# Patient Record
Sex: MALE | Race: BLACK OR AFRICAN AMERICAN | NOT HISPANIC OR LATINO | Employment: OTHER | URBAN - METROPOLITAN AREA
[De-identification: names, ages, dates, MRNs, and addresses within clinical notes are randomized per-mention and may not be internally consistent; named-entity substitution may affect disease eponyms.]

---

## 2018-07-02 LAB — FINAL CULTURE: NO GROWTH

## 2018-07-12 ENCOUNTER — HISTORICAL (OUTPATIENT)
Dept: ADMINISTRATIVE | Facility: HOSPITAL | Age: 43
End: 2018-07-12

## 2018-07-18 LAB
FINAL CULTURE: NORMAL
FINAL CULTURE: NORMAL

## 2018-11-26 ENCOUNTER — HISTORICAL (OUTPATIENT)
Dept: ADMINISTRATIVE | Facility: HOSPITAL | Age: 43
End: 2018-11-26

## 2018-12-03 ENCOUNTER — HISTORICAL (OUTPATIENT)
Dept: ADMINISTRATIVE | Facility: HOSPITAL | Age: 43
End: 2018-12-03

## 2019-01-03 ENCOUNTER — HISTORICAL (OUTPATIENT)
Dept: ADMINISTRATIVE | Facility: HOSPITAL | Age: 44
End: 2019-01-03

## 2019-01-04 ENCOUNTER — HISTORICAL (OUTPATIENT)
Dept: LAB | Facility: HOSPITAL | Age: 44
End: 2019-01-04

## 2019-01-08 ENCOUNTER — HISTORICAL (OUTPATIENT)
Dept: ADMINISTRATIVE | Facility: HOSPITAL | Age: 44
End: 2019-01-08

## 2019-01-08 ENCOUNTER — HISTORICAL (OUTPATIENT)
Dept: LAB | Facility: HOSPITAL | Age: 44
End: 2019-01-08

## 2019-01-14 ENCOUNTER — HISTORICAL (OUTPATIENT)
Dept: ADMINISTRATIVE | Facility: HOSPITAL | Age: 44
End: 2019-01-14

## 2019-01-14 ENCOUNTER — HISTORICAL (OUTPATIENT)
Dept: LAB | Facility: HOSPITAL | Age: 44
End: 2019-01-14

## 2019-01-21 ENCOUNTER — HISTORICAL (OUTPATIENT)
Dept: LAB | Facility: HOSPITAL | Age: 44
End: 2019-01-21

## 2019-01-21 ENCOUNTER — HISTORICAL (OUTPATIENT)
Dept: ADMINISTRATIVE | Facility: HOSPITAL | Age: 44
End: 2019-01-21

## 2019-01-21 LAB
ABS NEUT (OLG): 5.6 X10(3)/MCL (ref 2.1–9.2)
ALBUMIN SERPL-MCNC: 3 GM/DL (ref 3.4–5)
ALBUMIN/GLOB SERPL: 0.8 RATIO (ref 1.1–2)
ALP SERPL-CCNC: 102 UNIT/L (ref 50–136)
ALT SERPL-CCNC: 16 UNIT/L (ref 12–78)
AST SERPL-CCNC: 9 UNIT/L (ref 15–37)
BASOPHILS # BLD AUTO: 0 X10(3)/MCL (ref 0–0.2)
BASOPHILS NFR BLD AUTO: 1 %
BILIRUB SERPL-MCNC: 0.3 MG/DL (ref 0.2–1)
BILIRUBIN DIRECT+TOT PNL SERPL-MCNC: 0.1 MG/DL (ref 0–0.5)
BILIRUBIN DIRECT+TOT PNL SERPL-MCNC: 0.2 MG/DL (ref 0–0.8)
BUN SERPL-MCNC: 12 MG/DL (ref 7–18)
CALCIUM SERPL-MCNC: 10 MG/DL (ref 8.5–10.1)
CHLORIDE SERPL-SCNC: 104 MMOL/L (ref 98–107)
CO2 SERPL-SCNC: 28 MMOL/L (ref 21–32)
CREAT SERPL-MCNC: 0.41 MG/DL (ref 0.7–1.3)
CRP SERPL HS-MCNC: 66.7 MG/L (ref 0–3)
EOSINOPHIL # BLD AUTO: 0.3 X10(3)/MCL (ref 0–0.9)
EOSINOPHIL NFR BLD AUTO: 4 %
ERYTHROCYTE [DISTWIDTH] IN BLOOD BY AUTOMATED COUNT: 16.3 % (ref 11.5–17)
ERYTHROCYTE [SEDIMENTATION RATE] IN BLOOD: 82 MM/HR (ref 0–15)
GLOBULIN SER-MCNC: 4 GM/DL (ref 2.4–3.5)
GLUCOSE SERPL-MCNC: 67 MG/DL (ref 74–106)
HCT VFR BLD AUTO: 34 % (ref 42–52)
HGB BLD-MCNC: 10.1 GM/DL (ref 14–18)
LYMPHOCYTES # BLD AUTO: 1.9 X10(3)/MCL (ref 0.6–4.6)
LYMPHOCYTES NFR BLD AUTO: 22 %
MCH RBC QN AUTO: 26 PG (ref 27–31)
MCHC RBC AUTO-ENTMCNC: 29.7 GM/DL (ref 33–36)
MCV RBC AUTO: 87.4 FL (ref 80–94)
MONOCYTES # BLD AUTO: 0.6 X10(3)/MCL (ref 0.1–1.3)
MONOCYTES NFR BLD AUTO: 7 %
NEUTROPHILS # BLD AUTO: 5.6 X10(3)/MCL (ref 2.1–9.2)
NEUTROPHILS NFR BLD AUTO: 66 %
PLATELET # BLD AUTO: 424 X10(3)/MCL (ref 130–400)
PMV BLD AUTO: 10.2 FL (ref 9.4–12.4)
POTASSIUM SERPL-SCNC: 4 MMOL/L (ref 3.5–5.1)
PROT SERPL-MCNC: 7 GM/DL (ref 6.4–8.2)
RBC # BLD AUTO: 3.89 X10(6)/MCL (ref 4.7–6.1)
SODIUM SERPL-SCNC: 140 MMOL/L (ref 136–145)
VANCOMYCIN TROUGH SERPL-MCNC: 20.6 MCG/ML (ref 12–20)
WBC # SPEC AUTO: 8.5 X10(3)/MCL (ref 4.5–11.5)

## 2019-01-28 ENCOUNTER — HISTORICAL (OUTPATIENT)
Dept: ADMINISTRATIVE | Facility: HOSPITAL | Age: 44
End: 2019-01-28

## 2019-01-30 ENCOUNTER — HISTORICAL (OUTPATIENT)
Dept: LAB | Facility: HOSPITAL | Age: 44
End: 2019-01-30

## 2019-01-30 LAB
ABS NEUT (OLG): 7.06 X10(3)/MCL (ref 2.1–9.2)
ALBUMIN SERPL-MCNC: 2.6 GM/DL (ref 3.4–5)
ALBUMIN/GLOB SERPL: 0.7 {RATIO}
ALP SERPL-CCNC: 89 UNIT/L (ref 50–136)
ALT SERPL-CCNC: 9 UNIT/L (ref 12–78)
AST SERPL-CCNC: 8 UNIT/L (ref 15–37)
BASOPHILS # BLD AUTO: 0 X10(3)/MCL (ref 0–0.2)
BASOPHILS NFR BLD AUTO: 0 %
BILIRUB SERPL-MCNC: 0.3 MG/DL (ref 0.2–1)
BILIRUBIN DIRECT+TOT PNL SERPL-MCNC: 0.1 MG/DL (ref 0–0.2)
BILIRUBIN DIRECT+TOT PNL SERPL-MCNC: 0.2 MG/DL (ref 0–0.8)
BUN SERPL-MCNC: 7 MG/DL (ref 7–18)
CALCIUM SERPL-MCNC: 8.6 MG/DL (ref 8.5–10.1)
CHLORIDE SERPL-SCNC: 106 MMOL/L (ref 98–107)
CO2 SERPL-SCNC: 27 MMOL/L (ref 21–32)
CREAT SERPL-MCNC: 0.51 MG/DL (ref 0.7–1.3)
CRP SERPL HS-MCNC: 124 MG/L (ref 0–3)
EOSINOPHIL # BLD AUTO: 0.1 X10(3)/MCL (ref 0–0.9)
EOSINOPHIL NFR BLD AUTO: 2 %
ERYTHROCYTE [DISTWIDTH] IN BLOOD BY AUTOMATED COUNT: 16.2 % (ref 11.5–17)
ERYTHROCYTE [SEDIMENTATION RATE] IN BLOOD: 89 MM/HR (ref 0–15)
GLOBULIN SER-MCNC: 3.8 GM/DL (ref 2.4–3.5)
GLUCOSE SERPL-MCNC: 78 MG/DL (ref 74–106)
HCT VFR BLD AUTO: 30.2 % (ref 42–52)
HGB BLD-MCNC: 9 GM/DL (ref 14–18)
LYMPHOCYTES # BLD AUTO: 1.6 X10(3)/MCL (ref 0.6–4.6)
LYMPHOCYTES NFR BLD AUTO: 17 %
MCH RBC QN AUTO: 26 PG (ref 27–31)
MCHC RBC AUTO-ENTMCNC: 29.8 GM/DL (ref 33–36)
MCV RBC AUTO: 87.3 FL (ref 80–94)
MONOCYTES # BLD AUTO: 0.5 X10(3)/MCL (ref 0.1–1.3)
MONOCYTES NFR BLD AUTO: 6 %
NEUTROPHILS # BLD AUTO: 7.06 X10(3)/MCL (ref 2.1–9.2)
NEUTROPHILS NFR BLD AUTO: 75 %
PLATELET # BLD AUTO: 388 X10(3)/MCL (ref 130–400)
PMV BLD AUTO: 10.4 FL (ref 9.4–12.4)
POTASSIUM SERPL-SCNC: 3.8 MMOL/L (ref 3.5–5.1)
PROT SERPL-MCNC: 6.4 GM/DL (ref 6.4–8.2)
RBC # BLD AUTO: 3.46 X10(6)/MCL (ref 4.7–6.1)
SODIUM SERPL-SCNC: 142 MMOL/L (ref 136–145)
VANCOMYCIN TROUGH SERPL-MCNC: 19.6 MCG/ML (ref 12–20)
WBC # SPEC AUTO: 9.4 X10(3)/MCL (ref 4.5–11.5)

## 2019-02-04 ENCOUNTER — HISTORICAL (OUTPATIENT)
Dept: ADMINISTRATIVE | Facility: HOSPITAL | Age: 44
End: 2019-02-04

## 2019-03-07 ENCOUNTER — HISTORICAL (OUTPATIENT)
Dept: ADMINISTRATIVE | Facility: HOSPITAL | Age: 44
End: 2019-03-07

## 2019-03-14 ENCOUNTER — HOSPITAL ENCOUNTER (OUTPATIENT)
Dept: MEDSURG UNIT | Facility: HOSPITAL | Age: 44
End: 2019-04-04
Attending: INTERNAL MEDICINE | Admitting: INTERNAL MEDICINE

## 2019-03-14 LAB
ABS NEUT (OLG): 20.96 X10(3)/MCL (ref 2.1–9.2)
ALBUMIN SERPL-MCNC: 2.3 GM/DL (ref 3.4–5)
ALBUMIN/GLOB SERPL: 0.4 RATIO (ref 1.1–2)
ALP SERPL-CCNC: 136 UNIT/L (ref 50–136)
ALT SERPL-CCNC: 11 UNIT/L (ref 12–78)
AMPHET UR QL SCN: ABNORMAL
APPEARANCE, UA: ABNORMAL
AST SERPL-CCNC: 8 UNIT/L (ref 15–37)
BACTERIA SPEC CULT: ABNORMAL /HPF
BARBITURATE SCN PRESENT UR: ABNORMAL
BENZODIAZ UR QL SCN: ABNORMAL
BILIRUB SERPL-MCNC: 0.6 MG/DL (ref 0.2–1)
BILIRUB UR QL STRIP: ABNORMAL
BILIRUBIN DIRECT+TOT PNL SERPL-MCNC: 0.2 MG/DL (ref 0–0.2)
BILIRUBIN DIRECT+TOT PNL SERPL-MCNC: 0.4 MG/DL (ref 0–0.8)
BUN SERPL-MCNC: 17 MG/DL (ref 7–18)
CALCIUM SERPL-MCNC: 9 MG/DL (ref 8.5–10.1)
CANNABINOIDS UR QL SCN: POSITIVE
CHLORIDE SERPL-SCNC: 98 MMOL/L (ref 98–107)
CO2 SERPL-SCNC: 26 MMOL/L (ref 21–32)
COCAINE UR QL SCN: ABNORMAL
COLOR UR: ABNORMAL
CREAT SERPL-MCNC: 0.79 MG/DL (ref 0.7–1.3)
CRP SERPL HS-MCNC: >190 MG/L (ref 0–3)
ERYTHROCYTE [DISTWIDTH] IN BLOOD BY AUTOMATED COUNT: 15.7 % (ref 11.5–17)
ERYTHROCYTE [SEDIMENTATION RATE] IN BLOOD: 120 MM/HR (ref 0–15)
GLOBULIN SER-MCNC: 5.8 GM/DL (ref 2.4–3.5)
GLUCOSE (UA): NEGATIVE
GLUCOSE SERPL-MCNC: 86 MG/DL (ref 74–106)
HCT VFR BLD AUTO: 32.4 % (ref 42–52)
HGB BLD-MCNC: 9.5 GM/DL (ref 14–18)
HGB UR QL STRIP: ABNORMAL
HYPOCHROMIA BLD QL SMEAR: 1
KETONES UR QL STRIP: ABNORMAL
LACTATE SERPL-SCNC: 0.8 MMOL/L (ref 0.4–2)
LACTATE SERPL-SCNC: 2.4 MMOL/L (ref 0.4–2)
LACTATE SERPL-SCNC: 3.9 MMOL/L (ref 0.4–2)
LACTATE SERPL-SCNC: 5.1 MMOL/L (ref 0.4–2)
LEUKOCYTE ESTERASE UR QL STRIP: ABNORMAL
LYMPHOCYTES NFR BLD MANUAL: 5 % (ref 13–40)
MCH RBC QN AUTO: 26.4 PG (ref 27–31)
MCHC RBC AUTO-ENTMCNC: 29.3 GM/DL (ref 33–36)
MCV RBC AUTO: 90 FL (ref 80–94)
MONOCYTES NFR BLD MANUAL: 2 % (ref 2–11)
NEUTROPHILS NFR BLD MANUAL: 93 % (ref 47–80)
NITRITE UR QL STRIP: NEGATIVE
OPIATES UR QL SCN: POSITIVE
PCP UR QL: ABNORMAL
PH UR STRIP.AUTO: 5.5 [PH] (ref 5–7.5)
PH UR STRIP: 5.5 [PH] (ref 5–9)
PLATELET # BLD AUTO: 540 X10(3)/MCL (ref 130–400)
PLATELET # BLD EST: NORMAL 10*3/UL
PMV BLD AUTO: 9.7 FL (ref 7.4–10.4)
POTASSIUM SERPL-SCNC: 4.1 MMOL/L (ref 3.5–5.1)
PREALB SERPL-MCNC: 7.7 MG/DL (ref 18–38)
PROT SERPL-MCNC: 8.1 GM/DL (ref 6.4–8.2)
PROT UR QL STRIP: ABNORMAL
RBC # BLD AUTO: 3.6 X10(6)/MCL (ref 4.7–6.1)
RBC #/AREA URNS HPF: ABNORMAL /HPF
RBC MORPH BLD: NORMAL
SODIUM SERPL-SCNC: 134 MMOL/L (ref 136–145)
SP GR FLD REFRACTOMETRY: 1.02 (ref 1–1.03)
SP GR UR STRIP: 1.02 (ref 1–1.03)
SQUAMOUS EPITHELIAL, UA: ABNORMAL
UA WBC MAN: >200 /HPF
UROBILINOGEN UR STRIP-ACNC: 1
WBC # SPEC AUTO: 24.1 X10(3)/MCL (ref 4.5–11.5)
WBC #/AREA URNS HPF: ABNORMAL /HPF (ref 0–3)

## 2019-03-15 LAB
ABS NEUT (OLG): 12.13 X10(3)/MCL (ref 2.1–9.2)
ALBUMIN SERPL-MCNC: 2.1 GM/DL (ref 3.4–5)
ALBUMIN/GLOB SERPL: 0.5 RATIO (ref 1.1–2)
ALP SERPL-CCNC: 125 UNIT/L (ref 50–136)
ALT SERPL-CCNC: 13 UNIT/L (ref 12–78)
AST SERPL-CCNC: 11 UNIT/L (ref 15–37)
BASOPHILS # BLD AUTO: 0.1 X10(3)/MCL (ref 0–0.2)
BASOPHILS NFR BLD AUTO: 1 %
BILIRUB SERPL-MCNC: 0.2 MG/DL (ref 0.2–1)
BILIRUBIN DIRECT+TOT PNL SERPL-MCNC: 0.1 MG/DL (ref 0–0.5)
BILIRUBIN DIRECT+TOT PNL SERPL-MCNC: 0.1 MG/DL (ref 0–0.8)
BUN SERPL-MCNC: 10 MG/DL (ref 7–18)
CALCIUM SERPL-MCNC: 8.5 MG/DL (ref 8.5–10.1)
CHLORIDE SERPL-SCNC: 101 MMOL/L (ref 98–107)
CO2 SERPL-SCNC: 26 MMOL/L (ref 21–32)
CREAT SERPL-MCNC: 0.45 MG/DL (ref 0.7–1.3)
EOSINOPHIL # BLD AUTO: 0.8 X10(3)/MCL (ref 0–0.9)
EOSINOPHIL NFR BLD AUTO: 5 %
ERYTHROCYTE [DISTWIDTH] IN BLOOD BY AUTOMATED COUNT: 15.5 % (ref 11.5–17)
GLOBULIN SER-MCNC: 4.4 GM/DL (ref 2.4–3.5)
GLUCOSE SERPL-MCNC: 126 MG/DL (ref 74–106)
HCT VFR BLD AUTO: 26.2 % (ref 42–52)
HGB BLD-MCNC: 7.8 GM/DL (ref 14–18)
HIV 1+2 AB+HIV1 P24 AG SERPL QL IA: NEGATIVE
LYMPHOCYTES # BLD AUTO: 1.8 X10(3)/MCL (ref 0.6–4.6)
LYMPHOCYTES NFR BLD AUTO: 12 %
MCH RBC QN AUTO: 26.6 PG (ref 27–31)
MCHC RBC AUTO-ENTMCNC: 29.8 GM/DL (ref 33–36)
MCV RBC AUTO: 89.4 FL (ref 80–94)
MONOCYTES # BLD AUTO: 0.8 X10(3)/MCL (ref 0.1–1.3)
MONOCYTES NFR BLD AUTO: 5 %
NEUTROPHILS # BLD AUTO: 12.13 X10(3)/MCL (ref 2.1–9.2)
NEUTROPHILS NFR BLD AUTO: 77 %
PLATELET # BLD AUTO: 491 X10(3)/MCL (ref 130–400)
PMV BLD AUTO: 9.5 FL (ref 9.4–12.4)
POTASSIUM SERPL-SCNC: 4.1 MMOL/L (ref 3.5–5.1)
PROT SERPL-MCNC: 6.5 GM/DL (ref 6.4–8.2)
RBC # BLD AUTO: 2.93 X10(6)/MCL (ref 4.7–6.1)
SODIUM SERPL-SCNC: 135 MMOL/L (ref 136–145)
T PALLIDUM AB SER QL: NEGATIVE
WBC # SPEC AUTO: 15.8 X10(3)/MCL (ref 4.5–11.5)

## 2019-03-16 LAB
ABS NEUT (OLG): 8.22 X10(3)/MCL (ref 2.1–9.2)
BASOPHILS # BLD AUTO: 0.1 X10(3)/MCL (ref 0–0.2)
BASOPHILS NFR BLD AUTO: 1 %
BUN SERPL-MCNC: 6 MG/DL (ref 7–18)
CALCIUM SERPL-MCNC: 8.7 MG/DL (ref 8.5–10.1)
CHLORIDE SERPL-SCNC: 104 MMOL/L (ref 98–107)
CO2 SERPL-SCNC: 26 MMOL/L (ref 21–32)
CREAT SERPL-MCNC: 0.32 MG/DL (ref 0.7–1.3)
CREAT/UREA NIT SERPL: 18.8
EOSINOPHIL # BLD AUTO: 0.8 X10(3)/MCL (ref 0–0.9)
EOSINOPHIL NFR BLD AUTO: 6 %
ERYTHROCYTE [DISTWIDTH] IN BLOOD BY AUTOMATED COUNT: 15.6 % (ref 11.5–17)
GLUCOSE SERPL-MCNC: 82 MG/DL (ref 74–106)
HCT VFR BLD AUTO: 27.2 % (ref 42–52)
HGB BLD-MCNC: 7.9 GM/DL (ref 14–18)
LYMPHOCYTES # BLD AUTO: 2 X10(3)/MCL (ref 0.6–4.6)
LYMPHOCYTES NFR BLD AUTO: 17 %
MCH RBC QN AUTO: 26.2 PG (ref 27–31)
MCHC RBC AUTO-ENTMCNC: 29 GM/DL (ref 33–36)
MCV RBC AUTO: 90.4 FL (ref 80–94)
MONOCYTES # BLD AUTO: 0.8 X10(3)/MCL (ref 0.1–1.3)
MONOCYTES NFR BLD AUTO: 7 %
NEUTROPHILS # BLD AUTO: 8.22 X10(3)/MCL (ref 2.1–9.2)
NEUTROPHILS NFR BLD AUTO: 69 %
PLATELET # BLD AUTO: 452 X10(3)/MCL (ref 130–400)
PMV BLD AUTO: 9.5 FL (ref 9.4–12.4)
POTASSIUM SERPL-SCNC: 4.2 MMOL/L (ref 3.5–5.1)
RBC # BLD AUTO: 3.01 X10(6)/MCL (ref 4.7–6.1)
SODIUM SERPL-SCNC: 138 MMOL/L (ref 136–145)
WBC # SPEC AUTO: 11.9 X10(3)/MCL (ref 4.5–11.5)

## 2019-03-17 LAB
ABS NEUT (OLG): 6.97 X10(3)/MCL (ref 2.1–9.2)
BASOPHILS # BLD AUTO: 0.1 X10(3)/MCL (ref 0–0.2)
BASOPHILS NFR BLD AUTO: 1 %
EOSINOPHIL # BLD AUTO: 0.7 X10(3)/MCL (ref 0–0.9)
EOSINOPHIL NFR BLD AUTO: 7 %
ERYTHROCYTE [DISTWIDTH] IN BLOOD BY AUTOMATED COUNT: 15.3 % (ref 11.5–17)
HCT VFR BLD AUTO: 28.8 % (ref 42–52)
HGB BLD-MCNC: 8.2 GM/DL (ref 14–18)
LYMPHOCYTES # BLD AUTO: 2 X10(3)/MCL (ref 0.6–4.6)
LYMPHOCYTES NFR BLD AUTO: 19 %
MCH RBC QN AUTO: 26.1 PG (ref 27–31)
MCHC RBC AUTO-ENTMCNC: 28.5 GM/DL (ref 33–36)
MCV RBC AUTO: 91.7 FL (ref 80–94)
MONOCYTES # BLD AUTO: 0.8 X10(3)/MCL (ref 0.1–1.3)
MONOCYTES NFR BLD AUTO: 7 %
NEUTROPHILS # BLD AUTO: 6.97 X10(3)/MCL (ref 2.1–9.2)
NEUTROPHILS NFR BLD AUTO: 65 %
PLATELET # BLD AUTO: 547 X10(3)/MCL (ref 130–400)
PMV BLD AUTO: 9.3 FL (ref 9.4–12.4)
RBC # BLD AUTO: 3.14 X10(6)/MCL (ref 4.7–6.1)
WBC # SPEC AUTO: 10.8 X10(3)/MCL (ref 4.5–11.5)

## 2019-03-18 LAB
ABS NEUT (OLG): 7.27 X10(3)/MCL (ref 2.1–9.2)
BASOPHILS # BLD AUTO: 0.1 X10(3)/MCL (ref 0–0.2)
BASOPHILS NFR BLD AUTO: 1 %
EOSINOPHIL # BLD AUTO: 0.7 X10(3)/MCL (ref 0–0.9)
EOSINOPHIL NFR BLD AUTO: 6 %
ERYTHROCYTE [DISTWIDTH] IN BLOOD BY AUTOMATED COUNT: 15.5 % (ref 11.5–17)
HCT VFR BLD AUTO: 30 % (ref 42–52)
HGB BLD-MCNC: 8.5 GM/DL (ref 14–18)
LYMPHOCYTES # BLD AUTO: 2.8 X10(3)/MCL (ref 0.6–4.6)
LYMPHOCYTES NFR BLD AUTO: 24 %
MCH RBC QN AUTO: 25.9 PG (ref 27–31)
MCHC RBC AUTO-ENTMCNC: 28.3 GM/DL (ref 33–36)
MCV RBC AUTO: 91.5 FL (ref 80–94)
MONOCYTES # BLD AUTO: 0.9 X10(3)/MCL (ref 0.1–1.3)
MONOCYTES NFR BLD AUTO: 8 %
NEUTROPHILS # BLD AUTO: 7.27 X10(3)/MCL (ref 2.1–9.2)
NEUTROPHILS NFR BLD AUTO: 61 %
PLATELET # BLD AUTO: 578 X10(3)/MCL (ref 130–400)
PMV BLD AUTO: 9.5 FL (ref 9.4–12.4)
RBC # BLD AUTO: 3.28 X10(6)/MCL (ref 4.7–6.1)
WBC # SPEC AUTO: 11.9 X10(3)/MCL (ref 4.5–11.5)

## 2019-03-19 LAB — FINAL CULTURE: NORMAL

## 2019-03-20 LAB
ABS NEUT (OLG): 6.55 X10(3)/MCL (ref 2.1–9.2)
ALBUMIN SERPL-MCNC: 2.6 GM/DL (ref 3.4–5)
ALBUMIN/GLOB SERPL: 0.6 {RATIO}
ALP SERPL-CCNC: 100 UNIT/L (ref 50–136)
ALT SERPL-CCNC: 19 UNIT/L (ref 12–78)
AST SERPL-CCNC: 8 UNIT/L (ref 15–37)
BASOPHILS # BLD AUTO: 0.1 X10(3)/MCL (ref 0–0.2)
BASOPHILS NFR BLD AUTO: 1 %
BILIRUB SERPL-MCNC: 0.6 MG/DL (ref 0.2–1)
BILIRUBIN DIRECT+TOT PNL SERPL-MCNC: 0.1 MG/DL (ref 0–0.2)
BILIRUBIN DIRECT+TOT PNL SERPL-MCNC: 0.5 MG/DL (ref 0–0.8)
BUN SERPL-MCNC: 12 MG/DL (ref 7–18)
CALCIUM SERPL-MCNC: 9.4 MG/DL (ref 8.5–10.1)
CHLORIDE SERPL-SCNC: 98 MMOL/L (ref 98–107)
CO2 SERPL-SCNC: 31 MMOL/L (ref 21–32)
CREAT SERPL-MCNC: 0.37 MG/DL (ref 0.7–1.3)
EOSINOPHIL # BLD AUTO: 0.5 X10(3)/MCL (ref 0–0.9)
EOSINOPHIL NFR BLD AUTO: 5 %
ERYTHROCYTE [DISTWIDTH] IN BLOOD BY AUTOMATED COUNT: 15.9 % (ref 11.5–17)
GLOBULIN SER-MCNC: 4.6 GM/DL (ref 2.4–3.5)
GLUCOSE SERPL-MCNC: 95 MG/DL (ref 74–106)
HCT VFR BLD AUTO: 30.5 % (ref 42–52)
HGB BLD-MCNC: 9 GM/DL (ref 14–18)
LYMPHOCYTES # BLD AUTO: 2.6 X10(3)/MCL (ref 0.6–4.6)
LYMPHOCYTES NFR BLD AUTO: 24 %
MCH RBC QN AUTO: 25.7 PG (ref 27–31)
MCHC RBC AUTO-ENTMCNC: 29.5 GM/DL (ref 33–36)
MCV RBC AUTO: 87.1 FL (ref 80–94)
MONOCYTES # BLD AUTO: 0.9 X10(3)/MCL (ref 0.1–1.3)
MONOCYTES NFR BLD AUTO: 8 %
NEUTROPHILS # BLD AUTO: 6.55 X10(3)/MCL (ref 2.1–9.2)
NEUTROPHILS NFR BLD AUTO: 61 %
PLATELET # BLD AUTO: 667 X10(3)/MCL (ref 130–400)
PMV BLD AUTO: 9.1 FL (ref 9.4–12.4)
POTASSIUM SERPL-SCNC: 4.9 MMOL/L (ref 3.5–5.1)
PROT SERPL-MCNC: 7.2 GM/DL (ref 6.4–8.2)
RBC # BLD AUTO: 3.5 X10(6)/MCL (ref 4.7–6.1)
SODIUM SERPL-SCNC: 137 MMOL/L (ref 136–145)
WBC # SPEC AUTO: 10.8 X10(3)/MCL (ref 4.5–11.5)

## 2019-03-23 LAB
ABS NEUT (OLG): 4.9 X10(3)/MCL (ref 2.1–9.2)
ALBUMIN SERPL-MCNC: 2.6 GM/DL (ref 3.4–5)
ALBUMIN/GLOB SERPL: 0.6 RATIO (ref 1.1–2)
ALP SERPL-CCNC: 95 UNIT/L (ref 50–136)
ALT SERPL-CCNC: 16 UNIT/L (ref 12–78)
AST SERPL-CCNC: 9 UNIT/L (ref 15–37)
BASOPHILS # BLD AUTO: 0.1 X10(3)/MCL (ref 0–0.2)
BASOPHILS NFR BLD AUTO: 2 %
BILIRUB SERPL-MCNC: 0.1 MG/DL (ref 0.2–1)
BILIRUBIN DIRECT+TOT PNL SERPL-MCNC: 0 MG/DL (ref 0–0.8)
BILIRUBIN DIRECT+TOT PNL SERPL-MCNC: 0.1 MG/DL (ref 0–0.5)
BUN SERPL-MCNC: 14 MG/DL (ref 7–18)
CALCIUM SERPL-MCNC: 9.8 MG/DL (ref 8.5–10.1)
CHLORIDE SERPL-SCNC: 100 MMOL/L (ref 98–107)
CO2 SERPL-SCNC: 35 MMOL/L (ref 21–32)
CREAT SERPL-MCNC: 0.4 MG/DL (ref 0.7–1.3)
EOSINOPHIL # BLD AUTO: 0.5 X10(3)/MCL (ref 0–0.9)
EOSINOPHIL NFR BLD AUTO: 6 %
ERYTHROCYTE [DISTWIDTH] IN BLOOD BY AUTOMATED COUNT: 15.9 % (ref 11.5–17)
GLOBULIN SER-MCNC: 4.7 GM/DL (ref 2.4–3.5)
GLUCOSE SERPL-MCNC: 81 MG/DL (ref 74–106)
HCT VFR BLD AUTO: 31.1 % (ref 42–52)
HGB BLD-MCNC: 9.2 GM/DL (ref 14–18)
LYMPHOCYTES # BLD AUTO: 2.5 X10(3)/MCL (ref 0.6–4.6)
LYMPHOCYTES NFR BLD AUTO: 29 %
MAGNESIUM SERPL-MCNC: 2 MG/DL (ref 1.8–2.4)
MCH RBC QN AUTO: 26.6 PG (ref 27–31)
MCHC RBC AUTO-ENTMCNC: 29.6 GM/DL (ref 33–36)
MCV RBC AUTO: 89.9 FL (ref 80–94)
MONOCYTES # BLD AUTO: 0.5 X10(3)/MCL (ref 0.1–1.3)
MONOCYTES NFR BLD AUTO: 6 %
NEUTROPHILS # BLD AUTO: 4.9 X10(3)/MCL (ref 2.1–9.2)
NEUTROPHILS NFR BLD AUTO: 56 %
PLATELET # BLD AUTO: 707 X10(3)/MCL (ref 130–400)
PMV BLD AUTO: 8.9 FL (ref 9.4–12.4)
POTASSIUM SERPL-SCNC: 4.7 MMOL/L (ref 3.5–5.1)
PROT SERPL-MCNC: 7.3 GM/DL (ref 6.4–8.2)
RBC # BLD AUTO: 3.46 X10(6)/MCL (ref 4.7–6.1)
SODIUM SERPL-SCNC: 138 MMOL/L (ref 136–145)
WBC # SPEC AUTO: 8.7 X10(3)/MCL (ref 4.5–11.5)

## 2019-03-24 LAB
ABS NEUT (OLG): 8.54 X10(3)/MCL (ref 2.1–9.2)
ALBUMIN SERPL-MCNC: 2.7 GM/DL (ref 3.4–5)
ALBUMIN/GLOB SERPL: 0.6 RATIO (ref 1.1–2)
ALP SERPL-CCNC: 101 UNIT/L (ref 50–136)
ALT SERPL-CCNC: 15 UNIT/L (ref 12–78)
AST SERPL-CCNC: 8 UNIT/L (ref 15–37)
BASOPHILS # BLD AUTO: 0.1 X10(3)/MCL (ref 0–0.2)
BASOPHILS NFR BLD AUTO: 1 %
BILIRUB SERPL-MCNC: 0.2 MG/DL (ref 0.2–1)
BILIRUBIN DIRECT+TOT PNL SERPL-MCNC: 0.1 MG/DL (ref 0–0.5)
BILIRUBIN DIRECT+TOT PNL SERPL-MCNC: 0.1 MG/DL (ref 0–0.8)
BUN SERPL-MCNC: 18 MG/DL (ref 7–18)
CALCIUM SERPL-MCNC: 10.2 MG/DL (ref 8.5–10.1)
CHLORIDE SERPL-SCNC: 100 MMOL/L (ref 98–107)
CO2 SERPL-SCNC: 36 MMOL/L (ref 21–32)
CREAT SERPL-MCNC: 0.53 MG/DL (ref 0.7–1.3)
EOSINOPHIL # BLD AUTO: 0.5 X10(3)/MCL (ref 0–0.9)
EOSINOPHIL NFR BLD AUTO: 4 %
ERYTHROCYTE [DISTWIDTH] IN BLOOD BY AUTOMATED COUNT: 16.1 % (ref 11.5–17)
GLOBULIN SER-MCNC: 4.8 GM/DL (ref 2.4–3.5)
GLUCOSE SERPL-MCNC: 87 MG/DL (ref 74–106)
HCT VFR BLD AUTO: 32.2 % (ref 42–52)
HGB BLD-MCNC: 9.3 GM/DL (ref 14–18)
LYMPHOCYTES # BLD AUTO: 2.2 X10(3)/MCL (ref 0.6–4.6)
LYMPHOCYTES NFR BLD AUTO: 18 %
MAGNESIUM SERPL-MCNC: 2 MG/DL (ref 1.8–2.4)
MCH RBC QN AUTO: 25.8 PG (ref 27–31)
MCHC RBC AUTO-ENTMCNC: 28.9 GM/DL (ref 33–36)
MCV RBC AUTO: 89.4 FL (ref 80–94)
MONOCYTES # BLD AUTO: 0.7 X10(3)/MCL (ref 0.1–1.3)
MONOCYTES NFR BLD AUTO: 6 %
NEUTROPHILS # BLD AUTO: 8.54 X10(3)/MCL (ref 2.1–9.2)
NEUTROPHILS NFR BLD AUTO: 70 %
PLATELET # BLD AUTO: 657 X10(3)/MCL (ref 130–400)
PMV BLD AUTO: 8.7 FL (ref 9.4–12.4)
POTASSIUM SERPL-SCNC: 4.7 MMOL/L (ref 3.5–5.1)
PROT SERPL-MCNC: 7.5 GM/DL (ref 6.4–8.2)
RBC # BLD AUTO: 3.6 X10(6)/MCL (ref 4.7–6.1)
SODIUM SERPL-SCNC: 138 MMOL/L (ref 136–145)
WBC # SPEC AUTO: 12.2 X10(3)/MCL (ref 4.5–11.5)

## 2019-03-25 LAB
ABS NEUT (OLG): 7.95 X10(3)/MCL (ref 2.1–9.2)
ALBUMIN SERPL-MCNC: 2.7 GM/DL (ref 3.4–5)
ALBUMIN/GLOB SERPL: 0.6 RATIO (ref 1.1–2)
ALP SERPL-CCNC: 93 UNIT/L (ref 50–136)
ALT SERPL-CCNC: 12 UNIT/L (ref 12–78)
AST SERPL-CCNC: 10 UNIT/L (ref 15–37)
BASOPHILS # BLD AUTO: 0.1 X10(3)/MCL (ref 0–0.2)
BASOPHILS NFR BLD AUTO: 1 %
BILIRUB SERPL-MCNC: 0.2 MG/DL (ref 0.2–1)
BILIRUBIN DIRECT+TOT PNL SERPL-MCNC: 0.1 MG/DL (ref 0–0.5)
BILIRUBIN DIRECT+TOT PNL SERPL-MCNC: 0.1 MG/DL (ref 0–0.8)
BUN SERPL-MCNC: 16 MG/DL (ref 7–18)
CALCIUM SERPL-MCNC: 10 MG/DL (ref 8.5–10.1)
CHLORIDE SERPL-SCNC: 101 MMOL/L (ref 98–107)
CO2 SERPL-SCNC: 29 MMOL/L (ref 21–32)
CREAT SERPL-MCNC: 0.35 MG/DL (ref 0.7–1.3)
EOSINOPHIL # BLD AUTO: 0.5 X10(3)/MCL (ref 0–0.9)
EOSINOPHIL NFR BLD AUTO: 4 %
ERYTHROCYTE [DISTWIDTH] IN BLOOD BY AUTOMATED COUNT: 16 % (ref 11.5–17)
GLOBULIN SER-MCNC: 4.7 GM/DL (ref 2.4–3.5)
GLUCOSE SERPL-MCNC: 86 MG/DL (ref 74–106)
HCT VFR BLD AUTO: 31.4 % (ref 42–52)
HGB BLD-MCNC: 9.2 GM/DL (ref 14–18)
LYMPHOCYTES # BLD AUTO: 2.5 X10(3)/MCL (ref 0.6–4.6)
LYMPHOCYTES NFR BLD AUTO: 21 %
MAGNESIUM SERPL-MCNC: 2 MG/DL (ref 1.8–2.4)
MCH RBC QN AUTO: 26.1 PG (ref 27–31)
MCHC RBC AUTO-ENTMCNC: 29.3 GM/DL (ref 33–36)
MCV RBC AUTO: 89.2 FL (ref 80–94)
MONOCYTES # BLD AUTO: 0.7 X10(3)/MCL (ref 0.1–1.3)
MONOCYTES NFR BLD AUTO: 6 %
NEUTROPHILS # BLD AUTO: 7.95 X10(3)/MCL (ref 2.1–9.2)
NEUTROPHILS NFR BLD AUTO: 68 %
PLATELET # BLD AUTO: 651 X10(3)/MCL (ref 130–400)
PMV BLD AUTO: 8.6 FL (ref 9.4–12.4)
POTASSIUM SERPL-SCNC: 4.6 MMOL/L (ref 3.5–5.1)
PROT SERPL-MCNC: 7.4 GM/DL (ref 6.4–8.2)
RBC # BLD AUTO: 3.52 X10(6)/MCL (ref 4.7–6.1)
SODIUM SERPL-SCNC: 137 MMOL/L (ref 136–145)
WBC # SPEC AUTO: 11.8 X10(3)/MCL (ref 4.5–11.5)

## 2019-03-26 LAB
ABS NEUT (OLG): 6.86 X10(3)/MCL (ref 2.1–9.2)
ALBUMIN SERPL-MCNC: 2.8 GM/DL (ref 3.4–5)
ALBUMIN/GLOB SERPL: 0.6 RATIO (ref 1.1–2)
ALP SERPL-CCNC: 91 UNIT/L (ref 50–136)
ALT SERPL-CCNC: 16 UNIT/L (ref 12–78)
AST SERPL-CCNC: 13 UNIT/L (ref 15–37)
BASOPHILS # BLD AUTO: 0.1 X10(3)/MCL (ref 0–0.2)
BASOPHILS NFR BLD AUTO: 1 %
BILIRUB SERPL-MCNC: 0.2 MG/DL (ref 0.2–1)
BILIRUBIN DIRECT+TOT PNL SERPL-MCNC: 0.1 MG/DL (ref 0–0.5)
BILIRUBIN DIRECT+TOT PNL SERPL-MCNC: 0.1 MG/DL (ref 0–0.8)
BUN SERPL-MCNC: 19 MG/DL (ref 7–18)
CALCIUM SERPL-MCNC: 9.8 MG/DL (ref 8.5–10.1)
CHLORIDE SERPL-SCNC: 101 MMOL/L (ref 98–107)
CO2 SERPL-SCNC: 30 MMOL/L (ref 21–32)
CREAT SERPL-MCNC: 0.49 MG/DL (ref 0.7–1.3)
EOSINOPHIL # BLD AUTO: 0.6 X10(3)/MCL (ref 0–0.9)
EOSINOPHIL NFR BLD AUTO: 6 %
ERYTHROCYTE [DISTWIDTH] IN BLOOD BY AUTOMATED COUNT: 15.9 % (ref 11.5–17)
GLOBULIN SER-MCNC: 4.6 GM/DL (ref 2.4–3.5)
GLUCOSE SERPL-MCNC: 95 MG/DL (ref 74–106)
HCT VFR BLD AUTO: 30.6 % (ref 42–52)
HGB BLD-MCNC: 9 GM/DL (ref 14–18)
LYMPHOCYTES # BLD AUTO: 2.9 X10(3)/MCL (ref 0.6–4.6)
LYMPHOCYTES NFR BLD AUTO: 26 %
MAGNESIUM SERPL-MCNC: 2 MG/DL (ref 1.8–2.4)
MCH RBC QN AUTO: 26.1 PG (ref 27–31)
MCHC RBC AUTO-ENTMCNC: 29.4 GM/DL (ref 33–36)
MCV RBC AUTO: 88.7 FL (ref 80–94)
MONOCYTES # BLD AUTO: 0.7 X10(3)/MCL (ref 0.1–1.3)
MONOCYTES NFR BLD AUTO: 6 %
NEUTROPHILS # BLD AUTO: 6.86 X10(3)/MCL (ref 2.1–9.2)
NEUTROPHILS NFR BLD AUTO: 60 %
PLATELET # BLD AUTO: 598 X10(3)/MCL (ref 130–400)
PMV BLD AUTO: 8.8 FL (ref 9.4–12.4)
POTASSIUM SERPL-SCNC: 4.6 MMOL/L (ref 3.5–5.1)
PROT SERPL-MCNC: 7.4 GM/DL (ref 6.4–8.2)
RBC # BLD AUTO: 3.45 X10(6)/MCL (ref 4.7–6.1)
SODIUM SERPL-SCNC: 138 MMOL/L (ref 136–145)
WBC # SPEC AUTO: 11.4 X10(3)/MCL (ref 4.5–11.5)

## 2019-03-27 LAB
ABS NEUT (OLG): 7.13 X10(3)/MCL (ref 2.1–9.2)
ALBUMIN SERPL-MCNC: 2.7 GM/DL (ref 3.4–5)
ALBUMIN/GLOB SERPL: 0.6 {RATIO}
ALP SERPL-CCNC: 95 UNIT/L (ref 50–136)
ALT SERPL-CCNC: 12 UNIT/L (ref 12–78)
AST SERPL-CCNC: 7 UNIT/L (ref 15–37)
BASOPHILS # BLD AUTO: 0.2 X10(3)/MCL (ref 0–0.2)
BASOPHILS NFR BLD AUTO: 1 %
BILIRUB SERPL-MCNC: 0.2 MG/DL (ref 0.2–1)
BILIRUBIN DIRECT+TOT PNL SERPL-MCNC: 0.1 MG/DL (ref 0–0.2)
BILIRUBIN DIRECT+TOT PNL SERPL-MCNC: 0.1 MG/DL (ref 0–0.8)
BUN SERPL-MCNC: 17 MG/DL (ref 7–18)
CALCIUM SERPL-MCNC: 9.6 MG/DL (ref 8.5–10.1)
CHLORIDE SERPL-SCNC: 99 MMOL/L (ref 98–107)
CO2 SERPL-SCNC: 28 MMOL/L (ref 21–32)
CREAT SERPL-MCNC: 0.46 MG/DL (ref 0.7–1.3)
EOSINOPHIL # BLD AUTO: 0.6 X10(3)/MCL (ref 0–0.9)
EOSINOPHIL NFR BLD AUTO: 5 %
ERYTHROCYTE [DISTWIDTH] IN BLOOD BY AUTOMATED COUNT: 15.9 % (ref 11.5–17)
GLOBULIN SER-MCNC: 4.5 GM/DL (ref 2.4–3.5)
GLUCOSE SERPL-MCNC: 79 MG/DL (ref 74–106)
HCT VFR BLD AUTO: 29.6 % (ref 42–52)
HGB BLD-MCNC: 8.5 GM/DL (ref 14–18)
LYMPHOCYTES # BLD AUTO: 3.1 X10(3)/MCL (ref 0.6–4.6)
LYMPHOCYTES NFR BLD AUTO: 26 %
MAGNESIUM SERPL-MCNC: 1.9 MG/DL (ref 1.8–2.4)
MCH RBC QN AUTO: 26 PG (ref 27–31)
MCHC RBC AUTO-ENTMCNC: 28.7 GM/DL (ref 33–36)
MCV RBC AUTO: 90.5 FL (ref 80–94)
MONOCYTES # BLD AUTO: 0.7 X10(3)/MCL (ref 0.1–1.3)
MONOCYTES NFR BLD AUTO: 6 %
NEUTROPHILS # BLD AUTO: 7.13 X10(3)/MCL (ref 2.1–9.2)
NEUTROPHILS NFR BLD AUTO: 61 %
PLATELET # BLD AUTO: 598 X10(3)/MCL (ref 130–400)
PMV BLD AUTO: 8.9 FL (ref 9.4–12.4)
POTASSIUM SERPL-SCNC: 4.4 MMOL/L (ref 3.5–5.1)
PROT SERPL-MCNC: 7.2 GM/DL (ref 6.4–8.2)
RBC # BLD AUTO: 3.27 X10(6)/MCL (ref 4.7–6.1)
SODIUM SERPL-SCNC: 137 MMOL/L (ref 136–145)
WBC # SPEC AUTO: 11.8 X10(3)/MCL (ref 4.5–11.5)

## 2019-03-29 ENCOUNTER — HISTORICAL (OUTPATIENT)
Dept: LAB | Facility: HOSPITAL | Age: 44
End: 2019-03-29

## 2019-03-29 LAB
ABS NEUT (OLG): 9.56 X10(3)/MCL (ref 2.1–9.2)
BASOPHILS # BLD AUTO: 0.2 X10(3)/MCL (ref 0–0.2)
BASOPHILS NFR BLD AUTO: 1 %
EOSINOPHIL # BLD AUTO: 0.5 X10(3)/MCL (ref 0–0.9)
EOSINOPHIL NFR BLD AUTO: 3 %
ERYTHROCYTE [DISTWIDTH] IN BLOOD BY AUTOMATED COUNT: 15.9 % (ref 11.5–17)
HCT VFR BLD AUTO: 31.1 % (ref 42–52)
HGB BLD-MCNC: 9.1 GM/DL (ref 14–18)
LYMPHOCYTES # BLD AUTO: 2.4 X10(3)/MCL (ref 0.6–4.6)
LYMPHOCYTES NFR BLD AUTO: 18 %
MCH RBC QN AUTO: 25.8 PG (ref 27–31)
MCHC RBC AUTO-ENTMCNC: 29.3 GM/DL (ref 33–36)
MCV RBC AUTO: 88.1 FL (ref 80–94)
MONOCYTES # BLD AUTO: 0.9 X10(3)/MCL (ref 0.1–1.3)
MONOCYTES NFR BLD AUTO: 6 %
NEUTROPHILS # BLD AUTO: 9.56 X10(3)/MCL (ref 2.1–9.2)
NEUTROPHILS NFR BLD AUTO: 71 %
PLATELET # BLD AUTO: 584 X10(3)/MCL (ref 130–400)
PMV BLD AUTO: 9 FL (ref 9.4–12.4)
RBC # BLD AUTO: 3.53 X10(6)/MCL (ref 4.7–6.1)
WBC # SPEC AUTO: 13.5 X10(3)/MCL (ref 4.5–11.5)

## 2019-03-30 LAB
ABS NEUT (OLG): 5.47 X10(3)/MCL (ref 2.1–9.2)
BASOPHILS # BLD AUTO: 0.1 X10(3)/MCL (ref 0–0.2)
BASOPHILS NFR BLD AUTO: 1 %
EOSINOPHIL # BLD AUTO: 0.6 X10(3)/MCL (ref 0–0.9)
EOSINOPHIL NFR BLD AUTO: 6 %
ERYTHROCYTE [DISTWIDTH] IN BLOOD BY AUTOMATED COUNT: 15.8 % (ref 11.5–17)
HCT VFR BLD AUTO: 27.2 % (ref 42–52)
HGB BLD-MCNC: 8.1 GM/DL (ref 14–18)
LYMPHOCYTES # BLD AUTO: 2.4 X10(3)/MCL (ref 0.6–4.6)
LYMPHOCYTES NFR BLD AUTO: 26 %
MCH RBC QN AUTO: 26.4 PG (ref 27–31)
MCHC RBC AUTO-ENTMCNC: 29.8 GM/DL (ref 33–36)
MCV RBC AUTO: 88.6 FL (ref 80–94)
MONOCYTES # BLD AUTO: 0.7 X10(3)/MCL (ref 0.1–1.3)
MONOCYTES NFR BLD AUTO: 7 %
NEUTROPHILS # BLD AUTO: 5.47 X10(3)/MCL (ref 2.1–9.2)
NEUTROPHILS NFR BLD AUTO: 59 %
PLATELET # BLD AUTO: 474 X10(3)/MCL (ref 130–400)
PMV BLD AUTO: 9.2 FL (ref 9.4–12.4)
RBC # BLD AUTO: 3.07 X10(6)/MCL (ref 4.7–6.1)
WBC # SPEC AUTO: 9.2 X10(3)/MCL (ref 4.5–11.5)

## 2019-03-31 LAB
HCT VFR BLD AUTO: 28.3 % (ref 42–52)
HGB BLD-MCNC: 8.2 GM/DL (ref 14–18)

## 2019-04-01 LAB
ABS NEUT (OLG): 6.09 X10(3)/MCL (ref 2.1–9.2)
BASOPHILS # BLD AUTO: 0.1 X10(3)/MCL (ref 0–0.2)
BASOPHILS NFR BLD AUTO: 1 %
EOSINOPHIL # BLD AUTO: 0.5 X10(3)/MCL (ref 0–0.9)
EOSINOPHIL NFR BLD AUTO: 6 %
ERYTHROCYTE [DISTWIDTH] IN BLOOD BY AUTOMATED COUNT: 15.3 % (ref 11.5–17)
HCT VFR BLD AUTO: 29 % (ref 42–52)
HGB BLD-MCNC: 8.4 GM/DL (ref 14–18)
LYMPHOCYTES # BLD AUTO: 2 X10(3)/MCL (ref 0.6–4.6)
LYMPHOCYTES NFR BLD AUTO: 22 %
MCH RBC QN AUTO: 26 PG (ref 27–31)
MCHC RBC AUTO-ENTMCNC: 29 GM/DL (ref 33–36)
MCV RBC AUTO: 89.8 FL (ref 80–94)
MONOCYTES # BLD AUTO: 0.5 X10(3)/MCL (ref 0.1–1.3)
MONOCYTES NFR BLD AUTO: 6 %
NEUTROPHILS # BLD AUTO: 6.09 X10(3)/MCL (ref 2.1–9.2)
NEUTROPHILS NFR BLD AUTO: 65 %
PLATELET # BLD AUTO: 464 X10(3)/MCL (ref 130–400)
PMV BLD AUTO: 9.5 FL (ref 9.4–12.4)
RBC # BLD AUTO: 3.23 X10(6)/MCL (ref 4.7–6.1)
WBC # SPEC AUTO: 9.3 X10(3)/MCL (ref 4.5–11.5)

## 2019-04-03 LAB
ABS NEUT (OLG): 3.85 X10(3)/MCL (ref 2.1–9.2)
BASOPHILS # BLD AUTO: 0.1 X10(3)/MCL (ref 0–0.2)
BASOPHILS NFR BLD AUTO: 1 %
EOSINOPHIL # BLD AUTO: 0.4 X10(3)/MCL (ref 0–0.9)
EOSINOPHIL NFR BLD AUTO: 6 %
ERYTHROCYTE [DISTWIDTH] IN BLOOD BY AUTOMATED COUNT: 15.4 % (ref 11.5–17)
HCT VFR BLD AUTO: 31 % (ref 42–52)
HGB BLD-MCNC: 8.9 GM/DL (ref 14–18)
LYMPHOCYTES # BLD AUTO: 2.2 X10(3)/MCL (ref 0.6–4.6)
LYMPHOCYTES NFR BLD AUTO: 31 %
MCH RBC QN AUTO: 25.8 PG (ref 27–31)
MCHC RBC AUTO-ENTMCNC: 28.7 GM/DL (ref 33–36)
MCV RBC AUTO: 89.9 FL (ref 80–94)
MONOCYTES # BLD AUTO: 0.5 X10(3)/MCL (ref 0.1–1.3)
MONOCYTES NFR BLD AUTO: 7 %
NEUTROPHILS # BLD AUTO: 3.85 X10(3)/MCL (ref 2.1–9.2)
NEUTROPHILS NFR BLD AUTO: 54 %
PLATELET # BLD AUTO: 436 X10(3)/MCL (ref 130–400)
PMV BLD AUTO: 9.4 FL (ref 9.4–12.4)
RBC # BLD AUTO: 3.45 X10(6)/MCL (ref 4.7–6.1)
WBC # SPEC AUTO: 7.1 X10(3)/MCL (ref 4.5–11.5)

## 2019-05-13 ENCOUNTER — HISTORICAL (OUTPATIENT)
Dept: ADMINISTRATIVE | Facility: HOSPITAL | Age: 44
End: 2019-05-13

## 2019-05-20 ENCOUNTER — HISTORICAL (OUTPATIENT)
Dept: ADMINISTRATIVE | Facility: HOSPITAL | Age: 44
End: 2019-05-20

## 2020-04-03 ENCOUNTER — HOSPITAL ENCOUNTER (OUTPATIENT)
Dept: NUTRITION | Facility: HOSPITAL | Age: 45
End: 2020-04-04
Attending: INTERNAL MEDICINE | Admitting: INTERNAL MEDICINE

## 2020-11-28 ENCOUNTER — HOSPITAL ENCOUNTER (OUTPATIENT)
Dept: MEDSURG UNIT | Facility: HOSPITAL | Age: 45
End: 2020-11-30
Attending: INTERNAL MEDICINE | Admitting: INTERNAL MEDICINE

## 2021-05-03 ENCOUNTER — HISTORICAL (OUTPATIENT)
Dept: ADMINISTRATIVE | Facility: HOSPITAL | Age: 46
End: 2021-05-03

## 2021-05-10 LAB — FINAL CULTURE: NORMAL

## 2021-05-22 LAB — FINAL CULTURE: NO GROWTH

## 2022-01-14 ENCOUNTER — HOSPITAL ENCOUNTER (OUTPATIENT)
Dept: MEDSURG UNIT | Facility: HOSPITAL | Age: 47
End: 2022-01-20
Attending: INTERNAL MEDICINE | Admitting: INTERNAL MEDICINE

## 2022-04-09 ENCOUNTER — HISTORICAL (OUTPATIENT)
Dept: ADMINISTRATIVE | Facility: HOSPITAL | Age: 47
End: 2022-04-09
Payer: MEDICARE

## 2022-04-25 VITALS
HEIGHT: 72 IN | WEIGHT: 180.75 LBS | BODY MASS INDEX: 24.48 KG/M2 | SYSTOLIC BLOOD PRESSURE: 136 MMHG | DIASTOLIC BLOOD PRESSURE: 95 MMHG | OXYGEN SATURATION: 98 %
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
## 2022-04-30 NOTE — DISCHARGE SUMMARY
Patient:   Hemal Guerrero            MRN: [id number]            FIN: [id number]               Age:   46 years     Sex:  Male     :  1975   Associated Diagnoses:   None   Author:   Alli Kilpatrick MD      Discharge Information      Discharge Summary Information   Admit/Discharge Dates   Admit Date: 2022  Discharge Date: 2022     Physicians   Attending Physician - Ed ORELLANA, Fritz HARRIS  Admitting Physician - Fritz Ward MD  Consulting Physician - Mark ORELLANA, Jose HOOPER  Consulting Physician - Finesse ORELLANA, Alli  Primary Care Physician - No PCP, No  Primary Care Physician - Miguel Lamb MD  Primary Care Physician - No PCP, No     Discharge Diagnosis   Acute on chronic abdominal pain, ? bladder spasms  Chronic Cystitis  Lactic acidosis, resolved  Transaminitis, improving  Hyperkalemia, resolved  Sacral decubitus (POA)  Tobacco use  Constipation  h/o Paraplegia  Neurogenic bladder s/p SP catheter   h/o DVT on Eliquis   Procedures   No procedures recorded for this visit.   Immunizations   No immunizations recorded for this visit.     Discharge Medications   Prescribed  docusate (Colace 100 mg oral capsule) 100 mg, Oral, BID  hydrALAZINE (hydrALAZINE 10 mg oral tablet) 10 mg, Oral, TIDPC  methocarbamol (methocarbamol 500 mg oral tablet) 500 mg, Oral, QID  metoprolol (metoprolol succinate 25 mg oral tablet extended release) 25 mg, Oral, Daily  tamsulosin (Flomax 0.4 mg oral capsule) 0.4 mg, Oral, Daily  Continue  acetaminophen-HYDROcodone (Norco 10 mg-325 mg oral tablet) 1 tab(s), Oral, q6hr, PRN as needed for pain  acetaminophen-HYDROcodone (Norco 10 mg-325 mg oral tablet) 1 tab(s), Oral, q6hr, PRN as needed for pain  acetaminophen-HYDROcodone (Norco 10 mg-325 mg oral tablet) 1 tab(s), Oral, q6hr, PRN as needed for pain  acetaminophen-HYDROcodone (Norco 10 mg-325 mg oral tablet) 1 tab(s), Oral, q6hr, PRN as needed for pain  amLODIPine (amlodipine 10 mg oral tablet) 10 mg, Oral,  Daily  apixaban (Eliquis 5 mg oral tablet) 5 mg, Oral, BID  dicyclomine (dicyclomine 20 mg oral tablet) 20 mg, Oral, QID, PRN abdominal spasms  hyoscyamine (Levsin 0.125 mg oral tablet) 0.125 mg, Oral, QID, PRN for spasm  oxybutynin (oxybutynin 5 mg oral tablet) 10 mg, Oral, BID  Discontinue  losartan (losartan 50 mg oral tablet) 50 mg, Oral, BID        Education   Discharge - 01/19/22 8:50:00 CST, Post-Acute Services/Facilities, Give all scheduled vaccinations prior to discharge.   Discharge Activity - Activity as Tolerated   Discharge Diet - Regular   Discharge - 01/20/22 9:52:00 CST, Post-Acute Services/Facilities, Give all scheduled vaccinations prior to discharge.         Followup   Mulu Lui, on 03/07/2022   new PCP appt  [facility] will call you for date and time to resume [facility] [phone]        Hospital Course   Hospital Course   46-year-old paraplegic with neurogenic bladder, s/p suprapubic catheter, frequent hospitalizations for intractable abdominal pain, and questionable UTIs, has been admitted again with the same.  Patient seen and examined at bedside, and chart reviewed, including current medications.  He continues to complain of the same lower abd pain.  CT abd/pelvis was unremarkable.   Urine culture from admission is negative.  SP catheter has been changed. He was started on po prn pain meds and po robaxin. His pain was more in the back. Symptoms mostly consistent with musculoskeletal pain. His focus was more on pain control. Discussed about palliative care and he is ok with it.  will set up home health with PTx and palliative care.   Explained in detail to patient about the discharge plan, medications and F/U visits. He understands and agrees with the treatment plan.   Condition stable  Diet: Regular   Meds per dc med rec  Activities as tolerated   F/U with PCP in 1 to 2 weeks  For further questions contact hospitalist office  DC 31 mts   .        Physical Examination   General:   Alert and oriented, No acute distress.    Respiratory:  Lungs are clear to auscultation, Breath sounds are equal.    Cardiovascular:  Normal rate, Regular rhythm, No murmur.    Gastrointestinal:  Soft, Non-tender, Non-distended, Normal bowel sounds.       Discharge Plan   Education and Follow-up   Counseled: patient, regarding diagnosis, regarding treatment, regarding medications.
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
## 2022-04-30 NOTE — DISCHARGE SUMMARY
Patient:   Hemal Guerrero            MRN: [id number]            FIN: [id number]               Age:   45 years     Sex:  Male     :  1975   Associated Diagnoses:   Sacral osteomyelitis; Neurogenic bladder; Hypertension; History of DVT of lower extremity; Depression; Decubitus ulcer; Constipation; Chronic pain; Candida UTI   Author:   Chano Navarrete MD      Discharge Information      Discharge Summary Information   Admitted  2021   Discharged  2021   Admitting physician     Chano Navarrete MD.     Discharge diagnosis     Sacral osteomyelitis (IVM67-QQ M46.28).     Neurogenic bladder (RYN91-UZ N31.9).     Hypertension (WVV72-PJ I10).     History of DVT of lower extremity (XTY07-TI Z86.718).     Depression (VFP59-BA F32.9).     Decubitus ulcer (SDY55-TT L89.90).     Constipation (UAZ72-XF K59.00).     Chronic pain (ZIX74-JS G89.29).     Candida UTI (XBG25-UD B37.49).     Discharge medications     OTHER MEDICATIONS (Selected)   Prescriptions  Prescribed  Abilify 5 mg oral tablet: 5 mg = 1 tab(s), Oral, Daily, # 30 tab(s), 11 Refill(s), [id number], 183, cm, Height/Length Dosing, 21 18:20:00 CDT, 85.8, kg, Weight Dosing, 21 18:20:00 CDT  Cymbalta 30 mg oral delayed release capsule: 60 mg = 2 cap(s), Oral, Daily, # 60 cap(s), 11 Refill(s), Pharmacy: [facility]/pharmacy #[id number], 183, cm, Height/Length Dosing, 21 18:20:00 CDT, 85.8, kg, Weight Dosing, 21 18:20:00 CDT  Eliquis 5 mg oral tablet: 5 mg = 1 tab(s), Oral, BID, # 60 tab(s), 0 Refill(s), Pharmacy: [facility]/pharmacy #[id number], 183, cm, Height/Length Dosing, 20 9:15:00 CDT, 85, kg, Weight Dosing, 20 9:15:00 CDT  Percocet 10/325 oral tablet: 1 tab(s), Oral, q6hr, X 7 day(s), # 28 tab(s), 0 Refill(s), Pharmacy: [facility]/pharmacy #[id number], 183, cm, Height/Length Dosing, 21 18:20:00 CDT, 85.8, kg, Weight Dosing, 21 18:20:00 CDT  Documented Medications  Documented  amlodipine 5 mg oral tablet: 5 mg = 1 tab(s),  Oral, Daily, # 30 tab(s), 0 Refill(s).        Physical Examination      Vital Signs (last 24 hrs)_____  Last Charted___________  Temp Oral     36.6 DegC  (MAY 26 07:42)  Heart Rate Peripheral   90 bpm  (MAY 26 07:42)  Resp Rate         14 br/min  (MAY 26 03:00)  SBP      114 mmHg  (MAY 26 07:42)  DBP      71 mmHg  (MAY 26 07:42)  SpO2      97 %  (MAY 26 07:42)     Awake and alert.  Sacral and ischial wounds are healed.  Perisuprapubic cath site looks good.  Depression seems mostly resolved.      Hospital Course     Patient admitted for long-term IV antibiotics for sacral and coccygeal osteomyelitis.  He was treated with vancomycin and Zosyn empirically and has now completed a total of 6 weeks of treatment.  CRP is down to 2.  His stage IV sacral and ischial wounds that were in various stages of healing upon arrival from [facility] and [facility] now healed.  He had problems with severe depression when he came in along with chronic pain and was weaned off Effexor and started on Cymbalta and Abilify with good effect.  His mood is improved and he no longer seems depressed but he still complains of chronic pain that upon chart review has been going back for years and has been thoroughly evaluated multiple times without finding any cause be on chronic pain syndrome.  He has a neurogenic bladder and 20 [place] suprapubic catheter was changed on 5/20/2021.  He has been on Eliquis for history of DVT and it sounds as if it was a year ago or more so not sure why he is still on full anticoagulation but we continued it and will defer to his PCP whether its okay for him to stop.  Discharged home today in stable condition.      Discharge Plan   Discharge Summary Plan   Discharge Status: improved.     Discharge instructions given: to patient.     Discharge disposition: discharge to home with home health care.     Prescriptions: called to pharmacy.     Total discharge time greater than 30 minutes.
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
## 2022-08-06 ENCOUNTER — HOSPITAL ENCOUNTER (EMERGENCY)
Facility: HOSPITAL | Age: 47
Discharge: HOME | End: 2022-08-06
Attending: EMERGENCY MEDICINE
Payer: OTHER MISCELLANEOUS

## 2022-08-06 VITALS
DIASTOLIC BLOOD PRESSURE: 70 MMHG | WEIGHT: 130 LBS | OXYGEN SATURATION: 100 % | HEIGHT: 72 IN | HEART RATE: 60 BPM | BODY MASS INDEX: 17.61 KG/M2 | RESPIRATION RATE: 16 BRPM | SYSTOLIC BLOOD PRESSURE: 110 MMHG | TEMPERATURE: 98 F

## 2022-08-06 DIAGNOSIS — T78.40XD ALLERGIC REACTION, SUBSEQUENT ENCOUNTER: ICD-10-CM

## 2022-08-06 DIAGNOSIS — H00.011 HORDEOLUM EXTERNUM OF RIGHT UPPER EYELID: Primary | ICD-10-CM

## 2022-08-06 PROCEDURE — 99284 EMERGENCY DEPT VISIT MOD MDM: CPT | Mod: 25

## 2022-08-06 PROCEDURE — 63600175 PHARM REV CODE 636 W HCPCS: Performed by: PHYSICIAN ASSISTANT

## 2022-08-06 PROCEDURE — 96372 THER/PROPH/DIAG INJ SC/IM: CPT | Performed by: PHYSICIAN ASSISTANT

## 2022-08-06 RX ORDER — DIPHENHYDRAMINE HYDROCHLORIDE 50 MG/ML
12.5 INJECTION INTRAMUSCULAR; INTRAVENOUS
Status: COMPLETED | OUTPATIENT
Start: 2022-08-06 | End: 2022-08-06

## 2022-08-06 RX ADMIN — DIPHENHYDRAMINE HYDROCHLORIDE 12.5 MG: 50 INJECTION, SOLUTION INTRAMUSCULAR; INTRAVENOUS at 08:08

## 2022-08-07 NOTE — ED PROVIDER NOTES
Encounter Date: 8/6/2022       History     Chief Complaint   Patient presents with    Eye Problem     Eye infection few weeks ago. States he feels like something is now crawling around his R eye      This is a 46-year-old male.  Who comes in with complaint of right eyelid pain.  He also feels crawling around his right eye.  He did not have an injection into the eye.  Patient states that few days ago he felt a little bump over the right eyelid.  He has been trying to pop the eyelid bump.  Today he has allergic reaction around the eye, itching.  The bump it is not longer painful.  He feels something crawling around his eyes.  He denies fevers denies chest pain nausea vomiting.      Eye Pain   This is a new problem. The current episode started several days ago. The problem occurs occasionally. The problem has been waxing and waning. The right eye is affected. There was no injury mechanism. There is no history of trauma to the eye. There is no known exposure to pink eye. He does not wear contacts. Associated symptoms include foreign body sensation. Pertinent negatives include no numbness, no blurred vision, no discharge, no double vision, no photophobia, no eye redness, no nausea, no weakness and no itching. The treatment provided no relief.     Review of patient's allergies indicates:  No Known Allergies  No past medical history on file.  No past surgical history on file.  No family history on file.     Review of Systems   Constitutional: Negative for fever.   HENT: Negative for sore throat.    Eyes: Positive for pain. Negative for blurred vision, double vision, photophobia, discharge and redness.   Respiratory: Negative for shortness of breath.    Cardiovascular: Negative for chest pain.   Gastrointestinal: Negative for nausea.   Genitourinary: Negative for dysuria.   Musculoskeletal: Negative for back pain.   Skin: Negative for itching and rash.   Neurological: Negative for weakness and numbness.   Hematological:  Does not bruise/bleed easily.       Physical Exam     Initial Vitals [08/06/22 1948]   BP Pulse Resp Temp SpO2   (!) 132/90 96 16 97.7 °F (36.5 °C) 100 %      MAP       --         Physical Exam    Constitutional: He appears well-developed.   Eyes: Conjunctivae and EOM are normal. Pupils are equal, round, and reactive to light. Right eye exhibits hordeolum. Right eye exhibits no discharge and no exudate. No foreign body present in the right eye. Right conjunctiva is not injected. Right conjunctiva has no hemorrhage.       Cardiovascular: Normal rate.   Pulmonary/Chest: Breath sounds normal.     Skin: Skin is warm and dry. Rash noted.   Psychiatric: His behavior is normal. Judgment normal.         ED Course   Procedures  Labs Reviewed - No data to display       Imaging Results    None          Medications   diphenhydrAMINE injection 12.5 mg (12.5 mg Intramuscular Given 8/6/22 2028)     Medical Decision Making:   Initial Assessment:   Eye pain    Differential Diagnosis:   Hordeolum   anxiety    ED Management:  Patient understood instructions.  Apply warm compress to affected eye right.  Applied a thin layer of triple antibiotics.                      Clinical Impression:   Final diagnoses:  [H00.011] Hordeolum externum of right upper eyelid (Primary)  [T78.40XD] Allergic reaction, subsequent encounter          ED Disposition Condition    Discharge Stable        ED Prescriptions     None        Follow-up Information     Follow up With Specialties Details Why Contact Info    [facility] - Emergency Dept Emergency Medicine  If symptoms worsen [address] [facility] [address]  [phone]           ANDRAE Cook  08/06/22 2046

## 2022-08-08 ENCOUNTER — HOSPITAL ENCOUNTER (EMERGENCY)
Facility: HOSPITAL | Age: 47
Discharge: HOME | End: 2022-08-09
Attending: EMERGENCY MEDICINE
Payer: MEDICARE

## 2022-08-08 VITALS
OXYGEN SATURATION: 100 % | SYSTOLIC BLOOD PRESSURE: 123 MMHG | RESPIRATION RATE: 18 BRPM | TEMPERATURE: 98 F | HEART RATE: 70 BPM | DIASTOLIC BLOOD PRESSURE: 73 MMHG

## 2022-08-08 DIAGNOSIS — M25.551 BILATERAL HIP PAIN: ICD-10-CM

## 2022-08-08 DIAGNOSIS — M25.552 BILATERAL HIP PAIN: ICD-10-CM

## 2022-08-08 DIAGNOSIS — H02.9 LESION OF RIGHT UPPER EYELID: Primary | ICD-10-CM

## 2022-08-08 PROCEDURE — 99284 EMERGENCY DEPT VISIT MOD MDM: CPT | Mod: 25

## 2022-08-08 RX ORDER — ERYTHROMYCIN 5 MG/G
OINTMENT OPHTHALMIC
Qty: 3.5 G | Refills: 0 | Status: SHIPPED | OUTPATIENT
Start: 2022-08-08

## 2022-08-09 NOTE — DISCHARGE INSTRUCTIONS
Take your muscle relaxers and pain medication as needed as prescribed. Support your legs and hops with pillows and position changes. Apply thin layer of triple antibiotic ointment and erythromycin ointment to the area.

## 2022-08-09 NOTE — ED PROVIDER NOTES
Encounter Date: 8/8/2022       History     Chief Complaint   Patient presents with    Hip Pain     Pt to ER via [facility] for right hip pain.  Started this am.  Also back pain.  Pt is paraplegic/wheelchair bound.       45 y/o male who presents via EMS for c/o bilateral hip pain more so than usual. No trauma. He is paraplegic and bed bound. Indwelling suprapubic catheter noted. Also he continues to c/o of the right upper eyelid skin lesion for past several days. He was seen here on Saturday for the lesion on right upper eyelid and told to use triple antibiotic ointment.     The history is provided by the patient. No  was used.   Hip Pain  This is a recurrent problem. The current episode started 2 days ago. The problem occurs constantly. The problem has not changed since onset.Nothing relieves the symptoms.     Review of patient's allergies indicates:  No Known Allergies  No past medical history on file.  No past surgical history on file.  No family history on file.     Review of Systems   Eyes:        Right upper eyelid skin lesion   Musculoskeletal:        Bilateral hip pain    All other systems reviewed and are negative.      Physical Exam     Initial Vitals [08/08/22 1920]   BP Pulse Resp Temp SpO2   123/73 70 18 98.4 °F (36.9 °C) 100 %      MAP       --         Physical Exam    Nursing note and vitals reviewed.  Constitutional: He appears well-developed.   Eyes: Conjunctivae and EOM are normal.   Right upper eyelid has an area of approx 1cm by 1cm dry skin or some type of flat appearing lesion. Not red.does not appear to be an abscess or obvious stye. Is not in eyelashes.    Cardiovascular: Regular rhythm and intact distal pulses.   Pulmonary/Chest: No respiratory distress.   Musculoskeletal:      Right hip: Tenderness present.      Left hip: Tenderness present.      Comments: All other adjacent joints otherwise normal       Neurological: He is alert and oriented to person, place, and time.    Skin: Skin is warm and dry.   Psychiatric: His speech is normal.   Patient very agitated and hostile         ED Course   Procedures  Labs Reviewed - No data to display       Imaging Results          CT Pelvis Without Contrast (Final result)  Result time 08/08/22 22:32:40    Final result by Shaun Lopez MD (08/08/22 22:32:40)                 Impression:      An acute fracture is not seen.    Soft tissue calcification.    Prominent lateral margins of the acetabulum bilaterally cannot rule out impingement.      Electronically signed by: Shaun Lopez MD  Date:    08/08/2022  Time:    22:32             Narrative:    EXAMINATION:  CT PELVIS WITHOUT CONTRAST    CLINICAL HISTORY:  questionable pelvic xray; bilateral hip and pelvic pain;    TECHNIQUE:  Automatic exposure control (AEC) was utilized for dose reduction.    Dose: 273 mGycm    COMPARISON:  None    FINDINGS:  There is hardware in the spine there is extensive soft tissue calcification about both hips there are calcifications of the inferior pubic rami bilaterally and I cannot rule out old healed fractures.  Femoral heads are not dislocated.  There are prominent lateral osteophytes of the acetabulum bilaterally and I cannot rule out impingement.                               X-Ray Pelvis Routine AP (Final result)  Result time 08/09/22 11:03:57    Final result by Jerrell Vega MD (08/09/22 11:03:57)                 Impression:      No acute osseous abnormality.      Electronically signed by: Jerrell Vega  Date:    08/09/2022  Time:    11:03             Narrative:    EXAMINATION:  XR PELVIS ROUTINE AP    CLINICAL HISTORY:  Pain in right hip    COMPARISON:  None.    FINDINGS:  No acute displaced fractures or dislocations.    Mild degenerative changes of the hips bilaterally.    No blastic or lytic lesions.    Status post lumbosacral posterior fusion.    Suprapubic catheter in place.    Extensive dystrophic calcifications.    Otherwise soft tissues within  normal limits.    The sacrum is partially obscured by overlying stool and bowel gas.                                 Medications - No data to display  Medical Decision Making:   Clinical Tests:   Radiological Study: Ordered and Reviewed  ED Management:  No acute findings noted on CT pelvis, pulses intact. No mechanism of injury. Discussed f/u with ortho for further evaluation. He has oxycodone and muscle relaxers. I offered medications here and he states he has all that. Also discussed eye ointment as he is adamant he was supposed to get a sample before he left and he didn't get it and still having same issue. It appears per previous provider note that he was to use a thin layer of triple antiobiotic ointment. Will give erythromycin ointment to use and discussed triple antibiotic ointment.                         Clinical Impression:   Final diagnoses:  [M25.551, M25.552] Bilateral hip pain  [H02.9] Lesion of right upper eyelid (Primary)          ED Disposition Condition    Discharge Stable        ED Prescriptions     Medication Sig Dispense Start Date End Date Auth. Provider    erythromycin (ROMYCIN) ophthalmic ointment Place a 1/2 inch ribbon of ointment into the lower eyelid. 3.5 g 8/8/2022  JOSE ALFREDO Felix        Follow-up Information     Follow up With Specialties Details Why Contact Info    primary care provider  Call in 1 week As needed            JOSE ALFREDO Felix  08/09/22 9849

## 2022-09-12 ENCOUNTER — HOSPITAL ENCOUNTER (EMERGENCY)
Facility: HOSPITAL | Age: 47
Discharge: HOME | End: 2022-09-12
Attending: EMERGENCY MEDICINE
Payer: OTHER MISCELLANEOUS

## 2022-09-12 VITALS
RESPIRATION RATE: 14 BRPM | SYSTOLIC BLOOD PRESSURE: 135 MMHG | DIASTOLIC BLOOD PRESSURE: 94 MMHG | HEART RATE: 62 BPM | OXYGEN SATURATION: 96 %

## 2022-09-12 DIAGNOSIS — H57.89 EYE IRRITATION: Primary | ICD-10-CM

## 2022-09-12 PROCEDURE — 99283 EMERGENCY DEPT VISIT LOW MDM: CPT

## 2022-09-12 RX ORDER — ONDANSETRON 4 MG/1
4 TABLET, ORALLY DISINTEGRATING ORAL EVERY 8 HOURS PRN
Status: ON HOLD | COMMUNITY
Start: 2022-04-03 | End: 2023-02-03 | Stop reason: HOSPADM

## 2022-09-12 RX ORDER — GABAPENTIN 600 MG/1
600 TABLET ORAL 3 TIMES DAILY
Status: ON HOLD | COMMUNITY
Start: 2022-07-27 | End: 2023-02-03 | Stop reason: SDUPTHER

## 2022-09-12 RX ORDER — OXYCODONE HYDROCHLORIDE 10 MG/1
10 TABLET ORAL 4 TIMES DAILY PRN
Status: ON HOLD | COMMUNITY
Start: 2022-08-03 | End: 2023-02-03 | Stop reason: SDUPTHER

## 2022-09-12 RX ORDER — DOCUSATE SODIUM 100 MG/1
100 CAPSULE, LIQUID FILLED ORAL 2 TIMES DAILY
Status: ON HOLD | COMMUNITY
Start: 2022-06-27 | End: 2023-02-03 | Stop reason: SDUPTHER

## 2022-09-12 RX ORDER — OXYBUTYNIN CHLORIDE 5 MG/1
10 TABLET ORAL 2 TIMES DAILY
Status: ON HOLD | COMMUNITY
Start: 2022-05-23 | End: 2023-02-03 | Stop reason: HOSPADM

## 2022-09-12 RX ORDER — BACLOFEN 20 MG/1
20 TABLET ORAL 3 TIMES DAILY
Status: ON HOLD | COMMUNITY
Start: 2022-08-05 | End: 2023-02-03 | Stop reason: SDUPTHER

## 2022-09-12 RX ORDER — DULOXETIN HYDROCHLORIDE 60 MG/1
60 CAPSULE, DELAYED RELEASE ORAL DAILY
Status: ON HOLD | COMMUNITY
Start: 2022-07-28 | End: 2023-02-03 | Stop reason: SDUPTHER

## 2022-09-12 RX ORDER — DIAZEPAM 5 MG/1
5 TABLET ORAL 2 TIMES DAILY
Status: ON HOLD | COMMUNITY
Start: 2022-06-20 | End: 2022-12-09 | Stop reason: HOSPADM

## 2022-09-12 RX ORDER — METOPROLOL SUCCINATE 25 MG/1
25 TABLET, EXTENDED RELEASE ORAL DAILY
Status: ON HOLD | COMMUNITY
Start: 2022-06-20 | End: 2023-02-03 | Stop reason: SDUPTHER

## 2022-09-12 RX ORDER — AMLODIPINE BESYLATE 10 MG/1
10 TABLET ORAL DAILY
Status: ON HOLD | COMMUNITY
Start: 2021-09-28 | End: 2022-12-09 | Stop reason: HOSPADM

## 2022-09-12 NOTE — ED PROVIDER NOTES
Encounter Date: 9/12/2022       History     Chief Complaint   Patient presents with    medical problem     Pt reports drainage/possible bugs coming from eye. Quadriplegic. Told to come to ed by hospice nurse. Denies fever/pain. Denies any other complaints.      Patient is a 46-year-old male with a history of paraplegia presents with complain possible worm infestation.  Patient states he thinks he is having worms coming out of his nose on 1 occasion and saw on a couple of occasions worms around his eyes.  Patient states he has an appointment with his eye physician in approximately 10 days.  Patient does state over the last few months his vision seems to have gotten worse.  Patient denies eye pain.  Patient denies headache.  Patient denies any shortness of breath or cough.    Review of patient's allergies indicates:  No Known Allergies  No past medical history on file.  No past surgical history on file.  No family history on file.     Review of Systems   Constitutional: Negative.    HENT:  Negative for congestion, dental problem, ear discharge, ear pain, facial swelling, mouth sores, nosebleeds, postnasal drip, sinus pain, sneezing, sore throat and trouble swallowing.    Respiratory: Negative.     Cardiovascular: Negative.    Gastrointestinal: Negative.    Musculoskeletal: Negative.    Neurological:  Negative for headaches.   Psychiatric/Behavioral: Negative.       Physical Exam     Initial Vitals [09/12/22 1321]   BP Pulse Resp Temp SpO2   (!) 159/89 76 16 -- 99 %      MAP       --         Physical Exam    Nursing note and vitals reviewed.  Constitutional: He appears well-developed and well-nourished.   HENT:   Head: Normocephalic and atraumatic.   Eyes: Pupils are equal, round, and reactive to light.   Patient can easily count fingers at 10 ft using each eye individually.  Pupils are 3 mm bilaterally and reactive.  No conjunctival changes of the eyes bilaterally.  No foreign body seen within the eye or on the eyes.   Cornea is bilaterally.   Cardiovascular:  Normal rate, regular rhythm and normal heart sounds.           Pulmonary/Chest: Breath sounds normal.   Musculoskeletal:      Comments: Normal range of motion bilateral upper extremities.  Patient has paraplegia bilateral lower extremities.     Neurological: He is alert and oriented to person, place, and time.   Skin: Skin is warm and dry.   Psychiatric: He has a normal mood and affect. His behavior is normal. Thought content normal.       ED Course   Procedures  Labs Reviewed - No data to display       Imaging Results    None          Medications - No data to display                           Clinical Impression:   Final diagnoses:  [H57.89] Eye irritation (Primary)        ED Disposition Condition    Discharge Stable          ED Prescriptions       Medication Sig Dispense Start Date End Date Auth. Provider    pyrantel pamoate (HERMANN'S PINWORM MEDICINE) 50 mg/mL Susp Take 20 mLs by mouth once daily. for 3 days 60 mL 9/12/2022 9/15/2022 Tom Blanchard MD          Follow-up Information    None          Tom Blanchard MD  09/12/22 2720

## 2022-11-16 ENCOUNTER — HOSPITAL ENCOUNTER (INPATIENT)
Facility: HOSPITAL | Age: 47
LOS: 14 days | Discharge: NURSING, REHAB OR LONG-TERM CARE | DRG: 593 | End: 2022-12-09
Attending: EMERGENCY MEDICINE | Admitting: INTERNAL MEDICINE
Payer: MEDICARE

## 2022-11-16 DIAGNOSIS — M25.551 ACUTE HIP PAIN, RIGHT: Primary | ICD-10-CM

## 2022-11-16 DIAGNOSIS — L89.94: ICD-10-CM

## 2022-11-16 PROCEDURE — 25000003 PHARM REV CODE 250: Performed by: EMERGENCY MEDICINE

## 2022-11-16 PROCEDURE — 99285 EMERGENCY DEPT VISIT HI MDM: CPT | Mod: 25

## 2022-11-16 PROCEDURE — 11000001 HC ACUTE MED/SURG PRIVATE ROOM

## 2022-11-16 RX ORDER — OXYCODONE AND ACETAMINOPHEN 10; 325 MG/1; MG/1
1 TABLET ORAL
Status: COMPLETED | OUTPATIENT
Start: 2022-11-16 | End: 2022-11-16

## 2022-11-16 RX ORDER — SODIUM CHLORIDE 9 MG/ML
500 INJECTION, SOLUTION INTRAVENOUS
Status: COMPLETED | OUTPATIENT
Start: 2022-11-16 | End: 2022-11-17

## 2022-11-16 RX ORDER — FERROUS SULFATE 324(65)MG
324 TABLET, DELAYED RELEASE (ENTERIC COATED) ORAL EVERY MORNING
Status: ON HOLD | COMMUNITY
Start: 2022-10-20 | End: 2023-02-03 | Stop reason: SDUPTHER

## 2022-11-16 RX ORDER — CLONIDINE HYDROCHLORIDE 0.3 MG/1
0.3 TABLET ORAL DAILY
Status: ON HOLD | COMMUNITY
Start: 2022-10-31 | End: 2022-12-09 | Stop reason: HOSPADM

## 2022-11-16 RX ADMIN — OXYCODONE AND ACETAMINOPHEN 1 TABLET: 10; 325 TABLET ORAL at 11:11

## 2022-11-16 NOTE — LETTER
46-year-old  gentleman with paraplegia with chronic sacral decubitus ulcer who was admitted for placement on 11/16/2022.  There was also consideration for acute cystitis versus colonization.  Placed on anti-infective therapy, wound care.  Case management working on placement.  The patient was placed on an observation setting secondary to no acute entities.  However, upon reevaluation from infectious disease, there was a concern for exposed bone/clinical osteomyelitis.  Deep wound cultures were obtained.  The patient was started on vancomycin and cefepime.  A nuclear medicine bone scan demonstrated osteomyelitis.  In the setting of acute osteomyelitis, need for broad-spectrum anti-infective therapy, the patient is now appropriate for inpatient level of care.  I reached out to Dr. Bar on 11/30/2022 at 3 PM central time who agreed to inpatient level of care    MD JOHNNY  , Physician Advisor

## 2022-11-16 NOTE — LETTER
46-year-old  gentleman with paraplegia with chronic sacral decubitus ulcer who was admitted for placement on 11/16/2022.  There was also consideration for acute cystitis versus colonization.  Placed on anti-infective therapy, wound care.  Case management working on placement.  No evidence hemodynamic instability, fever, bacteremia, osteomyelitis, abscess, the need for surgical intervention.  Essentially, admitted for placement.  The patient was placed on an observation setting on 1/21/2022.  Since then, the patient remains hemodynamically stable, afebrile.  Nonhypoxic, nontachypneic.  Stable CBC, stable CMP cultures growing Providencia rettgeri and Pseudomonas.  Consideration of colonization.  Not on any anti-infective therapy.  Remains appropriate for observation setting as he awaits placement    MD JOHNNY  , Physician Advisor

## 2022-11-16 NOTE — Clinical Note
Diagnosis: Decubitus ulcer, stage 4 [513511]   Admitting Provider:: ESTUARDO SOFIA [[id number]]   Future Attending Provider: ESTUARDO SOFIA [[id number]]   Reason for IP Medical Treatment  (Clinical interventions that can only be accomplished in the IP setting? ) :: wound care   Reason for IP Medical Treatment  (Clinical interventions that can only be accomplished in the IP setting? ) :: NH placement   Estimated Length of Stay:: 3-4 midnights   I certify that Inpatient services for greater than or equal to 2 midnights are medically necessary:: Yes   Plans for Post-Acute care--if anticipated (pick the single best option):: I. Nursing Home (group home)

## 2022-11-17 LAB
ALBUMIN SERPL-MCNC: 2.6 GM/DL (ref 3.5–5)
ALBUMIN/GLOB SERPL: 0.6 RATIO (ref 1.1–2)
ALP SERPL-CCNC: 92 UNIT/L (ref 40–150)
ALT SERPL-CCNC: <5 UNIT/L (ref 0–55)
APPEARANCE UR: ABNORMAL
AST SERPL-CCNC: 7 UNIT/L (ref 5–34)
BACTERIA #/AREA URNS AUTO: ABNORMAL /HPF
BASOPHILS # BLD AUTO: 0.11 X10(3)/MCL (ref 0–0.2)
BASOPHILS NFR BLD AUTO: 1.2 %
BILIRUB UR QL STRIP.AUTO: ABNORMAL MG/DL
BILIRUBIN DIRECT+TOT PNL SERPL-MCNC: 0.3 MG/DL
BUN SERPL-MCNC: 10.2 MG/DL (ref 8.9–20.6)
CALCIUM SERPL-MCNC: 8.6 MG/DL (ref 8.4–10.2)
CHLORIDE SERPL-SCNC: 104 MMOL/L (ref 98–107)
CO2 SERPL-SCNC: 31 MMOL/L (ref 22–29)
COLOR UR AUTO: ABNORMAL
CREAT SERPL-MCNC: 0.75 MG/DL (ref 0.73–1.18)
EOSINOPHIL # BLD AUTO: 0.2 X10(3)/MCL (ref 0–0.9)
EOSINOPHIL NFR BLD AUTO: 2.1 %
ERYTHROCYTE [DISTWIDTH] IN BLOOD BY AUTOMATED COUNT: 13.8 % (ref 11.5–17)
GFR SERPLBLD CREATININE-BSD FMLA CKD-EPI: >60 MLS/MIN/1.73/M2
GLOBULIN SER-MCNC: 4.4 GM/DL (ref 2.4–3.5)
GLUCOSE SERPL-MCNC: 127 MG/DL (ref 74–100)
GLUCOSE UR QL STRIP.AUTO: NEGATIVE MG/DL
HCT VFR BLD AUTO: 36.4 % (ref 42–52)
HGB BLD-MCNC: 11 GM/DL (ref 14–18)
IMM GRANULOCYTES # BLD AUTO: 0.03 X10(3)/MCL (ref 0–0.04)
IMM GRANULOCYTES NFR BLD AUTO: 0.3 %
KETONES UR QL STRIP.AUTO: ABNORMAL MG/DL
LEUKOCYTE ESTERASE UR QL STRIP.AUTO: ABNORMAL UNIT/L
LYMPHOCYTES # BLD AUTO: 2.52 X10(3)/MCL (ref 0.6–4.6)
LYMPHOCYTES NFR BLD AUTO: 27 %
MCH RBC QN AUTO: 25.3 PG (ref 27–31)
MCHC RBC AUTO-ENTMCNC: 30.2 MG/DL (ref 33–36)
MCV RBC AUTO: 83.9 FL (ref 80–94)
MONOCYTES # BLD AUTO: 0.68 X10(3)/MCL (ref 0.1–1.3)
MONOCYTES NFR BLD AUTO: 7.3 %
NEUTROPHILS # BLD AUTO: 5.8 X10(3)/MCL (ref 2.1–9.2)
NEUTROPHILS NFR BLD AUTO: 62.1 %
NITRITE UR QL STRIP.AUTO: POSITIVE
NRBC BLD AUTO-RTO: 0 %
PH UR STRIP.AUTO: 6 [PH]
PLATELET # BLD AUTO: 376 X10(3)/MCL (ref 130–400)
PMV BLD AUTO: 10 FL (ref 7.4–10.4)
POTASSIUM SERPL-SCNC: 3 MMOL/L (ref 3.5–5.1)
PROT SERPL-MCNC: 7 GM/DL (ref 6.4–8.3)
PROT UR QL STRIP.AUTO: ABNORMAL MG/DL
RBC # BLD AUTO: 4.34 X10(6)/MCL (ref 4.7–6.1)
RBC #/AREA URNS AUTO: <5 /HPF
RBC UR QL AUTO: ABNORMAL UNIT/L
SODIUM SERPL-SCNC: 144 MMOL/L (ref 136–145)
SP GR UR STRIP.AUTO: 1.03 (ref 1–1.03)
SQUAMOUS #/AREA URNS AUTO: <5 /HPF
UROBILINOGEN UR STRIP-ACNC: 2 MG/DL
WBC # SPEC AUTO: 9.3 X10(3)/MCL (ref 4.5–11.5)
WBC #/AREA URNS AUTO: >200 /HPF

## 2022-11-17 PROCEDURE — 63600175 PHARM REV CODE 636 W HCPCS: Performed by: EMERGENCY MEDICINE

## 2022-11-17 PROCEDURE — 25000003 PHARM REV CODE 250: Performed by: INTERNAL MEDICINE

## 2022-11-17 PROCEDURE — 87186 SC STD MICRODIL/AGAR DIL: CPT | Performed by: EMERGENCY MEDICINE

## 2022-11-17 PROCEDURE — 85025 COMPLETE CBC W/AUTO DIFF WBC: CPT | Performed by: EMERGENCY MEDICINE

## 2022-11-17 PROCEDURE — 11000001 HC ACUTE MED/SURG PRIVATE ROOM

## 2022-11-17 PROCEDURE — 81003 URINALYSIS AUTO W/O SCOPE: CPT | Performed by: EMERGENCY MEDICINE

## 2022-11-17 PROCEDURE — 80053 COMPREHEN METABOLIC PANEL: CPT | Performed by: EMERGENCY MEDICINE

## 2022-11-17 PROCEDURE — 81001 URINALYSIS AUTO W/SCOPE: CPT | Performed by: EMERGENCY MEDICINE

## 2022-11-17 PROCEDURE — 25000003 PHARM REV CODE 250: Performed by: EMERGENCY MEDICINE

## 2022-11-17 RX ORDER — OXYBUTYNIN CHLORIDE 5 MG/1
10 TABLET ORAL 2 TIMES DAILY
Status: DISCONTINUED | OUTPATIENT
Start: 2022-11-17 | End: 2022-12-09 | Stop reason: HOSPADM

## 2022-11-17 RX ORDER — GABAPENTIN 300 MG/1
600 CAPSULE ORAL 3 TIMES DAILY
Status: DISCONTINUED | OUTPATIENT
Start: 2022-11-17 | End: 2022-11-18

## 2022-11-17 RX ORDER — BACLOFEN 10 MG/1
20 TABLET ORAL 3 TIMES DAILY
Status: DISCONTINUED | OUTPATIENT
Start: 2022-11-17 | End: 2022-12-09 | Stop reason: HOSPADM

## 2022-11-17 RX ORDER — ONDANSETRON 4 MG/1
4 TABLET, ORALLY DISINTEGRATING ORAL EVERY 8 HOURS PRN
Status: DISCONTINUED | OUTPATIENT
Start: 2022-11-17 | End: 2022-12-09 | Stop reason: HOSPADM

## 2022-11-17 RX ORDER — DULOXETIN HYDROCHLORIDE 30 MG/1
60 CAPSULE, DELAYED RELEASE ORAL DAILY
Status: DISCONTINUED | OUTPATIENT
Start: 2022-11-17 | End: 2022-12-09 | Stop reason: HOSPADM

## 2022-11-17 RX ORDER — TRAMADOL HYDROCHLORIDE 50 MG/1
50 TABLET ORAL EVERY 6 HOURS PRN
Status: DISCONTINUED | OUTPATIENT
Start: 2022-11-17 | End: 2022-12-01

## 2022-11-17 RX ORDER — DOCUSATE SODIUM 100 MG/1
100 CAPSULE, LIQUID FILLED ORAL 2 TIMES DAILY
Status: DISCONTINUED | OUTPATIENT
Start: 2022-11-17 | End: 2022-12-09 | Stop reason: HOSPADM

## 2022-11-17 RX ORDER — METOPROLOL SUCCINATE 25 MG/1
25 TABLET, EXTENDED RELEASE ORAL DAILY
Status: DISCONTINUED | OUTPATIENT
Start: 2022-11-17 | End: 2022-12-09 | Stop reason: HOSPADM

## 2022-11-17 RX ORDER — POTASSIUM CHLORIDE 20 MEQ/1
40 TABLET, EXTENDED RELEASE ORAL 3 TIMES DAILY
Status: COMPLETED | OUTPATIENT
Start: 2022-11-17 | End: 2022-11-18

## 2022-11-17 RX ORDER — AMLODIPINE BESYLATE 5 MG/1
10 TABLET ORAL DAILY
Status: DISCONTINUED | OUTPATIENT
Start: 2022-11-17 | End: 2022-11-22

## 2022-11-17 RX ADMIN — APIXABAN 5 MG: 5 TABLET, FILM COATED ORAL at 08:11

## 2022-11-17 RX ADMIN — POTASSIUM CHLORIDE 40 MEQ: 1500 TABLET, EXTENDED RELEASE ORAL at 08:11

## 2022-11-17 RX ADMIN — OXYBUTYNIN CHLORIDE 10 MG: 5 TABLET ORAL at 08:11

## 2022-11-17 RX ADMIN — METOPROLOL SUCCINATE 25 MG: 25 TABLET, FILM COATED, EXTENDED RELEASE ORAL at 08:11

## 2022-11-17 RX ADMIN — GABAPENTIN 600 MG: 300 CAPSULE ORAL at 08:11

## 2022-11-17 RX ADMIN — BACLOFEN 20 MG: 10 TABLET ORAL at 08:11

## 2022-11-17 RX ADMIN — TRAMADOL HYDROCHLORIDE 50 MG: 50 TABLET, COATED ORAL at 06:11

## 2022-11-17 RX ADMIN — DULOXETINE 60 MG: 30 CAPSULE, DELAYED RELEASE ORAL at 08:11

## 2022-11-17 RX ADMIN — AMLODIPINE BESYLATE 10 MG: 5 TABLET ORAL at 08:11

## 2022-11-17 RX ADMIN — SODIUM CHLORIDE 500 ML: 9 INJECTION, SOLUTION INTRAVENOUS at 01:11

## 2022-11-17 RX ADMIN — DOCUSATE SODIUM 100 MG: 100 CAPSULE, LIQUID FILLED ORAL at 08:11

## 2022-11-17 RX ADMIN — BACLOFEN 20 MG: 10 TABLET ORAL at 04:11

## 2022-11-17 RX ADMIN — CEFTRIAXONE SODIUM 1 G: 1 INJECTION, POWDER, FOR SOLUTION INTRAMUSCULAR; INTRAVENOUS at 03:11

## 2022-11-17 RX ADMIN — GABAPENTIN 600 MG: 300 CAPSULE ORAL at 04:11

## 2022-11-17 RX ADMIN — TRAMADOL HYDROCHLORIDE 50 MG: 50 TABLET, COATED ORAL at 05:11
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
## 2022-11-17 NOTE — PROGRESS NOTES
Initial visit , noting patient at rest with pressure injury prevention measures in place,  left ischial full thickness wound noted , cleansed assessed and redressed with Dakins moist gauze in wound bed under dry gauze and secured with cloth tape, hygeine measures provided small BM noted. Requested low air loss mattress , reported same to assigned nurse.    11/17/22 1100        Altered Skin Integrity 11/16/22 Left Buttocks #1 Ulceration   Date First Assessed: 11/16/22   Altered Skin Integrity Present on Admission: yes  Side: Left  Location: Buttocks  Wound Number: #1  Primary Wound Type: Ulceration   Wound Image    Description of Altered Skin Integrity Full thickness tissue loss with exposed bone, tendon, or muscle. Often includes undermining and tunneling. May extend into muscle and/or supporting structures.   Dressing Appearance Moist drainage   Drainage Amount Moderate   Drainage Characteristics/Odor Serous   Appearance Red   Tissue loss description Full thickness   Red (%), Wound Tissue Color 80 %   Periwound Area Intact   Wound Edges Defined   Wound Length (cm) 4 cm   Wound Width (cm) 5 cm   Wound Depth (cm) 2 cm   Wound Volume (cm^3) 40 cm^3   Wound Surface Area (cm^2) 20 cm^2   Care Antimicrobial agent   Dressing Changed;Gauze, wet to dry  (with Dakins moist gauze.)   Dressing Change Due 11/17/22   Skin Interventions   Pressure Reduction Devices positioning supports utilized;heel offloading device utilized   Pressure Reduction Techniques pressure points protected  (sacral preventive foam dressing.)   Safety Management   Patient Rounds bed in low position;ID band on;bed wheels locked;placement of personal items at bedside;toileting offered;call light in patient/parent reach;clutter free environment maintained;visualized patient
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
## 2022-11-18 PROCEDURE — 63600175 PHARM REV CODE 636 W HCPCS: Performed by: INTERNAL MEDICINE

## 2022-11-18 PROCEDURE — 11000001 HC ACUTE MED/SURG PRIVATE ROOM

## 2022-11-18 PROCEDURE — 25000003 PHARM REV CODE 250: Performed by: INTERNAL MEDICINE

## 2022-11-18 RX ORDER — KETOROLAC TROMETHAMINE 30 MG/ML
30 INJECTION, SOLUTION INTRAMUSCULAR; INTRAVENOUS EVERY 6 HOURS PRN
Status: DISPENSED | OUTPATIENT
Start: 2022-11-18 | End: 2022-11-21

## 2022-11-18 RX ORDER — DIAZEPAM 5 MG/1
5 TABLET ORAL EVERY 8 HOURS
Status: DISCONTINUED | OUTPATIENT
Start: 2022-11-19 | End: 2022-11-21

## 2022-11-18 RX ADMIN — TRAMADOL HYDROCHLORIDE 50 MG: 50 TABLET, COATED ORAL at 03:11

## 2022-11-18 RX ADMIN — GABAPENTIN 600 MG: 300 CAPSULE ORAL at 09:11

## 2022-11-18 RX ADMIN — BACLOFEN 20 MG: 10 TABLET ORAL at 03:11

## 2022-11-18 RX ADMIN — DULOXETINE 60 MG: 30 CAPSULE, DELAYED RELEASE ORAL at 09:11

## 2022-11-18 RX ADMIN — TRAMADOL HYDROCHLORIDE 50 MG: 50 TABLET, COATED ORAL at 07:11

## 2022-11-18 RX ADMIN — TRAMADOL HYDROCHLORIDE 50 MG: 50 TABLET, COATED ORAL at 11:11

## 2022-11-18 RX ADMIN — APIXABAN 5 MG: 5 TABLET, FILM COATED ORAL at 09:11

## 2022-11-18 RX ADMIN — KETOROLAC TROMETHAMINE 30 MG: 30 INJECTION, SOLUTION INTRAMUSCULAR at 06:11

## 2022-11-18 RX ADMIN — BACLOFEN 20 MG: 10 TABLET ORAL at 09:11

## 2022-11-18 RX ADMIN — POTASSIUM CHLORIDE 40 MEQ: 1500 TABLET, EXTENDED RELEASE ORAL at 09:11

## 2022-11-18 RX ADMIN — OXYBUTYNIN CHLORIDE 10 MG: 5 TABLET ORAL at 09:11

## 2022-11-18 RX ADMIN — GABAPENTIN 600 MG: 300 CAPSULE ORAL at 03:11

## 2022-11-18 RX ADMIN — GABAPENTIN 800 MG: 300 CAPSULE ORAL at 09:11

## 2022-11-18 RX ADMIN — POTASSIUM CHLORIDE 40 MEQ: 1500 TABLET, EXTENDED RELEASE ORAL at 03:11

## 2022-11-18 RX ADMIN — DOCUSATE SODIUM 100 MG: 100 CAPSULE, LIQUID FILLED ORAL at 09:11

## 2022-11-18 NOTE — PROGRESS NOTES
Follow up , noting patient resting on a pressure redistribution mattress, low air loss mattress has been requested , reported same to assigned nurse Sabina MOORE and communicated same to bed rep Norton .pressure injury prevention measures in place.    11/18/22 0900   Skin   Specialty Bed/Overlay   (Pressure redistribution mattress)   Safety   Safety Precautions emergency equipment at bedside   Safety Management   Safety Promotion/Fall Prevention assistive device/personal item within reach;side rails raised x 2;family to remain at bedside   Patient Rounds bed in low position;bed wheels locked;call light in patient/parent reach;clutter free environment maintained;ID band on;placement of personal items at bedside;toileting offered   Daily Care   Activity Management Rolling - L1   Positioning   Body Position 30 degrees;semi-prone   Head of Bed (HOB) Positioning HOB at 30-45 degrees   Positioning/Transfer Devices pillows;wedge

## 2022-11-18 NOTE — PROGRESS NOTES
Inpatient Nutrition Evaluation    Admit Date: 11/16/2022   Total duration of encounter: 2 days    Nutrition Recommendation/Prescription     Continue regular diet as tolerated.     Boost Max BID. 160 kcals and 30 g protein per serving.    Boost Plus once daily. 360 kcals and 14 g protein per serving.     Add vitamin C, MVI, and 2 weeks of zinc to aid in wound healing.     Nutrition Assessment     Chart Review    Reason Seen: continuous nutrition monitoring full thickness tissue loss w/ exposed muscle or bone to left buttocks    Diagnosis:  1. Stage IV decubitus ulcer.  It looks like he probably as acute cystitis versus   colonization.  2. Mild chronic anemia.  3. Hypertension.  4. Tobacco abuse.  5. Paraplegia.  6. Neurogenic bladder and bowel.  7. History of recurrent deep venous thrombosis.   8. History of significant deconditioning.  9. Protein-calorie malnutrition.    10. He has some nausea and vomiting.  11. Slight dehydration.    Relevant Medical History:   paraplegia, tobacco abuse, neurogenic   bowel and bladder, stage IV decubitus in his sacrum and area on the buttock,   hypertension, history of DVT, history depression, anxiety, history of C diff in   the past, history of depression    Nutrition-Related Medications: Baclofen, Cl, Metoprolol     Nutrition-Related Labs:  11/17: RBC 4.34, K 3.0, Glu 127, Alb 2.6     Diet Order: Diet Adult Regular  Oral Supplement Order: none  Appetite/Oral Intake: fair/50-75% of meals  Factors Affecting Nutritional Intake: none identified  Food/Church/Cultural Preferences: none reported  Food Allergies: none reported    Skin Integrity: (P) wound  Wound(s):      Altered Skin Integrity 11/16/22 Left Buttocks #1 Ulceration-Tissue loss description: Full thickness     Comments    11/18/22: Pt reports fair appetite today, tolerating diet, appetite has been good lately, no recent unintentional weight loss, no GI complaints, normal bowel activity. Sounds like there is a problem with  "his care @ home and that case management is looking into placement in a care facility, it does sound like he has at least had food available to him. Does not like Brandon, agrees to Boost Max. Likes all flavors of Boost.     Anthropometrics    Height: 5' 10" (177.8 cm) Height Method: Stated  Last Weight: 78.9 kg (173 lb 14.4 oz) (11/17/22 1405) Weight Method: Bed Scale  BMI (Calculated): 25  BMI Classification: overweight (BMI 25-29.9)        Ideal Body Weight (IBW), Male: 166 lb     % Ideal Body Weight, Male (lb): 104.76 %                          Usual Weight Provided By: patient    Wt Readings from Last 5 Encounters:   11/17/22 78.9 kg (173 lb 14.4 oz)   08/06/22 59 kg (130 lb)   01/02/20 82 kg (180 lb 12.4 oz)     Weight Change(s) Since Admission:  Admit Weight: 78.9 kg (173 lb 14.4 oz) (11/17/22 1405)      Patient Education    Not applicable.    Monitoring & Evaluation     Dietitian will monitor food and beverage intake, weight, and electrolyte/renal panel.  Nutrition Risk/Follow-Up: low (follow-up in 5-7 days)  Patients assigned 'low nutrition risk' status do not qualify for a full nutritional assessment but will be monitored and re-evaluated in a 5-7 day time period. Please consult if re-evaluation needed sooner.   "

## 2022-11-19 LAB
ALBUMIN SERPL-MCNC: 2.4 GM/DL (ref 3.5–5)
ALBUMIN/GLOB SERPL: 0.6 RATIO (ref 1.1–2)
ALP SERPL-CCNC: 83 UNIT/L (ref 40–150)
ALT SERPL-CCNC: 5 UNIT/L (ref 0–55)
AST SERPL-CCNC: 6 UNIT/L (ref 5–34)
BASOPHILS # BLD AUTO: 0.09 X10(3)/MCL (ref 0–0.2)
BASOPHILS NFR BLD AUTO: 1.1 %
BILIRUBIN DIRECT+TOT PNL SERPL-MCNC: 0.3 MG/DL
BUN SERPL-MCNC: 8.8 MG/DL (ref 8.9–20.6)
CALCIUM SERPL-MCNC: 8.4 MG/DL (ref 8.4–10.2)
CHLORIDE SERPL-SCNC: 108 MMOL/L (ref 98–107)
CO2 SERPL-SCNC: 24 MMOL/L (ref 22–29)
CREAT SERPL-MCNC: 0.67 MG/DL (ref 0.73–1.18)
EOSINOPHIL # BLD AUTO: 0.17 X10(3)/MCL (ref 0–0.9)
EOSINOPHIL NFR BLD AUTO: 2.1 %
ERYTHROCYTE [DISTWIDTH] IN BLOOD BY AUTOMATED COUNT: 14.5 % (ref 11.5–17)
GFR SERPLBLD CREATININE-BSD FMLA CKD-EPI: >60 MLS/MIN/1.73/M2
GLOBULIN SER-MCNC: 3.9 GM/DL (ref 2.4–3.5)
GLUCOSE SERPL-MCNC: 98 MG/DL (ref 74–100)
HCT VFR BLD AUTO: 32.9 % (ref 42–52)
HGB BLD-MCNC: 9.7 GM/DL (ref 14–18)
IMM GRANULOCYTES # BLD AUTO: 0.04 X10(3)/MCL (ref 0–0.04)
IMM GRANULOCYTES NFR BLD AUTO: 0.5 %
LYMPHOCYTES # BLD AUTO: 2.76 X10(3)/MCL (ref 0.6–4.6)
LYMPHOCYTES NFR BLD AUTO: 34.8 %
MAGNESIUM SERPL-MCNC: 2.1 MG/DL (ref 1.6–2.6)
MCH RBC QN AUTO: 25.1 PG (ref 27–31)
MCHC RBC AUTO-ENTMCNC: 29.5 MG/DL (ref 33–36)
MCV RBC AUTO: 85 FL (ref 80–94)
MONOCYTES # BLD AUTO: 0.7 X10(3)/MCL (ref 0.1–1.3)
MONOCYTES NFR BLD AUTO: 8.8 %
NEUTROPHILS # BLD AUTO: 4.2 X10(3)/MCL (ref 2.1–9.2)
NEUTROPHILS NFR BLD AUTO: 52.7 %
NRBC BLD AUTO-RTO: 0 %
PLATELET # BLD AUTO: 322 X10(3)/MCL (ref 130–400)
PMV BLD AUTO: 10.4 FL (ref 7.4–10.4)
POTASSIUM SERPL-SCNC: 4.4 MMOL/L (ref 3.5–5.1)
PROT SERPL-MCNC: 6.3 GM/DL (ref 6.4–8.3)
RBC # BLD AUTO: 3.87 X10(6)/MCL (ref 4.7–6.1)
SODIUM SERPL-SCNC: 141 MMOL/L (ref 136–145)
WBC # SPEC AUTO: 7.9 X10(3)/MCL (ref 4.5–11.5)

## 2022-11-19 PROCEDURE — 25000003 PHARM REV CODE 250: Performed by: INTERNAL MEDICINE

## 2022-11-19 PROCEDURE — 80053 COMPREHEN METABOLIC PANEL: CPT | Performed by: INTERNAL MEDICINE

## 2022-11-19 PROCEDURE — 83735 ASSAY OF MAGNESIUM: CPT | Performed by: INTERNAL MEDICINE

## 2022-11-19 PROCEDURE — 36415 COLL VENOUS BLD VENIPUNCTURE: CPT | Performed by: INTERNAL MEDICINE

## 2022-11-19 PROCEDURE — 85025 COMPLETE CBC W/AUTO DIFF WBC: CPT | Performed by: INTERNAL MEDICINE

## 2022-11-19 PROCEDURE — 11000001 HC ACUTE MED/SURG PRIVATE ROOM

## 2022-11-19 RX ORDER — OXYCODONE AND ACETAMINOPHEN 10; 325 MG/1; MG/1
1 TABLET ORAL EVERY 4 HOURS PRN
Status: DISCONTINUED | OUTPATIENT
Start: 2022-11-19 | End: 2022-11-22

## 2022-11-19 RX ADMIN — GABAPENTIN 800 MG: 300 CAPSULE ORAL at 09:11

## 2022-11-19 RX ADMIN — BACLOFEN 20 MG: 10 TABLET ORAL at 09:11

## 2022-11-19 RX ADMIN — OXYCODONE AND ACETAMINOPHEN 1 TABLET: 10; 325 TABLET ORAL at 09:11

## 2022-11-19 RX ADMIN — GABAPENTIN 800 MG: 300 CAPSULE ORAL at 10:11

## 2022-11-19 RX ADMIN — OXYBUTYNIN CHLORIDE 10 MG: 5 TABLET ORAL at 09:11

## 2022-11-19 RX ADMIN — APIXABAN 5 MG: 5 TABLET, FILM COATED ORAL at 09:11

## 2022-11-19 RX ADMIN — OXYCODONE AND ACETAMINOPHEN 1 TABLET: 10; 325 TABLET ORAL at 03:11

## 2022-11-19 RX ADMIN — TRAMADOL HYDROCHLORIDE 50 MG: 50 TABLET, COATED ORAL at 05:11

## 2022-11-19 RX ADMIN — DOCUSATE SODIUM 100 MG: 100 CAPSULE, LIQUID FILLED ORAL at 09:11

## 2022-11-19 RX ADMIN — DULOXETINE 60 MG: 30 CAPSULE, DELAYED RELEASE ORAL at 09:11

## 2022-11-19 RX ADMIN — DIAZEPAM 5 MG: 5 TABLET ORAL at 09:11

## 2022-11-19 RX ADMIN — GABAPENTIN 800 MG: 300 CAPSULE ORAL at 02:11

## 2022-11-19 RX ADMIN — DIAZEPAM 5 MG: 5 TABLET ORAL at 02:11

## 2022-11-19 RX ADMIN — BACLOFEN 20 MG: 10 TABLET ORAL at 02:11

## 2022-11-19 RX ADMIN — DIAZEPAM 5 MG: 5 TABLET ORAL at 05:11

## 2022-11-19 RX ADMIN — BACLOFEN 20 MG: 10 TABLET ORAL at 10:11

## 2022-11-19 NOTE — PROGRESS NOTES
[facility]                       [address] [address]    PATIENT NAME:       OWEN PIRES  YOB: 1975  CSN:                [id number]   MRN:                [id number]  ADMIT DATE:         11/16/2022 22:08:00  PHYSICIAN:          Miguel Lamb MD                            PROGRESS NOTE    DATE:      A 46-year-old  male.  He was admitted for a stage IV decubitus   and acute cystitis, also for placement.  He is doing fairly good at the present   time.  He denies any shortness of breath.  No chest pain or palpitations.  No   headaches or any other problems.  He has been afebrile.  Vital signs have been   stable.  No other significant problems.    REVIEW OF SYSTEMS:  X12 as above.    PHYSICAL EXAMINATION:  VITAL SIGNS:  Blood pressure is 129/69, pulse 59, and temp 97.7.   GENERAL APPEARANCE:  He is alert, in no acute distress.    HEART:  Regular rhythm and rate.  LUNGS:  Clear.    ABDOMEN:  Soft, nontender.    EXTREMITIES:  No clubbing, cyanosis, or edema.  NEUROLOGIC:  Unchanged, paraplegic.  He does have a stage IV sacral decubitus.    LABS:  There are no new labs.  Urine culture shows more than 100,000 colonies of   gram-negative rods.    IMPRESSION:    1. Acute cystitis.    2. He has hypokalemia.    3. Stage IV decubitus.  4. History of hypertension.   5. Clinical malnutrition.   6. Paraplegia.   7. Neurogenic bladder and bowel.   8. He has some nausea, vomiting.   9. History of recurrent DVTs.    PLAN:    1.  The patient will continue with Rocephin until cultures and sensitivities are   back.    2. We will continue with DVT prophylaxis.    3. The patient had the potassium replaced.        ______________________________  Miguel Lamb MD    JOHN/S  DD:  11/18/2022  Time:  05:47PM  DT:  11/18/2022  Time:  06:16PM  Job #:  [id number]      PROGRESS NOTE

## 2022-11-19 NOTE — PROGRESS NOTES
A 46-year-old  male.  He was admitted for a stage IV decubitus   and acute cystitis, also for placement.  He is doing fairly good at the present   time.  He denies any shortness of breath.  No chest pain or palpitations.  No   headaches or any other problems.  He has been afebrile.  Vital signs have been   stable.  No other significant problems.    Today's info : seen and examined, no acute events overnight. Continues to improve   Afebrile  Carbepenem resist pseudomonas in urine    REVIEW OF SYSTEMS:  X12 as above.    PHYSICAL EXAMINATION:  Vitals:    11/19/22 0001 11/19/22 0551 11/19/22 0801 11/19/22 1142   BP: 123/81 118/75 120/79 124/80   Pulse: 78 81 76 63   Resp: 18 18 20 20   Temp: 98.3 °F (36.8 °C) 98 °F (36.7 °C) 98.2 °F (36.8 °C) 98.2 °F (36.8 °C)   TempSrc: Oral  Oral Oral   SpO2: 98% 99% 100% 99%   Weight:       Height:         .   GENERAL APPEARANCE:  He is alert, in no acute distress.    HEART:  Regular rhythm and rate.  LUNGS:  Clear.    ABDOMEN:  Soft, nontender.    EXTREMITIES:  No clubbing, cyanosis, or edema.  NEUROLOGIC:  Unchanged, paraplegic.  He does have a stage IV sacral decubitus.    LABS:  There are no new labs.  Urine culture shows more than 100,000 colonies of   gram-negative rods.  Lab Results   Component Value Date    WBC 7.9 11/19/2022    HGB 9.7 (L) 11/19/2022    HCT 32.9 (L) 11/19/2022    MCV 85.0 11/19/2022     11/19/2022         Recent Labs   Lab 11/19/22  0516      K 4.4   CO2 24   BUN 8.8*   CREATININE 0.67*   GLUCOSE 98   CALCIUM 8.4         IMPRESSION:    1. Acute cystitis.    2. He has hypokalemia.    3. Stage IV decubitus.  4. History of hypertension.   5. Clinical malnutrition.   6. Paraplegia.   7. Neurogenic bladder and bowel.   8. He has some nausea, vomiting.   9. History of recurrent DVTs.    PLAN:    Ivf  Monitor off of Iv abx  Follow cx  Labs in am  Gi and dvt ppx

## 2022-11-19 NOTE — PROGRESS NOTES
PATIENT NAME:       OWEN PIRES  YOB: 1975  CSN:                [id number]   MRN:                [id number]  ADMIT DATE:         11/16/2022 22:08:00  PHYSICIAN:          Miguel Lamb MD                            PROGRESS NOTE    DATE:  11/17/2022 00:00:00    A 46-year-old  male with acute cystitis, sensitivities are not   back yet.  No fever or chills.  No shortness of breath.  No chest pain or   palpitations or other problems.  He has been afebrile.  Vital signs have been   stable.    REVIEW OF SYSTEMS:  X12 as above.    PHYSICAL EXAMINATION:  VITAL SIGNS:  Blood pressure is 114/76, pulse is 74, temp 98.2.   GENERAL APPEARANCE:  Alert, in no acute distress.  HEART:  Regular rhythm and rate.  LUNGS:  Clear.  ABDOMEN:  Soft, nontender.  EXTREMITIES:  No clubbing, cyanosis, or edema.    NEUROLOGIC:  Unchanged.  Paraplegic.    SKIN:  He has a sacral decubitus stage IV.    IMPRESSION:    1. Acute cystitis.  2. Hypokalemia, status post replacement of the potassium.    3. He has mild chronic anemia.  4. Hypertension.   5. Paraplegia.   6. History of drug abuse.   7. Neurogenic bladder and bowel.   8. Recurrent DVTs.   9. Deconditioning.   10. Malnutrition.    11. Nausea vomiting with slight dehydration.    PLAN:    1. Continue Rocephin until the sensitivities are back.   2. We will continue with wound care.  3. We will continue to look for placement.   is on the case.        ______________________________  Miguel Lamb MD    JOHN/AQS  DD:  11/18/2022  Time:  05:47PM  DT:  11/18/2022  Time:  06:20PM  Job #:  [id number]      PROGRESS NOTE

## 2022-11-20 LAB
ALBUMIN SERPL-MCNC: 2.7 GM/DL (ref 3.5–5)
ALBUMIN/GLOB SERPL: 0.7 RATIO (ref 1.1–2)
ALP SERPL-CCNC: 91 UNIT/L (ref 40–150)
ALT SERPL-CCNC: 5 UNIT/L (ref 0–55)
AST SERPL-CCNC: 15 UNIT/L (ref 5–34)
BASOPHILS # BLD AUTO: 0.1 X10(3)/MCL (ref 0–0.2)
BASOPHILS NFR BLD AUTO: 1 %
BILIRUBIN DIRECT+TOT PNL SERPL-MCNC: 0.2 MG/DL
BUN SERPL-MCNC: 11.7 MG/DL (ref 8.9–20.6)
CALCIUM SERPL-MCNC: 8.6 MG/DL (ref 8.4–10.2)
CHLORIDE SERPL-SCNC: 102 MMOL/L (ref 98–107)
CO2 SERPL-SCNC: 25 MMOL/L (ref 22–29)
CREAT SERPL-MCNC: 0.57 MG/DL (ref 0.73–1.18)
EOSINOPHIL # BLD AUTO: 0.23 X10(3)/MCL (ref 0–0.9)
EOSINOPHIL NFR BLD AUTO: 2.3 %
ERYTHROCYTE [DISTWIDTH] IN BLOOD BY AUTOMATED COUNT: 14.4 % (ref 11.5–17)
GFR SERPLBLD CREATININE-BSD FMLA CKD-EPI: >60 MLS/MIN/1.73/M2
GLOBULIN SER-MCNC: 4 GM/DL (ref 2.4–3.5)
GLUCOSE SERPL-MCNC: 81 MG/DL (ref 74–100)
HCT VFR BLD AUTO: 34.3 % (ref 42–52)
HGB BLD-MCNC: 10.6 GM/DL (ref 14–18)
IMM GRANULOCYTES # BLD AUTO: 0.04 X10(3)/MCL (ref 0–0.04)
IMM GRANULOCYTES NFR BLD AUTO: 0.4 %
LYMPHOCYTES # BLD AUTO: 4.07 X10(3)/MCL (ref 0.6–4.6)
LYMPHOCYTES NFR BLD AUTO: 41.5 %
MCH RBC QN AUTO: 26 PG (ref 27–31)
MCHC RBC AUTO-ENTMCNC: 30.9 MG/DL (ref 33–36)
MCV RBC AUTO: 84.1 FL (ref 80–94)
MONOCYTES # BLD AUTO: 0.69 X10(3)/MCL (ref 0.1–1.3)
MONOCYTES NFR BLD AUTO: 7 %
NEUTROPHILS # BLD AUTO: 4.7 X10(3)/MCL (ref 2.1–9.2)
NEUTROPHILS NFR BLD AUTO: 47.8 %
NRBC BLD AUTO-RTO: 0 %
PLATELET # BLD AUTO: 243 X10(3)/MCL (ref 130–400)
PMV BLD AUTO: 12 FL (ref 7.4–10.4)
POTASSIUM SERPL-SCNC: 5.6 MMOL/L (ref 3.5–5.1)
PROT SERPL-MCNC: 6.7 GM/DL (ref 6.4–8.3)
RBC # BLD AUTO: 4.08 X10(6)/MCL (ref 4.7–6.1)
SODIUM SERPL-SCNC: 138 MMOL/L (ref 136–145)
WBC # SPEC AUTO: 9.8 X10(3)/MCL (ref 4.5–11.5)

## 2022-11-20 PROCEDURE — 25000003 PHARM REV CODE 250: Performed by: INTERNAL MEDICINE

## 2022-11-20 PROCEDURE — 80053 COMPREHEN METABOLIC PANEL: CPT | Performed by: INTERNAL MEDICINE

## 2022-11-20 PROCEDURE — 11000001 HC ACUTE MED/SURG PRIVATE ROOM

## 2022-11-20 PROCEDURE — 85025 COMPLETE CBC W/AUTO DIFF WBC: CPT | Performed by: INTERNAL MEDICINE

## 2022-11-20 PROCEDURE — 36415 COLL VENOUS BLD VENIPUNCTURE: CPT | Performed by: INTERNAL MEDICINE

## 2022-11-20 RX ADMIN — OXYCODONE AND ACETAMINOPHEN 1 TABLET: 10; 325 TABLET ORAL at 05:11

## 2022-11-20 RX ADMIN — OXYCODONE AND ACETAMINOPHEN 1 TABLET: 10; 325 TABLET ORAL at 01:11

## 2022-11-20 RX ADMIN — OXYCODONE AND ACETAMINOPHEN 1 TABLET: 10; 325 TABLET ORAL at 07:11

## 2022-11-20 RX ADMIN — APIXABAN 5 MG: 5 TABLET, FILM COATED ORAL at 09:11

## 2022-11-20 RX ADMIN — BACLOFEN 20 MG: 10 TABLET ORAL at 09:11

## 2022-11-20 RX ADMIN — BACLOFEN 20 MG: 10 TABLET ORAL at 04:11

## 2022-11-20 RX ADMIN — DIAZEPAM 5 MG: 5 TABLET ORAL at 05:11

## 2022-11-20 RX ADMIN — OXYCODONE AND ACETAMINOPHEN 1 TABLET: 10; 325 TABLET ORAL at 11:11

## 2022-11-20 RX ADMIN — DOCUSATE SODIUM 100 MG: 100 CAPSULE, LIQUID FILLED ORAL at 09:11

## 2022-11-20 RX ADMIN — OXYCODONE AND ACETAMINOPHEN 1 TABLET: 10; 325 TABLET ORAL at 02:11

## 2022-11-20 RX ADMIN — DIAZEPAM 5 MG: 5 TABLET ORAL at 09:11

## 2022-11-20 RX ADMIN — OXYBUTYNIN CHLORIDE 10 MG: 5 TABLET ORAL at 09:11

## 2022-11-20 RX ADMIN — GABAPENTIN 800 MG: 300 CAPSULE ORAL at 09:11

## 2022-11-20 RX ADMIN — DIAZEPAM 5 MG: 5 TABLET ORAL at 03:11

## 2022-11-20 RX ADMIN — OXYCODONE AND ACETAMINOPHEN 1 TABLET: 10; 325 TABLET ORAL at 09:11

## 2022-11-20 RX ADMIN — DULOXETINE 60 MG: 30 CAPSULE, DELAYED RELEASE ORAL at 09:11

## 2022-11-20 RX ADMIN — GABAPENTIN 800 MG: 300 CAPSULE ORAL at 04:11

## 2022-11-20 NOTE — PROGRESS NOTES
A 46-year-old  male.  He was admitted for a stage IV decubitus   and acute cystitis, also for placement.  He is doing fairly good at the present   time.  He denies any shortness of breath.  No chest pain or palpitations.  No   headaches or any other problems.  He has been afebrile.  Vital signs have been   stable.  No other significant problems.    Today's info : seen and examined, no acute events overnight. Continues to improve   Afebrile  Carbepenem resist pseudomonas in urine    REVIEW OF SYSTEMS:  X12 as above.    PHYSICAL EXAMINATION:  Vitals:    11/20/22 0531 11/20/22 0908 11/20/22 0910 11/20/22 0930   BP:  134/87 137/87    Pulse:  63     Resp: 15 18  20   Temp:  97.6 °F (36.4 °C)     TempSrc:  Oral     SpO2:  99%     Weight:       Height:         .   GENERAL APPEARANCE:  He is alert, in no acute distress.    HEART:  Regular rhythm and rate.  LUNGS:  Clear.    ABDOMEN:  Soft, nontender.    EXTREMITIES:  No clubbing, cyanosis, or edema.  NEUROLOGIC:  Unchanged, paraplegic.  He does have a stage IV sacral decubitus.    LABS:  There are no new labs.  Urine culture shows more than 100,000 colonies of   gram-negative rods.  Lab Results   Component Value Date    WBC 9.8 11/20/2022    HGB 10.6 (L) 11/20/2022    HCT 34.3 (L) 11/20/2022    MCV 84.1 11/20/2022     11/20/2022         Recent Labs   Lab 11/20/22  0504      K 5.6*   CO2 25   BUN 11.7   CREATININE 0.57*   GLUCOSE 81   CALCIUM 8.6           IMPRESSION:    1. Acute cystitis.    2. He has hypokalemia.    3. Stage IV decubitus.  4. History of hypertension.   5. Clinical malnutrition.   6. Paraplegia.   7. Neurogenic bladder and bowel.   8. He has some nausea, vomiting.   9. History of recurrent DVTs.    PLAN:    Ivf  Monitor off of Iv abx  Follow cx  Labs in am  Gi and dvt ppx  Id consult

## 2022-11-21 LAB
BASOPHILS # BLD AUTO: 0.09 X10(3)/MCL (ref 0–0.2)
BASOPHILS NFR BLD AUTO: 1 %
CRP SERPL-MCNC: 12.4 MG/L
EOSINOPHIL # BLD AUTO: 0.26 X10(3)/MCL (ref 0–0.9)
EOSINOPHIL NFR BLD AUTO: 2.8 %
ERYTHROCYTE [DISTWIDTH] IN BLOOD BY AUTOMATED COUNT: 14.4 % (ref 11.5–17)
ERYTHROCYTE [SEDIMENTATION RATE] IN BLOOD: 115 MM/HR (ref 0–15)
HCT VFR BLD AUTO: 37.7 % (ref 42–52)
HGB BLD-MCNC: 11.3 GM/DL (ref 14–18)
IMM GRANULOCYTES # BLD AUTO: 0.03 X10(3)/MCL (ref 0–0.04)
IMM GRANULOCYTES NFR BLD AUTO: 0.3 %
LYMPHOCYTES # BLD AUTO: 2.75 X10(3)/MCL (ref 0.6–4.6)
LYMPHOCYTES NFR BLD AUTO: 29.8 %
MCH RBC QN AUTO: 25.1 PG (ref 27–31)
MCHC RBC AUTO-ENTMCNC: 30 MG/DL (ref 33–36)
MCV RBC AUTO: 83.6 FL (ref 80–94)
MONOCYTES # BLD AUTO: 0.46 X10(3)/MCL (ref 0.1–1.3)
MONOCYTES NFR BLD AUTO: 5 %
NEUTROPHILS # BLD AUTO: 5.6 X10(3)/MCL (ref 2.1–9.2)
NEUTROPHILS NFR BLD AUTO: 61.1 %
NRBC BLD AUTO-RTO: 0 %
PLATELET # BLD AUTO: 333 X10(3)/MCL (ref 130–400)
PMV BLD AUTO: 10.1 FL (ref 7.4–10.4)
RBC # BLD AUTO: 4.51 X10(6)/MCL (ref 4.7–6.1)
WBC # SPEC AUTO: 9.2 X10(3)/MCL (ref 4.5–11.5)

## 2022-11-21 PROCEDURE — 25000003 PHARM REV CODE 250: Performed by: INTERNAL MEDICINE

## 2022-11-21 PROCEDURE — 86140 C-REACTIVE PROTEIN: CPT | Performed by: INTERNAL MEDICINE

## 2022-11-21 PROCEDURE — G0378 HOSPITAL OBSERVATION PER HR: HCPCS

## 2022-11-21 PROCEDURE — 36415 COLL VENOUS BLD VENIPUNCTURE: CPT | Performed by: INTERNAL MEDICINE

## 2022-11-21 PROCEDURE — 85025 COMPLETE CBC W/AUTO DIFF WBC: CPT | Performed by: INTERNAL MEDICINE

## 2022-11-21 PROCEDURE — 85651 RBC SED RATE NONAUTOMATED: CPT | Performed by: INTERNAL MEDICINE

## 2022-11-21 RX ORDER — TIZANIDINE 4 MG/1
4 TABLET ORAL EVERY 8 HOURS
Status: DISCONTINUED | OUTPATIENT
Start: 2022-11-21 | End: 2022-11-22

## 2022-11-21 RX ADMIN — AMLODIPINE BESYLATE 10 MG: 5 TABLET ORAL at 08:11

## 2022-11-21 RX ADMIN — BACLOFEN 20 MG: 10 TABLET ORAL at 03:11

## 2022-11-21 RX ADMIN — OXYBUTYNIN CHLORIDE 10 MG: 5 TABLET ORAL at 10:11

## 2022-11-21 RX ADMIN — OXYCODONE AND ACETAMINOPHEN 1 TABLET: 10; 325 TABLET ORAL at 03:11

## 2022-11-21 RX ADMIN — GABAPENTIN 800 MG: 300 CAPSULE ORAL at 03:11

## 2022-11-21 RX ADMIN — OXYCODONE AND ACETAMINOPHEN 1 TABLET: 10; 325 TABLET ORAL at 10:11

## 2022-11-21 RX ADMIN — GABAPENTIN 800 MG: 300 CAPSULE ORAL at 10:11

## 2022-11-21 RX ADMIN — APIXABAN 5 MG: 5 TABLET, FILM COATED ORAL at 10:11

## 2022-11-21 RX ADMIN — TIZANIDINE 4 MG: 4 TABLET ORAL at 03:11

## 2022-11-21 RX ADMIN — GABAPENTIN 800 MG: 300 CAPSULE ORAL at 08:11

## 2022-11-21 RX ADMIN — OXYCODONE AND ACETAMINOPHEN 1 TABLET: 10; 325 TABLET ORAL at 12:11

## 2022-11-21 RX ADMIN — DIAZEPAM 5 MG: 5 TABLET ORAL at 06:11

## 2022-11-21 RX ADMIN — OXYCODONE AND ACETAMINOPHEN 1 TABLET: 10; 325 TABLET ORAL at 06:11

## 2022-11-21 RX ADMIN — BACLOFEN 20 MG: 10 TABLET ORAL at 10:11

## 2022-11-21 RX ADMIN — TIZANIDINE 4 MG: 4 TABLET ORAL at 10:11

## 2022-11-21 RX ADMIN — METOPROLOL SUCCINATE 25 MG: 25 TABLET, FILM COATED, EXTENDED RELEASE ORAL at 08:11

## 2022-11-21 RX ADMIN — APIXABAN 5 MG: 5 TABLET, FILM COATED ORAL at 08:11

## 2022-11-21 RX ADMIN — DULOXETINE 60 MG: 30 CAPSULE, DELAYED RELEASE ORAL at 08:11

## 2022-11-21 RX ADMIN — OXYCODONE AND ACETAMINOPHEN 1 TABLET: 10; 325 TABLET ORAL at 07:11

## 2022-11-21 RX ADMIN — BACLOFEN 20 MG: 10 TABLET ORAL at 08:11

## 2022-11-21 RX ADMIN — DOCUSATE SODIUM 100 MG: 100 CAPSULE, LIQUID FILLED ORAL at 08:11

## 2022-11-21 RX ADMIN — OXYBUTYNIN CHLORIDE 10 MG: 5 TABLET ORAL at 08:11

## 2022-11-21 NOTE — NURSING
Nurses Note -- 4 Eyes      11/21/2022   12:09 PM      Skin assessed during: Admit      [] No Pressure Injuries Present    []Prevention Measures Documented      [x] Yes- Altered Skin Integrity Present or Discovered   [] LDA Added if Not in Epic (Describe Wound)   [x] New Altered Skin Integrity was Present on Admit and Documented in LDA   [] Wound Image Taken    Wound Care Consulted? Yes    Attending Nurse:  Ivanna Bennett RN     Second RN/Staff Member:  Antionette Shabazz RN

## 2022-11-21 NOTE — PLAN OF CARE
Problem: Adult Inpatient Plan of Care  Goal: Plan of Care Review  Outcome: Ongoing, Progressing  Goal: Patient-Specific Goal (Individualized)  Outcome: Ongoing, Progressing  Goal: Absence of Hospital-Acquired Illness or Injury  Outcome: Ongoing, Progressing  Goal: Optimal Comfort and Wellbeing  Outcome: Ongoing, Progressing  Goal: Readiness for Transition of Care  Outcome: Ongoing, Progressing     Problem: Skin Injury Risk Increased  Goal: Skin Health and Integrity  Outcome: Ongoing, Progressing     Problem: Impaired Wound Healing  Goal: Optimal Wound Healing  Outcome: Ongoing, Progressing     Problem: Fall Injury Risk  Goal: Absence of Fall and Fall-Related Injury  Outcome: Ongoing, Progressing

## 2022-11-21 NOTE — PLAN OF CARE
Problem: Adult Inpatient Plan of Care  Goal: Plan of Care Review  Outcome: Ongoing, Progressing  Flowsheets (Taken 11/21/2022 1611)  Plan of Care Reviewed With: patient  Goal: Absence of Hospital-Acquired Illness or Injury  Outcome: Ongoing, Progressing  Goal: Optimal Comfort and Wellbeing  Outcome: Ongoing, Progressing  Goal: Readiness for Transition of Care  Outcome: Ongoing, Progressing     Problem: Skin Injury Risk Increased  Goal: Skin Health and Integrity  Outcome: Ongoing, Progressing     Problem: Impaired Wound Healing  Goal: Optimal Wound Healing  Outcome: Ongoing, Progressing  Intervention: Promote Wound Healing  Flowsheets (Taken 11/21/2022 1611)  Oral Nutrition Promotion:   rest periods promoted   medicated   safe use of adaptive equipment encouraged  Sleep/Rest Enhancement: awakenings minimized     Problem: Fall Injury Risk  Goal: Absence of Fall and Fall-Related Injury  Outcome: Ongoing, Progressing  Intervention: Identify and Manage Contributors  Flowsheets (Taken 11/21/2022 1611)  Self-Care Promotion:   independence encouraged   safe use of adaptive equipment encouraged   BADL personal objects within reach  Medication Review/Management: medications reviewed

## 2022-11-21 NOTE — PLAN OF CARE
CC44. RN Explained CYR to patient 11/21 @0910AM. Patient verbalized understanding. Delivery of CYR letter per JAISON Rodriguez.

## 2022-11-21 NOTE — PROGRESS NOTES
[facility]                       [address] [address]    PATIENT NAME:       OWEN PIRES  YOB: 1975  CSN:                [id number]   MRN:                [id number]  ADMIT DATE:         11/16/2022 22:08:00  PHYSICIAN:          Miguel Lamb MD                            PROGRESS NOTE    DATE:      A 46-year-old  male.  He is waiting for placement.  He is able   to make all decisions for himself.  He is oriented x3 with no significant   cognitive issues other than some spasms when he moves around.  He is not having   any other significant problems.  No fever or chills.  No shortness of breath.    No chest pain or palpitations or any other problems.    REVIEW OF SYSTEMS:  Times 12 as above.    PHYSICAL EXAMINATION:  VITAL SIGNS:  Blood pressure 112/74, pulse 69, temp 97.2.  GENERAL APPEARANCE:  Alert, not in acute distress.  Oriented x3.    HEART:  Regular rate and rhythm.   LUNGS:  Clear.    ABDOMEN:  Soft, nontender.  EXTREMITIES:  Trace edema.  No clubbing or cyanosis.   NEUROLOGIC:  Unchanged, paraplegic.    LABS:  Yesterday, white cell count 9.8, hemoglobin and hematocrit 10.6 and 34.3,   platelet count 243. Potassium 5.6, most likely secondary to subtle   hyperkalemia.  BUN 11.7 and creatinine 0.57, albumin 2.7.  Urine showed   multi-resistance Pseudomonas colonization.    IMPRESSION:  Acute cystitis versus colonisation, hyperkalemia, stage IV decubitus,   hypertension, clinical malnutrition and deconditioning, paraplegia, neurogenic   bladder and bowel, DVT's which are recurrence.  Drug seeking behavior  PLAN:  Patient off antibiotics to see if he has any symptoms to treat his   Pseudomonas.  We are waiting for placement in a nursing home.  Continue with DVT   prophylaxis.  We will add some Zanaflex 4 mg t.i.d. for spasms.        ______________________________  Miguel Lamb  MD LOPEZ/JESENIA  DD:  11/21/2022  Time:  07:54AM  DT:  11/21/2022  Time:  08:35AM  Job #:  [id number]      PROGRESS NOTE

## 2022-11-21 NOTE — PLAN OF CARE
[facility] provided client with MOON form to sign and provided the pt with copy and inserted the original in chart

## 2022-11-21 NOTE — PLAN OF CARE
46-year-old  gentleman with paraplegia with chronic sacral decubitus ulcer who was admitted for placement on 11/16/2022.  There was also consideration for acute cystitis versus colonization.  Placed on anti-infective therapy, wound care.  Case management working on placement.  No evidence hemodynamic instability, fever, bacteremia, osteomyelitis, abscess, the need for surgical intervention.  Essentially, admitted for placement.  I reached out to Dr. Yosvany ALANIS on 11/21/2022 at 8:58 a.m. central time who is okay with an outpatient with observation level of care services    Angelo Wiggins MD  Utilization Management  Physician Advisor

## 2022-11-22 LAB
BACTERIA UR CULT: ABNORMAL
BACTERIA UR CULT: ABNORMAL
POCT GLUCOSE: 168 MG/DL (ref 70–110)

## 2022-11-22 PROCEDURE — 25000003 PHARM REV CODE 250: Performed by: INTERNAL MEDICINE

## 2022-11-22 PROCEDURE — G0378 HOSPITAL OBSERVATION PER HR: HCPCS

## 2022-11-22 PROCEDURE — 76937 US GUIDE VASCULAR ACCESS: CPT

## 2022-11-22 PROCEDURE — A4216 STERILE WATER/SALINE, 10 ML: HCPCS | Performed by: INTERNAL MEDICINE

## 2022-11-22 PROCEDURE — C1751 CATH, INF, PER/CENT/MIDLINE: HCPCS

## 2022-11-22 PROCEDURE — 36410 VNPNXR 3YR/> PHY/QHP DX/THER: CPT

## 2022-11-22 RX ORDER — SODIUM CHLORIDE 9 MG/ML
INJECTION, SOLUTION INTRAVENOUS ONCE
Status: COMPLETED | OUTPATIENT
Start: 2022-11-22 | End: 2022-11-22

## 2022-11-22 RX ORDER — SODIUM CHLORIDE 0.9 % (FLUSH) 0.9 %
10 SYRINGE (ML) INJECTION EVERY 6 HOURS
Status: DISCONTINUED | OUTPATIENT
Start: 2022-11-22 | End: 2022-12-09 | Stop reason: HOSPADM

## 2022-11-22 RX ORDER — SODIUM CHLORIDE 900 MG/100ML
1000 INJECTION INTRAVENOUS ONCE
Status: COMPLETED | OUTPATIENT
Start: 2022-11-22 | End: 2022-11-22

## 2022-11-22 RX ORDER — AMLODIPINE BESYLATE 5 MG/1
5 TABLET ORAL DAILY
Status: DISCONTINUED | OUTPATIENT
Start: 2022-11-23 | End: 2022-12-09 | Stop reason: HOSPADM

## 2022-11-22 RX ORDER — SODIUM CHLORIDE 9 MG/ML
INJECTION, SOLUTION INTRAVENOUS CONTINUOUS
Status: DISCONTINUED | OUTPATIENT
Start: 2022-11-22 | End: 2022-11-22

## 2022-11-22 RX ORDER — SODIUM CHLORIDE 9 MG/ML
INJECTION, SOLUTION INTRAVENOUS CONTINUOUS
Status: DISCONTINUED | OUTPATIENT
Start: 2022-11-22 | End: 2022-12-07

## 2022-11-22 RX ORDER — SODIUM CHLORIDE 900 MG/100ML
1000 INJECTION INTRAVENOUS ONCE
Status: DISCONTINUED | OUTPATIENT
Start: 2022-11-22 | End: 2022-11-22

## 2022-11-22 RX ORDER — SODIUM CHLORIDE 0.9 % (FLUSH) 0.9 %
10 SYRINGE (ML) INJECTION
Status: DISCONTINUED | OUTPATIENT
Start: 2022-11-22 | End: 2022-12-09 | Stop reason: HOSPADM

## 2022-11-22 RX ADMIN — SODIUM CHLORIDE, PRESERVATIVE FREE 10 ML: 5 INJECTION INTRAVENOUS at 11:11

## 2022-11-22 RX ADMIN — SODIUM CHLORIDE: 9 INJECTION, SOLUTION INTRAVENOUS at 02:11

## 2022-11-22 RX ADMIN — SODIUM CHLORIDE: 9 INJECTION, SOLUTION INTRAVENOUS at 09:11

## 2022-11-22 RX ADMIN — APIXABAN 5 MG: 5 TABLET, FILM COATED ORAL at 10:11

## 2022-11-22 RX ADMIN — DULOXETINE 60 MG: 30 CAPSULE, DELAYED RELEASE ORAL at 10:11

## 2022-11-22 RX ADMIN — SODIUM CHLORIDE, PRESERVATIVE FREE 10 ML: 5 INJECTION INTRAVENOUS at 06:11

## 2022-11-22 RX ADMIN — OXYCODONE AND ACETAMINOPHEN 1 TABLET: 10; 325 TABLET ORAL at 03:11

## 2022-11-22 RX ADMIN — TIZANIDINE 4 MG: 4 TABLET ORAL at 05:11

## 2022-11-22 RX ADMIN — BACLOFEN 20 MG: 10 TABLET ORAL at 08:11

## 2022-11-22 RX ADMIN — SODIUM CHLORIDE 1000 ML: 9 INJECTION, SOLUTION INTRAVENOUS at 08:11

## 2022-11-22 RX ADMIN — DOCUSATE SODIUM 100 MG: 100 CAPSULE, LIQUID FILLED ORAL at 08:11

## 2022-11-22 RX ADMIN — APIXABAN 5 MG: 5 TABLET, FILM COATED ORAL at 08:11

## 2022-11-22 RX ADMIN — BACLOFEN 20 MG: 10 TABLET ORAL at 12:11

## 2022-11-22 RX ADMIN — OXYBUTYNIN CHLORIDE 10 MG: 5 TABLET ORAL at 08:11

## 2022-11-22 RX ADMIN — GABAPENTIN 800 MG: 300 CAPSULE ORAL at 08:11

## 2022-11-22 RX ADMIN — GABAPENTIN 800 MG: 300 CAPSULE ORAL at 12:11

## 2022-11-22 NOTE — PROCEDURES
"Hemal Guerrero is a 46 y.o. male patient.    Temp: 97.4 °F (36.3 °C) (11/22/22 0014)  Pulse: 64 (11/22/22 0145)  Resp: 20 (11/21/22 2212)  BP: (!) 89/56 (11/22/22 0145)  SpO2: 100 % (11/22/22 0014)  Weight: 78.9 kg (173 lb 14.4 oz) (11/17/22 1405)  Height: 5' 10" (177.8 cm) (11/17/22 1405)    PICC  Date/Time: 11/22/2022 2:10 AM  Performed by: Fabien Beard RN  Consent Done: Yes  Time out: Immediately prior to procedure a time out was called to verify the correct patient, procedure, equipment, support staff and site/side marked as required  Indications: med administration and vascular access  Anesthesia: local infiltration  Local anesthetic: lidocaine 1% without epinephrine  Anesthetic Total (mL): 5  Preparation: skin prepped with ChloraPrep  Skin prep agent dried: skin prep agent completely dried prior to procedure  Sterile barriers: all five maximum sterile barriers used - cap, mask, sterile gown, sterile gloves, and large sterile sheet  Hand hygiene: hand hygiene performed prior to central venous catheter insertion  Location details: right brachial  Catheter type: single lumen  Catheter size: 4 Fr  Catheter Length: 17cm    Ultrasound guidance: yes  Vessel Caliber: large and patentVascular Doppler: not done  Needle advanced into vessel with real time Ultrasound guidance.  Guidewire confirmed in vessel.  Sterile sheath used.  no esophageal manometryNumber of attempts: 1  Post-procedure: blood return through all ports, chlorhexidine patch and sterile dressing applied    Assessment: successful placement  Complications: none        Fabien Beard RN  11/22/2022    "

## 2022-11-22 NOTE — PROGRESS NOTES
[facility] - 8th Floor Med Surg  Wound Care    Patient Name:  Hemal Guerrero   MRN:  [id number]  Date: 11/22/2022  Diagnosis: <principal problem not specified>    History:     History reviewed. No pertinent past medical history.    Social History     Socioeconomic History    Marital status:        Precautions:     Allergies as of 11/16/2022 - Reviewed 11/16/2022   Allergen Reaction Noted    Amitriptyline  11/16/2022       WOC Assessment Details/Treatment   Patient with present on admission stage 4 pressure injury to left buttocks. Patient without complaints, denies any pain today.       11/22/22 1035   WOCN Assessment   WOCN Total Time (mins) 30   Visit Date 11/22/22   Visit Time 1035   Consult Type Follow Up   WOCN Speciality Wound   Wound pressure   Number of Wounds 1   Intervention assessed;changed;applied   Teaching on-going   Skin Interventions   Device Skin Pressure Protection pressure points protected   Pressure Reduction Devices pressure-redistributing mattress utilized  (Low air loss mattress)   Pressure Reduction Techniques weight shift assistance provided;heels elevated off bed   Positioning   Body Position 30 degrees   Head of Bed (HOB) Positioning HOB at 20-30 degrees   Pressure Injury Prevention    Check Moisture Management Pad Done   Sacral Foam Dressing Replace   Heel protection technique Pillow   Check Medical Devices Not Applicable        Altered Skin Integrity 11/16/22 Left Buttocks #1 Ulceration   Date First Assessed: 11/16/22   Altered Skin Integrity Present on Admission: yes  Side: Left  Location: Buttocks  Wound Number: #1  Primary Wound Type: Ulceration   Wound Image    Description of Altered Skin Integrity Full thickness tissue loss with exposed bone, tendon, or muscle. Often includes undermining and tunneling. May extend into muscle and/or supporting structures.   Dressing Appearance Intact   Drainage Amount Moderate   Drainage Characteristics/Odor Clear;Serous    Appearance Granulating;Slough   Tissue loss description Full thickness   Red (%), Wound Tissue Color 75 %   Yellow (%), Wound Tissue Color 25 %   Periwound Area Intact;Scar tissue   Wound Edges Defined   Wound Length (cm) 4.2 cm   Wound Width (cm) 4 cm   Wound Depth (cm) 1.6 cm   Wound Volume (cm^3) 26.88 cm^3   Wound Surface Area (cm^2) 16.8 cm^2   Undermining (depth (cm)/location) 2.8cm @ 11-2 o'clock position 2.8cm @ 1 o'clock position   Care Sterile normal saline   Dressing Applied  (1/4 strength dakins moistened gauze, abd pad, cloth tape.)    (Insert flowsheet data here)    Recommendations made to primary team for continued present wound care plan . Continue present dressing changes.     11/22/2022

## 2022-11-22 NOTE — PT/OT/SLP PROGRESS
Attempted PT eval 3x. This AM, pt was too hypotensive. Attempted this afternoon and pt was completing a medicaid application. Returned again, and transportation was taking pt to xray. Will follow up as able.

## 2022-11-22 NOTE — PLAN OF CARE
[facility].     [facility] notified Deneen Varela,  that pt is requesting to apply for medicaid.
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
## 2022-11-22 NOTE — PROGRESS NOTES
[facility]                       [address] [id number]    PATIENT NAME:       OWEN PIRES  YOB: 1975  CSN:                [id number]   MRN:                [id number]  ADMIT DATE:         11/16/2022 22:08:00  PHYSICIAN:          Miguel Lamb MD                            PROGRESS NOTE    DATE:      HISTORY:  A 46-year-old  male.  He had an episode of hypotension   yesterday, most likely secondary to Zanaflex.  It was resolved with 1 L of   normal saline.  Other than that, he has been doing good.  He denies any   shortness of breath, chest pain, palpitations, or any other problems.  He was   asymptomatic throughout the episode.  He is still waiting for placement at the   present time.  There was an infectious disease consult placed and he has not   been seen by Infectious Disease.  He continues to have wound care due to a stage   IV decubitus.  He has been afebrile.  No other new issues.    REVIEW OF SYSTEMS:  Systems times 12 as above.    PHYSICAL EXAMINATION:  VITAL SIGNS:  Blood pressure this morning was 99/71, pulse 62, and temp 97.    GENERAL APPEARANCE:  He is alert in no acute distress.    HEART:  Regular rhythm and rate.  LUNGS:  Clear.    ABDOMEN:  Soft, nontender.  Bowel sounds are present.    EXTREMITIES:  No clubbing, cyanosis, or edema.  NEUROLOGIC:  Unchanged.  He is paraplegic.    LABS:  His sed rate is 115, CRP 12.4.    IMPRESSION:    1. Acute cystitis versus colonization.  2. Stage IV decubitus, cannot rule out osteomyelitis.   3. Hypertension.  He had an episode of hypotension with Zanaflex.  4. Deconditioned.  5. Clinical malnutrition.  6. Elevated sed rate, most likely secondary to osteo.  7. Neurogenic bladder and bowel.    8. History of deep venous thrombosis with recurrence.  9 drug seeking behavior  PLAN:   As stated above, we will await for the infectious  disease consult for   possible osteomyelitis.  We will continue with the wound care.  We will continue   with placement.        ______________________________  MD JOHN Aquino/JESENIA  DD:  11/22/2022  Time:  07:21AM  DT:  11/22/2022  Time:  07:37AM  Job #:  [id number]      PROGRESS NOTE

## 2022-11-23 LAB
ALBUMIN SERPL-MCNC: 2.5 GM/DL (ref 3.5–5)
ALBUMIN/GLOB SERPL: 0.7 RATIO (ref 1.1–2)
ALP SERPL-CCNC: 84 UNIT/L (ref 40–150)
ALT SERPL-CCNC: 9 UNIT/L (ref 0–55)
AST SERPL-CCNC: 11 UNIT/L (ref 5–34)
BASOPHILS # BLD AUTO: 0.12 X10(3)/MCL (ref 0–0.2)
BASOPHILS NFR BLD AUTO: 1.3 %
BILIRUBIN DIRECT+TOT PNL SERPL-MCNC: 0.5 MG/DL
BUN SERPL-MCNC: 9.5 MG/DL (ref 8.9–20.6)
CALCIUM SERPL-MCNC: 8.3 MG/DL (ref 8.4–10.2)
CHLORIDE SERPL-SCNC: 111 MMOL/L (ref 98–107)
CO2 SERPL-SCNC: 24 MMOL/L (ref 22–29)
CREAT SERPL-MCNC: 0.58 MG/DL (ref 0.73–1.18)
EOSINOPHIL # BLD AUTO: 0.25 X10(3)/MCL (ref 0–0.9)
EOSINOPHIL NFR BLD AUTO: 2.7 %
ERYTHROCYTE [DISTWIDTH] IN BLOOD BY AUTOMATED COUNT: 14.9 % (ref 11.5–17)
GFR SERPLBLD CREATININE-BSD FMLA CKD-EPI: >60 MLS/MIN/1.73/M2
GLOBULIN SER-MCNC: 3.7 GM/DL (ref 2.4–3.5)
GLUCOSE SERPL-MCNC: 85 MG/DL (ref 74–100)
HCT VFR BLD AUTO: 34.3 % (ref 42–52)
HGB BLD-MCNC: 10.5 GM/DL (ref 14–18)
IMM GRANULOCYTES # BLD AUTO: 0.03 X10(3)/MCL (ref 0–0.04)
IMM GRANULOCYTES NFR BLD AUTO: 0.3 %
LYMPHOCYTES # BLD AUTO: 2.38 X10(3)/MCL (ref 0.6–4.6)
LYMPHOCYTES NFR BLD AUTO: 25.9 %
MCH RBC QN AUTO: 25.5 PG (ref 27–31)
MCHC RBC AUTO-ENTMCNC: 30.6 MG/DL (ref 33–36)
MCV RBC AUTO: 83.5 FL (ref 80–94)
MONOCYTES # BLD AUTO: 0.47 X10(3)/MCL (ref 0.1–1.3)
MONOCYTES NFR BLD AUTO: 5.1 %
NEUTROPHILS # BLD AUTO: 6 X10(3)/MCL (ref 2.1–9.2)
NEUTROPHILS NFR BLD AUTO: 64.7 %
NRBC BLD AUTO-RTO: 0 %
PLATELET # BLD AUTO: 280 X10(3)/MCL (ref 130–400)
PMV BLD AUTO: 10.3 FL (ref 7.4–10.4)
POTASSIUM SERPL-SCNC: 4 MMOL/L (ref 3.5–5.1)
PROT SERPL-MCNC: 6.2 GM/DL (ref 6.4–8.3)
RBC # BLD AUTO: 4.11 X10(6)/MCL (ref 4.7–6.1)
SODIUM SERPL-SCNC: 141 MMOL/L (ref 136–145)
WBC # SPEC AUTO: 9.2 X10(3)/MCL (ref 4.5–11.5)

## 2022-11-23 PROCEDURE — 36415 COLL VENOUS BLD VENIPUNCTURE: CPT | Performed by: INTERNAL MEDICINE

## 2022-11-23 PROCEDURE — 97162 PT EVAL MOD COMPLEX 30 MIN: CPT

## 2022-11-23 PROCEDURE — A4216 STERILE WATER/SALINE, 10 ML: HCPCS | Performed by: INTERNAL MEDICINE

## 2022-11-23 PROCEDURE — G0378 HOSPITAL OBSERVATION PER HR: HCPCS

## 2022-11-23 PROCEDURE — 85025 COMPLETE CBC W/AUTO DIFF WBC: CPT | Performed by: INTERNAL MEDICINE

## 2022-11-23 PROCEDURE — 80053 COMPREHEN METABOLIC PANEL: CPT | Performed by: INTERNAL MEDICINE

## 2022-11-23 PROCEDURE — 97166 OT EVAL MOD COMPLEX 45 MIN: CPT

## 2022-11-23 PROCEDURE — 25000003 PHARM REV CODE 250: Performed by: INTERNAL MEDICINE

## 2022-11-23 RX ADMIN — APIXABAN 5 MG: 5 TABLET, FILM COATED ORAL at 09:11

## 2022-11-23 RX ADMIN — GABAPENTIN 800 MG: 300 CAPSULE ORAL at 09:11

## 2022-11-23 RX ADMIN — BACLOFEN 20 MG: 10 TABLET ORAL at 09:11

## 2022-11-23 RX ADMIN — OXYBUTYNIN CHLORIDE 10 MG: 5 TABLET ORAL at 09:11

## 2022-11-23 RX ADMIN — SODIUM CHLORIDE, PRESERVATIVE FREE 10 ML: 5 INJECTION INTRAVENOUS at 06:11

## 2022-11-23 RX ADMIN — DULOXETINE 60 MG: 30 CAPSULE, DELAYED RELEASE ORAL at 09:11

## 2022-11-23 RX ADMIN — TRAMADOL HYDROCHLORIDE 50 MG: 50 TABLET, COATED ORAL at 03:11

## 2022-11-23 RX ADMIN — DOCUSATE SODIUM 100 MG: 100 CAPSULE, LIQUID FILLED ORAL at 09:11

## 2022-11-23 RX ADMIN — GABAPENTIN 800 MG: 300 CAPSULE ORAL at 03:11

## 2022-11-23 RX ADMIN — SODIUM CHLORIDE, PRESERVATIVE FREE 10 ML: 5 INJECTION INTRAVENOUS at 12:11

## 2022-11-23 RX ADMIN — TRAMADOL HYDROCHLORIDE 50 MG: 50 TABLET, COATED ORAL at 07:11

## 2022-11-23 RX ADMIN — BACLOFEN 20 MG: 10 TABLET ORAL at 03:11

## 2022-11-23 RX ADMIN — TRAMADOL HYDROCHLORIDE 50 MG: 50 TABLET, COATED ORAL at 09:11

## 2022-11-23 RX ADMIN — SODIUM CHLORIDE, PRESERVATIVE FREE 10 ML: 5 INJECTION INTRAVENOUS at 05:11

## 2022-11-23 NOTE — PLAN OF CARE
Problem: Occupational Therapy  Goal: Occupational Therapy Goal  Description: Goals to be met by: 12/21/22     Patient will increase functional independence with ADLs by performing:    LE Dressing with Modified [place].  Grooming while seated with Modified [place].  Pt will participate in progressive resistive UE exercises to increase and maximize UE strength required for tf and functional independence with ADLs.     Outcome: Ongoing, Progressing
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
## 2022-11-23 NOTE — PLAN OF CARE
JAISON left a vm and sent a text to  [facility] also left a message for [facility], awaiting a call back  JAISON sent updated clinicals via [facility]
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
## 2022-11-23 NOTE — PLAN OF CARE
Problem: Occupational Therapy  Goal: Occupational Therapy Goal  Description: Goals to be met by: 12/21/22     Patient will increase functional independence with ADLs by performing:    LE Dressing with Modified [place].  Grooming while seated with Modified [place].    Pt will participate in progressive resistive UE exercises to increase UE strength required for tf and functional independence with ADLs.     Outcome: Ongoing, Progressing

## 2022-11-23 NOTE — PT/OT/SLP EVAL
Physical Therapy Evaluation    Patient Name:  Hemal Guerrero   MRN:  [id number]    Recommendations:     Discharge Recommendations:  nursing facility, skilled   Discharge Equipment Recommendations:     Barriers to discharge: Inaccessible home and Decreased caregiver support    Assessment:     Hemal Guerrero is a 46 y.o. male admitted with a medical diagnosis of decubitus ulcer, stage IV, paraplegia, neurogenic bladder.  He presents with the following impairments/functional limitations:  weakness, impaired self care skills, impaired functional mobility, decreased lower extremity function, pain, decreased ROM, edema Pt is in low spirits but demonstrated independence with pulling up to long sitting, and sitting on the EOB. He noted he has more feeling in his lower abdomen and demonstrated good trunk support. BLE ROM is limited into flexion and hip internal and external rotation. PT stated that he used a slide board about a month ago to transfer to his WC but transferring and sitting up in the chair was painful. PT demonstrates good upper body strength and required Sam to transfer from sit to supine. Pt's goal is to become as independent as possible. Pt will benefit from placement at a skilled nursing facility to address the above impairments.     Rehab Prognosis: Good; patient would benefit from acute skilled PT services to address these deficits and reach maximum level of function.    Recent Surgery: * No surgery found *      Plan:     During this hospitalization, patient to be seen 6 x/week to address the identified rehab impairments via therapeutic activities, therapeutic exercises, wheelchair management/training and progress toward the following goals:    Plan of Care Expires:       Subjective     Chief Complaint: not stated  Patient/Family Comments/goals: wants to become as indpendent as possible  Pain/Comfort:       Patients cultural, spiritual, [employer] conflicts given the current situation: no    Living  Environment:  Previously living with children and ex wife. PT was receiving hospice but was discharged recently. Pt stated he has no place to go and is seeking placement so he can recover while he figures out his living situation.   Prior to admission, patients level of function was modA with dressing and bathing. Pt said hospice was helping him with this. Pt reported independence with slide board transfers until about a month ago when he started remaining in bed.  Equipment used at home: wheelchair.  DME owned (not currently used): none.  Upon discharge, patient will have assistance from not determined. Pt has decreased caregiver support.    Objective:     Communicated with nursing prior to session.  Patient found HOB elevated with vogt catheter  upon PT entry to room.    General Precautions: Standard, sit time <1 hr, no slide board transfers, use geomat when sitting up in chair    Orthopedic Precautions:    Braces:    Respiratory Status: Room air    Exams:  Sensation:    -       Impaired  light/touch impaired sensation starting around L1 dermatome - paraplegia- Pt stated he recently just started feeling more in his trunk and abdomen  RLE ROM: WFL except pt has decreased knee and hip ROM - increasing range will benefit him achieve his independence with ADLs  RLE Strength: 0/5  LLE ROM: WFL except pt has decreased knee and hip ROM - increasing range will benefit him achieve his independence with ADLs  LLE Strength: 0/5    Functional Mobility:  Bed Mobility:     Scooting: independence  Supine to Sit: independence  Sit to Supine: minimum assistance  Balance: Pt demonstrates good seated balance at EOB for several min      AM-PAC 6 CLICK MOBILITY  Total Score:11       Treatment & Education:  Bed mobility  ROM  Discussed goals and how we will help him achieve them    Patient left HOB elevated with all lines intact and call button in reach.    GOALS:   Multidisciplinary Problems       Physical Therapy Goals           Problem: Physical Therapy    Goal Priority Disciplines Outcome Goal Variances Interventions   Physical Therapy Goal     PT, PT/OT Ongoing, Progressing     Description: Goals to be met by: 2022     Patient will increase functional independence with mobility by performin. Sit to supine with [place]  2. Rolling to Left and Right with [place].  3. Wheelchair propulsion x400 feet with [place] using bilateral uppper extremities  4. Sitting at edge of bed x10 minutes with [place]  5. Improve BLE ROM knee and hip flexion as well as hip internal and external rotation.                         History:     History reviewed. No pertinent past medical history.    History reviewed. No pertinent surgical history.    Time Tracking:     PT Received On: 22  PT Start Time: 1018     PT Stop Time: 1042  PT Total Time (min): 24 min     Billable Minutes: Evaluation 24      2022

## 2022-11-23 NOTE — PLAN OF CARE
Problem: Physical Therapy  Goal: Physical Therapy Goal  Description: Goals to be met by: 2022     Patient will increase functional independence with mobility by performin. Sit to supine with [place]  2. Rolling to Left and Right with [place].  3. Wheelchair propulsion x400 feet with [place] using bilateral uppper extremities  4. Sitting at edge of bed x10 minutes with [place]  5. Improve BLE ROM knee and hip flexion as well as hip internal and external rotation.    Outcome: Ongoing, Progressing

## 2022-11-24 PROCEDURE — 63600175 PHARM REV CODE 636 W HCPCS: Performed by: INTERNAL MEDICINE

## 2022-11-24 PROCEDURE — 25000003 PHARM REV CODE 250: Performed by: INTERNAL MEDICINE

## 2022-11-24 PROCEDURE — G0378 HOSPITAL OBSERVATION PER HR: HCPCS

## 2022-11-24 PROCEDURE — A4216 STERILE WATER/SALINE, 10 ML: HCPCS | Performed by: INTERNAL MEDICINE

## 2022-11-24 RX ORDER — HYDROMORPHONE HYDROCHLORIDE 2 MG/ML
1 INJECTION, SOLUTION INTRAMUSCULAR; INTRAVENOUS; SUBCUTANEOUS EVERY 8 HOURS PRN
Status: DISCONTINUED | OUTPATIENT
Start: 2022-11-24 | End: 2022-11-29

## 2022-11-24 RX ADMIN — TRAMADOL HYDROCHLORIDE 50 MG: 50 TABLET, COATED ORAL at 05:11

## 2022-11-24 RX ADMIN — BACLOFEN 20 MG: 10 TABLET ORAL at 09:11

## 2022-11-24 RX ADMIN — DULOXETINE 60 MG: 30 CAPSULE, DELAYED RELEASE ORAL at 09:11

## 2022-11-24 RX ADMIN — SODIUM CHLORIDE: 9 INJECTION, SOLUTION INTRAVENOUS at 01:11

## 2022-11-24 RX ADMIN — SODIUM CHLORIDE, PRESERVATIVE FREE 10 ML: 5 INJECTION INTRAVENOUS at 05:11

## 2022-11-24 RX ADMIN — METOPROLOL SUCCINATE 25 MG: 25 TABLET, FILM COATED, EXTENDED RELEASE ORAL at 09:11

## 2022-11-24 RX ADMIN — APIXABAN 5 MG: 5 TABLET, FILM COATED ORAL at 08:11

## 2022-11-24 RX ADMIN — OXYBUTYNIN CHLORIDE 10 MG: 5 TABLET ORAL at 08:11

## 2022-11-24 RX ADMIN — SODIUM CHLORIDE, PRESERVATIVE FREE 10 ML: 5 INJECTION INTRAVENOUS at 11:11

## 2022-11-24 RX ADMIN — BACLOFEN 20 MG: 10 TABLET ORAL at 08:11

## 2022-11-24 RX ADMIN — OXYBUTYNIN CHLORIDE 10 MG: 5 TABLET ORAL at 09:11

## 2022-11-24 RX ADMIN — HYDROMORPHONE HYDROCHLORIDE 1 MG: 2 INJECTION INTRAMUSCULAR; INTRAVENOUS; SUBCUTANEOUS at 02:11

## 2022-11-24 RX ADMIN — GABAPENTIN 800 MG: 300 CAPSULE ORAL at 08:11

## 2022-11-24 RX ADMIN — BACLOFEN 20 MG: 10 TABLET ORAL at 02:11

## 2022-11-24 RX ADMIN — GABAPENTIN 800 MG: 300 CAPSULE ORAL at 01:11

## 2022-11-24 RX ADMIN — GABAPENTIN 800 MG: 300 CAPSULE ORAL at 04:11

## 2022-11-24 RX ADMIN — SODIUM CHLORIDE, PRESERVATIVE FREE 10 ML: 5 INJECTION INTRAVENOUS at 06:11

## 2022-11-24 RX ADMIN — SODIUM CHLORIDE, PRESERVATIVE FREE 10 ML: 5 INJECTION INTRAVENOUS at 12:11

## 2022-11-24 RX ADMIN — HYDROMORPHONE HYDROCHLORIDE 1 MG: 2 INJECTION INTRAMUSCULAR; INTRAVENOUS; SUBCUTANEOUS at 11:11

## 2022-11-24 RX ADMIN — SODIUM CHLORIDE, PRESERVATIVE FREE 10 ML: 5 INJECTION INTRAVENOUS at 01:11

## 2022-11-24 RX ADMIN — GABAPENTIN 800 MG: 300 CAPSULE ORAL at 09:11

## 2022-11-24 RX ADMIN — APIXABAN 5 MG: 5 TABLET, FILM COATED ORAL at 09:11

## 2022-11-24 RX ADMIN — TRAMADOL HYDROCHLORIDE 50 MG: 50 TABLET, COATED ORAL at 01:11

## 2022-11-24 RX ADMIN — TRAMADOL HYDROCHLORIDE 50 MG: 50 TABLET, COATED ORAL at 09:11

## 2022-11-24 NOTE — PROGRESS NOTES
SUBJECTIVE:  46-year-old  male.  He said that he is hurting in   his sides and his butt, which I find it almost impossible due to his paraplegia.    He denies any other new problems.  He does not have any signs of being in pain   like moaning, like anything else that tells that he is in pain, and he says it   comes and goes, which is even more unlikely.  He has not had any other problems.    Today's info : seen and examined, no acute events overnight. Continues to improve   afebrile    REVIEW OF SYSTEMS:  Times 12 as above.    PHYSICAL EXAMINATION:  VITAL SIGNS:   Vitals:    11/24/22 0423 11/24/22 0849 11/24/22 0900 11/24/22 1152   BP: 113/72 118/66  127/84   Pulse: 88 71  68   Resp: 18 18 18    Temp: 98.2 °F (36.8 °C) 98.4 °F (36.9 °C)  97.5 °F (36.4 °C)   TempSrc: Oral Oral  Oral   SpO2: 99% 97%  98%   Weight:       Height:           GENERAL APPEARANCE:  Alert in no acute distress.    HEART:  Regular rhythm and rate.  LUNGS:  Clear.    ABDOMEN:  Soft, nontender.  Suprapubic cath in the right lower quadrant.    EXTREMITIES:  No clubbing, cyanosis, or edema.    NEUROLOGIC:  Unchanged.  Paraplegic.    LABS:  Remarkable for a white cell count of 9.2, H and H 10.5 and 34.3,   platelets 280, BUN 9.5, creatinine 0.58, potassium 4.  Lab Results   Component Value Date    WBC 9.2 11/23/2022    HGB 10.5 (L) 11/23/2022    HCT 34.3 (L) 11/23/2022    MCV 83.5 11/23/2022     11/23/2022         Recent Labs   Lab 11/23/22  0648      K 4.0   CO2 24   BUN 9.5   CREATININE 0.58*   GLUCOSE 85   CALCIUM 8.3*         IMPRESSION:    1. Most likely colonizations with pseudomonas and gram-negative bacteria.   2. Neurogenic bladder and bowel with a suprapubic catheter.  3. Stage IV sacral decubitus.  4. Anemia.  5. Hypertension.  6. Chronic malnutrition and deconditioning.  7. Paraplegia.    PLAN:    1. No other new issues.   2. Will increase his Neurontin to 800 mg q.i.d.    3. Will continue with other  current medications.    4. Will continue with wound care as well.    Pending placement

## 2022-11-24 NOTE — PLAN OF CARE
Problem: Adult Inpatient Plan of Care  Goal: Plan of Care Review  Outcome: Ongoing, Progressing  Flowsheets (Taken 11/24/2022 0750)  Plan of Care Reviewed With: patient  Goal: Patient-Specific Goal (Individualized)  Outcome: Ongoing, Progressing  Goal: Absence of Hospital-Acquired Illness or Injury  Outcome: Ongoing, Progressing  Goal: Optimal Comfort and Wellbeing  Outcome: Ongoing, Progressing  Goal: Readiness for Transition of Care  Outcome: Ongoing, Progressing     Problem: Skin Injury Risk Increased  Goal: Skin Health and Integrity  Outcome: Ongoing, Progressing     Problem: Impaired Wound Healing  Goal: Optimal Wound Healing  Outcome: Ongoing, Progressing     Problem: Fall Injury Risk  Goal: Absence of Fall and Fall-Related Injury  Outcome: Ongoing, Progressing     Problem: Infection  Goal: Absence of Infection Signs and Symptoms  Outcome: Ongoing, Progressing

## 2022-11-24 NOTE — PROGRESS NOTES
[facility]                       [address] [id number]    PATIENT NAME:       OWEN PIRES  YOB: 1975  CSN:                [id number]   MRN:                [id number]  ADMIT DATE:         11/16/2022 22:08:00  PHYSICIAN:          Miguel Lamb MD                            PROGRESS NOTE    DATE:      SUBJECTIVE:  46-year-old  male.  He said that he is hurting in   his sides and his butt, which I find it almost impossible due to his paraplegia.    He denies any other new problems.  He does not have any signs of being in pain   like moaning, like anything else that tells that he is in pain, and he says it   comes and goes, which is even more unlikely.  He has not had any other problems.    REVIEW OF SYSTEMS:  Times 12 as above.    PHYSICAL EXAMINATION:  VITAL SIGNS:  Blood pressure is 118/79, pulse 79, and temp 98.5.  GENERAL APPEARANCE:  Alert in no acute distress.    HEART:  Regular rhythm and rate.  LUNGS:  Clear.    ABDOMEN:  Soft, nontender.  Suprapubic cath in the right lower quadrant.    EXTREMITIES:  No clubbing, cyanosis, or edema.    NEUROLOGIC:  Unchanged.  Paraplegic.    LABS:  Remarkable for a white cell count of 9.2, H and H 10.5 and 34.3,   platelets 280, BUN 9.5, creatinine 0.58, potassium 4.    IMPRESSION:    1. Most likely colonizations with pseudomonas and gram-negative bacteria.   2. Neurogenic bladder and bowel with a suprapubic  catheter.  3. Stage IV sacral decubitus.  4. Anemia.  5. Hypertension.  6. Chronic malnutrition and deconditioning.  7. Paraplegia.  8 drug seeking behavior  PLAN:    1. No other new issues.   2. Will increase his Neurontin to 800 mg q.i.d.    3. Will continue with other current medications.    4. Will continue with wound care as well.        ______________________________  MD JOHN Aquino/AQS  DD:  11/23/2022  Time:  05:48PM  DT:   11/23/2022  Time:  08:04PM  Job #:  [id number]      PROGRESS NOTE

## 2022-11-25 PROCEDURE — 63600175 PHARM REV CODE 636 W HCPCS: Performed by: INTERNAL MEDICINE

## 2022-11-25 PROCEDURE — G0378 HOSPITAL OBSERVATION PER HR: HCPCS

## 2022-11-25 PROCEDURE — A4216 STERILE WATER/SALINE, 10 ML: HCPCS | Performed by: INTERNAL MEDICINE

## 2022-11-25 PROCEDURE — 25000003 PHARM REV CODE 250: Performed by: INTERNAL MEDICINE

## 2022-11-25 RX ADMIN — TRAMADOL HYDROCHLORIDE 50 MG: 50 TABLET, COATED ORAL at 01:11

## 2022-11-25 RX ADMIN — OXYBUTYNIN CHLORIDE 10 MG: 5 TABLET ORAL at 08:11

## 2022-11-25 RX ADMIN — SODIUM CHLORIDE, PRESERVATIVE FREE 10 ML: 5 INJECTION INTRAVENOUS at 12:11

## 2022-11-25 RX ADMIN — BACLOFEN 20 MG: 10 TABLET ORAL at 08:11

## 2022-11-25 RX ADMIN — SODIUM CHLORIDE, PRESERVATIVE FREE 10 ML: 5 INJECTION INTRAVENOUS at 06:11

## 2022-11-25 RX ADMIN — HYDROMORPHONE HYDROCHLORIDE 1 MG: 2 INJECTION INTRAMUSCULAR; INTRAVENOUS; SUBCUTANEOUS at 06:11

## 2022-11-25 RX ADMIN — APIXABAN 5 MG: 5 TABLET, FILM COATED ORAL at 08:11

## 2022-11-25 RX ADMIN — HYDROMORPHONE HYDROCHLORIDE 1 MG: 2 INJECTION INTRAMUSCULAR; INTRAVENOUS; SUBCUTANEOUS at 08:11

## 2022-11-25 RX ADMIN — SODIUM CHLORIDE: 9 INJECTION, SOLUTION INTRAVENOUS at 06:11

## 2022-11-25 RX ADMIN — DOCUSATE SODIUM 100 MG: 100 CAPSULE, LIQUID FILLED ORAL at 08:11

## 2022-11-25 RX ADMIN — TRAMADOL HYDROCHLORIDE 50 MG: 50 TABLET, COATED ORAL at 08:11

## 2022-11-25 RX ADMIN — AMLODIPINE BESYLATE 5 MG: 5 TABLET ORAL at 08:11

## 2022-11-25 RX ADMIN — GABAPENTIN 800 MG: 300 CAPSULE ORAL at 08:11

## 2022-11-25 RX ADMIN — BACLOFEN 20 MG: 10 TABLET ORAL at 02:11

## 2022-11-25 RX ADMIN — TRAMADOL HYDROCHLORIDE 50 MG: 50 TABLET, COATED ORAL at 02:11

## 2022-11-25 RX ADMIN — GABAPENTIN 800 MG: 300 CAPSULE ORAL at 06:11

## 2022-11-25 RX ADMIN — DULOXETINE 60 MG: 30 CAPSULE, DELAYED RELEASE ORAL at 08:11

## 2022-11-25 RX ADMIN — GABAPENTIN 800 MG: 300 CAPSULE ORAL at 12:11

## 2022-11-25 RX ADMIN — METOPROLOL SUCCINATE 25 MG: 25 TABLET, FILM COATED, EXTENDED RELEASE ORAL at 08:11

## 2022-11-25 NOTE — PLAN OF CARE
David denied referral alone with all the others that has been sent. Will send placement referrals [place] wide

## 2022-11-25 NOTE — PROGRESS NOTES
SUBJECTIVE:  46-year-old  male.  He said that he is hurting in   his sides and his butt, which I find it almost impossible due to his paraplegia.    He denies any other new problems.  He does not have any signs of being in pain   like moaning, like anything else that tells that he is in pain, and he says it   comes and goes, which is even more unlikely.  He has not had any other problems.    Today's info : seen and examined, no acute events overnight. Continues to improve   afebrile    REVIEW OF SYSTEMS:  Times 12 as above.    PHYSICAL EXAMINATION:  VITAL SIGNS:   Vitals:    11/25/22 0434 11/25/22 0829 11/25/22 0846 11/25/22 1131   BP: 138/79 138/79 (!) 142/90 (!) 162/99   Pulse: 64  69 66   Resp: 18 18 18 18   Temp: 97.4 °F (36.3 °C)  98.1 °F (36.7 °C) 97.8 °F (36.6 °C)   TempSrc: Oral  Oral Oral   SpO2: 99%  99% 100%   Weight:       Height:           GENERAL APPEARANCE:  Alert in no acute distress.    HEART:  Regular rhythm and rate.  LUNGS:  Clear.    ABDOMEN:  Soft, nontender.  Suprapubic cath in the right lower quadrant.    EXTREMITIES:  No clubbing, cyanosis, or edema.    NEUROLOGIC:  Unchanged.  Paraplegic.    LABS:  Remarkable for a white cell count of 9.2, H and H 10.5 and 34.3,   platelets 280, BUN 9.5, creatinine 0.58, potassium 4.  Lab Results   Component Value Date    WBC 9.2 11/23/2022    HGB 10.5 (L) 11/23/2022    HCT 34.3 (L) 11/23/2022    MCV 83.5 11/23/2022     11/23/2022         Recent Labs   Lab 11/23/22  0648      K 4.0   CO2 24   BUN 9.5   CREATININE 0.58*   GLUCOSE 85   CALCIUM 8.3*           IMPRESSION:    1. Most likely colonizations with pseudomonas and gram-negative bacteria.   2. Neurogenic bladder and bowel with a suprapubic catheter.  3. Stage IV sacral decubitus.  4. Anemia.  5. Hypertension.  6. Chronic malnutrition and deconditioning.  7. Paraplegia.    PLAN:    1. No other new issues.   2. Will increase his Neurontin to 800 mg q.i.d.    3. Will  continue with other current medications.    4. Will continue with wound care as well.    Pending placement

## 2022-11-25 NOTE — PROGRESS NOTES
Inpatient Nutrition Evaluation    Admit Date: 11/16/2022   Total duration of encounter: 9 days    Nutrition Recommendation/Prescription     Continue regular diet as tolerated.     Boost Max BID. 160 kcals and 30 g protein per serving.    Boost Plus once daily. 360 kcals and 14 g protein per serving.     Add vitamin C, MVI, and 2 weeks of zinc to aid in wound healing.     Nutrition Assessment     Chart Review    Reason Seen: continuous nutrition monitoring full thickness tissue loss w/ exposed muscle or bone to left buttocks    Diagnosis:  1. Stage IV decubitus ulcer.  It looks like he probably as acute cystitis versus   colonization.  2. Mild chronic anemia.  3. Hypertension.  4. Tobacco abuse.  5. Paraplegia.  6. Neurogenic bladder and bowel.  7. History of recurrent deep venous thrombosis.   8. History of significant deconditioning.  9. Protein-calorie malnutrition.    10. He has some nausea and vomiting.  11. Slight dehydration.    Relevant Medical History:   paraplegia, tobacco abuse, neurogenic   bowel and bladder, stage IV decubitus in his sacrum and area on the buttock,   hypertension, history of DVT, history depression, anxiety, history of C diff in   the past, history of depression    Nutrition-Related Medications: Baclofen, Cl, Metoprolol     Nutrition-Related Labs:  11/17: RBC 4.34, K 3.0, Glu 127, Alb 2.6     Diet Order: Diet Adult Regular  Oral Supplement Order: none  Appetite/Oral Intake: fair/% of meals  Factors Affecting Nutritional Intake: none identified  Food/Congregational/Cultural Preferences: none reported  Food Allergies: none reported    Skin Integrity: wound  Wound(s):      Altered Skin Integrity 11/16/22 Left Buttocks #1 Ulceration-Tissue loss description: Full thickness     Comments    11/18/22: Pt reports fair appetite today, tolerating diet, appetite has been good lately, no recent unintentional weight loss, no GI complaints, normal bowel activity. Sounds like there is a problem with  "his care @ home and that case management is looking into placement in a care facility, it does sound like he has at least had food available to him. Does not like Brandon, agrees to Boost Max. Likes all flavors of Boost.     11/25/22: Pt reports fair po intake, tolerating diet, drinking supplements, voiced complaint of back spasms that he says that affect his abdomen in some way and when this happens he does not feel like eating, this causes him to skip an odd meal here are there but overall is eating well, mentions back spasms multiple times during our visit.      Anthropometrics    Height: 5' 10" (177.8 cm) Height Method: Stated  Last Weight: 78.9 kg (173 lb 14.4 oz) (11/17/22 1405) Weight Method: Bed Scale  BMI (Calculated): 25  BMI Classification: overweight (BMI 25-29.9)        Ideal Body Weight (IBW), Male: 166 lb     % Ideal Body Weight, Male (lb): 104.76 %                          Usual Weight Provided By: patient    Wt Readings from Last 5 Encounters:   11/17/22 78.9 kg (173 lb 14.4 oz)   08/06/22 59 kg (130 lb)   01/02/20 82 kg (180 lb 12.4 oz)     Weight Change(s) Since Admission:  Admit Weight: 78.9 kg (173 lb 14.4 oz) (11/17/22 1405)      Patient Education    Not applicable.    Monitoring & Evaluation     Dietitian will monitor food and beverage intake, weight, and electrolyte/renal panel.  Nutrition Risk/Follow-Up: low (follow-up in 5-7 days)  Patients assigned 'low nutrition risk' status do not qualify for a full nutritional assessment but will be monitored and re-evaluated in a 5-7 day time period. Please consult if re-evaluation needed sooner.   "

## 2022-11-25 NOTE — NURSING
Nurse informed at shift change that patient's home pain management was not reordered and the attending physician did not wish to restart it. Pt aware of such but still c/o pain, ultram ordered and being given. Dr Bar on call. On rounds nurse spoke with Dr Bar who reiterated the attending had left instructions not to reorder pain meds. Pt continues to c/o pain and wants a new physician. Discussed concern with Charge nurse, Lucia who recommended discussing with SurpervisorGina. Explained issue to Gina who said to discuss further with Dr Bar about changing attending physician. Dr Bar said no changing physician at this point but did give an order for Dilaudid 1mg q8 prn pain. Med given to Patient and explained the process that was taken. Pt stating dilaudid did help some and not insisting to change attending physician .

## 2022-11-26 PROCEDURE — 63600175 PHARM REV CODE 636 W HCPCS: Performed by: INTERNAL MEDICINE

## 2022-11-26 PROCEDURE — G0378 HOSPITAL OBSERVATION PER HR: HCPCS

## 2022-11-26 PROCEDURE — A4216 STERILE WATER/SALINE, 10 ML: HCPCS | Performed by: INTERNAL MEDICINE

## 2022-11-26 PROCEDURE — 25000003 PHARM REV CODE 250: Performed by: INTERNAL MEDICINE

## 2022-11-26 RX ADMIN — APIXABAN 5 MG: 5 TABLET, FILM COATED ORAL at 09:11

## 2022-11-26 RX ADMIN — OXYBUTYNIN CHLORIDE 10 MG: 5 TABLET ORAL at 09:11

## 2022-11-26 RX ADMIN — GABAPENTIN 800 MG: 300 CAPSULE ORAL at 01:11

## 2022-11-26 RX ADMIN — DULOXETINE 60 MG: 30 CAPSULE, DELAYED RELEASE ORAL at 09:11

## 2022-11-26 RX ADMIN — SODIUM CHLORIDE, PRESERVATIVE FREE 10 ML: 5 INJECTION INTRAVENOUS at 06:11

## 2022-11-26 RX ADMIN — OXYBUTYNIN CHLORIDE 10 MG: 5 TABLET ORAL at 08:11

## 2022-11-26 RX ADMIN — GABAPENTIN 800 MG: 300 CAPSULE ORAL at 09:11

## 2022-11-26 RX ADMIN — TRAMADOL HYDROCHLORIDE 50 MG: 50 TABLET, COATED ORAL at 08:11

## 2022-11-26 RX ADMIN — DOCUSATE SODIUM 100 MG: 100 CAPSULE, LIQUID FILLED ORAL at 08:11

## 2022-11-26 RX ADMIN — SODIUM CHLORIDE, PRESERVATIVE FREE 10 ML: 5 INJECTION INTRAVENOUS at 12:11

## 2022-11-26 RX ADMIN — BACLOFEN 20 MG: 10 TABLET ORAL at 09:11

## 2022-11-26 RX ADMIN — GABAPENTIN 800 MG: 300 CAPSULE ORAL at 08:11

## 2022-11-26 RX ADMIN — HYDROMORPHONE HYDROCHLORIDE 1 MG: 2 INJECTION INTRAMUSCULAR; INTRAVENOUS; SUBCUTANEOUS at 07:11

## 2022-11-26 RX ADMIN — BACLOFEN 20 MG: 10 TABLET ORAL at 08:11

## 2022-11-26 RX ADMIN — HYDROMORPHONE HYDROCHLORIDE 1 MG: 2 INJECTION INTRAMUSCULAR; INTRAVENOUS; SUBCUTANEOUS at 02:11

## 2022-11-26 RX ADMIN — APIXABAN 5 MG: 5 TABLET, FILM COATED ORAL at 08:11

## 2022-11-26 RX ADMIN — SODIUM CHLORIDE: 9 INJECTION, SOLUTION INTRAVENOUS at 03:11

## 2022-11-26 RX ADMIN — GABAPENTIN 800 MG: 300 CAPSULE ORAL at 04:11

## 2022-11-26 RX ADMIN — HYDROMORPHONE HYDROCHLORIDE 1 MG: 2 INJECTION INTRAMUSCULAR; INTRAVENOUS; SUBCUTANEOUS at 10:11

## 2022-11-26 RX ADMIN — BACLOFEN 20 MG: 10 TABLET ORAL at 03:11

## 2022-11-26 NOTE — PLAN OF CARE
Problem: Adult Inpatient Plan of Care  Goal: Plan of Care Review  Outcome: Ongoing, Progressing  Goal: Patient-Specific Goal (Individualized)  Outcome: Ongoing, Progressing  Goal: Absence of Hospital-Acquired Illness or Injury  Outcome: Ongoing, Progressing  Goal: Optimal Comfort and Wellbeing  Outcome: Ongoing, Progressing  Goal: Readiness for Transition of Care  Outcome: Ongoing, Progressing     Problem: Skin Injury Risk Increased  Goal: Skin Health and Integrity  Outcome: Ongoing, Progressing     Problem: Impaired Wound Healing  Goal: Optimal Wound Healing  Outcome: Ongoing, Progressing     Problem: Fall Injury Risk  Goal: Absence of Fall and Fall-Related Injury  Outcome: Ongoing, Progressing     Problem: Infection  Goal: Absence of Infection Signs and Symptoms  Outcome: Ongoing, Progressing

## 2022-11-26 NOTE — PROGRESS NOTES
SUBJECTIVE:  46-year-old  male.  He said that he is hurting in   his sides and his butt, which I find it almost impossible due to his paraplegia.    He denies any other new problems.  He does not have any signs of being in pain   like moaning, like anything else that tells that he is in pain, and he says it   comes and goes, which is even more unlikely.  He has not had any other problems.    Today's info : seen and examined, no acute events overnight. Continues to improve   Afebrile  Pain controlled    REVIEW OF SYSTEMS:  Times 12 as above.    PHYSICAL EXAMINATION:  VITAL SIGNS:   Vitals:    11/26/22 0438 11/26/22 0802 11/26/22 1042 11/26/22 1151   BP: (!) 122/51 130/76  (!) 144/89   Pulse: 66 61  (!) 54   Resp: 18 16 18 18   Temp: 97.6 °F (36.4 °C) 97.5 °F (36.4 °C)  97.4 °F (36.3 °C)   TempSrc: Oral Oral  Oral   SpO2: 100% 100%  100%   Weight:       Height:           GENERAL APPEARANCE:  Alert in no acute distress.    HEART:  Regular rhythm and rate.  LUNGS:  Clear.    ABDOMEN:  Soft, nontender.  Suprapubic cath in the right lower quadrant.    EXTREMITIES:  No clubbing, cyanosis, or edema.    NEUROLOGIC:  Unchanged.  Paraplegic.    LABS:  Remarkable for a white cell count of 9.2, H and H 10.5 and 34.3,   platelets 280, BUN 9.5, creatinine 0.58, potassium 4.  Lab Results   Component Value Date    WBC 9.2 11/23/2022    HGB 10.5 (L) 11/23/2022    HCT 34.3 (L) 11/23/2022    MCV 83.5 11/23/2022     11/23/2022         Recent Labs   Lab 11/23/22  0648      K 4.0   CO2 24   BUN 9.5   CREATININE 0.58*   GLUCOSE 85   CALCIUM 8.3*           IMPRESSION:    1. Most likely colonizations with pseudomonas and gram-negative bacteria.   2. Neurogenic bladder and bowel with a suprapubic catheter.  3. Stage IV sacral decubitus.  4. Anemia.  5. Hypertension.  6. Chronic malnutrition and deconditioning.  7. Paraplegia.    PLAN:    1. No other new issues.   2. Will increase his Neurontin to 800 mg q.i.d.     3. Will continue with other current medications.    4. Will continue with wound care as well.    Pending placement

## 2022-11-27 PROCEDURE — 25000003 PHARM REV CODE 250: Performed by: INTERNAL MEDICINE

## 2022-11-27 PROCEDURE — A4216 STERILE WATER/SALINE, 10 ML: HCPCS | Performed by: INTERNAL MEDICINE

## 2022-11-27 PROCEDURE — 63600175 PHARM REV CODE 636 W HCPCS: Performed by: INTERNAL MEDICINE

## 2022-11-27 PROCEDURE — G0378 HOSPITAL OBSERVATION PER HR: HCPCS

## 2022-11-27 RX ADMIN — BACLOFEN 20 MG: 10 TABLET ORAL at 09:11

## 2022-11-27 RX ADMIN — METOPROLOL SUCCINATE 25 MG: 25 TABLET, FILM COATED, EXTENDED RELEASE ORAL at 09:11

## 2022-11-27 RX ADMIN — SODIUM CHLORIDE, PRESERVATIVE FREE 10 ML: 5 INJECTION INTRAVENOUS at 06:11

## 2022-11-27 RX ADMIN — HYDROMORPHONE HYDROCHLORIDE 1 MG: 2 INJECTION INTRAMUSCULAR; INTRAVENOUS; SUBCUTANEOUS at 07:11

## 2022-11-27 RX ADMIN — HYDROMORPHONE HYDROCHLORIDE 1 MG: 2 INJECTION INTRAMUSCULAR; INTRAVENOUS; SUBCUTANEOUS at 11:11

## 2022-11-27 RX ADMIN — SODIUM CHLORIDE, PRESERVATIVE FREE 10 ML: 5 INJECTION INTRAVENOUS at 05:11

## 2022-11-27 RX ADMIN — DOCUSATE SODIUM 100 MG: 100 CAPSULE, LIQUID FILLED ORAL at 09:11

## 2022-11-27 RX ADMIN — DULOXETINE 60 MG: 30 CAPSULE, DELAYED RELEASE ORAL at 09:11

## 2022-11-27 RX ADMIN — APIXABAN 5 MG: 5 TABLET, FILM COATED ORAL at 09:11

## 2022-11-27 RX ADMIN — BACLOFEN 20 MG: 10 TABLET ORAL at 03:11

## 2022-11-27 RX ADMIN — SODIUM CHLORIDE: 9 INJECTION, SOLUTION INTRAVENOUS at 05:11

## 2022-11-27 RX ADMIN — OXYBUTYNIN CHLORIDE 10 MG: 5 TABLET ORAL at 09:11

## 2022-11-27 RX ADMIN — SODIUM CHLORIDE, PRESERVATIVE FREE 10 ML: 5 INJECTION INTRAVENOUS at 12:11

## 2022-11-27 RX ADMIN — AMLODIPINE BESYLATE 5 MG: 5 TABLET ORAL at 09:11

## 2022-11-27 RX ADMIN — GABAPENTIN 800 MG: 300 CAPSULE ORAL at 05:11

## 2022-11-27 RX ADMIN — TRAMADOL HYDROCHLORIDE 50 MG: 50 TABLET, COATED ORAL at 06:11

## 2022-11-27 RX ADMIN — SODIUM CHLORIDE, PRESERVATIVE FREE 10 ML: 5 INJECTION INTRAVENOUS at 11:11

## 2022-11-27 RX ADMIN — SODIUM CHLORIDE: 9 INJECTION, SOLUTION INTRAVENOUS at 06:11

## 2022-11-27 RX ADMIN — GABAPENTIN 800 MG: 300 CAPSULE ORAL at 12:11

## 2022-11-27 RX ADMIN — GABAPENTIN 800 MG: 300 CAPSULE ORAL at 09:11

## 2022-11-27 RX ADMIN — HYDROMORPHONE HYDROCHLORIDE 1 MG: 2 INJECTION INTRAMUSCULAR; INTRAVENOUS; SUBCUTANEOUS at 02:11
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
## 2022-11-27 NOTE — PROGRESS NOTES
SUBJECTIVE:  46-year-old  male.  He said that he is hurting in   his sides and his butt, which I find it almost impossible due to his paraplegia.    He denies any other new problems.  He does not have any signs of being in pain   like moaning, like anything else that tells that he is in pain, and he says it   comes and goes, which is even more unlikely.  He has not had any other problems.    Today's info : seen and examined, no acute events overnight. Continues to improve   Afebrile  Pain controlled    REVIEW OF SYSTEMS:  Times 12 as above.    PHYSICAL EXAMINATION:  VITAL SIGNS:   Vitals:    11/27/22 0752 11/27/22 0916 11/27/22 1111 11/27/22 1129   BP: 127/79 127/79  130/85   Pulse: 76   81   Resp: 16  18 18   Temp: 98.2 °F (36.8 °C)   98.5 °F (36.9 °C)   TempSrc: Oral   Oral   SpO2: 98%   98%   Weight:       Height:           GENERAL APPEARANCE:  Alert in no acute distress.    HEART:  Regular rhythm and rate.  LUNGS:  Clear.    ABDOMEN:  Soft, nontender.  Suprapubic cath in the right lower quadrant.    EXTREMITIES:  No clubbing, cyanosis, or edema.    NEUROLOGIC:  Unchanged.  Paraplegic.    LABS:  Remarkable for a white cell count of 9.2, H and H 10.5 and 34.3,   platelets 280, BUN 9.5, creatinine 0.58, potassium 4.  Lab Results   Component Value Date    WBC 9.2 11/23/2022    HGB 10.5 (L) 11/23/2022    HCT 34.3 (L) 11/23/2022    MCV 83.5 11/23/2022     11/23/2022         Recent Labs   Lab 11/23/22  0648      K 4.0   CO2 24   BUN 9.5   CREATININE 0.58*   GLUCOSE 85   CALCIUM 8.3*           IMPRESSION:    1. Most likely colonizations with pseudomonas and gram-negative bacteria.   2. Neurogenic bladder and bowel with a suprapubic catheter.  3. Stage IV sacral decubitus.  4. Anemia.  5. Hypertension.  6. Chronic malnutrition and deconditioning.  7. Paraplegia.    PLAN:    1. No other new issues.   2. Will increase his Neurontin to 800 mg q.i.d.    3. Will continue with other current  medications.    4. Will continue with wound care as well.    Pending placement

## 2022-11-28 PROCEDURE — 25000003 PHARM REV CODE 250: Performed by: INTERNAL MEDICINE

## 2022-11-28 PROCEDURE — A4216 STERILE WATER/SALINE, 10 ML: HCPCS | Performed by: INTERNAL MEDICINE

## 2022-11-28 PROCEDURE — G0378 HOSPITAL OBSERVATION PER HR: HCPCS

## 2022-11-28 PROCEDURE — 63600175 PHARM REV CODE 636 W HCPCS: Performed by: INTERNAL MEDICINE

## 2022-11-28 RX ADMIN — GABAPENTIN 800 MG: 300 CAPSULE ORAL at 04:11

## 2022-11-28 RX ADMIN — SODIUM CHLORIDE, PRESERVATIVE FREE 10 ML: 5 INJECTION INTRAVENOUS at 01:11

## 2022-11-28 RX ADMIN — OXYBUTYNIN CHLORIDE 10 MG: 5 TABLET ORAL at 09:11

## 2022-11-28 RX ADMIN — HYDROMORPHONE HYDROCHLORIDE 1 MG: 2 INJECTION INTRAMUSCULAR; INTRAVENOUS; SUBCUTANEOUS at 01:11

## 2022-11-28 RX ADMIN — TRAMADOL HYDROCHLORIDE 50 MG: 50 TABLET, COATED ORAL at 09:11

## 2022-11-28 RX ADMIN — BACLOFEN 20 MG: 10 TABLET ORAL at 04:11

## 2022-11-28 RX ADMIN — DULOXETINE 60 MG: 30 CAPSULE, DELAYED RELEASE ORAL at 09:11

## 2022-11-28 RX ADMIN — GABAPENTIN 800 MG: 300 CAPSULE ORAL at 09:11

## 2022-11-28 RX ADMIN — APIXABAN 5 MG: 5 TABLET, FILM COATED ORAL at 09:11

## 2022-11-28 RX ADMIN — HYDROMORPHONE HYDROCHLORIDE 1 MG: 2 INJECTION INTRAMUSCULAR; INTRAVENOUS; SUBCUTANEOUS at 05:11

## 2022-11-28 RX ADMIN — SODIUM CHLORIDE, PRESERVATIVE FREE 10 ML: 5 INJECTION INTRAVENOUS at 06:11

## 2022-11-28 RX ADMIN — HYDROMORPHONE HYDROCHLORIDE 1 MG: 2 INJECTION INTRAMUSCULAR; INTRAVENOUS; SUBCUTANEOUS at 11:11

## 2022-11-28 RX ADMIN — BACLOFEN 20 MG: 10 TABLET ORAL at 09:11

## 2022-11-28 RX ADMIN — SODIUM CHLORIDE: 9 INJECTION, SOLUTION INTRAVENOUS at 04:11

## 2022-11-28 RX ADMIN — GABAPENTIN 800 MG: 300 CAPSULE ORAL at 01:11

## 2022-11-28 NOTE — PLAN OF CARE
Problem: Skin Injury Risk Increased  Goal: Skin Health and Integrity  Outcome: Ongoing, Progressing  Intervention: Optimize Skin Protection  Flowsheets (Taken 11/27/2022 2022)  Pressure Reduction Devices:   positioning supports utilized   pressure-redistributing mattress utilized  Head of Bed (HOB) Positioning: HOB at 30-45 degrees     Problem: Fall Injury Risk  Goal: Absence of Fall and Fall-Related Injury  Outcome: Ongoing, Progressing  Intervention: Promote Injury-Free Environment  Flowsheets (Taken 11/27/2022 2022)  Safety Promotion/Fall Prevention:   assistive device/personal item within reach   medications reviewed   nonskid shoes/socks when out of bed   instructed to call staff for mobility

## 2022-11-29 LAB
ANION GAP SERPL CALC-SCNC: 6 MEQ/L
BUN SERPL-MCNC: 6.6 MG/DL (ref 8.9–20.6)
CALCIUM SERPL-MCNC: 8.4 MG/DL (ref 8.4–10.2)
CHLORIDE SERPL-SCNC: 108 MMOL/L (ref 98–107)
CO2 SERPL-SCNC: 29 MMOL/L (ref 22–29)
CREAT SERPL-MCNC: 0.58 MG/DL (ref 0.73–1.18)
CREAT/UREA NIT SERPL: 11
GFR SERPLBLD CREATININE-BSD FMLA CKD-EPI: >60 MLS/MIN/1.73/M2
GLUCOSE SERPL-MCNC: 109 MG/DL (ref 74–100)
POTASSIUM SERPL-SCNC: 3.9 MMOL/L (ref 3.5–5.1)
SODIUM SERPL-SCNC: 143 MMOL/L (ref 136–145)

## 2022-11-29 PROCEDURE — 80048 BASIC METABOLIC PNL TOTAL CA: CPT | Performed by: INTERNAL MEDICINE

## 2022-11-29 PROCEDURE — 36415 COLL VENOUS BLD VENIPUNCTURE: CPT | Performed by: INTERNAL MEDICINE

## 2022-11-29 PROCEDURE — G0378 HOSPITAL OBSERVATION PER HR: HCPCS

## 2022-11-29 PROCEDURE — 63600175 PHARM REV CODE 636 W HCPCS: Performed by: INTERNAL MEDICINE

## 2022-11-29 PROCEDURE — 25000003 PHARM REV CODE 250: Performed by: INTERNAL MEDICINE

## 2022-11-29 PROCEDURE — 87070 CULTURE OTHR SPECIMN AEROBIC: CPT | Performed by: INTERNAL MEDICINE

## 2022-11-29 PROCEDURE — A4216 STERILE WATER/SALINE, 10 ML: HCPCS | Performed by: INTERNAL MEDICINE

## 2022-11-29 RX ORDER — KETOROLAC TROMETHAMINE 30 MG/ML
15 INJECTION, SOLUTION INTRAMUSCULAR; INTRAVENOUS EVERY 6 HOURS PRN
Status: DISPENSED | OUTPATIENT
Start: 2022-11-29 | End: 2022-12-01

## 2022-11-29 RX ADMIN — OXYBUTYNIN CHLORIDE 10 MG: 5 TABLET ORAL at 09:11

## 2022-11-29 RX ADMIN — METOPROLOL SUCCINATE 25 MG: 25 TABLET, FILM COATED, EXTENDED RELEASE ORAL at 09:11

## 2022-11-29 RX ADMIN — HYDROMORPHONE HYDROCHLORIDE 1 MG: 2 INJECTION INTRAMUSCULAR; INTRAVENOUS; SUBCUTANEOUS at 02:11

## 2022-11-29 RX ADMIN — VANCOMYCIN HYDROCHLORIDE 1250 MG: 1.25 INJECTION, POWDER, LYOPHILIZED, FOR SOLUTION INTRAVENOUS at 09:11

## 2022-11-29 RX ADMIN — DOCUSATE SODIUM 100 MG: 100 CAPSULE, LIQUID FILLED ORAL at 09:11

## 2022-11-29 RX ADMIN — CEFEPIME 2 G: 2 INJECTION, POWDER, FOR SOLUTION INTRAVENOUS at 02:11

## 2022-11-29 RX ADMIN — SODIUM CHLORIDE, PRESERVATIVE FREE 10 ML: 5 INJECTION INTRAVENOUS at 12:11

## 2022-11-29 RX ADMIN — VANCOMYCIN HYDROCHLORIDE 1500 MG: 1.5 INJECTION, POWDER, LYOPHILIZED, FOR SOLUTION INTRAVENOUS at 03:11

## 2022-11-29 RX ADMIN — TRAMADOL HYDROCHLORIDE 50 MG: 50 TABLET, COATED ORAL at 05:11

## 2022-11-29 RX ADMIN — KETOROLAC TROMETHAMINE 15 MG: 30 INJECTION, SOLUTION INTRAMUSCULAR at 09:11

## 2022-11-29 RX ADMIN — GABAPENTIN 800 MG: 300 CAPSULE ORAL at 09:11

## 2022-11-29 RX ADMIN — GABAPENTIN 800 MG: 300 CAPSULE ORAL at 05:11

## 2022-11-29 RX ADMIN — SODIUM CHLORIDE, PRESERVATIVE FREE 10 ML: 5 INJECTION INTRAVENOUS at 05:11

## 2022-11-29 RX ADMIN — SODIUM CHLORIDE: 9 INJECTION, SOLUTION INTRAVENOUS at 02:11

## 2022-11-29 RX ADMIN — AMLODIPINE BESYLATE 5 MG: 5 TABLET ORAL at 09:11

## 2022-11-29 RX ADMIN — HYDROMORPHONE HYDROCHLORIDE 1 MG: 2 INJECTION INTRAMUSCULAR; INTRAVENOUS; SUBCUTANEOUS at 09:11

## 2022-11-29 RX ADMIN — DULOXETINE 60 MG: 30 CAPSULE, DELAYED RELEASE ORAL at 09:11

## 2022-11-29 RX ADMIN — CEFEPIME 2 G: 2 INJECTION, POWDER, FOR SOLUTION INTRAVENOUS at 09:11

## 2022-11-29 RX ADMIN — TRAMADOL HYDROCHLORIDE 50 MG: 50 TABLET, COATED ORAL at 10:11

## 2022-11-29 RX ADMIN — SODIUM CHLORIDE: 9 INJECTION, SOLUTION INTRAVENOUS at 12:11

## 2022-11-29 RX ADMIN — BACLOFEN 20 MG: 10 TABLET ORAL at 05:11

## 2022-11-29 RX ADMIN — APIXABAN 5 MG: 5 TABLET, FILM COATED ORAL at 09:11

## 2022-11-29 RX ADMIN — BACLOFEN 20 MG: 10 TABLET ORAL at 09:11

## 2022-11-29 NOTE — PROGRESS NOTES
Upon attempt at tx this AM, pt having BM, declined cleaning and further activity at this time, educated on POC, role/goals of therapy. Attempted again PM and pt at nuclear med. Will con't to attempt as appropriate.

## 2022-11-29 NOTE — PLAN OF CARE
[facility]  sent LTAC referral via [facility]  [facility] spoke to pt about d/c planning, will contact his sister Marivel about assisting with d/c planning     Pt is requesting to change his ER contact to his sister    Pt is also requesting to remove his spouse has ER contact & legal Representative for his financial monthly check. He is requesting that is takes control of his own finances so that he can provide stability safe housing upon discharge as well
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
## 2022-11-29 NOTE — PROGRESS NOTES
Pharmacokinetic Initial Assessment: IV Vancomycin    Assessment/Plan:  Initiate intravenous vancomycin with loading dose of 1500 mg once followed by a maintenance dose of vancomycin 1250 mg IV every 8 hours  Desired empiric serum trough concentration is 15 to 20 mcg/mL  Draw vancomycin trough level 60 min prior to fourth dose on 11/30 at approximately 1200  Pharmacy will continue to follow and monitor vancomycin.      Please contact pharmacy at extension [phone] with any questions regarding this assessment.     Thank you for the consult,   Porsha Lea, MariaelenaD       Patient brief summary:  Hemal Guerrero is a 47 y.o. male initiated on antimicrobial therapy with IV Vancomycin for treatment of suspected bone/joint infection    Drug Allergies:   Review of patient's allergies indicates:   Allergen Reactions    Amitriptyline        Actual Body Weight:   78.9 kg    Renal Function:   Estimated Creatinine Clearance: 160.8 mL/min (A) (based on SCr of 0.58 mg/dL (L)).,     Dialysis Method (if applicable):  N/A    CBC (last 72 hours):  No results for input(s): WHITE BLOOD CELL COUNT, HEMOGLOBIN, HEMATOCRIT, PLATELETS, GRAN%, LYMPH%, MONO%, EOSINOPHIL%, BASOPHIL%, DIFFERENTIAL METHOD in the last 72 hours.    Metabolic Panel (last 72 hours):  No results for input(s): SODIUM, POTASSIUM, CHLORIDE, CO2, GLUCOSE, BUN BLD, CREATININE, ALBUMIN, BILIRUBIN TOTAL, ALK PHOS, AST, ALT, MAGNESIUM, PHOSPHORUS in the last 72 hours.    Drug levels (last 3 results):  No results for input(s): VANCOMYCINRA, VANCORANDOM, VANCOMYCINPE, VANCOPEAK, VANCOMYCINTR, VANCOTROUGH in the last 72 hours.    Microbiologic Results:  Microbiology Results (last 7 days)       Procedure Component Value Units Date/Time    Urine culture [[id number]]  (Abnormal)  (Susceptibility) Collected: 11/17/22 0103    Order Status: Completed Specimen: Urine Updated: 11/22/22 1236     Urine Culture >/= 100,000 colonies/ml Providencia stuartii      >/= 100,000 colonies/ml Pseudomonas  aeruginosa
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
## 2022-11-29 NOTE — PT/OT/SLP PROGRESS
Physical Therapy      Patient Name:  Hemal Guerrero   MRN:  [id number]    Pt off floor for bone scan, will f/u when schedule permits.

## 2022-11-30 LAB
IRON SATN MFR SERPL: 11 % (ref 20–50)
IRON SERPL-MCNC: 24 UG/DL (ref 65–175)
TIBC SERPL-MCNC: 196 UG/DL (ref 69–240)
TIBC SERPL-MCNC: 220 UG/DL (ref 250–450)
TRANSFERRIN SERPL-MCNC: 190 MG/DL (ref 174–364)
VANCOMYCIN TROUGH SERPL-MCNC: 30.2 UG/ML (ref 15–20)

## 2022-11-30 PROCEDURE — 82746 ASSAY OF FOLIC ACID SERUM: CPT | Performed by: NURSE PRACTITIONER

## 2022-11-30 PROCEDURE — 97110 THERAPEUTIC EXERCISES: CPT | Mod: CO

## 2022-11-30 PROCEDURE — 36415 COLL VENOUS BLD VENIPUNCTURE: CPT | Performed by: NURSE PRACTITIONER

## 2022-11-30 PROCEDURE — A4216 STERILE WATER/SALINE, 10 ML: HCPCS | Performed by: INTERNAL MEDICINE

## 2022-11-30 PROCEDURE — 97530 THERAPEUTIC ACTIVITIES: CPT | Mod: CQ

## 2022-11-30 PROCEDURE — 63600175 PHARM REV CODE 636 W HCPCS: Performed by: INTERNAL MEDICINE

## 2022-11-30 PROCEDURE — 36415 COLL VENOUS BLD VENIPUNCTURE: CPT | Performed by: INTERNAL MEDICINE

## 2022-11-30 PROCEDURE — 25000003 PHARM REV CODE 250: Performed by: NURSE PRACTITIONER

## 2022-11-30 PROCEDURE — 82728 ASSAY OF FERRITIN: CPT | Performed by: NURSE PRACTITIONER

## 2022-11-30 PROCEDURE — 82607 VITAMIN B-12: CPT | Performed by: NURSE PRACTITIONER

## 2022-11-30 PROCEDURE — 25000003 PHARM REV CODE 250: Performed by: INTERNAL MEDICINE

## 2022-11-30 PROCEDURE — 87040 BLOOD CULTURE FOR BACTERIA: CPT | Performed by: NURSE PRACTITIONER

## 2022-11-30 PROCEDURE — 11000001 HC ACUTE MED/SURG PRIVATE ROOM

## 2022-11-30 PROCEDURE — 80202 ASSAY OF VANCOMYCIN: CPT | Performed by: INTERNAL MEDICINE

## 2022-11-30 PROCEDURE — 97535 SELF CARE MNGMENT TRAINING: CPT | Mod: CO

## 2022-11-30 PROCEDURE — 83540 ASSAY OF IRON: CPT | Performed by: NURSE PRACTITIONER

## 2022-11-30 RX ORDER — TRAMADOL HYDROCHLORIDE 50 MG/1
100 TABLET ORAL ONCE
Status: COMPLETED | OUTPATIENT
Start: 2022-11-30 | End: 2022-11-30

## 2022-11-30 RX ADMIN — SODIUM CHLORIDE, PRESERVATIVE FREE 10 ML: 5 INJECTION INTRAVENOUS at 12:11

## 2022-11-30 RX ADMIN — TRAMADOL HYDROCHLORIDE 50 MG: 50 TABLET, COATED ORAL at 06:11

## 2022-11-30 RX ADMIN — SODIUM CHLORIDE: 9 INJECTION, SOLUTION INTRAVENOUS at 03:11

## 2022-11-30 RX ADMIN — GABAPENTIN 800 MG: 300 CAPSULE ORAL at 04:11

## 2022-11-30 RX ADMIN — VANCOMYCIN HYDROCHLORIDE 1250 MG: 1.25 INJECTION, POWDER, LYOPHILIZED, FOR SOLUTION INTRAVENOUS at 06:11

## 2022-11-30 RX ADMIN — OXYBUTYNIN CHLORIDE 10 MG: 5 TABLET ORAL at 09:11

## 2022-11-30 RX ADMIN — CEFEPIME 2 G: 2 INJECTION, POWDER, FOR SOLUTION INTRAVENOUS at 03:11

## 2022-11-30 RX ADMIN — CEFEPIME 2 G: 2 INJECTION, POWDER, FOR SOLUTION INTRAVENOUS at 12:11

## 2022-11-30 RX ADMIN — SODIUM CHLORIDE, PRESERVATIVE FREE 10 ML: 5 INJECTION INTRAVENOUS at 06:11

## 2022-11-30 RX ADMIN — TRAMADOL HYDROCHLORIDE 100 MG: 50 TABLET, COATED ORAL at 11:11

## 2022-11-30 RX ADMIN — APIXABAN 5 MG: 5 TABLET, FILM COATED ORAL at 09:11

## 2022-11-30 RX ADMIN — DULOXETINE 60 MG: 30 CAPSULE, DELAYED RELEASE ORAL at 09:11

## 2022-11-30 RX ADMIN — GABAPENTIN 800 MG: 300 CAPSULE ORAL at 09:11

## 2022-11-30 RX ADMIN — BACLOFEN 20 MG: 10 TABLET ORAL at 09:11

## 2022-11-30 RX ADMIN — DOCUSATE SODIUM 100 MG: 100 CAPSULE, LIQUID FILLED ORAL at 09:11

## 2022-11-30 RX ADMIN — CEFEPIME 2 G: 2 INJECTION, POWDER, FOR SOLUTION INTRAVENOUS at 09:11

## 2022-11-30 RX ADMIN — KETOROLAC TROMETHAMINE 15 MG: 30 INJECTION, SOLUTION INTRAMUSCULAR at 07:11

## 2022-11-30 RX ADMIN — BACLOFEN 20 MG: 10 TABLET ORAL at 04:11

## 2022-11-30 RX ADMIN — KETOROLAC TROMETHAMINE 15 MG: 30 INJECTION, SOLUTION INTRAMUSCULAR at 01:11

## 2022-11-30 RX ADMIN — SODIUM CHLORIDE: 9 INJECTION, SOLUTION INTRAVENOUS at 04:11

## 2022-11-30 RX ADMIN — TRAMADOL HYDROCHLORIDE 50 MG: 50 TABLET, COATED ORAL at 03:11

## 2022-11-30 RX ADMIN — KETOROLAC TROMETHAMINE 15 MG: 30 INJECTION, SOLUTION INTRAMUSCULAR at 03:11

## 2022-11-30 NOTE — PROGRESS NOTES
Pharmacokinetic Assessment Follow Up: IV Vancomycin    Vancomycin serum concentration assessment(s):    The trough level was drawn correctly and can be used to guide therapy at this time. The measurement is above the desired definitive target range of 15 to 20 mcg/mL.    Vancomycin Regimen Plan:    Discontinue the scheduled vancomycin regimen and re-dose when the random level is less than 20 mcg/mL, next level to be drawn at 0430 on 12/1/22..    Drug levels (last 3 results):  Recent Labs   Lab Result Units 11/30/22  1347   Vancomycin Trough ug/ml 30.2*       Pharmacy will continue to follow and monitor vancomycin.    Please contact pharmacy at extension [phone] for questions regarding this assessment.    Thank you for the consult,   Pilar Cowan       Patient brief summary:  Hemal Guerrero is a 47 y.o. male initiated on antimicrobial therapy with IV Vancomycin for treatment of bone/joint infection    Drug Allergies:   Review of patient's allergies indicates:   Allergen Reactions    Amitriptyline        Actual Body Weight:   78.9 kg    Renal Function:   Estimated Creatinine Clearance: 160.8 mL/min (A) (based on SCr of 0.58 mg/dL (L)).,     Dialysis Method (if applicable):  N/A    CBC (last 72 hours):  No results for input(s): WHITE BLOOD CELL COUNT, HEMOGLOBIN, HEMATOCRIT, PLATELETS, GRAN%, LYMPH%, MONO%, EOSINOPHIL%, BASOPHIL%, DIFFERENTIAL METHOD in the last 72 hours.    Metabolic Panel (last 72 hours):  Recent Labs   Lab Result Units 11/29/22  1148   Sodium Level mmol/L 143   Potassium Level mmol/L 3.9   Chloride mmol/L 108*   Carbon Dioxide mmol/L 29   Glucose Level mg/dL 109*   Blood Urea Nitrogen mg/dL 6.6*   Creatinine mg/dL 0.58*       Vancomycin Administrations:  vancomycin given in the last 96 hours                     vancomycin 1.25 g in dextrose 5% 250 mL IVPB (ready to mix) (mg) 1,250 mg New Bag 11/30/22 0632     1,250 mg New Bag 11/29/22 2100    vancomycin 1.5 g in dextrose 5 % 250 mL IVPB (ready to mix)  (mg) 1,500 mg New Bag 11/29/22 1521                    Microbiologic Results:  Microbiology Results (last 7 days)       Procedure Component Value Units Date/Time    Wound Culture [[id number]]  (Abnormal) Collected: 11/29/22 1229    Order Status: Completed Specimen: Decubitus from Leg, Right Updated: 11/30/22 0996     Wound Culture Moderate Gram-negative Rods

## 2022-11-30 NOTE — PT/OT/SLP PROGRESS
Occupational Therapy  Treatment    Hemal Guerrero   MRN: [id number]   Admitting Diagnosis: <principal problem not specified>    OT Date of Treatment: 11/30/22   OT Start Time: 1510  OT Stop Time: 1539  OT Total Time (min): 29 min      OT/ALEXANDER: ALEXANDER MUNOZ Visit Number: 1    General Precautions: Standard, fall  Orthopedic Precautions:    Braces:      Spiritual, Cultural Beliefs, Scientologist Practices, Values that Affect Care: no    Subjective:  Communicated with RN prior to session.         Objective:       Functional Mobility:  Bed Mobility:   Supine to sit: Moderate Assistance   Sit to supine: Moderate Assistance   Rolling: Independent    Toilet Training:  Pt performed toileting with Total Assistance with therapist assist at Bed level.    Additional Treatment:  Therapband exercises using green band in all planes for 10 reps.    Patient left HOB elevated with all lines intact and call button in reach    ASSESSMENT:  Hemal Guerrero is a 47 y.o. male with a medical diagnosis of <principal problem not specified>.    Rehab potential is excellent    Activity tolerance: Excellent    Discharge recommendations: [facility] (long-term acute care [facility] in [place])     Equipment recommendations:       GOALS:   Multidisciplinary Problems       Occupational Therapy Goals          Problem: Occupational Therapy    Goal Priority Disciplines Outcome Interventions   Occupational Therapy Goal     OT, PT/OT Ongoing, Progressing    Description: Goals to be met by: 12/21/22     Patient will increase functional independence with ADLs by performing:    LE Dressing with Modified [place].  Grooming while seated with Modified [place].  Toileting from bedside commode with Modified [place] for hygiene and clothing management. Progress to toilet as appropriate.   Toilet transfer to bedside commode with Modified [place]. Progress to toilet as appropriate.   Pt will participate in progressive resistive UE exercises to increase UE strength  required for tf and functional independence with ADLs.                          Plan:  Patient to be seen 2 x/week, 3 x/week to address the above listed problems via self-care/home management, therapeutic activities, therapeutic exercises  Plan of Care expires: 12/21/22  Plan of Care reviewed with: patient         11/30/2022
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
## 2022-11-30 NOTE — PROGRESS NOTES
[facility]                       [address] [id number]    PATIENT NAME:       OWEN PIRES  YOB: 1975  CSN:                [id number]   MRN:                [id number]  ADMIT DATE:         11/16/2022 22:08:00  PHYSICIAN:          Miguel Lamb MD                            PROGRESS NOTE    DATE:      SUBJECTIVE:  47-year-old  male.  He has been taken off narcotics   and again he was placed on IV Dilaudid over the weekend.  He is paraplegic and   he states that he hurts in his pelvis, which does not make any sense.    In any case, he was seen by Infectious Disease due to an elevated sed rate and   other symptoms suggestive of osteomyelitis.  He was placed on vancomycin as well   as cefepime.  He had some deep wound cultures as per Infectious Disease, and   x-rays of the sacrum.  Triple phase bone scan was order.  No other significant   issues.    REVIEW OF SYSTEMS:  Times 12 as above.    PHYSICAL EXAMINATION:  VITAL SIGNS:  Blood pressure 138/88, pulse is 64, temp 98.  GENERAL APPEARANCE:  Alert in no acute distress.   HEART:  Regular rhythm and rate.  LUNGS:  Clear.    ABDOMEN:  Soft, nontender.  Suprapubic catheter in the right lower quadrant.    EXTREMITIES:  No clubbing, cyanosis, or edema.    NEUROLOGIC:  Unchanged, paraplegic.    LABS:  There is a Chem-7 from today with a BUN 6.6, creatinine 0.58.  Bone scan   pending.    IMPRESSION:    1. Elevated inflammatory markers suggestive of osteomyelitis.    2. History of paraplegia.  3. Stage IV ulcers to the sacral area.  4. Anemia.  5. Hypertension.  6. Tobacco abuse.  7. History of recurrent deep vein thromboses.  8. Clinical malnutrition and deconditioning.    PLAN:    1. Will continue with the current medications.   2. Will continue to wait for the reports of the bone scan.        ______________________________  Miguel Lamb  MD LOPEZ/JESENIA  DD:  11/29/2022  Time:  06:16PM  DT:  11/29/2022  Time:  06:53PM  Job #:  [id number]      PROGRESS NOTE
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
## 2022-11-30 NOTE — PLAN OF CARE
SSC notified pt sister( Marivel) that Ami from [facility] was going to contact her about placement    SSC sent wound care notes, Bone scan results to [facility] via [facility] Envelope as requested

## 2022-11-30 NOTE — PROGRESS NOTES
[facility]                       [address] [id number]    PATIENT NAME:       OWEN PIRES  YOB: 1975  CSN:                [id number]   MRN:                [id number]  ADMIT DATE:         11/16/2022 22:08:00  PHYSICIAN:          Miguel Lamb MD                            PROGRESS NOTE    DATE:      SUBJECTIVE:  47-year-old  male.  He is doing fair at the present   time.  Complains of pain.  It seems that they wanted to get him is [facility].    He was evaluated by Infectious Disease and he was placed on vancomycin and   Rocephin.  He did grow out in the urine some pseudomonas and procidentia, which   most likely is most likely colonization.  He did start the vancomycin and   cefepime.  Triple phase scan shows a light in the right hip around the right   hip.  It may represent septic arthritis or osteo.  He is not having any fever or   chills.  No shortness of breath, chest pain, palpitations, or other problems.    REVIEW OF SYSTEMS:  Time 12 as above.    PHYSICAL EXAMINATION:  VITAL SIGNS:  Blood pressure was 131/86, pulse 64, temp 97.5.  GENERAL APPEARANCE:  Alert, in no acute distress.  HEART:  Regular rhythm and rate.  LUNGS:  Clear.    ABDOMEN:  Soft.  Suprapubic looks good.    EXTREMITIES:  No clubbing, cyanosis, or edema.   GLUTEAL AREA:  See wound care notes.    NEUROLOGIC:  Unchanged, paraplegic.  Imp:poss osteo,htn paraplegia,drug seeking behavior,anemia,[id number] sacral decubitus  PLAN:    1. Agree with LTAC placement.    2. He had a chemistry on the 29th.  Will do some labs in a couple of days,   including sed rate and CRP.    3. Will continue with the current medications.    4. We are waiting for placement.        ______________________________  Miguel Lamb MD    JHON/AQS  DD:  11/30/2022  Time:  03:12PM  DT:  11/30/2022  Time:  04:36PM  Job #:   [id number]      PROGRESS NOTE

## 2022-11-30 NOTE — PLAN OF CARE
Rosalba with [facility] called about patient. There are no beds available at this time but she will continue to follow patient and possibly re eval next week.

## 2022-11-30 NOTE — PROGRESS NOTES
[facility]                       [address] [id number]    PATIENT NAME:       OWEN PIRES  YOB: 1975  CSN:                [id number]   MRN:                [id number]  ADMIT DATE:         11/16/2022 22:08:00  PHYSICIAN:          Mgiuel Lamb MD                            PROGRESS NOTE    DATE:      SUBJECTIVE:  This is a 47-year-old  male. Again, he has   drug-seeking behavior.  No fever or chills.  No shortness of breath.  No chest   pain or palpitations or any other problems at the present time.  Blood pressure   has been stable.    REVIEW OF SYSTEMS:  Times 12 as above.    PHYSICAL EXAMINATION:  VITAL SIGNS:  Blood pressure is 147/94, pulse 64, temp 98.1.  GENERAL APPEARANCE: Alert, in no acute distress.  HEART:  Regular rhythm and rate.  LUNGS: Clear.  ABDOMEN:  Soft, , nontender.  Bowel sounds present.  Right lower quadrant   suprapubic catheter in place.  EXTREMITIES:  No clubbing, cyanosis, or edema.  NEUROLOGIC: Nonfocal in the upper extremities.  He is paraplegic.    IMPRESSION:    1. Possible osteomyelitis.  2. Hypertension.  3. Chronic anemia.  4. History of recurrent deep vein thromboses.   5. Paraplegia.  6. Neurogenic bladder and bowel.  7. Deconditioning.  8. Protein-calorie malnutrition.  9 drug seeking behavior  PLAN:    1. Will continue with DVT prophylaxis.    2. The patient was started on vancomycin and Maxipime.   3. Bone scan is pending.        ______________________________  MD JOHN Aquino/AQS  DD:  11/29/2022  Time:  06:16PM  DT:  11/29/2022  Time:  06:54PM  Job #:  [id number]      PROGRESS NOTE

## 2022-11-30 NOTE — NURSING
Assisted assigned nurse Sridhar VALDIVIA to , cleanse assess and obtain wound culture from Left ischial PU POOA and redress wound,recommend to continue current wound care orders. Patient remains resting on a low air loss mattress with pressure injury prevention measures in place.

## 2022-12-01 LAB
ALBUMIN SERPL-MCNC: 2.6 GM/DL (ref 3.5–5)
ALBUMIN/GLOB SERPL: 0.8 RATIO (ref 1.1–2)
ALP SERPL-CCNC: 86 UNIT/L (ref 40–150)
ALT SERPL-CCNC: 7 UNIT/L (ref 0–55)
AST SERPL-CCNC: 7 UNIT/L (ref 5–34)
BASOPHILS # BLD AUTO: 0.11 X10(3)/MCL (ref 0–0.2)
BASOPHILS NFR BLD AUTO: 1 %
BILIRUBIN DIRECT+TOT PNL SERPL-MCNC: 0.2 MG/DL
BUN SERPL-MCNC: 10 MG/DL (ref 8.9–20.6)
CALCIUM SERPL-MCNC: 8.4 MG/DL (ref 8.4–10.2)
CHLORIDE SERPL-SCNC: 105 MMOL/L (ref 98–107)
CO2 SERPL-SCNC: 27 MMOL/L (ref 22–29)
CREAT SERPL-MCNC: 0.61 MG/DL (ref 0.73–1.18)
EOSINOPHIL # BLD AUTO: 0.51 X10(3)/MCL (ref 0–0.9)
EOSINOPHIL NFR BLD AUTO: 4.7 %
ERYTHROCYTE [DISTWIDTH] IN BLOOD BY AUTOMATED COUNT: 15.7 % (ref 11.5–17)
FERRITIN SERPL-MCNC: 60.67 NG/ML (ref 21.81–274.66)
FOLATE SERPL-MCNC: 7.2 NG/ML (ref 7–31.4)
GFR SERPLBLD CREATININE-BSD FMLA CKD-EPI: >60 MLS/MIN/1.73/M2
GLOBULIN SER-MCNC: 3.4 GM/DL (ref 2.4–3.5)
GLUCOSE SERPL-MCNC: 91 MG/DL (ref 74–100)
HCT VFR BLD AUTO: 31.7 % (ref 42–52)
HGB BLD-MCNC: 9.5 GM/DL (ref 14–18)
IMM GRANULOCYTES # BLD AUTO: 0.05 X10(3)/MCL (ref 0–0.04)
IMM GRANULOCYTES NFR BLD AUTO: 0.5 %
LYMPHOCYTES # BLD AUTO: 2.03 X10(3)/MCL (ref 0.6–4.6)
LYMPHOCYTES NFR BLD AUTO: 18.6 %
MAGNESIUM SERPL-MCNC: 1.9 MG/DL (ref 1.6–2.6)
MCH RBC QN AUTO: 25.5 PG (ref 27–31)
MCHC RBC AUTO-ENTMCNC: 30 MG/DL (ref 33–36)
MCV RBC AUTO: 85.2 FL (ref 80–94)
MONOCYTES # BLD AUTO: 0.72 X10(3)/MCL (ref 0.1–1.3)
MONOCYTES NFR BLD AUTO: 6.6 %
NEUTROPHILS # BLD AUTO: 7.5 X10(3)/MCL (ref 2.1–9.2)
NEUTROPHILS NFR BLD AUTO: 68.6 %
NRBC BLD AUTO-RTO: 0 %
PHOSPHATE SERPL-MCNC: 3.6 MG/DL (ref 2.3–4.7)
PLATELET # BLD AUTO: 267 X10(3)/MCL (ref 130–400)
PMV BLD AUTO: 11.5 FL (ref 7.4–10.4)
POTASSIUM SERPL-SCNC: 4.1 MMOL/L (ref 3.5–5.1)
PROT SERPL-MCNC: 6 GM/DL (ref 6.4–8.3)
RBC # BLD AUTO: 3.72 X10(6)/MCL (ref 4.7–6.1)
SODIUM SERPL-SCNC: 139 MMOL/L (ref 136–145)
VANCOMYCIN SERPL-MCNC: 10.4 UG/ML (ref 15–20)
VIT B12 SERPL-MCNC: 348 PG/ML (ref 213–816)
WBC # SPEC AUTO: 10.9 X10(3)/MCL (ref 4.5–11.5)

## 2022-12-01 PROCEDURE — 84100 ASSAY OF PHOSPHORUS: CPT | Performed by: NURSE PRACTITIONER

## 2022-12-01 PROCEDURE — 83735 ASSAY OF MAGNESIUM: CPT | Performed by: NURSE PRACTITIONER

## 2022-12-01 PROCEDURE — 99223 PR INITIAL HOSPITAL CARE,LEVL III: ICD-10-PCS | Mod: ,,, | Performed by: ORTHOPAEDIC SURGERY

## 2022-12-01 PROCEDURE — 25000003 PHARM REV CODE 250: Performed by: NURSE PRACTITIONER

## 2022-12-01 PROCEDURE — 25000003 PHARM REV CODE 250: Performed by: INTERNAL MEDICINE

## 2022-12-01 PROCEDURE — 11000001 HC ACUTE MED/SURG PRIVATE ROOM

## 2022-12-01 PROCEDURE — 85025 COMPLETE CBC W/AUTO DIFF WBC: CPT | Performed by: NURSE PRACTITIONER

## 2022-12-01 PROCEDURE — 63600175 PHARM REV CODE 636 W HCPCS: Performed by: INTERNAL MEDICINE

## 2022-12-01 PROCEDURE — 36415 COLL VENOUS BLD VENIPUNCTURE: CPT | Performed by: NURSE PRACTITIONER

## 2022-12-01 PROCEDURE — A4216 STERILE WATER/SALINE, 10 ML: HCPCS | Performed by: INTERNAL MEDICINE

## 2022-12-01 PROCEDURE — 99223 1ST HOSP IP/OBS HIGH 75: CPT | Mod: ,,, | Performed by: ORTHOPAEDIC SURGERY

## 2022-12-01 PROCEDURE — 80202 ASSAY OF VANCOMYCIN: CPT | Performed by: INTERNAL MEDICINE

## 2022-12-01 PROCEDURE — 80053 COMPREHEN METABOLIC PANEL: CPT | Performed by: NURSE PRACTITIONER

## 2022-12-01 RX ORDER — HYDROCODONE BITARTRATE AND ACETAMINOPHEN 10; 325 MG/1; MG/1
1 TABLET ORAL ONCE
Status: COMPLETED | OUTPATIENT
Start: 2022-12-01 | End: 2022-12-01

## 2022-12-01 RX ORDER — TRAMADOL HYDROCHLORIDE 50 MG/1
100 TABLET ORAL EVERY 8 HOURS PRN
Status: DISCONTINUED | OUTPATIENT
Start: 2022-12-01 | End: 2022-12-09 | Stop reason: HOSPADM

## 2022-12-01 RX ADMIN — TRAMADOL HYDROCHLORIDE 100 MG: 50 TABLET, COATED ORAL at 03:12

## 2022-12-01 RX ADMIN — SODIUM CHLORIDE, PRESERVATIVE FREE 10 ML: 5 INJECTION INTRAVENOUS at 12:12

## 2022-12-01 RX ADMIN — VANCOMYCIN HYDROCHLORIDE 1250 MG: 1.25 INJECTION, POWDER, LYOPHILIZED, FOR SOLUTION INTRAVENOUS at 09:12

## 2022-12-01 RX ADMIN — KETOROLAC TROMETHAMINE 15 MG: 30 INJECTION, SOLUTION INTRAMUSCULAR at 03:12

## 2022-12-01 RX ADMIN — METOPROLOL SUCCINATE 25 MG: 25 TABLET, FILM COATED, EXTENDED RELEASE ORAL at 09:12

## 2022-12-01 RX ADMIN — VANCOMYCIN HYDROCHLORIDE 1250 MG: 1.25 INJECTION, POWDER, LYOPHILIZED, FOR SOLUTION INTRAVENOUS at 11:12

## 2022-12-01 RX ADMIN — SODIUM CHLORIDE: 9 INJECTION, SOLUTION INTRAVENOUS at 03:12

## 2022-12-01 RX ADMIN — GABAPENTIN 800 MG: 300 CAPSULE ORAL at 02:12

## 2022-12-01 RX ADMIN — KETOROLAC TROMETHAMINE 15 MG: 30 INJECTION, SOLUTION INTRAMUSCULAR at 05:12

## 2022-12-01 RX ADMIN — TRAMADOL HYDROCHLORIDE 100 MG: 50 TABLET, COATED ORAL at 11:12

## 2022-12-01 RX ADMIN — BACLOFEN 20 MG: 10 TABLET ORAL at 02:12

## 2022-12-01 RX ADMIN — HYDROCODONE BITARTRATE AND ACETAMINOPHEN 1 TABLET: 10; 325 TABLET ORAL at 09:12

## 2022-12-01 RX ADMIN — BACLOFEN 20 MG: 10 TABLET ORAL at 08:12

## 2022-12-01 RX ADMIN — CEFEPIME 2 G: 2 INJECTION, POWDER, FOR SOLUTION INTRAVENOUS at 08:12

## 2022-12-01 RX ADMIN — OXYBUTYNIN CHLORIDE 10 MG: 5 TABLET ORAL at 08:12

## 2022-12-01 RX ADMIN — SODIUM CHLORIDE, PRESERVATIVE FREE 10 ML: 5 INJECTION INTRAVENOUS at 06:12

## 2022-12-01 RX ADMIN — CEFEPIME 2 G: 2 INJECTION, POWDER, FOR SOLUTION INTRAVENOUS at 03:12

## 2022-12-01 RX ADMIN — CEFEPIME 2 G: 2 INJECTION, POWDER, FOR SOLUTION INTRAVENOUS at 02:12

## 2022-12-01 RX ADMIN — TRAMADOL HYDROCHLORIDE 100 MG: 50 TABLET, COATED ORAL at 05:12

## 2022-12-01 RX ADMIN — BACLOFEN 20 MG: 10 TABLET ORAL at 09:12

## 2022-12-01 RX ADMIN — OXYBUTYNIN CHLORIDE 10 MG: 5 TABLET ORAL at 09:12

## 2022-12-01 RX ADMIN — DULOXETINE 60 MG: 30 CAPSULE, DELAYED RELEASE ORAL at 09:12

## 2022-12-01 RX ADMIN — AMLODIPINE BESYLATE 5 MG: 5 TABLET ORAL at 09:12

## 2022-12-01 RX ADMIN — APIXABAN 5 MG: 5 TABLET, FILM COATED ORAL at 08:12

## 2022-12-01 RX ADMIN — GABAPENTIN 800 MG: 300 CAPSULE ORAL at 09:12

## 2022-12-01 RX ADMIN — GABAPENTIN 800 MG: 300 CAPSULE ORAL at 08:12

## 2022-12-01 RX ADMIN — KETOROLAC TROMETHAMINE 15 MG: 30 INJECTION, SOLUTION INTRAMUSCULAR at 11:12

## 2022-12-01 RX ADMIN — SODIUM CHLORIDE, PRESERVATIVE FREE 10 ML: 5 INJECTION INTRAVENOUS at 05:12

## 2022-12-01 RX ADMIN — DOCUSATE SODIUM 100 MG: 100 CAPSULE, LIQUID FILLED ORAL at 09:12

## 2022-12-01 RX ADMIN — DOCUSATE SODIUM 100 MG: 100 CAPSULE, LIQUID FILLED ORAL at 08:12

## 2022-12-01 RX ADMIN — GABAPENTIN 800 MG: 300 CAPSULE ORAL at 05:12

## 2022-12-01 NOTE — PROGRESS NOTES
Inpatient Nutrition Evaluation    Admit Date: 11/16/2022   Total duration of encounter: 15 days    Nutrition Recommendation/Prescription     -Resume Regular Diet once medically feasible.    - Resume Boost Max BID. 160 kcals and 30 g protein per serving.  - Resume Boost Plus once daily. 360 kcals and 14 g protein per serving.   - Add vitamin C, MVI, and 2 weeks of zinc to aid in wound healing.     Nutrition Assessment     Chart Review    Reason Seen: continuous nutrition monitoring full thickness tissue loss w/ exposed muscle or bone to left buttocks    Diagnosis:  1. Stage IV decubitus ulcer.  It looks like he probably as acute cystitis versus   colonization.  2. Mild chronic anemia.  3. Hypertension.  4. Tobacco abuse.  5. Paraplegia.  6. Neurogenic bladder and bowel.  7. History of recurrent deep venous thrombosis.   8. History of significant deconditioning.  9. Protein-calorie malnutrition.    10. He has some nausea and vomiting.  11. Slight dehydration.    Relevant Medical History:   paraplegia, tobacco abuse, neurogenic   bowel and bladder, stage IV decubitus in his sacrum and area on the buttock,   hypertension, history of DVT, history depression, anxiety, history of C diff in   the past, history of depression    Nutrition-Related Medications: NS, docusate sodium     Nutrition-Related Labs:  11/17: RBC 4.34, K 3.0, Glu 127, Alb 2.6   12/1: Glu 91, GFR>60    Diet Order: Diet NPO Except for: Medication  Oral Supplement Order: Boost Max and Boost Plus  Appetite/Oral Intake: NPO/NPO  Factors Affecting Nutritional Intake: NPO  Food/Pent[employer]al/Cultural Preferences: none reported  Food Allergies: none reported    Skin Integrity: wound  Wound(s):      Altered Skin Integrity 11/16/22 Left Buttocks #1 Ulceration-Tissue loss description: Full thickness     Comments    11/18/22: Pt reports fair appetite today, tolerating diet, appetite has been good lately, no recent unintentional weight loss, no GI complaints, normal  "bowel activity. Sounds like there is a problem with his care @ home and that case management is looking into placement in a care facility, it does sound like he has at least had food available to him. Does not like Brandon, agrees to Boost Max. Likes all flavors of Boost.     11/25/22: Pt reports fair po intake, tolerating diet, drinking supplements, voiced complaint of back spasms that he says that affect his abdomen in some way and when this happens he does not feel like eating, this causes him to skip an odd meal here are there but overall is eating well, mentions back spasms multiple times during our visit.      12/1/22: Pt is NPO for testing.     Anthropometrics    Height: 5' 10" (177.8 cm) Height Method: Stated  Last Weight: 78.9 kg (173 lb 14.4 oz) (11/17/22 1405) Weight Method: Bed Scale  BMI (Calculated): 25  BMI Classification: overweight (BMI 25-29.9)        Ideal Body Weight (IBW), Male: 166 lb     % Ideal Body Weight, Male (lb): 104.76 %                          Usual Weight Provided By: patient    Wt Readings from Last 5 Encounters:   11/17/22 78.9 kg (173 lb 14.4 oz)   08/06/22 59 kg (130 lb)   01/02/20 82 kg (180 lb 12.4 oz)     Weight Change(s) Since Admission:  Admit Weight: 78.9 kg (173 lb 14.4 oz) (11/17/22 1405)  12/1/22: no new wt noted     Patient Education    Not applicable.    Monitoring & Evaluation     Dietitian will monitor food and beverage intake, weight, and electrolyte/renal panel.  Nutrition Risk/Follow-Up: low (follow-up in 5-7 days)  Patients assigned 'low nutrition risk' status do not qualify for a full nutritional assessment but will be monitored and re-evaluated in a 5-7 day time period. Please consult if re-evaluation needed sooner.   "

## 2022-12-01 NOTE — PROGRESS NOTES
Brief ortho Consult -     Full consult/patient exam to follow    Briefly - patient is 48 yo M, paraplegic with chronic R hip pain.  Admitted with decubitus/wound.  Bone scan with concern for septic arthritis of his hip.      Plan to order x-rays of his hip - likely to require aspiration to evaluate for possible infectious etiology.

## 2022-12-01 NOTE — PROGRESS NOTES
Pharmacokinetic Assessment Follow Up: IV Vancomycin    Vancomycin serum concentration assessment(s):  The random level was drawn correctly and can be used to guide therapy at this time.    Vancomycin Regimen Plan:  Change regimen to Vancomycin 1250 mg IV every 12 hours with next serum trough concentration measured at 2000 prior to fourth dose on 12/02    Scheduled Administration Times  0900  2100      Drug levels (last 3 results):  Recent Labs   Lab Result Units 11/30/22  1347 12/01/22  0430   Vanc Lvl Random ug/ml  --  10.4*   Vancomycin Trough ug/ml 30.2*  --        Vancomycin Administrations:  vancomycin given in the last 96 hours                     vancomycin 1.25 g in dextrose 5% 250 mL IVPB (ready to mix) (mg) 1,250 mg New Bag 11/30/22 0632     1,250 mg New Bag 11/29/22 2100    vancomycin 1.5 g in dextrose 5 % 250 mL IVPB (ready to mix) (mg) 1,500 mg New Bag 11/29/22 1521                    Pharmacy will continue to follow and monitor vancomycin.    Please contact pharmacy at extension [phone] for questions regarding this assessment.    Thank you for the consult,   Porsha Lea, MariaelenaD       Patient brief summary:  Hemal Guerrero is a 47 y.o. male initiated on antimicrobial therapy with IV Vancomycin for treatment of bone/joint infection    The patient's current regimen is 1250 mg IV Q12H    Drug Allergies:   Review of patient's allergies indicates:   Allergen Reactions    Amitriptyline        Actual Body Weight:   78.9 kg    Renal Function:   Estimated Creatinine Clearance: 152.9 mL/min (A) (based on SCr of 0.61 mg/dL (L)).,     Dialysis Method (if applicable):  N/A    CBC (last 72 hours):  Recent Labs   Lab Result Units 12/01/22  0430   WBC x10(3)/mcL 10.9   Hgb gm/dL 9.5*   Hct % 31.7*   Platelet x10(3)/mcL 267   Mono % % 6.6   Eos % % 4.7   Basophil % % 1.0       Metabolic Panel (last 72 hours):  Recent Labs   Lab Result Units 11/29/22  1148 12/01/22  0430   Sodium Level mmol/L 143 139   Potassium Level  mmol/L 3.9 4.1   Chloride mmol/L 108* 105   Carbon Dioxide mmol/L 29 27   Glucose Level mg/dL 109* 91   Blood Urea Nitrogen mg/dL 6.6* 10.0   Creatinine mg/dL 0.58* 0.61*   Albumin Level gm/dL  --  2.6*   Bilirubin Total mg/dL  --  0.2   Alkaline Phosphatase unit/L  --  86   Aspartate Aminotransferase unit/L  --  7   Alanine Aminotransferase unit/L  --  7   Magnesium Level mg/dL  --  1.90   Phosphorus Level mg/dL  --  3.6       Microbiologic Results:  Microbiology Results (last 7 days)       Procedure Component Value Units Date/Time    Blood Culture [[id number]] Collected: 11/30/22 2233    Order Status: Resulted Specimen: Blood from Hand, Right Updated: 11/30/22 2312    Blood Culture [[id number]] Collected: 11/30/22 2224    Order Status: Resulted Specimen: Blood from Hand, Left Updated: 11/30/22 2312    Wound Culture [[id number]]  (Abnormal) Collected: 11/29/22 1229    Order Status: Completed Specimen: Decubitus from Leg, Right Updated: 11/30/22 0926     Wound Culture Moderate Gram-negative Rods

## 2022-12-01 NOTE — PLAN OF CARE
JAISON sent clinicals update to [facility] via [employer]. Spoke to Ami at [facility] she  already spoke to pt family (Marivel )and now Ami will review updates to decide on admission

## 2022-12-01 NOTE — PLAN OF CARE
Problem: Adult Inpatient Plan of Care  Goal: Plan of Care Review  Outcome: Ongoing, Progressing     Problem: Skin Injury Risk Increased  Goal: Skin Health and Integrity  Outcome: Ongoing, Progressing     Problem: Impaired Wound Healing  Goal: Optimal Wound Healing  Outcome: Ongoing, Progressing     Problem: Fall Injury Risk  Goal: Absence of Fall and Fall-Related Injury  Outcome: Ongoing, Progressing     Problem: Infection  Goal: Absence of Infection Signs and Symptoms  Outcome: Ongoing, Progressing
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
## 2022-12-02 LAB
ALBUMIN SERPL-MCNC: 2.7 GM/DL (ref 3.5–5)
ALBUMIN/GLOB SERPL: 0.7 RATIO (ref 1.1–2)
ALP SERPL-CCNC: 87 UNIT/L (ref 40–150)
ALT SERPL-CCNC: 7 UNIT/L (ref 0–55)
AST SERPL-CCNC: 7 UNIT/L (ref 5–34)
BASOPHILS # BLD AUTO: 0.09 X10(3)/MCL (ref 0–0.2)
BASOPHILS NFR BLD AUTO: 1.1 %
BILIRUBIN DIRECT+TOT PNL SERPL-MCNC: 0.2 MG/DL
BUN SERPL-MCNC: 13 MG/DL (ref 8.9–20.6)
CALCIUM SERPL-MCNC: 8.8 MG/DL (ref 8.4–10.2)
CHLORIDE SERPL-SCNC: 104 MMOL/L (ref 98–107)
CO2 SERPL-SCNC: 27 MMOL/L (ref 22–29)
CREAT SERPL-MCNC: 0.57 MG/DL (ref 0.73–1.18)
EOSINOPHIL # BLD AUTO: 0.6 X10(3)/MCL (ref 0–0.9)
EOSINOPHIL NFR BLD AUTO: 7.1 %
ERYTHROCYTE [DISTWIDTH] IN BLOOD BY AUTOMATED COUNT: 15.6 % (ref 11.5–17)
GFR SERPLBLD CREATININE-BSD FMLA CKD-EPI: >60 MLS/MIN/1.73/M2
GLOBULIN SER-MCNC: 3.8 GM/DL (ref 2.4–3.5)
GLUCOSE SERPL-MCNC: 92 MG/DL (ref 74–100)
HCT VFR BLD AUTO: 30.9 % (ref 42–52)
HGB BLD-MCNC: 9.4 GM/DL (ref 14–18)
IMM GRANULOCYTES # BLD AUTO: 0.03 X10(3)/MCL (ref 0–0.04)
IMM GRANULOCYTES NFR BLD AUTO: 0.4 %
LYMPHOCYTES # BLD AUTO: 2.3 X10(3)/MCL (ref 0.6–4.6)
LYMPHOCYTES NFR BLD AUTO: 27.1 %
MAGNESIUM SERPL-MCNC: 2 MG/DL (ref 1.6–2.6)
MCH RBC QN AUTO: 25.3 PG (ref 27–31)
MCHC RBC AUTO-ENTMCNC: 30.4 MG/DL (ref 33–36)
MCV RBC AUTO: 83.1 FL (ref 80–94)
MONOCYTES # BLD AUTO: 0.66 X10(3)/MCL (ref 0.1–1.3)
MONOCYTES NFR BLD AUTO: 7.8 %
NEUTROPHILS # BLD AUTO: 4.8 X10(3)/MCL (ref 2.1–9.2)
NEUTROPHILS NFR BLD AUTO: 56.5 %
NRBC BLD AUTO-RTO: 0 %
PLATELET # BLD AUTO: 271 X10(3)/MCL (ref 130–400)
PMV BLD AUTO: 10.7 FL (ref 7.4–10.4)
POTASSIUM SERPL-SCNC: 4.1 MMOL/L (ref 3.5–5.1)
PROT SERPL-MCNC: 6.5 GM/DL (ref 6.4–8.3)
RBC # BLD AUTO: 3.72 X10(6)/MCL (ref 4.7–6.1)
SODIUM SERPL-SCNC: 138 MMOL/L (ref 136–145)
WBC # SPEC AUTO: 8.5 X10(3)/MCL (ref 4.5–11.5)

## 2022-12-02 PROCEDURE — 25000003 PHARM REV CODE 250: Performed by: INTERNAL MEDICINE

## 2022-12-02 PROCEDURE — 25000003 PHARM REV CODE 250: Performed by: NURSE PRACTITIONER

## 2022-12-02 PROCEDURE — 87070 CULTURE OTHR SPECIMN AEROBIC: CPT | Performed by: ORTHOPAEDIC SURGERY

## 2022-12-02 PROCEDURE — 11000001 HC ACUTE MED/SURG PRIVATE ROOM

## 2022-12-02 PROCEDURE — A4216 STERILE WATER/SALINE, 10 ML: HCPCS | Performed by: INTERNAL MEDICINE

## 2022-12-02 PROCEDURE — 87075 CULTR BACTERIA EXCEPT BLOOD: CPT | Performed by: ORTHOPAEDIC SURGERY

## 2022-12-02 PROCEDURE — 83735 ASSAY OF MAGNESIUM: CPT | Performed by: INTERNAL MEDICINE

## 2022-12-02 PROCEDURE — 36415 COLL VENOUS BLD VENIPUNCTURE: CPT | Performed by: INTERNAL MEDICINE

## 2022-12-02 PROCEDURE — 63600175 PHARM REV CODE 636 W HCPCS: Mod: JG | Performed by: INTERNAL MEDICINE

## 2022-12-02 PROCEDURE — 87205 SMEAR GRAM STAIN: CPT | Performed by: ORTHOPAEDIC SURGERY

## 2022-12-02 PROCEDURE — 80053 COMPREHEN METABOLIC PANEL: CPT | Performed by: INTERNAL MEDICINE

## 2022-12-02 PROCEDURE — 85025 COMPLETE CBC W/AUTO DIFF WBC: CPT | Performed by: INTERNAL MEDICINE

## 2022-12-02 PROCEDURE — 63600175 PHARM REV CODE 636 W HCPCS: Performed by: INTERNAL MEDICINE

## 2022-12-02 RX ORDER — HYDROCODONE BITARTRATE AND ACETAMINOPHEN 10; 325 MG/1; MG/1
1 TABLET ORAL ONCE
Status: COMPLETED | OUTPATIENT
Start: 2022-12-02 | End: 2022-12-02

## 2022-12-02 RX ORDER — MORPHINE SULFATE 4 MG/ML
2 INJECTION, SOLUTION INTRAMUSCULAR; INTRAVENOUS EVERY 6 HOURS PRN
Status: DISPENSED | OUTPATIENT
Start: 2022-12-02 | End: 2022-12-03

## 2022-12-02 RX ADMIN — METOPROLOL SUCCINATE 25 MG: 25 TABLET, FILM COATED, EXTENDED RELEASE ORAL at 09:12

## 2022-12-02 RX ADMIN — GABAPENTIN 800 MG: 300 CAPSULE ORAL at 08:12

## 2022-12-02 RX ADMIN — GABAPENTIN 800 MG: 300 CAPSULE ORAL at 01:12

## 2022-12-02 RX ADMIN — AMLODIPINE BESYLATE 5 MG: 5 TABLET ORAL at 09:12

## 2022-12-02 RX ADMIN — TRAMADOL HYDROCHLORIDE 100 MG: 50 TABLET, COATED ORAL at 08:12

## 2022-12-02 RX ADMIN — OXYBUTYNIN CHLORIDE 10 MG: 5 TABLET ORAL at 08:12

## 2022-12-02 RX ADMIN — CEFEPIME 2 G: 2 INJECTION, POWDER, FOR SOLUTION INTRAVENOUS at 01:12

## 2022-12-02 RX ADMIN — BACLOFEN 20 MG: 10 TABLET ORAL at 09:12

## 2022-12-02 RX ADMIN — SODIUM CHLORIDE, PRESERVATIVE FREE 10 ML: 5 INJECTION INTRAVENOUS at 06:12

## 2022-12-02 RX ADMIN — DULOXETINE 60 MG: 30 CAPSULE, DELAYED RELEASE ORAL at 09:12

## 2022-12-02 RX ADMIN — MORPHINE SULFATE 2 MG: 4 INJECTION INTRAVENOUS at 09:12

## 2022-12-02 RX ADMIN — DOCUSATE SODIUM 100 MG: 100 CAPSULE, LIQUID FILLED ORAL at 09:12

## 2022-12-02 RX ADMIN — VANCOMYCIN HYDROCHLORIDE 1250 MG: 1.25 INJECTION, POWDER, LYOPHILIZED, FOR SOLUTION INTRAVENOUS at 09:12

## 2022-12-02 RX ADMIN — GABAPENTIN 800 MG: 300 CAPSULE ORAL at 05:12

## 2022-12-02 RX ADMIN — BACLOFEN 20 MG: 10 TABLET ORAL at 03:12

## 2022-12-02 RX ADMIN — GABAPENTIN 800 MG: 300 CAPSULE ORAL at 09:12

## 2022-12-02 RX ADMIN — BACLOFEN 20 MG: 10 TABLET ORAL at 08:12

## 2022-12-02 RX ADMIN — CEFEPIME 2 G: 2 INJECTION, POWDER, FOR SOLUTION INTRAVENOUS at 03:12

## 2022-12-02 RX ADMIN — MORPHINE SULFATE 2 MG: 4 INJECTION INTRAVENOUS at 03:12

## 2022-12-02 RX ADMIN — CEFTAZIDIME, AVIBACTAM 2.5 G: 2; .5 POWDER, FOR SOLUTION INTRAVENOUS at 08:12

## 2022-12-02 RX ADMIN — SODIUM CHLORIDE, PRESERVATIVE FREE 10 ML: 5 INJECTION INTRAVENOUS at 03:12

## 2022-12-02 RX ADMIN — HYDROCODONE BITARTRATE AND ACETAMINOPHEN 1 TABLET: 10; 325 TABLET ORAL at 03:12

## 2022-12-02 RX ADMIN — APIXABAN 5 MG: 5 TABLET, FILM COATED ORAL at 08:12

## 2022-12-02 RX ADMIN — DOCUSATE SODIUM 100 MG: 100 CAPSULE, LIQUID FILLED ORAL at 08:12

## 2022-12-02 RX ADMIN — SODIUM CHLORIDE, PRESERVATIVE FREE 10 ML: 5 INJECTION INTRAVENOUS at 12:12

## 2022-12-02 RX ADMIN — OXYBUTYNIN CHLORIDE 10 MG: 5 TABLET ORAL at 09:12

## 2022-12-02 RX ADMIN — SODIUM CHLORIDE: 9 INJECTION, SOLUTION INTRAVENOUS at 03:12
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
## 2022-12-02 NOTE — PLAN OF CARE
Problem: Adult Inpatient Plan of Care  Goal: Plan of Care Review  Outcome: Ongoing, Progressing  Goal: Patient-Specific Goal (Individualized)  Outcome: Ongoing, Progressing  Goal: Absence of Hospital-Acquired Illness or Injury  Outcome: Ongoing, Progressing  Goal: Optimal Comfort and Wellbeing  Outcome: Ongoing, Progressing  Goal: Readiness for Transition of Care  Outcome: Ongoing, Progressing     Problem: Skin Injury Risk Increased  Goal: Skin Health and Integrity  Outcome: Ongoing, Progressing     Problem: Impaired Wound Healing  Goal: Optimal Wound Healing  Outcome: Ongoing, Progressing     Problem: Fall Injury Risk  Goal: Absence of Fall and Fall-Related Injury  Outcome: Ongoing, Progressing     Problem: Infection  Goal: Absence of Infection Signs and Symptoms  Outcome: Ongoing, Progressing     Problem: Pain Acute  Goal: Acceptable Pain Control and Functional Ability  Outcome: Ongoing, Progressing
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
## 2022-12-02 NOTE — PT/OT/SLP PROGRESS
Physical Therapy      Patient Name:  Hemal Guerrero   MRN:  [id number]    Patient not seen today secondary to aspiration of hip.
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
## 2022-12-02 NOTE — PLAN OF CARE
Spoke to patient and sister Marivel [phone]. They agreed for [facility] placement. Notified Rosalba with [facility] and Ami with [facility].

## 2022-12-03 LAB — GRAM STN SPEC: NORMAL

## 2022-12-03 PROCEDURE — 25000003 PHARM REV CODE 250: Performed by: INTERNAL MEDICINE

## 2022-12-03 PROCEDURE — 63600175 PHARM REV CODE 636 W HCPCS: Mod: JG | Performed by: INTERNAL MEDICINE

## 2022-12-03 PROCEDURE — 11000001 HC ACUTE MED/SURG PRIVATE ROOM

## 2022-12-03 PROCEDURE — 25000003 PHARM REV CODE 250: Performed by: NURSE PRACTITIONER

## 2022-12-03 PROCEDURE — A4216 STERILE WATER/SALINE, 10 ML: HCPCS | Performed by: INTERNAL MEDICINE

## 2022-12-03 PROCEDURE — 63600175 PHARM REV CODE 636 W HCPCS: Performed by: INTERNAL MEDICINE

## 2022-12-03 RX ORDER — HYDROCODONE BITARTRATE AND ACETAMINOPHEN 7.5; 325 MG/1; MG/1
1 TABLET ORAL EVERY 6 HOURS PRN
Status: DISCONTINUED | OUTPATIENT
Start: 2022-12-03 | End: 2022-12-06

## 2022-12-03 RX ADMIN — OXYBUTYNIN CHLORIDE 10 MG: 5 TABLET ORAL at 08:12

## 2022-12-03 RX ADMIN — GABAPENTIN 800 MG: 300 CAPSULE ORAL at 12:12

## 2022-12-03 RX ADMIN — MORPHINE SULFATE 2 MG: 4 INJECTION INTRAVENOUS at 04:12

## 2022-12-03 RX ADMIN — APIXABAN 5 MG: 5 TABLET, FILM COATED ORAL at 08:12

## 2022-12-03 RX ADMIN — MORPHINE SULFATE 2 MG: 4 INJECTION INTRAVENOUS at 10:12

## 2022-12-03 RX ADMIN — BACLOFEN 20 MG: 10 TABLET ORAL at 04:12

## 2022-12-03 RX ADMIN — CEFTAZIDIME, AVIBACTAM 2.5 G: 2; .5 POWDER, FOR SOLUTION INTRAVENOUS at 04:12

## 2022-12-03 RX ADMIN — CEFTAZIDIME, AVIBACTAM 2.5 G: 2; .5 POWDER, FOR SOLUTION INTRAVENOUS at 12:12

## 2022-12-03 RX ADMIN — BACLOFEN 20 MG: 10 TABLET ORAL at 08:12

## 2022-12-03 RX ADMIN — GABAPENTIN 800 MG: 300 CAPSULE ORAL at 08:12

## 2022-12-03 RX ADMIN — GABAPENTIN 800 MG: 300 CAPSULE ORAL at 04:12

## 2022-12-03 RX ADMIN — HYDROCODONE BITARTRATE AND ACETAMINOPHEN 1 TABLET: 7.5; 325 TABLET ORAL at 11:12

## 2022-12-03 RX ADMIN — SODIUM CHLORIDE: 9 INJECTION, SOLUTION INTRAVENOUS at 04:12

## 2022-12-03 RX ADMIN — DULOXETINE 60 MG: 30 CAPSULE, DELAYED RELEASE ORAL at 08:12

## 2022-12-03 RX ADMIN — CEFTAZIDIME, AVIBACTAM 2.5 G: 2; .5 POWDER, FOR SOLUTION INTRAVENOUS at 08:12

## 2022-12-03 RX ADMIN — HYDROCODONE BITARTRATE AND ACETAMINOPHEN 1 TABLET: 7.5; 325 TABLET ORAL at 05:12

## 2022-12-03 RX ADMIN — DOCUSATE SODIUM 100 MG: 100 CAPSULE, LIQUID FILLED ORAL at 08:12

## 2022-12-03 RX ADMIN — SODIUM CHLORIDE, PRESERVATIVE FREE 10 ML: 5 INJECTION INTRAVENOUS at 12:12

## 2022-12-03 RX ADMIN — SODIUM CHLORIDE, PRESERVATIVE FREE 10 ML: 5 INJECTION INTRAVENOUS at 06:12

## 2022-12-03 RX ADMIN — TRAMADOL HYDROCHLORIDE 100 MG: 50 TABLET, COATED ORAL at 06:12

## 2022-12-03 RX ADMIN — SODIUM CHLORIDE, PRESERVATIVE FREE 10 ML: 5 INJECTION INTRAVENOUS at 11:12

## 2022-12-03 RX ADMIN — TRAMADOL HYDROCHLORIDE 100 MG: 50 TABLET, COATED ORAL at 02:12

## 2022-12-03 NOTE — PROGRESS NOTES
[facility]  Orthopedics  Progress Note    Patient Name: Hemal Guerrero  MRN: [id number]  Admission Date: 11/16/2022  Hospital Length of Stay: 8 days  Attending Provider: Harpal Rios MD  Primary Care Provider: Miguel Lamb MD    Subjective:     Principal Problem:<principal problem not specified>    Principal Orthopedic Problem: * No surgery found *   Patient POD 1 right hip aspiration with IR.     Interval History: Cultures with no growth to date at this time. Patient was not able to work with therapy yesterday due to aspiration. Dr. Moreno is following for infectious disease. Overall states still some pain in the hip as prior, no acute complaints. Denies fevers or chills. No acute distress    Review of patient's allergies indicates:   Allergen Reactions    Amitriptyline        Current Facility-Administered Medications   Medication    0.9%  NaCl infusion    amLODIPine tablet 5 mg    apixaban tablet 5 mg    baclofen tablet 20 mg    cefTAZidime-avibactam (AVYCAZ) 2.5 g in dextrose 5 % 100 mL    docusate sodium capsule 100 mg    DULoxetine DR capsule 60 mg    gabapentin capsule 800 mg    metoprolol succinate (TOPROL-XL) 24 hr tablet 25 mg    morphine injection 2 mg    ondansetron disintegrating tablet 4 mg    oxybutynin tablet 10 mg    sodium chloride 0.9% flush 10 mL    And    sodium chloride 0.9% flush 10 mL    traMADoL tablet 100 mg     Objective:     Vital Signs (Most Recent):  Temp: 98 °F (36.7 °C) (12/03/22 0721)  Pulse: 73 (12/03/22 0721)  Resp: 18 (12/03/22 0721)  BP: (!) 143/88 (12/03/22 0721)  SpO2: 99 % (12/03/22 0721)   Vital Signs (24h Range):  Temp:  [97.7 °F (36.5 °C)-98.2 °F (36.8 °C)] 98 °F (36.7 °C)  Pulse:  [69-77] 73  Resp:  [16-18] 18  SpO2:  [99 %-100 %] 99 %  BP: (121-143)/(78-90) 143/88     Weight: 78.9 kg (173 lb 14.4 oz)  Height: 5' 10" (177.8 cm)  Body mass index is 24.95 kg/m².      Intake/Output Summary (Last 24 hours) at 12/3/2022 0931  Last " data filed at 12/3/2022 0634  Gross per 24 hour   Intake 2330 ml   Output 4900 ml   Net -2570 ml       Physical Exam:   Musculoskeletal:   Right lower extremity: no swelling; no deformity; no open wounds; compartments are soft and compressible; minimal pain to the lateral hip with range of motion, no groin pain, no pain with ROM at the knee or ankle; appropriate tenderness to palpation; dorsi/plantar flexes the foot; SILT distally; BCR distally; DP pulse palpable      Diagnostic Findings:   Significant Labs:   Recent Lab Results  (Last 5 results in the past 72 hours)        12/02/22  1444   12/02/22  0434   12/01/22  0430   11/30/22  2233   11/30/22  2224        Albumin/Globulin Ratio   0.7   0.8           Aerobic Culture - Tissue No Growth At 24 Hours  [P]               Albumin   2.7   2.6           Alkaline Phosphatase   87   86           ALT   7   7           AST   7   7           Baso #   0.09   0.11           Basophil %   1.1   1.0           BILIRUBIN TOTAL   0.2   0.2           BLOOD CULTURE       No Growth At 48 Hours  [P]   No Growth At 48 Hours  [P]       BUN   13.0   10.0           Calcium   8.8   8.4           Chloride   104   105           CO2   27   27           Creatinine   0.57   0.61           eGFR   >60   >60           Eos #   0.60   0.51           Eosinophil %   7.1   4.7           Ferritin         60.67       Folate         7.2       Globulin, Total   3.8   3.4           Glucose   92   91           Gram Stain Result No WBCs, No bacteria seen               Hematocrit   30.9   31.7           Hemoglobin   9.4   9.5           Immature Grans (Abs)   0.03   0.05           Immature Granulocytes   0.4   0.5           Iron         24       Iron Binding Capacity Unsaturated         196       Iron Saturation         11       Lymph #   2.30   2.03           LYMPH %   27.1   18.6           Magnesium   2.00   1.90           MCH   25.3   25.5           MCHC   30.4   30.0           MCV   83.1   85.2            Mono #   0.66   0.72           Mono %   7.8   6.6           MPV   10.7   11.5           Neut #   4.8   7.5           Neut %   56.5   68.6           nRBC   0.0   0.0           Phosphorus     3.6           Platelets   271   267           Potassium   4.1   4.1           PROTEIN TOTAL   6.5   6.0           RBC   3.72   3.72           RDW   15.6   15.7           Sodium   138   139           TIBC         220       Transferrin         190       Vancomycin, Random     10.4           Vitamin B-12         348       WBC   8.5   10.9                                   [P] - Preliminary Result                Significant Imaging: I have reviewed and interpreted all pertinent imaging results/findings.     Assessment/Plan:     There are no hospital problems to display for this patient.  No leukocytosis. No WBC or bacteria on gram stain. Cultures with no growth.   Low suspicion for septic hip at this time.  We will continue to monitor labs for evidence of infection.   No surgical intervention planned at this time.   No activity restrictions.     The above findings, diagnostics, and treatment plan were discussed with Dr. Jasso who is in agreement with the plan of care except as stated in additional documentation.      Cassandra Pan PA-C  Orthopedic Trauma Surgery  [facility]
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
## 2022-12-04 PROCEDURE — 25000003 PHARM REV CODE 250: Performed by: INTERNAL MEDICINE

## 2022-12-04 PROCEDURE — 25000003 PHARM REV CODE 250: Performed by: NURSE PRACTITIONER

## 2022-12-04 PROCEDURE — 63600175 PHARM REV CODE 636 W HCPCS: Mod: JG | Performed by: INTERNAL MEDICINE

## 2022-12-04 PROCEDURE — 11000001 HC ACUTE MED/SURG PRIVATE ROOM

## 2022-12-04 PROCEDURE — A4216 STERILE WATER/SALINE, 10 ML: HCPCS | Performed by: INTERNAL MEDICINE

## 2022-12-04 RX ORDER — HYDROXYZINE HYDROCHLORIDE 25 MG/1
25 TABLET, FILM COATED ORAL 3 TIMES DAILY
Status: DISCONTINUED | OUTPATIENT
Start: 2022-12-04 | End: 2022-12-09 | Stop reason: HOSPADM

## 2022-12-04 RX ADMIN — TRAMADOL HYDROCHLORIDE 100 MG: 50 TABLET, COATED ORAL at 09:12

## 2022-12-04 RX ADMIN — BACLOFEN 20 MG: 10 TABLET ORAL at 03:12

## 2022-12-04 RX ADMIN — TRAMADOL HYDROCHLORIDE 100 MG: 50 TABLET, COATED ORAL at 12:12

## 2022-12-04 RX ADMIN — TRAMADOL HYDROCHLORIDE 100 MG: 50 TABLET, COATED ORAL at 05:12

## 2022-12-04 RX ADMIN — HYDROCODONE BITARTRATE AND ACETAMINOPHEN 1 TABLET: 7.5; 325 TABLET ORAL at 07:12

## 2022-12-04 RX ADMIN — HYDROCODONE BITARTRATE AND ACETAMINOPHEN 1 TABLET: 7.5; 325 TABLET ORAL at 06:12

## 2022-12-04 RX ADMIN — BACLOFEN 20 MG: 10 TABLET ORAL at 08:12

## 2022-12-04 RX ADMIN — BACLOFEN 20 MG: 10 TABLET ORAL at 09:12

## 2022-12-04 RX ADMIN — SODIUM CHLORIDE, PRESERVATIVE FREE 10 ML: 5 INJECTION INTRAVENOUS at 05:12

## 2022-12-04 RX ADMIN — HYDROXYZINE HYDROCHLORIDE 25 MG: 25 TABLET, FILM COATED ORAL at 08:12

## 2022-12-04 RX ADMIN — OXYBUTYNIN CHLORIDE 10 MG: 5 TABLET ORAL at 09:12

## 2022-12-04 RX ADMIN — GABAPENTIN 800 MG: 300 CAPSULE ORAL at 12:12

## 2022-12-04 RX ADMIN — OXYBUTYNIN CHLORIDE 10 MG: 5 TABLET ORAL at 08:12

## 2022-12-04 RX ADMIN — SODIUM CHLORIDE, PRESERVATIVE FREE 10 ML: 5 INJECTION INTRAVENOUS at 10:12

## 2022-12-04 RX ADMIN — APIXABAN 5 MG: 5 TABLET, FILM COATED ORAL at 08:12

## 2022-12-04 RX ADMIN — HYDROCODONE BITARTRATE AND ACETAMINOPHEN 1 TABLET: 7.5; 325 TABLET ORAL at 12:12

## 2022-12-04 RX ADMIN — GABAPENTIN 800 MG: 300 CAPSULE ORAL at 09:12

## 2022-12-04 RX ADMIN — SODIUM CHLORIDE: 9 INJECTION, SOLUTION INTRAVENOUS at 04:12

## 2022-12-04 RX ADMIN — SODIUM CHLORIDE, PRESERVATIVE FREE 10 ML: 5 INJECTION INTRAVENOUS at 06:12

## 2022-12-04 RX ADMIN — HYDROXYZINE HYDROCHLORIDE 25 MG: 25 TABLET, FILM COATED ORAL at 10:12

## 2022-12-04 RX ADMIN — DOCUSATE SODIUM 100 MG: 100 CAPSULE, LIQUID FILLED ORAL at 09:12

## 2022-12-04 RX ADMIN — CEFTAZIDIME, AVIBACTAM 2.5 G: 2; .5 POWDER, FOR SOLUTION INTRAVENOUS at 10:12

## 2022-12-04 RX ADMIN — APIXABAN 5 MG: 5 TABLET, FILM COATED ORAL at 09:12

## 2022-12-04 RX ADMIN — CEFTAZIDIME, AVIBACTAM 2.5 G: 2; .5 POWDER, FOR SOLUTION INTRAVENOUS at 04:12

## 2022-12-04 RX ADMIN — CEFTAZIDIME, AVIBACTAM 2.5 G: 2; .5 POWDER, FOR SOLUTION INTRAVENOUS at 08:12

## 2022-12-04 RX ADMIN — DULOXETINE 60 MG: 30 CAPSULE, DELAYED RELEASE ORAL at 09:12

## 2022-12-04 RX ADMIN — HYDROXYZINE HYDROCHLORIDE 25 MG: 25 TABLET, FILM COATED ORAL at 03:12

## 2022-12-04 RX ADMIN — GABAPENTIN 800 MG: 300 CAPSULE ORAL at 08:12

## 2022-12-04 RX ADMIN — GABAPENTIN 800 MG: 300 CAPSULE ORAL at 05:12
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
## 2022-12-05 LAB — BACTERIA SPEC ANAEROBE CULT: NORMAL

## 2022-12-05 PROCEDURE — 25000003 PHARM REV CODE 250: Performed by: INTERNAL MEDICINE

## 2022-12-05 PROCEDURE — 25500020 PHARM REV CODE 255: Performed by: INTERNAL MEDICINE

## 2022-12-05 PROCEDURE — A4216 STERILE WATER/SALINE, 10 ML: HCPCS | Performed by: INTERNAL MEDICINE

## 2022-12-05 PROCEDURE — 63600175 PHARM REV CODE 636 W HCPCS: Mod: JG | Performed by: INTERNAL MEDICINE

## 2022-12-05 PROCEDURE — 11000001 HC ACUTE MED/SURG PRIVATE ROOM

## 2022-12-05 PROCEDURE — 25000003 PHARM REV CODE 250: Performed by: NURSE PRACTITIONER

## 2022-12-05 RX ORDER — HYDROCODONE BITARTRATE AND ACETAMINOPHEN 5; 325 MG/1; MG/1
1 TABLET ORAL ONCE
Status: COMPLETED | OUTPATIENT
Start: 2022-12-05 | End: 2022-12-05

## 2022-12-05 RX ADMIN — HYDROXYZINE HYDROCHLORIDE 25 MG: 25 TABLET, FILM COATED ORAL at 10:12

## 2022-12-05 RX ADMIN — TRAMADOL HYDROCHLORIDE 100 MG: 50 TABLET, COATED ORAL at 12:12

## 2022-12-05 RX ADMIN — OXYBUTYNIN CHLORIDE 10 MG: 5 TABLET ORAL at 08:12

## 2022-12-05 RX ADMIN — HYDROCODONE BITARTRATE AND ACETAMINOPHEN 1 TABLET: 7.5; 325 TABLET ORAL at 02:12

## 2022-12-05 RX ADMIN — APIXABAN 5 MG: 5 TABLET, FILM COATED ORAL at 08:12

## 2022-12-05 RX ADMIN — GABAPENTIN 800 MG: 300 CAPSULE ORAL at 12:12

## 2022-12-05 RX ADMIN — BACLOFEN 20 MG: 10 TABLET ORAL at 08:12

## 2022-12-05 RX ADMIN — GABAPENTIN 800 MG: 300 CAPSULE ORAL at 04:12

## 2022-12-05 RX ADMIN — BACLOFEN 20 MG: 10 TABLET ORAL at 02:12

## 2022-12-05 RX ADMIN — APIXABAN 5 MG: 5 TABLET, FILM COATED ORAL at 10:12

## 2022-12-05 RX ADMIN — HYDROXYZINE HYDROCHLORIDE 25 MG: 25 TABLET, FILM COATED ORAL at 02:12

## 2022-12-05 RX ADMIN — CEFTAZIDIME, AVIBACTAM 2.5 G: 2; .5 POWDER, FOR SOLUTION INTRAVENOUS at 05:12

## 2022-12-05 RX ADMIN — HYDROCODONE BITARTRATE AND ACETAMINOPHEN 1 TABLET: 7.5; 325 TABLET ORAL at 04:12

## 2022-12-05 RX ADMIN — GABAPENTIN 800 MG: 300 CAPSULE ORAL at 08:12

## 2022-12-05 RX ADMIN — GABAPENTIN 800 MG: 300 CAPSULE ORAL at 10:12

## 2022-12-05 RX ADMIN — OXYBUTYNIN CHLORIDE 10 MG: 5 TABLET ORAL at 10:12

## 2022-12-05 RX ADMIN — SODIUM CHLORIDE, PRESERVATIVE FREE 10 ML: 5 INJECTION INTRAVENOUS at 04:12

## 2022-12-05 RX ADMIN — TRAMADOL HYDROCHLORIDE 100 MG: 50 TABLET, COATED ORAL at 08:12

## 2022-12-05 RX ADMIN — HYDROCODONE BITARTRATE AND ACETAMINOPHEN 1 TABLET: 7.5; 325 TABLET ORAL at 10:12

## 2022-12-05 RX ADMIN — CEFTAZIDIME, AVIBACTAM 2.5 G: 2; .5 POWDER, FOR SOLUTION INTRAVENOUS at 08:12

## 2022-12-05 RX ADMIN — HYDROCODONE BITARTRATE AND ACETAMINOPHEN 1 TABLET: 5; 325 TABLET ORAL at 05:12

## 2022-12-05 RX ADMIN — BACLOFEN 20 MG: 10 TABLET ORAL at 10:12

## 2022-12-05 RX ADMIN — IOPAMIDOL 100 ML: 755 INJECTION, SOLUTION INTRAVENOUS at 10:12

## 2022-12-05 RX ADMIN — CEFTAZIDIME, AVIBACTAM 2.5 G: 2; .5 POWDER, FOR SOLUTION INTRAVENOUS at 12:12

## 2022-12-05 RX ADMIN — DULOXETINE 60 MG: 30 CAPSULE, DELAYED RELEASE ORAL at 10:12

## 2022-12-05 NOTE — PLAN OF CARE
Spoke to Melania with [facility] [phone]. Sent copy of responses from SNF referrals to [email or web address].
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
## 2022-12-05 NOTE — PLAN OF CARE
Problem: Adult Inpatient Plan of Care  Goal: Patient-Specific Goal (Individualized)  Outcome: Ongoing, Progressing  Goal: Absence of Hospital-Acquired Illness or Injury  Outcome: Ongoing, Progressing  Goal: Optimal Comfort and Wellbeing  Outcome: Ongoing, Progressing  Goal: Readiness for Transition of Care  Outcome: Ongoing, Progressing     Problem: Skin Injury Risk Increased  Goal: Skin Health and Integrity  Outcome: Ongoing, Progressing     Problem: Impaired Wound Healing  Goal: Optimal Wound Healing  Outcome: Ongoing, Progressing     Problem: Fall Injury Risk  Goal: Absence of Fall and Fall-Related Injury  Outcome: Ongoing, Progressing     Problem: Infection  Goal: Absence of Infection Signs and Symptoms  Outcome: Ongoing, Progressing     Problem: Pain Acute  Goal: Acceptable Pain Control and Functional Ability  Outcome: Ongoing, Progressing

## 2022-12-05 NOTE — PT/OT/SLP PROGRESS
"Physical Therapy      Patient Name:  Hemal Guererro   MRN:  [id number]    Patient not seen today secondary to pt having BM. Therapist offered to assist with pericare. Pt stated "Didn't you hear me I'm having a BM. It takes awhile." PT to return as schedule permits.    "

## 2022-12-06 LAB
BACTERIA BLD CULT: NORMAL
BACTERIA BLD CULT: NORMAL
BACTERIA SPEC CULT: ABNORMAL
BACTERIA SPEC CULT: ABNORMAL

## 2022-12-06 PROCEDURE — 97530 THERAPEUTIC ACTIVITIES: CPT

## 2022-12-06 PROCEDURE — 25000003 PHARM REV CODE 250: Performed by: INTERNAL MEDICINE

## 2022-12-06 PROCEDURE — 97110 THERAPEUTIC EXERCISES: CPT

## 2022-12-06 PROCEDURE — 25000003 PHARM REV CODE 250: Performed by: NURSE PRACTITIONER

## 2022-12-06 PROCEDURE — A4216 STERILE WATER/SALINE, 10 ML: HCPCS | Performed by: INTERNAL MEDICINE

## 2022-12-06 PROCEDURE — 11000001 HC ACUTE MED/SURG PRIVATE ROOM

## 2022-12-06 PROCEDURE — 63600175 PHARM REV CODE 636 W HCPCS: Mod: JG | Performed by: INTERNAL MEDICINE

## 2022-12-06 PROCEDURE — 97112 NEUROMUSCULAR REEDUCATION: CPT

## 2022-12-06 RX ORDER — OXYCODONE AND ACETAMINOPHEN 5; 325 MG/1; MG/1
1 TABLET ORAL EVERY 6 HOURS PRN
Status: DISCONTINUED | OUTPATIENT
Start: 2022-12-06 | End: 2022-12-09 | Stop reason: HOSPADM

## 2022-12-06 RX ADMIN — APIXABAN 5 MG: 5 TABLET, FILM COATED ORAL at 08:12

## 2022-12-06 RX ADMIN — GABAPENTIN 800 MG: 300 CAPSULE ORAL at 08:12

## 2022-12-06 RX ADMIN — SODIUM CHLORIDE, PRESERVATIVE FREE 10 ML: 5 INJECTION INTRAVENOUS at 05:12

## 2022-12-06 RX ADMIN — DULOXETINE 60 MG: 30 CAPSULE, DELAYED RELEASE ORAL at 08:12

## 2022-12-06 RX ADMIN — CEFTAZIDIME, AVIBACTAM 2.5 G: 2; .5 POWDER, FOR SOLUTION INTRAVENOUS at 04:12

## 2022-12-06 RX ADMIN — SODIUM CHLORIDE, PRESERVATIVE FREE 10 ML: 5 INJECTION INTRAVENOUS at 01:12

## 2022-12-06 RX ADMIN — OXYCODONE HYDROCHLORIDE AND ACETAMINOPHEN 1 TABLET: 5; 325 TABLET ORAL at 01:12

## 2022-12-06 RX ADMIN — METOPROLOL SUCCINATE 25 MG: 25 TABLET, FILM COATED, EXTENDED RELEASE ORAL at 08:12

## 2022-12-06 RX ADMIN — BACLOFEN 20 MG: 10 TABLET ORAL at 08:12

## 2022-12-06 RX ADMIN — BACLOFEN 20 MG: 10 TABLET ORAL at 03:12

## 2022-12-06 RX ADMIN — OXYCODONE HYDROCHLORIDE AND ACETAMINOPHEN 1 TABLET: 5; 325 TABLET ORAL at 06:12

## 2022-12-06 RX ADMIN — HYDROXYZINE HYDROCHLORIDE 25 MG: 25 TABLET, FILM COATED ORAL at 08:12

## 2022-12-06 RX ADMIN — DOCUSATE SODIUM 100 MG: 100 CAPSULE, LIQUID FILLED ORAL at 08:12

## 2022-12-06 RX ADMIN — TRAMADOL HYDROCHLORIDE 100 MG: 50 TABLET, COATED ORAL at 11:12

## 2022-12-06 RX ADMIN — CEFTAZIDIME, AVIBACTAM 2.5 G: 2; .5 POWDER, FOR SOLUTION INTRAVENOUS at 08:12

## 2022-12-06 RX ADMIN — CEFTAZIDIME, AVIBACTAM 2.5 G: 2; .5 POWDER, FOR SOLUTION INTRAVENOUS at 01:12

## 2022-12-06 RX ADMIN — OXYBUTYNIN CHLORIDE 10 MG: 5 TABLET ORAL at 08:12

## 2022-12-06 RX ADMIN — AMLODIPINE BESYLATE 5 MG: 5 TABLET ORAL at 08:12

## 2022-12-06 RX ADMIN — GABAPENTIN 800 MG: 300 CAPSULE ORAL at 05:12

## 2022-12-06 RX ADMIN — GABAPENTIN 800 MG: 300 CAPSULE ORAL at 01:12

## 2022-12-06 RX ADMIN — TRAMADOL HYDROCHLORIDE 100 MG: 50 TABLET, COATED ORAL at 06:12

## 2022-12-06 RX ADMIN — Medication 10 ML: at 01:12

## 2022-12-06 RX ADMIN — HYDROCODONE BITARTRATE AND ACETAMINOPHEN 1 TABLET: 7.5; 325 TABLET ORAL at 03:12

## 2022-12-06 RX ADMIN — HYDROCODONE BITARTRATE AND ACETAMINOPHEN 1 TABLET: 7.5; 325 TABLET ORAL at 10:12

## 2022-12-06 RX ADMIN — TRAMADOL HYDROCHLORIDE 100 MG: 50 TABLET, COATED ORAL at 03:12

## 2022-12-06 NOTE — PLAN OF CARE
Copy of all of the SNF denials sent to [email or web address]. LVM for Melania with [facility] [phone].

## 2022-12-06 NOTE — PT/OT/SLP PROGRESS
Occupational Therapy   Treatment    Name: Hemal Guerrero  MRN: [id number]  Admitting Diagnosis:  <principal problem not specified>       Recommendations:     Discharge Recommendations: LTACH (long-term acute care hospital)  Discharge Equipment Recommendations:  bedside commode  Barriers to discharge:       Assessment:     Hemal Guerrero is a 47 y.o. male with a medical diagnosis of stage IV sacral wound  He presents with good effort with activity, good static sitting balance and progressing with dynamic balance. Performance deficits affecting function are weakness, impaired endurance, impaired self care skills, impaired functional mobility, impaired balance, decreased lower extremity function, pain, impaired sensation, decreased ROM, impaired skin, impaired muscle length, impaired joint extensibility.     Rehab Prognosis:  Fair; patient would benefit from acute skilled OT services to address these deficits and reach maximum level of function.       Plan:     Patient to be seen 2 x/week, 3 x/week to address the above listed problems via self-care/home management, therapeutic activities, therapeutic exercises, neuromuscular re-education  Plan of Care Expires: 12/21/22  Plan of Care Reviewed with: patient    Subjective     Pain/Comfort:  Pain Rating 1: 8/10  Location 1: sacral spine  Pain Addressed 1: Pre-medicate for activity    Objective:     Communicated with: nsg and PT prior to session.  Patient found left sidelying with pressure relief boots upon OT entry to room.    General Precautions: Standard, fall    Orthopedic Precautions:N/A  Braces: N/A  Respiratory Status: Room air     Occupational Performance:     Bed Mobility:    Patient completed Supine to Sit with stand by assistance     Functional Mobility/Transfers:    Functional Mobility: able to scoot forward seated EOB with Ue's and lifting bottom from bed    Activities of Daily Living:        [facility] 6 Click ADL:      Treatment & Education:  Participated in static  and dynamic balance activities EOB, performed BUE dynamic reaching and crossbody reaches and punches, practiced forward leans and self-righting, performed BUE green theraband exercises x 4 sets x 10-15 reps, performed figure 4 stretches, educated on long sitting and core strengthening exercises seated EOB.     Patient left left sidelying with all lines intact and call button in reach    GOALS:   Multidisciplinary Problems       Occupational Therapy Goals          Problem: Occupational Therapy    Goal Priority Disciplines Outcome Interventions   Occupational Therapy Goal     OT, PT/OT Ongoing, Progressing    Description: Goals to be met by: 12/21/22     Patient will increase functional independence with ADLs by performing:    LE Dressing with Modified [place].  Grooming while seated with Modified [place].  Toileting from bedside commode with Modified [place] for hygiene and clothing management. Progress to toilet as appropriate.   Toilet transfer to bedside commode with Modified [place]. Progress to toilet as appropriate.   Pt will participate in progressive resistive UE exercises to increase UE strength required for tf and functional independence with ADLs.                          Time Tracking:     OT Date of Treatment: 12/06/22  OT Start Time: 1002  OT Stop Time: 1025  OT Total Time (min): 23 min    Billable Minutes:Therapeutic Exercise 10 min  Neuromuscular Re-education 13 min    OT/ALEXANDER: OT     ALEXANDER Visit Number: 2    12/6/2022

## 2022-12-06 NOTE — PLAN OF CARE
[facility] contacted [facility] and the [place] on the status of their acceptance of the pt and they both denied the pt.     [facility] requested that they noted their response in [facility], email or fax    [facility] also made both aware that this is urgent and this is causing some delay with placement per [employer] request as well.    [facility] updated their status in [facility] still awaiting on the [place].

## 2022-12-06 NOTE — PROGRESS NOTES
[facility] - 8th Floor Med Surg  Wound Care    Patient Name:  Hemal Guerrero   MRN:  [id number]  Date: 12/6/2022  Diagnosis: <principal problem not specified>    History:     History reviewed. No pertinent past medical history.    Social History     Socioeconomic History    Marital status:        Precautions:     Allergies as of 11/16/2022 - Reviewed 11/16/2022   Allergen Reaction Noted    Amitriptyline  11/16/2022       WOC Assessment Details/Treatment     Follow up visit, cleansed , assessed and redressed left ischial wound, noting moist red granulating wound bed, patient remains resting on a low air loss mattress with pressure injury  prevention measures in place, discussed same with assigned nurse Vidal CASON.       12/06/22 0930        Altered Skin Integrity 11/16/22 Left Buttocks #1 Ulceration   Date First Assessed: 11/16/22   Altered Skin Integrity Present on Admission: yes  Side: Left  Location: Buttocks  Wound Number: #1  Primary Wound Type: Ulceration   Wound Image    Description of Altered Skin Integrity Full thickness tissue loss with exposed bone, tendon, or muscle. Often includes undermining and tunneling. May extend into muscle and/or supporting structures.   Dressing Appearance Intact   Drainage Amount Scant   Drainage Characteristics/Odor Serous   Appearance Red;Granulating   Tissue loss description Full thickness   Red (%), Wound Tissue Color 100 %   Periwound Area Intact;Moist  (moist wound edges)   Wound Length (cm) 3.5 cm   Wound Width (cm) 5 cm   Wound Depth (cm) 1.5 cm   Wound Volume (cm^3) 26.25 cm^3   Wound Surface Area (cm^2) 17.5 cm^2   Care Cleansed with:;Antimicrobial agent   Dressing Gauze  (Dakins moist gauze under dry gauze secured with cloth tape.)   Dressing Change Due 12/06/22   (Insert flowsheet data here)    Recommendations made to primary team for continue with current wound care and pressure injury prevention measures  .      12/06/2022

## 2022-12-06 NOTE — PT/OT/SLP PROGRESS
Physical Therapy         Treatment        Hemal Guerrero   MRN: [id number]     PT Received On: 12/06/22  PT Start Time: 1000     PT Stop Time: 1025    PT Total Time (min): 25 min       Billable Minutes:  Therapeutic Activity 25  Total Minutes: 25    Treatment Type: Treatment  PT/PTA: PT     PTA Visit Number: 2       General Precautions: Standard, fall  Orthopedic Precautions: Orthopedic Precautions : N/A   Braces: Braces: N/A    Spiritual, Cultural Beliefs, Anabaptism Practices, Values that Affect Care: no    Subjective:  Communicated with NSG prior to session.         Objective:  Patient found supine, with Patient found with: pressure relief boots    Functional Mobility:  Bed Mobility:   Supine to sit: Standby Assistance   Sit to supine: Standby Assistance    Balance:   Static Sit: FAIR+: Able to take MINIMAL challenges from all directions  Dynamic Sit:  FAIR+: Maintains balance through MINIMAL excursions of active trunk motion  Pt able to perform reaching activities while seated EOB, as well as leaning forward and extending trunk back to upright posture, with min A.    Activity Tolerance:  Patient tolerated treatment well    Patient left supine with all lines intact and call button in reach.    Assessment:  Hemal Guerrero is a 47 y.o. male with a medical diagnosis of stage IV decubitus ulcer, hx paraplegia. He presents with impaired mobility.    Rehab potential is good.    Activity tolerance: Good    Discharge recommendations: Discharge Facility/Level of Care Needs: LTACH (long-term acute care hospital)     Equipment recommendations: Equipment Needed After Discharge: bedside commode     GOALS:   Multidisciplinary Problems       Physical Therapy Goals          Problem: Physical Therapy    Goal Priority Disciplines Outcome Goal Variances Interventions   Physical Therapy Goal     PT, PT/OT Ongoing, Progressing     Description: Goals to be met by: 12/23/2022     Patient will increase functional independence with mobility  by performin. Sit to supine with [place]  2. Rolling to Left and Right with [place].  3. Wheelchair propulsion x400 feet with [place] using bilateral uppper extremities  4. Sitting at edge of bed x10 minutes with [place]  5. Improve BLE ROM knee and hip flexion as well as hip internal and external rotation.                         PLAN:    Patient to be seen 6 x/week  to address the above listed problems via therapeutic activities, therapeutic exercises, gait training  Plan of Care expires: 23  Plan of Care reviewed with: patient         2022
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
## 2022-12-07 LAB — BACTERIA SPEC CULT: NORMAL

## 2022-12-07 PROCEDURE — 11000001 HC ACUTE MED/SURG PRIVATE ROOM

## 2022-12-07 PROCEDURE — 25000003 PHARM REV CODE 250: Performed by: NURSE PRACTITIONER

## 2022-12-07 PROCEDURE — 25000003 PHARM REV CODE 250: Performed by: INTERNAL MEDICINE

## 2022-12-07 PROCEDURE — 63600175 PHARM REV CODE 636 W HCPCS: Mod: JG | Performed by: INTERNAL MEDICINE

## 2022-12-07 PROCEDURE — A4216 STERILE WATER/SALINE, 10 ML: HCPCS | Performed by: INTERNAL MEDICINE

## 2022-12-07 RX ADMIN — GABAPENTIN 800 MG: 300 CAPSULE ORAL at 04:12

## 2022-12-07 RX ADMIN — DOCUSATE SODIUM 100 MG: 100 CAPSULE, LIQUID FILLED ORAL at 09:12

## 2022-12-07 RX ADMIN — CEFTAZIDIME, AVIBACTAM 2.5 G: 2; .5 POWDER, FOR SOLUTION INTRAVENOUS at 08:12

## 2022-12-07 RX ADMIN — OXYCODONE HYDROCHLORIDE AND ACETAMINOPHEN 1 TABLET: 5; 325 TABLET ORAL at 10:12

## 2022-12-07 RX ADMIN — CEFTAZIDIME, AVIBACTAM 2.5 G: 2; .5 POWDER, FOR SOLUTION INTRAVENOUS at 12:12

## 2022-12-07 RX ADMIN — OXYCODONE HYDROCHLORIDE AND ACETAMINOPHEN 1 TABLET: 5; 325 TABLET ORAL at 09:12

## 2022-12-07 RX ADMIN — GABAPENTIN 800 MG: 300 CAPSULE ORAL at 09:12

## 2022-12-07 RX ADMIN — BACLOFEN 20 MG: 10 TABLET ORAL at 04:12

## 2022-12-07 RX ADMIN — GABAPENTIN 800 MG: 300 CAPSULE ORAL at 08:12

## 2022-12-07 RX ADMIN — OXYCODONE HYDROCHLORIDE AND ACETAMINOPHEN 1 TABLET: 5; 325 TABLET ORAL at 04:12

## 2022-12-07 RX ADMIN — BACLOFEN 20 MG: 10 TABLET ORAL at 08:12

## 2022-12-07 RX ADMIN — GABAPENTIN 800 MG: 300 CAPSULE ORAL at 12:12

## 2022-12-07 RX ADMIN — SODIUM CHLORIDE, PRESERVATIVE FREE 10 ML: 5 INJECTION INTRAVENOUS at 12:12

## 2022-12-07 RX ADMIN — OXYBUTYNIN CHLORIDE 10 MG: 5 TABLET ORAL at 09:12

## 2022-12-07 RX ADMIN — SODIUM CHLORIDE, PRESERVATIVE FREE 10 ML: 5 INJECTION INTRAVENOUS at 11:12

## 2022-12-07 RX ADMIN — TRAMADOL HYDROCHLORIDE 100 MG: 50 TABLET, COATED ORAL at 12:12

## 2022-12-07 RX ADMIN — OXYBUTYNIN CHLORIDE 10 MG: 5 TABLET ORAL at 08:12

## 2022-12-07 RX ADMIN — HYDROXYZINE HYDROCHLORIDE 25 MG: 25 TABLET, FILM COATED ORAL at 08:12

## 2022-12-07 RX ADMIN — SODIUM CHLORIDE, PRESERVATIVE FREE 10 ML: 5 INJECTION INTRAVENOUS at 06:12

## 2022-12-07 RX ADMIN — CEFTAZIDIME, AVIBACTAM 2.5 G: 2; .5 POWDER, FOR SOLUTION INTRAVENOUS at 04:12

## 2022-12-07 RX ADMIN — SODIUM CHLORIDE, PRESERVATIVE FREE 10 ML: 5 INJECTION INTRAVENOUS at 05:12

## 2022-12-07 RX ADMIN — TRAMADOL HYDROCHLORIDE 100 MG: 50 TABLET, COATED ORAL at 08:12

## 2022-12-07 RX ADMIN — DULOXETINE 60 MG: 30 CAPSULE, DELAYED RELEASE ORAL at 09:12

## 2022-12-07 RX ADMIN — BACLOFEN 20 MG: 10 TABLET ORAL at 09:12

## 2022-12-07 RX ADMIN — APIXABAN 5 MG: 5 TABLET, FILM COATED ORAL at 09:12

## 2022-12-07 RX ADMIN — DOCUSATE SODIUM 100 MG: 100 CAPSULE, LIQUID FILLED ORAL at 08:12

## 2022-12-07 RX ADMIN — APIXABAN 5 MG: 5 TABLET, FILM COATED ORAL at 08:12
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
## 2022-12-07 NOTE — PT/OT/SLP PROGRESS
Physical Therapy      Patient Name:  Hemal Guerrero   MRN:  [id number]    Pt declined PT session this AM, POC updated to 3x per week.
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
## 2022-12-08 PROCEDURE — 25000003 PHARM REV CODE 250: Performed by: INTERNAL MEDICINE

## 2022-12-08 PROCEDURE — 63600175 PHARM REV CODE 636 W HCPCS: Mod: JG | Performed by: INTERNAL MEDICINE

## 2022-12-08 PROCEDURE — 25000003 PHARM REV CODE 250: Performed by: NURSE PRACTITIONER

## 2022-12-08 PROCEDURE — 11000001 HC ACUTE MED/SURG PRIVATE ROOM

## 2022-12-08 PROCEDURE — A4216 STERILE WATER/SALINE, 10 ML: HCPCS | Performed by: INTERNAL MEDICINE

## 2022-12-08 PROCEDURE — 97110 THERAPEUTIC EXERCISES: CPT | Mod: CQ

## 2022-12-08 RX ADMIN — HYDROXYZINE HYDROCHLORIDE 25 MG: 25 TABLET, FILM COATED ORAL at 04:12

## 2022-12-08 RX ADMIN — SODIUM CHLORIDE, PRESERVATIVE FREE 10 ML: 5 INJECTION INTRAVENOUS at 11:12

## 2022-12-08 RX ADMIN — BACLOFEN 20 MG: 10 TABLET ORAL at 08:12

## 2022-12-08 RX ADMIN — APIXABAN 5 MG: 5 TABLET, FILM COATED ORAL at 08:12

## 2022-12-08 RX ADMIN — SODIUM CHLORIDE, PRESERVATIVE FREE 10 ML: 5 INJECTION INTRAVENOUS at 04:12

## 2022-12-08 RX ADMIN — DOCUSATE SODIUM 100 MG: 100 CAPSULE, LIQUID FILLED ORAL at 08:12

## 2022-12-08 RX ADMIN — GABAPENTIN 800 MG: 300 CAPSULE ORAL at 08:12

## 2022-12-08 RX ADMIN — OXYCODONE HYDROCHLORIDE AND ACETAMINOPHEN 1 TABLET: 5; 325 TABLET ORAL at 04:12

## 2022-12-08 RX ADMIN — APIXABAN 5 MG: 5 TABLET, FILM COATED ORAL at 10:12

## 2022-12-08 RX ADMIN — DULOXETINE 60 MG: 30 CAPSULE, DELAYED RELEASE ORAL at 08:12

## 2022-12-08 RX ADMIN — OXYCODONE HYDROCHLORIDE AND ACETAMINOPHEN 1 TABLET: 5; 325 TABLET ORAL at 05:12

## 2022-12-08 RX ADMIN — CEFTAZIDIME, AVIBACTAM 2.5 G: 2; .5 POWDER, FOR SOLUTION INTRAVENOUS at 04:12

## 2022-12-08 RX ADMIN — TRAMADOL HYDROCHLORIDE 100 MG: 50 TABLET, COATED ORAL at 10:12

## 2022-12-08 RX ADMIN — DOCUSATE SODIUM 100 MG: 100 CAPSULE, LIQUID FILLED ORAL at 10:12

## 2022-12-08 RX ADMIN — TRAMADOL HYDROCHLORIDE 100 MG: 50 TABLET, COATED ORAL at 01:12

## 2022-12-08 RX ADMIN — METOPROLOL SUCCINATE 25 MG: 25 TABLET, FILM COATED, EXTENDED RELEASE ORAL at 08:12

## 2022-12-08 RX ADMIN — OXYCODONE HYDROCHLORIDE AND ACETAMINOPHEN 1 TABLET: 5; 325 TABLET ORAL at 11:12

## 2022-12-08 RX ADMIN — GABAPENTIN 800 MG: 300 CAPSULE ORAL at 04:12

## 2022-12-08 RX ADMIN — OXYBUTYNIN CHLORIDE 10 MG: 5 TABLET ORAL at 10:12

## 2022-12-08 RX ADMIN — GABAPENTIN 800 MG: 300 CAPSULE ORAL at 01:12

## 2022-12-08 RX ADMIN — TRAMADOL HYDROCHLORIDE 100 MG: 50 TABLET, COATED ORAL at 03:12

## 2022-12-08 RX ADMIN — OXYBUTYNIN CHLORIDE 10 MG: 5 TABLET ORAL at 08:12

## 2022-12-08 RX ADMIN — HYDROXYZINE HYDROCHLORIDE 25 MG: 25 TABLET, FILM COATED ORAL at 08:12

## 2022-12-08 RX ADMIN — BACLOFEN 20 MG: 10 TABLET ORAL at 10:12

## 2022-12-08 RX ADMIN — BACLOFEN 20 MG: 10 TABLET ORAL at 04:12

## 2022-12-08 RX ADMIN — CEFTAZIDIME, AVIBACTAM 2.5 G: 2; .5 POWDER, FOR SOLUTION INTRAVENOUS at 11:12

## 2022-12-08 RX ADMIN — HYDROXYZINE HYDROCHLORIDE 25 MG: 25 TABLET, FILM COATED ORAL at 10:12

## 2022-12-08 RX ADMIN — GABAPENTIN 800 MG: 300 CAPSULE ORAL at 10:12
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
## 2022-12-08 NOTE — PLAN OF CARE
Spoke to Melania with [employer] . She said to re submit to [facility] for authorization. Rosalba with [facility] notified.
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
## 2022-12-08 NOTE — PROGRESS NOTES
Inpatient Nutrition Evaluation    Admit Date: 11/16/2022   Total duration of encounter: 22 days    Nutrition Recommendation/Prescription     - Continue Regular Diet as tolerated.    - Cancel Boosts 2/2 pt not drinking and stockpile at bedside.   - Brandon BID for wound healing.   - Add vitamin C, MVI, and 2 weeks of zinc to aid in wound healing.   - Obtain and document more recent measured weight.     Nutrition Assessment     Chart Review    Reason Seen: continuous nutrition monitoring full thickness tissue loss w/ exposed muscle or bone to left buttocks    Diagnosis:  1. Stage IV decubitus ulcer.  It looks like he probably as acute cystitis versus   colonization.  2. Mild chronic anemia.  3. Hypertension.  4. Tobacco abuse.  5. Paraplegia.  6. Neurogenic bladder and bowel.  7. History of recurrent deep venous thrombosis.   8. History of significant deconditioning.  9. Protein-calorie malnutrition.    10. He has some nausea and vomiting.  11. Slight dehydration.    Relevant Medical History:   paraplegia, tobacco abuse, neurogenic   bowel and bladder, stage IV decubitus in his sacrum and area on the buttock,   hypertension, history of DVT, history depression, anxiety, history of C diff in   the past, history of depression    Nutrition-Related Medications: docusate sodium, zofran PRN    Nutrition-Related Labs:  11/17: RBC 4.34, K 3.0, Glu 127, Alb 2.6   12/1: Glu 91, GFR>60  12/8: no recent pertinent labs noted    Diet Order: Diet Adult Regular  Oral Supplement Order: Boost Max and Boost Plus  Appetite/Oral Intake: good/% of meals  Factors Affecting Nutritional Intake: none identified  Food/Adventist/Cultural Preferences: none reported  Food Allergies: none reported    Skin Integrity: wound  Wound(s):      Altered Skin Integrity 11/16/22 Left Buttocks #1 Ulceration-Tissue loss description: Full thickness     Comments    11/18/22: Pt reports fair appetite today, tolerating diet, appetite has been good lately, no  "recent unintentional weight loss, no GI complaints, normal bowel activity. Sounds like there is a problem with his care @ home and that case management is looking into placement in a care facility, it does sound like he has at least had food available to him. Does not like Brandon, agrees to Boost Max. Likes all flavors of Boost.     11/25/22: Pt reports fair po intake, tolerating diet, drinking supplements, voiced complaint of back spasms that he says that affect his abdomen in some way and when this happens he does not feel like eating, this causes him to skip an odd meal here are there but overall is eating well, mentions back spasms multiple times during our visit.      12/1/22: Pt is NPO for testing.     12/8/22: Pt reports good po intake of meals; denies n/v; states that the Boost products are giving him gas and he would like to cancel order; noted stockpile at bedside.     Anthropometrics    Height: 5' 10" (177.8 cm) Height Method: Stated  Last Weight: 78.9 kg (173 lb 14.4 oz) (11/17/22 1405) Weight Method: Bed Scale  BMI (Calculated): 25  BMI Classification: overweight (BMI 25-29.9)        Ideal Body Weight (IBW), Male: 166 lb     % Ideal Body Weight, Male (lb): 104.76 %                          Usual Weight Provided By: patient    Wt Readings from Last 5 Encounters:   11/17/22 78.9 kg (173 lb 14.4 oz)   08/06/22 59 kg (130 lb)   01/02/20 82 kg (180 lb 12.4 oz)     Weight Change(s) Since Admission:  Admit Weight: 78.9 kg (173 lb 14.4 oz) (11/17/22 1405)  12/1/22-12/8/22: no new wt noted     Patient Education    Not applicable.    Monitoring & Evaluation     Dietitian will monitor food and beverage intake, weight, and electrolyte/renal panel.  Nutrition Risk/Follow-Up: low (follow-up in 5-7 days)  Patients assigned 'low nutrition risk' status do not qualify for a full nutritional assessment but will be monitored and re-evaluated in a 5-7 day time period. Please consult if re-evaluation needed sooner.   "

## 2022-12-08 NOTE — PT/OT/SLP PROGRESS
Physical Therapy         Treatment        Hemal Guerrero   MRN: [id number]     PT Received On: 12/08/22  PT Start Time: 0912     PT Stop Time: 0930    PT Total Time (min): 18 min       Billable Minutes:  Therapeutic Exercise 10 TherAct 8  Total Minutes: 18    Treatment Type: Treatment  PT/PTA: PTA     PTA Visit Number: 3       General Precautions: Standard, fall  Orthopedic Precautions: Orthopedic Precautions : N/A   Braces:      Spiritual, Cultural Beliefs, Anabaptist Practices, Values that Affect Care: no    Subjective:  Communicated with NSG prior to session.    Pain/Comfort  Location - Orientation 1: generalized  Pain Addressed 1: Reposition, Distraction    Objective:  Patient found in bed with HOB elevated, with Patient found with: PRAFO. Wedge under pt R side.    Functional Mobility:  Bed Mobility:   Supine to sit: Moderate Assistance   Sit to supine: Moderate Assistance   Rolling: Standby Assistance   Scooting: Contact Guard Assistance    Balance:   Static Sit: GOOD: Takes MODERATE challenges from all directions  Dynamic Sit:  GOOD: Maintains balance through MODERATE excursions of active trunk movement. Pt sat EOB while reaching outside REINALDO and across midline to touch therapist hand in front. 15 reps BUE. Pt unsteady but had no major LOB.    Additional Treatment:  BLE PROM: ankle DF/PF, knee flex/ext, hip flex/add/abd. 12 reps BLE    Activity Tolerance:  Patient limited by fatigue    Patient left HOB elevated with call button in reach. Wedge under pt R side.    Assessment:  Hemal Guerrero is a 47 y.o. male with a medical diagnosis of stage IV decubitus ulcer, hx paraplegia. He presents with impaired functional mobility. Pt mentioned he wanted to practice T/Fing to w/c. Plan to practice this in future if appropriate.        Rehab potential is good.    Activity tolerance: Good    Discharge recommendations: Discharge Facility/Level of Care Needs: LTACH (long-term acute care hospital)     Equipment recommendations:        GOALS:   Multidisciplinary Problems       Physical Therapy Goals          Problem: Physical Therapy    Goal Priority Disciplines Outcome Goal Variances Interventions   Physical Therapy Goal     PT, PT/OT Ongoing, Progressing     Description: Goals to be met by: 2022     Patient will increase functional independence with mobility by performin. Sit to supine with [place]  2. Rolling to Left and Right with [place].  3. Wheelchair propulsion x400 feet with [place] using bilateral uppper extremities  4. Sitting at edge of bed x10 minutes with [place]  5. Improve BLE ROM knee and hip flexion as well as hip internal and external rotation.                         PLAN:    Patient to be seen 3 x/week  to address the above listed problems via therapeutic activities, therapeutic exercises, gait training  Plan of Care expires: 23  Plan of Care reviewed with: patient         2022
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
## 2022-12-09 VITALS
SYSTOLIC BLOOD PRESSURE: 135 MMHG | BODY MASS INDEX: 24.89 KG/M2 | DIASTOLIC BLOOD PRESSURE: 89 MMHG | HEIGHT: 70 IN | TEMPERATURE: 98 F | RESPIRATION RATE: 18 BRPM | OXYGEN SATURATION: 99 % | HEART RATE: 67 BPM | WEIGHT: 173.88 LBS

## 2022-12-09 PROBLEM — L03.90 CELLULITIS: Status: ACTIVE | Noted: 2022-12-09

## 2022-12-09 LAB — SARS-COV-2 RDRP RESP QL NAA+PROBE: NEGATIVE

## 2022-12-09 PROCEDURE — 25000003 PHARM REV CODE 250: Performed by: NURSE PRACTITIONER

## 2022-12-09 PROCEDURE — 25000003 PHARM REV CODE 250: Performed by: INTERNAL MEDICINE

## 2022-12-09 PROCEDURE — 63600175 PHARM REV CODE 636 W HCPCS: Mod: JG | Performed by: INTERNAL MEDICINE

## 2022-12-09 PROCEDURE — A4216 STERILE WATER/SALINE, 10 ML: HCPCS | Performed by: INTERNAL MEDICINE

## 2022-12-09 PROCEDURE — 87635 SARS-COV-2 COVID-19 AMP PRB: CPT | Performed by: INTERNAL MEDICINE

## 2022-12-09 RX ORDER — AMLODIPINE BESYLATE 5 MG/1
5 TABLET ORAL DAILY
Qty: 30 TABLET | Refills: 11 | Status: ON HOLD
Start: 2022-12-10 | End: 2023-02-03 | Stop reason: SDUPTHER

## 2022-12-09 RX ADMIN — SODIUM CHLORIDE, PRESERVATIVE FREE 10 ML: 5 INJECTION INTRAVENOUS at 07:12

## 2022-12-09 RX ADMIN — BACLOFEN 20 MG: 10 TABLET ORAL at 08:12

## 2022-12-09 RX ADMIN — GABAPENTIN 800 MG: 300 CAPSULE ORAL at 01:12

## 2022-12-09 RX ADMIN — CEFTAZIDIME, AVIBACTAM 2.5 G: 2; .5 POWDER, FOR SOLUTION INTRAVENOUS at 08:12

## 2022-12-09 RX ADMIN — HYDROXYZINE HYDROCHLORIDE 25 MG: 25 TABLET, FILM COATED ORAL at 08:12

## 2022-12-09 RX ADMIN — OXYCODONE HYDROCHLORIDE AND ACETAMINOPHEN 1 TABLET: 5; 325 TABLET ORAL at 01:12

## 2022-12-09 RX ADMIN — GABAPENTIN 800 MG: 300 CAPSULE ORAL at 08:12

## 2022-12-09 RX ADMIN — DULOXETINE 60 MG: 30 CAPSULE, DELAYED RELEASE ORAL at 08:12

## 2022-12-09 RX ADMIN — DOCUSATE SODIUM 100 MG: 100 CAPSULE, LIQUID FILLED ORAL at 08:12

## 2022-12-09 RX ADMIN — OXYCODONE HYDROCHLORIDE AND ACETAMINOPHEN 1 TABLET: 5; 325 TABLET ORAL at 08:12

## 2022-12-09 RX ADMIN — TRAMADOL HYDROCHLORIDE 100 MG: 50 TABLET, COATED ORAL at 01:12

## 2022-12-09 RX ADMIN — APIXABAN 5 MG: 5 TABLET, FILM COATED ORAL at 08:12

## 2022-12-09 RX ADMIN — TRAMADOL HYDROCHLORIDE 100 MG: 50 TABLET, COATED ORAL at 05:12

## 2022-12-09 RX ADMIN — OXYBUTYNIN CHLORIDE 10 MG: 5 TABLET ORAL at 08:12

## 2022-12-09 NOTE — NURSING
DC note: Patient transferring [place] [facility] [place] [facility] Report called to Leonides CASON. All questions answered. NAD noted.
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
## 2022-12-09 NOTE — PLAN OF CARE
Pt has been accepted at [facility]. Accepting MD is Dr Flaherty. Nurse will call report to [place] at [phone]. Patient set up in will call with [facility].
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
## 2023-01-14 ENCOUNTER — HOSPITAL ENCOUNTER (INPATIENT)
Facility: HOSPITAL | Age: 48
LOS: 19 days | Discharge: HOME HEALTH | DRG: 673 | End: 2023-02-03
Attending: EMERGENCY MEDICINE | Admitting: INTERNAL MEDICINE
Payer: MEDICARE

## 2023-01-14 DIAGNOSIS — L03.90 CELLULITIS, UNSPECIFIED CELLULITIS SITE: ICD-10-CM

## 2023-01-14 DIAGNOSIS — L89.324 PRESSURE INJURY OF LEFT BUTTOCK, STAGE 4: ICD-10-CM

## 2023-01-14 DIAGNOSIS — N17.9 AKI (ACUTE KIDNEY INJURY): Primary | ICD-10-CM

## 2023-01-14 LAB
ALBUMIN SERPL-MCNC: 4.4 G/DL (ref 3.5–5)
ALBUMIN/GLOB SERPL: 0.8 RATIO (ref 1.1–2)
ALP SERPL-CCNC: 123 UNIT/L (ref 40–150)
ALT SERPL-CCNC: 46 UNIT/L (ref 0–55)
AST SERPL-CCNC: 21 UNIT/L (ref 5–34)
BASOPHILS # BLD AUTO: 0.1 X10(3)/MCL (ref 0–0.2)
BASOPHILS NFR BLD AUTO: 0.8 %
BILIRUBIN DIRECT+TOT PNL SERPL-MCNC: 0.7 MG/DL
BUN SERPL-MCNC: 33.6 MG/DL (ref 8.9–20.6)
CALCIUM SERPL-MCNC: 10.8 MG/DL (ref 8.4–10.2)
CHLORIDE SERPL-SCNC: 103 MMOL/L (ref 98–107)
CO2 SERPL-SCNC: 20 MMOL/L (ref 22–29)
CREAT SERPL-MCNC: 1.5 MG/DL (ref 0.73–1.18)
EOSINOPHIL # BLD AUTO: 0.18 X10(3)/MCL (ref 0–0.9)
EOSINOPHIL NFR BLD AUTO: 1.5 %
ERYTHROCYTE [DISTWIDTH] IN BLOOD BY AUTOMATED COUNT: 14.6 % (ref 11.5–17)
GFR SERPLBLD CREATININE-BSD FMLA CKD-EPI: 57 MLS/MIN/1.73/M2
GLOBULIN SER-MCNC: 5.3 GM/DL (ref 2.4–3.5)
GLUCOSE SERPL-MCNC: 75 MG/DL (ref 74–100)
HCT VFR BLD AUTO: 48.3 % (ref 42–52)
HGB BLD-MCNC: 14.8 GM/DL (ref 14–18)
IMM GRANULOCYTES # BLD AUTO: 0.06 X10(3)/MCL (ref 0–0.04)
IMM GRANULOCYTES NFR BLD AUTO: 0.5 %
LYMPHOCYTES # BLD AUTO: 2.61 X10(3)/MCL (ref 0.6–4.6)
LYMPHOCYTES NFR BLD AUTO: 21.4 %
MCH RBC QN AUTO: 24.8 PG
MCHC RBC AUTO-ENTMCNC: 30.6 MG/DL (ref 33–36)
MCV RBC AUTO: 81 FL (ref 80–94)
MONOCYTES # BLD AUTO: 0.89 X10(3)/MCL (ref 0.1–1.3)
MONOCYTES NFR BLD AUTO: 7.3 %
NEUTROPHILS # BLD AUTO: 8.38 X10(3)/MCL (ref 2.1–9.2)
NEUTROPHILS NFR BLD AUTO: 68.5 %
NRBC BLD AUTO-RTO: 0 %
PLATELET # BLD AUTO: 311 X10(3)/MCL (ref 130–400)
PMV BLD AUTO: 10.7 FL (ref 7.4–10.4)
POTASSIUM SERPL-SCNC: 4.6 MMOL/L (ref 3.5–5.1)
PROT SERPL-MCNC: 9.7 GM/DL (ref 6.4–8.3)
RBC # BLD AUTO: 5.96 X10(6)/MCL (ref 4.7–6.1)
SODIUM SERPL-SCNC: 138 MMOL/L (ref 136–145)
WBC # SPEC AUTO: 12.2 X10(3)/MCL (ref 4.5–11.5)

## 2023-01-14 PROCEDURE — 81001 URINALYSIS AUTO W/SCOPE: CPT | Performed by: STUDENT IN AN ORGANIZED HEALTH CARE EDUCATION/TRAINING PROGRAM

## 2023-01-14 PROCEDURE — 96376 TX/PRO/DX INJ SAME DRUG ADON: CPT

## 2023-01-14 PROCEDURE — 96375 TX/PRO/DX INJ NEW DRUG ADDON: CPT

## 2023-01-14 PROCEDURE — 80053 COMPREHEN METABOLIC PANEL: CPT | Performed by: STUDENT IN AN ORGANIZED HEALTH CARE EDUCATION/TRAINING PROGRAM

## 2023-01-14 PROCEDURE — 85025 COMPLETE CBC W/AUTO DIFF WBC: CPT | Performed by: STUDENT IN AN ORGANIZED HEALTH CARE EDUCATION/TRAINING PROGRAM

## 2023-01-14 PROCEDURE — 96374 THER/PROPH/DIAG INJ IV PUSH: CPT

## 2023-01-14 PROCEDURE — 96361 HYDRATE IV INFUSION ADD-ON: CPT

## 2023-01-14 PROCEDURE — 99285 EMERGENCY DEPT VISIT HI MDM: CPT | Mod: 25

## 2023-01-14 PROCEDURE — 87077 CULTURE AEROBIC IDENTIFY: CPT | Performed by: STUDENT IN AN ORGANIZED HEALTH CARE EDUCATION/TRAINING PROGRAM

## 2023-01-14 RX ORDER — OXYCODONE AND ACETAMINOPHEN 10; 325 MG/1; MG/1
1 TABLET ORAL EVERY 4 HOURS PRN
Status: DISCONTINUED | OUTPATIENT
Start: 2023-01-15 | End: 2023-01-15

## 2023-01-14 RX ADMIN — OXYCODONE AND ACETAMINOPHEN 1 TABLET: 10; 325 TABLET ORAL at 11:01

## 2023-01-15 LAB
APPEARANCE UR: ABNORMAL
BACTERIA #/AREA URNS AUTO: ABNORMAL /HPF
BILIRUB UR QL STRIP.AUTO: NEGATIVE MG/DL
COLOR UR AUTO: ABNORMAL
CRP SERPL HS-MCNC: 34.68 MG/L
ERYTHROCYTE [SEDIMENTATION RATE] IN BLOOD: 86 MM/HR (ref 0–15)
GLUCOSE UR QL STRIP.AUTO: NEGATIVE MG/DL
GRAN CASTS URNS QL MICRO: ABNORMAL /LPF
KETONES UR QL STRIP.AUTO: NEGATIVE MG/DL
LEUKOCYTE ESTERASE UR QL STRIP.AUTO: ABNORMAL UNIT/L
NITRITE UR QL STRIP.AUTO: NEGATIVE
PH UR STRIP.AUTO: 5.5 [PH]
PROT UR QL STRIP.AUTO: NEGATIVE MG/DL
RBC #/AREA URNS AUTO: ABNORMAL /HPF
RBC UR QL AUTO: ABNORMAL UNIT/L
SP GR UR STRIP.AUTO: 1.02 (ref 1–1.03)
SQUAMOUS #/AREA URNS AUTO: ABNORMAL /HPF
UROBILINOGEN UR STRIP-ACNC: 0.2 MG/DL
WBC #/AREA URNS AUTO: ABNORMAL /HPF
YEAST URNS QL MICRO: ABNORMAL /HPF

## 2023-01-15 PROCEDURE — 25000003 PHARM REV CODE 250: Performed by: NURSE PRACTITIONER

## 2023-01-15 PROCEDURE — 86141 C-REACTIVE PROTEIN HS: CPT | Performed by: NURSE PRACTITIONER

## 2023-01-15 PROCEDURE — 63600175 PHARM REV CODE 636 W HCPCS: Performed by: EMERGENCY MEDICINE

## 2023-01-15 PROCEDURE — 25000003 PHARM REV CODE 250: Performed by: STUDENT IN AN ORGANIZED HEALTH CARE EDUCATION/TRAINING PROGRAM

## 2023-01-15 PROCEDURE — 85651 RBC SED RATE NONAUTOMATED: CPT | Performed by: NURSE PRACTITIONER

## 2023-01-15 PROCEDURE — 25500020 PHARM REV CODE 255: Performed by: EMERGENCY MEDICINE

## 2023-01-15 PROCEDURE — 63600175 PHARM REV CODE 636 W HCPCS: Performed by: NURSE PRACTITIONER

## 2023-01-15 PROCEDURE — 21400001 HC TELEMETRY ROOM

## 2023-01-15 PROCEDURE — 11000001 HC ACUTE MED/SURG PRIVATE ROOM

## 2023-01-15 RX ORDER — DOCUSATE SODIUM 100 MG/1
100 CAPSULE, LIQUID FILLED ORAL 2 TIMES DAILY
Status: DISCONTINUED | OUTPATIENT
Start: 2023-01-15 | End: 2023-01-19

## 2023-01-15 RX ORDER — ONDANSETRON 2 MG/ML
4 INJECTION INTRAMUSCULAR; INTRAVENOUS EVERY 8 HOURS PRN
Status: DISCONTINUED | OUTPATIENT
Start: 2023-01-15 | End: 2023-01-15

## 2023-01-15 RX ORDER — DOXYCYCLINE HYCLATE 100 MG
100 TABLET ORAL EVERY 12 HOURS
Status: DISCONTINUED | OUTPATIENT
Start: 2023-01-15 | End: 2023-02-03 | Stop reason: HOSPADM

## 2023-01-15 RX ORDER — DULOXETIN HYDROCHLORIDE 30 MG/1
60 CAPSULE, DELAYED RELEASE ORAL DAILY
Status: DISCONTINUED | OUTPATIENT
Start: 2023-01-15 | End: 2023-01-18

## 2023-01-15 RX ORDER — ONDANSETRON 4 MG/1
4 TABLET, ORALLY DISINTEGRATING ORAL EVERY 8 HOURS PRN
Status: DISCONTINUED | OUTPATIENT
Start: 2023-01-15 | End: 2023-01-23

## 2023-01-15 RX ORDER — OXYBUTYNIN CHLORIDE 5 MG/1
10 TABLET ORAL 2 TIMES DAILY
Status: DISCONTINUED | OUTPATIENT
Start: 2023-01-15 | End: 2023-02-03 | Stop reason: HOSPADM

## 2023-01-15 RX ORDER — LANOLIN ALCOHOL/MO/W.PET/CERES
1 CREAM (GRAM) TOPICAL
Status: DISCONTINUED | OUTPATIENT
Start: 2023-01-15 | End: 2023-02-03 | Stop reason: HOSPADM

## 2023-01-15 RX ORDER — DOXYCYCLINE 100 MG/1
100 CAPSULE ORAL 2 TIMES DAILY
Status: ON HOLD | COMMUNITY
End: 2023-02-03 | Stop reason: SDUPTHER

## 2023-01-15 RX ORDER — HYDROXYZINE HYDROCHLORIDE 10 MG/1
25 TABLET, FILM COATED ORAL 3 TIMES DAILY
Status: ON HOLD | COMMUNITY
End: 2023-02-03 | Stop reason: SDUPTHER

## 2023-01-15 RX ORDER — GABAPENTIN 300 MG/1
600 CAPSULE ORAL 3 TIMES DAILY
Status: DISCONTINUED | OUTPATIENT
Start: 2023-01-15 | End: 2023-02-03 | Stop reason: HOSPADM

## 2023-01-15 RX ORDER — METOPROLOL SUCCINATE 25 MG/1
25 TABLET, EXTENDED RELEASE ORAL DAILY
Status: DISCONTINUED | OUTPATIENT
Start: 2023-01-15 | End: 2023-02-03 | Stop reason: HOSPADM

## 2023-01-15 RX ORDER — ACETAMINOPHEN 325 MG/1
650 TABLET ORAL EVERY 4 HOURS PRN
Status: DISCONTINUED | OUTPATIENT
Start: 2023-01-15 | End: 2023-02-02

## 2023-01-15 RX ORDER — BACLOFEN 10 MG/1
20 TABLET ORAL 3 TIMES DAILY
Status: DISCONTINUED | OUTPATIENT
Start: 2023-01-15 | End: 2023-02-03 | Stop reason: HOSPADM

## 2023-01-15 RX ORDER — MORPHINE SULFATE 4 MG/ML
4 INJECTION, SOLUTION INTRAMUSCULAR; INTRAVENOUS
Status: COMPLETED | OUTPATIENT
Start: 2023-01-15 | End: 2023-01-15

## 2023-01-15 RX ORDER — METHOCARBAMOL 500 MG/1
500 TABLET, FILM COATED ORAL 3 TIMES DAILY
Status: DISCONTINUED | OUTPATIENT
Start: 2023-01-15 | End: 2023-01-23

## 2023-01-15 RX ORDER — OXYCODONE HYDROCHLORIDE 5 MG/1
10 TABLET ORAL 4 TIMES DAILY PRN
Status: DISCONTINUED | OUTPATIENT
Start: 2023-01-15 | End: 2023-01-18

## 2023-01-15 RX ORDER — AMLODIPINE BESYLATE 5 MG/1
5 TABLET ORAL DAILY
Status: DISCONTINUED | OUTPATIENT
Start: 2023-01-15 | End: 2023-02-03 | Stop reason: HOSPADM

## 2023-01-15 RX ORDER — ONDANSETRON 2 MG/ML
4 INJECTION INTRAMUSCULAR; INTRAVENOUS
Status: COMPLETED | OUTPATIENT
Start: 2023-01-15 | End: 2023-01-15

## 2023-01-15 RX ORDER — ACETAMINOPHEN 325 MG/1
650 TABLET ORAL EVERY 8 HOURS PRN
Status: DISCONTINUED | OUTPATIENT
Start: 2023-01-15 | End: 2023-01-16

## 2023-01-15 RX ADMIN — ONDANSETRON 4 MG: 2 INJECTION INTRAMUSCULAR; INTRAVENOUS at 02:01

## 2023-01-15 RX ADMIN — OXYCODONE AND ACETAMINOPHEN 1 TABLET: 10; 325 TABLET ORAL at 10:01

## 2023-01-15 RX ADMIN — SODIUM CHLORIDE 1000 ML: 9 INJECTION, SOLUTION INTRAVENOUS at 10:01

## 2023-01-15 RX ADMIN — DOCUSATE SODIUM 100 MG: 100 CAPSULE, LIQUID FILLED ORAL at 08:01

## 2023-01-15 RX ADMIN — GABAPENTIN 600 MG: 300 CAPSULE ORAL at 03:01

## 2023-01-15 RX ADMIN — OXYBUTYNIN CHLORIDE 10 MG: 5 TABLET ORAL at 08:01

## 2023-01-15 RX ADMIN — METOPROLOL SUCCINATE 25 MG: 25 TABLET, EXTENDED RELEASE ORAL at 02:01

## 2023-01-15 RX ADMIN — DULOXETINE 60 MG: 30 CAPSULE, DELAYED RELEASE ORAL at 02:01

## 2023-01-15 RX ADMIN — OXYCODONE 10 MG: 5 TABLET ORAL at 02:01

## 2023-01-15 RX ADMIN — DOXYCYCLINE HYCLATE 100 MG: 100 TABLET, COATED ORAL at 08:01

## 2023-01-15 RX ADMIN — BACLOFEN 20 MG: 10 TABLET ORAL at 08:01

## 2023-01-15 RX ADMIN — ONDANSETRON 4 MG: 2 INJECTION INTRAMUSCULAR; INTRAVENOUS at 10:01

## 2023-01-15 RX ADMIN — APIXABAN 5 MG: 5 TABLET, FILM COATED ORAL at 08:01

## 2023-01-15 RX ADMIN — METHOCARBAMOL 500 MG: 500 TABLET ORAL at 03:01

## 2023-01-15 RX ADMIN — BACLOFEN 20 MG: 10 TABLET ORAL at 03:01

## 2023-01-15 RX ADMIN — METHOCARBAMOL 500 MG: 500 TABLET ORAL at 08:01

## 2023-01-15 RX ADMIN — OXYCODONE 10 MG: 5 TABLET ORAL at 08:01

## 2023-01-15 RX ADMIN — MORPHINE SULFATE 4 MG: 4 INJECTION INTRAVENOUS at 02:01

## 2023-01-15 RX ADMIN — AMLODIPINE BESYLATE 5 MG: 5 TABLET ORAL at 02:01

## 2023-01-15 RX ADMIN — FERROUS SULFATE TAB 325 MG (65 MG ELEMENTAL FE) 1 EACH: 325 (65 FE) TAB at 02:01

## 2023-01-15 RX ADMIN — GABAPENTIN 600 MG: 300 CAPSULE ORAL at 08:01

## 2023-01-15 RX ADMIN — SODIUM CHLORIDE 1000 ML: 9 INJECTION, SOLUTION INTRAVENOUS at 01:01

## 2023-01-15 RX ADMIN — IOPAMIDOL 100 ML: 755 INJECTION, SOLUTION INTRAVENOUS at 01:01
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
## 2023-01-15 NOTE — NURSING
Nurses Note -- 4 Eyes      1/15/2023   5:42 PM      Skin assessed during: Admit      [] No Pressure Injuries Present    [x]Prevention Measures Documented      [x] Yes- Altered Skin Integrity Present or Discovered   [] LDA Added if Not in Epic (Describe Wound)   [x] New Altered Skin Integrity was Present on Admit and Documented in LDA   [x] Wound Image Taken    Wound Care Consulted? Yes    Attending Nurse:  Deloris Ariza LPN     Second RN/Staff Member:  Yohan Castellanos CNA
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
## 2023-01-16 LAB
ALBUMIN SERPL-MCNC: 3.1 G/DL (ref 3.5–5)
ALBUMIN/GLOB SERPL: 0.8 RATIO (ref 1.1–2)
ALP SERPL-CCNC: 92 UNIT/L (ref 40–150)
ALT SERPL-CCNC: 25 UNIT/L (ref 0–55)
AST SERPL-CCNC: 10 UNIT/L (ref 5–34)
BASOPHILS # BLD AUTO: 0.12 X10(3)/MCL (ref 0–0.2)
BASOPHILS NFR BLD AUTO: 1.2 %
BILIRUBIN DIRECT+TOT PNL SERPL-MCNC: 0.3 MG/DL
BUN SERPL-MCNC: 40.2 MG/DL (ref 8.9–20.6)
CALCIUM SERPL-MCNC: 8.9 MG/DL (ref 8.4–10.2)
CHLORIDE SERPL-SCNC: 108 MMOL/L (ref 98–107)
CO2 SERPL-SCNC: 21 MMOL/L (ref 22–29)
CREAT SERPL-MCNC: 1.6 MG/DL (ref 0.73–1.18)
EOSINOPHIL # BLD AUTO: 0.25 X10(3)/MCL (ref 0–0.9)
EOSINOPHIL NFR BLD AUTO: 2.6 %
ERYTHROCYTE [DISTWIDTH] IN BLOOD BY AUTOMATED COUNT: 14.9 % (ref 11.5–17)
GFR SERPLBLD CREATININE-BSD FMLA CKD-EPI: 53 MLS/MIN/1.73/M2
GLOBULIN SER-MCNC: 3.7 GM/DL (ref 2.4–3.5)
GLUCOSE SERPL-MCNC: 83 MG/DL (ref 74–100)
HCT VFR BLD AUTO: 36.8 % (ref 42–52)
HGB BLD-MCNC: 11.1 GM/DL (ref 14–18)
IMM GRANULOCYTES # BLD AUTO: 0.04 X10(3)/MCL (ref 0–0.04)
IMM GRANULOCYTES NFR BLD AUTO: 0.4 %
LYMPHOCYTES # BLD AUTO: 3.53 X10(3)/MCL (ref 0.6–4.6)
LYMPHOCYTES NFR BLD AUTO: 36.1 %
MCH RBC QN AUTO: 24.8 PG
MCHC RBC AUTO-ENTMCNC: 30.2 MG/DL (ref 33–36)
MCV RBC AUTO: 82.1 FL (ref 80–94)
MONOCYTES # BLD AUTO: 0.9 X10(3)/MCL (ref 0.1–1.3)
MONOCYTES NFR BLD AUTO: 9.2 %
NEUTROPHILS # BLD AUTO: 4.94 X10(3)/MCL (ref 2.1–9.2)
NEUTROPHILS NFR BLD AUTO: 50.5 %
NRBC BLD AUTO-RTO: 0 %
PLATELET # BLD AUTO: 264 X10(3)/MCL (ref 130–400)
PMV BLD AUTO: 11.1 FL (ref 7.4–10.4)
POTASSIUM SERPL-SCNC: 5 MMOL/L (ref 3.5–5.1)
PROT SERPL-MCNC: 6.8 GM/DL (ref 6.4–8.3)
RBC # BLD AUTO: 4.48 X10(6)/MCL (ref 4.7–6.1)
SODIUM SERPL-SCNC: 139 MMOL/L (ref 136–145)
WBC # SPEC AUTO: 9.8 X10(3)/MCL (ref 4.5–11.5)

## 2023-01-16 PROCEDURE — 85025 COMPLETE CBC W/AUTO DIFF WBC: CPT | Performed by: NURSE PRACTITIONER

## 2023-01-16 PROCEDURE — 36415 COLL VENOUS BLD VENIPUNCTURE: CPT | Performed by: NURSE PRACTITIONER

## 2023-01-16 PROCEDURE — 80053 COMPREHEN METABOLIC PANEL: CPT | Performed by: NURSE PRACTITIONER

## 2023-01-16 PROCEDURE — 97162 PT EVAL MOD COMPLEX 30 MIN: CPT

## 2023-01-16 PROCEDURE — 25000003 PHARM REV CODE 250: Performed by: NURSE PRACTITIONER

## 2023-01-16 PROCEDURE — 11000001 HC ACUTE MED/SURG PRIVATE ROOM

## 2023-01-16 PROCEDURE — 25000003 PHARM REV CODE 250: Performed by: STUDENT IN AN ORGANIZED HEALTH CARE EDUCATION/TRAINING PROGRAM

## 2023-01-16 PROCEDURE — 21400001 HC TELEMETRY ROOM

## 2023-01-16 RX ADMIN — METHOCARBAMOL 500 MG: 500 TABLET ORAL at 02:01

## 2023-01-16 RX ADMIN — METHOCARBAMOL 500 MG: 500 TABLET ORAL at 09:01

## 2023-01-16 RX ADMIN — DULOXETINE 60 MG: 30 CAPSULE, DELAYED RELEASE ORAL at 09:01

## 2023-01-16 RX ADMIN — BACLOFEN 20 MG: 10 TABLET ORAL at 09:01

## 2023-01-16 RX ADMIN — DOXYCYCLINE HYCLATE 100 MG: 100 TABLET, COATED ORAL at 09:01

## 2023-01-16 RX ADMIN — APIXABAN 5 MG: 5 TABLET, FILM COATED ORAL at 09:01

## 2023-01-16 RX ADMIN — GABAPENTIN 600 MG: 300 CAPSULE ORAL at 09:01

## 2023-01-16 RX ADMIN — OXYCODONE 10 MG: 5 TABLET ORAL at 04:01

## 2023-01-16 RX ADMIN — BACLOFEN 20 MG: 10 TABLET ORAL at 02:01

## 2023-01-16 RX ADMIN — OXYCODONE 10 MG: 5 TABLET ORAL at 02:01

## 2023-01-16 RX ADMIN — DOCUSATE SODIUM 100 MG: 100 CAPSULE, LIQUID FILLED ORAL at 09:01

## 2023-01-16 RX ADMIN — OXYBUTYNIN CHLORIDE 10 MG: 5 TABLET ORAL at 09:01

## 2023-01-16 RX ADMIN — OXYCODONE 10 MG: 5 TABLET ORAL at 09:01

## 2023-01-16 RX ADMIN — ONDANSETRON 4 MG: 4 TABLET, ORALLY DISINTEGRATING ORAL at 04:01

## 2023-01-16 RX ADMIN — OXYCODONE 10 MG: 5 TABLET ORAL at 10:01

## 2023-01-16 RX ADMIN — GABAPENTIN 600 MG: 300 CAPSULE ORAL at 02:01

## 2023-01-16 NOTE — PLAN OF CARE
Problem: Infection  Goal: Absence of Infection Signs and Symptoms  1/16/2023 0542 by Ashley Burk RN  Outcome: Ongoing, Progressing  1/16/2023 0529 by Ashley Burk RN  Outcome: Ongoing, Progressing     Problem: Adult Inpatient Plan of Care  Goal: Plan of Care Review  1/16/2023 0542 by Ashley Burk RN  Outcome: Ongoing, Progressing  1/16/2023 0529 by Ashley Burk RN  Outcome: Ongoing, Progressing  Goal: Patient-Specific Goal (Individualized)  1/16/2023 0542 by Ashley Burk RN  Outcome: Ongoing, Progressing  1/16/2023 0529 by Ashley Burk RN  Outcome: Ongoing, Progressing  Goal: Absence of Hospital-Acquired Illness or Injury  1/16/2023 0542 by Ashley Burk RN  Outcome: Ongoing, Progressing  1/16/2023 0529 by Ashley Burk RN  Outcome: Ongoing, Progressing  Goal: Optimal Comfort and Wellbeing  1/16/2023 0542 by Ashley Burk RN  Outcome: Ongoing, Progressing  1/16/2023 0529 by Ashley Burk RN  Outcome: Ongoing, Progressing

## 2023-01-16 NOTE — PLAN OF CARE
Problem: Physical Therapy  Goal: Physical Therapy Goal  Description: Goals to be met by: 2023     Patient will increase functional independence with mobility by performin. Supine to sit with Stand-by Assistance  2. Sit to supine with Stand-by Assistance  3. Rolling to Left and Right with Stand-by Assistance.  4. Sit to stand transfer with Maximum Assistance  5. Sitting at edge of bed x10 minutes with [place]    Outcome: Ongoing, Progressing

## 2023-01-17 LAB
ALBUMIN SERPL-MCNC: 3.2 G/DL (ref 3.5–5)
ALBUMIN/GLOB SERPL: 1 RATIO (ref 1.1–2)
ALP SERPL-CCNC: 92 UNIT/L (ref 40–150)
ALT SERPL-CCNC: 17 UNIT/L (ref 0–55)
AST SERPL-CCNC: 10 UNIT/L (ref 5–34)
BASOPHILS # BLD AUTO: 0.12 X10(3)/MCL (ref 0–0.2)
BASOPHILS NFR BLD AUTO: 1.1 %
BILIRUBIN DIRECT+TOT PNL SERPL-MCNC: 0.5 MG/DL
BUN SERPL-MCNC: 48.8 MG/DL (ref 8.9–20.6)
CALCIUM SERPL-MCNC: 9.1 MG/DL (ref 8.4–10.2)
CHLORIDE SERPL-SCNC: 104 MMOL/L (ref 98–107)
CO2 SERPL-SCNC: 23 MMOL/L (ref 22–29)
CREAT SERPL-MCNC: 1.74 MG/DL (ref 0.73–1.18)
EOSINOPHIL # BLD AUTO: 0.28 X10(3)/MCL (ref 0–0.9)
EOSINOPHIL NFR BLD AUTO: 2.5 %
ERYTHROCYTE [DISTWIDTH] IN BLOOD BY AUTOMATED COUNT: 14.9 % (ref 11.5–17)
GFR SERPLBLD CREATININE-BSD FMLA CKD-EPI: 48 MLS/MIN/1.73/M2
GLOBULIN SER-MCNC: 3.3 GM/DL (ref 2.4–3.5)
GLUCOSE SERPL-MCNC: 88 MG/DL (ref 74–100)
HCT VFR BLD AUTO: 36.5 % (ref 42–52)
HGB BLD-MCNC: 11.2 GM/DL (ref 14–18)
IMM GRANULOCYTES # BLD AUTO: 0.05 X10(3)/MCL (ref 0–0.04)
IMM GRANULOCYTES NFR BLD AUTO: 0.5 %
LYMPHOCYTES # BLD AUTO: 3.94 X10(3)/MCL (ref 0.6–4.6)
LYMPHOCYTES NFR BLD AUTO: 35.9 %
MCH RBC QN AUTO: 24.7 PG
MCHC RBC AUTO-ENTMCNC: 30.7 MG/DL (ref 33–36)
MCV RBC AUTO: 80.6 FL (ref 80–94)
MONOCYTES # BLD AUTO: 0.99 X10(3)/MCL (ref 0.1–1.3)
MONOCYTES NFR BLD AUTO: 9 %
NEUTROPHILS # BLD AUTO: 5.61 X10(3)/MCL (ref 2.1–9.2)
NEUTROPHILS NFR BLD AUTO: 51 %
NRBC BLD AUTO-RTO: 0 %
PLATELET # BLD AUTO: 243 X10(3)/MCL (ref 130–400)
PMV BLD AUTO: 9.9 FL (ref 7.4–10.4)
POTASSIUM SERPL-SCNC: 4.8 MMOL/L (ref 3.5–5.1)
PROT SERPL-MCNC: 6.5 GM/DL (ref 6.4–8.3)
RBC # BLD AUTO: 4.53 X10(6)/MCL (ref 4.7–6.1)
SODIUM SERPL-SCNC: 137 MMOL/L (ref 136–145)
WBC # SPEC AUTO: 11 X10(3)/MCL (ref 4.5–11.5)

## 2023-01-17 PROCEDURE — 85025 COMPLETE CBC W/AUTO DIFF WBC: CPT | Performed by: STUDENT IN AN ORGANIZED HEALTH CARE EDUCATION/TRAINING PROGRAM

## 2023-01-17 PROCEDURE — 99223 1ST HOSP IP/OBS HIGH 75: CPT | Mod: NSCH,,, | Performed by: EMERGENCY MEDICINE

## 2023-01-17 PROCEDURE — 25000003 PHARM REV CODE 250: Performed by: NURSE PRACTITIONER

## 2023-01-17 PROCEDURE — 21400001 HC TELEMETRY ROOM

## 2023-01-17 PROCEDURE — 99223 PR INITIAL HOSPITAL CARE,LEVL III: ICD-10-PCS | Mod: NSCH,,, | Performed by: EMERGENCY MEDICINE

## 2023-01-17 PROCEDURE — 25000003 PHARM REV CODE 250: Performed by: STUDENT IN AN ORGANIZED HEALTH CARE EDUCATION/TRAINING PROGRAM

## 2023-01-17 PROCEDURE — 97165 OT EVAL LOW COMPLEX 30 MIN: CPT

## 2023-01-17 PROCEDURE — 36415 COLL VENOUS BLD VENIPUNCTURE: CPT | Performed by: STUDENT IN AN ORGANIZED HEALTH CARE EDUCATION/TRAINING PROGRAM

## 2023-01-17 PROCEDURE — 80053 COMPREHEN METABOLIC PANEL: CPT | Performed by: STUDENT IN AN ORGANIZED HEALTH CARE EDUCATION/TRAINING PROGRAM

## 2023-01-17 RX ORDER — DOXEPIN HYDROCHLORIDE 10 MG/1
10 CAPSULE ORAL NIGHTLY
Status: DISCONTINUED | OUTPATIENT
Start: 2023-01-17 | End: 2023-01-20

## 2023-01-17 RX ADMIN — DOCUSATE SODIUM 100 MG: 100 CAPSULE, LIQUID FILLED ORAL at 10:01

## 2023-01-17 RX ADMIN — GABAPENTIN 600 MG: 300 CAPSULE ORAL at 02:01

## 2023-01-17 RX ADMIN — DOXYCYCLINE HYCLATE 100 MG: 100 TABLET, COATED ORAL at 10:01

## 2023-01-17 RX ADMIN — METHOCARBAMOL 500 MG: 500 TABLET ORAL at 10:01

## 2023-01-17 RX ADMIN — BACLOFEN 20 MG: 10 TABLET ORAL at 02:01

## 2023-01-17 RX ADMIN — BACLOFEN 20 MG: 10 TABLET ORAL at 09:01

## 2023-01-17 RX ADMIN — OXYCODONE 10 MG: 5 TABLET ORAL at 09:01

## 2023-01-17 RX ADMIN — BACLOFEN 20 MG: 10 TABLET ORAL at 10:01

## 2023-01-17 RX ADMIN — OXYCODONE 10 MG: 5 TABLET ORAL at 07:01

## 2023-01-17 RX ADMIN — APIXABAN 5 MG: 5 TABLET, FILM COATED ORAL at 10:01

## 2023-01-17 RX ADMIN — DOCUSATE SODIUM 100 MG: 100 CAPSULE, LIQUID FILLED ORAL at 09:01

## 2023-01-17 RX ADMIN — DOXYCYCLINE HYCLATE 100 MG: 100 TABLET, COATED ORAL at 09:01

## 2023-01-17 RX ADMIN — DULOXETINE 60 MG: 30 CAPSULE, DELAYED RELEASE ORAL at 09:01

## 2023-01-17 RX ADMIN — GABAPENTIN 600 MG: 300 CAPSULE ORAL at 09:01

## 2023-01-17 RX ADMIN — ONDANSETRON 4 MG: 4 TABLET, ORALLY DISINTEGRATING ORAL at 09:01

## 2023-01-17 RX ADMIN — METHOCARBAMOL 500 MG: 500 TABLET ORAL at 02:01

## 2023-01-17 RX ADMIN — FERROUS SULFATE TAB 325 MG (65 MG ELEMENTAL FE) 1 EACH: 325 (65 FE) TAB at 01:01

## 2023-01-17 RX ADMIN — OXYBUTYNIN CHLORIDE 10 MG: 5 TABLET ORAL at 11:01

## 2023-01-17 RX ADMIN — OXYBUTYNIN CHLORIDE 10 MG: 5 TABLET ORAL at 09:01

## 2023-01-17 RX ADMIN — DOXEPIN HYDROCHLORIDE 10 MG: 10 CAPSULE ORAL at 10:01

## 2023-01-17 RX ADMIN — APIXABAN 5 MG: 5 TABLET, FILM COATED ORAL at 09:01

## 2023-01-17 RX ADMIN — OXYCODONE 10 MG: 5 TABLET ORAL at 01:01

## 2023-01-17 RX ADMIN — GABAPENTIN 600 MG: 300 CAPSULE ORAL at 10:01

## 2023-01-17 RX ADMIN — OXYCODONE 10 MG: 5 TABLET ORAL at 11:01

## 2023-01-17 RX ADMIN — METHOCARBAMOL 500 MG: 500 TABLET ORAL at 09:01
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
## 2023-01-17 NOTE — PROGRESS NOTES
[facility]  Wound Care    Patient Name:  Hemal Guerrero   MRN:  [id number]  Date: 1/17/2023  Diagnosis: <principal problem not specified>    History:     Past Medical History:   Diagnosis Date    Paraplegia        Social History     Socioeconomic History    Marital status:    Tobacco Use    Smoking status: Never    Smokeless tobacco: Never   Substance and Sexual Activity    Alcohol use: Not Currently    Drug use: Never       Precautions:     Allergies as of 01/14/2023 - Reviewed 01/14/2023   Allergen Reaction Noted    Amitriptyline  11/16/2022       [facility] Assessment Details/Treatment        01/17/23 1249        Altered Skin Integrity 01/14/23 2301  medial Coccyx #2 Full thickness tissue loss. Subcutaneous fat may be visible but bone, tendon or muscle are not exposed   Date First Assessed/Time First Assessed: 01/14/23 2301   Altered Skin Integrity Present on Admission: yes  Side: (c)   Orientation: medial  Location: Coccyx  Wound Number: #2  Is this injury device related?: No  Description of Altered Skin Integrity: ...   Wound Image    Dressing Appearance Dry;Intact;Clean   Drainage Amount None   Appearance Intact;Dry;Closed/resurfaced   Periwound Area Scar tissue;Dry   Care Cleansed with:;Sterile normal saline   Dressing Applied;Foam        Altered Skin Integrity 11/16/22 Left Buttocks #1 Ulceration   Date First Assessed: 11/16/22   Altered Skin Integrity Present on Admission: yes  Side: Left  Location: Buttocks  Wound Number: #1  Primary Wound Type: Ulceration   Wound Image    Description of Altered Skin Integrity Full thickness tissue loss with exposed bone, tendon, or muscle. Often includes undermining and tunneling. May extend into muscle and/or supporting structures.   Dressing Appearance Dry;Intact;Clean   Drainage Amount Moderate   Drainage Characteristics/Odor Serosanguineous;Green   Appearance Red;Yellow   Tissue loss description Full thickness   Red (%), Wound Tissue  Color 50 %   Yellow (%), Wound Tissue Color 50 %   Periwound Area Scar tissue   Wound Edges Defined   Wound Length (cm) 3.5 cm   Wound Width (cm) 3 cm   Wound Depth (cm) 1 cm   Wound Volume (cm^3) 10.5 cm^3   Wound Surface Area (cm^2) 10.5 cm^2   Undermining (depth (cm)/location) 2cm from 11:00 to 2:00   Care Cleansed with:;Wound cleanser   Dressing Applied;Gauze, wet to dry;Absorptive Pad     WOCN consulted for sacrum. No family at bedside. Educated patient on the importance of turning every 2 hours. He voiced understanding. Treatment recommendations put into place. Left Buttocks: Cleanse with Dakins. Pack with Dakins moistened 4 x 4, cover with abd pad, secure with medipore tape. Change BID and PRN, Coccyx: Cover with abd pad and secure with medipore tape for protection. Keep areas clean and dry, no adult briefs while in bed. Nursing to continue with turning every two hours, wedge and floating heels.  Head of bed elevated, bed in lowest position, and call bell within reach. MONTY mattress ordered with [employer] rep. Will follow up.    01/17/2023
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
## 2023-01-17 NOTE — PT/OT/SLP PROGRESS
Attempted PT tx, however pt is unwilling to mobilize this AM. Assisted pt with emailing his national seating rep to inquire about his new w/c. Changing PT freq to 3x/week due to pt's wound and inability to perform slide board transfer on wound.
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
## 2023-01-17 NOTE — SUBJECTIVE & OBJECTIVE
Scheduled Meds:   amLODIPine  5 mg Oral Daily    apixaban  5 mg Oral BID    baclofen  20 mg Oral TID    docusate sodium  100 mg Oral BID    doxycycline  100 mg Oral Q12H    DULoxetine  60 mg Oral Daily    ferrous sulfate  1 tablet Oral Q48H    gabapentin  600 mg Oral TID    methocarbamoL  500 mg Oral TID    metoprolol succinate  25 mg Oral Daily    oxybutynin  10 mg Oral BID     Continuous Infusions:  PRN Meds:acetaminophen, ondansetron, oxyCODONE    Review of patient's allergies indicates:   Allergen Reactions    Amitriptyline         Past Medical History:   Diagnosis Date    Paraplegia      Past Surgical History:   Procedure Laterality Date    INSERTION OF SUPRAPUBIC CATHETER         Family History    None       Tobacco Use    Smoking status: Never    Smokeless tobacco: Never   Substance and Sexual Activity    Alcohol use: Not Currently    Drug use: Never    Sexual activity: Not on file     Review of Systems   Constitutional:  Negative for chills and fatigue.   Respiratory:  Negative for chest tightness and shortness of breath.    Cardiovascular:  Negative for chest pain, palpitations and leg swelling.   Gastrointestinal:  Positive for abdominal pain.   Genitourinary:         Suprapubic catheter   Musculoskeletal:  Positive for back pain.   Skin:  Wound: see hpi.   Neurological:  Weakness: paraplegic.     Objective:     Vital Signs (Most Recent):  Temp: 98.4 °F (36.9 °C) (01/17/23 1539)  Pulse: (!) 114 (01/17/23 1544)  Resp: 20 (01/17/23 1539)  BP: 110/74 (01/17/23 1544)  SpO2: 99 % (01/17/23 1539)   Vital Signs (24h Range):  Temp:  [97.5 °F (36.4 °C)-98.4 °F (36.9 °C)] 98.4 °F (36.9 °C)  Pulse:  [] 114  Resp:  [18-20] 20  SpO2:  [95 %-99 %] 99 %  BP: ()/(56-85) 110/74     Weight: 72.9 kg (160 lb 11.5 oz)  Body mass index is 21.8 kg/m².  Physical Exam  Vitals reviewed.   HENT:      Head: Normocephalic and atraumatic.      Mouth/Throat:      Pharynx: Oropharynx is clear.   Eyes:      Conjunctiva/sclera:  Conjunctivae normal.   Cardiovascular:      Rate and Rhythm: Tachycardia present.   Pulmonary:      Effort: Pulmonary effort is normal.   Abdominal:      General: Abdomen is flat.   Genitourinary:     Comments: Suprapubic catheter noted ; he is picking around the entrance of the tube making sure he does not see anything; I assured him no wound noted In that area  Musculoskeletal:        Legs:    Feet:      Comments: Bilateral lower extremity Podus boots in place  Neurological:      Mental Status: He is alert.      Sensory: Sensory deficit: paraplegia.     Left lower buttock      SACRUM      Laboratory:  CBC:   Recent Labs   Lab 01/17/23  0405   WBC 11.0   RBC 4.53*   HGB 11.2*   HCT 36.5*      MCV 80.6   MCH 24.7   MCHC 30.7*     CMP:   Recent Labs   Lab 01/17/23  0405   CALCIUM 9.1   ALBUMIN 3.2*      K 4.8   CO2 23   BUN 48.8*   CREATININE 1.74*   ALKPHOS 92   ALT 17   AST 10   BILITOT 0.5     Prealbumin: No results for input(s): PREALBUMIN in the last 48 hours.  Wound Cultures: No results for input(s): LABAERO in the last 4320 hours.  Microbiology Results (last 7 days)       Procedure Component Value Units Date/Time    Urine culture [[id number]]  (Abnormal) Collected: 01/14/23 2333    Order Status: Completed Specimen: Urine Updated: 01/17/23 0959     Urine Culture >/= 100,000 colonies/ml Candida tropicalis      >/= 100,000 colonies/ml GAMMA STREPTOCOCCUS          Specimen (24h ago, onward)      None          Recent Labs   Lab 01/14/23  2333   PROTEINUA Negative   BACTERIA None Seen   LEUKOCYTESUR 2+*   UROBILINOGEN 0.2       Results for orders placed or performed during the hospital encounter of 01/14/23 (from the past 2160 hour(s))   CT Abdomen Pelvis With Contrast    Impression    Impression:    1. No acute intraabdominal or pelvic solid organ or bowel pathology identified. Details and other findings as discussed above.    There is general concurrence with the preliminary report.  Of note is a  nonobstructing punctate medullary calculus in the lower pole of the left kidney which was not described in the preliminary report.      Electronically signed by: Romy Mcdonald  Date:    01/15/2023  Time:    08:10   CT Head Without Contrast    Impression    No acute intracranial findings.      Electronically signed by: Man Naylor  Date:    01/16/2023  Time:    08:39   Results for orders placed or performed during the hospital encounter of 11/16/22 (from the past 2160 hour(s))   CT Maxillofacial With Contrast    Impression    No abnormality seen of CT scan of the maxillofacial region with contrast      Electronically signed by: Romy Mcdonald  Date:    12/05/2022  Time:    18:18

## 2023-01-17 NOTE — PLAN OF CARE
Problem: Infection  Goal: Absence of Infection Signs and Symptoms  Outcome: Ongoing, Progressing     Problem: Adult Inpatient Plan of Care  Goal: Plan of Care Review  Outcome: Ongoing, Progressing  Goal: Patient-Specific Goal (Individualized)  Outcome: Ongoing, Progressing  Goal: Absence of Hospital-Acquired Illness or Injury  Outcome: Ongoing, Progressing  Goal: Optimal Comfort and Wellbeing  Outcome: Ongoing, Progressing  Goal: Readiness for Transition of Care  Outcome: Ongoing, Progressing     Problem: Impaired Wound Healing  Goal: Optimal Wound Healing  Outcome: Ongoing, Progressing     Problem: Skin Injury Risk Increased  Goal: Skin Health and Integrity  Outcome: Ongoing, Progressing

## 2023-01-17 NOTE — PLAN OF CARE
Problem: Occupational Therapy  Goal: Occupational Therapy Goal  Description: Goals to be met by: 2/17/23     Patient will increase functional independence with ADLs by performing:    LE Dressing with Minimal Assistance.  Grooming while seated at sink with Minimal Assistance.    Outcome: Ongoing, Progressing

## 2023-01-18 LAB
BACTERIA UR CULT: ABNORMAL
BACTERIA UR CULT: ABNORMAL

## 2023-01-18 PROCEDURE — 25000003 PHARM REV CODE 250: Performed by: NURSE PRACTITIONER

## 2023-01-18 PROCEDURE — 21400001 HC TELEMETRY ROOM

## 2023-01-18 PROCEDURE — 25000003 PHARM REV CODE 250: Performed by: STUDENT IN AN ORGANIZED HEALTH CARE EDUCATION/TRAINING PROGRAM

## 2023-01-18 PROCEDURE — G0378 HOSPITAL OBSERVATION PER HR: HCPCS

## 2023-01-18 RX ORDER — SERTRALINE HYDROCHLORIDE 50 MG/1
50 TABLET, FILM COATED ORAL DAILY
Status: DISCONTINUED | OUTPATIENT
Start: 2023-01-25 | End: 2023-02-03 | Stop reason: HOSPADM

## 2023-01-18 RX ORDER — OXYCODONE HYDROCHLORIDE 5 MG/1
10 TABLET ORAL EVERY 4 HOURS PRN
Status: DISCONTINUED | OUTPATIENT
Start: 2023-01-18 | End: 2023-01-29

## 2023-01-18 RX ORDER — DULOXETIN HYDROCHLORIDE 30 MG/1
30 CAPSULE, DELAYED RELEASE ORAL DAILY
Status: COMPLETED | OUTPATIENT
Start: 2023-01-18 | End: 2023-01-31

## 2023-01-18 RX ORDER — SERTRALINE HYDROCHLORIDE 25 MG/1
25 TABLET, FILM COATED ORAL DAILY
Status: DISCONTINUED | OUTPATIENT
Start: 2023-01-18 | End: 2023-01-20

## 2023-01-18 RX ORDER — DULOXETIN HYDROCHLORIDE 30 MG/1
30 CAPSULE, DELAYED RELEASE ORAL EVERY OTHER DAY
Status: DISCONTINUED | OUTPATIENT
Start: 2023-02-01 | End: 2023-02-03 | Stop reason: HOSPADM

## 2023-01-18 RX ADMIN — GABAPENTIN 600 MG: 300 CAPSULE ORAL at 10:01

## 2023-01-18 RX ADMIN — BACLOFEN 20 MG: 10 TABLET ORAL at 10:01

## 2023-01-18 RX ADMIN — DOCUSATE SODIUM 100 MG: 100 CAPSULE, LIQUID FILLED ORAL at 10:01

## 2023-01-18 RX ADMIN — OXYBUTYNIN CHLORIDE 10 MG: 5 TABLET ORAL at 10:01

## 2023-01-18 RX ADMIN — APIXABAN 5 MG: 5 TABLET, FILM COATED ORAL at 10:01

## 2023-01-18 RX ADMIN — OXYCODONE 10 MG: 5 TABLET ORAL at 05:01

## 2023-01-18 RX ADMIN — DOXYCYCLINE HYCLATE 100 MG: 100 TABLET, COATED ORAL at 10:01

## 2023-01-18 RX ADMIN — GABAPENTIN 600 MG: 300 CAPSULE ORAL at 03:01

## 2023-01-18 RX ADMIN — METHOCARBAMOL 500 MG: 500 TABLET ORAL at 10:01

## 2023-01-18 RX ADMIN — OXYCODONE HYDROCHLORIDE 10 MG: 5 TABLET ORAL at 07:01

## 2023-01-18 RX ADMIN — OXYCODONE 10 MG: 5 TABLET ORAL at 11:01

## 2023-01-18 RX ADMIN — DULOXETINE 30 MG: 30 CAPSULE, DELAYED RELEASE ORAL at 10:01

## 2023-01-18 RX ADMIN — METHOCARBAMOL 500 MG: 500 TABLET ORAL at 03:01
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
## 2023-01-18 NOTE — PLAN OF CARE
Condition Code 44 Complete  Secondary review determination agrees with obs; Team physican agrees; Patient changed to OBS.
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
## 2023-01-18 NOTE — PLAN OF CARE
Reviewed MOON with patient over the phone in room 550. Emailed a copy of CYR to ROSALVA Mujica to deliver to patient.
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
## 2023-01-19 LAB
ALBUMIN SERPL-MCNC: 3.2 G/DL (ref 3.5–5)
ALBUMIN/GLOB SERPL: 1 RATIO (ref 1.1–2)
ALP SERPL-CCNC: 95 UNIT/L (ref 40–150)
ALT SERPL-CCNC: 14 UNIT/L (ref 0–55)
AST SERPL-CCNC: 10 UNIT/L (ref 5–34)
BASOPHILS # BLD AUTO: 0.12 X10(3)/MCL (ref 0–0.2)
BASOPHILS NFR BLD AUTO: 1 %
BILIRUBIN DIRECT+TOT PNL SERPL-MCNC: 0.5 MG/DL
BUN SERPL-MCNC: 41.2 MG/DL (ref 8.9–20.6)
CALCIUM SERPL-MCNC: 8.9 MG/DL (ref 8.4–10.2)
CHLORIDE SERPL-SCNC: 101 MMOL/L (ref 98–107)
CO2 SERPL-SCNC: 28 MMOL/L (ref 22–29)
CREAT SERPL-MCNC: 1.9 MG/DL (ref 0.73–1.18)
EOSINOPHIL # BLD AUTO: 0.28 X10(3)/MCL (ref 0–0.9)
EOSINOPHIL NFR BLD AUTO: 2.4 %
ERYTHROCYTE [DISTWIDTH] IN BLOOD BY AUTOMATED COUNT: 15 % (ref 11.5–17)
GFR SERPLBLD CREATININE-BSD FMLA CKD-EPI: 43 MLS/MIN/1.73/M2
GLOBULIN SER-MCNC: 3.1 GM/DL (ref 2.4–3.5)
GLUCOSE SERPL-MCNC: 73 MG/DL (ref 74–100)
HCT VFR BLD AUTO: 35.5 % (ref 42–52)
HGB BLD-MCNC: 10.8 GM/DL (ref 14–18)
IMM GRANULOCYTES # BLD AUTO: 0.03 X10(3)/MCL (ref 0–0.04)
IMM GRANULOCYTES NFR BLD AUTO: 0.3 %
LYMPHOCYTES # BLD AUTO: 3.95 X10(3)/MCL (ref 0.6–4.6)
LYMPHOCYTES NFR BLD AUTO: 34.5 %
MCH RBC QN AUTO: 24.8 PG
MCHC RBC AUTO-ENTMCNC: 30.4 MG/DL (ref 33–36)
MCV RBC AUTO: 81.6 FL (ref 80–94)
MONOCYTES # BLD AUTO: 1.05 X10(3)/MCL (ref 0.1–1.3)
MONOCYTES NFR BLD AUTO: 9.2 %
NEUTROPHILS # BLD AUTO: 6.02 X10(3)/MCL (ref 2.1–9.2)
NEUTROPHILS NFR BLD AUTO: 52.6 %
NRBC BLD AUTO-RTO: 0 %
PLATELET # BLD AUTO: 261 X10(3)/MCL (ref 130–400)
PMV BLD AUTO: 10.7 FL (ref 7.4–10.4)
POTASSIUM SERPL-SCNC: 4.5 MMOL/L (ref 3.5–5.1)
PROT SERPL-MCNC: 6.3 GM/DL (ref 6.4–8.3)
RBC # BLD AUTO: 4.35 X10(6)/MCL (ref 4.7–6.1)
SODIUM SERPL-SCNC: 138 MMOL/L (ref 136–145)
WBC # SPEC AUTO: 11.5 X10(3)/MCL (ref 4.5–11.5)

## 2023-01-19 PROCEDURE — 25000003 PHARM REV CODE 250: Performed by: NURSE PRACTITIONER

## 2023-01-19 PROCEDURE — 96376 TX/PRO/DX INJ SAME DRUG ADON: CPT

## 2023-01-19 PROCEDURE — 25000003 PHARM REV CODE 250: Performed by: STUDENT IN AN ORGANIZED HEALTH CARE EDUCATION/TRAINING PROGRAM

## 2023-01-19 PROCEDURE — 97535 SELF CARE MNGMENT TRAINING: CPT

## 2023-01-19 PROCEDURE — 25000003 PHARM REV CODE 250: Performed by: INTERNAL MEDICINE

## 2023-01-19 PROCEDURE — 63600175 PHARM REV CODE 636 W HCPCS: Performed by: INTERNAL MEDICINE

## 2023-01-19 PROCEDURE — 80053 COMPREHEN METABOLIC PANEL: CPT | Performed by: STUDENT IN AN ORGANIZED HEALTH CARE EDUCATION/TRAINING PROGRAM

## 2023-01-19 PROCEDURE — 21400001 HC TELEMETRY ROOM

## 2023-01-19 PROCEDURE — 85025 COMPLETE CBC W/AUTO DIFF WBC: CPT | Performed by: STUDENT IN AN ORGANIZED HEALTH CARE EDUCATION/TRAINING PROGRAM

## 2023-01-19 PROCEDURE — 97530 THERAPEUTIC ACTIVITIES: CPT

## 2023-01-19 PROCEDURE — 96361 HYDRATE IV INFUSION ADD-ON: CPT

## 2023-01-19 PROCEDURE — G0378 HOSPITAL OBSERVATION PER HR: HCPCS

## 2023-01-19 PROCEDURE — 36415 COLL VENOUS BLD VENIPUNCTURE: CPT | Performed by: STUDENT IN AN ORGANIZED HEALTH CARE EDUCATION/TRAINING PROGRAM

## 2023-01-19 RX ORDER — MORPHINE SULFATE 4 MG/ML
2 INJECTION, SOLUTION INTRAMUSCULAR; INTRAVENOUS ONCE
Status: COMPLETED | OUTPATIENT
Start: 2023-01-19 | End: 2023-01-19

## 2023-01-19 RX ORDER — ONDANSETRON 4 MG/1
4 TABLET, ORALLY DISINTEGRATING ORAL
Status: DISCONTINUED | OUTPATIENT
Start: 2023-01-19 | End: 2023-01-23

## 2023-01-19 RX ORDER — ACETAMINOPHEN 500 MG
1000 TABLET ORAL 3 TIMES DAILY
Status: DISCONTINUED | OUTPATIENT
Start: 2023-01-19 | End: 2023-02-02

## 2023-01-19 RX ORDER — AMOXICILLIN 250 MG
2 CAPSULE ORAL NIGHTLY
Status: DISCONTINUED | OUTPATIENT
Start: 2023-01-19 | End: 2023-02-03 | Stop reason: HOSPADM

## 2023-01-19 RX ADMIN — METHOCARBAMOL 500 MG: 500 TABLET ORAL at 03:01

## 2023-01-19 RX ADMIN — FERROUS SULFATE TAB 325 MG (65 MG ELEMENTAL FE) 1 EACH: 325 (65 FE) TAB at 12:01

## 2023-01-19 RX ADMIN — DOCUSATE SODIUM 100 MG: 100 CAPSULE, LIQUID FILLED ORAL at 08:01

## 2023-01-19 RX ADMIN — OXYCODONE HYDROCHLORIDE 10 MG: 5 TABLET ORAL at 08:01

## 2023-01-19 RX ADMIN — APIXABAN 5 MG: 5 TABLET, FILM COATED ORAL at 08:01

## 2023-01-19 RX ADMIN — GABAPENTIN 600 MG: 300 CAPSULE ORAL at 08:01

## 2023-01-19 RX ADMIN — OXYCODONE HYDROCHLORIDE 10 MG: 5 TABLET ORAL at 12:01

## 2023-01-19 RX ADMIN — DOXEPIN HYDROCHLORIDE 10 MG: 10 CAPSULE ORAL at 09:01

## 2023-01-19 RX ADMIN — ACETAMINOPHEN 1000 MG: 500 TABLET, FILM COATED ORAL at 03:01

## 2023-01-19 RX ADMIN — AMLODIPINE BESYLATE 5 MG: 5 TABLET ORAL at 08:01

## 2023-01-19 RX ADMIN — SODIUM CHLORIDE, POTASSIUM CHLORIDE, SODIUM LACTATE AND CALCIUM CHLORIDE 1000 ML: 600; 310; 30; 20 INJECTION, SOLUTION INTRAVENOUS at 12:01

## 2023-01-19 RX ADMIN — OXYBUTYNIN CHLORIDE 10 MG: 5 TABLET ORAL at 08:01

## 2023-01-19 RX ADMIN — METHOCARBAMOL 500 MG: 500 TABLET ORAL at 08:01

## 2023-01-19 RX ADMIN — ONDANSETRON 4 MG: 4 TABLET, ORALLY DISINTEGRATING ORAL at 03:01

## 2023-01-19 RX ADMIN — ACETAMINOPHEN 1000 MG: 500 TABLET, FILM COATED ORAL at 08:01

## 2023-01-19 RX ADMIN — METOPROLOL SUCCINATE 25 MG: 25 TABLET, EXTENDED RELEASE ORAL at 08:01

## 2023-01-19 RX ADMIN — DOXYCYCLINE HYCLATE 100 MG: 100 TABLET, COATED ORAL at 08:01

## 2023-01-19 RX ADMIN — OXYCODONE HYDROCHLORIDE 10 MG: 5 TABLET ORAL at 09:01

## 2023-01-19 RX ADMIN — DULOXETINE 30 MG: 30 CAPSULE, DELAYED RELEASE ORAL at 08:01

## 2023-01-19 RX ADMIN — SERTRALINE HYDROCHLORIDE 25 MG: 25 TABLET ORAL at 08:01

## 2023-01-19 RX ADMIN — GABAPENTIN 600 MG: 300 CAPSULE ORAL at 03:01

## 2023-01-19 RX ADMIN — SENNOSIDES AND DOCUSATE SODIUM 2 TABLET: 50; 8.6 TABLET ORAL at 09:01

## 2023-01-19 RX ADMIN — OXYCODONE HYDROCHLORIDE 10 MG: 5 TABLET ORAL at 04:01

## 2023-01-19 RX ADMIN — MORPHINE SULFATE 2 MG: 4 INJECTION INTRAVENOUS at 02:01
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
## 2023-01-19 NOTE — PT/OT/SLP PROGRESS
Physical Therapy Treatment    Patient Name:  Hemal Guerrero   MRN:  [id number]    Recommendations:     Discharge Recommendations: nursing facility, skilled  Discharge Equipment Recommendations: hospital bed, lift device  Barriers to discharge: Decreased caregiver support    Assessment:     Discussed pt's custom chair with national seating rep, and they stated they were requesting pt's home address so if the w/c becomes approved they can deliver it. They stated they cannot deliver the w/c to patient while the patient is in in SNF or NH. Relayed this message to the CM. Attempted to mobilize pt this afternoon, however pt found soiled in BM. Assisted pt in clean up and notified RN to place new dressings on wounds as they were soiled and had to be removed. Will also order wedge for room for better offloading of sore.     Rehab Prognosis: Fair; patient would benefit from acute skilled PT services to address these deficits and reach maximum level of function.    Recent Surgery: * No surgery found *      Plan:     During this hospitalization, patient to be seen 3 x/week to address the identified rehab impairments via therapeutic activities, therapeutic exercises, wheelchair management/training and progress toward the following goals:    Plan of Care Expires:  02/16/23    Subjective     Chief Complaint: none  Patient/Family Comments/goals: to get better  Pain/Comfort:  Pain Rating 1: 0/10      Objective:     Communicated with nurse prior to session.  Patient found supine with vogt catheter upon PT entry to room.     General Precautions: Standard, other (see comments) (geomat/ROHO on wheelchair seat and small pillow to lower back as well as ongoing heel offloading devices in place. christie lift only)  Orthopedic Precautions:    Braces:    Respiratory Status: Room air     Functional Mobility:  Bed Mobility:     Rolling Left:  minimum assistance  Rolling Right: minimum assistance      AM-PAC 6 CLICK MOBILITY  Turning over in bed  (including adjusting bedclothes, sheets and blankets)?: 3  Sitting down on and standing up from a chair with arms (e.g., wheelchair, bedside commode, etc.): 1  Moving from lying on back to sitting on the side of the bed?: 3  Moving to and from a bed to a chair (including a wheelchair)?: 1  Need to walk in hospital room?: 1  Climbing 3-5 steps with a railing?: 1  Basic Mobility Total Score: 10           Patient left supine with all lines intact, call button in reach, and nurse notified..    GOALS:   Multidisciplinary Problems       Physical Therapy Goals          Problem: Physical Therapy    Goal Priority Disciplines Outcome Goal Variances Interventions   Physical Therapy Goal     PT, PT/OT Ongoing, Progressing     Description: Goals to be met by: 2023     Patient will increase functional independence with mobility by performin. Supine to sit with Stand-by Assistance  2. Sit to supine with Stand-by Assistance  3. Rolling to Left and Right with Stand-by Assistance.  4. Sit to stand transfer with Maximum Assistance  5. Sitting at edge of bed x10 minutes with [place]                         Time Tracking:     PT Received On: 23  PT Start Time: 1456     PT Stop Time: 1509  PT Total Time (min): 13 min     Billable Minutes: Therapeutic Activity 13    Treatment Type: Evaluation  PT/PTA: PT     PTA Visit Number: 1     2023
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
## 2023-01-19 NOTE — PSYCH
1/19/2023 1:09 AM   Hemal Guerrero   1975   [id number]       Psychiatry Consult Progress Note     SUBJECTIVE:   Hemal Guerrero is a 47 y.o. male seen for follow-up for questionable hallucination.    Mr. Guerrero is lying in bed sound asleep.  I attempted to wake him several times but he just slept through my attempts.  He had his home meds in a clear bag sitting on his bed.  I informed nurse Jesus of this and advised her that they need to remove his home medications from his reach so that we can be certain that he is not taking any extra medications from home.  Jesus reports that she was advised in report that Mr. Guerrero had a restless night.  She reports that he did not have any aggressive behaviors or agitation.       Current Medications:   Scheduled Meds:    amLODIPine  5 mg Oral Daily    apixaban  5 mg Oral BID    baclofen  20 mg Oral TID    docusate sodium  100 mg Oral BID    doxepin  10 mg Oral QHS    doxycycline  100 mg Oral Q12H    DULoxetine  30 mg Oral Daily    [START ON 2/1/2023] DULoxetine  30 mg Oral Every other day    ferrous sulfate  1 tablet Oral Q48H    gabapentin  600 mg Oral TID    methocarbamoL  500 mg Oral TID    metoprolol succinate  25 mg Oral Daily    oxybutynin  10 mg Oral BID    sertraline  25 mg Oral Daily    [START ON 1/25/2023] sertraline  50 mg Oral Daily      PRN Meds: acetaminophen, ondansetron, oxyCODONE   Psychotherapeutics (From admission, onward)      Start     Stop Route Frequency Ordered    02/01/23 0900  DULoxetine DR capsule 30 mg         02/09 0859 Oral Every other day 01/18/23 0348    01/25/23 0900  sertraline tablet 50 mg         -- Oral Daily 01/18/23 0348    01/18/23 0900  sertraline tablet 25 mg         01/25 0859 Oral Daily 01/18/23 0348    01/18/23 0900  DULoxetine DR capsule 30 mg         02/01 0859 Oral Daily 01/18/23 0348    01/17/23 2100  doxepin capsule 10 mg         -- Oral Nightly 01/17/23 1846            Allergies:   Review of patient's allergies  indicates:   Allergen Reactions    Amitriptyline         OBJECTIVE:   Vitals   Vitals:    01/19/23 0015   BP:    Pulse:    Resp: 16   Temp:         Labs/Imaging/Studies:   No results found for this or any previous visit (from the past 36 hour(s)).         Psychiatric Mental Status Exam:  Unable to complete assessment due to patient not wanting to wake up.      ASSESSMENT/PLAN:   Diagnosis:  Major depressive disorder, recurrent with psychosis  Anxiety       Past Medical History:   Diagnosis Date    Paraplegia         Recommendations:  Continue current psych meds.  Psych to continue following.          Cierra Guerrero
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
## 2023-01-20 LAB
ANION GAP SERPL CALC-SCNC: 10 MEQ/L
BUN SERPL-MCNC: 38 MG/DL (ref 8.9–20.6)
CALCIUM SERPL-MCNC: 9.1 MG/DL (ref 8.4–10.2)
CHLORIDE SERPL-SCNC: 103 MMOL/L (ref 98–107)
CO2 SERPL-SCNC: 22 MMOL/L (ref 22–29)
CREAT SERPL-MCNC: 1.79 MG/DL (ref 0.73–1.18)
CREAT/UREA NIT SERPL: 21
GFR SERPLBLD CREATININE-BSD FMLA CKD-EPI: 46 MLS/MIN/1.73/M2
GLUCOSE SERPL-MCNC: 105 MG/DL (ref 74–100)
POTASSIUM SERPL-SCNC: 5 MMOL/L (ref 3.5–5.1)
SODIUM SERPL-SCNC: 135 MMOL/L (ref 136–145)

## 2023-01-20 PROCEDURE — 25000003 PHARM REV CODE 250: Performed by: INTERNAL MEDICINE

## 2023-01-20 PROCEDURE — 25000003 PHARM REV CODE 250: Performed by: NURSE PRACTITIONER

## 2023-01-20 PROCEDURE — 25000003 PHARM REV CODE 250: Performed by: STUDENT IN AN ORGANIZED HEALTH CARE EDUCATION/TRAINING PROGRAM

## 2023-01-20 PROCEDURE — 97530 THERAPEUTIC ACTIVITIES: CPT

## 2023-01-20 PROCEDURE — 11000001 HC ACUTE MED/SURG PRIVATE ROOM

## 2023-01-20 PROCEDURE — G0378 HOSPITAL OBSERVATION PER HR: HCPCS

## 2023-01-20 PROCEDURE — 36415 COLL VENOUS BLD VENIPUNCTURE: CPT | Performed by: INTERNAL MEDICINE

## 2023-01-20 PROCEDURE — 80048 BASIC METABOLIC PNL TOTAL CA: CPT | Performed by: INTERNAL MEDICINE

## 2023-01-20 PROCEDURE — 97530 THERAPEUTIC ACTIVITIES: CPT | Mod: CO

## 2023-01-20 RX ORDER — MUPIROCIN 20 MG/G
OINTMENT TOPICAL 2 TIMES DAILY
Status: DISCONTINUED | OUTPATIENT
Start: 2023-01-20 | End: 2023-02-03 | Stop reason: HOSPADM

## 2023-01-20 RX ORDER — SERTRALINE HYDROCHLORIDE 50 MG/1
50 TABLET, FILM COATED ORAL DAILY
Status: COMPLETED | OUTPATIENT
Start: 2023-01-21 | End: 2023-01-23

## 2023-01-20 RX ORDER — MIRTAZAPINE 15 MG/1
15 TABLET, FILM COATED ORAL NIGHTLY
Status: DISCONTINUED | OUTPATIENT
Start: 2023-01-20 | End: 2023-02-03 | Stop reason: HOSPADM

## 2023-01-20 RX ADMIN — SERTRALINE HYDROCHLORIDE 25 MG: 25 TABLET ORAL at 08:01

## 2023-01-20 RX ADMIN — SENNOSIDES AND DOCUSATE SODIUM 2 TABLET: 50; 8.6 TABLET ORAL at 08:01

## 2023-01-20 RX ADMIN — OXYCODONE HYDROCHLORIDE 10 MG: 5 TABLET ORAL at 08:01

## 2023-01-20 RX ADMIN — OXYCODONE HYDROCHLORIDE 10 MG: 5 TABLET ORAL at 12:01

## 2023-01-20 RX ADMIN — OXYCODONE HYDROCHLORIDE 10 MG: 5 TABLET ORAL at 04:01

## 2023-01-20 RX ADMIN — BACLOFEN 20 MG: 10 TABLET ORAL at 08:01

## 2023-01-20 RX ADMIN — METHOCARBAMOL 500 MG: 500 TABLET ORAL at 08:01

## 2023-01-20 RX ADMIN — ACETAMINOPHEN 1000 MG: 500 TABLET, FILM COATED ORAL at 08:01

## 2023-01-20 RX ADMIN — BACLOFEN 20 MG: 10 TABLET ORAL at 02:01

## 2023-01-20 RX ADMIN — DOXYCYCLINE HYCLATE 100 MG: 100 TABLET, COATED ORAL at 08:01

## 2023-01-20 RX ADMIN — GABAPENTIN 600 MG: 300 CAPSULE ORAL at 02:01

## 2023-01-20 RX ADMIN — ACETAMINOPHEN 650 MG: 325 TABLET ORAL at 08:01

## 2023-01-20 RX ADMIN — AMLODIPINE BESYLATE 5 MG: 5 TABLET ORAL at 08:01

## 2023-01-20 RX ADMIN — OXYBUTYNIN CHLORIDE 10 MG: 5 TABLET ORAL at 08:01

## 2023-01-20 RX ADMIN — OXYCODONE HYDROCHLORIDE 10 MG: 5 TABLET ORAL at 07:01

## 2023-01-20 RX ADMIN — ONDANSETRON 4 MG: 4 TABLET, ORALLY DISINTEGRATING ORAL at 10:01

## 2023-01-20 RX ADMIN — OXYCODONE HYDROCHLORIDE 10 MG: 5 TABLET ORAL at 02:01

## 2023-01-20 RX ADMIN — GABAPENTIN 600 MG: 300 CAPSULE ORAL at 08:01

## 2023-01-20 RX ADMIN — METHOCARBAMOL 500 MG: 500 TABLET ORAL at 02:01

## 2023-01-20 RX ADMIN — MIRTAZAPINE 15 MG: 15 TABLET, FILM COATED ORAL at 10:01

## 2023-01-20 RX ADMIN — MUPIROCIN: 20 OINTMENT TOPICAL at 08:01

## 2023-01-20 RX ADMIN — METOPROLOL SUCCINATE 25 MG: 25 TABLET, EXTENDED RELEASE ORAL at 08:01

## 2023-01-20 RX ADMIN — ONDANSETRON 4 MG: 4 TABLET, ORALLY DISINTEGRATING ORAL at 03:01

## 2023-01-20 RX ADMIN — DULOXETINE 30 MG: 30 CAPSULE, DELAYED RELEASE ORAL at 08:01

## 2023-01-20 RX ADMIN — ONDANSETRON 4 MG: 4 TABLET, ORALLY DISINTEGRATING ORAL at 07:01

## 2023-01-20 RX ADMIN — APIXABAN 5 MG: 5 TABLET, FILM COATED ORAL at 08:01

## 2023-01-20 NOTE — PT/OT/SLP PROGRESS
Occupational Therapy  Treatment    Hemal Guerrero   MRN: [id number]   Admitting Diagnosis: <principal problem not specified>    OT Date of Treatment: 01/20/23   OT Start Time: 1233  OT Stop Time: 1256  OT Total Time (min): 23 min     Billable Minutes:  Therapeutic Activity 2  Total Minutes: 23     OT/ALEXANDER: ALEXANDER     ALEXANDER Visit Number: 2    General Precautions: Standard, fall  Orthopedic Precautions:    Braces:      Spiritual, Cultural Beliefs, Catholic Practices, Values that Affect Care: no    Subjective:  Communicated with RN prior to session.  Flat    Objective:  Pt. UIC upon entry, pt. Repositioned in w/c appropriately. Pt. Hoyered BTB with adherence given to all pxns.   Pt. Requiring Max A for rolling during toileting activity. Pt. Left repositioned in bed appropriately.        Patient left HOB elevated with all lines intact and call button in reach    ASSESSMENT:  Hemal Guerrero is a 47 y.o. male with a medical diagnosis of <principal problem not specified> Pt. Tolerated t/f well, continue POC    Activity tolerance: Fair    Discharge recommendations: nursing facility, skilled     Equipment recommendations:       GOALS:   Multidisciplinary Problems       Occupational Therapy Goals          Problem: Occupational Therapy    Goal Priority Disciplines Outcome Interventions   Occupational Therapy Goal     OT, PT/OT Ongoing, Progressing    Description: Goals to be met by: 2/17/23     Patient will increase functional independence with ADLs by performing:    LE Dressing with Minimal Assistance.  Grooming while seated at sink with Minimal Assistance.                         Plan:  Patient to be seen 2 x/week, 3 x/week to address the above listed problems via self-care/home management, therapeutic activities, therapeutic exercises  Plan of Care expires: 02/17/23  Plan of Care reviewed with: patient         01/20/2023

## 2023-01-20 NOTE — PT/OT/SLP PROGRESS
Physical Therapy Treatment    Patient Name:  Hemal Guerrero   MRN:  [id number]    Recommendations:     Discharge Recommendations: nursing facility, skilled  Discharge Equipment Recommendations: hospital bed, lift device  Barriers to discharge: Decreased caregiver support    Assessment:     Pt tolerated bed mobility, christie transfer to w/c, and w/c mobility. Pt sat in w/c for 1 hr with geomat cushion and small pillow to lower back.     Rehab Prognosis: Fair; patient would benefit from acute skilled PT services to address these deficits and reach maximum level of function.    Recent Surgery: * No surgery found *      Plan:     During this hospitalization, patient to be seen 3 x/week to address the identified rehab impairments via therapeutic activities, therapeutic exercises, wheelchair management/training and progress toward the following goals:    Plan of Care Expires:  02/16/23    Subjective     Chief Complaint: none  Patient/Family Comments/goals: to get better  Pain/Comfort:  Pain Rating 1: 0/10      Objective:     Communicated with nurse prior to session.  Patient found right sidelying with vogt catheter upon PT entry to room.     General Precautions: Standard, other (see comments) (geomat/ROHO on wheelchair seat and small pillow to lower back as well as ongoing heel offloading devices in place. christie lift only) limit sitting time to 1hr.   Orthopedic Precautions:    Braces:    Respiratory Status: Room air     Functional Mobility:  Bed Mobility:     Rolling Left:  minimum assistance  Rolling Right: minimum assistance  Wheelchair Propulsion:  Pt propelled Standard wheelchair x 100 feet on Level tile with  Bilateral upper extremity with Stand-by Assistance.       AM-PAC 6 CLICK MOBILITY  Turning over in bed (including adjusting bedclothes, sheets and blankets)?: 3  Sitting down on and standing up from a chair with arms (e.g., wheelchair, bedside commode, etc.): 1  Moving from lying on back to sitting on the side of  the bed?: 3  Moving to and from a bed to a chair (including a wheelchair)?: 1  Need to walk in hospital room?: 1  Climbing 3-5 steps with a railing?: 1  Basic Mobility Total Score: 10           Patient left up in chair with all lines intact and call button in reach..      Returned from [id number] to [facility] pt back to bed and position for optimum sacral offloading.       GOALS:   Multidisciplinary Problems       Physical Therapy Goals          Problem: Physical Therapy    Goal Priority Disciplines Outcome Goal Variances Interventions   Physical Therapy Goal     PT, PT/OT Ongoing, Progressing     Description: Goals to be met by: 2023     Patient will increase functional independence with mobility by performin. Supine to sit with Stand-by Assistance  2. Sit to supine with Stand-by Assistance  3. Rolling to Left and Right with Stand-by Assistance.  4. Sit to stand transfer with Maximum Assistance  5. Sitting at edge of bed x10 minutes with Ona                         Time Tracking:     PT Received On: 23  PT Start Time: 1120     PT Stop Time: 1140  PT Total Time (min): 20 min     Start time tx 2: 1236  Stop time tx 2: 1253  Total time tx 2: 17 min    Total mins: 37min    Billable Minutes: Therapeutic Activity 37    Treatment Type: Treatment  PT/PTA: PT     PTA Visit Number: 2     2023

## 2023-01-20 NOTE — NURSING
Patient requesting brief. Educated as to why he should not have a brief in bed with current wounds. He still insist he wants one for tonight so he can rest without getting wet.

## 2023-01-20 NOTE — PLAN OF CARE
Denied by [place] and [facility]. Met with patient for additional choices, he states he has no preference and would just like to be placed around the [place] area.
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
## 2023-01-21 PROCEDURE — G0378 HOSPITAL OBSERVATION PER HR: HCPCS

## 2023-01-21 PROCEDURE — 25000003 PHARM REV CODE 250: Performed by: NURSE PRACTITIONER

## 2023-01-21 PROCEDURE — 11000001 HC ACUTE MED/SURG PRIVATE ROOM

## 2023-01-21 PROCEDURE — 25000003 PHARM REV CODE 250: Performed by: STUDENT IN AN ORGANIZED HEALTH CARE EDUCATION/TRAINING PROGRAM

## 2023-01-21 PROCEDURE — 25000003 PHARM REV CODE 250: Performed by: INTERNAL MEDICINE

## 2023-01-21 RX ADMIN — OXYCODONE HYDROCHLORIDE 10 MG: 5 TABLET ORAL at 01:01

## 2023-01-21 RX ADMIN — METOPROLOL SUCCINATE 25 MG: 25 TABLET, EXTENDED RELEASE ORAL at 08:01

## 2023-01-21 RX ADMIN — SERTRALINE 50 MG: 50 TABLET, FILM COATED ORAL at 08:01

## 2023-01-21 RX ADMIN — OXYCODONE HYDROCHLORIDE 10 MG: 5 TABLET ORAL at 06:01

## 2023-01-21 RX ADMIN — OXYCODONE HYDROCHLORIDE 10 MG: 5 TABLET ORAL at 08:01

## 2023-01-21 RX ADMIN — OXYCODONE HYDROCHLORIDE 10 MG: 5 TABLET ORAL at 10:01

## 2023-01-21 RX ADMIN — SERTRALINE 50 MG: 50 TABLET, FILM COATED ORAL at 09:01

## 2023-01-21 RX ADMIN — AMLODIPINE BESYLATE 5 MG: 5 TABLET ORAL at 08:01

## 2023-01-21 RX ADMIN — ONDANSETRON 4 MG: 4 TABLET, ORALLY DISINTEGRATING ORAL at 04:01

## 2023-01-21 RX ADMIN — OXYCODONE HYDROCHLORIDE 10 MG: 5 TABLET ORAL at 05:01

## 2023-01-21 RX ADMIN — MUPIROCIN: 20 OINTMENT TOPICAL at 09:01

## 2023-01-21 RX ADMIN — GABAPENTIN 600 MG: 300 CAPSULE ORAL at 08:01

## 2023-01-21 RX ADMIN — DOXYCYCLINE HYCLATE 100 MG: 100 TABLET, COATED ORAL at 08:01

## 2023-01-21 RX ADMIN — MIRTAZAPINE 15 MG: 15 TABLET, FILM COATED ORAL at 09:01

## 2023-01-21 RX ADMIN — APIXABAN 5 MG: 5 TABLET, FILM COATED ORAL at 08:01

## 2023-01-21 RX ADMIN — DULOXETINE 30 MG: 30 CAPSULE, DELAYED RELEASE ORAL at 08:01

## 2023-01-21 RX ADMIN — ACETAMINOPHEN 1000 MG: 500 TABLET, FILM COATED ORAL at 09:01

## 2023-01-21 RX ADMIN — OXYBUTYNIN CHLORIDE 10 MG: 5 TABLET ORAL at 08:01

## 2023-01-21 RX ADMIN — APIXABAN 5 MG: 5 TABLET, FILM COATED ORAL at 09:01

## 2023-01-21 RX ADMIN — MUPIROCIN: 20 OINTMENT TOPICAL at 08:01

## 2023-01-21 RX ADMIN — ONDANSETRON 4 MG: 4 TABLET, ORALLY DISINTEGRATING ORAL at 10:01

## 2023-01-21 RX ADMIN — OXYBUTYNIN CHLORIDE 10 MG: 5 TABLET ORAL at 09:01

## 2023-01-21 RX ADMIN — GABAPENTIN 600 MG: 300 CAPSULE ORAL at 09:01

## 2023-01-21 RX ADMIN — SENNOSIDES AND DOCUSATE SODIUM 2 TABLET: 50; 8.6 TABLET ORAL at 09:01

## 2023-01-21 RX ADMIN — ACETAMINOPHEN 1000 MG: 500 TABLET, FILM COATED ORAL at 03:01

## 2023-01-21 RX ADMIN — METHOCARBAMOL 500 MG: 500 TABLET ORAL at 08:01

## 2023-01-21 RX ADMIN — GABAPENTIN 600 MG: 300 CAPSULE ORAL at 03:01

## 2023-01-21 RX ADMIN — ONDANSETRON 4 MG: 4 TABLET, ORALLY DISINTEGRATING ORAL at 06:01

## 2023-01-21 RX ADMIN — METHOCARBAMOL 500 MG: 500 TABLET ORAL at 03:01

## 2023-01-21 RX ADMIN — METHOCARBAMOL 500 MG: 500 TABLET ORAL at 09:01

## 2023-01-21 RX ADMIN — FERROUS SULFATE TAB 325 MG (65 MG ELEMENTAL FE) 1 EACH: 325 (65 FE) TAB at 01:01
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
## 2023-01-21 NOTE — CONSULTS
Contacted to consult on MR Guerrero regarding the possibility of a parasite in his nose.  His history is well documented in the medical records.  He reports a sensation of some thick nasal discharge with intermittent movements.  He shows me some blurry pictures of his nostrils and one of his left eye, both of which are non-revealing to me.  On exam he has some dried regions of his anterior nasal septum, but no other concerning findings.  He has 2 CT scans from the past month that are clear, with the most recent being from 5 days ago.    I have no concern for parasites in the patient's nose.  We discussed the more likely scenario of nasal dryness, intermittent rhinitis.  He can trial saline sprays bid, nasal steroid sprays bid, and for the dryness can use mupirocin bid x 1 week.  Other considerations could be given to formications, morgellons, or malingering.

## 2023-01-21 NOTE — PLAN OF CARE
Problem: Infection  Goal: Absence of Infection Signs and Symptoms  Outcome: Ongoing, Progressing     Problem: Adult Inpatient Plan of Care  Goal: Plan of Care Review  Outcome: Ongoing, Progressing  Goal: Optimal Comfort and Wellbeing  Outcome: Ongoing, Progressing     Problem: Impaired Wound Healing  Goal: Optimal Wound Healing  Outcome: Ongoing, Progressing     Problem: Skin Injury Risk Increased  Goal: Skin Health and Integrity  Outcome: Ongoing, Progressing
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
## 2023-01-22 PROCEDURE — 25000003 PHARM REV CODE 250: Performed by: NURSE PRACTITIONER

## 2023-01-22 PROCEDURE — G0378 HOSPITAL OBSERVATION PER HR: HCPCS

## 2023-01-22 PROCEDURE — 11000001 HC ACUTE MED/SURG PRIVATE ROOM

## 2023-01-22 PROCEDURE — 25000003 PHARM REV CODE 250: Performed by: INTERNAL MEDICINE

## 2023-01-22 PROCEDURE — 25000003 PHARM REV CODE 250: Performed by: STUDENT IN AN ORGANIZED HEALTH CARE EDUCATION/TRAINING PROGRAM

## 2023-01-22 RX ADMIN — OXYCODONE HYDROCHLORIDE 10 MG: 5 TABLET ORAL at 09:01

## 2023-01-22 RX ADMIN — ACETAMINOPHEN 1000 MG: 500 TABLET, FILM COATED ORAL at 03:01

## 2023-01-22 RX ADMIN — APIXABAN 5 MG: 5 TABLET, FILM COATED ORAL at 08:01

## 2023-01-22 RX ADMIN — OXYBUTYNIN CHLORIDE 10 MG: 5 TABLET ORAL at 08:01

## 2023-01-22 RX ADMIN — METOPROLOL SUCCINATE 25 MG: 25 TABLET, EXTENDED RELEASE ORAL at 09:01

## 2023-01-22 RX ADMIN — OXYCODONE HYDROCHLORIDE 10 MG: 5 TABLET ORAL at 01:01

## 2023-01-22 RX ADMIN — DOXYCYCLINE HYCLATE 100 MG: 100 TABLET, COATED ORAL at 08:01

## 2023-01-22 RX ADMIN — ACETAMINOPHEN 1000 MG: 500 TABLET, FILM COATED ORAL at 08:01

## 2023-01-22 RX ADMIN — METHOCARBAMOL 500 MG: 500 TABLET ORAL at 03:01

## 2023-01-22 RX ADMIN — OXYCODONE HYDROCHLORIDE 10 MG: 5 TABLET ORAL at 08:01

## 2023-01-22 RX ADMIN — GABAPENTIN 600 MG: 300 CAPSULE ORAL at 03:01

## 2023-01-22 RX ADMIN — GABAPENTIN 600 MG: 300 CAPSULE ORAL at 08:01

## 2023-01-22 RX ADMIN — DOXYCYCLINE HYCLATE 100 MG: 100 TABLET, COATED ORAL at 09:01

## 2023-01-22 RX ADMIN — SERTRALINE 50 MG: 50 TABLET, FILM COATED ORAL at 09:01

## 2023-01-22 RX ADMIN — MUPIROCIN: 20 OINTMENT TOPICAL at 09:01

## 2023-01-22 RX ADMIN — METHOCARBAMOL 500 MG: 500 TABLET ORAL at 08:01

## 2023-01-22 RX ADMIN — MIRTAZAPINE 15 MG: 15 TABLET, FILM COATED ORAL at 08:01

## 2023-01-22 RX ADMIN — ACETAMINOPHEN 1000 MG: 500 TABLET, FILM COATED ORAL at 09:01

## 2023-01-22 RX ADMIN — DULOXETINE 30 MG: 30 CAPSULE, DELAYED RELEASE ORAL at 09:01

## 2023-01-22 RX ADMIN — GABAPENTIN 600 MG: 300 CAPSULE ORAL at 09:01

## 2023-01-22 RX ADMIN — AMLODIPINE BESYLATE 5 MG: 5 TABLET ORAL at 09:01

## 2023-01-22 RX ADMIN — OXYCODONE HYDROCHLORIDE 10 MG: 5 TABLET ORAL at 03:01

## 2023-01-22 RX ADMIN — MUPIROCIN: 20 OINTMENT TOPICAL at 08:01

## 2023-01-22 RX ADMIN — OXYCODONE HYDROCHLORIDE 10 MG: 5 TABLET ORAL at 04:01

## 2023-01-22 RX ADMIN — ONDANSETRON 4 MG: 4 TABLET, ORALLY DISINTEGRATING ORAL at 09:01

## 2023-01-22 NOTE — PLAN OF CARE
Problem: Infection  Goal: Absence of Infection Signs and Symptoms  Outcome: Ongoing, Progressing     Problem: Adult Inpatient Plan of Care  Goal: Plan of Care Review  Outcome: Ongoing, Progressing  Goal: Absence of Hospital-Acquired Illness or Injury  Outcome: Ongoing, Progressing  Goal: Optimal Comfort and Wellbeing  Outcome: Ongoing, Progressing     Problem: Impaired Wound Healing  Goal: Optimal Wound Healing  Outcome: Ongoing, Progressing     Problem: Skin Injury Risk Increased  Goal: Skin Health and Integrity  Outcome: Ongoing, Progressing
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
## 2023-01-23 PROBLEM — L89.324 PRESSURE INJURY OF LEFT BUTTOCK, STAGE 4: Status: ACTIVE | Noted: 2023-01-23

## 2023-01-23 LAB
ANION GAP SERPL CALC-SCNC: 10 MEQ/L
BASOPHILS # BLD AUTO: 0.1 X10(3)/MCL (ref 0–0.2)
BASOPHILS NFR BLD AUTO: 0.8 %
BUN SERPL-MCNC: 32.4 MG/DL (ref 8.9–20.6)
CALCIUM SERPL-MCNC: 9.4 MG/DL (ref 8.4–10.2)
CHLORIDE SERPL-SCNC: 106 MMOL/L (ref 98–107)
CO2 SERPL-SCNC: 24 MMOL/L (ref 22–29)
CREAT SERPL-MCNC: 1.5 MG/DL (ref 0.73–1.18)
CREAT/UREA NIT SERPL: 22
CRP SERPL-MCNC: 46 MG/L
EOSINOPHIL # BLD AUTO: 0.34 X10(3)/MCL (ref 0–0.9)
EOSINOPHIL NFR BLD AUTO: 2.7 %
ERYTHROCYTE [DISTWIDTH] IN BLOOD BY AUTOMATED COUNT: 15.6 % (ref 11.5–17)
ERYTHROCYTE [SEDIMENTATION RATE] IN BLOOD: 88 MM/HR (ref 0–15)
GFR SERPLBLD CREATININE-BSD FMLA CKD-EPI: 57 MLS/MIN/1.73/M2
GLUCOSE SERPL-MCNC: 112 MG/DL (ref 74–100)
HCT VFR BLD AUTO: 32.8 % (ref 42–52)
HGB BLD-MCNC: 10 GM/DL (ref 14–18)
IMM GRANULOCYTES # BLD AUTO: 0.07 X10(3)/MCL (ref 0–0.04)
IMM GRANULOCYTES NFR BLD AUTO: 0.6 %
LYMPHOCYTES # BLD AUTO: 3.02 X10(3)/MCL (ref 0.6–4.6)
LYMPHOCYTES NFR BLD AUTO: 24 %
MCH RBC QN AUTO: 25.4 PG
MCHC RBC AUTO-ENTMCNC: 30.5 MG/DL (ref 33–36)
MCV RBC AUTO: 83.2 FL (ref 80–94)
MONOCYTES # BLD AUTO: 1.39 X10(3)/MCL (ref 0.1–1.3)
MONOCYTES NFR BLD AUTO: 11 %
NEUTROPHILS # BLD AUTO: 7.66 X10(3)/MCL (ref 2.1–9.2)
NEUTROPHILS NFR BLD AUTO: 60.9 %
NRBC BLD AUTO-RTO: 0 %
PLATELET # BLD AUTO: 167 X10(3)/MCL (ref 130–400)
PMV BLD AUTO: 11.6 FL (ref 7.4–10.4)
POTASSIUM SERPL-SCNC: 4.6 MMOL/L (ref 3.5–5.1)
RBC # BLD AUTO: 3.94 X10(6)/MCL (ref 4.7–6.1)
SODIUM SERPL-SCNC: 140 MMOL/L (ref 136–145)
WBC # SPEC AUTO: 12.6 X10(3)/MCL (ref 4.5–11.5)

## 2023-01-23 PROCEDURE — 99233 PR SUBSEQUENT HOSPITAL CARE,LEVL III: ICD-10-PCS | Mod: 25,NSCH,, | Performed by: EMERGENCY MEDICINE

## 2023-01-23 PROCEDURE — 11042 DEBRIDEMENT: ICD-10-PCS | Mod: NSCH,,, | Performed by: EMERGENCY MEDICINE

## 2023-01-23 PROCEDURE — 11000001 HC ACUTE MED/SURG PRIVATE ROOM

## 2023-01-23 PROCEDURE — 85025 COMPLETE CBC W/AUTO DIFF WBC: CPT | Performed by: INTERNAL MEDICINE

## 2023-01-23 PROCEDURE — 99233 SBSQ HOSP IP/OBS HIGH 50: CPT | Mod: 25,NSCH,, | Performed by: EMERGENCY MEDICINE

## 2023-01-23 PROCEDURE — 36415 COLL VENOUS BLD VENIPUNCTURE: CPT | Performed by: INTERNAL MEDICINE

## 2023-01-23 PROCEDURE — 86140 C-REACTIVE PROTEIN: CPT | Performed by: INTERNAL MEDICINE

## 2023-01-23 PROCEDURE — 80048 BASIC METABOLIC PNL TOTAL CA: CPT | Performed by: INTERNAL MEDICINE

## 2023-01-23 PROCEDURE — 25000003 PHARM REV CODE 250: Performed by: STUDENT IN AN ORGANIZED HEALTH CARE EDUCATION/TRAINING PROGRAM

## 2023-01-23 PROCEDURE — 97530 THERAPEUTIC ACTIVITIES: CPT

## 2023-01-23 PROCEDURE — 11042 DBRDMT SUBQ TIS 1ST 20SQCM/<: CPT | Mod: NSCH,,, | Performed by: EMERGENCY MEDICINE

## 2023-01-23 PROCEDURE — 25000003 PHARM REV CODE 250: Performed by: NURSE PRACTITIONER

## 2023-01-23 PROCEDURE — 25000003 PHARM REV CODE 250: Performed by: INTERNAL MEDICINE

## 2023-01-23 PROCEDURE — 11042 DBRDMT SUBQ TIS 1ST 20SQCM/<: CPT

## 2023-01-23 PROCEDURE — 85651 RBC SED RATE NONAUTOMATED: CPT | Performed by: INTERNAL MEDICINE

## 2023-01-23 PROCEDURE — G0378 HOSPITAL OBSERVATION PER HR: HCPCS

## 2023-01-23 RX ORDER — ONDANSETRON 4 MG/1
4 TABLET, ORALLY DISINTEGRATING ORAL EVERY 6 HOURS PRN
Status: DISCONTINUED | OUTPATIENT
Start: 2023-01-23 | End: 2023-02-03 | Stop reason: HOSPADM

## 2023-01-23 RX ADMIN — OXYCODONE HYDROCHLORIDE 10 MG: 5 TABLET ORAL at 05:01

## 2023-01-23 RX ADMIN — OXYCODONE HYDROCHLORIDE 10 MG: 5 TABLET ORAL at 06:01

## 2023-01-23 RX ADMIN — ONDANSETRON 4 MG: 4 TABLET, ORALLY DISINTEGRATING ORAL at 09:01

## 2023-01-23 RX ADMIN — MUPIROCIN: 20 OINTMENT TOPICAL at 09:01

## 2023-01-23 RX ADMIN — GABAPENTIN 600 MG: 300 CAPSULE ORAL at 08:01

## 2023-01-23 RX ADMIN — OXYCODONE HYDROCHLORIDE 10 MG: 5 TABLET ORAL at 01:01

## 2023-01-23 RX ADMIN — OXYBUTYNIN CHLORIDE 10 MG: 5 TABLET ORAL at 09:01

## 2023-01-23 RX ADMIN — SERTRALINE 50 MG: 50 TABLET, FILM COATED ORAL at 09:01

## 2023-01-23 RX ADMIN — BACLOFEN 20 MG: 10 TABLET ORAL at 08:01

## 2023-01-23 RX ADMIN — APIXABAN 5 MG: 5 TABLET, FILM COATED ORAL at 08:01

## 2023-01-23 RX ADMIN — APIXABAN 5 MG: 5 TABLET, FILM COATED ORAL at 09:01

## 2023-01-23 RX ADMIN — DOXYCYCLINE HYCLATE 100 MG: 100 TABLET, COATED ORAL at 09:01

## 2023-01-23 RX ADMIN — DULOXETINE 30 MG: 30 CAPSULE, DELAYED RELEASE ORAL at 09:01

## 2023-01-23 RX ADMIN — ACETAMINOPHEN 1000 MG: 500 TABLET, FILM COATED ORAL at 09:01

## 2023-01-23 RX ADMIN — OXYBUTYNIN CHLORIDE 10 MG: 5 TABLET ORAL at 08:01

## 2023-01-23 RX ADMIN — SENNOSIDES AND DOCUSATE SODIUM 2 TABLET: 50; 8.6 TABLET ORAL at 08:01

## 2023-01-23 RX ADMIN — OXYCODONE HYDROCHLORIDE 10 MG: 5 TABLET ORAL at 10:01

## 2023-01-23 RX ADMIN — MIRTAZAPINE 15 MG: 15 TABLET, FILM COATED ORAL at 08:01

## 2023-01-23 RX ADMIN — BACLOFEN 20 MG: 10 TABLET ORAL at 04:01

## 2023-01-23 RX ADMIN — MUPIROCIN: 20 OINTMENT TOPICAL at 08:01

## 2023-01-23 RX ADMIN — GABAPENTIN 600 MG: 300 CAPSULE ORAL at 04:01

## 2023-01-23 RX ADMIN — ACETAMINOPHEN 1000 MG: 500 TABLET, FILM COATED ORAL at 04:01

## 2023-01-23 RX ADMIN — DOXYCYCLINE HYCLATE 100 MG: 100 TABLET, COATED ORAL at 08:01

## 2023-01-23 RX ADMIN — FERROUS SULFATE TAB 325 MG (65 MG ELEMENTAL FE) 1 EACH: 325 (65 FE) TAB at 02:01

## 2023-01-23 RX ADMIN — GABAPENTIN 600 MG: 300 CAPSULE ORAL at 09:01

## 2023-01-23 RX ADMIN — OXYCODONE HYDROCHLORIDE 10 MG: 5 TABLET ORAL at 02:01

## 2023-01-23 RX ADMIN — OXYCODONE HYDROCHLORIDE 10 MG: 5 TABLET ORAL at 09:01

## 2023-01-23 NOTE — PT/OT/SLP PROGRESS
Physical Therapy Treatment    Patient Name:  Hemal Guerrero   MRN:  [id number]    Recommendations:     Discharge Recommendations: nursing facility, skilled  Discharge Equipment Recommendations: hospital bed, lift device  Barriers to discharge: Decreased caregiver support, placement    Assessment:     Pt agreed to EOB mobility. Easily fatigues with dynamic balance challenges, requiring frequent rest breaks. Increased difficulty reaching with L UE to target vs R UE. Verbal cues for slow controlled movements throughout sessions.    Rehab Prognosis: Fair; patient would benefit from acute skilled PT services to address these deficits and reach maximum level of function.    Recent Surgery: * No surgery found *      Plan:     During this hospitalization, patient to be seen 3 x/week to address the identified rehab impairments via therapeutic activities, therapeutic exercises, wheelchair management/training and progress toward the following goals:    Plan of Care Expires:  02/16/23    Subjective     Chief Complaint: none  Patient/Family Comments/goals: to get better  Pain/Comfort:  Pain Rating 1: 5/10  Location 1: abdomen      Objective:     Communicated with nurse prior to session.  Patient found right sidelying with PRAFO, peripheral IV, vogt catheter upon PT entry to room.     General Precautions: Standard, other (see comments) (geomat/ROHO on wheelchair seat and small pillow to lower back as well as ongoing heel offloading devices in place. christie lift only) limit sitting time to 1hr.   Orthopedic Precautions:    Braces: N/A  Respiratory Status: Room air     Functional Mobility:  Bed Mobility:     Rolling Left:  minimum assistance  Rolling Right: minimum assistance  Supine<>Sit: Moderate assistance  Balance  Static- with STEW UE support pt maintained x 5 min SBA   Dynamic- single UE reaches to target with mod A for trunk support, STEW UE reaches with max trunk support    AM-PAC 6 CLICK MOBILITY  Turning over in bed  (including adjusting bedclothes, sheets and blankets)?: 2  Sitting down on and standing up from a chair with arms (e.g., wheelchair, bedside commode, etc.): 1  Moving from lying on back to sitting on the side of the bed?: 2  Moving to and from a bed to a chair (including a wheelchair)?: 2  Need to walk in hospital room?: 1  Climbing 3-5 steps with a railing?: 1  Basic Mobility Total Score: 9     Treatment and Education:  Pt sat EOB approx 15 min performing UE reaching and balance activities.           Patient left HOB elevated with wedge on L and STEW heels offloaded with PRAFO boots with all lines intact and call button in reach..      Returned from [id number] to [facility] pt back to bed and position for optimum sacral offloading.       GOALS:   Multidisciplinary Problems       Physical Therapy Goals          Problem: Physical Therapy    Goal Priority Disciplines Outcome Goal Variances Interventions   Physical Therapy Goal     PT, PT/OT Ongoing, Progressing     Description: Goals to be met by: 2023     Patient will increase functional independence with mobility by performin. Supine to sit with Stand-by Assistance  2. Sit to supine with Stand-by Assistance  3. Rolling to Left and Right with Stand-by Assistance.  4. Sit to stand transfer with Maximum Assistance  5. Sitting at edge of bed x10 minutes with [place]                         Time Tracking:     PT Received On:    PT Start Time: 1106     PT Stop Time: 1130  PT Total Time (min): 24 min       Billable Minutes: Therapeutic Activity 24    Treatment Type: Treatment  PT/PTA: PT     PTA Visit Number: 3     2023

## 2023-01-23 NOTE — PLAN OF CARE
Spoke to patient about discharge plan. Has been denied by a few SNF discussed that we will send outside of [place] due to history of not being able to placed and very limited if any options inside of [place]. Notified [facility] to extend search.
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
## 2023-01-23 NOTE — PLAN OF CARE
01/23/23 0938   Discharge Reassessment   Assessment Type Discharge Planning Reassessment   Did the patient's condition or plan change since previous assessment? No   Discharge Plan discussed with: Patient   Communicated IVORY with patient/caregiver Date not available/Unable to determine   Discharge Plan A Skilled Nursing Facility   Discharge Plan B Home Health   DME Needed Upon Discharge  none   Discharge Barriers Identified Does not adhere to care plan;Homeless;Nursing Home rejection;Social   Why the patient remains in the hospital Social issues;Placement issues   Post-Acute Status   Discharge Delays None known at this time

## 2023-01-23 NOTE — PROCEDURES
"Hemal Guerrero is a 47 y.o. male patient.    Temp: 98.5 °F (36.9 °C) (01/23/23 1609)  Pulse: (!) 120 (01/23/23 1609)  Resp: 18 (01/23/23 1609)  BP: 123/76 (01/23/23 1609)  SpO2: 97 % (01/23/23 1609)  Weight: 72.9 kg (160 lb 11.5 oz) (01/15/23 1628)  Height: 6' (182.9 cm) (01/15/23 1628)       Debridement    Date/Time: 1/23/2023 5:03 PM  Performed by: Cathi Bravo MD  Authorized by: Cathi Bravo MD   Associated wounds:        Altered Skin Integrity 11/16/22 Left Buttocks #1 Ulceration  Time out: Immediately prior to procedure a "time out" was called to verify the correct patient, procedure, equipment, support staff and site/side marked as required.    Consent Done?:  Yes (Verbal)    Preparation: Patient was prepped and draped in usual sterile fashion    Local anesthesia used?: Yes      Wound Details:    Location:  Left buttock    Type of Debridement:  Excisional       Length (cm):  2.5       Area (sq cm):  6.75       Width (cm):  2.7       Percent Debrided (%):  100       Depth (cm):  1       Total Area Debrided (sq cm):  6.75    Depth of debridement:  Subcutaneous tissue    Tissue debrided:  Dermis, Epidermis, Subcutaneous and Hypergranulation    Devitalized tissue debrided:  Biofilm and Slough    Instruments:  Curette    Bleeding:  Minimal  Hemostasis Achieved: Yes    Method Used:  Silver Nitrate and Pressure  Patient tolerance:  Patient tolerated the procedure well with no immediate complications     No bleeding when dressed wound    1/23/2023    "

## 2023-01-24 LAB
ALBUMIN SERPL-MCNC: 3.3 G/DL (ref 3.5–5)
ALBUMIN/GLOB SERPL: 0.7 RATIO (ref 1.1–2)
ALP SERPL-CCNC: 95 UNIT/L (ref 40–150)
ALT SERPL-CCNC: 13 UNIT/L (ref 0–55)
APPEARANCE UR: ABNORMAL
AST SERPL-CCNC: 9 UNIT/L (ref 5–34)
BACTERIA #/AREA URNS AUTO: ABNORMAL /HPF
BASOPHILS # BLD AUTO: 0.1 X10(3)/MCL (ref 0–0.2)
BASOPHILS NFR BLD AUTO: 0.9 %
BILIRUB UR QL STRIP.AUTO: NEGATIVE MG/DL
BILIRUBIN DIRECT+TOT PNL SERPL-MCNC: 0.5 MG/DL
BUN SERPL-MCNC: 30 MG/DL (ref 8.9–20.6)
CALCIUM SERPL-MCNC: 9.7 MG/DL (ref 8.4–10.2)
CHLORIDE SERPL-SCNC: 105 MMOL/L (ref 98–107)
CO2 SERPL-SCNC: 26 MMOL/L (ref 22–29)
COLOR UR AUTO: ABNORMAL
CREAT SERPL-MCNC: 1.54 MG/DL (ref 0.73–1.18)
EOSINOPHIL # BLD AUTO: 0.29 X10(3)/MCL (ref 0–0.9)
EOSINOPHIL NFR BLD AUTO: 2.7 %
ERYTHROCYTE [DISTWIDTH] IN BLOOD BY AUTOMATED COUNT: 15.8 % (ref 11.5–17)
GFR SERPLBLD CREATININE-BSD FMLA CKD-EPI: 55 MLS/MIN/1.73/M2
GLOBULIN SER-MCNC: 4.5 GM/DL (ref 2.4–3.5)
GLUCOSE SERPL-MCNC: 108 MG/DL (ref 74–100)
GLUCOSE UR QL STRIP.AUTO: NEGATIVE MG/DL
HCT VFR BLD AUTO: 34.2 % (ref 42–52)
HGB BLD-MCNC: 10.4 GM/DL (ref 14–18)
IMM GRANULOCYTES # BLD AUTO: 0.12 X10(3)/MCL (ref 0–0.04)
IMM GRANULOCYTES NFR BLD AUTO: 1.1 %
KETONES UR QL STRIP.AUTO: ABNORMAL MG/DL
LEUKOCYTE ESTERASE UR QL STRIP.AUTO: ABNORMAL UNIT/L
LYMPHOCYTES # BLD AUTO: 2.45 X10(3)/MCL (ref 0.6–4.6)
LYMPHOCYTES NFR BLD AUTO: 23 %
MCH RBC QN AUTO: 25.2 PG
MCHC RBC AUTO-ENTMCNC: 30.4 MG/DL (ref 33–36)
MCV RBC AUTO: 83 FL (ref 80–94)
MONOCYTES # BLD AUTO: 1.1 X10(3)/MCL (ref 0.1–1.3)
MONOCYTES NFR BLD AUTO: 10.3 %
NEUTROPHILS # BLD AUTO: 6.59 X10(3)/MCL (ref 2.1–9.2)
NEUTROPHILS NFR BLD AUTO: 62 %
NITRITE UR QL STRIP.AUTO: NEGATIVE
NRBC BLD AUTO-RTO: 0 %
PH UR STRIP.AUTO: 7 [PH]
PLATELET # BLD AUTO: 244 X10(3)/MCL (ref 130–400)
PMV BLD AUTO: 10.9 FL (ref 7.4–10.4)
POTASSIUM SERPL-SCNC: 4.6 MMOL/L (ref 3.5–5.1)
PREALB SERPL-MCNC: 20.3 MG/DL (ref 18–45)
PROT SERPL-MCNC: 7.8 GM/DL (ref 6.4–8.3)
PROT UR QL STRIP.AUTO: NEGATIVE MG/DL
RBC # BLD AUTO: 4.12 X10(6)/MCL (ref 4.7–6.1)
RBC #/AREA URNS AUTO: ABNORMAL /HPF
RBC UR QL AUTO: ABNORMAL UNIT/L
SODIUM SERPL-SCNC: 142 MMOL/L (ref 136–145)
SP GR UR STRIP.AUTO: 1.01 (ref 1–1.03)
SQUAMOUS #/AREA URNS AUTO: ABNORMAL /HPF
UROBILINOGEN UR STRIP-ACNC: 0.2 MG/DL
WBC # SPEC AUTO: 10.7 X10(3)/MCL (ref 4.5–11.5)
WBC #/AREA URNS AUTO: ABNORMAL /HPF
YEAST URNS QL MICRO: ABNORMAL /HPF

## 2023-01-24 PROCEDURE — 25000003 PHARM REV CODE 250: Performed by: INTERNAL MEDICINE

## 2023-01-24 PROCEDURE — 87077 CULTURE AEROBIC IDENTIFY: CPT | Performed by: INTERNAL MEDICINE

## 2023-01-24 PROCEDURE — 11000001 HC ACUTE MED/SURG PRIVATE ROOM

## 2023-01-24 PROCEDURE — 81003 URINALYSIS AUTO W/O SCOPE: CPT | Performed by: INTERNAL MEDICINE

## 2023-01-24 PROCEDURE — 97530 THERAPEUTIC ACTIVITIES: CPT

## 2023-01-24 PROCEDURE — 97530 THERAPEUTIC ACTIVITIES: CPT | Mod: CQ

## 2023-01-24 PROCEDURE — 36415 COLL VENOUS BLD VENIPUNCTURE: CPT | Performed by: EMERGENCY MEDICINE

## 2023-01-24 PROCEDURE — 36415 COLL VENOUS BLD VENIPUNCTURE: CPT | Performed by: INTERNAL MEDICINE

## 2023-01-24 PROCEDURE — 85025 COMPLETE CBC W/AUTO DIFF WBC: CPT | Performed by: INTERNAL MEDICINE

## 2023-01-24 PROCEDURE — 25000003 PHARM REV CODE 250: Performed by: NURSE PRACTITIONER

## 2023-01-24 PROCEDURE — 87040 BLOOD CULTURE FOR BACTERIA: CPT | Performed by: INTERNAL MEDICINE

## 2023-01-24 PROCEDURE — 97535 SELF CARE MNGMENT TRAINING: CPT

## 2023-01-24 PROCEDURE — 97110 THERAPEUTIC EXERCISES: CPT | Mod: CQ

## 2023-01-24 PROCEDURE — 84134 ASSAY OF PREALBUMIN: CPT | Performed by: EMERGENCY MEDICINE

## 2023-01-24 PROCEDURE — G0378 HOSPITAL OBSERVATION PER HR: HCPCS

## 2023-01-24 PROCEDURE — 80053 COMPREHEN METABOLIC PANEL: CPT | Performed by: INTERNAL MEDICINE

## 2023-01-24 PROCEDURE — 25000003 PHARM REV CODE 250: Performed by: STUDENT IN AN ORGANIZED HEALTH CARE EDUCATION/TRAINING PROGRAM

## 2023-01-24 RX ADMIN — OXYCODONE HYDROCHLORIDE 10 MG: 5 TABLET ORAL at 01:01

## 2023-01-24 RX ADMIN — GABAPENTIN 600 MG: 300 CAPSULE ORAL at 08:01

## 2023-01-24 RX ADMIN — APIXABAN 5 MG: 5 TABLET, FILM COATED ORAL at 09:01

## 2023-01-24 RX ADMIN — ACETAMINOPHEN 1000 MG: 500 TABLET, FILM COATED ORAL at 09:01

## 2023-01-24 RX ADMIN — MUPIROCIN: 20 OINTMENT TOPICAL at 09:01

## 2023-01-24 RX ADMIN — GABAPENTIN 600 MG: 300 CAPSULE ORAL at 05:01

## 2023-01-24 RX ADMIN — SENNOSIDES AND DOCUSATE SODIUM 2 TABLET: 50; 8.6 TABLET ORAL at 09:01

## 2023-01-24 RX ADMIN — MIRTAZAPINE 15 MG: 15 TABLET, FILM COATED ORAL at 09:01

## 2023-01-24 RX ADMIN — BACLOFEN 20 MG: 10 TABLET ORAL at 09:01

## 2023-01-24 RX ADMIN — GABAPENTIN 600 MG: 300 CAPSULE ORAL at 09:01

## 2023-01-24 RX ADMIN — OXYCODONE HYDROCHLORIDE 10 MG: 5 TABLET ORAL at 09:01

## 2023-01-24 RX ADMIN — OXYBUTYNIN CHLORIDE 10 MG: 5 TABLET ORAL at 09:01

## 2023-01-24 RX ADMIN — APIXABAN 5 MG: 5 TABLET, FILM COATED ORAL at 08:01

## 2023-01-24 RX ADMIN — BACLOFEN 20 MG: 10 TABLET ORAL at 05:01

## 2023-01-24 RX ADMIN — OXYCODONE HYDROCHLORIDE 10 MG: 5 TABLET ORAL at 08:01

## 2023-01-24 RX ADMIN — OXYBUTYNIN CHLORIDE 10 MG: 5 TABLET ORAL at 08:01

## 2023-01-24 RX ADMIN — DOXYCYCLINE HYCLATE 100 MG: 100 TABLET, COATED ORAL at 08:01

## 2023-01-24 RX ADMIN — OXYCODONE HYDROCHLORIDE 10 MG: 5 TABLET ORAL at 05:01

## 2023-01-24 RX ADMIN — DOXYCYCLINE HYCLATE 100 MG: 100 TABLET, COATED ORAL at 09:01

## 2023-01-24 RX ADMIN — ACETAMINOPHEN 1000 MG: 500 TABLET, FILM COATED ORAL at 05:01

## 2023-01-24 RX ADMIN — DULOXETINE 30 MG: 30 CAPSULE, DELAYED RELEASE ORAL at 08:01
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
## 2023-01-24 NOTE — PLAN OF CARE
Expanded referrals to all facilities in patient's insurance network in a 50 mile radius at [facility]'s request. Awaiting response at this time.

## 2023-01-25 PROCEDURE — 97110 THERAPEUTIC EXERCISES: CPT

## 2023-01-25 PROCEDURE — 97530 THERAPEUTIC ACTIVITIES: CPT | Mod: CQ

## 2023-01-25 PROCEDURE — 97535 SELF CARE MNGMENT TRAINING: CPT

## 2023-01-25 PROCEDURE — 25000003 PHARM REV CODE 250: Performed by: NURSE PRACTITIONER

## 2023-01-25 PROCEDURE — G0378 HOSPITAL OBSERVATION PER HR: HCPCS

## 2023-01-25 PROCEDURE — 11000001 HC ACUTE MED/SURG PRIVATE ROOM

## 2023-01-25 PROCEDURE — 25000003 PHARM REV CODE 250: Performed by: STUDENT IN AN ORGANIZED HEALTH CARE EDUCATION/TRAINING PROGRAM

## 2023-01-25 PROCEDURE — 25000003 PHARM REV CODE 250: Performed by: INTERNAL MEDICINE

## 2023-01-25 RX ORDER — FLUTICASONE PROPIONATE 50 MCG
1 SPRAY, SUSPENSION (ML) NASAL 2 TIMES DAILY
Status: DISCONTINUED | OUTPATIENT
Start: 2023-01-25 | End: 2023-02-03 | Stop reason: HOSPADM

## 2023-01-25 RX ADMIN — OXYCODONE HYDROCHLORIDE 10 MG: 5 TABLET ORAL at 09:01

## 2023-01-25 RX ADMIN — MUPIROCIN: 20 OINTMENT TOPICAL at 08:01

## 2023-01-25 RX ADMIN — DULOXETINE 30 MG: 30 CAPSULE, DELAYED RELEASE ORAL at 08:01

## 2023-01-25 RX ADMIN — MIRTAZAPINE 15 MG: 15 TABLET, FILM COATED ORAL at 09:01

## 2023-01-25 RX ADMIN — GABAPENTIN 600 MG: 300 CAPSULE ORAL at 09:01

## 2023-01-25 RX ADMIN — APIXABAN 5 MG: 5 TABLET, FILM COATED ORAL at 08:01

## 2023-01-25 RX ADMIN — OXYCODONE HYDROCHLORIDE 10 MG: 5 TABLET ORAL at 01:01

## 2023-01-25 RX ADMIN — OXYBUTYNIN CHLORIDE 10 MG: 5 TABLET ORAL at 09:01

## 2023-01-25 RX ADMIN — DOXYCYCLINE HYCLATE 100 MG: 100 TABLET, COATED ORAL at 09:01

## 2023-01-25 RX ADMIN — APIXABAN 5 MG: 5 TABLET, FILM COATED ORAL at 09:01

## 2023-01-25 RX ADMIN — OXYCODONE HYDROCHLORIDE 10 MG: 5 TABLET ORAL at 10:01

## 2023-01-25 RX ADMIN — AMLODIPINE BESYLATE 5 MG: 5 TABLET ORAL at 08:01

## 2023-01-25 RX ADMIN — METOPROLOL SUCCINATE 25 MG: 25 TABLET, EXTENDED RELEASE ORAL at 08:01

## 2023-01-25 RX ADMIN — DOXYCYCLINE HYCLATE 100 MG: 100 TABLET, COATED ORAL at 08:01

## 2023-01-25 RX ADMIN — SERTRALINE HYDROCHLORIDE 50 MG: 50 TABLET ORAL at 08:01

## 2023-01-25 RX ADMIN — OXYCODONE HYDROCHLORIDE 10 MG: 5 TABLET ORAL at 05:01

## 2023-01-25 RX ADMIN — SENNOSIDES AND DOCUSATE SODIUM 2 TABLET: 50; 8.6 TABLET ORAL at 09:01

## 2023-01-25 RX ADMIN — GABAPENTIN 600 MG: 300 CAPSULE ORAL at 03:01

## 2023-01-25 RX ADMIN — ACETAMINOPHEN 1000 MG: 500 TABLET, FILM COATED ORAL at 03:01

## 2023-01-25 RX ADMIN — OXYCODONE HYDROCHLORIDE 10 MG: 5 TABLET ORAL at 06:01

## 2023-01-25 RX ADMIN — OXYBUTYNIN CHLORIDE 10 MG: 5 TABLET ORAL at 08:01

## 2023-01-25 RX ADMIN — ACETAMINOPHEN 1000 MG: 500 TABLET, FILM COATED ORAL at 09:01

## 2023-01-25 RX ADMIN — MUPIROCIN: 20 OINTMENT TOPICAL at 09:01

## 2023-01-25 RX ADMIN — FERROUS SULFATE TAB 325 MG (65 MG ELEMENTAL FE) 1 EACH: 325 (65 FE) TAB at 01:01

## 2023-01-25 RX ADMIN — ONDANSETRON 4 MG: 4 TABLET, ORALLY DISINTEGRATING ORAL at 01:01

## 2023-01-25 RX ADMIN — GABAPENTIN 600 MG: 300 CAPSULE ORAL at 08:01

## 2023-01-25 RX ADMIN — BACLOFEN 20 MG: 10 TABLET ORAL at 09:01

## 2023-01-25 RX ADMIN — ACETAMINOPHEN 1000 MG: 500 TABLET, FILM COATED ORAL at 08:01

## 2023-01-25 RX ADMIN — BACLOFEN 20 MG: 10 TABLET ORAL at 03:01
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
## 2023-01-25 NOTE — PT/OT/SLP PROGRESS
Physical Therapy Treatment    Patient Name:  Hemal Guerrero   MRN:  [id number]    Recommendations:     Discharge Recommendations:  nursing facility, skilled   Discharge Equipment Recommendations: hospital bed, lift device     Subjective     Patient awake and alert.     Objective:     General Precautions: Standard, other (see comments) (geomat/ROHO on wheelchair seat and small pillow to lower back as well as ongoing heel offloading devices in place. christie lift only)   Orthopedic Precautions:    Braces:    Respiratory Status: Room air     Communicated with nurse prior to treatment.     Functional Mobility:    Rolling:Minimal Assistance  Supine to sit:Activity did not occur  Sit to stand transfer: Activity did not occur  Bed to chair transfer:Total Assistance  Pt sat for one hour on geomat and assisted BTB via christie lift due to wound. Pt was nauseated therefore no PRE's performed. /65 and xray of right were negative.       AM-PAC 6 CLICK MOBILITY        Patient left left sidelying with call button in reach.    GOALS:   Multidisciplinary Problems       Physical Therapy Goals          Problem: Physical Therapy    Goal Priority Disciplines Outcome Goal Variances Interventions   Physical Therapy Goal     PT, PT/OT Ongoing, Progressing     Description: Goals to be met by: 2023     Patient will increase functional independence with mobility by performin. Supine to sit with Stand-by Assistance  2. Sit to supine with Stand-by Assistance  3. Rolling to Left and Right with Stand-by Assistance.  4. Sit to stand transfer with Maximum Assistance  5. Sitting at edge of bed x10 minutes with [place]                         Assessment:     Hemal Guerrero is a 47 y.o. male admitted with a medical diagnosis of <principal problem not specified>.     Patient Active Problem List   Diagnosis    Cellulitis    Pressure injury of left buttock, stage 4        Rehab Prognosis: Good; patient would benefit from acute skilled PT  services to address these deficits and reach maximum level of function.    Recent Surgery: * No surgery found *      Plan:     During this hospitalization, patient to be seen 3 x/week to address the identified rehab impairments via therapeutic activities, therapeutic exercises, wheelchair management/training and progress toward the following goals:    Plan of Care Expires:  02/16/23    Billable Minutes     Billable Minutes: Therapeutic Activity 15    Treatment Type: Treatment  PT/PTA: PTA     PTA Visit Number: 5     01/25/2023

## 2023-01-26 PROCEDURE — 25000242 PHARM REV CODE 250 ALT 637 W/ HCPCS: Performed by: NURSE PRACTITIONER

## 2023-01-26 PROCEDURE — 25000003 PHARM REV CODE 250: Performed by: STUDENT IN AN ORGANIZED HEALTH CARE EDUCATION/TRAINING PROGRAM

## 2023-01-26 PROCEDURE — 11000001 HC ACUTE MED/SURG PRIVATE ROOM

## 2023-01-26 PROCEDURE — 25000003 PHARM REV CODE 250: Performed by: NURSE PRACTITIONER

## 2023-01-26 PROCEDURE — G0378 HOSPITAL OBSERVATION PER HR: HCPCS

## 2023-01-26 PROCEDURE — 97530 THERAPEUTIC ACTIVITIES: CPT | Mod: CO

## 2023-01-26 PROCEDURE — 51702 INSERT TEMP BLADDER CATH: CPT

## 2023-01-26 PROCEDURE — 25000003 PHARM REV CODE 250: Performed by: INTERNAL MEDICINE

## 2023-01-26 RX ADMIN — ONDANSETRON 4 MG: 4 TABLET, ORALLY DISINTEGRATING ORAL at 10:01

## 2023-01-26 RX ADMIN — APIXABAN 5 MG: 5 TABLET, FILM COATED ORAL at 08:01

## 2023-01-26 RX ADMIN — MUPIROCIN: 20 OINTMENT TOPICAL at 08:01

## 2023-01-26 RX ADMIN — GABAPENTIN 600 MG: 300 CAPSULE ORAL at 09:01

## 2023-01-26 RX ADMIN — DOXYCYCLINE HYCLATE 100 MG: 100 TABLET, COATED ORAL at 08:01

## 2023-01-26 RX ADMIN — FLUTICASONE PROPIONATE 50 MCG: 50 SPRAY, METERED NASAL at 12:01

## 2023-01-26 RX ADMIN — DULOXETINE 30 MG: 30 CAPSULE, DELAYED RELEASE ORAL at 09:01

## 2023-01-26 RX ADMIN — SERTRALINE HYDROCHLORIDE 50 MG: 50 TABLET ORAL at 09:01

## 2023-01-26 RX ADMIN — DOXYCYCLINE HYCLATE 100 MG: 100 TABLET, COATED ORAL at 09:01

## 2023-01-26 RX ADMIN — MUPIROCIN: 20 OINTMENT TOPICAL at 09:01

## 2023-01-26 RX ADMIN — OXYCODONE HYDROCHLORIDE 10 MG: 5 TABLET ORAL at 03:01

## 2023-01-26 RX ADMIN — OXYBUTYNIN CHLORIDE 10 MG: 5 TABLET ORAL at 08:01

## 2023-01-26 RX ADMIN — APIXABAN 5 MG: 5 TABLET, FILM COATED ORAL at 09:01

## 2023-01-26 RX ADMIN — FLUTICASONE PROPIONATE 50 MCG: 50 SPRAY, METERED NASAL at 09:01

## 2023-01-26 RX ADMIN — BACLOFEN 20 MG: 10 TABLET ORAL at 09:01

## 2023-01-26 RX ADMIN — BACLOFEN 20 MG: 10 TABLET ORAL at 02:01

## 2023-01-26 RX ADMIN — OXYCODONE HYDROCHLORIDE 10 MG: 5 TABLET ORAL at 08:01

## 2023-01-26 RX ADMIN — ACETAMINOPHEN 1000 MG: 500 TABLET, FILM COATED ORAL at 08:01

## 2023-01-26 RX ADMIN — GABAPENTIN 600 MG: 300 CAPSULE ORAL at 02:01

## 2023-01-26 RX ADMIN — GABAPENTIN 600 MG: 300 CAPSULE ORAL at 08:01

## 2023-01-26 RX ADMIN — OXYCODONE HYDROCHLORIDE 10 MG: 5 TABLET ORAL at 07:01

## 2023-01-26 RX ADMIN — OXYBUTYNIN CHLORIDE 10 MG: 5 TABLET ORAL at 09:01

## 2023-01-26 RX ADMIN — MIRTAZAPINE 15 MG: 15 TABLET, FILM COATED ORAL at 08:01

## 2023-01-26 RX ADMIN — OXYCODONE HYDROCHLORIDE 10 MG: 5 TABLET ORAL at 11:01

## 2023-01-26 RX ADMIN — OXYCODONE HYDROCHLORIDE 10 MG: 5 TABLET ORAL at 04:01

## 2023-01-26 NOTE — PROCEDURES
Hemal Guerrero is a 47 y.o. male patient.    Temp: 98.4 °F (36.9 °C) (01/26/23 1128)  Pulse: 93 (01/26/23 1128)  Resp: 18 (01/26/23 1156)  BP: 128/84 (01/26/23 1128)  SpO2: 97 % (01/26/23 1128)  Weight: 72.9 kg (160 lb 11.5 oz) (01/15/23 1628)  Height: 6' (182.9 cm) (01/15/23 1628)       Bladder Cath    Date/Time: 1/26/2023 3:12 PM  Location procedure was performed: [facility]  Performed by: JOSE ALFREDO Kay  Authorized by: JOSE ALFREDO Kay   Indications: neurogenic bladder  Local anesthesia used: no    Anesthesia:  Local anesthesia used: no    Patient sedated: no  Preparation: Patient was prepped and draped in the usual sterile fashion.  Catheter insertion: indwelling  Catheter size: 18 Fr  Complicated insertion: no  Altered anatomy: no  Urine characteristics: yellow  Complications: No  Specimens: No  Implants: No  Patient tolerance: Patient tolerated the procedure well with no immediate complications  Comments: There was minimal brown urine in the  bag prior to removal of existing Bhatti, once I removed it, urine was shooting out from the SP site. New 18 Malay Bhatti replaced quickly qith return of 800 mL's of urine.         1/26/2023

## 2023-01-26 NOTE — PLAN OF CARE
Problem: Infection  Goal: Absence of Infection Signs and Symptoms  Outcome: Ongoing, Progressing     Problem: Adult Inpatient Plan of Care  Goal: Plan of Care Review  Outcome: Ongoing, Progressing  Goal: Readiness for Transition of Care  Outcome: Ongoing, Progressing     Problem: Impaired Wound Healing  Goal: Optimal Wound Healing  Outcome: Ongoing, Progressing     Problem: Skin Injury Risk Increased  Goal: Skin Health and Integrity  Outcome: Ongoing, Progressing
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
## 2023-01-26 NOTE — PT/OT/SLP PROGRESS
Occupational Therapy  Treatment    Hemal Guerrero   MRN: [id number]   Admitting Diagnosis: <principal problem not specified>    OT Date of Treatment: 01/26/23   OT Start Time: 1520  OT Stop Time: 1539  OT Total Time (min): 19 min     OT/ALEXANDER: ALEXANDER     ALEXANDER Visit Number: 5    General Precautions: Standard, fall  Orthopedic Precautions:    Braces:      Spiritual, Cultural Beliefs, Advent Practices, Values that Affect Care: no    Subjective:  Communicated with RN prior to session.         Objective:       Functional Mobility:  Bed Mobility:   Supine to sit: Total Assistance   Sit to supine: Total Assistance    Patient performing theraband activity supported long sit in bed in all planes using green theraband for 10 reps  Patient sitting to EOB performing dynamic sitting activity to reach in different planes for 5 reps x5, patient requiring Mod A for sitting balance with tremors noted throughout body.    Patient left HOB elevated with all lines intact, call button in reach, and wedge under R shoulder    ASSESSMENT:  Hemal Guerrero is a 47 y.o. male with a medical diagnosis of <principal problem not specified>.    Rehab potential is poor    Activity tolerance: Poor    Discharge recommendations: nursing facility, basic     Equipment recommendations:       GOALS:   Multidisciplinary Problems       Occupational Therapy Goals          Problem: Occupational Therapy    Goal Priority Disciplines Outcome Interventions   Occupational Therapy Goal     OT, PT/OT Ongoing, Progressing    Description: Goals to be met by: 2/17/23     Patient will increase functional independence with ADLs by performing:    LE Dressing with Minimal Assistance.  Grooming while seated at sink with Minimal Assistance.                         Plan:  Patient to be seen 2 x/week, 3 x/week to address the above listed problems via self-care/home management, therapeutic activities, therapeutic exercises  Plan of Care expires: 02/17/23  Plan of Care reviewed with:  patient         01/26/2023
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
## 2023-01-27 PROCEDURE — 25000003 PHARM REV CODE 250: Performed by: INTERNAL MEDICINE

## 2023-01-27 PROCEDURE — 25000003 PHARM REV CODE 250: Performed by: NURSE PRACTITIONER

## 2023-01-27 PROCEDURE — 25000003 PHARM REV CODE 250: Performed by: STUDENT IN AN ORGANIZED HEALTH CARE EDUCATION/TRAINING PROGRAM

## 2023-01-27 PROCEDURE — 11000001 HC ACUTE MED/SURG PRIVATE ROOM

## 2023-01-27 PROCEDURE — G0378 HOSPITAL OBSERVATION PER HR: HCPCS

## 2023-01-27 RX ADMIN — BACLOFEN 20 MG: 10 TABLET ORAL at 07:01

## 2023-01-27 RX ADMIN — BACLOFEN 20 MG: 10 TABLET ORAL at 09:01

## 2023-01-27 RX ADMIN — DOXYCYCLINE HYCLATE 100 MG: 100 TABLET, COATED ORAL at 09:01

## 2023-01-27 RX ADMIN — OXYBUTYNIN CHLORIDE 10 MG: 5 TABLET ORAL at 07:01

## 2023-01-27 RX ADMIN — AMLODIPINE BESYLATE 5 MG: 5 TABLET ORAL at 09:01

## 2023-01-27 RX ADMIN — METOPROLOL SUCCINATE 25 MG: 25 TABLET, EXTENDED RELEASE ORAL at 09:01

## 2023-01-27 RX ADMIN — OXYCODONE HYDROCHLORIDE 10 MG: 5 TABLET ORAL at 01:01

## 2023-01-27 RX ADMIN — MIRTAZAPINE 15 MG: 15 TABLET, FILM COATED ORAL at 07:01

## 2023-01-27 RX ADMIN — DULOXETINE 30 MG: 30 CAPSULE, DELAYED RELEASE ORAL at 09:01

## 2023-01-27 RX ADMIN — MUPIROCIN: 20 OINTMENT TOPICAL at 08:01

## 2023-01-27 RX ADMIN — BACLOFEN 20 MG: 10 TABLET ORAL at 02:01

## 2023-01-27 RX ADMIN — ACETAMINOPHEN 1000 MG: 500 TABLET, FILM COATED ORAL at 09:01

## 2023-01-27 RX ADMIN — OXYCODONE HYDROCHLORIDE 10 MG: 5 TABLET ORAL at 10:01

## 2023-01-27 RX ADMIN — FLUTICASONE PROPIONATE 50 MCG: 50 SPRAY, METERED NASAL at 09:01

## 2023-01-27 RX ADMIN — APIXABAN 5 MG: 5 TABLET, FILM COATED ORAL at 07:01

## 2023-01-27 RX ADMIN — OXYBUTYNIN CHLORIDE 10 MG: 5 TABLET ORAL at 09:01

## 2023-01-27 RX ADMIN — APIXABAN 5 MG: 5 TABLET, FILM COATED ORAL at 09:01

## 2023-01-27 RX ADMIN — ACETAMINOPHEN 1000 MG: 500 TABLET, FILM COATED ORAL at 07:01

## 2023-01-27 RX ADMIN — SERTRALINE HYDROCHLORIDE 50 MG: 50 TABLET ORAL at 09:01

## 2023-01-27 RX ADMIN — OXYCODONE HYDROCHLORIDE 10 MG: 5 TABLET ORAL at 03:01

## 2023-01-27 RX ADMIN — GABAPENTIN 600 MG: 300 CAPSULE ORAL at 07:01

## 2023-01-27 RX ADMIN — DOXYCYCLINE HYCLATE 100 MG: 100 TABLET, COATED ORAL at 07:01

## 2023-01-27 RX ADMIN — GABAPENTIN 600 MG: 300 CAPSULE ORAL at 09:01

## 2023-01-27 RX ADMIN — OXYCODONE HYDROCHLORIDE 10 MG: 5 TABLET ORAL at 05:01

## 2023-01-27 RX ADMIN — GABAPENTIN 600 MG: 300 CAPSULE ORAL at 02:01

## 2023-01-27 RX ADMIN — ACETAMINOPHEN 1000 MG: 500 TABLET, FILM COATED ORAL at 02:01

## 2023-01-27 RX ADMIN — OXYCODONE HYDROCHLORIDE 10 MG: 5 TABLET ORAL at 07:01

## 2023-01-27 RX ADMIN — MUPIROCIN: 20 OINTMENT TOPICAL at 09:01

## 2023-01-27 RX ADMIN — FERROUS SULFATE TAB 325 MG (65 MG ELEMENTAL FE) 1 EACH: 325 (65 FE) TAB at 02:01
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
## 2023-01-28 LAB
BACTERIA UR CULT: ABNORMAL
BACTERIA UR CULT: ABNORMAL

## 2023-01-28 PROCEDURE — 25000003 PHARM REV CODE 250: Performed by: NURSE PRACTITIONER

## 2023-01-28 PROCEDURE — 11000001 HC ACUTE MED/SURG PRIVATE ROOM

## 2023-01-28 PROCEDURE — 25000003 PHARM REV CODE 250: Performed by: INTERNAL MEDICINE

## 2023-01-28 PROCEDURE — 25000003 PHARM REV CODE 250: Performed by: STUDENT IN AN ORGANIZED HEALTH CARE EDUCATION/TRAINING PROGRAM

## 2023-01-28 PROCEDURE — G0378 HOSPITAL OBSERVATION PER HR: HCPCS

## 2023-01-28 RX ADMIN — APIXABAN 5 MG: 5 TABLET, FILM COATED ORAL at 08:01

## 2023-01-28 RX ADMIN — DOXYCYCLINE HYCLATE 100 MG: 100 TABLET, COATED ORAL at 08:01

## 2023-01-28 RX ADMIN — OXYCODONE HYDROCHLORIDE 10 MG: 5 TABLET ORAL at 02:01

## 2023-01-28 RX ADMIN — SERTRALINE HYDROCHLORIDE 50 MG: 50 TABLET ORAL at 08:01

## 2023-01-28 RX ADMIN — BACLOFEN 20 MG: 10 TABLET ORAL at 08:01

## 2023-01-28 RX ADMIN — ACETAMINOPHEN 1000 MG: 500 TABLET, FILM COATED ORAL at 02:01

## 2023-01-28 RX ADMIN — MUPIROCIN: 20 OINTMENT TOPICAL at 08:01

## 2023-01-28 RX ADMIN — OXYCODONE HYDROCHLORIDE 10 MG: 5 TABLET ORAL at 07:01

## 2023-01-28 RX ADMIN — FLUTICASONE PROPIONATE 50 MCG: 50 SPRAY, METERED NASAL at 09:01

## 2023-01-28 RX ADMIN — GABAPENTIN 600 MG: 300 CAPSULE ORAL at 08:01

## 2023-01-28 RX ADMIN — OXYCODONE HYDROCHLORIDE 10 MG: 5 TABLET ORAL at 06:01

## 2023-01-28 RX ADMIN — BACLOFEN 20 MG: 10 TABLET ORAL at 02:01

## 2023-01-28 RX ADMIN — AMLODIPINE BESYLATE 5 MG: 5 TABLET ORAL at 08:01

## 2023-01-28 RX ADMIN — OXYBUTYNIN CHLORIDE 10 MG: 5 TABLET ORAL at 08:01

## 2023-01-28 RX ADMIN — SENNOSIDES AND DOCUSATE SODIUM 2 TABLET: 50; 8.6 TABLET ORAL at 08:01

## 2023-01-28 RX ADMIN — MIRTAZAPINE 15 MG: 15 TABLET, FILM COATED ORAL at 08:01

## 2023-01-28 RX ADMIN — DULOXETINE 30 MG: 30 CAPSULE, DELAYED RELEASE ORAL at 08:01

## 2023-01-28 RX ADMIN — FLUTICASONE PROPIONATE 50 MCG: 50 SPRAY, METERED NASAL at 08:01

## 2023-01-28 RX ADMIN — ACETAMINOPHEN 1000 MG: 500 TABLET, FILM COATED ORAL at 08:01

## 2023-01-28 RX ADMIN — OXYCODONE HYDROCHLORIDE 10 MG: 5 TABLET ORAL at 10:01

## 2023-01-28 RX ADMIN — OXYCODONE HYDROCHLORIDE 10 MG: 5 TABLET ORAL at 03:01

## 2023-01-28 RX ADMIN — GABAPENTIN 600 MG: 300 CAPSULE ORAL at 02:01
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
## 2023-01-29 LAB
BACTERIA BLD CULT: NORMAL
BACTERIA BLD CULT: NORMAL

## 2023-01-29 PROCEDURE — 25000003 PHARM REV CODE 250: Performed by: INTERNAL MEDICINE

## 2023-01-29 PROCEDURE — 25000003 PHARM REV CODE 250: Performed by: STUDENT IN AN ORGANIZED HEALTH CARE EDUCATION/TRAINING PROGRAM

## 2023-01-29 PROCEDURE — 11000001 HC ACUTE MED/SURG PRIVATE ROOM

## 2023-01-29 PROCEDURE — 25000003 PHARM REV CODE 250: Performed by: NURSE PRACTITIONER

## 2023-01-29 PROCEDURE — G0378 HOSPITAL OBSERVATION PER HR: HCPCS

## 2023-01-29 RX ORDER — OXYCODONE HYDROCHLORIDE 5 MG/1
10 TABLET ORAL EVERY 6 HOURS PRN
Status: DISCONTINUED | OUTPATIENT
Start: 2023-01-29 | End: 2023-02-03 | Stop reason: HOSPADM

## 2023-01-29 RX ADMIN — OXYCODONE HYDROCHLORIDE 10 MG: 5 TABLET ORAL at 05:01

## 2023-01-29 RX ADMIN — METOPROLOL SUCCINATE 25 MG: 25 TABLET, EXTENDED RELEASE ORAL at 08:01

## 2023-01-29 RX ADMIN — APIXABAN 5 MG: 5 TABLET, FILM COATED ORAL at 08:01

## 2023-01-29 RX ADMIN — DOXYCYCLINE HYCLATE 100 MG: 100 TABLET, COATED ORAL at 09:01

## 2023-01-29 RX ADMIN — OXYCODONE 10 MG: 5 TABLET ORAL at 10:01

## 2023-01-29 RX ADMIN — ACETAMINOPHEN 1000 MG: 500 TABLET, FILM COATED ORAL at 08:01

## 2023-01-29 RX ADMIN — GABAPENTIN 600 MG: 300 CAPSULE ORAL at 03:01

## 2023-01-29 RX ADMIN — ACETAMINOPHEN 1000 MG: 500 TABLET, FILM COATED ORAL at 09:01

## 2023-01-29 RX ADMIN — FLUTICASONE PROPIONATE 50 MCG: 50 SPRAY, METERED NASAL at 08:01

## 2023-01-29 RX ADMIN — SENNOSIDES AND DOCUSATE SODIUM 2 TABLET: 50; 8.6 TABLET ORAL at 09:01

## 2023-01-29 RX ADMIN — OXYBUTYNIN CHLORIDE 10 MG: 5 TABLET ORAL at 08:01

## 2023-01-29 RX ADMIN — APIXABAN 5 MG: 5 TABLET, FILM COATED ORAL at 09:01

## 2023-01-29 RX ADMIN — GABAPENTIN 600 MG: 300 CAPSULE ORAL at 09:01

## 2023-01-29 RX ADMIN — OXYBUTYNIN CHLORIDE 10 MG: 5 TABLET ORAL at 09:01

## 2023-01-29 RX ADMIN — BACLOFEN 20 MG: 10 TABLET ORAL at 08:01

## 2023-01-29 RX ADMIN — GABAPENTIN 600 MG: 300 CAPSULE ORAL at 08:01

## 2023-01-29 RX ADMIN — OXYCODONE 10 MG: 5 TABLET ORAL at 04:01

## 2023-01-29 RX ADMIN — FERROUS SULFATE TAB 325 MG (65 MG ELEMENTAL FE) 1 EACH: 325 (65 FE) TAB at 01:01

## 2023-01-29 RX ADMIN — ACETAMINOPHEN 1000 MG: 500 TABLET, FILM COATED ORAL at 03:01

## 2023-01-29 RX ADMIN — SERTRALINE HYDROCHLORIDE 50 MG: 50 TABLET ORAL at 08:01

## 2023-01-29 RX ADMIN — DOXYCYCLINE HYCLATE 100 MG: 100 TABLET, COATED ORAL at 08:01

## 2023-01-29 RX ADMIN — DULOXETINE 30 MG: 30 CAPSULE, DELAYED RELEASE ORAL at 08:01

## 2023-01-29 RX ADMIN — FLUTICASONE PROPIONATE 50 MCG: 50 SPRAY, METERED NASAL at 09:01

## 2023-01-29 RX ADMIN — OXYCODONE HYDROCHLORIDE 10 MG: 5 TABLET ORAL at 12:01

## 2023-01-29 RX ADMIN — MUPIROCIN: 20 OINTMENT TOPICAL at 08:01

## 2023-01-29 RX ADMIN — MIRTAZAPINE 15 MG: 15 TABLET, FILM COATED ORAL at 09:01

## 2023-01-29 RX ADMIN — BACLOFEN 20 MG: 10 TABLET ORAL at 09:01

## 2023-01-29 RX ADMIN — OXYCODONE HYDROCHLORIDE 10 MG: 5 TABLET ORAL at 09:01

## 2023-01-29 RX ADMIN — MUPIROCIN: 20 OINTMENT TOPICAL at 09:01

## 2023-01-29 RX ADMIN — BACLOFEN 20 MG: 10 TABLET ORAL at 03:01
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
## 2023-01-30 PROCEDURE — 25000003 PHARM REV CODE 250: Performed by: STUDENT IN AN ORGANIZED HEALTH CARE EDUCATION/TRAINING PROGRAM

## 2023-01-30 PROCEDURE — 25000003 PHARM REV CODE 250: Performed by: INTERNAL MEDICINE

## 2023-01-30 PROCEDURE — 99233 SBSQ HOSP IP/OBS HIGH 50: CPT | Mod: NSCH,,, | Performed by: EMERGENCY MEDICINE

## 2023-01-30 PROCEDURE — G0378 HOSPITAL OBSERVATION PER HR: HCPCS

## 2023-01-30 PROCEDURE — 25000003 PHARM REV CODE 250: Performed by: NURSE PRACTITIONER

## 2023-01-30 PROCEDURE — 97168 OT RE-EVAL EST PLAN CARE: CPT

## 2023-01-30 PROCEDURE — 99233 PR SUBSEQUENT HOSPITAL CARE,LEVL III: ICD-10-PCS | Mod: NSCH,,, | Performed by: EMERGENCY MEDICINE

## 2023-01-30 PROCEDURE — 11000001 HC ACUTE MED/SURG PRIVATE ROOM

## 2023-01-30 PROCEDURE — 97535 SELF CARE MNGMENT TRAINING: CPT

## 2023-01-30 RX ADMIN — DULOXETINE 30 MG: 30 CAPSULE, DELAYED RELEASE ORAL at 08:01

## 2023-01-30 RX ADMIN — GABAPENTIN 600 MG: 300 CAPSULE ORAL at 10:01

## 2023-01-30 RX ADMIN — BACLOFEN 20 MG: 10 TABLET ORAL at 10:01

## 2023-01-30 RX ADMIN — ACETAMINOPHEN 1000 MG: 500 TABLET, FILM COATED ORAL at 08:01

## 2023-01-30 RX ADMIN — APIXABAN 5 MG: 5 TABLET, FILM COATED ORAL at 08:01

## 2023-01-30 RX ADMIN — APIXABAN 5 MG: 5 TABLET, FILM COATED ORAL at 10:01

## 2023-01-30 RX ADMIN — GABAPENTIN 600 MG: 300 CAPSULE ORAL at 08:01

## 2023-01-30 RX ADMIN — OXYCODONE 10 MG: 5 TABLET ORAL at 04:01

## 2023-01-30 RX ADMIN — MIRTAZAPINE 15 MG: 15 TABLET, FILM COATED ORAL at 10:01

## 2023-01-30 RX ADMIN — MUPIROCIN: 20 OINTMENT TOPICAL at 10:01

## 2023-01-30 RX ADMIN — DOXYCYCLINE HYCLATE 100 MG: 100 TABLET, COATED ORAL at 08:01

## 2023-01-30 RX ADMIN — SERTRALINE HYDROCHLORIDE 50 MG: 50 TABLET ORAL at 08:01

## 2023-01-30 RX ADMIN — OXYCODONE 10 MG: 5 TABLET ORAL at 10:01

## 2023-01-30 RX ADMIN — GABAPENTIN 600 MG: 300 CAPSULE ORAL at 04:01

## 2023-01-30 RX ADMIN — METOPROLOL SUCCINATE 25 MG: 25 TABLET, EXTENDED RELEASE ORAL at 08:01

## 2023-01-30 RX ADMIN — OXYBUTYNIN CHLORIDE 10 MG: 5 TABLET ORAL at 08:01

## 2023-01-30 RX ADMIN — DOXYCYCLINE HYCLATE 100 MG: 100 TABLET, COATED ORAL at 10:01

## 2023-01-30 RX ADMIN — OXYBUTYNIN CHLORIDE 10 MG: 5 TABLET ORAL at 10:01

## 2023-01-30 RX ADMIN — AMLODIPINE BESYLATE 5 MG: 5 TABLET ORAL at 08:01

## 2023-01-30 RX ADMIN — SENNOSIDES AND DOCUSATE SODIUM 2 TABLET: 50; 8.6 TABLET ORAL at 10:01

## 2023-01-30 RX ADMIN — BACLOFEN 20 MG: 10 TABLET ORAL at 08:01

## 2023-01-30 RX ADMIN — BACLOFEN 20 MG: 10 TABLET ORAL at 04:01

## 2023-01-30 RX ADMIN — FLUTICASONE PROPIONATE 50 MCG: 50 SPRAY, METERED NASAL at 10:01

## 2023-01-30 NOTE — PLAN OF CARE
Discussed with patient that he was denied SNF by [employer] he will have to go long-term of private pay to the nursing home. States that ain't going to happen .He will speak to his sister about a discharge plan.
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
## 2023-01-30 NOTE — PLAN OF CARE
Problem: Occupational Therapy  Goal: Occupational Therapy Goal  Description: Goals to be met by: 2/13/23     Patient will increase functional independence with ADLs by performing:    LE Dressing with Minimal Assistance, utilizing dressing AEDs.   Grooming while seated at sink with Minimal Assistance. - MET.  UB Dressing at w/c level with Mod I. - New goal.  Pt will maintain dynamic sitting balance while EOB with one UE support and SBA provided in order to increase pt's safety and independence with ADLs. - New goal.    Outcome: Ongoing, Progressing

## 2023-01-31 PROCEDURE — 25000003 PHARM REV CODE 250: Performed by: NURSE PRACTITIONER

## 2023-01-31 PROCEDURE — 25000003 PHARM REV CODE 250: Performed by: STUDENT IN AN ORGANIZED HEALTH CARE EDUCATION/TRAINING PROGRAM

## 2023-01-31 PROCEDURE — 97530 THERAPEUTIC ACTIVITIES: CPT

## 2023-01-31 PROCEDURE — 25000003 PHARM REV CODE 250: Performed by: INTERNAL MEDICINE

## 2023-01-31 PROCEDURE — 11000001 HC ACUTE MED/SURG PRIVATE ROOM

## 2023-01-31 PROCEDURE — G0378 HOSPITAL OBSERVATION PER HR: HCPCS

## 2023-01-31 PROCEDURE — 97164 PT RE-EVAL EST PLAN CARE: CPT

## 2023-01-31 RX ADMIN — MIRTAZAPINE 15 MG: 15 TABLET, FILM COATED ORAL at 09:01

## 2023-01-31 RX ADMIN — DOXYCYCLINE HYCLATE 100 MG: 100 TABLET, COATED ORAL at 09:01

## 2023-01-31 RX ADMIN — BACLOFEN 20 MG: 10 TABLET ORAL at 09:01

## 2023-01-31 RX ADMIN — FERROUS SULFATE TAB 325 MG (65 MG ELEMENTAL FE) 1 EACH: 325 (65 FE) TAB at 03:01

## 2023-01-31 RX ADMIN — DULOXETINE 30 MG: 30 CAPSULE, DELAYED RELEASE ORAL at 09:01

## 2023-01-31 RX ADMIN — FLUTICASONE PROPIONATE 50 MCG: 50 SPRAY, METERED NASAL at 09:01

## 2023-01-31 RX ADMIN — APIXABAN 5 MG: 5 TABLET, FILM COATED ORAL at 09:01

## 2023-01-31 RX ADMIN — OXYCODONE 10 MG: 5 TABLET ORAL at 05:01

## 2023-01-31 RX ADMIN — BACLOFEN 20 MG: 10 TABLET ORAL at 03:01

## 2023-01-31 RX ADMIN — METOPROLOL SUCCINATE 25 MG: 25 TABLET, EXTENDED RELEASE ORAL at 09:01

## 2023-01-31 RX ADMIN — OXYCODONE 10 MG: 5 TABLET ORAL at 11:01

## 2023-01-31 RX ADMIN — GABAPENTIN 600 MG: 300 CAPSULE ORAL at 03:01

## 2023-01-31 RX ADMIN — SENNOSIDES AND DOCUSATE SODIUM 2 TABLET: 50; 8.6 TABLET ORAL at 09:01

## 2023-01-31 RX ADMIN — GABAPENTIN 600 MG: 300 CAPSULE ORAL at 09:01

## 2023-01-31 RX ADMIN — AMLODIPINE BESYLATE 5 MG: 5 TABLET ORAL at 09:01

## 2023-01-31 RX ADMIN — OXYBUTYNIN CHLORIDE 10 MG: 5 TABLET ORAL at 09:01

## 2023-01-31 RX ADMIN — ACETAMINOPHEN 1000 MG: 500 TABLET, FILM COATED ORAL at 09:01

## 2023-01-31 RX ADMIN — ACETAMINOPHEN 1000 MG: 500 TABLET, FILM COATED ORAL at 03:01

## 2023-01-31 RX ADMIN — SERTRALINE HYDROCHLORIDE 50 MG: 50 TABLET ORAL at 09:01

## 2023-01-31 RX ADMIN — MUPIROCIN: 20 OINTMENT TOPICAL at 09:01

## 2023-01-31 NOTE — PLAN OF CARE
Spoke to patient's sister Marivel she states that he was told by previous casemanager to get a copy of his medical records specifically MD notes from November stay to prove his competency.

## 2023-01-31 NOTE — PLAN OF CARE
Problem: Physical Therapy  Goal: Physical Therapy Goal  Description: Goals to be met by: 2023     Patient will increase functional independence with mobility by performin. Supine to sit with Stand-by Assistance  2. Sit to supine with Stand-by Assistance  3. Rolling to Left and Right with Stand-by Assistance.  4. Sitting at edge of bed x10 minutes with [place]  5. Wheelchair mobility x250 ft with independence    Outcome: Ongoing, Progressing
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
## 2023-01-31 NOTE — PT/OT/SLP RE-EVAL
Physical Therapy Re-evaluation    Patient Name:  Hemal Guerrero   MRN:  [id number]    Recommendations:     Discharge Recommendations: nursing facility, basic, nursing facility, skilled  Discharge Equipment Recommendations: hospital bed, lift device   Barriers to discharge:  medical dx; decreased functional independence; burden of care    Assessment:     Hemal Guerrero is a 47 y.o. 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  He presents with the following impairments/functional limitations: weakness, impaired balance, decreased lower extremity function, decreased upper extremity function, impaired self care skills, impaired functional mobility.    PT to continue services in order to improve trunk control and tolerance to an upright position in order to improve overall sitting balance independence.Will not work on transfers at this time 2/2 pt only able to laterally scoot/use slideboard (this is still not appropriate for him to do at this time 2/2 buttock wound; do not want to shear that region).     Rehab Prognosis:  fair; patient would benefit from acute skilled PT services to address these deficits and reach maximum level of function.      Recent Surgery: * No surgery found *      Plan:     During this hospitalization, patient to be seen 3 x/week to address the above listed problems via therapeutic activities, therapeutic exercises, wheelchair management/training, neuromuscular re-education  Plan of Care Expires:  02/16/23  Plan of Care Reviewed with: patient    Subjective     Communicated with NSG prior to session.  Patient found HOB elevated with PRAZOILA, vogt catheter, wedge on R side upon PT entry to room, agreeable to evaluation.      Chief Complaint: n/a  Patient comments/goals: to get better  Pain/Comfort:  Pain Rating 1: 0/10    Patients cultural, spiritual, Advent conflicts given the current situation: no      Objective:     Patient found with: VICK  vogt catheter (wedge on R side)     General Precautions: Standard,  (1 hour sitting limit)  Orthopedic Precautions: N/A  Braces: N/A  Respiratory Status: Room air    Exams:  Cognitive Exam:  Patient is oriented to Person, Place, and Time  RLE and LLE Strength: equivalent to that on initial evaluation    Functional Mobility:  Bed Mobility:     Rolling Left:  minimum assistance  Rolling Right: minimum assistance  Wheelchair chair mobility: pt propelled w/c x150 feet independently with use of B UE  Transfers:     Bed to Chair: dependence with  christie lift    Pt sat in wheelchair for about 45-50 minutes; PT returned to transfer pt B2B  In the w/c pt was seated on Geomat + had a small pillow behind his back    Patient left HOB elevated with all lines intact, call button in reach, and wedge on L side ; B PRAFO donned    GOALS:   Multidisciplinary Problems       Physical Therapy Goals          Problem: Physical Therapy    Goal Priority Disciplines Outcome Goal Variances Interventions   Physical Therapy Goal     PT, PT/OT Ongoing, Progressing     Description: Goals to be met by: 2023     Patient will increase functional independence with mobility by performin. Supine to sit with Stand-by Assistance  2. Sit to supine with Stand-by Assistance  3. Rolling to Left and Right with Stand-by Assistance.  4. Sitting at edge of bed x10 minutes with [place]  5. Wheelchair mobility x250 ft with independence                         History:     Past Medical History:   Diagnosis Date    Paraplegia        Past Surgical History:   Procedure Laterality Date    INSERTION OF SUPRAPUBIC CATHETER         Time Tracking:     PT Received On: 23  PT Start Time: 1049     PT Stop Time: 1112  PT Total Time (min): 23 min   PT Start Time: 1145  PT Stop Time: 1200  PT Total Time (min): 15 min  PT Overall Time (min): 38 min    Billable Minutes: Re-eval 1 and Therapeutic Activity 1      2023

## 2023-01-31 NOTE — PLAN OF CARE
Problem: Infection  Goal: Absence of Infection Signs and Symptoms  Outcome: Ongoing, Progressing     Problem: Adult Inpatient Plan of Care  Goal: Plan of Care Review  Outcome: Ongoing, Progressing  Goal: Patient-Specific Goal (Individualized)  Outcome: Ongoing, Progressing  Goal: Absence of Hospital-Acquired Illness or Injury  Outcome: Ongoing, Progressing  Goal: Optimal Comfort and Wellbeing  Outcome: Ongoing, Progressing  Goal: Readiness for Transition of Care  Outcome: Ongoing, Progressing     Problem: Impaired Wound Healing  Goal: Optimal Wound Healing  Outcome: Ongoing, Progressing     Problem: Skin Injury Risk Increased  Goal: Skin Health and Integrity  Outcome: Ongoing, Progressing     Problem: Fall Injury Risk  Goal: Absence of Fall and Fall-Related Injury  Outcome: Ongoing, Progressing
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
## 2023-02-01 PROCEDURE — 25000003 PHARM REV CODE 250: Performed by: NURSE PRACTITIONER

## 2023-02-01 PROCEDURE — 97535 SELF CARE MNGMENT TRAINING: CPT

## 2023-02-01 PROCEDURE — G0378 HOSPITAL OBSERVATION PER HR: HCPCS

## 2023-02-01 PROCEDURE — 25000003 PHARM REV CODE 250: Performed by: INTERNAL MEDICINE

## 2023-02-01 PROCEDURE — 25000003 PHARM REV CODE 250: Performed by: STUDENT IN AN ORGANIZED HEALTH CARE EDUCATION/TRAINING PROGRAM

## 2023-02-01 PROCEDURE — 11000001 HC ACUTE MED/SURG PRIVATE ROOM

## 2023-02-01 RX ADMIN — OXYCODONE 10 MG: 5 TABLET ORAL at 06:02

## 2023-02-01 RX ADMIN — APIXABAN 5 MG: 5 TABLET, FILM COATED ORAL at 07:02

## 2023-02-01 RX ADMIN — FLUTICASONE PROPIONATE 50 MCG: 50 SPRAY, METERED NASAL at 09:02

## 2023-02-01 RX ADMIN — DOXYCYCLINE HYCLATE 100 MG: 100 TABLET, COATED ORAL at 07:02

## 2023-02-01 RX ADMIN — ACETAMINOPHEN 1000 MG: 500 TABLET, FILM COATED ORAL at 07:02

## 2023-02-01 RX ADMIN — METOPROLOL SUCCINATE 25 MG: 25 TABLET, EXTENDED RELEASE ORAL at 09:02

## 2023-02-01 RX ADMIN — OXYCODONE 10 MG: 5 TABLET ORAL at 12:02

## 2023-02-01 RX ADMIN — BACLOFEN 20 MG: 10 TABLET ORAL at 04:02

## 2023-02-01 RX ADMIN — DULOXETINE 30 MG: 30 CAPSULE, DELAYED RELEASE ORAL at 09:02

## 2023-02-01 RX ADMIN — SENNOSIDES AND DOCUSATE SODIUM 2 TABLET: 50; 8.6 TABLET ORAL at 07:02

## 2023-02-01 RX ADMIN — BACLOFEN 20 MG: 10 TABLET ORAL at 09:02

## 2023-02-01 RX ADMIN — OXYCODONE 10 MG: 5 TABLET ORAL at 07:02

## 2023-02-01 RX ADMIN — SERTRALINE HYDROCHLORIDE 50 MG: 50 TABLET ORAL at 09:02

## 2023-02-01 RX ADMIN — MIRTAZAPINE 15 MG: 15 TABLET, FILM COATED ORAL at 07:02

## 2023-02-01 RX ADMIN — GABAPENTIN 600 MG: 300 CAPSULE ORAL at 09:02

## 2023-02-01 RX ADMIN — GABAPENTIN 600 MG: 300 CAPSULE ORAL at 07:02

## 2023-02-01 RX ADMIN — ACETAMINOPHEN 1000 MG: 500 TABLET, FILM COATED ORAL at 04:02

## 2023-02-01 RX ADMIN — OXYBUTYNIN CHLORIDE 10 MG: 5 TABLET ORAL at 09:02

## 2023-02-01 RX ADMIN — APIXABAN 5 MG: 5 TABLET, FILM COATED ORAL at 09:02

## 2023-02-01 RX ADMIN — BACLOFEN 20 MG: 10 TABLET ORAL at 07:02

## 2023-02-01 RX ADMIN — OXYBUTYNIN CHLORIDE 10 MG: 5 TABLET ORAL at 07:02

## 2023-02-01 RX ADMIN — ACETAMINOPHEN 1000 MG: 500 TABLET, FILM COATED ORAL at 09:02

## 2023-02-01 RX ADMIN — GABAPENTIN 600 MG: 300 CAPSULE ORAL at 04:02

## 2023-02-01 RX ADMIN — DOXYCYCLINE HYCLATE 100 MG: 100 TABLET, COATED ORAL at 09:02

## 2023-02-01 NOTE — PT/OT/SLP PROGRESS
Attempted PT 2x this AM. First attempt pt needed to be cleaned from BM. Second attempt, pt stated R side of face felt weak. Smile is symmetrical. Pt then started talking about parasites in sinuses again. Pt insisted on talking to nurse, nurse notified.    Of note, pt's custom w/c is in the approval process with [employer]. Rep is Yaniv Bunch, email is [email or web address]. He stated that when it is approved, the w/c has to be delivered to a private residence. They will not deliver to pt while he is in a NH or rehab. Please email Yaniv if/when we know where pt is d/c to.
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
## 2023-02-02 LAB
ANION GAP SERPL CALC-SCNC: 12 MEQ/L
BASOPHILS # BLD AUTO: 0.09 X10(3)/MCL (ref 0–0.2)
BASOPHILS NFR BLD AUTO: 0.8 %
BUN SERPL-MCNC: 25.5 MG/DL (ref 8.9–20.6)
CALCIUM SERPL-MCNC: 9.8 MG/DL (ref 8.4–10.2)
CHLORIDE SERPL-SCNC: 109 MMOL/L (ref 98–107)
CO2 SERPL-SCNC: 23 MMOL/L (ref 22–29)
CREAT SERPL-MCNC: 0.9 MG/DL (ref 0.73–1.18)
CREAT/UREA NIT SERPL: 28
EOSINOPHIL # BLD AUTO: 0.31 X10(3)/MCL (ref 0–0.9)
EOSINOPHIL NFR BLD AUTO: 2.7 %
ERYTHROCYTE [DISTWIDTH] IN BLOOD BY AUTOMATED COUNT: 16.5 % (ref 11.5–17)
GFR SERPLBLD CREATININE-BSD FMLA CKD-EPI: >60 MLS/MIN/1.73/M2
GLUCOSE SERPL-MCNC: 98 MG/DL (ref 74–100)
HCT VFR BLD AUTO: 31.1 % (ref 42–52)
HGB BLD-MCNC: 9.6 GM/DL (ref 14–18)
IMM GRANULOCYTES # BLD AUTO: 0.12 X10(3)/MCL (ref 0–0.04)
IMM GRANULOCYTES NFR BLD AUTO: 1 %
LYMPHOCYTES # BLD AUTO: 2.75 X10(3)/MCL (ref 0.6–4.6)
LYMPHOCYTES NFR BLD AUTO: 23.9 %
MCH RBC QN AUTO: 25.3 PG
MCHC RBC AUTO-ENTMCNC: 30.9 MG/DL (ref 33–36)
MCV RBC AUTO: 82.1 FL (ref 80–94)
MONOCYTES # BLD AUTO: 0.73 X10(3)/MCL (ref 0.1–1.3)
MONOCYTES NFR BLD AUTO: 6.4 %
NEUTROPHILS # BLD AUTO: 7.49 X10(3)/MCL (ref 2.1–9.2)
NEUTROPHILS NFR BLD AUTO: 65.2 %
NRBC BLD AUTO-RTO: 0 %
PLATELET # BLD AUTO: 320 X10(3)/MCL (ref 130–400)
PMV BLD AUTO: 10.1 FL (ref 7.4–10.4)
POTASSIUM SERPL-SCNC: 5 MMOL/L (ref 3.5–5.1)
RBC # BLD AUTO: 3.79 X10(6)/MCL (ref 4.7–6.1)
SODIUM SERPL-SCNC: 144 MMOL/L (ref 136–145)
WBC # SPEC AUTO: 11.5 X10(3)/MCL (ref 4.5–11.5)

## 2023-02-02 PROCEDURE — G0378 HOSPITAL OBSERVATION PER HR: HCPCS

## 2023-02-02 PROCEDURE — 80048 BASIC METABOLIC PNL TOTAL CA: CPT | Performed by: INTERNAL MEDICINE

## 2023-02-02 PROCEDURE — 25000003 PHARM REV CODE 250: Performed by: INTERNAL MEDICINE

## 2023-02-02 PROCEDURE — 85025 COMPLETE CBC W/AUTO DIFF WBC: CPT | Performed by: INTERNAL MEDICINE

## 2023-02-02 PROCEDURE — 25000003 PHARM REV CODE 250: Performed by: NURSE PRACTITIONER

## 2023-02-02 PROCEDURE — 11000001 HC ACUTE MED/SURG PRIVATE ROOM

## 2023-02-02 PROCEDURE — 25000003 PHARM REV CODE 250: Performed by: STUDENT IN AN ORGANIZED HEALTH CARE EDUCATION/TRAINING PROGRAM

## 2023-02-02 RX ORDER — ACETAMINOPHEN 500 MG
1000 TABLET ORAL EVERY 8 HOURS PRN
Status: DISCONTINUED | OUTPATIENT
Start: 2023-02-02 | End: 2023-02-03 | Stop reason: HOSPADM

## 2023-02-02 RX ADMIN — DOXYCYCLINE HYCLATE 100 MG: 100 TABLET, COATED ORAL at 08:02

## 2023-02-02 RX ADMIN — GABAPENTIN 600 MG: 300 CAPSULE ORAL at 03:02

## 2023-02-02 RX ADMIN — FERROUS SULFATE TAB 325 MG (65 MG ELEMENTAL FE) 1 EACH: 325 (65 FE) TAB at 08:02

## 2023-02-02 RX ADMIN — OXYCODONE 10 MG: 5 TABLET ORAL at 08:02

## 2023-02-02 RX ADMIN — APIXABAN 5 MG: 5 TABLET, FILM COATED ORAL at 08:02

## 2023-02-02 RX ADMIN — SERTRALINE HYDROCHLORIDE 50 MG: 50 TABLET ORAL at 08:02

## 2023-02-02 RX ADMIN — MUPIROCIN: 20 OINTMENT TOPICAL at 08:02

## 2023-02-02 RX ADMIN — SENNOSIDES AND DOCUSATE SODIUM 2 TABLET: 50; 8.6 TABLET ORAL at 08:02

## 2023-02-02 RX ADMIN — METOPROLOL SUCCINATE 25 MG: 25 TABLET, EXTENDED RELEASE ORAL at 08:02

## 2023-02-02 RX ADMIN — GABAPENTIN 600 MG: 300 CAPSULE ORAL at 08:02

## 2023-02-02 RX ADMIN — BACLOFEN 20 MG: 10 TABLET ORAL at 08:02

## 2023-02-02 RX ADMIN — MIRTAZAPINE 15 MG: 15 TABLET, FILM COATED ORAL at 08:02

## 2023-02-02 RX ADMIN — OXYCODONE 10 MG: 5 TABLET ORAL at 01:02

## 2023-02-02 RX ADMIN — FLUTICASONE PROPIONATE 50 MCG: 50 SPRAY, METERED NASAL at 09:02

## 2023-02-02 RX ADMIN — OXYBUTYNIN CHLORIDE 10 MG: 5 TABLET ORAL at 08:02

## 2023-02-02 RX ADMIN — OXYCODONE 10 MG: 5 TABLET ORAL at 03:02

## 2023-02-02 RX ADMIN — BACLOFEN 20 MG: 10 TABLET ORAL at 03:02

## 2023-02-02 RX ADMIN — FLUTICASONE PROPIONATE 50 MCG: 50 SPRAY, METERED NASAL at 08:02
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
## 2023-02-02 NOTE — PLAN OF CARE
Problem: Infection  Goal: Absence of Infection Signs and Symptoms  2/2/2023 1240 by Aracelis Harrington RN  Outcome: Ongoing, Progressing  2/2/2023 1239 by Aracelis Harrington RN  Outcome: Ongoing, Progressing     Problem: Adult Inpatient Plan of Care  Goal: Plan of Care Review  2/2/2023 1240 by Aracelis Harrington RN  Outcome: Ongoing, Progressing  2/2/2023 1239 by Aracelis Harrington RN  Outcome: Ongoing, Progressing  Goal: Patient-Specific Goal (Individualized)  2/2/2023 1240 by Aracelis Harrington RN  Outcome: Ongoing, Progressing  2/2/2023 1239 by Aracelis Harrington RN  Outcome: Ongoing, Progressing  Goal: Absence of Hospital-Acquired Illness or Injury  2/2/2023 1240 by Aracelis Harrington RN  Outcome: Ongoing, Progressing  2/2/2023 1239 by Aracelis Harrington RN  Outcome: Ongoing, Progressing  Goal: Optimal Comfort and Wellbeing  2/2/2023 1240 by Aracelis Harrington RN  Outcome: Ongoing, Progressing  2/2/2023 1239 by Aracelis Harrington RN  Outcome: Ongoing, Progressing  Goal: Readiness for Transition of Care  2/2/2023 1240 by Aracelis Harrington RN  Outcome: Ongoing, Progressing  2/2/2023 1239 by Aracelis Harrington RN  Outcome: Ongoing, Progressing     Problem: Impaired Wound Healing  Goal: Optimal Wound Healing  2/2/2023 1240 by Aracelis Harrington RN  Outcome: Ongoing, Progressing  2/2/2023 1239 by Aracelis Harrington RN  Outcome: Ongoing, Progressing     Problem: Skin Injury Risk Increased  Goal: Skin Health and Integrity  2/2/2023 1240 by Aracelis Harrington RN  Outcome: Ongoing, Progressing  2/2/2023 1239 by Aracelis Harrington RN  Outcome: Ongoing, Progressing     Problem: Fall Injury Risk  Goal: Absence of Fall and Fall-Related Injury  2/2/2023 1240 by Aracelis Harrington RN  Outcome: Ongoing, Progressing  2/2/2023 1239 by Aracelis Harrington RN  Outcome: Ongoing, Progressing

## 2023-02-02 NOTE — PLAN OF CARE
Present for Conversation with patient that he is medically clear for placement and will be discharged tomorrow. Patient unhappy with conversation states that he will appeal. Notified patient that he does not have appeal rights as he is in observation status. Notified by Tiesha Miguel that patient is trying to appeal his discharge. I informed her that he does not have an order yet and is in observation status

## 2023-02-03 VITALS
TEMPERATURE: 99 F | OXYGEN SATURATION: 100 % | WEIGHT: 160.69 LBS | HEART RATE: 99 BPM | HEIGHT: 72 IN | DIASTOLIC BLOOD PRESSURE: 72 MMHG | RESPIRATION RATE: 18 BRPM | SYSTOLIC BLOOD PRESSURE: 106 MMHG | BODY MASS INDEX: 21.77 KG/M2

## 2023-02-03 PROCEDURE — 25000003 PHARM REV CODE 250: Performed by: NURSE PRACTITIONER

## 2023-02-03 PROCEDURE — 25000003 PHARM REV CODE 250: Performed by: INTERNAL MEDICINE

## 2023-02-03 PROCEDURE — G0378 HOSPITAL OBSERVATION PER HR: HCPCS

## 2023-02-03 PROCEDURE — 25000003 PHARM REV CODE 250: Performed by: STUDENT IN AN ORGANIZED HEALTH CARE EDUCATION/TRAINING PROGRAM

## 2023-02-03 RX ORDER — HYDROXYZINE HYDROCHLORIDE 25 MG/1
25 TABLET, FILM COATED ORAL 3 TIMES DAILY PRN
Qty: 30 TABLET | Refills: 0 | Status: ON HOLD | OUTPATIENT
Start: 2023-02-03 | End: 2023-06-23

## 2023-02-03 RX ORDER — SERTRALINE HYDROCHLORIDE 50 MG/1
50 TABLET, FILM COATED ORAL DAILY
Qty: 30 TABLET | Refills: 0 | Status: SHIPPED | OUTPATIENT
Start: 2023-02-03 | End: 2023-03-05

## 2023-02-03 RX ORDER — OXYCODONE HYDROCHLORIDE 10 MG/1
10 TABLET ORAL EVERY 6 HOURS PRN
Qty: 20 TABLET | Refills: 0 | Status: SHIPPED | OUTPATIENT
Start: 2023-02-03 | End: 2023-02-08

## 2023-02-03 RX ORDER — FERROUS SULFATE 324(65)MG
324 TABLET, DELAYED RELEASE (ENTERIC COATED) ORAL EVERY MORNING
Qty: 30 TABLET | Refills: 0 | Status: SHIPPED | OUTPATIENT
Start: 2023-02-03 | End: 2023-03-05

## 2023-02-03 RX ORDER — DOCUSATE SODIUM 100 MG/1
100 CAPSULE, LIQUID FILLED ORAL 2 TIMES DAILY
Qty: 60 CAPSULE | Refills: 0 | Status: SHIPPED | OUTPATIENT
Start: 2023-02-03 | End: 2023-03-05

## 2023-02-03 RX ORDER — BACLOFEN 10 MG/1
10 TABLET ORAL 2 TIMES DAILY PRN
Qty: 30 TABLET | Refills: 0 | Status: ON HOLD | OUTPATIENT
Start: 2023-02-03 | End: 2023-06-23 | Stop reason: CLARIF

## 2023-02-03 RX ORDER — MIRTAZAPINE 15 MG/1
15 TABLET, FILM COATED ORAL NIGHTLY
Qty: 30 TABLET | Refills: 0 | Status: SHIPPED | OUTPATIENT
Start: 2023-02-03 | End: 2023-03-05

## 2023-02-03 RX ORDER — METOPROLOL SUCCINATE 25 MG/1
25 TABLET, EXTENDED RELEASE ORAL DAILY
Qty: 30 TABLET | Refills: 0 | Status: ON HOLD | OUTPATIENT
Start: 2023-02-03 | End: 2023-06-23 | Stop reason: CLARIF

## 2023-02-03 RX ORDER — DOXYCYCLINE 100 MG/1
100 CAPSULE ORAL 2 TIMES DAILY
Qty: 60 CAPSULE | Refills: 0 | Status: SHIPPED | OUTPATIENT
Start: 2023-02-03 | End: 2023-03-05

## 2023-02-03 RX ORDER — FLUTICASONE PROPIONATE 50 MCG
1 SPRAY, SUSPENSION (ML) NASAL 2 TIMES DAILY
Qty: 5 G | Refills: 0 | Status: SHIPPED | OUTPATIENT
Start: 2023-02-03 | End: 2023-02-17

## 2023-02-03 RX ORDER — AMLODIPINE BESYLATE 5 MG/1
5 TABLET ORAL DAILY
Qty: 30 TABLET | Refills: 0 | Status: SHIPPED | OUTPATIENT
Start: 2023-02-03 | End: 2023-03-05

## 2023-02-03 RX ORDER — GABAPENTIN 600 MG/1
600 TABLET ORAL 3 TIMES DAILY
Qty: 90 TABLET | Refills: 0 | Status: SHIPPED | OUTPATIENT
Start: 2023-02-03 | End: 2023-03-05

## 2023-02-03 RX ORDER — DULOXETIN HYDROCHLORIDE 30 MG/1
30 CAPSULE, DELAYED RELEASE ORAL DAILY
Qty: 30 CAPSULE | Refills: 0 | Status: ON HOLD | OUTPATIENT
Start: 2023-02-03 | End: 2023-06-23 | Stop reason: CLARIF

## 2023-02-03 RX ADMIN — MUPIROCIN: 20 OINTMENT TOPICAL at 08:02

## 2023-02-03 RX ADMIN — BACLOFEN 20 MG: 10 TABLET ORAL at 08:02

## 2023-02-03 RX ADMIN — SERTRALINE HYDROCHLORIDE 50 MG: 50 TABLET ORAL at 08:02

## 2023-02-03 RX ADMIN — APIXABAN 5 MG: 5 TABLET, FILM COATED ORAL at 08:02

## 2023-02-03 RX ADMIN — DULOXETINE 30 MG: 30 CAPSULE, DELAYED RELEASE ORAL at 08:02

## 2023-02-03 RX ADMIN — OXYCODONE 10 MG: 5 TABLET ORAL at 03:02

## 2023-02-03 RX ADMIN — GABAPENTIN 600 MG: 300 CAPSULE ORAL at 08:02

## 2023-02-03 RX ADMIN — METOPROLOL SUCCINATE 25 MG: 25 TABLET, EXTENDED RELEASE ORAL at 08:02

## 2023-02-03 RX ADMIN — BACLOFEN 20 MG: 10 TABLET ORAL at 03:02

## 2023-02-03 RX ADMIN — DOXYCYCLINE HYCLATE 100 MG: 100 TABLET, COATED ORAL at 08:02

## 2023-02-03 RX ADMIN — FLUTICASONE PROPIONATE 50 MCG: 50 SPRAY, METERED NASAL at 08:02

## 2023-02-03 RX ADMIN — GABAPENTIN 600 MG: 300 CAPSULE ORAL at 03:02

## 2023-02-03 RX ADMIN — OXYCODONE 10 MG: 5 TABLET ORAL at 09:02

## 2023-02-03 RX ADMIN — OXYBUTYNIN CHLORIDE 10 MG: 5 TABLET ORAL at 08:02

## 2023-02-03 NOTE — PLAN OF CARE
"Spoke to patient about discharge he states that he is trying to find a place to discharge and his plan was to go to SNF to have time to get control of his check back and find an apartment. Discussed that this is not the appropriate use of SNF and that he does not meet criteria. Patient yelling at this nurse that "we need to get our shit together and fix this mess" Spoke to patient about assisting his finances. Discussed  that the hospital is for acutely ill people and we unfortunately do not have housing and the reality is that there is a homeless population. He cannot go to nursing home [facility] because he does not have access to his finances. Requesting to talk to [facility]   "
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
## 2023-02-03 NOTE — PLAN OF CARE
[facility]. Notified MD that patient has negative history with home health that I have tried she is ok with outpatient wound care if no one will accept him

## 2023-02-03 NOTE — PLAN OF CARE
02/03/23 1222   Final Note   Assessment Type Final Discharge Note   Anticipated Discharge Disposition Home-Health  (new 1st option)   Post-Acute Status   Post-Acute Authorization Home Health   Discharge Delays None known at this time
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
## 2023-04-10 ENCOUNTER — HOSPITAL ENCOUNTER (INPATIENT)
Facility: HOSPITAL | Age: 48
LOS: 1 days | Discharge: NURSING, REHAB OR LONG-TERM CARE | DRG: 602 | End: 2023-04-12
Attending: STUDENT IN AN ORGANIZED HEALTH CARE EDUCATION/TRAINING PROGRAM | Admitting: INTERNAL MEDICINE
Payer: MEDICARE

## 2023-04-10 DIAGNOSIS — R10.84 GENERALIZED ABDOMINAL PAIN: ICD-10-CM

## 2023-04-10 DIAGNOSIS — S31.829A BUTTOCK WOUND, LEFT, INITIAL ENCOUNTER: Primary | ICD-10-CM

## 2023-04-10 DIAGNOSIS — K59.00 CONSTIPATION, UNSPECIFIED CONSTIPATION TYPE: ICD-10-CM

## 2023-04-10 DIAGNOSIS — Z93.59 SUPRAPUBIC CATHETER: ICD-10-CM

## 2023-04-10 LAB
ALBUMIN SERPL-MCNC: 3.4 G/DL (ref 3.5–5)
ALBUMIN/GLOB SERPL: 0.7 RATIO (ref 1.1–2)
ALP SERPL-CCNC: 88 UNIT/L (ref 40–150)
ALT SERPL-CCNC: 5 UNIT/L (ref 0–55)
APPEARANCE UR: CLEAR
AST SERPL-CCNC: 10 UNIT/L (ref 5–34)
BACTERIA #/AREA URNS AUTO: ABNORMAL /HPF
BASOPHILS # BLD AUTO: 0.08 X10(3)/MCL (ref 0–0.2)
BASOPHILS NFR BLD AUTO: 0.9 %
BILIRUB UR QL STRIP.AUTO: NEGATIVE MG/DL
BILIRUBIN DIRECT+TOT PNL SERPL-MCNC: 0.5 MG/DL
BUN SERPL-MCNC: 4.6 MG/DL (ref 8.9–20.6)
CALCIUM SERPL-MCNC: 10.1 MG/DL (ref 8.4–10.2)
CHLORIDE SERPL-SCNC: 103 MMOL/L (ref 98–107)
CO2 SERPL-SCNC: 24 MMOL/L (ref 22–29)
COLOR UR AUTO: YELLOW
CREAT SERPL-MCNC: 0.76 MG/DL (ref 0.73–1.18)
EOSINOPHIL # BLD AUTO: 0.06 X10(3)/MCL (ref 0–0.9)
EOSINOPHIL NFR BLD AUTO: 0.7 %
ERYTHROCYTE [DISTWIDTH] IN BLOOD BY AUTOMATED COUNT: 13.5 % (ref 11.5–17)
ERYTHROCYTE [SEDIMENTATION RATE] IN BLOOD: >130 MM/HR (ref 0–15)
GFR SERPLBLD CREATININE-BSD FMLA CKD-EPI: >60 MLS/MIN/1.73/M2
GLOBULIN SER-MCNC: 4.9 GM/DL (ref 2.4–3.5)
GLUCOSE SERPL-MCNC: 140 MG/DL (ref 74–100)
GLUCOSE UR QL STRIP.AUTO: NEGATIVE MG/DL
HCT VFR BLD AUTO: 39.5 % (ref 42–52)
HGB BLD-MCNC: 12.5 G/DL (ref 14–18)
IMM GRANULOCYTES # BLD AUTO: 0.03 X10(3)/MCL (ref 0–0.04)
IMM GRANULOCYTES NFR BLD AUTO: 0.3 %
KETONES UR QL STRIP.AUTO: NEGATIVE MG/DL
LEUKOCYTE ESTERASE UR QL STRIP.AUTO: ABNORMAL UNIT/L
LIPASE SERPL-CCNC: 6 U/L
LYMPHOCYTES # BLD AUTO: 2.1 X10(3)/MCL (ref 0.6–4.6)
LYMPHOCYTES NFR BLD AUTO: 23.8 %
MCH RBC QN AUTO: 27.1 PG (ref 27–31)
MCHC RBC AUTO-ENTMCNC: 31.6 G/DL (ref 33–36)
MCV RBC AUTO: 85.5 FL (ref 80–94)
MONOCYTES # BLD AUTO: 0.44 X10(3)/MCL (ref 0.1–1.3)
MONOCYTES NFR BLD AUTO: 5 %
NEUTROPHILS # BLD AUTO: 6.1 X10(3)/MCL (ref 2.1–9.2)
NEUTROPHILS NFR BLD AUTO: 69.3 %
NITRITE UR QL STRIP.AUTO: NEGATIVE
NRBC BLD AUTO-RTO: 0 %
PH UR STRIP.AUTO: 7 [PH]
PLATELET # BLD AUTO: 353 X10(3)/MCL (ref 130–400)
PMV BLD AUTO: 10.2 FL (ref 7.4–10.4)
POTASSIUM SERPL-SCNC: 3.3 MMOL/L (ref 3.5–5.1)
PROT SERPL-MCNC: 8.3 GM/DL (ref 6.4–8.3)
PROT UR QL STRIP.AUTO: NEGATIVE MG/DL
RBC # BLD AUTO: 4.62 X10(6)/MCL (ref 4.7–6.1)
RBC #/AREA URNS AUTO: ABNORMAL /HPF
RBC UR QL AUTO: NEGATIVE UNIT/L
SODIUM SERPL-SCNC: 141 MMOL/L (ref 136–145)
SP GR UR STRIP.AUTO: 1 (ref 1–1.03)
SQUAMOUS #/AREA URNS AUTO: ABNORMAL /HPF
UROBILINOGEN UR STRIP-ACNC: 1 MG/DL
WBC # SPEC AUTO: 8.8 X10(3)/MCL (ref 4.5–11.5)
WBC #/AREA URNS AUTO: ABNORMAL /HPF
YEAST URNS QL MICRO: ABNORMAL /HPF

## 2023-04-10 PROCEDURE — 80053 COMPREHEN METABOLIC PANEL: CPT | Performed by: PHYSICIAN ASSISTANT

## 2023-04-10 PROCEDURE — G0378 HOSPITAL OBSERVATION PER HR: HCPCS

## 2023-04-10 PROCEDURE — 96365 THER/PROPH/DIAG IV INF INIT: CPT

## 2023-04-10 PROCEDURE — 63600175 PHARM REV CODE 636 W HCPCS: Performed by: STUDENT IN AN ORGANIZED HEALTH CARE EDUCATION/TRAINING PROGRAM

## 2023-04-10 PROCEDURE — 99285 EMERGENCY DEPT VISIT HI MDM: CPT | Mod: 25

## 2023-04-10 PROCEDURE — 96376 TX/PRO/DX INJ SAME DRUG ADON: CPT

## 2023-04-10 PROCEDURE — 96375 TX/PRO/DX INJ NEW DRUG ADDON: CPT

## 2023-04-10 PROCEDURE — 25000003 PHARM REV CODE 250: Performed by: STUDENT IN AN ORGANIZED HEALTH CARE EDUCATION/TRAINING PROGRAM

## 2023-04-10 PROCEDURE — 25500020 PHARM REV CODE 255: Performed by: STUDENT IN AN ORGANIZED HEALTH CARE EDUCATION/TRAINING PROGRAM

## 2023-04-10 PROCEDURE — 96366 THER/PROPH/DIAG IV INF ADDON: CPT

## 2023-04-10 PROCEDURE — 96361 HYDRATE IV INFUSION ADD-ON: CPT

## 2023-04-10 PROCEDURE — 85025 COMPLETE CBC W/AUTO DIFF WBC: CPT | Performed by: PHYSICIAN ASSISTANT

## 2023-04-10 PROCEDURE — 85651 RBC SED RATE NONAUTOMATED: CPT | Performed by: INTERNAL MEDICINE

## 2023-04-10 PROCEDURE — 81001 URINALYSIS AUTO W/SCOPE: CPT | Performed by: PHYSICIAN ASSISTANT

## 2023-04-10 PROCEDURE — 83690 ASSAY OF LIPASE: CPT | Performed by: PHYSICIAN ASSISTANT

## 2023-04-10 RX ORDER — ONDANSETRON 2 MG/ML
4 INJECTION INTRAMUSCULAR; INTRAVENOUS EVERY 8 HOURS PRN
Status: DISCONTINUED | OUTPATIENT
Start: 2023-04-10 | End: 2023-04-12 | Stop reason: HOSPADM

## 2023-04-10 RX ORDER — MAG HYDROX/ALUMINUM HYD/SIMETH 200-200-20
5 SUSPENSION, ORAL (FINAL DOSE FORM) ORAL
Status: COMPLETED | OUTPATIENT
Start: 2023-04-10 | End: 2023-04-10

## 2023-04-10 RX ORDER — HYDROMORPHONE HYDROCHLORIDE 2 MG/ML
1 INJECTION, SOLUTION INTRAMUSCULAR; INTRAVENOUS; SUBCUTANEOUS
Status: COMPLETED | OUTPATIENT
Start: 2023-04-10 | End: 2023-04-10

## 2023-04-10 RX ORDER — HYDROMORPHONE HYDROCHLORIDE 2 MG/ML
1 INJECTION, SOLUTION INTRAMUSCULAR; INTRAVENOUS; SUBCUTANEOUS EVERY 4 HOURS PRN
Status: DISCONTINUED | OUTPATIENT
Start: 2023-04-10 | End: 2023-04-12 | Stop reason: HOSPADM

## 2023-04-10 RX ORDER — PROMETHAZINE HYDROCHLORIDE 25 MG/1
25 TABLET ORAL EVERY 6 HOURS PRN
Status: DISCONTINUED | OUTPATIENT
Start: 2023-04-10 | End: 2023-04-12 | Stop reason: HOSPADM

## 2023-04-10 RX ORDER — ADHESIVE BANDAGE
10 BANDAGE TOPICAL
Status: ACTIVE | OUTPATIENT
Start: 2023-04-10 | End: 2023-04-11

## 2023-04-10 RX ORDER — ALUMINUM HYDROXIDE, MAGNESIUM HYDROXIDE, AND SIMETHICONE 2400; 240; 2400 MG/30ML; MG/30ML; MG/30ML
5 SUSPENSION ORAL
Status: DISCONTINUED | OUTPATIENT
Start: 2023-04-10 | End: 2023-04-10

## 2023-04-10 RX ORDER — DOCUSATE SODIUM 250 MG
250 CAPSULE ORAL DAILY PRN
COMMUNITY

## 2023-04-10 RX ORDER — OXYBUTYNIN CHLORIDE 10 MG/1
1 TABLET, EXTENDED RELEASE ORAL EVERY MORNING
COMMUNITY

## 2023-04-10 RX ORDER — GABAPENTIN 300 MG/1
600 CAPSULE ORAL 3 TIMES DAILY
Status: ON HOLD | COMMUNITY
Start: 2022-10-27 | End: 2023-06-23 | Stop reason: CLARIF

## 2023-04-10 RX ORDER — SODIUM CHLORIDE 0.9 % (FLUSH) 0.9 %
10 SYRINGE (ML) INJECTION
Status: DISCONTINUED | OUTPATIENT
Start: 2023-04-10 | End: 2023-04-12 | Stop reason: HOSPADM

## 2023-04-10 RX ORDER — APIXABAN 5 MG/1
5 TABLET, FILM COATED ORAL 2 TIMES DAILY
COMMUNITY
Start: 2023-03-23

## 2023-04-10 RX ORDER — HYDROMORPHONE HYDROCHLORIDE 2 MG/ML
0.5 INJECTION, SOLUTION INTRAMUSCULAR; INTRAVENOUS; SUBCUTANEOUS
Status: COMPLETED | OUTPATIENT
Start: 2023-04-10 | End: 2023-04-10

## 2023-04-10 RX ORDER — HYDROMORPHONE HYDROCHLORIDE 2 MG/ML
0.5 INJECTION, SOLUTION INTRAMUSCULAR; INTRAVENOUS; SUBCUTANEOUS EVERY 4 HOURS PRN
Status: DISCONTINUED | OUTPATIENT
Start: 2023-04-10 | End: 2023-04-11

## 2023-04-10 RX ORDER — POLYETHYLENE GLYCOL 3350 17 G/17G
17 POWDER, FOR SOLUTION ORAL DAILY
Status: DISCONTINUED | OUTPATIENT
Start: 2023-04-10 | End: 2023-04-12 | Stop reason: HOSPADM

## 2023-04-10 RX ORDER — TALC
6 POWDER (GRAM) TOPICAL NIGHTLY PRN
Status: DISCONTINUED | OUTPATIENT
Start: 2023-04-10 | End: 2023-04-12 | Stop reason: HOSPADM

## 2023-04-10 RX ORDER — ACETAMINOPHEN 325 MG/1
650 TABLET ORAL EVERY 8 HOURS PRN
Status: DISCONTINUED | OUTPATIENT
Start: 2023-04-10 | End: 2023-04-12 | Stop reason: HOSPADM

## 2023-04-10 RX ORDER — FERROUS SULFATE TAB 325 MG (65 MG ELEMENTAL FE) 325 (65 FE) MG
1 TAB ORAL EVERY MORNING
COMMUNITY
Start: 2023-02-03

## 2023-04-10 RX ORDER — KETOROLAC TROMETHAMINE 30 MG/ML
15 INJECTION, SOLUTION INTRAMUSCULAR; INTRAVENOUS
Status: COMPLETED | OUTPATIENT
Start: 2023-04-10 | End: 2023-04-10

## 2023-04-10 RX ADMIN — SODIUM CHLORIDE, POTASSIUM CHLORIDE, SODIUM LACTATE AND CALCIUM CHLORIDE 1000 ML: 600; 310; 30; 20 INJECTION, SOLUTION INTRAVENOUS at 10:04

## 2023-04-10 RX ADMIN — HYDROMORPHONE HYDROCHLORIDE 1 MG: 2 INJECTION, SOLUTION INTRAMUSCULAR; INTRAVENOUS; SUBCUTANEOUS at 08:04

## 2023-04-10 RX ADMIN — IOPAMIDOL 100 ML: 755 INJECTION, SOLUTION INTRAVENOUS at 11:04

## 2023-04-10 RX ADMIN — HYDROMORPHONE HYDROCHLORIDE 1 MG: 2 INJECTION, SOLUTION INTRAMUSCULAR; INTRAVENOUS; SUBCUTANEOUS at 04:04

## 2023-04-10 RX ADMIN — VANCOMYCIN HYDROCHLORIDE 1750 MG: 500 INJECTION, POWDER, LYOPHILIZED, FOR SOLUTION INTRAVENOUS at 01:04

## 2023-04-10 RX ADMIN — KETOROLAC TROMETHAMINE 15 MG: 30 INJECTION, SOLUTION INTRAMUSCULAR; INTRAVENOUS at 12:04

## 2023-04-10 RX ADMIN — ALUMINUM HYDROXIDE, MAGNESIUM HYDROXIDE, AND SIMETHICONE 5 ML: 200; 200; 20 SUSPENSION ORAL at 12:04

## 2023-04-10 RX ADMIN — POLYETHYLENE GLYCOL 3350 17 G: 17 POWDER, FOR SOLUTION ORAL at 12:04

## 2023-04-10 RX ADMIN — HYDROMORPHONE HYDROCHLORIDE 1 MG: 2 INJECTION, SOLUTION INTRAMUSCULAR; INTRAVENOUS; SUBCUTANEOUS at 12:04

## 2023-04-10 RX ADMIN — HYDROMORPHONE HYDROCHLORIDE 0.5 MG: 2 INJECTION INTRAMUSCULAR; INTRAVENOUS; SUBCUTANEOUS at 10:04

## 2023-04-10 NOTE — PROGRESS NOTES
Pharmacokinetic Initial Assessment: IV Vancomycin    Assessment/Plan:    Initiate intravenous vancomycin with loading dose of 1750 mg once followed by a maintenance dose of vancomycin 1250 mg IV every 12 hours  Desired empiric serum trough concentration is 15 to 20 mcg/mL  Draw vancomycin trough level 60 min prior to fourth dose on 04/12 at approximately 1300  Pharmacy will continue to follow and monitor vancomycin.      Please contact pharmacy at extension [phone] with any questions regarding this assessment.     Thank you for the consult,   Ashlyn Harman       Patient brief summary:  Hemal Guerrero is a 47 y.o. male initiated on antimicrobial therapy with IV Vancomycin for treatment of suspected skin & soft tissue infection    Drug Allergies:   Review of patient's allergies indicates:   Allergen Reactions    Amitriptyline        Actual Body Weight:   70.3kg    Renal Function:   Estimated Creatinine Clearance: 118.2 mL/min (based on SCr of 0.76 mg/dL).,     Dialysis Method (if applicable):  N/A    CBC (last 72 hours):  Recent Labs   Lab Result Units 04/10/23  1035   WBC x10(3)/mcL 8.8   Hgb g/dL 12.5*   Hct % 39.5*   Platelet x10(3)/mcL 353   Mono % % 5.0   Eos % % 0.7   Basophil % % 0.9       Metabolic Panel (last 72 hours):  Recent Labs   Lab Result Units 04/10/23  1035 04/10/23  1043   Sodium Level mmol/L 141  --    Potassium Level mmol/L 3.3*  --    Chloride mmol/L 103  --    Carbon Dioxide mmol/L 24  --    Glucose Level mg/dL 140*  --    Glucose, UA mg/dL  --  Negative   Blood Urea Nitrogen mg/dL 4.6*  --    Creatinine mg/dL 0.76  --    Albumin Level g/dL 3.4*  --    Bilirubin Total mg/dL 0.5  --    Alkaline Phosphatase unit/L 88  --    Aspartate Aminotransferase unit/L 10  --    Alanine Aminotransferase unit/L 5  --        Drug levels (last 3 results):  No results for input(s): VANCOMYCINRA, VANCORANDOM, VANCOMYCINPE, VANCOPEAK, VANCOMYCINTR, VANCOTROUGH in the last 72 hours.    Microbiologic  Results:  Microbiology Results (last 7 days)       ** No results found for the last 168 hours. **
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
## 2023-04-10 NOTE — Clinical Note
Diagnosis: Buttock wound, left, initial encounter [[id number]]   Future Attending Provider: JOSE MENDOZA [[id number]]   Admitting Provider:: JOSE MENDOZA [[id number]]
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
## 2023-04-11 PROCEDURE — 25000003 PHARM REV CODE 250: Performed by: INTERNAL MEDICINE

## 2023-04-11 PROCEDURE — C1751 CATH, INF, PER/CENT/MIDLINE: HCPCS

## 2023-04-11 PROCEDURE — 11000001 HC ACUTE MED/SURG PRIVATE ROOM

## 2023-04-11 PROCEDURE — 25000003 PHARM REV CODE 250: Performed by: STUDENT IN AN ORGANIZED HEALTH CARE EDUCATION/TRAINING PROGRAM

## 2023-04-11 PROCEDURE — 36569 INSJ PICC 5 YR+ W/O IMAGING: CPT

## 2023-04-11 PROCEDURE — 63600175 PHARM REV CODE 636 W HCPCS: Performed by: STUDENT IN AN ORGANIZED HEALTH CARE EDUCATION/TRAINING PROGRAM

## 2023-04-11 RX ORDER — MUPIROCIN 20 MG/G
OINTMENT TOPICAL 2 TIMES DAILY
Status: DISCONTINUED | OUTPATIENT
Start: 2023-04-12 | End: 2023-04-12 | Stop reason: HOSPADM

## 2023-04-11 RX ORDER — SODIUM CHLORIDE 0.9 % (FLUSH) 0.9 %
10 SYRINGE (ML) INJECTION EVERY 6 HOURS
Status: DISCONTINUED | OUTPATIENT
Start: 2023-04-12 | End: 2023-04-12 | Stop reason: HOSPADM

## 2023-04-11 RX ORDER — SODIUM CHLORIDE 0.9 % (FLUSH) 0.9 %
10 SYRINGE (ML) INJECTION
Status: DISCONTINUED | OUTPATIENT
Start: 2023-04-12 | End: 2023-04-12 | Stop reason: HOSPADM

## 2023-04-11 RX ORDER — OXYCODONE HYDROCHLORIDE 5 MG/1
10 TABLET ORAL EVERY 4 HOURS PRN
Status: DISCONTINUED | OUTPATIENT
Start: 2023-04-11 | End: 2023-04-12 | Stop reason: HOSPADM

## 2023-04-11 RX ADMIN — POLYETHYLENE GLYCOL 3350 17 G: 17 POWDER, FOR SOLUTION ORAL at 08:04

## 2023-04-11 RX ADMIN — OXYCODONE 10 MG: 5 TABLET ORAL at 02:04

## 2023-04-11 RX ADMIN — OXYCODONE 10 MG: 5 TABLET ORAL at 10:04

## 2023-04-11 RX ADMIN — HYDROMORPHONE HYDROCHLORIDE 1 MG: 2 INJECTION, SOLUTION INTRAMUSCULAR; INTRAVENOUS; SUBCUTANEOUS at 04:04

## 2023-04-11 RX ADMIN — HYDROMORPHONE HYDROCHLORIDE 1 MG: 2 INJECTION, SOLUTION INTRAMUSCULAR; INTRAVENOUS; SUBCUTANEOUS at 08:04

## 2023-04-11 RX ADMIN — OXYCODONE 10 MG: 5 TABLET ORAL at 06:04

## 2023-04-11 RX ADMIN — HYDROMORPHONE HYDROCHLORIDE 1 MG: 2 INJECTION, SOLUTION INTRAMUSCULAR; INTRAVENOUS; SUBCUTANEOUS at 12:04

## 2023-04-11 RX ADMIN — VANCOMYCIN HYDROCHLORIDE 1250 MG: 1.25 INJECTION, POWDER, LYOPHILIZED, FOR SOLUTION INTRAVENOUS at 01:04

## 2023-04-11 RX ADMIN — VANCOMYCIN HYDROCHLORIDE 1250 MG: 1.25 INJECTION, POWDER, LYOPHILIZED, FOR SOLUTION INTRAVENOUS at 02:04
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
## 2023-04-11 NOTE — PLAN OF CARE
Patient states that he does not have a preference for a new home health company he will go with whoever will accept him. Historically have had issues setting up home health due to limited companies in network with [employer] plan and negative history with several companies. May have more options with current medicare plan. Will send referrals. Patient states that he did not receive the wheelchair that was ordered for him he is unsure why and thinks that it has something to do with insurance.Called [employer] left VM message waiting for return call

## 2023-04-11 NOTE — NURSING
Nurses Note -- 4 Eyes      4/10/2023   11:48 PM      Skin assessed during: Admit      [] No Pressure Injuries Present    []Prevention Measures Documented      [x] Yes- Altered Skin Integrity Present or Discovered   [x] LDA Added if Not in Epic (Describe Wound)   [x] New Altered Skin Integrity was Present on Admit and Documented in LDA   [] Wound Image Taken    Wound Care Consulted? Yes    Attending Nurse:  Taylor Hernandez RN     Second RN/Staff Member:  Kiki Odonnell RN

## 2023-04-11 NOTE — PLAN OF CARE
04/11/23 1140   Discharge Assessment   Assessment Type Discharge Planning Assessment   Confirmed/corrected address, phone number and insurance Yes   Confirmed Demographics Correct on Facesheet   Source of Information patient   Communicated IVORY with patient/caregiver Date not available/Unable to determine   Reason For Admission Woundcare   People in Home child(arnol), adult   Do you expect to return to your current living situation? Yes   Prior to hospitilization cognitive status: Alert/Oriented   Current cognitive status: Alert/Oriented   Walking or Climbing Stairs ambulation difficulty, dependent;stair climbing difficulty, dependent;transferring difficulty, dependent   Mobility Management wheelchair   Home Accessibility wheelchair accessible   Home Layout Able to live on 1st floor   Equipment Currently Used at Home wheelchair   Readmission within 30 days? No   Patient currently being followed by outpatient case management? No   Do you currently have service(s) that help you manage your care at home? No   Do you take prescription medications? Yes   Do you have prescription coverage? Yes   Do you have any problems affording any of your prescribed medications? No   Is the patient taking medications as prescribed? yes   Who is going to help you get home at discharge? ambulance   How do you get to doctors appointments? health plan transportation   Are you on dialysis? No   Do you take coumadin? No   Discharge Plan A Home Health   Discharge Plan B Home   DME Needed Upon Discharge  none   Discharge Plan discussed with: Patient   Discharge Barriers Identified None

## 2023-04-11 NOTE — PROGRESS NOTES
[facility] - 5th Floor Med Surg  Wound Care    Patient Name:  Hemal Guerrero   MRN:  [id number]  Date: 4/11/2023  Diagnosis: <principal problem not specified>    History:     Past Medical History:   Diagnosis Date    Paraplegia        Social History     Socioeconomic History    Marital status:    Tobacco Use    Smoking status: Never    Smokeless tobacco: Never   Substance and Sexual Activity    Alcohol use: Not Currently    Drug use: Never       Precautions:     Allergies as of 04/10/2023 - Reviewed 04/10/2023   Allergen Reaction Noted    Amitriptyline  11/16/2022       WOC Assessment Details/Treatment        04/11/23 1119        Altered Skin Integrity 11/16/22 Left Buttocks #1 Ulceration   Date First Assessed: 11/16/22   Altered Skin Integrity Present on Admission: yes  Side: Left  Location: Buttocks  Wound Number: #1  Primary Wound Type: Ulceration   Wound Image    Description of Altered Skin Integrity Full thickness tissue loss with exposed bone, tendon, or muscle. Often includes undermining and tunneling. May extend into muscle and/or supporting structures.   Dressing Appearance Dry;Intact   Drainage Amount Moderate   Drainage Characteristics/Odor Serosanguineous   Appearance Pink;Yellow;Fibrin   Tissue loss description Full thickness   Red (%), Wound Tissue Color 50 %   Yellow (%), Wound Tissue Color 50 %   Periwound Area Scar tissue;Moist   Wound Edges Defined   Wound Length (cm) 3.5 cm   Wound Width (cm) 2.5 cm   Wound Depth (cm) 2 cm   Wound Volume (cm^3) 17.5 cm^3   Wound Surface Area (cm^2) 8.75 cm^2   Undermining (depth (cm)/location) 10-1 3.5cm   Care Cleansed with:;Sterile normal saline   Dressing Applied;Silver   Skin Interventions   Pressure Reduction Devices   (LALordered)   Pressure Reduction Techniques heels elevated off bed;positioned off wounds   Skin Protection incontinence pads utilized     WOCN consulted for Buttocks. Initial evaluation performed no family at bedside.  Treatment recommendations put into place. Cleanse with NS. Pack with Calcium alginate with AG, cover with ABD pad, secure with medipore tape. Change Daily.Nursing to continue with turning every two hours, wedge and floating heels. Repositioned on left side with wedge, heel lift boots on, head of bed elevated, bed in lowest position, call bell within reach. MONTY mattress ordered. Will follow up    04/11/2023

## 2023-04-12 VITALS
TEMPERATURE: 99 F | OXYGEN SATURATION: 99 % | WEIGHT: 155 LBS | BODY MASS INDEX: 20.99 KG/M2 | HEIGHT: 72 IN | SYSTOLIC BLOOD PRESSURE: 147 MMHG | HEART RATE: 66 BPM | RESPIRATION RATE: 18 BRPM | DIASTOLIC BLOOD PRESSURE: 83 MMHG

## 2023-04-12 PROBLEM — S31.829A BUTTOCK WOUND, LEFT, INITIAL ENCOUNTER: Status: RESOLVED | Noted: 2023-04-12 | Resolved: 2023-04-12

## 2023-04-12 PROBLEM — S31.829A BUTTOCK WOUND, LEFT, INITIAL ENCOUNTER: Status: ACTIVE | Noted: 2023-04-12

## 2023-04-12 LAB
ALBUMIN SERPL-MCNC: 3.3 G/DL (ref 3.5–5)
ALBUMIN/GLOB SERPL: 0.8 RATIO (ref 1.1–2)
ALP SERPL-CCNC: 84 UNIT/L (ref 40–150)
ALT SERPL-CCNC: 5 UNIT/L (ref 0–55)
AST SERPL-CCNC: 11 UNIT/L (ref 5–34)
BASOPHILS # BLD AUTO: 0.1 X10(3)/MCL (ref 0–0.2)
BASOPHILS NFR BLD AUTO: 1.2 %
BILIRUBIN DIRECT+TOT PNL SERPL-MCNC: 0.3 MG/DL
BUN SERPL-MCNC: 4.8 MG/DL (ref 8.9–20.6)
CALCIUM SERPL-MCNC: 9.3 MG/DL (ref 8.4–10.2)
CHLORIDE SERPL-SCNC: 103 MMOL/L (ref 98–107)
CO2 SERPL-SCNC: 27 MMOL/L (ref 22–29)
CREAT SERPL-MCNC: 0.69 MG/DL (ref 0.73–1.18)
EOSINOPHIL # BLD AUTO: 0.23 X10(3)/MCL (ref 0–0.9)
EOSINOPHIL NFR BLD AUTO: 2.8 %
ERYTHROCYTE [DISTWIDTH] IN BLOOD BY AUTOMATED COUNT: 13.3 % (ref 11.5–17)
GFR SERPLBLD CREATININE-BSD FMLA CKD-EPI: >60 MLS/MIN/1.73/M2
GLOBULIN SER-MCNC: 4.2 GM/DL (ref 2.4–3.5)
GLUCOSE SERPL-MCNC: 83 MG/DL (ref 74–100)
HCT VFR BLD AUTO: 39 % (ref 42–52)
HGB BLD-MCNC: 12.1 G/DL (ref 14–18)
IMM GRANULOCYTES # BLD AUTO: 0.01 X10(3)/MCL (ref 0–0.04)
IMM GRANULOCYTES NFR BLD AUTO: 0.1 %
LYMPHOCYTES # BLD AUTO: 2.45 X10(3)/MCL (ref 0.6–4.6)
LYMPHOCYTES NFR BLD AUTO: 29.3 %
MCH RBC QN AUTO: 26.8 PG (ref 27–31)
MCHC RBC AUTO-ENTMCNC: 31 G/DL (ref 33–36)
MCV RBC AUTO: 86.5 FL (ref 80–94)
MONOCYTES # BLD AUTO: 0.55 X10(3)/MCL (ref 0.1–1.3)
MONOCYTES NFR BLD AUTO: 6.6 %
NEUTROPHILS # BLD AUTO: 5.01 X10(3)/MCL (ref 2.1–9.2)
NEUTROPHILS NFR BLD AUTO: 60 %
NRBC BLD AUTO-RTO: 0 %
PLATELET # BLD AUTO: 322 X10(3)/MCL (ref 130–400)
PMV BLD AUTO: 10.1 FL (ref 7.4–10.4)
POTASSIUM SERPL-SCNC: 3.4 MMOL/L (ref 3.5–5.1)
PROT SERPL-MCNC: 7.5 GM/DL (ref 6.4–8.3)
RBC # BLD AUTO: 4.51 X10(6)/MCL (ref 4.7–6.1)
SODIUM SERPL-SCNC: 139 MMOL/L (ref 136–145)
VANCOMYCIN TROUGH SERPL-MCNC: 19.3 UG/ML (ref 15–20)
WBC # SPEC AUTO: 8.4 X10(3)/MCL (ref 4.5–11.5)

## 2023-04-12 PROCEDURE — A4216 STERILE WATER/SALINE, 10 ML: HCPCS | Performed by: INTERNAL MEDICINE

## 2023-04-12 PROCEDURE — 85025 COMPLETE CBC W/AUTO DIFF WBC: CPT | Performed by: INTERNAL MEDICINE

## 2023-04-12 PROCEDURE — 63600175 PHARM REV CODE 636 W HCPCS: Performed by: STUDENT IN AN ORGANIZED HEALTH CARE EDUCATION/TRAINING PROGRAM

## 2023-04-12 PROCEDURE — 25000003 PHARM REV CODE 250: Performed by: STUDENT IN AN ORGANIZED HEALTH CARE EDUCATION/TRAINING PROGRAM

## 2023-04-12 PROCEDURE — 80202 ASSAY OF VANCOMYCIN: CPT | Performed by: INTERNAL MEDICINE

## 2023-04-12 PROCEDURE — 80053 COMPREHEN METABOLIC PANEL: CPT | Performed by: INTERNAL MEDICINE

## 2023-04-12 PROCEDURE — 25000003 PHARM REV CODE 250: Performed by: INTERNAL MEDICINE

## 2023-04-12 RX ORDER — HYDROXYZINE HYDROCHLORIDE 25 MG/1
25 TABLET, FILM COATED ORAL ONCE
Status: COMPLETED | OUTPATIENT
Start: 2023-04-12 | End: 2023-04-12

## 2023-04-12 RX ADMIN — VANCOMYCIN HYDROCHLORIDE 1250 MG: 1.25 INJECTION, POWDER, LYOPHILIZED, FOR SOLUTION INTRAVENOUS at 02:04

## 2023-04-12 RX ADMIN — POLYETHYLENE GLYCOL 3350 17 G: 17 POWDER, FOR SOLUTION ORAL at 09:04

## 2023-04-12 RX ADMIN — HYDROXYZINE HYDROCHLORIDE 25 MG: 25 TABLET, FILM COATED ORAL at 03:04

## 2023-04-12 RX ADMIN — SODIUM CHLORIDE, PRESERVATIVE FREE 10 ML: 5 INJECTION INTRAVENOUS at 06:04

## 2023-04-12 RX ADMIN — HYDROMORPHONE HYDROCHLORIDE 1 MG: 2 INJECTION, SOLUTION INTRAMUSCULAR; INTRAVENOUS; SUBCUTANEOUS at 06:04

## 2023-04-12 RX ADMIN — OXYCODONE 10 MG: 5 TABLET ORAL at 04:04

## 2023-04-12 RX ADMIN — HYDROMORPHONE HYDROCHLORIDE 1 MG: 2 INJECTION, SOLUTION INTRAMUSCULAR; INTRAVENOUS; SUBCUTANEOUS at 11:04

## 2023-04-12 RX ADMIN — HYDROMORPHONE HYDROCHLORIDE 1 MG: 2 INJECTION, SOLUTION INTRAMUSCULAR; INTRAVENOUS; SUBCUTANEOUS at 12:04

## 2023-04-12 RX ADMIN — HYDROMORPHONE HYDROCHLORIDE 1 MG: 2 INJECTION, SOLUTION INTRAMUSCULAR; INTRAVENOUS; SUBCUTANEOUS at 04:04

## 2023-04-12 RX ADMIN — OXYCODONE 10 MG: 5 TABLET ORAL at 09:04

## 2023-04-12 RX ADMIN — MUPIROCIN: 20 OINTMENT TOPICAL at 09:04

## 2023-04-12 RX ADMIN — OXYCODONE 10 MG: 5 TABLET ORAL at 02:04

## 2023-04-12 RX ADMIN — SODIUM CHLORIDE, PRESERVATIVE FREE 10 ML: 5 INJECTION INTRAVENOUS at 12:04

## 2023-04-12 NOTE — NURSING
Attempted to call report to [facility]. Name and number left. Nurse to return call to writer for report.

## 2023-04-12 NOTE — PLAN OF CARE
04/12/23 1436   Final Note   Assessment Type Final Discharge Note   Anticipated Discharge Disposition LTAC   Post-Acute Status   Post-Acute Authorization Placement   Post-Acute Placement Status Set-up Complete/Auth obtained   Discharge Delays (!) Ambulance Transport/Facility Transport
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
## 2023-04-12 NOTE — PLAN OF CARE
Spoke to [facility] she stated she spoke to the pt. And pt agrees. She stated she'll call MD Bar to see when pt can DC

## 2023-04-12 NOTE — NURSING
Recd call back from Concepcion CASON at [facility]. Report given. Brinda here for transport to [facility]. Belongings gathered. Patient left via stretcher with IRENE PICC and suprapubic catheter in place.

## 2023-04-12 NOTE — PROCEDURES
Hemal Guerrero is a 47 y.o. male patient.    Temp: 98.5 °F (36.9 °C) (04/11/23 2221)  Pulse: 70 (04/11/23 2221)  Resp: 18 (04/11/23 2221)  BP: 130/86 (04/11/23 2221)  SpO2: 100 % (04/11/23 2221)  Weight: 70.3 kg (155 lb) (04/10/23 2221)  Height: 6' (182.9 cm) (04/10/23 2221)    PICC  Date/Time: 4/11/2023 11:16 PM  Performed by: Fabien Beard RN  Consent Done: Yes  Time out: Immediately prior to procedure a time out was called to verify the correct patient, procedure, equipment, support staff and site/side marked as required  Indications: med administration and vascular access  Anesthesia: local infiltration  Local anesthetic: lidocaine 1% without epinephrine  Anesthetic Total (mL): 5  Preparation: skin prepped with ChloraPrep  Skin prep agent dried: skin prep agent completely dried prior to procedure  Sterile barriers: all five maximum sterile barriers used - cap, mask, sterile gown, sterile gloves, and large sterile sheet  Hand hygiene: hand hygiene performed prior to central venous catheter insertion  Location details: right basilic  Catheter Length: 38cm    Ultrasound guidance: yes  Vessel Caliber: large and patent, compressibility normal  Vascular Doppler: not done  Needle advanced into vessel with real time Ultrasound guidance.  Guidewire confirmed in vessel.  Sterile sheath used.  no esophageal manometryNumber of attempts: 1  Post-procedure: blood return through all ports, sterile dressing applied and chlorhexidine patch    Assessment: placement verified by x-ray  Complications: none      Fabien Beard RN  4/11/2023

## 2023-04-12 NOTE — PLAN OF CARE
04/12/23 1448   Medicare Message   Important Message from Medicare regarding Discharge Appeal Rights Explained to patient/caregiver

## 2023-04-13 NOTE — PHYSICIAN QUERY
PT Name: Hemal Guerrero  MR #: [id number]     DOCUMENTATION CLARIFICATION     CDS/: Mary Ellen Pires RN          Contact Information: [email or web address]    This form is a permanent document in the medical record.     Query Date: April 13, 2023    By submitting this query, we are merely seeking further clarification of documentation.  Please utilize your independent clinical judgment when addressing the question(s) below.  The Medical Record contains the following:  Indicators Supporting Clinical Findings Location in Medical Record   x HR         RR          BP        Temp T 99, HR 82-88, RR 17-18, /89  T 97.8-98.1, HR 70-88, RR 16-20, /75 4/10/2023 Vitals  4/11/2023 Vitals    Lactic Acid          Procalcitonin     x WBC           Bands          CRP     04/10/23 10:35 04/12/23 05:19   WBC 8.8 8.4    Lab results     Culture(s)      AMS, Confusion, LOC, etc.      Organ Dysfunction/Failure     x Bacteremia or Sepsis / Septic Potential sepsis 4/10/2023 H&P, 4/11/2023 HM PN   x Known or Suspected Source of Infection documented Stage IV left gluteal/ischial pressure wound with associated recent osteomyelitis-completed IV antibiotics 4/10/2023 H&P    (Failed) Outpatient Treatment     x Medication vancomycin (VANCOCIN) 1,750 mg IV x1    vancomycin 1.25 g IV q12 hrs  4/10/2023 medications  4/11/2023-4/12/2023 medications   x Treatment lactated ringers bolus 1,000 mL IV x1  4/10/2023 medications    x Other Finished round of abx for UTI yesterday  Neurogenic bladder status post SP cath placement 4/10/2023 H&P        Provider, please clarify the Sepsis diagnosis or diagnoses associated with above clinical findings.    [ x  ] Sepsis Ruled Out   [   ] Sepsis Ruled In.       Please specify etiology and supported indicators:___________________________________   [   ] Other Infectious Disease (please specify): __________   [  ] Clinically Undetermined       Please document in your progress notes daily for the  duration of treatment until resolved and include in your discharge summary.

## 2023-05-15 ENCOUNTER — HOSPITAL ENCOUNTER (EMERGENCY)
Facility: HOSPITAL | Age: 48
Discharge: HOME | End: 2023-05-15
Attending: EMERGENCY MEDICINE
Payer: MEDICARE

## 2023-05-15 VITALS
OXYGEN SATURATION: 99 % | WEIGHT: 165 LBS | BODY MASS INDEX: 22.38 KG/M2 | RESPIRATION RATE: 18 BRPM | SYSTOLIC BLOOD PRESSURE: 123 MMHG | HEART RATE: 73 BPM | TEMPERATURE: 98 F | DIASTOLIC BLOOD PRESSURE: 74 MMHG

## 2023-05-15 DIAGNOSIS — G62.9 PERIPHERAL POLYNEUROPATHY: Primary | ICD-10-CM

## 2023-05-15 DIAGNOSIS — G83.9 PARALYSIS: ICD-10-CM

## 2023-05-15 DIAGNOSIS — L89.90 PRESSURE INJURY OF SKIN, UNSPECIFIED INJURY STAGE, UNSPECIFIED LOCATION: ICD-10-CM

## 2023-05-15 DIAGNOSIS — G62.9 NEUROPATHY: ICD-10-CM

## 2023-05-15 DIAGNOSIS — L84 PRE-ULCERATIVE CORN OR CALLOUS: ICD-10-CM

## 2023-05-15 PROCEDURE — 25000003 PHARM REV CODE 250: Performed by: EMERGENCY MEDICINE

## 2023-05-15 PROCEDURE — 99284 EMERGENCY DEPT VISIT MOD MDM: CPT | Mod: 25

## 2023-05-15 PROCEDURE — 63600175 PHARM REV CODE 636 W HCPCS: Performed by: EMERGENCY MEDICINE

## 2023-05-15 PROCEDURE — 96374 THER/PROPH/DIAG INJ IV PUSH: CPT

## 2023-05-15 RX ORDER — MELOXICAM 7.5 MG/1
7.5 TABLET ORAL DAILY
Qty: 7 TABLET | Refills: 0 | Status: SHIPPED | OUTPATIENT
Start: 2023-05-15 | End: 2023-05-22

## 2023-05-15 RX ORDER — HYDROMORPHONE HYDROCHLORIDE 2 MG/ML
1 INJECTION, SOLUTION INTRAMUSCULAR; INTRAVENOUS; SUBCUTANEOUS
Status: COMPLETED | OUTPATIENT
Start: 2023-05-15 | End: 2023-05-15

## 2023-05-15 RX ORDER — ONDANSETRON 4 MG/1
4 TABLET, ORALLY DISINTEGRATING ORAL
Status: COMPLETED | OUTPATIENT
Start: 2023-05-15 | End: 2023-05-15

## 2023-05-15 RX ORDER — OXYCODONE HYDROCHLORIDE 10 MG/1
10 TABLET ORAL
Status: COMPLETED | OUTPATIENT
Start: 2023-05-15 | End: 2023-05-15

## 2023-05-15 RX ADMIN — OXYCODONE HYDROCHLORIDE 10 MG: 10 TABLET ORAL at 09:05

## 2023-05-15 RX ADMIN — HYDROMORPHONE HYDROCHLORIDE 1 MG: 2 INJECTION, SOLUTION INTRAMUSCULAR; INTRAVENOUS; SUBCUTANEOUS at 09:05

## 2023-05-15 RX ADMIN — ONDANSETRON 4 MG: 4 TABLET, ORALLY DISINTEGRATING ORAL at 09:05

## 2023-05-16 NOTE — DISCHARGE INSTRUCTIONS
Follow-up with a primary care provider.  Continue to follow up with wound care.  I can not write you any additional opiate pain medications because you had a prescription filled 3 days ago that was prescribed for 7 days.  Talk to your primary doctor if you need additional medication

## 2023-05-16 NOTE — ED PROVIDER NOTES
Encounter Date: 5/15/2023    SCRIBE #1 NOTE: I, Kunal Fletcher, am scribing for, and in the presence of,  Jackson Juarez MD. I have scribed the entire note.     History     Chief Complaint   Patient presents with    Hip Pain     Hx of paraplegia. C/o l hip pain for awhile. Stage 4 wound to l hip. Seen by wound care on Saturday. Also reports regaining feeling in bilat LE     47 year old male with a hx of paraplegia presents to the ED via EMS for LE pain. Pt states he has a wound to his left hip. Pt was last seen by wound care this past Saturday. Pt notes he has experienced increased sensation to his bilateral LE for 2 months now. Pt has been taking 10 mg Percocet for pain, but recently ran out of it. Pt is also taking 600 mg Gabapentin 3x per day and Baclofen 3x per day. Pt is still experiencing back spasms. Pt lastly notes that he has a new wound to his right foot that he wanted to get evaluated before it further worsened. Pt recently had podous boots placed.  Right foot wound is not new it has been present for a while but he states he is only had a nurse with wound care look at it thus far so requested I look at it.        The history is provided by the patient. No  was used.   Hip Pain  This is a recurrent problem. The current episode started more than 1 week ago. The problem occurs daily. The problem has been gradually improving. Pertinent negatives include no chest pain, no abdominal pain and no shortness of breath. He has tried nothing for the symptoms. The treatment provided no relief.   Review of patient's allergies indicates:   Allergen Reactions    Amitriptyline      Past Medical History:   Diagnosis Date    Paraplegia      Past Surgical History:   Procedure Laterality Date    INSERTION OF SUPRAPUBIC CATHETER       No family history on file.  Social History     Tobacco Use    Smoking status: Never    Smokeless tobacco: Never   Substance Use Topics    Alcohol use: Not Currently    Drug use:  Never     Review of Systems   Constitutional:  Negative for chills and fever.   Respiratory:  Negative for cough and shortness of breath.    Cardiovascular:  Negative for chest pain.   Gastrointestinal:  Negative for abdominal pain, nausea and vomiting.   Musculoskeletal:  Negative for myalgias.   Skin:  Positive for wound (left hip).   Neurological:  Negative for syncope.        Increased sensation/pain bilateral LE   All other systems reviewed and are negative.    Physical Exam     Initial Vitals [05/15/23 1822]   BP Pulse Resp Temp SpO2   (!) 108/59 74 16 97.7 °F (36.5 °C) 100 %      MAP       --         Physical Exam    Constitutional: He appears well-developed and well-nourished. No distress.   HENT:   Head: Normocephalic and atraumatic.   Cardiovascular:  Normal rate.           Pulmonary/Chest: No respiratory distress. He has no wheezes. He has no rhonchi. He exhibits no tenderness.   Abdominal: Abdomen is soft. He exhibits no distension. There is no abdominal tenderness. There is no rebound and no guarding.   Musculoskeletal:         General: Normal range of motion.     Neurological: He is alert and oriented to person, place, and time.   Paralyzed bilateral LE   Skin: Skin is warm and dry. Rash is not vesicular.   Well healing sacral wound with no surrounding erythema, induration, or purulence.  No subcutaneous fluid collections    Callused skin to right lateral foot near 5th small toes. No erythema or fluctuance.  No Subcutaneous fluid collections   Psychiatric: He has a normal mood and affect.       ED Course   Procedures  Labs Reviewed - No data to display       Imaging Results    None          Medications   oxyCODONE immediate release tablet Tab 10 mg (10 mg Oral Given 5/15/23 2115)   ondansetron disintegrating tablet 4 mg (4 mg Oral Given 5/15/23 2115)   HYDROmorphone (PF) injection 1 mg (1 mg Intravenous Given 5/15/23 2132)     Medical Decision Making  The differential diagnosis includes, but is not  limited to, wound infection, neuropathic pain, or callus.   Chronic sacral wound appears to be healing well.  The lesion on the right lateral foot appears to be callused skin some slight darkening no heat no redness no purulence no fluid collection.  The wound care nurse has evaluated this and has placed a padded dressing.  Boots are well fitted and do not apply pressure to this region.  The sacral wound is clean healed base.  It is also being followed by wound care.  His only complaint today is that he ran out of pain medication and now is having return of his chronic pain.  Will refill it for a few days and have him follow up with his PCP    Problems Addressed:  Peripheral polyneuropathy: chronic illness or injury with exacerbation, progression, or side effects of treatment  Pressure injury of skin, unspecified injury stage, unspecified location: chronic illness or injury  Pre-ulcerative corn or callous: chronic illness or injury                 Scribe Attestation:   Scribe #1: I performed the above scribed service and the documentation accurately describes the services I performed. I attest to the accuracy of the note.    Attending Attestation:           Physician Attestation for Scribe:  Physician Attestation Statement for Scribe #1: I, Jackson Juarez MD, reviewed documentation, as scribed by Kunal Fletcher in my presence, and it is both accurate and complete.           ED Course as of 05/16/23 0430   Mon May 15, 2023   2120 Reviewing LA  patient was given a 7 day supply of Percocet 10 mg 3 days ago.  It was 14 tablets but listed to last for 7 days.  Unfortunately this is only day 3 as it was filled on the 12th so does not appropriate for me to write him a prescription of narcotics at this time.  I have discussed this with the patient.  I have given him a dose of pain medication here [LF]   2121 Continue gabapentin and baclofen.  Creatinine is within normal limits will write short supply of NSAIDs as well.   Follow up with PCP for additional medication [LF]      ED Course User Index  [LF] Jackson Juarez MD                 Clinical Impression:   Final diagnoses:  [G62.9] Peripheral polyneuropathy (Primary)  [G83.9] Paralysis  [L89.90] Pressure injury of skin, unspecified injury stage, unspecified location  [L84] Pre-ulcerative corn or callous  [G62.9] Neuropathy        ED Disposition Condition    Discharge Stable          ED Prescriptions       Medication Sig Dispense Start Date End Date Auth. Provider    meloxicam (MOBIC) 7.5 MG tablet Take 1 tablet (7.5 mg total) by mouth once daily. for 7 days 7 tablet 5/15/2023 5/22/2023 Jackson Juarez MD          Follow-up Information       Follow up With Specialties Details Why Contact Info    Yosvany Simms MD Internal Medicine Schedule an appointment as soon as possible for a visit  Keep your appointment on Saturday.  Call to see if he can get a sooner appointment.  Also call the physician who wrote you your prescription 3 days ago and see if they can write you any additional medication. [address] [facility] [address]  [address]  [facility] [id number]  [phone]               Jackson Juarez MD  05/16/23 5421

## 2023-06-13 ENCOUNTER — HOSPITAL ENCOUNTER (EMERGENCY)
Facility: HOSPITAL | Age: 48
Discharge: HOME | End: 2023-06-14
Attending: EMERGENCY MEDICINE
Payer: MEDICARE

## 2023-06-13 DIAGNOSIS — N39.0 CHRONIC UTI: ICD-10-CM

## 2023-06-13 DIAGNOSIS — K62.89 PROCTITIS: ICD-10-CM

## 2023-06-13 DIAGNOSIS — K59.03 CONSTIPATION DUE TO PAIN MEDICATION: Primary | ICD-10-CM

## 2023-06-13 PROCEDURE — 85025 COMPLETE CBC W/AUTO DIFF WBC: CPT | Performed by: EMERGENCY MEDICINE

## 2023-06-13 PROCEDURE — 99284 EMERGENCY DEPT VISIT MOD MDM: CPT

## 2023-06-13 PROCEDURE — 80053 COMPREHEN METABOLIC PANEL: CPT | Performed by: EMERGENCY MEDICINE

## 2023-06-13 PROCEDURE — P9612 CATHETERIZE FOR URINE SPEC: HCPCS

## 2023-06-14 VITALS
TEMPERATURE: 98 F | OXYGEN SATURATION: 98 % | HEART RATE: 71 BPM | SYSTOLIC BLOOD PRESSURE: 132 MMHG | WEIGHT: 165 LBS | HEIGHT: 72 IN | RESPIRATION RATE: 20 BRPM | DIASTOLIC BLOOD PRESSURE: 91 MMHG | BODY MASS INDEX: 22.35 KG/M2

## 2023-06-14 LAB
ALBUMIN SERPL-MCNC: 3.7 G/DL (ref 3.5–5)
ALBUMIN/GLOB SERPL: 0.9 RATIO (ref 1.1–2)
ALP SERPL-CCNC: 99 UNIT/L (ref 40–150)
ALT SERPL-CCNC: 9 UNIT/L (ref 0–55)
APPEARANCE UR: ABNORMAL
AST SERPL-CCNC: 9 UNIT/L (ref 5–34)
BACTERIA #/AREA URNS AUTO: ABNORMAL /HPF
BASOPHILS # BLD AUTO: 0.16 X10(3)/MCL
BASOPHILS NFR BLD AUTO: 1.4 %
BILIRUB UR QL STRIP.AUTO: NEGATIVE MG/DL
BILIRUBIN DIRECT+TOT PNL SERPL-MCNC: 0.4 MG/DL
BUN SERPL-MCNC: 12.7 MG/DL (ref 8.9–20.6)
CALCIUM SERPL-MCNC: 9.4 MG/DL (ref 8.4–10.2)
CHLORIDE SERPL-SCNC: 109 MMOL/L (ref 98–107)
CO2 SERPL-SCNC: 24 MMOL/L (ref 22–29)
COLOR UR: YELLOW
CREAT SERPL-MCNC: 0.88 MG/DL (ref 0.73–1.18)
EOSINOPHIL # BLD AUTO: 0.43 X10(3)/MCL (ref 0–0.9)
EOSINOPHIL NFR BLD AUTO: 3.6 %
ERYTHROCYTE [DISTWIDTH] IN BLOOD BY AUTOMATED COUNT: 14.5 % (ref 11.5–17)
GFR SERPLBLD CREATININE-BSD FMLA CKD-EPI: >60 MLS/MIN/1.73/M2
GLOBULIN SER-MCNC: 4 GM/DL (ref 2.4–3.5)
GLUCOSE SERPL-MCNC: 117 MG/DL (ref 74–100)
GLUCOSE UR QL STRIP.AUTO: NEGATIVE MG/DL
HCT VFR BLD AUTO: 40.5 % (ref 42–52)
HGB BLD-MCNC: 12.8 G/DL (ref 14–18)
IMM GRANULOCYTES # BLD AUTO: 0.03 X10(3)/MCL (ref 0–0.04)
IMM GRANULOCYTES NFR BLD AUTO: 0.3 %
KETONES UR QL STRIP.AUTO: NEGATIVE MG/DL
LEUKOCYTE ESTERASE UR QL STRIP.AUTO: ABNORMAL UNIT/L
LYMPHOCYTES # BLD AUTO: 2.95 X10(3)/MCL (ref 0.6–4.6)
LYMPHOCYTES NFR BLD AUTO: 25 %
MCH RBC QN AUTO: 26.7 PG (ref 27–31)
MCHC RBC AUTO-ENTMCNC: 31.6 G/DL (ref 33–36)
MCV RBC AUTO: 84.4 FL (ref 80–94)
MONOCYTES # BLD AUTO: 0.71 X10(3)/MCL (ref 0.1–1.3)
MONOCYTES NFR BLD AUTO: 6 %
NEUTROPHILS # BLD AUTO: 7.52 X10(3)/MCL (ref 2.1–9.2)
NEUTROPHILS NFR BLD AUTO: 63.7 %
NITRITE UR QL STRIP.AUTO: NEGATIVE
NRBC BLD AUTO-RTO: 0 %
PH UR STRIP.AUTO: 6.5 [PH]
PLATELET # BLD AUTO: 270 X10(3)/MCL (ref 130–400)
PMV BLD AUTO: 10.7 FL (ref 7.4–10.4)
POTASSIUM SERPL-SCNC: 3.7 MMOL/L (ref 3.5–5.1)
PROT SERPL-MCNC: 7.7 GM/DL (ref 6.4–8.3)
PROT UR QL STRIP.AUTO: NEGATIVE MG/DL
RBC # BLD AUTO: 4.8 X10(6)/MCL (ref 4.7–6.1)
RBC #/AREA URNS AUTO: 35 /HPF
RBC UR QL AUTO: ABNORMAL UNIT/L
SODIUM SERPL-SCNC: 143 MMOL/L (ref 136–145)
SP GR UR STRIP.AUTO: 1.02 (ref 1–1.03)
SQUAMOUS #/AREA URNS AUTO: <5 /HPF
UROBILINOGEN UR STRIP-ACNC: 1 MG/DL
WBC # SPEC AUTO: 11.8 X10(3)/MCL (ref 4.5–11.5)
WBC #/AREA URNS AUTO: 112 /HPF

## 2023-06-14 PROCEDURE — 87088 URINE BACTERIA CULTURE: CPT | Performed by: STUDENT IN AN ORGANIZED HEALTH CARE EDUCATION/TRAINING PROGRAM

## 2023-06-14 PROCEDURE — 25000003 PHARM REV CODE 250: Performed by: EMERGENCY MEDICINE

## 2023-06-14 PROCEDURE — 63700000 PHARM REV CODE 250 ALT 637 W/O HCPCS: Performed by: EMERGENCY MEDICINE

## 2023-06-14 PROCEDURE — 81001 URINALYSIS AUTO W/SCOPE: CPT | Performed by: STUDENT IN AN ORGANIZED HEALTH CARE EDUCATION/TRAINING PROGRAM

## 2023-06-14 PROCEDURE — 87186 SC STD MICRODIL/AGAR DIL: CPT | Performed by: STUDENT IN AN ORGANIZED HEALTH CARE EDUCATION/TRAINING PROGRAM

## 2023-06-14 RX ORDER — NITROFURANTOIN 25; 75 MG/1; MG/1
100 CAPSULE ORAL
Status: COMPLETED | OUTPATIENT
Start: 2023-06-14 | End: 2023-06-14

## 2023-06-14 RX ORDER — OXYCODONE AND ACETAMINOPHEN 10; 325 MG/1; MG/1
1 TABLET ORAL EVERY 4 HOURS PRN
Status: DISCONTINUED | OUTPATIENT
Start: 2023-06-14 | End: 2023-06-14 | Stop reason: HOSPADM

## 2023-06-14 RX ORDER — FLUCONAZOLE 100 MG/1
200 TABLET ORAL
Status: COMPLETED | OUTPATIENT
Start: 2023-06-14 | End: 2023-06-14

## 2023-06-14 RX ORDER — CIPROFLOXACIN 500 MG/1
500 TABLET ORAL 2 TIMES DAILY
Qty: 20 TABLET | Refills: 0 | Status: ON HOLD | OUTPATIENT
Start: 2023-06-14 | End: 2023-06-23 | Stop reason: CLARIF

## 2023-06-14 RX ORDER — METRONIDAZOLE 500 MG/1
500 TABLET ORAL 3 TIMES DAILY
Qty: 21 TABLET | Refills: 0 | Status: ON HOLD | OUTPATIENT
Start: 2023-06-14 | End: 2023-06-23 | Stop reason: CLARIF

## 2023-06-14 RX ORDER — ONDANSETRON 4 MG/1
4 TABLET, ORALLY DISINTEGRATING ORAL
Status: COMPLETED | OUTPATIENT
Start: 2023-06-14 | End: 2023-06-14

## 2023-06-14 RX ADMIN — OXYCODONE HYDROCHLORIDE AND ACETAMINOPHEN 1 TABLET: 10; 325 TABLET ORAL at 04:06

## 2023-06-14 RX ADMIN — OXYCODONE HYDROCHLORIDE AND ACETAMINOPHEN 1 TABLET: 10; 325 TABLET ORAL at 01:06

## 2023-06-14 RX ADMIN — NITROFURANTOIN MONOHYDRATE/MACROCRYSTALS 100 MG: 25; 75 CAPSULE ORAL at 01:06

## 2023-06-14 RX ADMIN — ONDANSETRON 4 MG: 4 TABLET, ORALLY DISINTEGRATING ORAL at 01:06

## 2023-06-14 RX ADMIN — FLUCONAZOLE 200 MG: 100 TABLET ORAL at 01:06
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
## 2023-06-14 NOTE — FIRST PROVIDER EVALUATION
Medical screening examination initiated.  I have conducted a focused provider triage encounter, findings are as follows:    Brief history of present illness:  Patient with a history of suprapubic catheter and with a pelvic skin breakdown who states that he was having difficulty urinating pain on urination and that he hurts down there.  No fevers no chills no change in urine patient states he is run out of his pain medication    Vitals:    06/13/23 2123   BP: (!) 149/90   Pulse: 94   Resp: 16   Temp: 98.4 °F (36.9 °C)   TempSrc: Oral   SpO2: 100%   Weight: 74.8 kg (165 lb)   Height: 6' (1.829 m)       Pertinent physical exam:  Suprapubic abdominal tenderness without rebound or guarding chest clear lower extremities without any significant movement  Brief workup plan:  CBC chemistry urinalysis    Preliminary workup initiated; this workup will be continued and followed by the physician or advanced practice provider that is assigned to the patient when roomed.

## 2023-06-16 LAB — BACTERIA UR CULT: ABNORMAL

## 2023-06-20 ENCOUNTER — HOSPITAL ENCOUNTER (INPATIENT)
Facility: HOSPITAL | Age: 48
LOS: 5 days | Discharge: HOME HEALTH | DRG: 698 | End: 2023-06-25
Attending: EMERGENCY MEDICINE | Admitting: INTERNAL MEDICINE
Payer: MEDICARE

## 2023-06-20 DIAGNOSIS — T83.511A URINARY TRACT INFECTION ASSOCIATED WITH CATHETERIZATION OF URINARY TRACT, UNSPECIFIED INDWELLING URINARY CATHETER TYPE, INITIAL ENCOUNTER: Primary | ICD-10-CM

## 2023-06-20 DIAGNOSIS — L03.90 CELLULITIS, UNSPECIFIED CELLULITIS SITE: ICD-10-CM

## 2023-06-20 DIAGNOSIS — N39.0 URINARY TRACT INFECTION ASSOCIATED WITH CATHETERIZATION OF URINARY TRACT, UNSPECIFIED INDWELLING URINARY CATHETER TYPE, INITIAL ENCOUNTER: Primary | ICD-10-CM

## 2023-06-20 DIAGNOSIS — L89.324 PRESSURE INJURY OF LEFT BUTTOCK, STAGE 4: ICD-10-CM

## 2023-06-20 LAB
ALBUMIN SERPL-MCNC: 4.5 G/DL (ref 3.5–5)
ALBUMIN/GLOB SERPL: 1.2 RATIO (ref 1.1–2)
ALP SERPL-CCNC: 106 UNIT/L (ref 40–150)
ALT SERPL-CCNC: 9 UNIT/L (ref 0–55)
AST SERPL-CCNC: 17 UNIT/L (ref 5–34)
BASOPHILS # BLD AUTO: 0.09 X10(3)/MCL
BASOPHILS NFR BLD AUTO: 1.1 %
BILIRUBIN DIRECT+TOT PNL SERPL-MCNC: 0.5 MG/DL
BUN SERPL-MCNC: 10.8 MG/DL (ref 8.9–20.6)
CALCIUM SERPL-MCNC: 9.8 MG/DL (ref 8.4–10.2)
CHLORIDE SERPL-SCNC: 107 MMOL/L (ref 98–107)
CO2 SERPL-SCNC: 20 MMOL/L (ref 22–29)
CREAT SERPL-MCNC: 0.76 MG/DL (ref 0.73–1.18)
EOSINOPHIL # BLD AUTO: 0.23 X10(3)/MCL (ref 0–0.9)
EOSINOPHIL NFR BLD AUTO: 2.9 %
ERYTHROCYTE [DISTWIDTH] IN BLOOD BY AUTOMATED COUNT: 14.2 % (ref 11.5–17)
GFR SERPLBLD CREATININE-BSD FMLA CKD-EPI: >60 MLS/MIN/1.73/M2
GLOBULIN SER-MCNC: 3.9 GM/DL (ref 2.4–3.5)
GLUCOSE SERPL-MCNC: 64 MG/DL (ref 74–100)
HCT VFR BLD AUTO: 45.5 % (ref 42–52)
HGB BLD-MCNC: 14.2 G/DL (ref 14–18)
IMM GRANULOCYTES # BLD AUTO: 0.03 X10(3)/MCL (ref 0–0.04)
IMM GRANULOCYTES NFR BLD AUTO: 0.4 %
LYMPHOCYTES # BLD AUTO: 1.99 X10(3)/MCL (ref 0.6–4.6)
LYMPHOCYTES NFR BLD AUTO: 24.8 %
MCH RBC QN AUTO: 26.2 PG (ref 27–31)
MCHC RBC AUTO-ENTMCNC: 31.2 G/DL (ref 33–36)
MCV RBC AUTO: 83.9 FL (ref 80–94)
MONOCYTES # BLD AUTO: 0.36 X10(3)/MCL (ref 0.1–1.3)
MONOCYTES NFR BLD AUTO: 4.5 %
NEUTROPHILS # BLD AUTO: 5.32 X10(3)/MCL (ref 2.1–9.2)
NEUTROPHILS NFR BLD AUTO: 66.3 %
NRBC BLD AUTO-RTO: 0 %
PLATELET # BLD AUTO: 289 X10(3)/MCL (ref 130–400)
PMV BLD AUTO: 10.6 FL (ref 7.4–10.4)
POTASSIUM SERPL-SCNC: 4.6 MMOL/L (ref 3.5–5.1)
PROT SERPL-MCNC: 8.4 GM/DL (ref 6.4–8.3)
RBC # BLD AUTO: 5.42 X10(6)/MCL (ref 4.7–6.1)
SODIUM SERPL-SCNC: 139 MMOL/L (ref 136–145)
WBC # SPEC AUTO: 8.02 X10(3)/MCL (ref 4.5–11.5)

## 2023-06-20 PROCEDURE — 80053 COMPREHEN METABOLIC PANEL: CPT | Performed by: NURSE PRACTITIONER

## 2023-06-20 PROCEDURE — 25000003 PHARM REV CODE 250: Performed by: NURSE PRACTITIONER

## 2023-06-20 PROCEDURE — 85025 COMPLETE CBC W/AUTO DIFF WBC: CPT | Performed by: NURSE PRACTITIONER

## 2023-06-20 PROCEDURE — 11000001 HC ACUTE MED/SURG PRIVATE ROOM

## 2023-06-20 PROCEDURE — 99285 EMERGENCY DEPT VISIT HI MDM: CPT

## 2023-06-20 PROCEDURE — 87040 BLOOD CULTURE FOR BACTERIA: CPT | Performed by: NURSE PRACTITIONER

## 2023-06-20 PROCEDURE — 63600175 PHARM REV CODE 636 W HCPCS: Performed by: NURSE PRACTITIONER

## 2023-06-20 PROCEDURE — 96375 TX/PRO/DX INJ NEW DRUG ADDON: CPT

## 2023-06-20 RX ORDER — HYDROMORPHONE HYDROCHLORIDE 2 MG/ML
1 INJECTION, SOLUTION INTRAMUSCULAR; INTRAVENOUS; SUBCUTANEOUS
Status: COMPLETED | OUTPATIENT
Start: 2023-06-20 | End: 2023-06-20

## 2023-06-20 RX ORDER — ONDANSETRON 2 MG/ML
4 INJECTION INTRAMUSCULAR; INTRAVENOUS
Status: COMPLETED | OUTPATIENT
Start: 2023-06-20 | End: 2023-06-20

## 2023-06-20 RX ORDER — SODIUM CHLORIDE 0.9 % (FLUSH) 0.9 %
10 SYRINGE (ML) INJECTION
Status: DISCONTINUED | OUTPATIENT
Start: 2023-06-20 | End: 2023-06-26 | Stop reason: HOSPADM

## 2023-06-20 RX ORDER — HYDROMORPHONE HYDROCHLORIDE 2 MG/ML
0.5 INJECTION, SOLUTION INTRAMUSCULAR; INTRAVENOUS; SUBCUTANEOUS EVERY 6 HOURS PRN
Status: DISCONTINUED | OUTPATIENT
Start: 2023-06-20 | End: 2023-06-26 | Stop reason: HOSPADM

## 2023-06-20 RX ORDER — TALC
6 POWDER (GRAM) TOPICAL NIGHTLY PRN
Status: DISCONTINUED | OUTPATIENT
Start: 2023-06-20 | End: 2023-06-26 | Stop reason: HOSPADM

## 2023-06-20 RX ORDER — HYDROMORPHONE HYDROCHLORIDE 2 MG/ML
1 INJECTION, SOLUTION INTRAMUSCULAR; INTRAVENOUS; SUBCUTANEOUS EVERY 6 HOURS PRN
Status: DISCONTINUED | OUTPATIENT
Start: 2023-06-20 | End: 2023-06-26 | Stop reason: HOSPADM

## 2023-06-20 RX ADMIN — ONDANSETRON 4 MG: 2 INJECTION INTRAMUSCULAR; INTRAVENOUS at 04:06

## 2023-06-20 RX ADMIN — MEROPENEM 500 MG: 500 INJECTION, POWDER, FOR SOLUTION INTRAVENOUS at 06:06

## 2023-06-20 RX ADMIN — HYDROMORPHONE HYDROCHLORIDE 1 MG: 2 INJECTION INTRAMUSCULAR; INTRAVENOUS; SUBCUTANEOUS at 04:06

## 2023-06-20 RX ADMIN — HYDROMORPHONE HYDROCHLORIDE 1 MG: 2 INJECTION INTRAMUSCULAR; INTRAVENOUS; SUBCUTANEOUS at 08:06

## 2023-06-20 NOTE — ED PROVIDER NOTES
Encounter Date: 6/20/2023       History     Chief Complaint   Patient presents with    General Illness     AASI from home where pt reports his PCP wants him here for IV ABX due to UTI. PMH paraplegic with indwelling urinary catheter, present PTA. Pt reports lower abdominal pain and a sore on his upper legs that is bothering him. GCS 15.      See MDM    The history is provided by the patient. No  was used.   Review of patient's allergies indicates:   Allergen Reactions    Amitriptyline      Past Medical History:   Diagnosis Date    Paraplegia      Past Surgical History:   Procedure Laterality Date    INSERTION OF SUPRAPUBIC CATHETER       History reviewed. No pertinent family history.  Social History     Tobacco Use    Smoking status: Never    Smokeless tobacco: Never   Substance Use Topics    Alcohol use: Not Currently    Drug use: Never     Review of Systems   Constitutional:  Negative for fever.   Respiratory:  Negative for cough and shortness of breath.    Cardiovascular:  Negative for chest pain.   Gastrointestinal:  Positive for abdominal pain.   Genitourinary:  Negative for difficulty urinating and dysuria.   Musculoskeletal:  Negative for gait problem.   Skin:  Negative for color change.   Neurological:  Negative for dizziness, speech difficulty and headaches.   Psychiatric/Behavioral:  Negative for hallucinations and suicidal ideas.    All other systems reviewed and are negative.    Physical Exam     Initial Vitals [06/20/23 1411]   BP Pulse Resp Temp SpO2   116/78 80 16 97.5 °F (36.4 °C) 96 %      MAP       --         Physical Exam    Nursing note and vitals reviewed.  Constitutional: He appears well-developed and well-nourished.   HENT:   Head: Normocephalic.   Eyes: EOM are normal.   Neck: Neck supple.   Normal range of motion.  Cardiovascular:  Normal rate, regular rhythm, normal heart sounds and intact distal pulses.           Pulmonary/Chest: Breath sounds normal.   Abdominal:  Abdomen is soft. Bowel sounds are normal.   Musculoskeletal:         General: Normal range of motion.      Cervical back: Normal range of motion and neck supple.     Neurological: He is alert and oriented to person, place, and time. He has normal strength.   Skin: Skin is warm and dry. Capillary refill takes less than 2 seconds.   Wound to left gluteal fold   Psychiatric: He has a normal mood and affect. His behavior is normal. Judgment and thought content normal.       ED Course   Procedures  Labs Reviewed   CBC WITH DIFFERENTIAL - Abnormal; Notable for the following components:       Result Value    MCH 26.2 (*)     MCHC 31.2 (*)     MPV 10.6 (*)     All other components within normal limits   BLOOD CULTURE [facility]   BLOOD CULTURE [facility]   COMPREHENSIVE METABOLIC PANEL          Imaging Results    None          Medications   meropenem (MERREM) 500 mg in sodium chloride 0.9 % 100 mL IVPB (MB+) (has no administration in time range)   HYDROmorphone (PF) injection 1 mg (1 mg Intravenous Given 6/20/23 1616)   ondansetron injection 4 mg (4 mg Intravenous Given 6/20/23 1617)     Medical Decision Making:   Initial Assessment:   Historian:  patient.  Patient is a 47-year-old male  that presents with sent by PCP for UTI and IV antibiotics that has been present today. Associated symptoms lower abdominal pain. Surrounding information is patient was seen by Dr. Leblanc today.  Urine culture from 06/14/2023  was reviewed at visit.  This showed Klebsiella pneumoniae only susceptible to Merrem.  He was sent to the ER for admission. Exacerbated by nothing. Relieved by nothing. Patient treatment prior to arrival  Cipro and Flagyl. Risk factors include paraplegic with a indwelling suprapubic catheter. Other history pertaining to this complaint nothing.   Assessment:  See physical exam.    Differential Diagnosis:    UTI, pyelonephritis  ED Management:   History was obtained.  Physical was performed.  Reviewed patient's recent urine culture  showing Klebsiella.  Only susceptible to Merrem.  I did speak to Dr. Bar [facility].  He would like patient admitted.  He would also like infectious disease consult.  I did speak to Dr. BELLA Blanchard, emergency room physician, and he agrees with admission and will perform a face-to-face interaction with patient.  Social determinants that affect healthcare or paraplegia.           ED Course as of 06/20/23 1707 Tue Jun 20, 2023   0099 Paged Dr Bar [CL]      ED Course User Index  [CL] JOSE ALFREDO Rodríguez                 Clinical Impression:   Final diagnoses:  [T83.511A, N39.0] Urinary tract infection associated with catheterization of urinary tract, unspecified indwelling urinary catheter type, initial encounter (Primary)        ED Disposition Condition    Admit Stable                JOSE ALFREDO Rodríguez  06/20/23 1707

## 2023-06-21 PROBLEM — N39.0 CATHETER-ASSOCIATED URINARY TRACT INFECTION: Status: ACTIVE | Noted: 2023-06-21

## 2023-06-21 PROBLEM — T83.511A CATHETER-ASSOCIATED URINARY TRACT INFECTION: Status: ACTIVE | Noted: 2023-06-21

## 2023-06-21 LAB
ANION GAP SERPL CALC-SCNC: 9 MEQ/L
BASOPHILS # BLD AUTO: 0.12 X10(3)/MCL
BASOPHILS NFR BLD AUTO: 1.5 %
BUN SERPL-MCNC: 16.7 MG/DL (ref 8.9–20.6)
CALCIUM SERPL-MCNC: 9.2 MG/DL (ref 8.4–10.2)
CHLORIDE SERPL-SCNC: 107 MMOL/L (ref 98–107)
CO2 SERPL-SCNC: 24 MMOL/L (ref 22–29)
CREAT SERPL-MCNC: 0.78 MG/DL (ref 0.73–1.18)
CREAT/UREA NIT SERPL: 21
EOSINOPHIL # BLD AUTO: 0.51 X10(3)/MCL (ref 0–0.9)
EOSINOPHIL NFR BLD AUTO: 6.5 %
ERYTHROCYTE [DISTWIDTH] IN BLOOD BY AUTOMATED COUNT: 14.4 % (ref 11.5–17)
GFR SERPLBLD CREATININE-BSD FMLA CKD-EPI: >60 MLS/MIN/1.73/M2
GLUCOSE SERPL-MCNC: 86 MG/DL (ref 74–100)
HCT VFR BLD AUTO: 42.3 % (ref 42–52)
HGB BLD-MCNC: 13.2 G/DL (ref 14–18)
IMM GRANULOCYTES # BLD AUTO: 0.02 X10(3)/MCL (ref 0–0.04)
IMM GRANULOCYTES NFR BLD AUTO: 0.3 %
LYMPHOCYTES # BLD AUTO: 2.65 X10(3)/MCL (ref 0.6–4.6)
LYMPHOCYTES NFR BLD AUTO: 33.6 %
MCH RBC QN AUTO: 26.3 PG (ref 27–31)
MCHC RBC AUTO-ENTMCNC: 31.2 G/DL (ref 33–36)
MCV RBC AUTO: 84.4 FL (ref 80–94)
MONOCYTES # BLD AUTO: 0.5 X10(3)/MCL (ref 0.1–1.3)
MONOCYTES NFR BLD AUTO: 6.3 %
NEUTROPHILS # BLD AUTO: 4.09 X10(3)/MCL (ref 2.1–9.2)
NEUTROPHILS NFR BLD AUTO: 51.8 %
NRBC BLD AUTO-RTO: 0 %
PLATELET # BLD AUTO: 245 X10(3)/MCL (ref 130–400)
PMV BLD AUTO: 10.2 FL (ref 7.4–10.4)
POTASSIUM SERPL-SCNC: 4 MMOL/L (ref 3.5–5.1)
RBC # BLD AUTO: 5.01 X10(6)/MCL (ref 4.7–6.1)
SODIUM SERPL-SCNC: 140 MMOL/L (ref 136–145)
WBC # SPEC AUTO: 7.89 X10(3)/MCL (ref 4.5–11.5)

## 2023-06-21 PROCEDURE — 27000207 HC ISOLATION

## 2023-06-21 PROCEDURE — 25000003 PHARM REV CODE 250: Performed by: INTERNAL MEDICINE

## 2023-06-21 PROCEDURE — 63600175 PHARM REV CODE 636 W HCPCS: Performed by: INTERNAL MEDICINE

## 2023-06-21 PROCEDURE — 63600175 PHARM REV CODE 636 W HCPCS: Performed by: NURSE PRACTITIONER

## 2023-06-21 PROCEDURE — 80048 BASIC METABOLIC PNL TOTAL CA: CPT | Performed by: NURSE PRACTITIONER

## 2023-06-21 PROCEDURE — 11000001 HC ACUTE MED/SURG PRIVATE ROOM

## 2023-06-21 PROCEDURE — 85025 COMPLETE CBC W/AUTO DIFF WBC: CPT | Performed by: NURSE PRACTITIONER

## 2023-06-21 PROCEDURE — 25000003 PHARM REV CODE 250: Performed by: NURSE PRACTITIONER

## 2023-06-21 RX ORDER — DULOXETIN HYDROCHLORIDE 30 MG/1
30 CAPSULE, DELAYED RELEASE ORAL DAILY
Status: DISCONTINUED | OUTPATIENT
Start: 2023-06-21 | End: 2023-06-26 | Stop reason: HOSPADM

## 2023-06-21 RX ORDER — OXYBUTYNIN CHLORIDE 5 MG/1
5 TABLET ORAL 2 TIMES DAILY
Status: DISCONTINUED | OUTPATIENT
Start: 2023-06-21 | End: 2023-06-26 | Stop reason: HOSPADM

## 2023-06-21 RX ORDER — BACLOFEN 10 MG/1
10 TABLET ORAL 2 TIMES DAILY PRN
Status: DISCONTINUED | OUTPATIENT
Start: 2023-06-21 | End: 2023-06-26 | Stop reason: HOSPADM

## 2023-06-21 RX ORDER — METOPROLOL SUCCINATE 25 MG/1
25 TABLET, EXTENDED RELEASE ORAL DAILY
Status: DISCONTINUED | OUTPATIENT
Start: 2023-06-21 | End: 2023-06-26 | Stop reason: HOSPADM

## 2023-06-21 RX ORDER — MIRTAZAPINE 15 MG/1
15 TABLET, FILM COATED ORAL NIGHTLY
Status: DISCONTINUED | OUTPATIENT
Start: 2023-06-21 | End: 2023-06-26 | Stop reason: HOSPADM

## 2023-06-21 RX ORDER — SODIUM CHLORIDE 9 MG/ML
INJECTION, SOLUTION INTRAVENOUS CONTINUOUS
Status: DISCONTINUED | OUTPATIENT
Start: 2023-06-21 | End: 2023-06-26 | Stop reason: HOSPADM

## 2023-06-21 RX ORDER — AMLODIPINE BESYLATE 5 MG/1
5 TABLET ORAL DAILY
Status: DISCONTINUED | OUTPATIENT
Start: 2023-06-21 | End: 2023-06-26 | Stop reason: HOSPADM

## 2023-06-21 RX ORDER — OXYCODONE AND ACETAMINOPHEN 10; 325 MG/1; MG/1
1 TABLET ORAL EVERY 4 HOURS PRN
Status: DISCONTINUED | OUTPATIENT
Start: 2023-06-21 | End: 2023-06-26 | Stop reason: HOSPADM

## 2023-06-21 RX ADMIN — MEROPENEM 500 MG: 500 INJECTION, POWDER, FOR SOLUTION INTRAVENOUS at 08:06

## 2023-06-21 RX ADMIN — OXYCODONE AND ACETAMINOPHEN 1 TABLET: 10; 325 TABLET ORAL at 08:06

## 2023-06-21 RX ADMIN — Medication 6 MG: at 08:06

## 2023-06-21 RX ADMIN — APIXABAN 5 MG: 5 TABLET, FILM COATED ORAL at 08:06

## 2023-06-21 RX ADMIN — MIRTAZAPINE 15 MG: 15 TABLET, FILM COATED ORAL at 08:06

## 2023-06-21 RX ADMIN — BACLOFEN 10 MG: 10 TABLET ORAL at 09:06

## 2023-06-21 RX ADMIN — DOCUSATE SODIUM 250 MG: 50 CAPSULE, LIQUID FILLED ORAL at 09:06

## 2023-06-21 RX ADMIN — HYDROMORPHONE HYDROCHLORIDE 1 MG: 2 INJECTION INTRAMUSCULAR; INTRAVENOUS; SUBCUTANEOUS at 12:06

## 2023-06-21 RX ADMIN — SODIUM CHLORIDE: 9 INJECTION, SOLUTION INTRAVENOUS at 05:06

## 2023-06-21 RX ADMIN — MEROPENEM 500 MG: 500 INJECTION, POWDER, FOR SOLUTION INTRAVENOUS at 02:06

## 2023-06-21 RX ADMIN — HYDROMORPHONE HYDROCHLORIDE 1 MG: 2 INJECTION INTRAMUSCULAR; INTRAVENOUS; SUBCUTANEOUS at 05:06

## 2023-06-21 RX ADMIN — OXYBUTYNIN CHLORIDE 5 MG: 5 TABLET ORAL at 08:06

## 2023-06-21 RX ADMIN — MEROPENEM 500 MG: 500 INJECTION, POWDER, FOR SOLUTION INTRAVENOUS at 11:06

## 2023-06-21 RX ADMIN — HYDROMORPHONE HYDROCHLORIDE 1 MG: 2 INJECTION INTRAMUSCULAR; INTRAVENOUS; SUBCUTANEOUS at 06:06
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
## 2023-06-21 NOTE — PLAN OF CARE
Pt lives at [address] [place] [address] with his son, Ahmet [phone]. No steps/ stairs. Pt is  with 3 children. PCP Dr. Yosvany Araujo. [facility]. Has Wheelchair, shower chair, bedside commode, BP machine. Son assists with ADLs. Pt fills with [facility] on [address]. Plans on [facility] for transport home.    06/21/23 5686   Discharge Assessment   Assessment Type Discharge Planning Assessment   Confirmed/corrected address, phone number and insurance Yes   Confirmed Demographics Correct on Facesheet   Source of Information patient   When was your last doctors appointment?   (PCP Dr. Yosvany Ibarra)   Communicated IVORY with patient/caregiver Date not available/Unable to determine   People in Home child(arnol), adult   Do you expect to return to your current living situation? Yes   Do you have help at home or someone to help you manage your care at home? Yes  (lives with son)   Who are your caregiver(s) and their phone number(s)? alla Leo [phone]   Prior to hospitilization cognitive status: Unable to Assess   Current cognitive status: Alert/Oriented   Walking or Climbing Stairs ambulation difficulty, requires equipment   Mobility Management wheelchair   Dressing/Bathing bathing difficulty, requires equipment;bathing difficulty, assistance 1 person   Dressing/Bathing Management shower chair, family assists   Home Accessibility wheelchair accessible   Home Layout Able to live on 1st floor   Equipment Currently Used at Home blood pressure machine;wheelchair;shower chair;bedside commode   Readmission within 30 days? No   Patient currently being followed by outpatient case management? Yes  ([facility])   Do you currently have service(s) that help you manage your care at home? Yes   Name and Contact number of agency [facility]   Is the pt/caregiver preference to resume services with current agency Yes   Do you take prescription medications? Yes  (Fills with [facility] on [address])   Do you have  prescription coverage? Yes   Coverage Medicare   Do you have any problems affording any of your prescribed medications? No   Is the patient taking medications as prescribed? yes   Who is going to help you get home at discharge? [facility] per pt   Are you on dialysis? No   Do you take coumadin? No   Discharge Plan A Home Health   DME Needed Upon Discharge  none   Discharge Plan discussed with: Patient   Transition of Care Barriers None   OTHER   Name(s) of People in Home lives with son, Ahmet [phone]

## 2023-06-21 NOTE — NURSING
Nurses Note -- 4 Eyes      6/21/2023   1:42 PM      Skin assessed during: Admit      [] No Altered Skin Integrity Present    []Prevention Measures Documented      [x] Yes- Altered Skin Integrity Present or Discovered   [] LDA Added if Not in Epic (Describe Wound)   [] New Altered Skin Integrity was Present on Admit and Documented in LDA   [x] Wound Image Taken    Wound Care Consulted? Yes    Attending Nurse:  Adriano Honeycutt RN     Second RN/Staff Member:    Dora CASON

## 2023-06-22 LAB
ALBUMIN SERPL-MCNC: 3.6 G/DL (ref 3.5–5)
ALBUMIN/GLOB SERPL: 1.1 RATIO (ref 1.1–2)
ALP SERPL-CCNC: 85 UNIT/L (ref 40–150)
ALT SERPL-CCNC: 8 UNIT/L (ref 0–55)
AST SERPL-CCNC: 11 UNIT/L (ref 5–34)
BASOPHILS # BLD AUTO: 0.15 X10(3)/MCL
BASOPHILS NFR BLD AUTO: 1.8 %
BILIRUBIN DIRECT+TOT PNL SERPL-MCNC: 0.4 MG/DL
BUN SERPL-MCNC: 15.8 MG/DL (ref 8.9–20.6)
CALCIUM SERPL-MCNC: 8.9 MG/DL (ref 8.4–10.2)
CHLORIDE SERPL-SCNC: 107 MMOL/L (ref 98–107)
CO2 SERPL-SCNC: 24 MMOL/L (ref 22–29)
CREAT SERPL-MCNC: 0.75 MG/DL (ref 0.73–1.18)
EOSINOPHIL # BLD AUTO: 0.47 X10(3)/MCL (ref 0–0.9)
EOSINOPHIL NFR BLD AUTO: 5.6 %
ERYTHROCYTE [DISTWIDTH] IN BLOOD BY AUTOMATED COUNT: 14.3 % (ref 11.5–17)
GFR SERPLBLD CREATININE-BSD FMLA CKD-EPI: >60 MLS/MIN/1.73/M2
GLOBULIN SER-MCNC: 3.2 GM/DL (ref 2.4–3.5)
GLUCOSE SERPL-MCNC: 79 MG/DL (ref 74–100)
HCT VFR BLD AUTO: 40.1 % (ref 42–52)
HGB BLD-MCNC: 12.2 G/DL (ref 14–18)
IMM GRANULOCYTES # BLD AUTO: 0.02 X10(3)/MCL (ref 0–0.04)
IMM GRANULOCYTES NFR BLD AUTO: 0.2 %
LYMPHOCYTES # BLD AUTO: 3.41 X10(3)/MCL (ref 0.6–4.6)
LYMPHOCYTES NFR BLD AUTO: 40.4 %
MCH RBC QN AUTO: 26.1 PG (ref 27–31)
MCHC RBC AUTO-ENTMCNC: 30.4 G/DL (ref 33–36)
MCV RBC AUTO: 85.7 FL (ref 80–94)
MONOCYTES # BLD AUTO: 0.56 X10(3)/MCL (ref 0.1–1.3)
MONOCYTES NFR BLD AUTO: 6.6 %
NEUTROPHILS # BLD AUTO: 3.84 X10(3)/MCL (ref 2.1–9.2)
NEUTROPHILS NFR BLD AUTO: 45.4 %
NRBC BLD AUTO-RTO: 0 %
PLATELET # BLD AUTO: 235 X10(3)/MCL (ref 130–400)
PMV BLD AUTO: 10.4 FL (ref 7.4–10.4)
POTASSIUM SERPL-SCNC: 3.7 MMOL/L (ref 3.5–5.1)
PROT SERPL-MCNC: 6.8 GM/DL (ref 6.4–8.3)
RBC # BLD AUTO: 4.68 X10(6)/MCL (ref 4.7–6.1)
SODIUM SERPL-SCNC: 141 MMOL/L (ref 136–145)
WBC # SPEC AUTO: 8.45 X10(3)/MCL (ref 4.5–11.5)

## 2023-06-22 PROCEDURE — A4216 STERILE WATER/SALINE, 10 ML: HCPCS | Performed by: INTERNAL MEDICINE

## 2023-06-22 PROCEDURE — 85025 COMPLETE CBC W/AUTO DIFF WBC: CPT | Performed by: INTERNAL MEDICINE

## 2023-06-22 PROCEDURE — 80053 COMPREHEN METABOLIC PANEL: CPT | Performed by: INTERNAL MEDICINE

## 2023-06-22 PROCEDURE — 63600175 PHARM REV CODE 636 W HCPCS: Performed by: INTERNAL MEDICINE

## 2023-06-22 PROCEDURE — 27000207 HC ISOLATION

## 2023-06-22 PROCEDURE — 11000001 HC ACUTE MED/SURG PRIVATE ROOM

## 2023-06-22 PROCEDURE — 25000003 PHARM REV CODE 250: Performed by: INTERNAL MEDICINE

## 2023-06-22 RX ORDER — POLYETHYLENE GLYCOL 3350 17 G/17G
17 POWDER, FOR SOLUTION ORAL 2 TIMES DAILY PRN
Status: DISCONTINUED | OUTPATIENT
Start: 2023-06-22 | End: 2023-06-26 | Stop reason: HOSPADM

## 2023-06-22 RX ADMIN — DOCUSATE SODIUM 250 MG: 50 CAPSULE, LIQUID FILLED ORAL at 09:06

## 2023-06-22 RX ADMIN — MIRTAZAPINE 15 MG: 15 TABLET, FILM COATED ORAL at 09:06

## 2023-06-22 RX ADMIN — HYDROMORPHONE HYDROCHLORIDE 1 MG: 2 INJECTION INTRAMUSCULAR; INTRAVENOUS; SUBCUTANEOUS at 11:06

## 2023-06-22 RX ADMIN — OXYCODONE AND ACETAMINOPHEN 1 TABLET: 10; 325 TABLET ORAL at 06:06

## 2023-06-22 RX ADMIN — MEROPENEM 500 MG: 500 INJECTION, POWDER, FOR SOLUTION INTRAVENOUS at 05:06

## 2023-06-22 RX ADMIN — OXYCODONE AND ACETAMINOPHEN 1 TABLET: 10; 325 TABLET ORAL at 09:06

## 2023-06-22 RX ADMIN — MEROPENEM 500 MG: 500 INJECTION, POWDER, FOR SOLUTION INTRAVENOUS at 01:06

## 2023-06-22 RX ADMIN — HYDROMORPHONE HYDROCHLORIDE 1 MG: 2 INJECTION INTRAMUSCULAR; INTRAVENOUS; SUBCUTANEOUS at 04:06

## 2023-06-22 RX ADMIN — DOCUSATE SODIUM 250 MG: 50 CAPSULE, LIQUID FILLED ORAL at 06:06

## 2023-06-22 RX ADMIN — APIXABAN 5 MG: 5 TABLET, FILM COATED ORAL at 09:06

## 2023-06-22 RX ADMIN — Medication 6 MG: at 09:06

## 2023-06-22 RX ADMIN — APIXABAN 5 MG: 5 TABLET, FILM COATED ORAL at 07:06

## 2023-06-22 RX ADMIN — METOPROLOL SUCCINATE 25 MG: 25 TABLET, EXTENDED RELEASE ORAL at 07:06

## 2023-06-22 RX ADMIN — DULOXETINE HYDROCHLORIDE 30 MG: 30 CAPSULE, DELAYED RELEASE ORAL at 07:06

## 2023-06-22 RX ADMIN — HYDROMORPHONE HYDROCHLORIDE 0.5 MG: 2 INJECTION INTRAMUSCULAR; INTRAVENOUS; SUBCUTANEOUS at 04:06

## 2023-06-22 RX ADMIN — OXYCODONE AND ACETAMINOPHEN 1 TABLET: 10; 325 TABLET ORAL at 11:06

## 2023-06-22 RX ADMIN — POLYETHYLENE GLYCOL 3350 17 G: 17 POWDER, FOR SOLUTION ORAL at 09:06

## 2023-06-22 RX ADMIN — Medication 10 ML: at 05:06

## 2023-06-22 RX ADMIN — OXYBUTYNIN CHLORIDE 5 MG: 5 TABLET ORAL at 09:06

## 2023-06-22 RX ADMIN — HYDROMORPHONE HYDROCHLORIDE 1 MG: 2 INJECTION INTRAMUSCULAR; INTRAVENOUS; SUBCUTANEOUS at 09:06

## 2023-06-22 RX ADMIN — OXYCODONE AND ACETAMINOPHEN 1 TABLET: 10; 325 TABLET ORAL at 02:06

## 2023-06-22 RX ADMIN — Medication 10 ML: at 09:06

## 2023-06-22 RX ADMIN — AMLODIPINE BESYLATE 5 MG: 5 TABLET ORAL at 07:06

## 2023-06-22 RX ADMIN — OXYBUTYNIN CHLORIDE 5 MG: 5 TABLET ORAL at 07:06

## 2023-06-22 RX ADMIN — BACLOFEN 10 MG: 10 TABLET ORAL at 08:06

## 2023-06-22 RX ADMIN — MEROPENEM 500 MG: 500 INJECTION, POWDER, FOR SOLUTION INTRAVENOUS at 09:06
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
## 2023-06-23 PROCEDURE — 63600175 PHARM REV CODE 636 W HCPCS: Performed by: INTERNAL MEDICINE

## 2023-06-23 PROCEDURE — 27000207 HC ISOLATION

## 2023-06-23 PROCEDURE — 36410 VNPNXR 3YR/> PHY/QHP DX/THER: CPT

## 2023-06-23 PROCEDURE — C1751 CATH, INF, PER/CENT/MIDLINE: HCPCS

## 2023-06-23 PROCEDURE — 25000003 PHARM REV CODE 250: Performed by: INTERNAL MEDICINE

## 2023-06-23 PROCEDURE — 76937 US GUIDE VASCULAR ACCESS: CPT

## 2023-06-23 PROCEDURE — 11000001 HC ACUTE MED/SURG PRIVATE ROOM

## 2023-06-23 RX ORDER — SODIUM CHLORIDE 0.9 % (FLUSH) 0.9 %
10 SYRINGE (ML) INJECTION
Status: DISCONTINUED | OUTPATIENT
Start: 2023-06-24 | End: 2023-06-26 | Stop reason: HOSPADM

## 2023-06-23 RX ORDER — OXYCODONE AND ACETAMINOPHEN 10; 325 MG/1; MG/1
1 TABLET ORAL 2 TIMES DAILY PRN
COMMUNITY
Start: 2023-05-20

## 2023-06-23 RX ORDER — DOCUSATE SODIUM 100 MG/1
100 CAPSULE, LIQUID FILLED ORAL 2 TIMES DAILY
Status: DISCONTINUED | OUTPATIENT
Start: 2023-06-23 | End: 2023-06-26 | Stop reason: HOSPADM

## 2023-06-23 RX ORDER — HYDROXYZINE PAMOATE 25 MG/1
25 CAPSULE ORAL 3 TIMES DAILY
COMMUNITY
Start: 2023-05-12

## 2023-06-23 RX ORDER — GABAPENTIN 600 MG/1
600 TABLET ORAL 3 TIMES DAILY
COMMUNITY
Start: 2023-05-12

## 2023-06-23 RX ORDER — FLUTICASONE PROPIONATE 50 MCG
1 SPRAY, SUSPENSION (ML) NASAL 2 TIMES DAILY
COMMUNITY
Start: 2023-05-12

## 2023-06-23 RX ORDER — BACLOFEN 20 MG/1
20 TABLET ORAL NIGHTLY
COMMUNITY
Start: 2023-05-12

## 2023-06-23 RX ORDER — BACLOFEN 10 MG/1
10 TABLET ORAL 3 TIMES DAILY
COMMUNITY
End: 2023-07-01 | Stop reason: ALTCHOICE

## 2023-06-23 RX ORDER — AMOXICILLIN 250 MG
2 CAPSULE ORAL 2 TIMES DAILY PRN
Status: DISCONTINUED | OUTPATIENT
Start: 2023-06-23 | End: 2023-06-26 | Stop reason: HOSPADM

## 2023-06-23 RX ORDER — CYCLOBENZAPRINE HCL 10 MG
10 TABLET ORAL 2 TIMES DAILY PRN
COMMUNITY
Start: 2023-06-13

## 2023-06-23 RX ORDER — AMMONIUM LACTATE 12 G/100G
LOTION TOPICAL 2 TIMES DAILY
Status: DISCONTINUED | OUTPATIENT
Start: 2023-06-23 | End: 2023-06-26 | Stop reason: HOSPADM

## 2023-06-23 RX ORDER — SODIUM CHLORIDE 0.9 % (FLUSH) 0.9 %
10 SYRINGE (ML) INJECTION EVERY 6 HOURS
Status: DISCONTINUED | OUTPATIENT
Start: 2023-06-24 | End: 2023-06-26 | Stop reason: HOSPADM

## 2023-06-23 RX ADMIN — Medication 6 MG: at 10:06

## 2023-06-23 RX ADMIN — MEROPENEM 500 MG: 500 INJECTION, POWDER, FOR SOLUTION INTRAVENOUS at 11:06

## 2023-06-23 RX ADMIN — METOPROLOL SUCCINATE 25 MG: 25 TABLET, EXTENDED RELEASE ORAL at 08:06

## 2023-06-23 RX ADMIN — MIRTAZAPINE 15 MG: 15 TABLET, FILM COATED ORAL at 09:06

## 2023-06-23 RX ADMIN — OXYCODONE AND ACETAMINOPHEN 1 TABLET: 10; 325 TABLET ORAL at 12:06

## 2023-06-23 RX ADMIN — HYDROMORPHONE HYDROCHLORIDE 1 MG: 2 INJECTION INTRAMUSCULAR; INTRAVENOUS; SUBCUTANEOUS at 03:06

## 2023-06-23 RX ADMIN — OXYCODONE AND ACETAMINOPHEN 1 TABLET: 10; 325 TABLET ORAL at 05:06

## 2023-06-23 RX ADMIN — APIXABAN 5 MG: 5 TABLET, FILM COATED ORAL at 09:06

## 2023-06-23 RX ADMIN — AMLODIPINE BESYLATE 5 MG: 5 TABLET ORAL at 08:06

## 2023-06-23 RX ADMIN — APIXABAN 5 MG: 5 TABLET, FILM COATED ORAL at 08:06

## 2023-06-23 RX ADMIN — OXYCODONE AND ACETAMINOPHEN 1 TABLET: 10; 325 TABLET ORAL at 06:06

## 2023-06-23 RX ADMIN — MEROPENEM 500 MG: 500 INJECTION, POWDER, FOR SOLUTION INTRAVENOUS at 12:06

## 2023-06-23 RX ADMIN — OXYBUTYNIN CHLORIDE 5 MG: 5 TABLET ORAL at 09:06

## 2023-06-23 RX ADMIN — BACLOFEN 10 MG: 10 TABLET ORAL at 06:06

## 2023-06-23 RX ADMIN — HYDROMORPHONE HYDROCHLORIDE 1 MG: 2 INJECTION INTRAMUSCULAR; INTRAVENOUS; SUBCUTANEOUS at 08:06

## 2023-06-23 RX ADMIN — MEROPENEM 500 MG: 500 INJECTION, POWDER, FOR SOLUTION INTRAVENOUS at 05:06

## 2023-06-23 RX ADMIN — DOCUSATE SODIUM 100 MG: 100 CAPSULE, LIQUID FILLED ORAL at 08:06

## 2023-06-23 RX ADMIN — HYDROMORPHONE HYDROCHLORIDE 1 MG: 2 INJECTION INTRAMUSCULAR; INTRAVENOUS; SUBCUTANEOUS at 11:06

## 2023-06-23 RX ADMIN — BACLOFEN 10 MG: 10 TABLET ORAL at 05:06

## 2023-06-23 RX ADMIN — SENNOSIDES AND DOCUSATE SODIUM 2 TABLET: 50; 8.6 TABLET ORAL at 02:06

## 2023-06-23 RX ADMIN — OXYBUTYNIN CHLORIDE 5 MG: 5 TABLET ORAL at 08:06

## 2023-06-23 RX ADMIN — SODIUM CHLORIDE: 9 INJECTION, SOLUTION INTRAVENOUS at 01:06

## 2023-06-23 RX ADMIN — OXYCODONE AND ACETAMINOPHEN 1 TABLET: 10; 325 TABLET ORAL at 02:06

## 2023-06-23 RX ADMIN — DOCUSATE SODIUM 100 MG: 100 CAPSULE, LIQUID FILLED ORAL at 09:06

## 2023-06-23 RX ADMIN — OXYCODONE AND ACETAMINOPHEN 1 TABLET: 10; 325 TABLET ORAL at 10:06

## 2023-06-23 NOTE — PROGRESS NOTES
[facility] - 4th Floor Medical Telemetry  Wound Care    Patient Name:  Hemal Guerrero   MRN:  [id number]  Date: 6/23/2023  Diagnosis: Catheter-associated urinary tract infection    History:     Past Medical History:   Diagnosis Date    Paraplegia        Social History     Socioeconomic History    Marital status:    Tobacco Use    Smoking status: Never    Smokeless tobacco: Never   Substance and Sexual Activity    Alcohol use: Not Currently    Drug use: Never       Precautions:     Allergies as of 06/20/2023 - Reviewed 06/20/2023   Allergen Reaction Noted    Amitriptyline  11/16/2022       [facility] Assessment Details/Treatment     Initial visit regarding ischial Pressure injury wound POA, affected area cleansed and assessed and redressed. Patient is resting on a pressure redistribution mattress with pressure injury prevention measures in place and a low air loss bed has been requested, he has PODUS boots in use.        06/23/23 1408        Altered Skin Integrity 06/21/23 1300 Right medial Coccyx Partial thickness tissue loss. Shallow open ulcer with a red or pink wound bed, without slough. Intact or Open/Ruptured Serum-filled blister.   Date First Assessed/Time First Assessed: 06/21/23 1300   Altered Skin Integrity Present on Admission - Did Patient arrive to the hospital with altered skin?: yes  Side: Right  Orientation: medial  Location: Coccyx  Is this injury device related?: No  ...   Wound Image   (epithelialized area remodeled nonpigmented skin.)        Altered Skin Integrity 11/16/22 Left Buttocks #1 Ulceration   Date First Assessed: 11/16/22   Altered Skin Integrity Present on Admission: yes  Side: Left  Location: Buttocks  Wound Number: #1  Primary Wound Type: Ulceration   Wound Image     Description of Altered Skin Integrity Full thickness tissue loss with exposed bone, tendon, or muscle. Often includes undermining and tunneling. May extend into muscle and/or supporting structures.    Dressing Appearance Open to air   Drainage Amount None   Appearance Pink;Red   Tissue loss description Full thickness   Red (%), Wound Tissue Color 80 %   Periwound Area Intact;Pink   Wound Edges Defined   Wound Length (cm) 2 cm   Wound Width (cm) 2 cm   Wound Depth (cm) 0.5 cm   Wound Volume (cm^3) 2 cm^3   Wound Surface Area (cm^2) 4 cm^2   Care Antimicrobial agent   Dressing Gauze, wet to dry  (VASHE moist gauze under dry gauze secure with cloth tape)   Dressing Change Due 06/23/23       Recommendations made to primary team for local wound care and pressure injury prevention measures . Orders placed.     06/23/2023

## 2023-06-23 NOTE — PHYSICIAN QUERY
PT Name: Hemal Guerrero  MR #: [id number]     DOCUMENTATION CLARIFICATION     CDS/: Mary Ellen Pires RN          Contact Information: [email or web address]    This form is a permanent document in the medical record.     Query Date: June 23, 2023    By submitting this query, we are merely seeking further clarification of documentation.  Please utilize your independent clinical judgment when addressing the question(s) below.  The Medical Record contains the following:  Indicators Supporting Clinical Findings Location in Medical Record   x HR         RR          BP        Temp T 97.5, HR , RR 11-20, /64  T 97.8-99.2, HR 71/98, RR 13-21, /71 6/21/2023 vitals  6/22/2023 vitals    Lactic Acid          Procalcitonin     x WBC           Bands          CRP     06/20/23 16:02 06/21/23 05:52 06/22/23 05:01   WBC 8.02 7.89 8.45    Lab results    Culture(s)      AMS, Confusion, LOC, etc.      Organ Dysfunction/Failure     x Bacteremia or Sepsis / Septic Potential sepsis 6/21/2023 H&P-6/23/2023 HM PN    x Known or Suspected Source of Infection documented Complicated uti  History of recurrent catheter associated UTI     Catheter-associated urinary tract infection  6/21/2023 H&P      6/22/2023 HM PN     (Failed) Outpatient Treatment     x Medication meropenem (MERREM) 500 mg IVPB q8 hrs  oxyCODONE-acetaminophen  mg oral q4 hrs PRN, given 10x since admit 6/20/2023-current medications  6/21/2023-current medications   x Treatment 0.9%  NaCl infusion continuous IV infusion at 75ml/hr 6/21/2023-current medications   x Other no fevers and no leukocytosis.  Blood cultures negative so far  Recent ESBL Klebsiella complicated UTI  Stage IV left gluteal pressure wound with history of chronic osteomyelitis 6/21/2023 ID Consult         Provider, please specify diagnosis or diagnoses associated with above clinical findings.    [  x ] Sepsis Ruled Out   [   ] Sepsis due to unknown organism   [   ] Other Infectious  Disease (please specify): __________   [  ] Clinically Undetermined         Please document in your progress notes daily for the duration of treatment until resolved and include in your discharge summary.
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
## 2023-06-24 PROCEDURE — 11000001 HC ACUTE MED/SURG PRIVATE ROOM

## 2023-06-24 PROCEDURE — A4216 STERILE WATER/SALINE, 10 ML: HCPCS | Performed by: INTERNAL MEDICINE

## 2023-06-24 PROCEDURE — 63600175 PHARM REV CODE 636 W HCPCS: Performed by: INTERNAL MEDICINE

## 2023-06-24 PROCEDURE — 25000003 PHARM REV CODE 250: Performed by: INTERNAL MEDICINE

## 2023-06-24 PROCEDURE — 27000207 HC ISOLATION

## 2023-06-24 RX ADMIN — OXYCODONE AND ACETAMINOPHEN 1 TABLET: 10; 325 TABLET ORAL at 03:06

## 2023-06-24 RX ADMIN — APIXABAN 5 MG: 5 TABLET, FILM COATED ORAL at 09:06

## 2023-06-24 RX ADMIN — OXYBUTYNIN CHLORIDE 5 MG: 5 TABLET ORAL at 09:06

## 2023-06-24 RX ADMIN — Medication 6 MG: at 09:06

## 2023-06-24 RX ADMIN — BACLOFEN 10 MG: 10 TABLET ORAL at 02:06

## 2023-06-24 RX ADMIN — HYDROMORPHONE HYDROCHLORIDE 1 MG: 2 INJECTION INTRAMUSCULAR; INTRAVENOUS; SUBCUTANEOUS at 06:06

## 2023-06-24 RX ADMIN — MEROPENEM 500 MG: 500 INJECTION, POWDER, FOR SOLUTION INTRAVENOUS at 03:06

## 2023-06-24 RX ADMIN — OXYCODONE AND ACETAMINOPHEN 1 TABLET: 10; 325 TABLET ORAL at 09:06

## 2023-06-24 RX ADMIN — SENNOSIDES AND DOCUSATE SODIUM 2 TABLET: 50; 8.6 TABLET ORAL at 09:06

## 2023-06-24 RX ADMIN — SODIUM CHLORIDE, PRESERVATIVE FREE 10 ML: 5 INJECTION INTRAVENOUS at 06:06

## 2023-06-24 RX ADMIN — BACLOFEN 10 MG: 10 TABLET ORAL at 03:06

## 2023-06-24 RX ADMIN — MEROPENEM 500 MG: 500 INJECTION, POWDER, FOR SOLUTION INTRAVENOUS at 08:06

## 2023-06-24 RX ADMIN — DOCUSATE SODIUM 100 MG: 100 CAPSULE, LIQUID FILLED ORAL at 09:06

## 2023-06-24 RX ADMIN — AMLODIPINE BESYLATE 5 MG: 5 TABLET ORAL at 09:06

## 2023-06-24 RX ADMIN — METOPROLOL SUCCINATE 25 MG: 25 TABLET, EXTENDED RELEASE ORAL at 09:06

## 2023-06-24 RX ADMIN — POLYETHYLENE GLYCOL 3350 17 G: 17 POWDER, FOR SOLUTION ORAL at 09:06

## 2023-06-24 RX ADMIN — HYDROMORPHONE HYDROCHLORIDE 1 MG: 2 INJECTION INTRAMUSCULAR; INTRAVENOUS; SUBCUTANEOUS at 12:06

## 2023-06-24 RX ADMIN — SODIUM CHLORIDE, PRESERVATIVE FREE 10 ML: 5 INJECTION INTRAVENOUS at 12:06

## 2023-06-24 RX ADMIN — OXYCODONE AND ACETAMINOPHEN 1 TABLET: 10; 325 TABLET ORAL at 02:06

## 2023-06-24 RX ADMIN — MIRTAZAPINE 15 MG: 15 TABLET, FILM COATED ORAL at 09:06

## 2023-06-24 RX ADMIN — SODIUM CHLORIDE: 9 INJECTION, SOLUTION INTRAVENOUS at 11:06
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
## 2023-06-24 NOTE — PROCEDURES
Hemal Guerrero is a 47 y.o. male patient.    Temp: 97.5 °F (36.4 °C) (06/23/23 2222)  Pulse: 76 (06/23/23 2222)  Resp: 16 (06/23/23 2201)  BP: (!) 147/97 (06/23/23 2222)  SpO2: 99 % (06/23/23 2222)  Weight: 74.8 kg (165 lb) (06/21/23 1316)  Height: 6' (182.9 cm) (06/21/23 1316)    PICC  Date/Time: 6/23/2023 11:33 PM  Performed by: Fabien Beard RN  Consent Done: Yes  Time out: Immediately prior to procedure a time out was called to verify the correct patient, procedure, equipment, support staff and site/side marked as required  Indications: med administration and vascular access  Anesthesia: local infiltration  Local anesthetic: lidocaine 1% without epinephrine  Anesthetic Total (mL): 5  Preparation: skin prepped with ChloraPrep  Skin prep agent dried: skin prep agent completely dried prior to procedure  Sterile barriers: all five maximum sterile barriers used - cap, mask, sterile gown, sterile gloves, and large sterile sheet  Hand hygiene: hand hygiene performed prior to central venous catheter insertion  Location details: right brachial  Catheter type: single lumen  Catheter size: 4 Fr  Catheter Length: 16cm    Ultrasound guidance: yes  Vessel Caliber: medium and patent, compressibility normal  Vascular Doppler: not done  Needle advanced into vessel with real time Ultrasound guidance.  Guidewire confirmed in vessel.  Sterile sheath used.  no esophageal manometryNumber of attempts: 1  Post-procedure: blood return through all ports, sterile dressing applied and chlorhexidine patch    Assessment: successful placement  Complications: none        Fabien Beard RN  6/23/2023
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
## 2023-06-25 VITALS
HEIGHT: 72 IN | SYSTOLIC BLOOD PRESSURE: 150 MMHG | OXYGEN SATURATION: 100 % | RESPIRATION RATE: 17 BRPM | TEMPERATURE: 99 F | DIASTOLIC BLOOD PRESSURE: 82 MMHG | WEIGHT: 165 LBS | HEART RATE: 70 BPM | BODY MASS INDEX: 22.35 KG/M2

## 2023-06-25 PROBLEM — N39.0 CATHETER-ASSOCIATED URINARY TRACT INFECTION: Status: RESOLVED | Noted: 2023-06-21 | Resolved: 2023-06-25

## 2023-06-25 PROBLEM — T83.511A CATHETER-ASSOCIATED URINARY TRACT INFECTION: Status: RESOLVED | Noted: 2023-06-21 | Resolved: 2023-06-25

## 2023-06-25 LAB
BACTERIA BLD CULT: NORMAL
BACTERIA BLD CULT: NORMAL

## 2023-06-25 PROCEDURE — 63600175 PHARM REV CODE 636 W HCPCS: Performed by: INTERNAL MEDICINE

## 2023-06-25 PROCEDURE — 25000003 PHARM REV CODE 250: Performed by: INTERNAL MEDICINE

## 2023-06-25 PROCEDURE — 63600175 PHARM REV CODE 636 W HCPCS: Performed by: NURSE PRACTITIONER

## 2023-06-25 PROCEDURE — A4216 STERILE WATER/SALINE, 10 ML: HCPCS | Performed by: INTERNAL MEDICINE

## 2023-06-25 PROCEDURE — 25000003 PHARM REV CODE 250: Performed by: NURSE PRACTITIONER

## 2023-06-25 RX ADMIN — SODIUM CHLORIDE, PRESERVATIVE FREE 10 ML: 5 INJECTION INTRAVENOUS at 12:06

## 2023-06-25 RX ADMIN — AMMONIUM LACTATE: 12 LOTION TOPICAL at 09:06

## 2023-06-25 RX ADMIN — MIRTAZAPINE 15 MG: 15 TABLET, FILM COATED ORAL at 08:06

## 2023-06-25 RX ADMIN — BACLOFEN 10 MG: 10 TABLET ORAL at 03:06

## 2023-06-25 RX ADMIN — SENNOSIDES AND DOCUSATE SODIUM 2 TABLET: 50; 8.6 TABLET ORAL at 09:06

## 2023-06-25 RX ADMIN — METHYLNALTREXONE BROMIDE 12 MG: 12 INJECTION, SOLUTION SUBCUTANEOUS at 04:06

## 2023-06-25 RX ADMIN — POLYETHYLENE GLYCOL 3350 17 G: 17 POWDER, FOR SOLUTION ORAL at 09:06

## 2023-06-25 RX ADMIN — AMLODIPINE BESYLATE 5 MG: 5 TABLET ORAL at 09:06

## 2023-06-25 RX ADMIN — OXYCODONE AND ACETAMINOPHEN 1 TABLET: 10; 325 TABLET ORAL at 05:06

## 2023-06-25 RX ADMIN — METOPROLOL SUCCINATE 25 MG: 25 TABLET, EXTENDED RELEASE ORAL at 09:06

## 2023-06-25 RX ADMIN — HYDROMORPHONE HYDROCHLORIDE 1 MG: 2 INJECTION INTRAMUSCULAR; INTRAVENOUS; SUBCUTANEOUS at 02:06

## 2023-06-25 RX ADMIN — OXYCODONE AND ACETAMINOPHEN 1 TABLET: 10; 325 TABLET ORAL at 12:06

## 2023-06-25 RX ADMIN — OXYBUTYNIN CHLORIDE 5 MG: 5 TABLET ORAL at 09:06

## 2023-06-25 RX ADMIN — OXYCODONE AND ACETAMINOPHEN 1 TABLET: 10; 325 TABLET ORAL at 06:06

## 2023-06-25 RX ADMIN — APIXABAN 5 MG: 5 TABLET, FILM COATED ORAL at 09:06

## 2023-06-25 RX ADMIN — OXYCODONE AND ACETAMINOPHEN 1 TABLET: 10; 325 TABLET ORAL at 08:06

## 2023-06-25 RX ADMIN — BACLOFEN 10 MG: 10 TABLET ORAL at 05:06

## 2023-06-25 RX ADMIN — APIXABAN 5 MG: 5 TABLET, FILM COATED ORAL at 08:06

## 2023-06-25 RX ADMIN — OXYBUTYNIN CHLORIDE 5 MG: 5 TABLET ORAL at 08:06

## 2023-06-25 RX ADMIN — HYDROMORPHONE HYDROCHLORIDE 1 MG: 2 INJECTION INTRAMUSCULAR; INTRAVENOUS; SUBCUTANEOUS at 03:06

## 2023-06-25 RX ADMIN — SODIUM CHLORIDE, PRESERVATIVE FREE 10 ML: 5 INJECTION INTRAVENOUS at 06:06

## 2023-06-25 RX ADMIN — MEROPENEM 500 MG: 500 INJECTION, POWDER, FOR SOLUTION INTRAVENOUS at 04:06

## 2023-06-25 RX ADMIN — DOCUSATE SODIUM 100 MG: 100 CAPSULE, LIQUID FILLED ORAL at 09:06

## 2023-06-25 RX ADMIN — HYDROMORPHONE HYDROCHLORIDE 1 MG: 2 INJECTION INTRAMUSCULAR; INTRAVENOUS; SUBCUTANEOUS at 09:06
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
## 2023-06-25 NOTE — PLAN OF CARE
06/25/23 1551   Final Note   Assessment Type Final Discharge Note   Anticipated Discharge Disposition Home-Health   Hospital Resources/Appts/Education Provided Post-Acute resouces added to AVS   Post-Acute Status   Post-Acute Authorization Home Health   Home Health Status Set-up Complete/Auth obtained  (Resume with [facility])

## 2023-06-25 NOTE — PROCEDURES
Hemal Guerrero is a 47 y.o. male patient.    Temp: 99 °F (37.2 °C) (06/25/23 0738)  Pulse: 65 (06/25/23 0911)  Resp: 18 (06/25/23 0912)  BP: (!) 171/94 (06/25/23 0911)  SpO2: 97 % (06/25/23 0738)  Weight: 74.8 kg (165 lb) (06/21/23 1316)  Height: 6' (182.9 cm) (06/21/23 1316)       Bladder Cath    Date/Time: 6/25/2023 11:57 AM  Location procedure was performed: [facility]  Performed by: JOSE ALFREDO Kay  Authorized by: JOSE ALFREDO Kay   Indications: neurogenic bladder  Local anesthesia used: no    Anesthesia:  Local anesthesia used: no    Patient sedated: no  Preparation: Patient was prepped and draped in the usual sterile fashion.  Catheter insertion: indwelling  Catheter type: Bhatti  Catheter size: 16 Fr  Complicated insertion: no  Altered anatomy: no  Bladder irrigation: no  Number of attempts: 1  Urine characteristics: clear  Complications: No  Specimens: No  Implants: No  Patient tolerance: Patient tolerated the procedure well with no immediate complications        6/25/2023

## 2023-06-25 NOTE — CONSULTS
Hemal Guerrero 1975  [id number]  6/25/2023    CONSULTING PHYSICIAN: Fred    Reason for consult:  Suprapubic catheter change    HPI: Mr. Guerrero is a 47-year-old male he with a past medical history of paraplegia requiring a chronic suprapubic catheter due to neurogenic bladder, sacral/gluteal pressure wounds whose primary urologist is Dr. Robert Gipson.  Of note, he does have chronic abdominal pain with multiple hospital admissions.  Presented to the emergency department on 06/20/2023 with complaints of worsening abdominal pain with a recent positive urine culture on 06/14/2023 showing ESBL only sensitive to Merrem.  We were consulted to exchange suprapubic catheter. It was last exchanged per home health on June 1st.       Past Medical History:   Diagnosis Date    Paraplegia      Past Surgical History:   Procedure Laterality Date    INSERTION OF SUPRAPUBIC CATHETER       History reviewed. No pertinent family history.    Social History     Tobacco Use    Smoking status: Never    Smokeless tobacco: Never   Substance Use Topics    Alcohol use: Not Currently    Drug use: Never     Current Facility-Administered Medications   Medication Dose Route Frequency Provider Last Rate Last Admin    0.9%  NaCl infusion   Intravenous Continuous Yosvany Simms MD 75 mL/hr at 06/24/23 1139 New Bag at 06/24/23 1139    amLODIPine tablet 5 mg  5 mg Oral Daily Yosvany Simms MD   5 mg at 06/25/23 0911    ammonium lactate 12 % lotion   Topical (Top) BID Yosvany Simms MD   Given at 06/25/23 0911    apixaban tablet 5 mg  5 mg Oral BID Yosvany Simms MD   5 mg at 06/25/23 0911    baclofen tablet 10 mg  10 mg Oral BID PRN Yosvany Simms MD   10 mg at 06/25/23 0305    docusate sodium capsule 100 mg  100 mg Oral BID Yosvany Simms MD   100 mg at 06/25/23 0911    DULoxetine DR capsule 30 mg  30 mg Oral Daily Yosvany Simms MD   30 mg at 06/22/23 0759    HYDROmorphone (PF) injection 0.5 mg  0.5 mg  Intravenous Q6H PRN Yosvany Simms MD   0.5 mg at 06/22/23 1606    HYDROmorphone (PF) injection 1 mg  1 mg Intravenous Q6H PRN Yosvany Simms MD   1 mg at 06/25/23 0912    melatonin tablet 6 mg  6 mg Oral Nightly PRN Yosvany Simms MD   6 mg at 06/24/23 2111    meropenem (MERREM) 500 mg in sodium chloride 0.9 % 100 mL IVPB (MB+)  500 mg Intravenous Q8H RAFAL KentP   Stopped at 06/25/23 0500    metoprolol succinate (TOPROL-XL) 24 hr tablet 25 mg  25 mg Oral Daily Yosvany Simms MD   25 mg at 06/25/23 0911    mirtazapine tablet 15 mg  15 mg Oral Nightly Yosvany Simms MD   15 mg at 06/24/23 2111    oxybutynin tablet 5 mg  5 mg Oral BID Yosvany Simms MD   5 mg at 06/25/23 0911    oxyCODONE-acetaminophen  mg per tablet 1 tablet  1 tablet Oral Q4H PRALPHONSO Simms MD   1 tablet at 06/25/23 0613    polyethylene glycol packet 17 g  17 g Oral BID PRN Yosvany Simms MD   17 g at 06/25/23 0911    senna-docusate 8.6-50 mg per tablet 2 tablet  2 tablet Oral BID BLAYNE Simms MD   2 tablet at 06/25/23 0911    sodium chloride 0.9% flush 10 mL  10 mL Intravenous PRN Yosvany Simms MD   10 mL at 06/22/23 2103    sodium chloride 0.9% flush 10 mL  10 mL Intravenous Q6H Yosvany Simms MD   10 mL at 06/25/23 0612    And    sodium chloride 0.9% flush 10 mL  10 mL Intravenous PRN Yosvany Simms MD         Review of patient's allergies indicates:   Allergen Reactions    Amitriptyline      ROS: 12 point review of systems negative other than the HPI    PHYSICAL EXAM:  Vitals:    06/25/23 0613 06/25/23 0738 06/25/23 0911 06/25/23 0912   BP:  (!) 171/94 (!) 171/94    BP Location:       Patient Position:       Pulse:  (!) 56 65    Resp: 18 18  18   Temp:  99 °F (37.2 °C)     TempSrc:  Oral     SpO2:  97%     Weight:       Height:             Intake/Output Summary (Last 24 hours) at 6/25/2023 1100  Last data filed at 6/25/2023 0800  Gross per 24 hour   Intake  2614 ml   Output 3625 ml   Net -1011 ml       GEN: WN/WD NAD  HEENT: NCAT, PERRLA, EOMI, OP clear, nares patent  CV: RRR  RESP: Even and unlabored  ABD: soft, NTND  : yellow urine with some sediment draining to  bag  EXT: no C/C/E  NEURO: no focal deficits, MAEW, AAOx4      LABS:  No results found for this or any previous visit (from the past 24 hour(s)).      IMAGING:  EXAMINATION:  CT ABDOMEN PELVIS WITHOUT CONTRAST     CLINICAL HISTORY:  Flank pain, kidney stone suspected;     TECHNIQUE:  Helically acquired axial images, sagittal and coronal reformations were obtained from the lung bases to the pubic symphysis without the IV administration of contrast.     Automated tube current modulation, weight-based exposure dosing, and/or iterative reconstruction technique utilized to reach lowest reasonably achievable exposure rate.     DLP: 753 mGy*cm     COMPARISON:  CT abdomen pelvis 04/10/2023     FINDINGS:  HEART: There are coronary artery calcifications.     LUNG BASES: Basilar atelectasis.     LIVER: Normal attenuation. No appreciable focal hepatic lesion.     BILIARY: No calcified gallstones.     PANCREAS: No inflammatory change.     SPLEEN: Normal in size     ADRENALS: No mass.     KIDNEYS/URETERS: There is a 4.5 mm nonobstructing left renal caliceal calculus.  No hydronephrosis.     GI TRACT/MESENTERY: Evaluation of the bowel is limited without contrast. There are postsurgical changes of right hemicolectomy.  Physiologic colonic stool burden.     Under distension versus wall thickening at the low rectum.     PERITONEUM: No free fluid.No free air.     LYMPH NODES: No enlarged lymph nodes by size criteria.     VASCULATURE: No significant atherosclerosis or aneurysm.     BLADDER: Suprapubic catheter in place.  The bladder is collapsed.     REPRODUCTIVE ORGANS: Normal as visualized.     ABDOMINAL WALL: Unremarkable.     BONES: There are postsurgical changes of lumbosacral spinal fusion.  Resection versus chronic  resorptive change at the lower sacrum/coccyx.  Metallic debris at the level of the posterior canal of T12 and L1, unchanged.  Hypertrophic enthesopathic changes most pronounced at the anterior inferior iliac spines and ischial tuberosities bilaterally.  Sclerosis at the left ischial tuberosity with overlying decubitus ulcer compatible with chronic osteomyelitis, similar to previous.  No appreciable drainable fluid collection.     Impression:     1. Nonobstructing left nephrolithiasis  2. Under distension versus wall thickening at the low rectum.  3. Chronic left ischial decubitus ulcer with chronic sclerotic change of the left ischial tuberosity  4. Pulmonary concordant        Electronically signed by: Aster Grissom  Date:                                            06/14/2023  Time:                                           08:18           Exam Ended: 06/14/23 01:09               ASSESSMENT:  Neurogenic bladder with chronic SP tube and recurrent infections/colonization    PLAN:  SP tube exchanged. Recommend HH continuing with exchanges every 3-4 weeks    Deloris Lyles, JOSE ALFREDO

## 2023-06-26 ENCOUNTER — PATIENT OUTREACH (OUTPATIENT)
Dept: ADMINISTRATIVE | Facility: CLINIC | Age: 48
End: 2023-06-26
Payer: MEDICARE

## 2023-06-26 ENCOUNTER — PATIENT MESSAGE (OUTPATIENT)
Dept: ADMINISTRATIVE | Facility: CLINIC | Age: 48
End: 2023-06-26
Payer: MEDICARE

## 2023-06-26 NOTE — PROGRESS NOTES
C3 nurse attempted to contact Hemal Guerrero for a TCC post hospital discharge follow up call. No answer. The patient does not have a scheduled HOSFU appointment, and the pt does not have an [facility] PCP.
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
## 2023-06-27 NOTE — PROGRESS NOTES
----- Message from Yanelis Sterling MD sent at 10/30/2019  8:13 AM EDT -----  Regarding: appointment tomorrow  Let's please have this patient see me tomorrow for a cysto in the office, can double book  ----- Message -----  From: Isa Roman DO  Sent: 10/30/2019   7:32 AM EDT  To: Soha Pham MD, Yanelis Sterling MD, #    Please call patient and facilitate appointment with Urology, Renal ultrasound suspicious for bladder tumor C3 nurse attempted to contact Hemal Guerrero for a TCC post hospital discharge follow up call. No answer. The patient does not have a scheduled HOSFU appointment, and the pt does not have an [facility] PCP.

## 2023-06-30 ENCOUNTER — HOSPITAL ENCOUNTER (EMERGENCY)
Facility: HOSPITAL | Age: 48
Discharge: HOME | End: 2023-07-03
Attending: STUDENT IN AN ORGANIZED HEALTH CARE EDUCATION/TRAINING PROGRAM
Payer: MEDICARE

## 2023-06-30 DIAGNOSIS — L89.329 PRESSURE INJURY OF SKIN OF LEFT BUTTOCK, UNSPECIFIED INJURY STAGE: Primary | ICD-10-CM

## 2023-06-30 DIAGNOSIS — K59.03 DRUG-INDUCED CONSTIPATION: ICD-10-CM

## 2023-06-30 DIAGNOSIS — Z91.89 AT HIGH RISK FOR SELF HARM: ICD-10-CM

## 2023-06-30 LAB
ALBUMIN SERPL-MCNC: 4.2 G/DL (ref 3.5–5)
ALBUMIN/GLOB SERPL: 1 RATIO (ref 1.1–2)
ALP SERPL-CCNC: 100 UNIT/L (ref 40–150)
ALT SERPL-CCNC: 22 UNIT/L (ref 0–55)
AMPHET UR QL SCN: NEGATIVE
APAP SERPL-MCNC: <17.4 UG/ML (ref 17.4–30)
APPEARANCE UR: CLEAR
AST SERPL-CCNC: 16 UNIT/L (ref 5–34)
BACTERIA #/AREA URNS AUTO: ABNORMAL /HPF
BARBITURATE SCN PRESENT UR: NEGATIVE
BASOPHILS # BLD AUTO: 0.11 X10(3)/MCL
BASOPHILS NFR BLD AUTO: 1.1 %
BENZODIAZ UR QL SCN: NEGATIVE
BILIRUB UR QL STRIP.AUTO: NEGATIVE MG/DL
BILIRUBIN DIRECT+TOT PNL SERPL-MCNC: 0.6 MG/DL
BUN SERPL-MCNC: 11.9 MG/DL (ref 8.9–20.6)
CALCIUM SERPL-MCNC: 9.4 MG/DL (ref 8.4–10.2)
CANNABINOIDS UR QL SCN: POSITIVE
CHLORIDE SERPL-SCNC: 104 MMOL/L (ref 98–107)
CO2 SERPL-SCNC: 23 MMOL/L (ref 22–29)
COCAINE UR QL SCN: NEGATIVE
COLOR UR: YELLOW
CREAT SERPL-MCNC: 0.71 MG/DL (ref 0.73–1.18)
EOSINOPHIL # BLD AUTO: 0.34 X10(3)/MCL (ref 0–0.9)
EOSINOPHIL NFR BLD AUTO: 3.4 %
ERYTHROCYTE [DISTWIDTH] IN BLOOD BY AUTOMATED COUNT: 13.6 % (ref 11.5–17)
ETHANOL SERPL-MCNC: <10 MG/DL
FENTANYL UR QL SCN: NEGATIVE
FLUAV AG UPPER RESP QL IA.RAPID: NOT DETECTED
FLUBV AG UPPER RESP QL IA.RAPID: NOT DETECTED
GFR SERPLBLD CREATININE-BSD FMLA CKD-EPI: >60 MLS/MIN/1.73/M2
GLOBULIN SER-MCNC: 4.1 GM/DL (ref 2.4–3.5)
GLUCOSE SERPL-MCNC: 86 MG/DL (ref 74–100)
GLUCOSE UR QL STRIP.AUTO: NEGATIVE MG/DL
HCT VFR BLD AUTO: 45.3 % (ref 42–52)
HGB BLD-MCNC: 14.5 G/DL (ref 14–18)
IMM GRANULOCYTES # BLD AUTO: 0.01 X10(3)/MCL (ref 0–0.04)
IMM GRANULOCYTES NFR BLD AUTO: 0.1 %
KETONES UR QL STRIP.AUTO: NEGATIVE MG/DL
LEUKOCYTE ESTERASE UR QL STRIP.AUTO: ABNORMAL UNIT/L
LYMPHOCYTES # BLD AUTO: 3.87 X10(3)/MCL (ref 0.6–4.6)
LYMPHOCYTES NFR BLD AUTO: 39.2 %
MCH RBC QN AUTO: 26.6 PG (ref 27–31)
MCHC RBC AUTO-ENTMCNC: 32 G/DL (ref 33–36)
MCV RBC AUTO: 83 FL (ref 80–94)
MDMA UR QL SCN: NEGATIVE
MONOCYTES # BLD AUTO: 0.6 X10(3)/MCL (ref 0.1–1.3)
MONOCYTES NFR BLD AUTO: 6.1 %
NEUTROPHILS # BLD AUTO: 4.94 X10(3)/MCL (ref 2.1–9.2)
NEUTROPHILS NFR BLD AUTO: 50.1 %
NITRITE UR QL STRIP.AUTO: NEGATIVE
NRBC BLD AUTO-RTO: 0 %
OPIATES UR QL SCN: POSITIVE
PCP UR QL: NEGATIVE
PH UR STRIP.AUTO: 6 [PH]
PH UR: 6 [PH] (ref 3–11)
PLATELET # BLD AUTO: 248 X10(3)/MCL (ref 130–400)
PMV BLD AUTO: 10.4 FL (ref 7.4–10.4)
POTASSIUM SERPL-SCNC: 3.4 MMOL/L (ref 3.5–5.1)
PROT SERPL-MCNC: 8.3 GM/DL (ref 6.4–8.3)
PROT UR QL STRIP.AUTO: NEGATIVE MG/DL
RBC # BLD AUTO: 5.46 X10(6)/MCL (ref 4.7–6.1)
RBC #/AREA URNS AUTO: ABNORMAL /HPF
RBC UR QL AUTO: ABNORMAL UNIT/L
SARS-COV-2 RNA RESP QL NAA+PROBE: NOT DETECTED
SODIUM SERPL-SCNC: 137 MMOL/L (ref 136–145)
SP GR UR STRIP.AUTO: 1.01 (ref 1–1.03)
SPECIFIC GRAVITY, URINE AUTO (.000) (OHS): 1.01 (ref 1–1.03)
SQUAMOUS #/AREA URNS AUTO: ABNORMAL /HPF
TSH SERPL-ACNC: 3.22 UIU/ML (ref 0.35–4.94)
UROBILINOGEN UR STRIP-ACNC: 1 MG/DL
WBC # SPEC AUTO: 9.87 X10(3)/MCL (ref 4.5–11.5)
WBC #/AREA URNS AUTO: ABNORMAL /HPF
YEAST URNS QL MICRO: ABNORMAL /HPF

## 2023-06-30 PROCEDURE — 0240U COVID/FLU A&B PCR: CPT

## 2023-06-30 PROCEDURE — 87088 URINE BACTERIA CULTURE: CPT

## 2023-06-30 PROCEDURE — 80307 DRUG TEST PRSMV CHEM ANLYZR: CPT

## 2023-06-30 PROCEDURE — 85025 COMPLETE CBC W/AUTO DIFF WBC: CPT

## 2023-06-30 PROCEDURE — 63600175 PHARM REV CODE 636 W HCPCS

## 2023-06-30 PROCEDURE — 25000003 PHARM REV CODE 250

## 2023-06-30 PROCEDURE — 80143 DRUG ASSAY ACETAMINOPHEN: CPT

## 2023-06-30 PROCEDURE — 96372 THER/PROPH/DIAG INJ SC/IM: CPT

## 2023-06-30 PROCEDURE — 99285 EMERGENCY DEPT VISIT HI MDM: CPT

## 2023-06-30 PROCEDURE — 82077 ASSAY SPEC XCP UR&BREATH IA: CPT

## 2023-06-30 PROCEDURE — 80053 COMPREHEN METABOLIC PANEL: CPT

## 2023-06-30 PROCEDURE — 81001 URINALYSIS AUTO W/SCOPE: CPT

## 2023-06-30 PROCEDURE — 87186 SC STD MICRODIL/AGAR DIL: CPT

## 2023-06-30 PROCEDURE — 84443 ASSAY THYROID STIM HORMONE: CPT

## 2023-06-30 RX ORDER — MELOXICAM 7.5 MG/1
7.5 TABLET ORAL DAILY
Qty: 7 TABLET | Refills: 0 | Status: SHIPPED | OUTPATIENT
Start: 2023-06-30 | End: 2023-07-03 | Stop reason: SDUPTHER

## 2023-06-30 RX ORDER — HYDROMORPHONE HYDROCHLORIDE 2 MG/ML
1 INJECTION, SOLUTION INTRAMUSCULAR; INTRAVENOUS; SUBCUTANEOUS
Status: COMPLETED | OUTPATIENT
Start: 2023-06-30 | End: 2023-06-30

## 2023-06-30 RX ORDER — OXYCODONE AND ACETAMINOPHEN 5; 325 MG/1; MG/1
2 TABLET ORAL
Status: COMPLETED | OUTPATIENT
Start: 2023-06-30 | End: 2023-06-30

## 2023-06-30 RX ORDER — ONDANSETRON 4 MG/1
4 TABLET, ORALLY DISINTEGRATING ORAL
Status: COMPLETED | OUTPATIENT
Start: 2023-06-30 | End: 2023-06-30

## 2023-06-30 RX ADMIN — HYDROMORPHONE HYDROCHLORIDE 1 MG: 2 INJECTION INTRAMUSCULAR; INTRAVENOUS; SUBCUTANEOUS at 05:06

## 2023-06-30 RX ADMIN — ONDANSETRON 4 MG: 4 TABLET, ORALLY DISINTEGRATING ORAL at 05:06

## 2023-06-30 RX ADMIN — OXYCODONE HYDROCHLORIDE AND ACETAMINOPHEN 2 TABLET: 5; 325 TABLET ORAL at 06:06
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
## 2023-06-30 NOTE — FIRST PROVIDER EVALUATION
Medical screening examination initiated.  I have conducted a focused provider triage encounter, findings are as follows:    Brief history of present illness:  48 y/o paraplegic male presents from home where he is having worsening pain to wound on ischium. Constipation for couple days as well.     Vitals:    06/30/23 1340   BP: 125/82   BP Location: Left arm   Patient Position: Lying   Pulse: 99   Resp: 16   Temp: 98.5 °F (36.9 °C)   SpO2: 100%   Weight: 74.8 kg (165 lb)   Height: 6' (1.829 m)       Pertinent physical exam:  alert, on stretcher, nonlabored     Brief workup plan:  exam    Preliminary workup initiated; this workup will be continued and followed by the physician or advanced practice provider that is assigned to the patient when roomed.

## 2023-06-30 NOTE — DISCHARGE INSTRUCTIONS
Thanks for letting us take care of you today!  It is our goal to give you courteous care and to keep you comfortable and informed, if you have any questions before you leave I will be happy to try and answer them.    Here is some advice after your visit:      Your visit in the emergency department is NOT definitive care - please follow-up with your primary care doctor and/or specialist within 1 week.  Please return if you have any worsening in your condition or if you have any other concerns.    If you had radiology exams like an XRAY or CT in the emergency Department the interpreation on them may be preliminary - there may be less time sensitive findings on the reports please obtain these reports within 24 hours from the hospital or by using your out on your mobile phone to access records.  Bring these to your primary care doctor and/or specialist for further review of incidental findings.    You have been prescribed Mobic for pain. This is an Non-Steroidal Anti-Inflammatory (NSAID) Medication. Please do not take any additional NSAIDs while you are taking this medication including (Advil, Aleve, Motrin, Ibuprofen, Mobic\meloxicam, Naprosyn, Toradol, etc.). Please stop taking this medication if you experience: weakness, itching, yellow skin or eyes, joint pains, vomiting blood, blood or black stools, unusual weight gain, or swelling in your arms, legs, hands, or feet.     While in the Emergency Department you received medication that may cause drowsiness, dizziness, impaired judgment, and reduced physical capabilities. You should not drive, operate heavy machinery, swim, or make life  changing decisions within 24 hours of receiving this medication.

## 2023-07-01 PROCEDURE — 25000003 PHARM REV CODE 250: Performed by: EMERGENCY MEDICINE

## 2023-07-01 RX ORDER — GABAPENTIN 300 MG/1
600 CAPSULE ORAL 3 TIMES DAILY
Status: DISCONTINUED | OUTPATIENT
Start: 2023-07-01 | End: 2023-07-03 | Stop reason: HOSPADM

## 2023-07-01 RX ORDER — MIRTAZAPINE 15 MG/1
15 TABLET, FILM COATED ORAL NIGHTLY
Status: DISCONTINUED | OUTPATIENT
Start: 2023-07-01 | End: 2023-07-03 | Stop reason: HOSPADM

## 2023-07-01 RX ORDER — OXYCODONE AND ACETAMINOPHEN 10; 325 MG/1; MG/1
1 TABLET ORAL EVERY 6 HOURS PRN
Status: DISCONTINUED | OUTPATIENT
Start: 2023-07-01 | End: 2023-07-03 | Stop reason: HOSPADM

## 2023-07-01 RX ORDER — AMLODIPINE BESYLATE 5 MG/1
5 TABLET ORAL DAILY
Status: DISCONTINUED | OUTPATIENT
Start: 2023-07-01 | End: 2023-07-03 | Stop reason: HOSPADM

## 2023-07-01 RX ORDER — BACLOFEN 10 MG/1
10 TABLET ORAL 3 TIMES DAILY
Status: DISCONTINUED | OUTPATIENT
Start: 2023-07-01 | End: 2023-07-01

## 2023-07-01 RX ORDER — SERTRALINE HYDROCHLORIDE 50 MG/1
50 TABLET, FILM COATED ORAL DAILY
Status: DISCONTINUED | OUTPATIENT
Start: 2023-07-01 | End: 2023-07-03 | Stop reason: HOSPADM

## 2023-07-01 RX ORDER — CYCLOBENZAPRINE HCL 10 MG
10 TABLET ORAL 2 TIMES DAILY PRN
Status: DISCONTINUED | OUTPATIENT
Start: 2023-07-01 | End: 2023-07-03 | Stop reason: HOSPADM

## 2023-07-01 RX ORDER — BACLOFEN 10 MG/1
20 TABLET ORAL NIGHTLY
Status: DISCONTINUED | OUTPATIENT
Start: 2023-07-01 | End: 2023-07-03 | Stop reason: HOSPADM

## 2023-07-01 RX ORDER — HYDROXYZINE PAMOATE 25 MG/1
25 CAPSULE ORAL 3 TIMES DAILY
Status: DISCONTINUED | OUTPATIENT
Start: 2023-07-01 | End: 2023-07-03 | Stop reason: HOSPADM

## 2023-07-01 RX ORDER — OXYBUTYNIN CHLORIDE 5 MG/1
5 TABLET ORAL DAILY
Status: DISCONTINUED | OUTPATIENT
Start: 2023-07-01 | End: 2023-07-03 | Stop reason: HOSPADM

## 2023-07-01 RX ADMIN — SERTRALINE HYDROCHLORIDE 50 MG: 50 TABLET ORAL at 08:07

## 2023-07-01 RX ADMIN — APIXABAN 5 MG: 5 TABLET, FILM COATED ORAL at 08:07

## 2023-07-01 RX ADMIN — BACLOFEN 20 MG: 10 TABLET ORAL at 08:07

## 2023-07-01 RX ADMIN — OXYBUTYNIN CHLORIDE 5 MG: 5 TABLET ORAL at 05:07

## 2023-07-01 RX ADMIN — MIRTAZAPINE 15 MG: 15 TABLET, FILM COATED ORAL at 08:07

## 2023-07-01 RX ADMIN — HYDROXYZINE PAMOATE 25 MG: 25 CAPSULE ORAL at 08:07

## 2023-07-01 RX ADMIN — GABAPENTIN 600 MG: 300 CAPSULE ORAL at 02:07

## 2023-07-01 RX ADMIN — HYDROXYZINE PAMOATE 25 MG: 25 CAPSULE ORAL at 02:07

## 2023-07-01 RX ADMIN — OXYCODONE AND ACETAMINOPHEN 1 TABLET: 10; 325 TABLET ORAL at 12:07

## 2023-07-01 RX ADMIN — OXYCODONE AND ACETAMINOPHEN 1 TABLET: 10; 325 TABLET ORAL at 08:07

## 2023-07-01 RX ADMIN — CYCLOBENZAPRINE 10 MG: 10 TABLET, FILM COATED ORAL at 08:07

## 2023-07-01 RX ADMIN — CYCLOBENZAPRINE 10 MG: 10 TABLET, FILM COATED ORAL at 10:07

## 2023-07-01 RX ADMIN — GABAPENTIN 600 MG: 300 CAPSULE ORAL at 08:07

## 2023-07-01 RX ADMIN — OXYCODONE AND ACETAMINOPHEN 1 TABLET: 10; 325 TABLET ORAL at 02:07

## 2023-07-01 NOTE — ED NOTES
Psychiatric rounds    Patient is a 47-year-old male with history of paraplegia.  He presented yesterday for complaints of acute on chronic pain to a wound on his ischium.  He does have a history of chronic pain and upon discharge when he was not offered a narcotic pain medication, he threatened to harm himself with a gun. PEC subsequently placed, he was medically cleared.  He is still here awaiting placement to a facility for psychiatric evaluation.  He is resting comfortably in the ED bed, asking for a blanket.  He has been tolerating PO.  Nursing staff will be contacting the psychiatric facility for placement versus evaluation here in the ED for recommendations as placement may prove difficult considering his history.     Amrita Echevarria MD 8:38 AM         Amrita Echevarria MD  07/01/23 1535

## 2023-07-01 NOTE — PROVIDER PROGRESS NOTES - EMERGENCY DEPT.
Encounter Date: 6/30/2023    ED Physician Progress Notes        Physician Note:   I have seen evaluated this patient.  Patient was threatening self-harm/suicide if he does not receive pain medicine prior to going home.  He states that if he goes home he does not know what he will do.  For these reasons he is placed under physician emergency certificate.  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  He is chronically ill.  Was of the emergency department today complaining of some chronic pain, spasms.  Unfortunately I am concerned about the patient, and we have to take his threats of self-harm serious eye.  For this reason he is placed under physician emergency certificate.  He will have lab work drawn in transfer to inpatient psychiatric facility for further evaluation management should he be medically cleared.  Discussed with Ms. Mccauley, nurse practitioner.  Agree with her plan.

## 2023-07-02 PROCEDURE — 25000003 PHARM REV CODE 250: Performed by: EMERGENCY MEDICINE

## 2023-07-02 RX ADMIN — OXYBUTYNIN CHLORIDE 5 MG: 5 TABLET ORAL at 04:07

## 2023-07-02 RX ADMIN — GABAPENTIN 600 MG: 300 CAPSULE ORAL at 04:07

## 2023-07-02 RX ADMIN — OXYCODONE AND ACETAMINOPHEN 1 TABLET: 10; 325 TABLET ORAL at 01:07

## 2023-07-02 RX ADMIN — OXYCODONE AND ACETAMINOPHEN 1 TABLET: 10; 325 TABLET ORAL at 07:07

## 2023-07-02 RX ADMIN — HYDROXYZINE PAMOATE 25 MG: 25 CAPSULE ORAL at 11:07

## 2023-07-02 RX ADMIN — OXYCODONE AND ACETAMINOPHEN 1 TABLET: 10; 325 TABLET ORAL at 12:07

## 2023-07-02 RX ADMIN — GABAPENTIN 600 MG: 300 CAPSULE ORAL at 11:07

## 2023-07-02 RX ADMIN — APIXABAN 5 MG: 5 TABLET, FILM COATED ORAL at 11:07

## 2023-07-02 RX ADMIN — APIXABAN 5 MG: 5 TABLET, FILM COATED ORAL at 08:07

## 2023-07-02 RX ADMIN — BACLOFEN 20 MG: 10 TABLET ORAL at 11:07

## 2023-07-02 RX ADMIN — GABAPENTIN 600 MG: 300 CAPSULE ORAL at 08:07

## 2023-07-02 RX ADMIN — HYDROXYZINE PAMOATE 25 MG: 25 CAPSULE ORAL at 08:07

## 2023-07-02 RX ADMIN — MIRTAZAPINE 15 MG: 15 TABLET, FILM COATED ORAL at 11:07

## 2023-07-02 RX ADMIN — SERTRALINE HYDROCHLORIDE 50 MG: 50 TABLET ORAL at 08:07

## 2023-07-02 RX ADMIN — OXYCODONE AND ACETAMINOPHEN 1 TABLET: 10; 325 TABLET ORAL at 05:07

## 2023-07-02 RX ADMIN — HYDROXYZINE PAMOATE 25 MG: 25 CAPSULE ORAL at 04:07
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
## 2023-07-02 NOTE — CONSULTS
Inpatient Nutrition Evaluation    Admit Date: 6/30/2023   Total duration of encounter: 2 days    Nutrition Recommendation/Prescription     Continue oral diet (regular) as tolerated.    Nutrition Assessment     Chart Review    Reason Seen: physician consult for altered skin integrity    Malnutrition Screening Tool Results    Not available            Diagnosis:   Anxiety  MDD  Insomnia  Suicidal ideation    Past Medical History:   Diagnosis Date    Paraplegia      Scheduled Medications:   amLODIPine  5 mg Oral Daily    apixaban  5 mg Oral BID    baclofen  20 mg Oral QHS    gabapentin  600 mg Oral TID    hydrOXYzine pamoate  25 mg Oral TID    mirtazapine  15 mg Oral Nightly    oxybutynin  5 mg Oral Daily    sertraline  50 mg Oral Daily     Recent Labs     06/30/23 2020      K 3.4*   CHLORIDE 104   CO2 23   CALCIUM 9.4   GLUCOSE 86   ALKPHOS 100   AST 16   ALT 22   ALBUMIN 4.2     Diet Order: Diet Adult Regular PEC/CEC - Styrofoam  Oral Supplement Order: none  Appetite/Oral Intake: good/% of meals  Factors Affecting Nutritional Intake: none identified  Food/Faith/Cultural Preferences: none reported  Food Allergies: none reported    Wound(s):  Altered Skin Integrity 06/21/23 1300 Right medial Coccyx Partial thickness tissue loss. Shallow open ulcer with a red or pink wound bed, without slough. Intact or Open/Ruptured Serum-filled blister.       Comments    7/2/23 Consult received due to altered skin integrity (partial thickness wound noted). Patient reports a good appetite currently and prior to admission, denies weight loss, does not want Brandon.    Anthropometrics    Height: 6' (182.9 cm)    Last Weight: 74.8 kg (165 lb) (06/30/23 1340)    BMI (Calculated): 22.4  BMI Classification: normal (BMI 18.5-24.9)        Ideal Body Weight (IBW), Male: 178 lb     % Ideal Body Weight, Male (lb): 92.7 %                          Usual Weight Provided By: patient denies unintentional weight loss    Wt Readings from  Last 5 Encounters:   06/30/23 74.8 kg (165 lb)   06/21/23 74.8 kg (165 lb)   06/13/23 74.8 kg (165 lb)   05/15/23 74.8 kg (165 lb)   04/10/23 70.3 kg (155 lb)     Weight Change(s) Since Admission: none at this time  Wt Readings from Last 1 Encounters:   06/30/23 1340 74.8 kg (165 lb)    Admit Weight: 74.8 kg (165 lb) (06/30/23 1340)    Patient Education Not applicable.    Monitoring & Evaluation     Dietitian will monitor food and beverage intake and weight.  Nutrition Risk/Follow-Up: low (follow-up in 5-7 days)  Patients assigned 'low nutrition risk' status do not qualify for a full nutritional assessment but will be monitored and re-evaluated in a 5-7 day time period. Please consult if re-evaluation needed sooner.

## 2023-07-03 VITALS
RESPIRATION RATE: 18 BRPM | TEMPERATURE: 97 F | HEIGHT: 72 IN | OXYGEN SATURATION: 100 % | WEIGHT: 165 LBS | DIASTOLIC BLOOD PRESSURE: 76 MMHG | HEART RATE: 102 BPM | SYSTOLIC BLOOD PRESSURE: 121 MMHG | BODY MASS INDEX: 22.35 KG/M2

## 2023-07-03 LAB — BACTERIA UR CULT: ABNORMAL

## 2023-07-03 PROCEDURE — 25000003 PHARM REV CODE 250: Performed by: EMERGENCY MEDICINE

## 2023-07-03 RX ORDER — MELOXICAM 7.5 MG/1
7.5 TABLET ORAL DAILY
Qty: 7 TABLET | Refills: 0 | Status: SHIPPED | OUTPATIENT
Start: 2023-07-03 | End: 2023-07-10

## 2023-07-03 RX ORDER — SULFAMETHOXAZOLE AND TRIMETHOPRIM 800; 160 MG/1; MG/1
1 TABLET ORAL 2 TIMES DAILY
Status: DISCONTINUED | OUTPATIENT
Start: 2023-07-03 | End: 2023-07-03 | Stop reason: HOSPADM

## 2023-07-03 RX ADMIN — OXYCODONE AND ACETAMINOPHEN 1 TABLET: 10; 325 TABLET ORAL at 01:07

## 2023-07-03 RX ADMIN — SERTRALINE HYDROCHLORIDE 50 MG: 50 TABLET ORAL at 08:07

## 2023-07-03 RX ADMIN — HYDROXYZINE PAMOATE 25 MG: 25 CAPSULE ORAL at 02:07

## 2023-07-03 RX ADMIN — CYCLOBENZAPRINE 10 MG: 10 TABLET, FILM COATED ORAL at 03:07

## 2023-07-03 RX ADMIN — GABAPENTIN 600 MG: 300 CAPSULE ORAL at 02:07

## 2023-07-03 RX ADMIN — OXYCODONE AND ACETAMINOPHEN 1 TABLET: 10; 325 TABLET ORAL at 07:07

## 2023-07-03 RX ADMIN — HYDROXYZINE PAMOATE 25 MG: 25 CAPSULE ORAL at 08:07

## 2023-07-03 RX ADMIN — SULFAMETHOXAZOLE AND TRIMETHOPRIM 1 TABLET: 800; 160 TABLET ORAL at 08:07

## 2023-07-03 RX ADMIN — GABAPENTIN 600 MG: 300 CAPSULE ORAL at 08:07

## 2023-07-03 RX ADMIN — APIXABAN 5 MG: 5 TABLET, FILM COATED ORAL at 08:07

## 2023-07-03 RX ADMIN — OXYCODONE AND ACETAMINOPHEN 1 TABLET: 10; 325 TABLET ORAL at 02:07

## 2023-07-03 NOTE — ED NOTES
Pt states that he is ready to go home; pt denies SI; states that he was frustrated with previous DC and never meant it; talked with pt in depth about concerns for SI; pt states that he will return to ED with any thoughts of SI or any feelings of hopeless ness

## 2023-07-03 NOTE — PROVIDER PROGRESS NOTES - EMERGENCY DEPT.
Encounter Date: 6/30/2023    ED Physician Progress Notes          Hemal Guerrero is a 47 y.o. male with a history of paraplegia currently under PEC for SI, awaiting placement.     No acute events overnight per nursing and chart review. Required no medications. Resting comfortably on my assessment, calm and in no distress. Vitals wnl. Spoke with nurse currently taking care of patient and no issues reported at this time. Will continue to monitor until placement occurs and patient is transferred out of emergency department.     Vitals:    07/03/23 0200 07/03/23 0600 07/03/23 0627 07/03/23 0727   BP: 105/69 105/70     BP Location: Left arm Right arm     Patient Position:       Pulse: 83 89     Resp:    20   Temp: 97.3 °F (36.3 °C) 97.1 °F (36.2 °C) 97.1 °F (36.2 °C)    TempSrc: Oral Oral     SpO2: 99% 99%     Weight:       Height:         Medications   oxyCODONE-acetaminophen  mg per tablet 1 tablet (1 tablet Oral Given 7/3/23 0727)   amLODIPine tablet 5 mg (5 mg Oral Not Given 7/3/23 0834)   baclofen tablet 20 mg (20 mg Oral Given 7/2/23 2321)   cyclobenzaprine tablet 10 mg (10 mg Oral Given 7/1/23 2205)   docusate sodium capsule 250 mg (has no administration in time range)   gabapentin capsule 600 mg (600 mg Oral Given 7/3/23 0834)   hydrOXYzine pamoate capsule 25 mg (25 mg Oral Given 7/3/23 0834)   mirtazapine tablet 15 mg (15 mg Oral Given 7/2/23 2320)   oxybutynin tablet 5 mg (5 mg Oral Given 7/2/23 1651)   sertraline tablet 50 mg (50 mg Oral Given 7/3/23 0834)   apixaban tablet 5 mg (5 mg Oral Given 7/3/23 0834)   sulfamethoxazole-trimethoprim 800-160mg per tablet 1 tablet (1 tablet Oral Given 7/3/23 0847)   HYDROmorphone (PF) injection 1 mg (1 mg Intramuscular Given 6/30/23 1708)   ondansetron disintegrating tablet 4 mg (4 mg Oral Given 6/30/23 1709)   oxyCODONE-acetaminophen 5-325 mg per tablet 2 tablet (2 tablets Oral Given 6/30/23 1800)       Nikolay Goel MD  Emergency Medicine

## 2023-07-03 NOTE — ED NOTES
Checked brief; pt clean; wiped pt down; reassessed wound on left buttock; clean, pink wound noted; redressed with sterile gauze and medipore tape; reposition and wedged for comfort; calling aasi at this time to tx pt back home
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
## 2023-07-03 NOTE — PROVIDER PROGRESS NOTES - EMERGENCY DEPT.
Encounter Date: 6/30/2023    ED Physician Progress Notes        Physician Note:   Psychiatry has seen the patient 2 days in a row, they recommend rescinded in the pec, they are making arrangements for outpatient follow-up along with the  Cherise.  I have interviewed the patient, he still denies suicidal ideations.  Based on the recommendations of the psychiatry services will recent the pec and continue discharge planning as previously directed, with the addition of the recommendations per Psychiatry

## 2023-07-03 NOTE — ED NOTES
Called [facility] office to check that they were notified of PEC; they were notified and will send someone to evaluate for possible CEC

## 2023-07-03 NOTE — ED NOTES
Spoke to mali garcia. She states that the PEC can be rescinded; spoke to dr hager; updated him on plan to DC pts; informed mali to amend note in chart to rescind pec per dr hager

## 2023-07-03 NOTE — ED NOTES
Spoke to jenny at [facility]; updated her on dressings and last time suprapubic was changed, June 25; informed her that pt is not CECd but will be rounded on today

## 2023-07-03 NOTE — PROGRESS NOTES
Spoke with Elba with [facility] who reported they are recommending rescinding PEC and will be forwarding referral to [facility] to set up with IOP. Packet faxed to [phone]

## 2023-07-03 NOTE — ED NOTES
Spoke to jenny at [facility] intake; will call her at [phone] ext [phone] after  sees pt regarding CEC or not

## 2023-07-05 ENCOUNTER — HOSPITAL ENCOUNTER (EMERGENCY)
Facility: HOSPITAL | Age: 48
Discharge: HOME | End: 2023-07-05
Attending: STUDENT IN AN ORGANIZED HEALTH CARE EDUCATION/TRAINING PROGRAM
Payer: MEDICARE

## 2023-07-05 VITALS
WEIGHT: 160 LBS | RESPIRATION RATE: 16 BRPM | OXYGEN SATURATION: 100 % | HEART RATE: 89 BPM | DIASTOLIC BLOOD PRESSURE: 85 MMHG | BODY MASS INDEX: 21.67 KG/M2 | SYSTOLIC BLOOD PRESSURE: 112 MMHG | HEIGHT: 72 IN | TEMPERATURE: 97 F

## 2023-07-05 DIAGNOSIS — G89.4 CHRONIC PAIN SYNDROME: Primary | ICD-10-CM

## 2023-07-05 DIAGNOSIS — M53.3 SACRAL PAIN: ICD-10-CM

## 2023-07-05 LAB
ALBUMIN SERPL-MCNC: 4.9 G/DL (ref 3.5–5)
ALBUMIN/GLOB SERPL: 1.1 RATIO (ref 1.1–2)
ALP SERPL-CCNC: 120 UNIT/L (ref 40–150)
ALT SERPL-CCNC: 20 UNIT/L (ref 0–55)
AST SERPL-CCNC: 13 UNIT/L (ref 5–34)
BASOPHILS # BLD AUTO: 0.09 X10(3)/MCL
BASOPHILS NFR BLD AUTO: 0.9 %
BILIRUBIN DIRECT+TOT PNL SERPL-MCNC: 0.4 MG/DL
BUN SERPL-MCNC: 11.7 MG/DL (ref 8.9–20.6)
CALCIUM SERPL-MCNC: 10.3 MG/DL (ref 8.4–10.2)
CHLORIDE SERPL-SCNC: 103 MMOL/L (ref 98–107)
CK SERPL-CCNC: 170 U/L (ref 30–200)
CO2 SERPL-SCNC: 24 MMOL/L (ref 22–29)
CREAT SERPL-MCNC: 0.77 MG/DL (ref 0.73–1.18)
EOSINOPHIL # BLD AUTO: 0.03 X10(3)/MCL (ref 0–0.9)
EOSINOPHIL NFR BLD AUTO: 0.3 %
ERYTHROCYTE [DISTWIDTH] IN BLOOD BY AUTOMATED COUNT: 13.7 % (ref 11.5–17)
GFR SERPLBLD CREATININE-BSD FMLA CKD-EPI: >60 MLS/MIN/1.73/M2
GLOBULIN SER-MCNC: 4.5 GM/DL (ref 2.4–3.5)
GLUCOSE SERPL-MCNC: 110 MG/DL (ref 74–100)
HCT VFR BLD AUTO: 52.4 % (ref 42–52)
HGB BLD-MCNC: 16 G/DL (ref 14–18)
IMM GRANULOCYTES # BLD AUTO: 0.04 X10(3)/MCL (ref 0–0.04)
IMM GRANULOCYTES NFR BLD AUTO: 0.4 %
LYMPHOCYTES # BLD AUTO: 1.56 X10(3)/MCL (ref 0.6–4.6)
LYMPHOCYTES NFR BLD AUTO: 15 %
MAGNESIUM SERPL-MCNC: 2.1 MG/DL (ref 1.6–2.6)
MCH RBC QN AUTO: 26 PG (ref 27–31)
MCHC RBC AUTO-ENTMCNC: 30.5 G/DL (ref 33–36)
MCV RBC AUTO: 85.1 FL (ref 80–94)
MONOCYTES # BLD AUTO: 0.26 X10(3)/MCL (ref 0.1–1.3)
MONOCYTES NFR BLD AUTO: 2.5 %
NEUTROPHILS # BLD AUTO: 8.4 X10(3)/MCL (ref 2.1–9.2)
NEUTROPHILS NFR BLD AUTO: 80.9 %
NRBC BLD AUTO-RTO: 0 %
PLATELET # BLD AUTO: 271 X10(3)/MCL (ref 130–400)
PMV BLD AUTO: 10.4 FL (ref 7.4–10.4)
POTASSIUM SERPL-SCNC: 4.4 MMOL/L (ref 3.5–5.1)
PROT SERPL-MCNC: 9.4 GM/DL (ref 6.4–8.3)
RBC # BLD AUTO: 6.16 X10(6)/MCL (ref 4.7–6.1)
SODIUM SERPL-SCNC: 139 MMOL/L (ref 136–145)
WBC # SPEC AUTO: 10.38 X10(3)/MCL (ref 4.5–11.5)

## 2023-07-05 PROCEDURE — 96376 TX/PRO/DX INJ SAME DRUG ADON: CPT

## 2023-07-05 PROCEDURE — 80053 COMPREHEN METABOLIC PANEL: CPT | Performed by: STUDENT IN AN ORGANIZED HEALTH CARE EDUCATION/TRAINING PROGRAM

## 2023-07-05 PROCEDURE — 96375 TX/PRO/DX INJ NEW DRUG ADDON: CPT

## 2023-07-05 PROCEDURE — 82550 ASSAY OF CK (CPK): CPT | Performed by: STUDENT IN AN ORGANIZED HEALTH CARE EDUCATION/TRAINING PROGRAM

## 2023-07-05 PROCEDURE — 96374 THER/PROPH/DIAG INJ IV PUSH: CPT

## 2023-07-05 PROCEDURE — 99284 EMERGENCY DEPT VISIT MOD MDM: CPT | Mod: 25

## 2023-07-05 PROCEDURE — 63600175 PHARM REV CODE 636 W HCPCS: Performed by: STUDENT IN AN ORGANIZED HEALTH CARE EDUCATION/TRAINING PROGRAM

## 2023-07-05 PROCEDURE — 83735 ASSAY OF MAGNESIUM: CPT | Performed by: STUDENT IN AN ORGANIZED HEALTH CARE EDUCATION/TRAINING PROGRAM

## 2023-07-05 PROCEDURE — 85025 COMPLETE CBC W/AUTO DIFF WBC: CPT | Performed by: STUDENT IN AN ORGANIZED HEALTH CARE EDUCATION/TRAINING PROGRAM

## 2023-07-05 RX ORDER — METHOCARBAMOL 100 MG/ML
1000 INJECTION, SOLUTION INTRAMUSCULAR; INTRAVENOUS ONCE
Status: DISCONTINUED | OUTPATIENT
Start: 2023-07-05 | End: 2023-07-05

## 2023-07-05 RX ORDER — HYDROMORPHONE HYDROCHLORIDE 2 MG/ML
0.5 INJECTION, SOLUTION INTRAMUSCULAR; INTRAVENOUS; SUBCUTANEOUS
Status: COMPLETED | OUTPATIENT
Start: 2023-07-05 | End: 2023-07-05

## 2023-07-05 RX ORDER — DROPERIDOL 2.5 MG/ML
1.25 INJECTION, SOLUTION INTRAMUSCULAR; INTRAVENOUS ONCE
Status: COMPLETED | OUTPATIENT
Start: 2023-07-05 | End: 2023-07-05

## 2023-07-05 RX ORDER — METHOCARBAMOL 100 MG/ML
1000 INJECTION, SOLUTION INTRAMUSCULAR; INTRAVENOUS ONCE
Status: COMPLETED | OUTPATIENT
Start: 2023-07-05 | End: 2023-07-05

## 2023-07-05 RX ORDER — HYDROMORPHONE HYDROCHLORIDE 2 MG/ML
1 INJECTION, SOLUTION INTRAMUSCULAR; INTRAVENOUS; SUBCUTANEOUS
Status: COMPLETED | OUTPATIENT
Start: 2023-07-05 | End: 2023-07-05

## 2023-07-05 RX ORDER — HYDROMORPHONE HYDROCHLORIDE 2 MG/ML
1 INJECTION, SOLUTION INTRAMUSCULAR; INTRAVENOUS; SUBCUTANEOUS ONCE
Status: DISCONTINUED | OUTPATIENT
Start: 2023-07-05 | End: 2023-07-05

## 2023-07-05 RX ORDER — HYDROCODONE BITARTRATE AND ACETAMINOPHEN 5; 325 MG/1; MG/1
1 TABLET ORAL EVERY 6 HOURS PRN
Qty: 6 TABLET | Refills: 0 | Status: SHIPPED | OUTPATIENT
Start: 2023-07-05

## 2023-07-05 RX ADMIN — HYDROMORPHONE HYDROCHLORIDE 1 MG: 2 INJECTION INTRAMUSCULAR; INTRAVENOUS; SUBCUTANEOUS at 11:07

## 2023-07-05 RX ADMIN — METHOCARBAMOL 1000 MG: 100 INJECTION INTRAMUSCULAR; INTRAVENOUS at 11:07

## 2023-07-05 RX ADMIN — HYDROMORPHONE HYDROCHLORIDE 0.5 MG: 2 INJECTION, SOLUTION INTRAMUSCULAR; INTRAVENOUS; SUBCUTANEOUS at 01:07

## 2023-07-05 RX ADMIN — DROPERIDOL 1.25 MG: 2.5 INJECTION, SOLUTION INTRAMUSCULAR; INTRAVENOUS at 12:07

## 2023-07-05 NOTE — DISCHARGE INSTRUCTIONS
Thanks for letting use take care of you today! It is our goal to give you courteous care and to keep you comfortable and informed. If you have any questions before you leave I will be happy to try and answer them.     Advice after your visit:  Your visit in the emergency department is NOT definitive care - please follow-up with your primary care doctor and/or specialist within 1-2 days. If you do not have a primary care physician call [phone] to schedule an appointment. Please return if you have any worsening in your condition or if you have any other concerns.    Return to the emergency department if any worsening symptoms including fever, chest pain, difficulty breathing, weakness, numbness, tingling, nausea, vomiting, inability to eat, drink or take your medication, or any other new symptoms or concerns arise.      Please signup for MyChart as noted below in your paperwork to review all labwork, imaging results, and any other incidental findings from today's visit.     If you had radiology exams like an XRAY or CT in the emergency Department the interpreation on them may be preliminary - there may be less time sensitive findings on the reports please obtain these reports within 24 hours from the hospital or by using your out on your mobile phone to access records.  Bring these to your primary care doctor and/or specialist for further review of incidental findings.    Please review any LAB WORK from your visit today with your primary care physician.    If you were prescribed OPIATE PAIN MEDICATION - please understand of these medications can be addictive, you may fill less of the prescription was written for, you do not have to take the full prescription.  You may discard what you do not use.  Please seek help if you feel you are having problems with addiction.  Do not drive or operate heavy machinery if you are taking sedating medications.  Do not mix these medications with alcohol.      If you had a SPLINT  placed in the emergency department if you have severe pain numbness tingling or discoloration of year digits please remove the splint and return to the emergency department for further evaluation as this may represent a sign of compromise to the nerves or blood vessels due to swelling.    If you had SUTURES in the emergency department please have them removed in the prescribed time frame typically within 7-14 days.  You may shower but please do not bathe or swim.  Keep the wounds clean and dry and covered with a clean dressing.  Please return if he have any signs of infection like redness or drainage or pain at the suture site.    Please take the full course of  any ANTIBIOTICS you were prescribed - incomplete courses of antibiotics can cause resistance to antibiotics in the future which will make it difficult to treat any infections you may have.

## 2023-07-05 NOTE — ED PROVIDER NOTES
Encounter Date: 7/5/2023    SCRIBE #1 NOTE: I, Isis Alexandra, am scribing for, and in the presence of,  Nikolay Goel IV, MD. I have scribed the following portions of the note - Other sections scribed: HPI, ROS, PE, MDM.     History     Chief Complaint   Patient presents with    Tailbone Pain     Wound to sacrum. C/o pain to wound. Denies fever.      47 Y.O. male with a history of paraplegia presents to the ED for exacerbation of chronic sacral pain onset 2 days ago. Pt also complains of leg spasms radiating from his lower back and leg heaviness. Denies fever. Admits to being out of pain medication. Not successful in getting in with pain management yet.     PER CHART REVIEW: Admitted to ER for SI and ischium pain on 6/30/23 and discharged 2 days ago.     The history is provided by the patient. No  was used.   Review of patient's allergies indicates:   Allergen Reactions    Amitriptyline      Past Medical History:   Diagnosis Date    Paraplegia      Past Surgical History:   Procedure Laterality Date    INSERTION OF SUPRAPUBIC CATHETER       No family history on file.  Social History     Tobacco Use    Smoking status: Never    Smokeless tobacco: Never   Substance Use Topics    Alcohol use: Not Currently    Drug use: Never     Review of Systems   Constitutional:  Negative for chills and fever.   HENT:  Negative for congestion, rhinorrhea and sore throat.    Eyes:  Negative for visual disturbance.   Respiratory:  Negative for cough and shortness of breath.    Cardiovascular:  Negative for chest pain.   Gastrointestinal:  Negative for abdominal pain, nausea and vomiting.   Genitourinary:  Negative for dysuria and hematuria.        Sacral pain.   Musculoskeletal:  Positive for back pain. Negative for joint swelling.        Leg pain and heaviness.   Skin:  Negative for rash.   Neurological:  Negative for weakness.   Psychiatric/Behavioral:  Negative for confusion.    All other systems reviewed and are  negative.    Physical Exam     Initial Vitals [07/05/23 0926]   BP Pulse Resp Temp SpO2   (!) 151/104 (!) 111 16 96.8 °F (36 °C) 100 %      MAP       --         Physical Exam    Nursing note and vitals reviewed.  Constitutional: He is not diaphoretic. No distress.   Cardiovascular:  Normal rate and regular rhythm.           Pulmonary/Chest: No respiratory distress.   Abdominal: Abdomen is soft. He exhibits no distension. There is no abdominal tenderness.   Genitourinary:    Genitourinary Comments: Indwelling vogt catheter with clear, darkish urine. Sacral ulcer is C/D/I. No drainage or signs of infection.       Neurological: He is alert.   Chronic paraplegia.    Psychiatric: He has a normal mood and affect.       ED Course   Procedures  Labs Reviewed   COMPREHENSIVE METABOLIC PANEL - Abnormal; Notable for the following components:       Result Value    Glucose Level 110 (*)     Calcium Level Total 10.3 (*)     Protein Total 9.4 (*)     Globulin 4.5 (*)     All other components within normal limits   CBC WITH DIFFERENTIAL - Abnormal; Notable for the following components:    RBC 6.16 (*)     Hct 52.4 (*)     MCH 26.0 (*)     MCHC 30.5 (*)     All other components within normal limits   MAGNESIUM - Normal   CK - Normal   CBC W/ AUTO DIFFERENTIAL    Narrative:     The following orders were created for panel order CBC auto differential.  Procedure                               Abnormality         Status                     ---------                               -----------         ------                     CBC with Differential[[id number]]        Abnormal            Final result                 Please view results for these tests on the individual orders.          Imaging Results    None          Medications   HYDROmorphone (PF) injection 1 mg (1 mg Intravenous Given 7/5/23 1117)   methocarbamoL injection 1,000 mg (1,000 mg Intravenous Given 7/5/23 1134)   droPERidol injection 1.25 mg (1.25 mg Intravenous Given 7/5/23  1238)   HYDROmorphone (PF) injection 0.5 mg (0.5 mg Intravenous Given 7/5/23 1345)              Scribe Attestation:   Scribe #1: I performed the above scribed service and the documentation accurately describes the services I performed. I attest to the accuracy of the note.    Attending Attestation:           Physician Attestation for Scribe:  Physician Attestation Statement for Scribe #1: INikolay IV, MD, reviewed documentation, as scribed by Isis Alexandra in my presence, and it is both accurate and complete.         Medical Decision Making  Problems Addressed:  Chronic pain syndrome: chronic illness or injury  Sacral pain: chronic illness or injury      ED assessment:    47-year-old with a history of paraplegia chronic sacral ulcer chronic pain presenting for exacerbation of his chronic pain  No new symptoms today  Patient was recently in the psych all here for several days after being pec when he reported suicide ideations on discharge and told he would not be getting any other pain medicines  Sacral wound here today looks well healing noninfected  Labs here today unremarkable able to control pain refer to several pain management doctors discharge at this time    Differential diagnosis:   Chronic pain electrolyte derangement infection    ED management:   IV pain medicine  Shared decision-making      Amount and/or Complexity of Data Reviewed  Independent historian: none  External data reviewed: notes from previous admissions and notes from previous ED visits  Summary of data reviewed: Admitted to ER for SI and ischium pain on 6/30/23 and discharged 2 days ago  Risk and benefits of testing: discussed   Labs: ordered and reviewed    Risk  Prescription drug management   Shared decision making     Critical Care  none    I, Nikolay Goel MD personally performed the history, PE, MDM, and procedures as documented above and agree with the scribe's documentation.      ED Course as of 07/05/23 1935 Wed Jul 05, 2023    1111 This is a 47-year-old male history of paraplegia chronic sacral chronic pain in lower extremities - presenting for exacerbation of his chronic pain.  No new or infectious symptoms today.  Patient was just PCP here in the ER and boarded for several days after he reported suicidal ideations when he was refused IV narcotics.  Will check labs treat pain reassess patient is having difficulty getting into pain management I believe this is why he keeps passing but we will rule out any acute pathology sacral wound does not appear infected on exam [AC]   1337 Pain controlled labs unremarkable will discharge pain management referral at this time [AC]      ED Course User Index  [AC] Nikolay oGel IV, MD                 Clinical Impression:   Final diagnoses:  [M53.3] Sacral pain  [G89.4] Chronic pain syndrome (Primary)        ED Disposition Condition    Discharge Stable          ED Prescriptions       Medication Sig Dispense Start Date End Date Auth. Provider    HYDROcodone-acetaminophen (NORCO) 5-325 mg per tablet Take 1 tablet by mouth every 6 (six) hours as needed for Pain. 6 tablet 7/5/2023 -- Nikolay Goel IV, MD    HYDROcodone-acetaminophen (NORCO) 5-325 mg per tablet Take 1 tablet by mouth every 6 (six) hours as needed for Pain. 6 tablet 7/5/2023 -- Nikolay Goel IV, MD          Follow-up Information       Follow up With Specialties Details Why Contact Info    [facility] - Emergency Dept Emergency Medicine Go to  If symptoms worsen [address] [facility] [id number] [phone]    Primary care physician  Schedule an appointment as soon as possible for a visit   Follow up with you primary care physician.   If you do not have a primary care physician call [phone] to schedule an appointment.    John White MD Pain Medicine, Anesthesiology Schedule an appointment as soon as possible for a visit   [address]  [address]  [facility] [address]  [phone]       Chapo Guajardo Sr., MD Pain Medicine Schedule an appointment as soon as possible for a visit   [address]  [address]  [facility] [id number] [phone]               Nikolay Goel IV, MD  07/05/23 8059

## 2023-08-13 ENCOUNTER — HOSPITAL ENCOUNTER (EMERGENCY)
Facility: HOSPITAL | Age: 48
Discharge: HOME | End: 2023-08-13
Attending: EMERGENCY MEDICINE
Payer: MEDICARE

## 2023-08-13 VITALS
HEART RATE: 70 BPM | DIASTOLIC BLOOD PRESSURE: 82 MMHG | OXYGEN SATURATION: 97 % | TEMPERATURE: 98 F | HEIGHT: 72 IN | BODY MASS INDEX: 22.35 KG/M2 | RESPIRATION RATE: 17 BRPM | SYSTOLIC BLOOD PRESSURE: 117 MMHG | WEIGHT: 165 LBS

## 2023-08-13 DIAGNOSIS — R31.9 HEMATURIA, UNSPECIFIED TYPE: Primary | ICD-10-CM

## 2023-08-13 LAB
ALBUMIN SERPL-MCNC: 3.8 G/DL (ref 3.5–5)
ALBUMIN/GLOB SERPL: 1.2 RATIO (ref 1.1–2)
ALP SERPL-CCNC: 102 UNIT/L (ref 40–150)
ALT SERPL-CCNC: 13 UNIT/L (ref 0–55)
APPEARANCE UR: ABNORMAL
AST SERPL-CCNC: 14 UNIT/L (ref 5–34)
BACTERIA #/AREA URNS AUTO: ABNORMAL /HPF
BASOPHILS # BLD AUTO: 0.12 X10(3)/MCL
BASOPHILS NFR BLD AUTO: 1 %
BILIRUB SERPL-MCNC: 0.4 MG/DL
BILIRUB UR QL STRIP.AUTO: ABNORMAL
BUN SERPL-MCNC: 16 MG/DL (ref 8.9–20.6)
CALCIUM SERPL-MCNC: 8.9 MG/DL (ref 8.4–10.2)
CHLORIDE SERPL-SCNC: 108 MMOL/L (ref 98–107)
CO2 SERPL-SCNC: 22 MMOL/L (ref 22–29)
COLOR UR: ABNORMAL
CREAT SERPL-MCNC: 0.72 MG/DL (ref 0.73–1.18)
EOSINOPHIL # BLD AUTO: 0.3 X10(3)/MCL (ref 0–0.9)
EOSINOPHIL NFR BLD AUTO: 2.5 %
ERYTHROCYTE [DISTWIDTH] IN BLOOD BY AUTOMATED COUNT: 14.1 % (ref 11.5–17)
GFR SERPLBLD CREATININE-BSD FMLA CKD-EPI: >60 MLS/MIN/1.73/M2
GLOBULIN SER-MCNC: 3.1 GM/DL (ref 2.4–3.5)
GLUCOSE SERPL-MCNC: 83 MG/DL (ref 74–100)
GLUCOSE UR QL STRIP.AUTO: NEGATIVE
HCT VFR BLD AUTO: 38.8 % (ref 42–52)
HGB BLD-MCNC: 12.5 G/DL (ref 14–18)
IMM GRANULOCYTES # BLD AUTO: 0.04 X10(3)/MCL (ref 0–0.04)
IMM GRANULOCYTES NFR BLD AUTO: 0.3 %
KETONES UR QL STRIP.AUTO: NEGATIVE
LEUKOCYTE ESTERASE UR QL STRIP.AUTO: ABNORMAL
LYMPHOCYTES # BLD AUTO: 3.19 X10(3)/MCL (ref 0.6–4.6)
LYMPHOCYTES NFR BLD AUTO: 26.2 %
MCH RBC QN AUTO: 26.3 PG (ref 27–31)
MCHC RBC AUTO-ENTMCNC: 32.2 G/DL (ref 33–36)
MCV RBC AUTO: 81.5 FL (ref 80–94)
MONOCYTES # BLD AUTO: 0.8 X10(3)/MCL (ref 0.1–1.3)
MONOCYTES NFR BLD AUTO: 6.6 %
NEUTROPHILS # BLD AUTO: 7.74 X10(3)/MCL (ref 2.1–9.2)
NEUTROPHILS NFR BLD AUTO: 63.4 %
NITRITE UR QL STRIP.AUTO: POSITIVE
NRBC BLD AUTO-RTO: 0 %
PH UR STRIP.AUTO: 6.5 [PH]
PLATELET # BLD AUTO: 218 X10(3)/MCL (ref 130–400)
PMV BLD AUTO: 10.2 FL (ref 7.4–10.4)
POTASSIUM SERPL-SCNC: 3.8 MMOL/L (ref 3.5–5.1)
PROT SERPL-MCNC: 6.9 GM/DL (ref 6.4–8.3)
PROT UR QL STRIP.AUTO: ABNORMAL
RBC # BLD AUTO: 4.76 X10(6)/MCL (ref 4.7–6.1)
RBC #/AREA URNS AUTO: >100 /HPF
RBC UR QL AUTO: ABNORMAL
SODIUM SERPL-SCNC: 140 MMOL/L (ref 136–145)
SP GR UR STRIP.AUTO: >1.03 (ref 1–1.03)
SQUAMOUS #/AREA URNS AUTO: ABNORMAL /HPF
UROBILINOGEN UR STRIP-ACNC: 1
WBC # SPEC AUTO: 12.19 X10(3)/MCL (ref 4.5–11.5)
WBC #/AREA URNS AUTO: ABNORMAL /HPF

## 2023-08-13 PROCEDURE — 85025 COMPLETE CBC W/AUTO DIFF WBC: CPT | Performed by: NURSE PRACTITIONER

## 2023-08-13 PROCEDURE — 81001 URINALYSIS AUTO W/SCOPE: CPT | Performed by: NURSE PRACTITIONER

## 2023-08-13 PROCEDURE — 80053 COMPREHEN METABOLIC PANEL: CPT | Performed by: NURSE PRACTITIONER

## 2023-08-13 PROCEDURE — 25000003 PHARM REV CODE 250: Performed by: EMERGENCY MEDICINE

## 2023-08-13 PROCEDURE — 99283 EMERGENCY DEPT VISIT LOW MDM: CPT

## 2023-08-13 RX ORDER — OXYCODONE AND ACETAMINOPHEN 10; 325 MG/1; MG/1
1 TABLET ORAL
Status: COMPLETED | OUTPATIENT
Start: 2023-08-13 | End: 2023-08-13

## 2023-08-13 RX ORDER — NITROFURANTOIN 25; 75 MG/1; MG/1
100 CAPSULE ORAL 2 TIMES DAILY
Qty: 10 CAPSULE | Refills: 0 | Status: SHIPPED | OUTPATIENT
Start: 2023-08-13 | End: 2023-08-18

## 2023-08-13 RX ADMIN — OXYCODONE AND ACETAMINOPHEN 1 TABLET: 10; 325 TABLET ORAL at 03:08

## 2023-08-13 NOTE — ED PROVIDER NOTES
Encounter Date: 8/13/2023    SCRIBE #1 NOTE: I, Kristyn Moore, am scribing for, and in the presence of,  Pacheco Batres III, MD. I have scribed the following portions of the note - Other sections scribed: HPI, ROS, PE, MDM.       History     Chief Complaint   Patient presents with    Hematuria     Pt has blood in suprapubic catheter and bladder pain. Pt is paralyzed.      47 year old male with a history of paraplegia presents to ED, via EMS, complaining of hematuria and abdominal pain.  Pt says that his suprapubic cathter bag has had blood in it for the last 3 days.  Pt denies any fever.  History of multiple UTIs on a blood thinner.    The history is provided by the patient. No  was used.   Hematuria  This is a new problem. Episode onset: 3 days. The problem has been gradually worsening since onset. He describes the hematuria as gross hematuria. The pain is mild. Associated symptoms include abdominal pain. Pertinent negatives include no dysuria, fever, nausea or vomiting.     Review of patient's allergies indicates:   Allergen Reactions    Amitriptyline      Past Medical History:   Diagnosis Date    Paraplegia      Past Surgical History:   Procedure Laterality Date    INSERTION OF SUPRAPUBIC CATHETER       No family history on file.  Social History     Tobacco Use    Smoking status: Never    Smokeless tobacco: Never   Substance Use Topics    Alcohol use: Not Currently    Drug use: Never     Review of Systems   Constitutional:  Negative for fatigue, fever and unexpected weight change.   HENT:  Negative for congestion and rhinorrhea.    Eyes:  Negative for pain.   Respiratory:  Negative for chest tightness, shortness of breath and wheezing.    Cardiovascular:  Negative for chest pain.   Gastrointestinal:  Positive for abdominal pain. Negative for constipation, diarrhea, nausea and vomiting.   Genitourinary:  Positive for hematuria. Negative for dysuria.   Musculoskeletal:  Negative for back  pain and neck pain.   Skin:  Negative for rash.   Allergic/Immunologic: Negative for environmental allergies, food allergies and immunocompromised state.   Neurological:  Negative for dizziness and speech difficulty.   Hematological:  Does not bruise/bleed easily.   Psychiatric/Behavioral:  Negative for sleep disturbance and suicidal ideas.        Physical Exam     Initial Vitals [08/13/23 1425]   BP Pulse Resp Temp SpO2   115/72 86 18 98.1 °F (36.7 °C) 100 %      MAP       --         Physical Exam    Nursing note and vitals reviewed.  Constitutional: No distress.   HENT:   Head: Normocephalic and atraumatic.   Neck: Trachea normal.   Cardiovascular:  Normal rate and regular rhythm.           No murmur heard.  Pulmonary/Chest: Breath sounds normal. No respiratory distress.   Abdominal: Abdomen is soft. Bowel sounds are normal. He exhibits no distension. There is no abdominal tenderness.   Suprapubic catheter in place   Musculoskeletal:         General: Normal range of motion.      Lumbar back: Normal.     Neurological: He is alert and oriented to person, place, and time. No cranial nerve deficit.   A sensate legs with 0/5 strength   Skin: Skin is warm and dry. No rash noted.   Psychiatric: He has a normal mood and affect. Judgment normal.         ED Course   Procedures  Labs Reviewed   CBC WITH DIFFERENTIAL - Abnormal; Notable for the following components:       Result Value    WBC 12.19 (*)     Hgb 12.5 (*)     Hct 38.8 (*)     MCH 26.3 (*)     MCHC 32.2 (*)     All other components within normal limits   COMPREHENSIVE METABOLIC PANEL - Abnormal; Notable for the following components:    Chloride 108 (*)     Creatinine 0.72 (*)     All other components within normal limits   URINALYSIS, REFLEX TO URINE CULTURE - Abnormal; Notable for the following components:    Color, UA Red (*)     Appearance, UA Turbid (*)     Specific Gravity, UA >1.030 (*)     Protein, UA 3+ (*)     Blood, UA 3+ (*)     Bilirubin, UA 1+ (*)      Nitrites, UA Positive (*)     Leukocyte Esterase, UA Trace (*)     All other components within normal limits   URINALYSIS, MICROSCOPIC - Abnormal; Notable for the following components:    RBC, UA >100 (*)     WBC, UA 6-10 (*)     Bacteria, UA Many (*)     All other components within normal limits          Imaging Results    None          Medications   oxyCODONE-acetaminophen  mg per tablet 1 tablet (1 tablet Oral Given 8/13/23 1525)   Medical Decision Making  Differential diagnosis includes, but is not limited to: UTI, renal dysfunction, bacteremia    Does have hematuria trace UTIs patient is on blood thinner CBC chemistry without abnormality will treat with Macrobid hold Eliquis for 2 days follow up primary care return emergency room as needed    Problems Addressed:  Hematuria, unspecified type: complicated acute illness or injury that poses a threat to life or bodily functions    Amount and/or Complexity of Data Reviewed  Labs: ordered.  Radiology: ordered.    Risk  Prescription drug management.                Scribe Attestation:   Scribe #1: I performed the above scribed service and the documentation accurately describes the services I performed. I attest to the accuracy of the note.    Attending Attestation:           Physician Attestation for Scribe:  Physician Attestation Statement for Scribe #1: I, Pacheco Batres III, MD, reviewed documentation, as scribed by Kristyn Moore in my presence, and it is both accurate and complete.                          Clinical Impression:   Final diagnoses:  [R31.9] Hematuria, unspecified type (Primary)        ED Disposition Condition    Discharge Stable          ED Prescriptions       Medication Sig Dispense Start Date End Date Auth. Provider    nitrofurantoin, macrocrystal-monohydrate, (MACROBID) 100 MG capsule Take 1 capsule (100 mg total) by mouth 2 (two) times daily. for 5 days 10 capsule 8/13/2023 8/18/2023 Pacheco Batres III, MD          Follow-up  Information       Follow up With Specialties Details Why Contact Info    Yosvany Simms MD Internal Medicine In 3 days  [address]  [address]  [facility] [address]  [phone]               Pacheco Batres III, MD  08/13/23 5172

## 2023-08-13 NOTE — DISCHARGE INSTRUCTIONS
Have your blood work checked in 2 days by your primary care doctor your primary care doctor will also follow up your culture results.  Do not take your Eliquis for 2 days.

## 2023-10-25 DIAGNOSIS — G89.29 CHRONIC PAIN: Primary | ICD-10-CM

## 2023-11-28 DIAGNOSIS — J32.9 CHRONIC SINUSITIS: Primary | ICD-10-CM

## 2024-01-01 ENCOUNTER — LAB REQUISITION (OUTPATIENT)
Dept: LAB | Facility: HOSPITAL | Age: 49
End: 2024-01-01
Payer: MEDICARE

## 2024-01-01 DIAGNOSIS — Z87.440 PERSONAL HISTORY OF URINARY (TRACT) INFECTIONS: ICD-10-CM

## 2024-01-01 DIAGNOSIS — N31.9 NEUROMUSCULAR DYSFUNCTION OF BLADDER, UNSPECIFIED: ICD-10-CM

## 2024-01-01 DIAGNOSIS — R39.81 FUNCTIONAL URINARY INCONTINENCE: ICD-10-CM

## 2024-01-01 LAB
AMORPH URATE CRY URNS QL MICRO: ABNORMAL /HPF
APPEARANCE UR: ABNORMAL
BACTERIA #/AREA URNS AUTO: ABNORMAL /HPF
BILIRUB UR QL STRIP.AUTO: NEGATIVE
COLOR UR AUTO: YELLOW
GLUCOSE UR QL STRIP.AUTO: NEGATIVE
KETONES UR QL STRIP.AUTO: NEGATIVE
LEUKOCYTE ESTERASE UR QL STRIP.AUTO: ABNORMAL
NITRITE UR QL STRIP.AUTO: POSITIVE
PH UR STRIP.AUTO: 6 [PH]
PROT UR QL STRIP.AUTO: ABNORMAL
RBC #/AREA URNS AUTO: ABNORMAL /HPF
RBC UR QL AUTO: ABNORMAL
SP GR UR STRIP.AUTO: 1.02 (ref 1–1.03)
SQUAMOUS #/AREA URNS AUTO: ABNORMAL /HPF
UROBILINOGEN UR STRIP-ACNC: 1
WBC #/AREA URNS AUTO: >100 /HPF
WBC CLUMPS UR QL AUTO: ABNORMAL

## 2024-01-01 PROCEDURE — 87086 URINE CULTURE/COLONY COUNT: CPT | Performed by: INTERNAL MEDICINE

## 2024-01-01 PROCEDURE — 81003 URINALYSIS AUTO W/O SCOPE: CPT | Performed by: INTERNAL MEDICINE

## 2024-01-03 LAB — BACTERIA UR CULT: ABNORMAL

## 2024-01-05 ENCOUNTER — HOSPITAL ENCOUNTER (INPATIENT)
Facility: HOSPITAL | Age: 49
LOS: 6 days | Discharge: NURSING, REHAB OR LONG-TERM CARE | DRG: 698 | End: 2024-01-11
Attending: INTERNAL MEDICINE | Admitting: INTERNAL MEDICINE
Payer: MEDICARE

## 2024-01-05 DIAGNOSIS — N39.0 COMPLICATED UTI (URINARY TRACT INFECTION): Primary | ICD-10-CM

## 2024-01-05 DIAGNOSIS — N39.0 UTI (URINARY TRACT INFECTION): ICD-10-CM

## 2024-01-05 DIAGNOSIS — M25.552 PAIN OF LEFT HIP: ICD-10-CM

## 2024-01-05 LAB
ALBUMIN SERPL-MCNC: 3.7 G/DL (ref 3.5–5)
ALBUMIN/GLOB SERPL: 1.1 RATIO (ref 1.1–2)
ALP SERPL-CCNC: 103 UNIT/L (ref 40–150)
ALT SERPL-CCNC: 6 UNIT/L (ref 0–55)
APPEARANCE UR: ABNORMAL
AST SERPL-CCNC: 8 UNIT/L (ref 5–34)
BACTERIA #/AREA URNS AUTO: ABNORMAL /HPF
BASOPHILS # BLD AUTO: 0.1 X10(3)/MCL
BASOPHILS NFR BLD AUTO: 0.9 %
BILIRUB SERPL-MCNC: 0.5 MG/DL
BILIRUB UR QL STRIP.AUTO: 1
BUN SERPL-MCNC: 11.4 MG/DL (ref 8.9–20.6)
CALCIUM SERPL-MCNC: 8.9 MG/DL (ref 8.4–10.2)
CHLORIDE SERPL-SCNC: 108 MMOL/L (ref 98–107)
CO2 SERPL-SCNC: 26 MMOL/L (ref 22–29)
COLOR UR AUTO: ABNORMAL
CREAT SERPL-MCNC: 0.71 MG/DL (ref 0.73–1.18)
EOSINOPHIL # BLD AUTO: 0.27 X10(3)/MCL (ref 0–0.9)
EOSINOPHIL NFR BLD AUTO: 2.4 %
ERYTHROCYTE [DISTWIDTH] IN BLOOD BY AUTOMATED COUNT: 14.2 % (ref 11.5–17)
GFR SERPLBLD CREATININE-BSD FMLA CKD-EPI: >60 MLS/MIN/1.73/M2
GLOBULIN SER-MCNC: 3.5 GM/DL (ref 2.4–3.5)
GLUCOSE SERPL-MCNC: 101 MG/DL (ref 74–100)
GLUCOSE UR QL STRIP.AUTO: NEGATIVE
HCT VFR BLD AUTO: 40.2 % (ref 42–52)
HGB BLD-MCNC: 13.1 G/DL (ref 14–18)
IMM GRANULOCYTES # BLD AUTO: 0.02 X10(3)/MCL (ref 0–0.04)
IMM GRANULOCYTES NFR BLD AUTO: 0.2 %
KETONES UR QL STRIP.AUTO: ABNORMAL
LEUKOCYTE ESTERASE UR QL STRIP.AUTO: 2
LIPASE SERPL-CCNC: 8 U/L
LYMPHOCYTES # BLD AUTO: 2.51 X10(3)/MCL (ref 0.6–4.6)
LYMPHOCYTES NFR BLD AUTO: 22.3 %
MCH RBC QN AUTO: 26.5 PG (ref 27–31)
MCHC RBC AUTO-ENTMCNC: 32.6 G/DL (ref 33–36)
MCV RBC AUTO: 81.4 FL (ref 80–94)
MONOCYTES # BLD AUTO: 0.6 X10(3)/MCL (ref 0.1–1.3)
MONOCYTES NFR BLD AUTO: 5.3 %
NEUTROPHILS # BLD AUTO: 7.74 X10(3)/MCL (ref 2.1–9.2)
NEUTROPHILS NFR BLD AUTO: 68.9 %
NITRITE UR QL STRIP.AUTO: POSITIVE
NRBC BLD AUTO-RTO: 0 %
PH UR STRIP.AUTO: 6 [PH]
PLATELET # BLD AUTO: 258 X10(3)/MCL (ref 130–400)
PMV BLD AUTO: 11.4 FL (ref 7.4–10.4)
POTASSIUM SERPL-SCNC: 3.5 MMOL/L (ref 3.5–5.1)
PROT SERPL-MCNC: 7.2 GM/DL (ref 6.4–8.3)
PROT UR QL STRIP.AUTO: ABNORMAL
RBC # BLD AUTO: 4.94 X10(6)/MCL (ref 4.7–6.1)
RBC #/AREA URNS AUTO: ABNORMAL /HPF
RBC UR QL AUTO: 2
SODIUM SERPL-SCNC: 144 MMOL/L (ref 136–145)
SP GR UR STRIP.AUTO: >=1.03 (ref 1–1.03)
SQUAMOUS #/AREA URNS LPF: ABNORMAL /HPF
UROBILINOGEN UR STRIP-ACNC: 8
WBC # SPEC AUTO: 11.24 X10(3)/MCL (ref 4.5–11.5)
WBC #/AREA URNS AUTO: >100 /HPF

## 2024-01-05 PROCEDURE — 96375 TX/PRO/DX INJ NEW DRUG ADDON: CPT

## 2024-01-05 PROCEDURE — 96374 THER/PROPH/DIAG INJ IV PUSH: CPT

## 2024-01-05 PROCEDURE — 96361 HYDRATE IV INFUSION ADD-ON: CPT

## 2024-01-05 PROCEDURE — 99285 EMERGENCY DEPT VISIT HI MDM: CPT | Mod: 25

## 2024-01-05 PROCEDURE — 11000001 HC ACUTE MED/SURG PRIVATE ROOM

## 2024-01-05 PROCEDURE — 25500020 PHARM REV CODE 255

## 2024-01-05 PROCEDURE — 87077 CULTURE AEROBIC IDENTIFY: CPT | Performed by: NURSE PRACTITIONER

## 2024-01-05 PROCEDURE — 63600175 PHARM REV CODE 636 W HCPCS

## 2024-01-05 PROCEDURE — 83690 ASSAY OF LIPASE: CPT | Performed by: NURSE PRACTITIONER

## 2024-01-05 PROCEDURE — 87040 BLOOD CULTURE FOR BACTERIA: CPT

## 2024-01-05 PROCEDURE — 80053 COMPREHEN METABOLIC PANEL: CPT | Performed by: NURSE PRACTITIONER

## 2024-01-05 PROCEDURE — 63600175 PHARM REV CODE 636 W HCPCS: Performed by: INTERNAL MEDICINE

## 2024-01-05 PROCEDURE — 85025 COMPLETE CBC W/AUTO DIFF WBC: CPT | Performed by: NURSE PRACTITIONER

## 2024-01-05 PROCEDURE — 25000003 PHARM REV CODE 250: Performed by: INTERNAL MEDICINE

## 2024-01-05 PROCEDURE — 25000003 PHARM REV CODE 250: Performed by: NURSE PRACTITIONER

## 2024-01-05 PROCEDURE — 81001 URINALYSIS AUTO W/SCOPE: CPT | Performed by: NURSE PRACTITIONER

## 2024-01-05 RX ORDER — GABAPENTIN 300 MG/1
600 CAPSULE ORAL 3 TIMES DAILY
Status: DISCONTINUED | OUTPATIENT
Start: 2024-01-06 | End: 2024-01-12 | Stop reason: HOSPADM

## 2024-01-05 RX ORDER — MORPHINE SULFATE 4 MG/ML
4 INJECTION, SOLUTION INTRAMUSCULAR; INTRAVENOUS
Status: COMPLETED | OUTPATIENT
Start: 2024-01-05 | End: 2024-01-05

## 2024-01-05 RX ORDER — OXYBUTYNIN CHLORIDE 5 MG/1
5 TABLET ORAL 2 TIMES DAILY
Status: DISCONTINUED | OUTPATIENT
Start: 2024-01-05 | End: 2024-01-12 | Stop reason: HOSPADM

## 2024-01-05 RX ORDER — OXYCODONE AND ACETAMINOPHEN 10; 325 MG/1; MG/1
1 TABLET ORAL 2 TIMES DAILY PRN
Status: DISCONTINUED | OUTPATIENT
Start: 2024-01-05 | End: 2024-01-06

## 2024-01-05 RX ORDER — METOCLOPRAMIDE HYDROCHLORIDE 5 MG/ML
10 INJECTION INTRAMUSCULAR; INTRAVENOUS EVERY 6 HOURS PRN
Status: DISCONTINUED | OUTPATIENT
Start: 2024-01-05 | End: 2024-01-12 | Stop reason: HOSPADM

## 2024-01-05 RX ORDER — ONDANSETRON HYDROCHLORIDE 2 MG/ML
4 INJECTION, SOLUTION INTRAVENOUS EVERY 4 HOURS PRN
Status: DISCONTINUED | OUTPATIENT
Start: 2024-01-05 | End: 2024-01-12 | Stop reason: HOSPADM

## 2024-01-05 RX ORDER — BACLOFEN 10 MG/1
20 TABLET ORAL NIGHTLY
Status: DISCONTINUED | OUTPATIENT
Start: 2024-01-05 | End: 2024-01-06

## 2024-01-05 RX ORDER — ONDANSETRON HYDROCHLORIDE 2 MG/ML
4 INJECTION, SOLUTION INTRAVENOUS
Status: COMPLETED | OUTPATIENT
Start: 2024-01-05 | End: 2024-01-05

## 2024-01-05 RX ORDER — HYDROMORPHONE HYDROCHLORIDE 2 MG/ML
1 INJECTION, SOLUTION INTRAMUSCULAR; INTRAVENOUS; SUBCUTANEOUS EVERY 4 HOURS PRN
Status: DISCONTINUED | OUTPATIENT
Start: 2024-01-05 | End: 2024-01-06

## 2024-01-05 RX ADMIN — ONDANSETRON 4 MG: 2 INJECTION INTRAMUSCULAR; INTRAVENOUS at 10:01

## 2024-01-05 RX ADMIN — OXYBUTYNIN CHLORIDE 5 MG: 5 TABLET ORAL at 10:01

## 2024-01-05 RX ADMIN — OXYCODONE AND ACETAMINOPHEN 1 TABLET: 10; 325 TABLET ORAL at 10:01

## 2024-01-05 RX ADMIN — IOPAMIDOL 100 ML: 755 INJECTION, SOLUTION INTRAVENOUS at 05:01

## 2024-01-05 RX ADMIN — ONDANSETRON 4 MG: 2 INJECTION INTRAMUSCULAR; INTRAVENOUS at 04:01

## 2024-01-05 RX ADMIN — HYDROMORPHONE HYDROCHLORIDE 1 MG: 2 INJECTION INTRAMUSCULAR; INTRAVENOUS; SUBCUTANEOUS at 08:01

## 2024-01-05 RX ADMIN — MEROPENEM 1 G: 1 INJECTION, POWDER, FOR SOLUTION INTRAVENOUS at 06:01

## 2024-01-05 RX ADMIN — SODIUM CHLORIDE, POTASSIUM CHLORIDE, SODIUM LACTATE AND CALCIUM CHLORIDE 1000 ML: 600; 310; 30; 20 INJECTION, SOLUTION INTRAVENOUS at 04:01

## 2024-01-05 RX ADMIN — MORPHINE SULFATE 4 MG: 4 INJECTION, SOLUTION INTRAMUSCULAR; INTRAVENOUS at 04:01

## 2024-01-05 NOTE — Clinical Note
Diagnosis: UTI (urinary tract infection) [557506]   Future Attending Provider: JOSE MENDOZA [[id number]]   Admitting Provider:: JOSE MENDOZA [[id number]]   Admit to which facility:: [facility] [[id number]]   Reason for IP Medical Treatment  (Clinical interventions that can only be accomplished in the IP setting? ) :: treatment of UTI   I certify that Inpatient services for greater than or equal to 2 midnights are medically necessary:: Yes   Plans for Post-Acute care--if anticipated (pick the single best option):: A. No post acute care anticipated at this time   Special Needs:: No Special Needs [1]

## 2024-01-05 NOTE — ED PROVIDER NOTES
Encounter Date: 1/5/2024       History     Chief Complaint   Patient presents with    Hematuria     Patient arrives with ems complaining of blood in urine, diagnosed with uti on Monday. Hx of UTIs. Reports abdominal pain. Urinary catheter in place. Hx of paraplegia.     48 y.o.  male with a history of paraplegia and chronic UTIs presents to Emergency Department with a chief complaint of hematuria. Symptoms began 1 week ago and have been constant since onset. UA collected on Monday and patient instructed to come to ER on today for IV abx. Associated symptoms include atraumatic L hip pain. Patient reports he was dx with sclerosis and osteomyelitis. The patient denies CP, SOB, dysuria, fever, chills, or dizziness. No other reported symptoms at this time. PCP: Benji      The history is provided by the patient. No  was used.   Hematuria  This is a new problem. The current episode started 1 to 4 weeks ago. The problem is unchanged. He describes the hematuria as gross hematuria. He describes his urine color as dark red. Pertinent negatives include no chills, dysuria, facial swelling, fever, flank pain, nausea, urinary retention or vomiting. Risk factors include anticoagulant.     Review of patient's allergies indicates:   Allergen Reactions    Amitriptyline      Past Medical History:   Diagnosis Date    Chronic UTI     Paraplegia      Past Surgical History:   Procedure Laterality Date    INSERTION OF SUPRAPUBIC CATHETER      LAPAROTOMY       No family history on file.  Social History     Tobacco Use    Smoking status: Never    Smokeless tobacco: Never   Substance Use Topics    Alcohol use: Not Currently    Drug use: Never     Review of Systems   Constitutional:  Negative for chills, fatigue and fever.   HENT:  Negative for facial swelling.    Eyes:  Negative for photophobia and visual disturbance.   Respiratory:  Negative for shortness of breath, wheezing and stridor.     Cardiovascular:  Negative for chest pain, palpitations and leg swelling.   Gastrointestinal:  Negative for blood in stool, nausea and vomiting.   Genitourinary:  Positive for hematuria. Negative for dysuria, flank pain, penile pain, penile swelling and scrotal swelling.   Musculoskeletal:  Positive for arthralgias.   All other systems reviewed and are negative.      Physical Exam     Initial Vitals   BP Pulse Resp Temp SpO2   01/05/24 1437 01/05/24 1437 01/05/24 1512 01/05/24 1437 01/05/24 1437   (!) 144/91 76 17 98.2 °F (36.8 °C) 99 %      MAP       --                Physical Exam    Nursing note and vitals reviewed.  Constitutional: He appears well-developed and well-nourished. He is not diaphoretic. He is cooperative.  Non-toxic appearance. No distress.   HENT:   Head: Normocephalic and atraumatic.   Right Ear: External ear normal.   Left Ear: External ear normal.   Mouth/Throat: Oropharynx is clear and moist.   Eyes: Conjunctivae and EOM are normal. Pupils are equal, round, and reactive to light.   Neck: Neck supple.   Normal range of motion.  Cardiovascular:  Normal rate, regular rhythm, S1 normal, S2 normal, normal heart sounds, intact distal pulses and normal pulses.           Pulmonary/Chest: Effort normal and breath sounds normal. No tachypnea and no bradypnea. No respiratory distress. He has no decreased breath sounds. He has no wheezes. He has no rhonchi. He has no rales. He exhibits no tenderness.   Abdominal: Abdomen is soft. Bowel sounds are normal. He exhibits no distension. There is no abdominal tenderness. There is no rebound.   Genitourinary:    Genitourinary Comments: Suprapubic catheter in place draining, yellow, cloudy urine.      Musculoskeletal:      Cervical back: Normal range of motion and neck supple.        Legs:       Comments: Paraplegic. Able to move BUE. Tenderness noted to outlined area. CMS intact. All other adjacent joints otherwise normal.        Neurological: He is alert and  oriented to person, place, and time. He has normal strength. No sensory deficit. GCS score is 15. GCS eye subscore is 4. GCS verbal subscore is 5. GCS motor subscore is 6.   Skin: Skin is warm. Capillary refill takes less than 2 seconds.   Patient has two pressure ulcers to buttock. Exam performed with KAMLA Amado as chaperone.    Psychiatric: He has a normal mood and affect. Thought content normal.               ED Course   Procedures  Labs Reviewed   COMPREHENSIVE METABOLIC PANEL - Abnormal; Notable for the following components:       Result Value    Chloride 108 (*)     Glucose Level 101 (*)     Creatinine 0.71 (*)     All other components within normal limits   URINALYSIS, REFLEX TO URINE CULTURE - Abnormal; Notable for the following components:    Appearance, UA Cloudy (*)     Protein, UA Trace (*)     Ketones, UA Trace (*)     Blood, UA +2 (*)     Bilirubin, UA +1 (*)     Urobilinogen, UA 8.0 (*)     Nitrites, UA Positive (*)     Leukocyte Esterase, UA +2 (*)     WBC, UA >100 (*)     Bacteria, UA Many (*)     All other components within normal limits   CBC WITH DIFFERENTIAL - Abnormal; Notable for the following components:    Hgb 13.1 (*)     Hct 40.2 (*)     MCH 26.5 (*)     MCHC 32.6 (*)     MPV 11.4 (*)     All other components within normal limits   LIPASE - Normal   CULTURE, URINE   BLOOD CULTURE [facility]   BLOOD CULTURE [facility]   CBC W/ AUTO DIFFERENTIAL    Narrative:     The following orders were created for panel order CBC Auto Differential.  Procedure                               Abnormality         Status                     ---------                               -----------         ------                     CBC with Differential[[id number]]       Abnormal            Final result                 Please view results for these tests on the individual orders.          Imaging Results              CT Abdomen Pelvis With IV Contrast NO Oral Contrast (Final result)  Result time 01/05/24 17:33:39      Final  result by Juan Sargent MD (01/05/24 17:33:39)                   Impression:      No acute process identified.      Electronically signed by: Juan Sargent  Date:    01/05/2024  Time:    17:33               Narrative:    EXAMINATION:  CT ABDOMEN PELVIS WITH IV CONTRAST    CLINICAL HISTORY:  Abdominal pain, acute, nonlocalized;    TECHNIQUE:  CT imaging of the abdomen and pelvis after intravenous contrast. Dose length product 419 mGycm. Automatic exposure control, adjustment of mA/kV or iterative reconstruction technique used to limit radiation dose.    COMPARISON:  CT 06/14/2023    FINDINGS:  Liver/biliary: Normal liver.  No radiodense gallstones. No intra or extrahepatic biliary ductal dilation.    Pancreas: Normal.    Spleen: Normal.    Adrenals: Normal.    Genitourinary: Symmetric renal enhancement. No hydronephrosis. Bladder decompressed with a suprapubic catheter.    Stomach/bowel: Right mid abdominal ileal colonic anastomosis.  No bowel obstruction.  No definitive sites of bowel inflammation.    Lymph nodes/peritoneum: No pathologically enlarged lymph node identified. No ascites or free air. No fluid collection.    Vasculature: Normal abdominal aortic caliber.    Abdominal wall: Normal.    Lung bases: No consolidation or pleural effusion.    Musculoskeletal: No acute osseous findings. Chronic sacral and left ischial decubitus ulcers.                                       X-Ray Hip 2 or 3 views Left (with Pelvis when performed) (Final result)  Result time 01/05/24 17:05:28      Final result by Juan Sargent MD (01/05/24 17:05:28)                   Impression:      No acute osseous process appreciated.      Electronically signed by: Juan Sargent  Date:    01/05/2024  Time:    17:05               Narrative:    EXAMINATION:  XR HIP WITH PELVIS WHEN PERFORMED, 2 OR 3 VIEWS LEFT    CLINICAL HISTORY:  hip pain;    TECHNIQUE:  AP view of the pelvis with frontal and frog leg lateral views of the left  hip.    COMPARISON:  Radiography 12/01/2022    FINDINGS:  Areas of heterotopic calcification at both hips and adjacent to the inferior pubic rami with similar overall appearance.  No acute fracture or dislocation.                                       Medications   HYDROmorphone (PF) injection 1 mg (has no administration in time range)   ondansetron injection 4 mg (has no administration in time range)   metoclopramide injection 10 mg (has no administration in time range)   meropenem (MERREM) 1 g in sodium chloride 0.9 % 100 mL IVPB (MB+) (has no administration in time range)   lactated ringers bolus 1,000 mL (0 mLs Intravenous Stopped 1/5/24 1730)   ondansetron injection 4 mg (4 mg Intravenous Given 1/5/24 1630)   morphine injection 4 mg (4 mg Intravenous Given 1/5/24 1630)   iopamidoL (ISOVUE-370) injection 100 mL (100 mLs Intravenous Given 1/5/24 1715)     Medical Decision Making  Patient awake, alert, has non-labored breathing, and follows commands appropriately. C/o hematuria and L hip pain. Patient reports he has had pressure ulcers to his buttock for 2 years. Patient paraplegic. Afebrile. NAD noted.       Differential Diagnosis: UTI, Hematuria, Wound, Pyelonephritis     Amount and/or Complexity of Data Reviewed  External Data Reviewed: labs.     Details: Recent urine culture revealed patient sensitive to Bactrim and Meropenem. Intermittent to Augmentin.   Labs: ordered. Decision-making details documented in ED Course.     Details: UA- WBCs, bacteria, and Nitrates noted. No RBCs. No leukocytosis.  Informed patient of results.   Radiology: ordered.     Details: XR- No acute osseous process appreciated. CT- No acute process identified. Informed patient of results.   Discussion of management or test interpretation with external provider(s): Due to patient's symptoms, hx of paraplegia, UA results, and sensitivity to IV antibiotics. Will admit to internal medicine services with wound care consult due to pressure  ulcers. Discussed plan of care and interventions with patient. Agreed to and aware of plan of care. Comfortable being admitted.     Risk  Prescription drug management.  Decision regarding hospitalization.               ED Course as of 01/05/24 1802 Fri Jan 05, 2024   1628 Bacteria, UA(!): Many [JA]   1628 NITRITE UA(!): Positive [JA]   1628 WBC, UA(!): >100 [JA]   1745 Dr. Bar paged.  [JA]   1750 Discussed patient with Dr. Bar (on call for Dr. Leblanc). Will order IV Dilaudid q4 hours as needed for pain and place on Meropenum. No further instruction or recommendations given.  [JA]   1755 Discussed patient with Dr. Blanchard. Aware of admission.  [JA]      ED Course User Index  [JA] Daria Mccauley, NP                           Clinical Impression:  Final diagnoses:  [N39.0] UTI (urinary tract infection)  [M25.552] Pain of left hip (Primary)          ED Disposition Condition    Admit Stable                Daria Mccauley, RAIN  01/05/24 1802       Daria Mccauley NP  01/05/24 1818

## 2024-01-06 LAB
ALBUMIN SERPL-MCNC: 3.4 G/DL (ref 3.5–5)
ALBUMIN/GLOB SERPL: 1.1 RATIO (ref 1.1–2)
ALP SERPL-CCNC: 89 UNIT/L (ref 40–150)
ALT SERPL-CCNC: 5 UNIT/L (ref 0–55)
AST SERPL-CCNC: 8 UNIT/L (ref 5–34)
BASOPHILS # BLD AUTO: 0.11 X10(3)/MCL
BASOPHILS NFR BLD AUTO: 1.2 %
BILIRUB SERPL-MCNC: 0.5 MG/DL
BUN SERPL-MCNC: 9.2 MG/DL (ref 8.9–20.6)
CALCIUM SERPL-MCNC: 8.6 MG/DL (ref 8.4–10.2)
CHLORIDE SERPL-SCNC: 105 MMOL/L (ref 98–107)
CO2 SERPL-SCNC: 28 MMOL/L (ref 22–29)
CREAT SERPL-MCNC: 0.68 MG/DL (ref 0.73–1.18)
EOSINOPHIL # BLD AUTO: 0.32 X10(3)/MCL (ref 0–0.9)
EOSINOPHIL NFR BLD AUTO: 3.6 %
ERYTHROCYTE [DISTWIDTH] IN BLOOD BY AUTOMATED COUNT: 14.3 % (ref 11.5–17)
GFR SERPLBLD CREATININE-BSD FMLA CKD-EPI: >60 MLS/MIN/1.73/M2
GLOBULIN SER-MCNC: 3.2 GM/DL (ref 2.4–3.5)
GLUCOSE SERPL-MCNC: 114 MG/DL (ref 74–100)
HCT VFR BLD AUTO: 38.6 % (ref 42–52)
HGB BLD-MCNC: 11.9 G/DL (ref 14–18)
IMM GRANULOCYTES # BLD AUTO: 0.02 X10(3)/MCL (ref 0–0.04)
IMM GRANULOCYTES NFR BLD AUTO: 0.2 %
LYMPHOCYTES # BLD AUTO: 3.45 X10(3)/MCL (ref 0.6–4.6)
LYMPHOCYTES NFR BLD AUTO: 38.5 %
MCH RBC QN AUTO: 25.8 PG (ref 27–31)
MCHC RBC AUTO-ENTMCNC: 30.8 G/DL (ref 33–36)
MCV RBC AUTO: 83.7 FL (ref 80–94)
MONOCYTES # BLD AUTO: 0.49 X10(3)/MCL (ref 0.1–1.3)
MONOCYTES NFR BLD AUTO: 5.5 %
NEUTROPHILS # BLD AUTO: 4.57 X10(3)/MCL (ref 2.1–9.2)
NEUTROPHILS NFR BLD AUTO: 51 %
NRBC BLD AUTO-RTO: 0 %
PLATELET # BLD AUTO: 231 X10(3)/MCL (ref 130–400)
PMV BLD AUTO: 11.6 FL (ref 7.4–10.4)
POTASSIUM SERPL-SCNC: 3.6 MMOL/L (ref 3.5–5.1)
PROT SERPL-MCNC: 6.6 GM/DL (ref 6.4–8.3)
RBC # BLD AUTO: 4.61 X10(6)/MCL (ref 4.7–6.1)
SODIUM SERPL-SCNC: 141 MMOL/L (ref 136–145)
WBC # SPEC AUTO: 8.96 X10(3)/MCL (ref 4.5–11.5)

## 2024-01-06 PROCEDURE — 63600175 PHARM REV CODE 636 W HCPCS: Performed by: INTERNAL MEDICINE

## 2024-01-06 PROCEDURE — 21400001 HC TELEMETRY ROOM

## 2024-01-06 PROCEDURE — 85025 COMPLETE CBC W/AUTO DIFF WBC: CPT

## 2024-01-06 PROCEDURE — 11000001 HC ACUTE MED/SURG PRIVATE ROOM

## 2024-01-06 PROCEDURE — 25000003 PHARM REV CODE 250: Performed by: NURSE PRACTITIONER

## 2024-01-06 PROCEDURE — 63600175 PHARM REV CODE 636 W HCPCS: Performed by: NURSE PRACTITIONER

## 2024-01-06 PROCEDURE — 25000003 PHARM REV CODE 250: Performed by: INTERNAL MEDICINE

## 2024-01-06 PROCEDURE — 63600175 PHARM REV CODE 636 W HCPCS

## 2024-01-06 PROCEDURE — 80053 COMPREHEN METABOLIC PANEL: CPT

## 2024-01-06 RX ORDER — HYDROMORPHONE HYDROCHLORIDE 2 MG/ML
1 INJECTION, SOLUTION INTRAMUSCULAR; INTRAVENOUS; SUBCUTANEOUS
Status: DISCONTINUED | OUTPATIENT
Start: 2024-01-06 | End: 2024-01-07

## 2024-01-06 RX ORDER — BACLOFEN 10 MG/1
20 TABLET ORAL 3 TIMES DAILY
Status: DISCONTINUED | OUTPATIENT
Start: 2024-01-06 | End: 2024-01-12 | Stop reason: HOSPADM

## 2024-01-06 RX ORDER — SERTRALINE HYDROCHLORIDE 50 MG/1
50 TABLET, FILM COATED ORAL DAILY
Status: DISCONTINUED | OUTPATIENT
Start: 2024-01-06 | End: 2024-01-12 | Stop reason: HOSPADM

## 2024-01-06 RX ORDER — CYCLOBENZAPRINE HCL 10 MG
10 TABLET ORAL 2 TIMES DAILY PRN
Status: DISCONTINUED | OUTPATIENT
Start: 2024-01-06 | End: 2024-01-12 | Stop reason: HOSPADM

## 2024-01-06 RX ORDER — OXYCODONE AND ACETAMINOPHEN 10; 325 MG/1; MG/1
1 TABLET ORAL EVERY 6 HOURS PRN
Status: DISCONTINUED | OUTPATIENT
Start: 2024-01-06 | End: 2024-01-12 | Stop reason: HOSPADM

## 2024-01-06 RX ORDER — HYDRALAZINE HYDROCHLORIDE 20 MG/ML
10 INJECTION INTRAMUSCULAR; INTRAVENOUS EVERY 6 HOURS PRN
Status: DISCONTINUED | OUTPATIENT
Start: 2024-01-06 | End: 2024-01-07

## 2024-01-06 RX ADMIN — SERTRALINE HYDROCHLORIDE 50 MG: 50 TABLET ORAL at 01:01

## 2024-01-06 RX ADMIN — HYDROMORPHONE HYDROCHLORIDE 1 MG: 2 INJECTION INTRAMUSCULAR; INTRAVENOUS; SUBCUTANEOUS at 11:01

## 2024-01-06 RX ADMIN — APIXABAN 5 MG: 5 TABLET, FILM COATED ORAL at 08:01

## 2024-01-06 RX ADMIN — HYDROMORPHONE HYDROCHLORIDE 1 MG: 2 INJECTION INTRAMUSCULAR; INTRAVENOUS; SUBCUTANEOUS at 04:01

## 2024-01-06 RX ADMIN — HYDROMORPHONE HYDROCHLORIDE 1 MG: 2 INJECTION INTRAMUSCULAR; INTRAVENOUS; SUBCUTANEOUS at 08:01

## 2024-01-06 RX ADMIN — GABAPENTIN 600 MG: 300 CAPSULE ORAL at 08:01

## 2024-01-06 RX ADMIN — ONDANSETRON 4 MG: 2 INJECTION INTRAMUSCULAR; INTRAVENOUS at 08:01

## 2024-01-06 RX ADMIN — MEROPENEM 1 G: 1 INJECTION, POWDER, FOR SOLUTION INTRAVENOUS at 08:01

## 2024-01-06 RX ADMIN — HYDROMORPHONE HYDROCHLORIDE 1 MG: 2 INJECTION INTRAMUSCULAR; INTRAVENOUS; SUBCUTANEOUS at 12:01

## 2024-01-06 RX ADMIN — OXYBUTYNIN CHLORIDE 5 MG: 5 TABLET ORAL at 08:01

## 2024-01-06 RX ADMIN — OXYCODONE AND ACETAMINOPHEN 1 TABLET: 10; 325 TABLET ORAL at 07:01

## 2024-01-06 RX ADMIN — MEROPENEM 1 G: 1 INJECTION, POWDER, FOR SOLUTION INTRAVENOUS at 07:01

## 2024-01-06 RX ADMIN — BACLOFEN 20 MG: 10 TABLET ORAL at 03:01

## 2024-01-06 RX ADMIN — OXYCODONE AND ACETAMINOPHEN 1 TABLET: 10; 325 TABLET ORAL at 01:01

## 2024-01-06 RX ADMIN — GABAPENTIN 600 MG: 300 CAPSULE ORAL at 02:01

## 2024-01-06 RX ADMIN — BACLOFEN 20 MG: 10 TABLET ORAL at 08:01

## 2024-01-06 NOTE — NURSING
Nurses Note -- 4 Eyes      1/6/2024   5:00 PM      Skin assessed during: Admit      [] No Altered Skin Integrity Present    []Prevention Measures Documented      [x] Yes- Altered Skin Integrity Present or Discovered   [x] LDA Added if Not in Epic (Describe Wound)   [] New Altered Skin Integrity was Present on Admit and Documented in LDA   [x] Wound Image Taken    Wound Care Consulted? Yes    Attending Nurse:  Radha Marroquin RN/Staff Member:     Mary Kate Caraballo

## 2024-01-06 NOTE — CONSULTS
Hemal Guerrero 1975  [id number]  1/6/2024    CONSULTING PHYSICIAN: Fred    Reason for consult: SP tube exchange    HPI: Mr. Guerrero is a 47-year-old male he with a past medical history of paraplegia requiring a chronic suprapubic catheter due to neurogenic bladder, sacral/gluteal pressure wounds whose primary urologist is Dr. Robert Gipson. He presented to the ED with complaints of hematuria x 1 week, he reportedly gave a UA during the week and was told to come here for IV antibiotics. Of note, he does have chronic abdominal pain with multiple hospital admissions. He had SP tube exchange on Monday per [facility].      Past Medical History:   Diagnosis Date    Chronic UTI     Paraplegia      Past Surgical History:   Procedure Laterality Date    INSERTION OF SUPRAPUBIC CATHETER      LAPAROTOMY       No family history on file.    Social History     Tobacco Use    Smoking status: Never    Smokeless tobacco: Never   Substance Use Topics    Alcohol use: Not Currently    Drug use: Never     Current Facility-Administered Medications   Medication Dose Route Frequency Provider Last Rate Last Admin    baclofen tablet 20 mg  20 mg Oral QHS Sherry Rios ANP        gabapentin capsule 600 mg  600 mg Oral TID Sherry Rios ANP        HYDROmorphone (PF) injection 1 mg  1 mg Intravenous Q3H PRN Sherry Rios ANP        meropenem (MERREM) 1 g in sodium chloride 0.9 % 100 mL IVPB (MB+)  1 g Intravenous Q12H Yosvany Simms MD   Stopped at 01/06/24 0817    metoclopramide injection 10 mg  10 mg Intravenous Q6H PRN Daria Mccauley NP        ondansetron injection 4 mg  4 mg Intravenous Q4H PRN Daria Mccauley NP   4 mg at 01/06/24 0838    oxybutynin tablet 5 mg  5 mg Oral BID Sherry Rios ANP   5 mg at 01/06/24 0839    oxyCODONE-acetaminophen  mg per tablet 1 tablet  1 tablet Oral Q6H PRN Sherry Rios ANP         Current Outpatient Medications   Medication Sig Dispense Refill     baclofen (LIORESAL) 20 MG tablet Take 20 mg by mouth every evening.      docusate sodium (COLACE) 250 MG capsule Take 250 mg by mouth daily as needed.      ELIQUIS 5 mg Tab Take 5 mg by mouth 2 (two) times daily.      fluticasone propionate (FLONASE) 50 mcg/actuation nasal spray 1 spray by Each Nostril route 2 (two) times daily.      gabapentin (NEURONTIN) 600 MG tablet Take 600 mg by mouth 3 (three) times daily.      oxybutynin (DITROPAN-XL) 10 MG 24 hr tablet Take 1 tablet by mouth every morning.      oxyCODONE-acetaminophen (PERCOCET)  mg per tablet Take 1 tablet by mouth 2 (two) times daily as needed.      amLODIPine (NORVASC) 5 MG tablet Take 1 tablet (5 mg total) by mouth once daily. 30 tablet 0    cyclobenzaprine (FLEXERIL) 10 MG tablet Take 10 mg by mouth 2 (two) times daily as needed.      erythromycin (ROMYCIN) ophthalmic ointment Place a 1/2 inch ribbon of ointment into the lower eyelid. 3.5 g 0    FEROSUL 325 mg (65 mg iron) Tab tablet Take 1 tablet by mouth every morning.      HYDROcodone-acetaminophen (NORCO) 5-325 mg per tablet Take 1 tablet by mouth every 6 (six) hours as needed for Pain. 6 tablet 0    HYDROcodone-acetaminophen (NORCO) 5-325 mg per tablet Take 1 tablet by mouth every 6 (six) hours as needed for Pain. 6 tablet 0    hydrOXYzine pamoate (VISTARIL) 25 MG Cap Take 25 mg by mouth 3 (three) times daily.      mirtazapine (REMERON) 15 MG tablet Take 1 tablet (15 mg total) by mouth nightly. 30 tablet 0    sertraline (ZOLOFT) 50 MG tablet Take 1 tablet (50 mg total) by mouth once daily. 30 tablet 0     Review of patient's allergies indicates:   Allergen Reactions    Amitriptyline      ROS: 12 point review of systems negative other than the HPI    PHYSICAL EXAM:  Vitals:    01/06/24 0712 01/06/24 0745 01/06/24 0800 01/06/24 0838   BP:  139/89 (!) 151/86    BP Location:  Left arm Left arm    Patient Position:  Lying Lying    Pulse:  72 61    Resp: 18 13 14 19   Temp:       TempSrc:        SpO2:  96% 98%    Weight:           Intake/Output Summary (Last 24 hours) at 1/6/2024 0926  Last data filed at 1/5/2024 2059  Gross per 24 hour   Intake --   Output 200 ml   Net -200 ml       GEN: WN/WD NAD  HEENT: NCAT, PERRLA, EOMI, OP clear, nares patent  CV: RRR  RESP: Even and unlabored  ABD: soft, NTND  : hoff yellow urine with sediment in  bag  EXT: no C/C/E, BLE paralysis  NEURO: no focal deficits, moved upper extremity and trunk, AAOx4      LABS:  Recent Results (from the past 24 hour(s))   Comprehensive Metabolic Panel    Collection Time: 01/05/24  3:42 PM   Result Value Ref Range    Sodium Level 144 136 - 145 mmol/L    Potassium Level 3.5 3.5 - 5.1 mmol/L    Chloride 108 (H) 98 - 107 mmol/L    Carbon Dioxide 26 22 - 29 mmol/L    Glucose Level 101 (H) 74 - 100 mg/dL    Blood Urea Nitrogen 11.4 8.9 - 20.6 mg/dL    Creatinine 0.71 (L) 0.73 - 1.18 mg/dL    Calcium Level Total 8.9 8.4 - 10.2 mg/dL    Protein Total 7.2 6.4 - 8.3 gm/dL    Albumin Level 3.7 3.5 - 5.0 g/dL    Globulin 3.5 2.4 - 3.5 gm/dL    Albumin/Globulin Ratio 1.1 1.1 - 2.0 ratio    Bilirubin Total 0.5 <=1.5 mg/dL    Alkaline Phosphatase 103 40 - 150 unit/L    Alanine Aminotransferase 6 0 - 55 unit/L    Aspartate Aminotransferase 8 5 - 34 unit/L    eGFR >60 mls/min/1.73/m2   Lipase    Collection Time: 01/05/24  3:42 PM   Result Value Ref Range    Lipase Level 8 <=60 U/L   CBC with Differential    Collection Time: 01/05/24  3:42 PM   Result Value Ref Range    WBC 11.24 4.50 - 11.50 x10(3)/mcL    RBC 4.94 4.70 - 6.10 x10(6)/mcL    Hgb 13.1 (L) 14.0 - 18.0 g/dL    Hct 40.2 (L) 42.0 - 52.0 %    MCV 81.4 80.0 - 94.0 fL    MCH 26.5 (L) 27.0 - 31.0 pg    MCHC 32.6 (L) 33.0 - 36.0 g/dL    RDW 14.2 11.5 - 17.0 %    Platelet 258 130 - 400 x10(3)/mcL    MPV 11.4 (H) 7.4 - 10.4 fL    Neut % 68.9 %    Lymph % 22.3 %    Mono % 5.3 %    Eos % 2.4 %    Basophil % 0.9 %    Lymph # 2.51 0.6 - 4.6 x10(3)/mcL    Neut # 7.74 2.1 - 9.2 x10(3)/mcL    Mono #  0.60 0.1 - 1.3 x10(3)/mcL    Eos # 0.27 0 - 0.9 x10(3)/mcL    Baso # 0.10 <=0.2 x10(3)/mcL    IG# 0.02 0 - 0.04 x10(3)/mcL    IG% 0.2 %    NRBC% 0.0 %   Urinalysis, Reflex to Urine Culture    Collection Time: 01/05/24  3:49 PM    Specimen: Urine   Result Value Ref Range    Color, UA Light-Yellow Yellow, Light-Yellow, Colorless, Straw, Dark-Yellow    Appearance, UA Cloudy (A) Clear    Specific Gravity, UA >=1.030 1.005 - 1.030    pH, UA 6.0 5.0 - 8.5    Protein, UA Trace (A) Negative    Glucose, UA Negative Negative, Normal    Ketones, UA Trace (A) Negative    Blood, UA +2 (A) Negative    Bilirubin, UA +1 (A) Negative    Urobilinogen, UA 8.0 (A) 0.2, 1.0, Normal    Nitrites, UA Positive (A) Negative    Leukocyte Esterase, UA +2 (A) Negative    WBC, UA >100 (A) None Seen, 0-2, 3-5, 0-5 /HPF    Bacteria, UA Many (A) None Seen, Rare, Occasional /HPF    Squamous Epithelial Cells, UA Few /HPF    RBC, UA 0-5 None Seen, 0-2, 3-5, 0-5 /HPF   Urine culture    Collection Time: 01/05/24  3:49 PM    Specimen: Urine   Result Value Ref Range    Urine Culture >/= 100,000 colonies/ml Gram-negative Rods (A)    Comprehensive metabolic panel    Collection Time: 01/06/24  4:18 AM   Result Value Ref Range    Sodium Level 141 136 - 145 mmol/L    Potassium Level 3.6 3.5 - 5.1 mmol/L    Chloride 105 98 - 107 mmol/L    Carbon Dioxide 28 22 - 29 mmol/L    Glucose Level 114 (H) 74 - 100 mg/dL    Blood Urea Nitrogen 9.2 8.9 - 20.6 mg/dL    Creatinine 0.68 (L) 0.73 - 1.18 mg/dL    Calcium Level Total 8.6 8.4 - 10.2 mg/dL    Protein Total 6.6 6.4 - 8.3 gm/dL    Albumin Level 3.4 (L) 3.5 - 5.0 g/dL    Globulin 3.2 2.4 - 3.5 gm/dL    Albumin/Globulin Ratio 1.1 1.1 - 2.0 ratio    Bilirubin Total 0.5 <=1.5 mg/dL    Alkaline Phosphatase 89 40 - 150 unit/L    Alanine Aminotransferase 5 0 - 55 unit/L    Aspartate Aminotransferase 8 5 - 34 unit/L    eGFR >60 mls/min/1.73/m2   CBC with Differential    Collection Time: 01/06/24  4:18 AM   Result Value Ref  Range    WBC 8.96 4.50 - 11.50 x10(3)/mcL    RBC 4.61 (L) 4.70 - 6.10 x10(6)/mcL    Hgb 11.9 (L) 14.0 - 18.0 g/dL    Hct 38.6 (L) 42.0 - 52.0 %    MCV 83.7 80.0 - 94.0 fL    MCH 25.8 (L) 27.0 - 31.0 pg    MCHC 30.8 (L) 33.0 - 36.0 g/dL    RDW 14.3 11.5 - 17.0 %    Platelet 231 130 - 400 x10(3)/mcL    MPV 11.6 (H) 7.4 - 10.4 fL    Neut % 51.0 %    Lymph % 38.5 %    Mono % 5.5 %    Eos % 3.6 %    Basophil % 1.2 %    Lymph # 3.45 0.6 - 4.6 x10(3)/mcL    Neut # 4.57 2.1 - 9.2 x10(3)/mcL    Mono # 0.49 0.1 - 1.3 x10(3)/mcL    Eos # 0.32 0 - 0.9 x10(3)/mcL    Baso # 0.11 <=0.2 x10(3)/mcL    IG# 0.02 0 - 0.04 x10(3)/mcL    IG% 0.2 %    NRBC% 0.0 %         IMAGING:  EXAMINATION:  CT ABDOMEN PELVIS WITH IV CONTRAST    CLINICAL HISTORY:  Abdominal pain, acute, nonlocalized;    TECHNIQUE:  CT imaging of the abdomen and pelvis after intravenous contrast. Dose length product 419 mGycm. Automatic exposure control, adjustment of mA/kV or iterative reconstruction technique used to limit radiation dose.    COMPARISON:  CT 06/14/2023    FINDINGS:  Liver/biliary: Normal liver.  No radiodense gallstones. No intra or extrahepatic biliary ductal dilation.    Pancreas: Normal.    Spleen: Normal.    Adrenals: Normal.    Genitourinary: Symmetric renal enhancement. No hydronephrosis. Bladder decompressed with a suprapubic catheter.    Stomach/bowel: Right mid abdominal ileal colonic anastomosis.  No bowel obstruction.  No definitive sites of bowel inflammation.    Lymph nodes/peritoneum: No pathologically enlarged lymph node identified. No ascites or free air. No fluid collection.    Vasculature: Normal abdominal aortic caliber.    Abdominal wall: Normal.    Lung bases: No consolidation or pleural effusion.    Musculoskeletal: No acute osseous findings. Chronic sacral and left ischial decubitus ulcers.   Impression:       No acute process identified.         ASSESSMENT:  Gross hematuria likely secondary to MDR UTI, he does have a chronic SP  tube for neurogenic bladder secondary to paraplegia. We did not feel the need to exchange his SP tube being it was just exchanged. We did  irrigate to assure correct placement.       PLAN:  Will get him follow up after discharge to discuss bladder management options including Botox injections. Continue full course of cx specific abx.     JOSE ALFREDO Kay
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
## 2024-01-07 LAB
ALBUMIN SERPL-MCNC: 3.9 G/DL (ref 3.5–5)
ALBUMIN/GLOB SERPL: 1.1 RATIO (ref 1.1–2)
ALP SERPL-CCNC: 108 UNIT/L (ref 40–150)
ALT SERPL-CCNC: 6 UNIT/L (ref 0–55)
AST SERPL-CCNC: 10 UNIT/L (ref 5–34)
BACTERIA UR CULT: ABNORMAL
BASOPHILS # BLD AUTO: 0.12 X10(3)/MCL
BASOPHILS NFR BLD AUTO: 1.2 %
BILIRUB SERPL-MCNC: 0.4 MG/DL
BUN SERPL-MCNC: 10.5 MG/DL (ref 8.9–20.6)
CALCIUM SERPL-MCNC: 9.2 MG/DL (ref 8.4–10.2)
CHLORIDE SERPL-SCNC: 108 MMOL/L (ref 98–107)
CO2 SERPL-SCNC: 27 MMOL/L (ref 22–29)
CREAT SERPL-MCNC: 0.67 MG/DL (ref 0.73–1.18)
EOSINOPHIL # BLD AUTO: 0.31 X10(3)/MCL (ref 0–0.9)
EOSINOPHIL NFR BLD AUTO: 3.2 %
ERYTHROCYTE [DISTWIDTH] IN BLOOD BY AUTOMATED COUNT: 14 % (ref 11.5–17)
GFR SERPLBLD CREATININE-BSD FMLA CKD-EPI: >60 MLS/MIN/1.73/M2
GLOBULIN SER-MCNC: 3.7 GM/DL (ref 2.4–3.5)
GLUCOSE SERPL-MCNC: 105 MG/DL (ref 74–100)
HCT VFR BLD AUTO: 44.3 % (ref 42–52)
HGB BLD-MCNC: 14 G/DL (ref 14–18)
IMM GRANULOCYTES # BLD AUTO: 0.03 X10(3)/MCL (ref 0–0.04)
IMM GRANULOCYTES NFR BLD AUTO: 0.3 %
LYMPHOCYTES # BLD AUTO: 2.44 X10(3)/MCL (ref 0.6–4.6)
LYMPHOCYTES NFR BLD AUTO: 24.9 %
MCH RBC QN AUTO: 26 PG (ref 27–31)
MCHC RBC AUTO-ENTMCNC: 31.6 G/DL (ref 33–36)
MCV RBC AUTO: 82.2 FL (ref 80–94)
MONOCYTES # BLD AUTO: 0.54 X10(3)/MCL (ref 0.1–1.3)
MONOCYTES NFR BLD AUTO: 5.5 %
NEUTROPHILS # BLD AUTO: 6.37 X10(3)/MCL (ref 2.1–9.2)
NEUTROPHILS NFR BLD AUTO: 64.9 %
NRBC BLD AUTO-RTO: 0 %
PLATELET # BLD AUTO: 256 X10(3)/MCL (ref 130–400)
PMV BLD AUTO: 11.1 FL (ref 7.4–10.4)
POTASSIUM SERPL-SCNC: 3.7 MMOL/L (ref 3.5–5.1)
PROT SERPL-MCNC: 7.6 GM/DL (ref 6.4–8.3)
RBC # BLD AUTO: 5.39 X10(6)/MCL (ref 4.7–6.1)
SODIUM SERPL-SCNC: 142 MMOL/L (ref 136–145)
WBC # SPEC AUTO: 9.81 X10(3)/MCL (ref 4.5–11.5)

## 2024-01-07 PROCEDURE — 25000003 PHARM REV CODE 250: Performed by: INTERNAL MEDICINE

## 2024-01-07 PROCEDURE — 63600175 PHARM REV CODE 636 W HCPCS: Performed by: INTERNAL MEDICINE

## 2024-01-07 PROCEDURE — 63600175 PHARM REV CODE 636 W HCPCS: Performed by: NURSE PRACTITIONER

## 2024-01-07 PROCEDURE — 11000001 HC ACUTE MED/SURG PRIVATE ROOM

## 2024-01-07 PROCEDURE — 85025 COMPLETE CBC W/AUTO DIFF WBC: CPT | Performed by: INTERNAL MEDICINE

## 2024-01-07 PROCEDURE — 80053 COMPREHEN METABOLIC PANEL: CPT | Performed by: INTERNAL MEDICINE

## 2024-01-07 PROCEDURE — 21400001 HC TELEMETRY ROOM

## 2024-01-07 RX ORDER — HYDROMORPHONE HYDROCHLORIDE 2 MG/ML
2 INJECTION, SOLUTION INTRAMUSCULAR; INTRAVENOUS; SUBCUTANEOUS EVERY 4 HOURS PRN
Status: DISCONTINUED | OUTPATIENT
Start: 2024-01-07 | End: 2024-01-12 | Stop reason: HOSPADM

## 2024-01-07 RX ORDER — LABETALOL HYDROCHLORIDE 5 MG/ML
20 INJECTION, SOLUTION INTRAVENOUS EVERY 4 HOURS PRN
Status: DISCONTINUED | OUTPATIENT
Start: 2024-01-07 | End: 2024-01-12 | Stop reason: HOSPADM

## 2024-01-07 RX ORDER — HYDROMORPHONE HYDROCHLORIDE 2 MG/ML
2 INJECTION, SOLUTION INTRAMUSCULAR; INTRAVENOUS; SUBCUTANEOUS EVERY 4 HOURS PRN
Status: DISCONTINUED | OUTPATIENT
Start: 2024-01-07 | End: 2024-01-07

## 2024-01-07 RX ORDER — CLONIDINE HYDROCHLORIDE 0.2 MG/1
0.2 TABLET ORAL 2 TIMES DAILY
Status: DISCONTINUED | OUTPATIENT
Start: 2024-01-07 | End: 2024-01-12 | Stop reason: HOSPADM

## 2024-01-07 RX ORDER — ENALAPRILAT 1.25 MG/ML
2.5 INJECTION INTRAVENOUS EVERY 4 HOURS PRN
Status: DISCONTINUED | OUTPATIENT
Start: 2024-01-07 | End: 2024-01-12 | Stop reason: HOSPADM

## 2024-01-07 RX ORDER — HYDRALAZINE HYDROCHLORIDE 20 MG/ML
20 INJECTION INTRAMUSCULAR; INTRAVENOUS
Status: DISCONTINUED | OUTPATIENT
Start: 2024-01-07 | End: 2024-01-12 | Stop reason: HOSPADM

## 2024-01-07 RX ORDER — HYDRALAZINE HYDROCHLORIDE 20 MG/ML
10 INJECTION INTRAMUSCULAR; INTRAVENOUS EVERY 4 HOURS PRN
Status: DISCONTINUED | OUTPATIENT
Start: 2024-01-07 | End: 2024-01-07

## 2024-01-07 RX ADMIN — HYDROMORPHONE HYDROCHLORIDE 1 MG: 2 INJECTION INTRAMUSCULAR; INTRAVENOUS; SUBCUTANEOUS at 12:01

## 2024-01-07 RX ADMIN — CYCLOBENZAPRINE 10 MG: 10 TABLET, FILM COATED ORAL at 04:01

## 2024-01-07 RX ADMIN — CLONIDINE HYDROCHLORIDE 0.2 MG: 0.2 TABLET ORAL at 08:01

## 2024-01-07 RX ADMIN — GABAPENTIN 600 MG: 300 CAPSULE ORAL at 08:01

## 2024-01-07 RX ADMIN — HYDRALAZINE HYDROCHLORIDE 20 MG: 20 INJECTION INTRAMUSCULAR; INTRAVENOUS at 11:01

## 2024-01-07 RX ADMIN — SERTRALINE HYDROCHLORIDE 50 MG: 50 TABLET ORAL at 08:01

## 2024-01-07 RX ADMIN — GABAPENTIN 600 MG: 300 CAPSULE ORAL at 03:01

## 2024-01-07 RX ADMIN — LABETALOL HYDROCHLORIDE 20 MG: 5 INJECTION, SOLUTION INTRAVENOUS at 03:01

## 2024-01-07 RX ADMIN — OXYCODONE AND ACETAMINOPHEN 1 TABLET: 10; 325 TABLET ORAL at 11:01

## 2024-01-07 RX ADMIN — OXYCODONE AND ACETAMINOPHEN 1 TABLET: 10; 325 TABLET ORAL at 05:01

## 2024-01-07 RX ADMIN — HYDROMORPHONE HYDROCHLORIDE 2 MG: 2 INJECTION INTRAMUSCULAR; INTRAVENOUS; SUBCUTANEOUS at 04:01

## 2024-01-07 RX ADMIN — HYDROMORPHONE HYDROCHLORIDE 1 MG: 2 INJECTION INTRAMUSCULAR; INTRAVENOUS; SUBCUTANEOUS at 08:01

## 2024-01-07 RX ADMIN — ONDANSETRON 4 MG: 2 INJECTION INTRAMUSCULAR; INTRAVENOUS at 11:01

## 2024-01-07 RX ADMIN — HYDROMORPHONE HYDROCHLORIDE 1 MG: 2 INJECTION INTRAMUSCULAR; INTRAVENOUS; SUBCUTANEOUS at 05:01

## 2024-01-07 RX ADMIN — HYDROMORPHONE HYDROCHLORIDE 2 MG: 2 INJECTION INTRAMUSCULAR; INTRAVENOUS; SUBCUTANEOUS at 08:01

## 2024-01-07 RX ADMIN — BACLOFEN 20 MG: 10 TABLET ORAL at 08:01

## 2024-01-07 RX ADMIN — OXYBUTYNIN CHLORIDE 5 MG: 5 TABLET ORAL at 08:01

## 2024-01-07 RX ADMIN — BACLOFEN 20 MG: 10 TABLET ORAL at 03:01

## 2024-01-07 RX ADMIN — HYDRALAZINE HYDROCHLORIDE 10 MG: 20 INJECTION INTRAMUSCULAR; INTRAVENOUS at 08:01

## 2024-01-07 RX ADMIN — APIXABAN 5 MG: 5 TABLET, FILM COATED ORAL at 08:01

## 2024-01-07 RX ADMIN — HYDROMORPHONE HYDROCHLORIDE 1 MG: 2 INJECTION INTRAMUSCULAR; INTRAVENOUS; SUBCUTANEOUS at 02:01

## 2024-01-07 RX ADMIN — HYDRALAZINE HYDROCHLORIDE 10 MG: 20 INJECTION INTRAMUSCULAR; INTRAVENOUS at 12:01

## 2024-01-07 RX ADMIN — HYDRALAZINE HYDROCHLORIDE 10 MG: 20 INJECTION INTRAMUSCULAR; INTRAVENOUS at 03:01

## 2024-01-07 RX ADMIN — MEROPENEM 1 G: 1 INJECTION, POWDER, FOR SOLUTION INTRAVENOUS at 07:01

## 2024-01-07 RX ADMIN — MEROPENEM 1 G: 1 INJECTION, POWDER, FOR SOLUTION INTRAVENOUS at 06:01
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
## 2024-01-08 PROCEDURE — 11000001 HC ACUTE MED/SURG PRIVATE ROOM

## 2024-01-08 PROCEDURE — 63600175 PHARM REV CODE 636 W HCPCS: Performed by: INTERNAL MEDICINE

## 2024-01-08 PROCEDURE — 21400001 HC TELEMETRY ROOM

## 2024-01-08 PROCEDURE — 27000207 HC ISOLATION

## 2024-01-08 PROCEDURE — 25500020 PHARM REV CODE 255: Performed by: INTERNAL MEDICINE

## 2024-01-08 PROCEDURE — 25000003 PHARM REV CODE 250: Performed by: INTERNAL MEDICINE

## 2024-01-08 RX ADMIN — OXYCODONE AND ACETAMINOPHEN 1 TABLET: 10; 325 TABLET ORAL at 08:01

## 2024-01-08 RX ADMIN — MEROPENEM 1 G: 1 INJECTION, POWDER, FOR SOLUTION INTRAVENOUS at 07:01

## 2024-01-08 RX ADMIN — CLONIDINE HYDROCHLORIDE 0.2 MG: 0.2 TABLET ORAL at 08:01

## 2024-01-08 RX ADMIN — HYDROMORPHONE HYDROCHLORIDE 2 MG: 2 INJECTION INTRAMUSCULAR; INTRAVENOUS; SUBCUTANEOUS at 01:01

## 2024-01-08 RX ADMIN — HYDROMORPHONE HYDROCHLORIDE 2 MG: 2 INJECTION INTRAMUSCULAR; INTRAVENOUS; SUBCUTANEOUS at 10:01

## 2024-01-08 RX ADMIN — BACLOFEN 20 MG: 10 TABLET ORAL at 08:01

## 2024-01-08 RX ADMIN — GABAPENTIN 600 MG: 300 CAPSULE ORAL at 08:01

## 2024-01-08 RX ADMIN — IOPAMIDOL 100 ML: 755 INJECTION, SOLUTION INTRAVENOUS at 06:01

## 2024-01-08 RX ADMIN — OXYBUTYNIN CHLORIDE 5 MG: 5 TABLET ORAL at 08:01

## 2024-01-08 RX ADMIN — HYDROMORPHONE HYDROCHLORIDE 2 MG: 2 INJECTION INTRAMUSCULAR; INTRAVENOUS; SUBCUTANEOUS at 02:01

## 2024-01-08 RX ADMIN — HYDROMORPHONE HYDROCHLORIDE 2 MG: 2 INJECTION INTRAMUSCULAR; INTRAVENOUS; SUBCUTANEOUS at 06:01

## 2024-01-08 RX ADMIN — APIXABAN 5 MG: 5 TABLET, FILM COATED ORAL at 08:01

## 2024-01-08 RX ADMIN — BACLOFEN 20 MG: 10 TABLET ORAL at 02:01

## 2024-01-08 RX ADMIN — SERTRALINE HYDROCHLORIDE 50 MG: 50 TABLET ORAL at 08:01

## 2024-01-08 RX ADMIN — MEROPENEM 1 G: 1 INJECTION, POWDER, FOR SOLUTION INTRAVENOUS at 06:01

## 2024-01-08 RX ADMIN — GABAPENTIN 600 MG: 300 CAPSULE ORAL at 02:01
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
## 2024-01-08 NOTE — PROGRESS NOTES
[facility] - 8th Floor Med Surg  Wound Care    Patient Name:  Hemal Guerrero   MRN:  [id number]  Date: 1/8/2024  Diagnosis: Complicated UTI (urinary tract infection)    History:     Past Medical History:   Diagnosis Date    Chronic UTI     Paraplegia        Social History     Socioeconomic History    Marital status:    Tobacco Use    Smoking status: Never    Smokeless tobacco: Never   Substance and Sexual Activity    Alcohol use: Not Currently    Drug use: Never       Precautions:     Allergies as of 01/05/2024 - Reviewed 01/05/2024   Allergen Reaction Noted    Amitriptyline  11/16/2022       WO Assessment Details/Treatment      01/08/24 0854   WOCN Assessment   Visit Date 01/08/24   Visit Time 0854   Consult Type New   WO Speciality Wound   Intervention chart review;assessed;applied;orders   Teaching on-going        Altered Skin Integrity 01/06/24 1640 Sacral spine #1 Other (comment)   Date First Assessed/Time First Assessed: 01/06/24 1640   Altered Skin Integrity Present on Admission - Did Patient arrive to the hospital with altered skin?: yes  Location: Sacral spine  Wound Number: #1  Is this injury device related?: No  Primary Wo...   Wound Image         Altered Skin Integrity 01/06/24 1640 Left posterior other (see comments) #2 Other (comment) Full thickness tissue loss. Subcutaneous fat may be visible but bone, tendon or muscle are not exposed   Date First Assessed/Time First Assessed: 01/06/24 1640   Altered Skin Integrity Present on Admission - Did Patient arrive to the hospital with altered skin?: yes  Side: Left  Orientation: posterior  Location: (c) other (see comments)  Wound Number: #2...   Wound Image    Dressing Appearance Intact;Moist drainage   Drainage Amount Small   Drainage Characteristics/Odor No odor;Yellow   Appearance Intact;Pink;White;Moist   Tissue loss description Full thickness   Black (%), Wound Tissue Color 0 %   Red (%), Wound Tissue Color 30 %   Periwound Area  Intact;Pale white;Moist   Wound Edges Defined   Wound Length (cm) 2.5 cm   Wound Width (cm) 2 cm   Wound Depth (cm) 0.6 cm   Wound Volume (cm^3) 3 cm^3   Wound Surface Area (cm^2) 5 cm^2   Tunneling (depth (cm)/location) 2.3 from 2-3   Care Cleansed with:;Antimicrobial agent;Wound cleanser;Other (see comments)  (Vashe)   Dressing Applied;Silver;Gauze;Other (comment)  (Secure with medipore tape)        Altered Skin Integrity 01/06/24 2030 Right lateral;posterior Foot   Date First Assessed/Time First Assessed: 01/06/24 2030   Altered Skin Integrity Present on Admission - Did Patient arrive to the hospital with altered skin?: yes  Side: Right  Orientation: lateral;posterior  Location: Foot   Wound Image    Dressing Appearance Open to air   Drainage Amount None   Drainage Characteristics/Odor No odor   Appearance Red;Black;Yellow;Dry;Maroon;Purple   Tissue loss description Partial thickness   Black (%), Wound Tissue Color 20 %   Red (%), Wound Tissue Color 40 %   Yellow (%), Wound Tissue Color 40 %   Periwound Area Intact;Dry   Wound Edges Defined;Callused   Wound Length (cm) 1.5 cm   Wound Width (cm) 1.3 cm   Wound Depth (cm) 0.1 cm   Wound Volume (cm^3) 0.195 cm^3   Wound Surface Area (cm^2) 1.95 cm^2   Care Cleansed with:;Antimicrobial agent;Wound cleanser;Other (see comments)  (Vashe)   Dressing Applied;Other (comment);Gauze  (Betadine, secured with medipore tape)     WOCN consulted for Left buttocks. No family at bedside. Treatment recommendations to be put in place. Educated the patient on importance of offloading by turning every 2 hours and maintaining a good hygiene regimen, he verbalized understanding. Was wedged on the left side upon arrival to room. Left buttocks: Cleanse with vashe, dry well. Apply aquacell ag in the wound and cover with dry gauze then secure with medipore tape BID and PRN with soilage. Right foot: Cleanse with vashe, dry well. Paint with betadine, cover with dry gauze then wrap with kerlex  and secure with medipore tape BID and PRN with soilage. Nursing to continue with treatment recommendations. On MONTY mattress at this time, answered all of the patients questions to his satisfaction. Will follow up.   01/08/2024

## 2024-01-08 NOTE — PLAN OF CARE
Attempted to see patient for discharge planning. He is sleeping. Resp even and unlabored. Did not awaken to name being called.
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
## 2024-01-09 PROCEDURE — 63600175 PHARM REV CODE 636 W HCPCS: Performed by: INTERNAL MEDICINE

## 2024-01-09 PROCEDURE — 99223 1ST HOSP IP/OBS HIGH 75: CPT | Mod: ,,, | Performed by: GENERAL PRACTICE

## 2024-01-09 PROCEDURE — 27000207 HC ISOLATION

## 2024-01-09 PROCEDURE — 25000003 PHARM REV CODE 250: Performed by: NURSE PRACTITIONER

## 2024-01-09 PROCEDURE — 21400001 HC TELEMETRY ROOM

## 2024-01-09 PROCEDURE — 25000003 PHARM REV CODE 250: Performed by: INTERNAL MEDICINE

## 2024-01-09 RX ADMIN — APIXABAN 5 MG: 5 TABLET, FILM COATED ORAL at 11:01

## 2024-01-09 RX ADMIN — HYDROMORPHONE HYDROCHLORIDE 2 MG: 2 INJECTION INTRAMUSCULAR; INTRAVENOUS; SUBCUTANEOUS at 11:01

## 2024-01-09 RX ADMIN — APIXABAN 5 MG: 5 TABLET, FILM COATED ORAL at 08:01

## 2024-01-09 RX ADMIN — OXYCODONE AND ACETAMINOPHEN 1 TABLET: 10; 325 TABLET ORAL at 10:01

## 2024-01-09 RX ADMIN — HYDROMORPHONE HYDROCHLORIDE 2 MG: 2 INJECTION INTRAMUSCULAR; INTRAVENOUS; SUBCUTANEOUS at 07:01

## 2024-01-09 RX ADMIN — OXYBUTYNIN CHLORIDE 5 MG: 5 TABLET ORAL at 08:01

## 2024-01-09 RX ADMIN — ONDANSETRON 4 MG: 2 INJECTION INTRAMUSCULAR; INTRAVENOUS at 01:01

## 2024-01-09 RX ADMIN — GABAPENTIN 600 MG: 300 CAPSULE ORAL at 08:01

## 2024-01-09 RX ADMIN — CYCLOBENZAPRINE 10 MG: 10 TABLET, FILM COATED ORAL at 01:01

## 2024-01-09 RX ADMIN — BACLOFEN 20 MG: 10 TABLET ORAL at 11:01

## 2024-01-09 RX ADMIN — DOCUSATE SODIUM 50 MG: 50 CAPSULE, LIQUID FILLED ORAL at 10:01

## 2024-01-09 RX ADMIN — GABAPENTIN 600 MG: 300 CAPSULE ORAL at 03:01

## 2024-01-09 RX ADMIN — SERTRALINE HYDROCHLORIDE 50 MG: 50 TABLET ORAL at 08:01

## 2024-01-09 RX ADMIN — CYCLOBENZAPRINE 10 MG: 10 TABLET, FILM COATED ORAL at 08:01

## 2024-01-09 RX ADMIN — HYDROMORPHONE HYDROCHLORIDE 2 MG: 2 INJECTION INTRAMUSCULAR; INTRAVENOUS; SUBCUTANEOUS at 02:01

## 2024-01-09 RX ADMIN — BACLOFEN 20 MG: 10 TABLET ORAL at 03:01

## 2024-01-09 RX ADMIN — BACLOFEN 20 MG: 10 TABLET ORAL at 08:01

## 2024-01-09 RX ADMIN — OXYCODONE AND ACETAMINOPHEN 1 TABLET: 10; 325 TABLET ORAL at 04:01

## 2024-01-09 RX ADMIN — OXYCODONE AND ACETAMINOPHEN 1 TABLET: 10; 325 TABLET ORAL at 05:01

## 2024-01-09 RX ADMIN — GABAPENTIN 600 MG: 300 CAPSULE ORAL at 11:01

## 2024-01-09 RX ADMIN — MEROPENEM 1 G: 1 INJECTION, POWDER, FOR SOLUTION INTRAVENOUS at 07:01

## 2024-01-09 RX ADMIN — OXYBUTYNIN CHLORIDE 5 MG: 5 TABLET ORAL at 11:01

## 2024-01-09 RX ADMIN — HYDROMORPHONE HYDROCHLORIDE 2 MG: 2 INJECTION INTRAMUSCULAR; INTRAVENOUS; SUBCUTANEOUS at 03:01

## 2024-01-09 RX ADMIN — OXYCODONE AND ACETAMINOPHEN 1 TABLET: 10; 325 TABLET ORAL at 11:01

## 2024-01-09 NOTE — PLAN OF CARE
Discharge planning discussed with patient. FOC obtained for [facility]. Referral sent via care port.

## 2024-01-09 NOTE — PLAN OF CARE
01/09/24 0906   Discharge Assessment   Assessment Type Discharge Planning Assessment   Confirmed/corrected address, phone number and insurance Yes   Confirmed Demographics Correct on Facesheet   Source of Information patient   When was your last doctors appointment?   (Pt states he saw Dr Leblanc last in November)   Reason For Admission complicated UTI   People in Home child(arnol), adult   Do you expect to return to your current living situation? Yes   Do you have help at home or someone to help you manage your care at home? Yes   Who are your caregiver(s) and their phone number(s)? Pt lives with son Ahmet [phone]   Current cognitive status: Alert/Oriented;No Deficits   Walking or Climbing Stairs Difficulty yes   Walking or Climbing Stairs ambulation difficulty, dependent   Mobility Management wheelchair   Dressing/Bathing Difficulty yes   Dressing/Bathing bathing difficulty, assistance 1 person   Dressing/Bathing Management Pt has a shower chair and his son helps him with bathing   Home Accessibility wheelchair accessible   Home Layout Able to live on 1st floor   Equipment Currently Used at Home wheelchair;shower chair;bedside commode   Do you currently have service(s) that help you manage your care at home? Yes   Name and Contact number of agency [facility]   Is the pt/caregiver preference to resume services with current agency Yes   Do you take prescription medications? Yes   Do you have prescription coverage? Yes   Coverage Medicare   Do you have any problems affording any of your prescribed medications? No   Is the patient taking medications as prescribed? yes   Who is going to help you get home at discharge? Pt states [facility] home   Are you on dialysis? No   Do you take coumadin? No   Discharge Plan A Long-term acute care facility (LTAC)   Discharge Plan B Long-term acute care facility (LTAC)   Discharge Plan discussed with: Patient   Transition of  Care Barriers None     Pt states he is hurting. Would only answer a few questions. He states he lives with his son and his son's girlfriend. He states he stays mostly in the bed. He has a wheelchair. He states his son provides help with bathing. He can feed himself. He states he is active with [facility] . Will follow for discharge needs.

## 2024-01-09 NOTE — CONSULTS
[facility]  Infectious Diseases Consult        ASSESSMENT & PLAN:     He is a 48-year-old male with a past medical history of paraplegia and neurogenic bladder and bowel, s/p SPC and colostomy, as well as sacral osteomyelitis, s/p recent treatment in November - December 2023 who presented to the emergency room currently with abdominal pain.  He was afebrile and denies any recent fevers, chills, or sweats.  Labs were unremarkable.  CT abdomen and pelvis with IV contrast showed possible proctitis.  Blood cultures are negative thus far.  Urine culture is positive for multidrug resistant Klebsiella.  Patient is currently on meropenem.  We have been consulted per [employer] policy for the use of meropenem.    Abdominal pain, suspect s/t the following  Abnormal CT A/P w/concern for proctitis  Positive urine culture, likely s/t colonization   Neurogenic bladder, s/p SPC  Paraplegia  Sacral decubitus w/recent treatment of underlying OM      PLAN:  Stable overall.   Can continue meropenem for now.   Consult urology to exchange SPC.  wound care as ordered.  Discussed with patient and nursing.       HISTORY OF PRESENT ILLNESS:     He is a 48-year-old male with a past medical history of paraplegia and neurogenic bladder and bowel, s/p SPC and colostomy, as well as sacral osteomyelitis, s/p recent treatment in November - December 2023 who presented to the emergency room currently with abdominal pain.  He was afebrile and denies any recent fevers, chills, or sweats.  Labs were unremarkable.  CT abdomen and pelvis with IV contrast showed possible proctitis.  Blood cultures are negative thus far.  Urine culture is positive for multidrug resistant Klebsiella.  Patient is currently on meropenem.  We have been consulted per [employer] policy for the use of meropenem.    PAST MEDICAL HISTORY:     Past Medical History:   Diagnosis Date    Chronic UTI     Paraplegia        PAST SURGICAL HISTORY:     Past Surgical History:   Procedure Laterality Date     "INSERTION OF SUPRAPUBIC CATHETER      LAPAROTOMY         ALLERGIES:   Amitriptyline    FAMILY HISTORY:   Reviewed and non-contributory     SOCIAL HISTORY:     Social History     Tobacco Use    Smoking status: Never    Smokeless tobacco: Never   Substance Use Topics    Alcohol use: Not Currently        MEDICATIONS:   Reviewed in EMR    REVIEW OF SYSTEMS:   Except as documented, all other systems reviewed and negative     PHYSICAL EXAM:   T 98.9 °F (37.2 °C)   /68   P 71   RR 18   O2 95 %  GENERAL: Chronically ill appearing; NAD; does not appear toxic  SKIN: no rash  HEENT: sclera non-icteric; PERRL   NECK: supple; no LAD  CHEST: CTA; nonlabored, equal expansion; no adventitious BS  CARDIOVASCULAR: RRR, S1S2; no murmur; strong, equal peripheral pulses; no edema  ABDOMEN:  active bowel sounds; abdomen soft, nondistended, nontender to palpation; colostomy   GENITOURINARY: SPC  EXTREMITIES: no cyanosis or clubbing; paraplegia  NEURO: AAO x4; CN II-XII grossly intact  PSYCH: Mentation and affect appropriate       LABS AND IMAGING:     Recent Labs     01/07/24  0453   WBC 9.81   RBC 5.39   HGB 14.0   HCT 44.3   MCV 82.2   MCH 26.0*   MCHC 31.6*   RDW 14.0        No results for input(s): "LACTIC" in the last 72 hours.  No results for input(s): "INR", "APTT", "D-DIMER" in the last 72 hours.  No results for input(s): "HGBA1C", "CHOL", "TRIG", "LDL", "VLDL", "HDL" in the last 72 hours.   Recent Labs     01/07/24  0453      K 3.7   CHLORIDE 108*   CO2 27   BUN 10.5   CREATININE 0.67*   GLUCOSE 105*   CALCIUM 9.2   ALBUMIN 3.9   GLOBULIN 3.7*   ALKPHOS 108   ALT 6   AST 10   BILITOT 0.4     No results for input(s): "BNP", "CPK", "TROPONINI" in the last 72 hours.       CT Abdomen Pelvis With IV Contrast NO Oral Contrast  Narrative: EXAMINATION:  CT ABDOMEN PELVIS WITH IV CONTRAST    CLINICAL HISTORY:  Abdominal pain, acute, nonlocalized;suprapubic catheter dysfunction;    TECHNIQUE:  Multidetector axial images " were obtained of the abdomen and pelvis following the administration of IV contrast. Oral contrast was not administered.    Dose length product of 395 mGycm. Automated exposure control was utilized to minimize radiation dose.    COMPARISON:  January 5, 2024    FINDINGS:  Left lung is of lesser volume considerable elevation of diaphragm displacement of the hollow viscus within left lower chest.  Visualized portion lungs are without acute air space infiltrates or fluid within the pleural spaces.  Trace of pericardial effusion as before.    Hepatic volume and attenuation is unremarkable. Gallbladder wall is not thickened and there is no intra luminal calcified calculus. No biliary dilation identified. Pancreatic unremarkable attenuation without acute peripancreatic phlegmons. Main pancreatic duct is not dilated. Spleen is of normal size without focal lesion.    The adrenal glands appear within normal limits. The kidneys are unremarkable in size and contour. No solid or cystic renal lesion identified. There is no hydronephrosis. No perinephric fluid strandings or collections identified.    Stomach is filled with residual ingested meal.  There is no abnormal dilatation of the loops of small bowel.  Right abdomen ileocolonic anastomosis.  There is some expansion of the transverse and the proximal ascending colon by fecal content.  No transition point or bowel obstruction.  There is anorectal concentric mural thickening without significant interval difference.  No pericolonic acute standings.  No free fluid.    Urinary bladder is decompressed around the Bhatti's catheter.  There is no pelvic free fluid.    Left sacral deep decubitus ulceration with about similar appearance.  Left ischial hypertrophic and sclerosis suggests chronic osteomyelitis and also is with similar appearance.  Operative changes L5-S1.  Impression: 1.  Anorectal mild concentric mural thickening suggest proctitis.    2.  Similar left pelvic decubitus  ulceration.    Electronically signed by: Agustin Agarwal  Date:    01/08/2024  Time:    18:29         JOSE ALFREDO Jacobs  Infectious Diseases
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
## 2024-01-09 NOTE — PLAN OF CARE
Per Kristian with [facility], pt has medicare as his secondary insurance. He had a liability insurance that is no longer active. Gave patient phone number to Medicare  so that patient can call coordination line to request medicare to be his primary. Pt states he will call.

## 2024-01-10 LAB
ALBUMIN SERPL-MCNC: 3.4 G/DL (ref 3.5–5)
ALBUMIN/GLOB SERPL: 0.8 RATIO (ref 1.1–2)
ALP SERPL-CCNC: 90 UNIT/L (ref 40–150)
ALT SERPL-CCNC: 10 UNIT/L (ref 0–55)
AST SERPL-CCNC: 11 UNIT/L (ref 5–34)
BACTERIA BLD CULT: NORMAL
BACTERIA BLD CULT: NORMAL
BASOPHILS # BLD AUTO: 0.11 X10(3)/MCL
BASOPHILS NFR BLD AUTO: 1.1 %
BILIRUB SERPL-MCNC: 0.6 MG/DL
BUN SERPL-MCNC: 14.7 MG/DL (ref 8.9–20.6)
CALCIUM SERPL-MCNC: 9.1 MG/DL (ref 8.4–10.2)
CHLORIDE SERPL-SCNC: 100 MMOL/L (ref 98–107)
CO2 SERPL-SCNC: 31 MMOL/L (ref 22–29)
CREAT SERPL-MCNC: 0.65 MG/DL (ref 0.73–1.18)
EOSINOPHIL # BLD AUTO: 0.23 X10(3)/MCL (ref 0–0.9)
EOSINOPHIL NFR BLD AUTO: 2.3 %
ERYTHROCYTE [DISTWIDTH] IN BLOOD BY AUTOMATED COUNT: 14 % (ref 11.5–17)
GFR SERPLBLD CREATININE-BSD FMLA CKD-EPI: >60 MLS/MIN/1.73/M2
GLOBULIN SER-MCNC: 4.1 GM/DL (ref 2.4–3.5)
GLUCOSE SERPL-MCNC: 101 MG/DL (ref 74–100)
HCT VFR BLD AUTO: 39.4 % (ref 42–52)
HGB BLD-MCNC: 12.2 G/DL (ref 14–18)
IMM GRANULOCYTES # BLD AUTO: 0.02 X10(3)/MCL (ref 0–0.04)
IMM GRANULOCYTES NFR BLD AUTO: 0.2 %
LYMPHOCYTES # BLD AUTO: 2.97 X10(3)/MCL (ref 0.6–4.6)
LYMPHOCYTES NFR BLD AUTO: 30.1 %
MCH RBC QN AUTO: 25.8 PG (ref 27–31)
MCHC RBC AUTO-ENTMCNC: 31 G/DL (ref 33–36)
MCV RBC AUTO: 83.5 FL (ref 80–94)
MONOCYTES # BLD AUTO: 0.84 X10(3)/MCL (ref 0.1–1.3)
MONOCYTES NFR BLD AUTO: 8.5 %
NEUTROPHILS # BLD AUTO: 5.69 X10(3)/MCL (ref 2.1–9.2)
NEUTROPHILS NFR BLD AUTO: 57.8 %
NRBC BLD AUTO-RTO: 0 %
PLATELET # BLD AUTO: 247 X10(3)/MCL (ref 130–400)
PMV BLD AUTO: 11.2 FL (ref 7.4–10.4)
POTASSIUM SERPL-SCNC: 4.2 MMOL/L (ref 3.5–5.1)
PROT SERPL-MCNC: 7.5 GM/DL (ref 6.4–8.3)
RBC # BLD AUTO: 4.72 X10(6)/MCL (ref 4.7–6.1)
SODIUM SERPL-SCNC: 138 MMOL/L (ref 136–145)
WBC # SPEC AUTO: 9.86 X10(3)/MCL (ref 4.5–11.5)

## 2024-01-10 PROCEDURE — 85025 COMPLETE CBC W/AUTO DIFF WBC: CPT | Performed by: INTERNAL MEDICINE

## 2024-01-10 PROCEDURE — 21400001 HC TELEMETRY ROOM

## 2024-01-10 PROCEDURE — 80053 COMPREHEN METABOLIC PANEL: CPT | Performed by: INTERNAL MEDICINE

## 2024-01-10 PROCEDURE — 25000003 PHARM REV CODE 250: Performed by: GENERAL PRACTICE

## 2024-01-10 PROCEDURE — 63600175 PHARM REV CODE 636 W HCPCS: Performed by: GENERAL PRACTICE

## 2024-01-10 PROCEDURE — 27000207 HC ISOLATION

## 2024-01-10 PROCEDURE — 25000003 PHARM REV CODE 250: Performed by: INTERNAL MEDICINE

## 2024-01-10 PROCEDURE — 63600175 PHARM REV CODE 636 W HCPCS: Performed by: INTERNAL MEDICINE

## 2024-01-10 PROCEDURE — 25000003 PHARM REV CODE 250: Performed by: NURSE PRACTITIONER

## 2024-01-10 RX ADMIN — GABAPENTIN 600 MG: 300 CAPSULE ORAL at 08:01

## 2024-01-10 RX ADMIN — CLONIDINE HYDROCHLORIDE 0.2 MG: 0.2 TABLET ORAL at 09:01

## 2024-01-10 RX ADMIN — APIXABAN 5 MG: 5 TABLET, FILM COATED ORAL at 09:01

## 2024-01-10 RX ADMIN — GABAPENTIN 600 MG: 300 CAPSULE ORAL at 02:01

## 2024-01-10 RX ADMIN — OXYBUTYNIN CHLORIDE 5 MG: 5 TABLET ORAL at 09:01

## 2024-01-10 RX ADMIN — HYDROMORPHONE HYDROCHLORIDE 2 MG: 2 INJECTION INTRAMUSCULAR; INTRAVENOUS; SUBCUTANEOUS at 12:01

## 2024-01-10 RX ADMIN — GABAPENTIN 600 MG: 300 CAPSULE ORAL at 09:01

## 2024-01-10 RX ADMIN — OXYCODONE AND ACETAMINOPHEN 1 TABLET: 10; 325 TABLET ORAL at 01:01

## 2024-01-10 RX ADMIN — OXYBUTYNIN CHLORIDE 5 MG: 5 TABLET ORAL at 08:01

## 2024-01-10 RX ADMIN — MEROPENEM 1 G: 1 INJECTION, POWDER, FOR SOLUTION INTRAVENOUS at 06:01

## 2024-01-10 RX ADMIN — OXYCODONE AND ACETAMINOPHEN 1 TABLET: 10; 325 TABLET ORAL at 07:01

## 2024-01-10 RX ADMIN — OXYCODONE AND ACETAMINOPHEN 1 TABLET: 10; 325 TABLET ORAL at 08:01

## 2024-01-10 RX ADMIN — APIXABAN 5 MG: 5 TABLET, FILM COATED ORAL at 08:01

## 2024-01-10 RX ADMIN — DOCUSATE SODIUM 50 MG: 50 CAPSULE, LIQUID FILLED ORAL at 09:01

## 2024-01-10 RX ADMIN — HYDROMORPHONE HYDROCHLORIDE 2 MG: 2 INJECTION INTRAMUSCULAR; INTRAVENOUS; SUBCUTANEOUS at 02:01

## 2024-01-10 RX ADMIN — BACLOFEN 20 MG: 10 TABLET ORAL at 09:01

## 2024-01-10 RX ADMIN — BACLOFEN 20 MG: 10 TABLET ORAL at 02:01

## 2024-01-10 RX ADMIN — HYDROMORPHONE HYDROCHLORIDE 2 MG: 2 INJECTION INTRAMUSCULAR; INTRAVENOUS; SUBCUTANEOUS at 05:01

## 2024-01-10 RX ADMIN — HYDROMORPHONE HYDROCHLORIDE 2 MG: 2 INJECTION INTRAMUSCULAR; INTRAVENOUS; SUBCUTANEOUS at 09:01

## 2024-01-10 RX ADMIN — BACLOFEN 20 MG: 10 TABLET ORAL at 08:01

## 2024-01-10 RX ADMIN — HYDROMORPHONE HYDROCHLORIDE 2 MG: 2 INJECTION INTRAMUSCULAR; INTRAVENOUS; SUBCUTANEOUS at 10:01

## 2024-01-10 NOTE — PLAN OF CARE
Follow up with patient to see if he was able to reach Medicare. He states he was not able to get through but he will try again now.
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
## 2024-01-11 VITALS
TEMPERATURE: 99 F | HEIGHT: 72 IN | DIASTOLIC BLOOD PRESSURE: 69 MMHG | HEART RATE: 84 BPM | WEIGHT: 149.94 LBS | SYSTOLIC BLOOD PRESSURE: 107 MMHG | OXYGEN SATURATION: 97 % | BODY MASS INDEX: 20.31 KG/M2 | RESPIRATION RATE: 18 BRPM

## 2024-01-11 PROBLEM — N39.0 COMPLICATED UTI (URINARY TRACT INFECTION): Status: RESOLVED | Noted: 2023-06-21 | Resolved: 2024-01-11

## 2024-01-11 LAB
ALBUMIN SERPL-MCNC: 3.3 G/DL (ref 3.5–5)
ALBUMIN/GLOB SERPL: 0.8 RATIO (ref 1.1–2)
ALP SERPL-CCNC: 89 UNIT/L (ref 40–150)
ALT SERPL-CCNC: 14 UNIT/L (ref 0–55)
AST SERPL-CCNC: 14 UNIT/L (ref 5–34)
BASOPHILS # BLD AUTO: 0.1 X10(3)/MCL
BASOPHILS NFR BLD AUTO: 1 %
BILIRUB SERPL-MCNC: 0.4 MG/DL
BUN SERPL-MCNC: 12.7 MG/DL (ref 8.9–20.6)
CALCIUM SERPL-MCNC: 9.2 MG/DL (ref 8.4–10.2)
CHLORIDE SERPL-SCNC: 102 MMOL/L (ref 98–107)
CO2 SERPL-SCNC: 28 MMOL/L (ref 22–29)
CREAT SERPL-MCNC: 0.62 MG/DL (ref 0.73–1.18)
EOSINOPHIL # BLD AUTO: 0.27 X10(3)/MCL (ref 0–0.9)
EOSINOPHIL NFR BLD AUTO: 2.8 %
ERYTHROCYTE [DISTWIDTH] IN BLOOD BY AUTOMATED COUNT: 14.1 % (ref 11.5–17)
GFR SERPLBLD CREATININE-BSD FMLA CKD-EPI: >60 MLS/MIN/1.73/M2
GLOBULIN SER-MCNC: 4.1 GM/DL (ref 2.4–3.5)
GLUCOSE SERPL-MCNC: 118 MG/DL (ref 74–100)
HCT VFR BLD AUTO: 39.9 % (ref 42–52)
HGB BLD-MCNC: 11.9 G/DL (ref 14–18)
IMM GRANULOCYTES # BLD AUTO: 0.02 X10(3)/MCL (ref 0–0.04)
IMM GRANULOCYTES NFR BLD AUTO: 0.2 %
LYMPHOCYTES # BLD AUTO: 2.05 X10(3)/MCL (ref 0.6–4.6)
LYMPHOCYTES NFR BLD AUTO: 21.2 %
MCH RBC QN AUTO: 25.8 PG (ref 27–31)
MCHC RBC AUTO-ENTMCNC: 29.8 G/DL (ref 33–36)
MCV RBC AUTO: 86.4 FL (ref 80–94)
MONOCYTES # BLD AUTO: 0.86 X10(3)/MCL (ref 0.1–1.3)
MONOCYTES NFR BLD AUTO: 8.9 %
NEUTROPHILS # BLD AUTO: 6.37 X10(3)/MCL (ref 2.1–9.2)
NEUTROPHILS NFR BLD AUTO: 65.9 %
NRBC BLD AUTO-RTO: 0 %
PLATELET # BLD AUTO: 216 X10(3)/MCL (ref 130–400)
PMV BLD AUTO: 11.2 FL (ref 7.4–10.4)
POTASSIUM SERPL-SCNC: 4.6 MMOL/L (ref 3.5–5.1)
PROT SERPL-MCNC: 7.4 GM/DL (ref 6.4–8.3)
RBC # BLD AUTO: 4.62 X10(6)/MCL (ref 4.7–6.1)
SODIUM SERPL-SCNC: 140 MMOL/L (ref 136–145)
WBC # SPEC AUTO: 9.67 X10(3)/MCL (ref 4.5–11.5)

## 2024-01-11 PROCEDURE — 63600175 PHARM REV CODE 636 W HCPCS: Performed by: GENERAL PRACTICE

## 2024-01-11 PROCEDURE — 63600175 PHARM REV CODE 636 W HCPCS: Performed by: INTERNAL MEDICINE

## 2024-01-11 PROCEDURE — 80053 COMPREHEN METABOLIC PANEL: CPT | Performed by: INTERNAL MEDICINE

## 2024-01-11 PROCEDURE — 25000003 PHARM REV CODE 250: Performed by: NURSE PRACTITIONER

## 2024-01-11 PROCEDURE — 85025 COMPLETE CBC W/AUTO DIFF WBC: CPT | Performed by: INTERNAL MEDICINE

## 2024-01-11 PROCEDURE — 25000003 PHARM REV CODE 250: Performed by: INTERNAL MEDICINE

## 2024-01-11 PROCEDURE — 25000003 PHARM REV CODE 250: Performed by: GENERAL PRACTICE

## 2024-01-11 RX ADMIN — SERTRALINE HYDROCHLORIDE 50 MG: 50 TABLET ORAL at 09:01

## 2024-01-11 RX ADMIN — OXYBUTYNIN CHLORIDE 5 MG: 5 TABLET ORAL at 08:01

## 2024-01-11 RX ADMIN — BACLOFEN 20 MG: 10 TABLET ORAL at 09:01

## 2024-01-11 RX ADMIN — BACLOFEN 20 MG: 10 TABLET ORAL at 03:01

## 2024-01-11 RX ADMIN — BACLOFEN 20 MG: 10 TABLET ORAL at 08:01

## 2024-01-11 RX ADMIN — CLONIDINE HYDROCHLORIDE 0.2 MG: 0.2 TABLET ORAL at 09:01

## 2024-01-11 RX ADMIN — MEROPENEM 1 G: 1 INJECTION, POWDER, FOR SOLUTION INTRAVENOUS at 06:01

## 2024-01-11 RX ADMIN — CYCLOBENZAPRINE 10 MG: 10 TABLET, FILM COATED ORAL at 08:01

## 2024-01-11 RX ADMIN — OXYCODONE AND ACETAMINOPHEN 1 TABLET: 10; 325 TABLET ORAL at 05:01

## 2024-01-11 RX ADMIN — GABAPENTIN 600 MG: 300 CAPSULE ORAL at 08:01

## 2024-01-11 RX ADMIN — OXYCODONE AND ACETAMINOPHEN 1 TABLET: 10; 325 TABLET ORAL at 03:01

## 2024-01-11 RX ADMIN — DOCUSATE SODIUM 50 MG: 50 CAPSULE, LIQUID FILLED ORAL at 09:01

## 2024-01-11 RX ADMIN — GABAPENTIN 600 MG: 300 CAPSULE ORAL at 09:01

## 2024-01-11 RX ADMIN — APIXABAN 5 MG: 5 TABLET, FILM COATED ORAL at 08:01

## 2024-01-11 RX ADMIN — MEROPENEM 1 G: 1 INJECTION, POWDER, FOR SOLUTION INTRAVENOUS at 08:01

## 2024-01-11 RX ADMIN — HYDROMORPHONE HYDROCHLORIDE 2 MG: 2 INJECTION INTRAMUSCULAR; INTRAVENOUS; SUBCUTANEOUS at 11:01

## 2024-01-11 RX ADMIN — OXYCODONE AND ACETAMINOPHEN 1 TABLET: 10; 325 TABLET ORAL at 09:01

## 2024-01-11 RX ADMIN — HYDROMORPHONE HYDROCHLORIDE 2 MG: 2 INJECTION INTRAMUSCULAR; INTRAVENOUS; SUBCUTANEOUS at 03:01

## 2024-01-11 RX ADMIN — APIXABAN 5 MG: 5 TABLET, FILM COATED ORAL at 09:01

## 2024-01-11 RX ADMIN — HYDROMORPHONE HYDROCHLORIDE 2 MG: 2 INJECTION INTRAMUSCULAR; INTRAVENOUS; SUBCUTANEOUS at 08:01

## 2024-01-11 RX ADMIN — GABAPENTIN 600 MG: 300 CAPSULE ORAL at 03:01

## 2024-01-11 RX ADMIN — HYDROMORPHONE HYDROCHLORIDE 2 MG: 2 INJECTION INTRAMUSCULAR; INTRAVENOUS; SUBCUTANEOUS at 05:01

## 2024-01-11 RX ADMIN — CLONIDINE HYDROCHLORIDE 0.2 MG: 0.2 TABLET ORAL at 08:01

## 2024-01-11 RX ADMIN — OXYBUTYNIN CHLORIDE 5 MG: 5 TABLET ORAL at 09:01
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
## 2024-01-11 NOTE — PLAN OF CARE
Problem: Adult Inpatient Plan of Care  Goal: Plan of Care Review  Outcome: Ongoing, Progressing  Goal: Patient-Specific Goal (Individualized)  Outcome: Ongoing, Progressing  Goal: Absence of Hospital-Acquired Illness or Injury  Outcome: Ongoing, Progressing  Goal: Optimal Comfort and Wellbeing  Outcome: Ongoing, Progressing  Goal: Readiness for Transition of Care  Outcome: Ongoing, Progressing     Problem: Skin Injury Risk Increased  Goal: Skin Health and Integrity  Outcome: Ongoing, Progressing     Problem: Impaired Wound Healing  Goal: Optimal Wound Healing  Outcome: Ongoing, Progressing     Problem: Infection  Goal: Absence of Infection Signs and Symptoms  Outcome: Ongoing, Progressing      no
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
## 2024-01-17 NOTE — PHYSICIAN QUERY
PT Name: Hemal Guerrero  MR #: [id number]     DOCUMENTATION CLARIFICATION     CDS/: KWASI Black, RN, CDS    Contact information:[email or web address]   This form is a permanent document in the medical record.     Query Date: January 17, 2024    By submitting this query, we are merely seeking further clarification of documentation.  Please utilize your independent clinical judgment when addressing the question(s) below.  Provider, please provide the integumentary diagnosis related to the documentation of Left Buttock:   The Medical Record contains the following:    ED, Dr. Mccauley, 1/5  Patient has two pressure ulcers to buttock  Patient reports he has had pressure ulcers to his buttock for 2 years     H&P, Dr. Simms, 1/6  sacral/gluteal pressure wounds  Patient had a recent hospitalization from mid November to early December for stage IV left gluteal/ischial pressure wound with concerns for osteomyelitis     LDA, 1/8  Altered Skin Integrity 01/06/24 1640 Left posterior other (see comments) #2 Other (comment) Full thickness tissue loss. Subcutaneous fat may be visible but bone, tendon or muscle are not exposed   Tissue loss description: Full thickness               The clinical guidelines noted are only a system guideline. It does not replace the providers clinical judgment.    Per the National Pressure Injury Advisory Panel:   A pressure injury is localized damage to the skin and underlying soft tissue usually over a bony prominence or related to a medical or other device. The injury can present as intact skin or an open ulcer and may be painful. The injury occurs as a result of intense and/or prolonged pressure or pressure in combination with shear. The tolerance of soft tissue for pressure and shear may also be affected by microclimate, nutrition, perfusion, co-morbidities and condition of the soft tissue.       Stage 1 Pressure Injury:  Intact skin with a localized area of non-blanchable  erythema, which may appear differently in darkly pigmented skin. Color changes do not include purple or maroon discoloration; these may indicate deep tissue pressure injury.    Stage 2 Pressure Injury:  Partial-thickness loss of skin with exposed dermis. The wound bed is viable, pink or red, moist, and may also present as an intact or ruptured serum-filled blister.    Stage 3 Pressure Injury:  Full-thickness loss of skin, in which adipose (fat) is visible in the ulcer and granulation tissue and epibole (rolled wound edges) are often present. Slough and/or eschar may be visible. Undermining and tunneling may occur.    Stage 4 Pressure Injury:  Full-thickness skin and tissue loss with exposed or directly palpable fascia, muscle, tendon, ligament, cartilage or bone in the ulcer. Slough and/or eschar may be visible. Epibole (rolled edges), undermining and/or tunneling often occur.    Unstageable Pressure Injury:  Full-thickness skin and tissue loss in which the extent of tissue damage within the ulcer cannot be confirmed because it is obscured by slough or eschar. If slough or eschar is removed, a Stage 3 or Stage 4 pressure injury will be revealed.        Provider, please provide the integumentary diagnosis related to the documentation of Left Buttock:     [  x ] Pressure Injury/Decubitus Ulcer, Stage 3     [   ] Pressure Injury/Decubitus Ulcer, Stage 4     [   ] Pressure Injury/Decubitus Ulcer, other stage, please specify:__________     [   ] Other (please specify):______________         Please document in your progress notes daily for the duration of treatment until resolved and include in your discharge summary.    Reference:    LISBETH Sylvester., KRISHAN Resendez., Goldberg, M., YAQUELIN Cheema., YAQUELIN Mosley., & KESHIA Zamudio. (2016). Revised National Pressure Ulcer Advisory Panel Pressure Injury Staging System: Revised Pressure Injury Staging System. J Wound Ostomy Continence Nurs, 43(6), 585-597.  doi:10.1097/won.[id number]    Form No.36859

## 2024-03-18 ENCOUNTER — HOSPITAL ENCOUNTER (INPATIENT)
Facility: HOSPITAL | Age: 49
LOS: 14 days | Discharge: NURSING, REHAB OR LONG-TERM CARE | DRG: 698 | End: 2024-04-01
Attending: STUDENT IN AN ORGANIZED HEALTH CARE EDUCATION/TRAINING PROGRAM | Admitting: STUDENT IN AN ORGANIZED HEALTH CARE EDUCATION/TRAINING PROGRAM
Payer: MEDICARE

## 2024-03-18 DIAGNOSIS — M86.9 OSTEOMYELITIS, UNSPECIFIED SITE, UNSPECIFIED TYPE: Primary | ICD-10-CM

## 2024-03-18 DIAGNOSIS — T14.8XXA COMPLICATED WOUND INFECTION: ICD-10-CM

## 2024-03-18 DIAGNOSIS — L08.9 COMPLICATED WOUND INFECTION: ICD-10-CM

## 2024-03-18 DIAGNOSIS — T83.511A URINARY TRACT INFECTION ASSOCIATED WITH CATHETERIZATION OF URINARY TRACT, UNSPECIFIED INDWELLING URINARY CATHETER TYPE, INITIAL ENCOUNTER: ICD-10-CM

## 2024-03-18 DIAGNOSIS — N39.0 URINARY TRACT INFECTION ASSOCIATED WITH CATHETERIZATION OF URINARY TRACT, UNSPECIFIED INDWELLING URINARY CATHETER TYPE, INITIAL ENCOUNTER: ICD-10-CM

## 2024-03-18 DIAGNOSIS — M79.671 RIGHT FOOT PAIN: ICD-10-CM

## 2024-03-18 LAB
ALBUMIN SERPL-MCNC: 3.2 G/DL (ref 3.5–5)
ALBUMIN/GLOB SERPL: 0.8 RATIO (ref 1.1–2)
ALP SERPL-CCNC: 102 UNIT/L (ref 40–150)
ALT SERPL-CCNC: 7 UNIT/L (ref 0–55)
APPEARANCE UR: ABNORMAL
APTT PPP: 32.4 SECONDS (ref 23.2–33.7)
AST SERPL-CCNC: 10 UNIT/L (ref 5–34)
BACTERIA #/AREA URNS AUTO: ABNORMAL /HPF
BASOPHILS # BLD AUTO: 0.1 X10(3)/MCL
BASOPHILS NFR BLD AUTO: 1.2 %
BILIRUB SERPL-MCNC: 0.4 MG/DL
BILIRUB UR QL STRIP.AUTO: NEGATIVE
BUN SERPL-MCNC: 11.3 MG/DL (ref 8.9–20.6)
CALCIUM SERPL-MCNC: 8.8 MG/DL (ref 8.4–10.2)
CHLORIDE SERPL-SCNC: 109 MMOL/L (ref 98–107)
CO2 SERPL-SCNC: 21 MMOL/L (ref 22–29)
COLOR UR AUTO: ABNORMAL
CREAT SERPL-MCNC: 0.65 MG/DL (ref 0.73–1.18)
EOSINOPHIL # BLD AUTO: 0.18 X10(3)/MCL (ref 0–0.9)
EOSINOPHIL NFR BLD AUTO: 2.1 %
ERYTHROCYTE [DISTWIDTH] IN BLOOD BY AUTOMATED COUNT: 14.6 % (ref 11.5–17)
GFR SERPLBLD CREATININE-BSD FMLA CKD-EPI: >60 MLS/MIN/1.73/M2
GLOBULIN SER-MCNC: 4 GM/DL (ref 2.4–3.5)
GLUCOSE SERPL-MCNC: 82 MG/DL (ref 74–100)
GLUCOSE UR QL STRIP.AUTO: NORMAL
HCT VFR BLD AUTO: 38.6 % (ref 42–52)
HGB BLD-MCNC: 11.7 G/DL (ref 14–18)
IMM GRANULOCYTES # BLD AUTO: 0.02 X10(3)/MCL (ref 0–0.04)
IMM GRANULOCYTES NFR BLD AUTO: 0.2 %
INR PPP: 1.1
KETONES UR QL STRIP.AUTO: ABNORMAL
LEUKOCYTE ESTERASE UR QL STRIP.AUTO: 500
LYMPHOCYTES # BLD AUTO: 2.3 X10(3)/MCL (ref 0.6–4.6)
LYMPHOCYTES NFR BLD AUTO: 26.8 %
MCH RBC QN AUTO: 26 PG (ref 27–31)
MCHC RBC AUTO-ENTMCNC: 30.3 G/DL (ref 33–36)
MCV RBC AUTO: 85.8 FL (ref 80–94)
MONOCYTES # BLD AUTO: 0.53 X10(3)/MCL (ref 0.1–1.3)
MONOCYTES NFR BLD AUTO: 6.2 %
MUCOUS THREADS URNS QL MICRO: ABNORMAL /LPF
NEUTROPHILS # BLD AUTO: 5.45 X10(3)/MCL (ref 2.1–9.2)
NEUTROPHILS NFR BLD AUTO: 63.5 %
NITRITE UR QL STRIP.AUTO: ABNORMAL
NRBC BLD AUTO-RTO: 0 %
PH UR STRIP.AUTO: 6 [PH]
PLATELET # BLD AUTO: 276 X10(3)/MCL (ref 130–400)
PMV BLD AUTO: 10.8 FL (ref 7.4–10.4)
POTASSIUM SERPL-SCNC: 4.3 MMOL/L (ref 3.5–5.1)
PROT SERPL-MCNC: 7.2 GM/DL (ref 6.4–8.3)
PROT UR QL STRIP.AUTO: ABNORMAL
PROTHROMBIN TIME: 13.9 SECONDS (ref 12.5–14.5)
RBC # BLD AUTO: 4.5 X10(6)/MCL (ref 4.7–6.1)
RBC #/AREA URNS AUTO: >100 /HPF
RBC UR QL AUTO: ABNORMAL
SODIUM SERPL-SCNC: 141 MMOL/L (ref 136–145)
SP GR UR STRIP.AUTO: 1.03 (ref 1–1.03)
SQUAMOUS #/AREA URNS LPF: ABNORMAL /HPF
UROBILINOGEN UR STRIP-ACNC: 4
WBC # SPEC AUTO: 8.58 X10(3)/MCL (ref 4.5–11.5)
WBC #/AREA URNS AUTO: >100 /HPF

## 2024-03-18 PROCEDURE — 63600175 PHARM REV CODE 636 W HCPCS: Performed by: STUDENT IN AN ORGANIZED HEALTH CARE EDUCATION/TRAINING PROGRAM

## 2024-03-18 PROCEDURE — 87086 URINE CULTURE/COLONY COUNT: CPT | Performed by: PHYSICIAN ASSISTANT

## 2024-03-18 PROCEDURE — 85025 COMPLETE CBC W/AUTO DIFF WBC: CPT | Performed by: PHYSICIAN ASSISTANT

## 2024-03-18 PROCEDURE — 81001 URINALYSIS AUTO W/SCOPE: CPT | Performed by: PHYSICIAN ASSISTANT

## 2024-03-18 PROCEDURE — 85730 THROMBOPLASTIN TIME PARTIAL: CPT | Performed by: PHYSICIAN ASSISTANT

## 2024-03-18 PROCEDURE — 25000003 PHARM REV CODE 250: Performed by: PHYSICIAN ASSISTANT

## 2024-03-18 PROCEDURE — 80053 COMPREHEN METABOLIC PANEL: CPT | Performed by: PHYSICIAN ASSISTANT

## 2024-03-18 PROCEDURE — 25000003 PHARM REV CODE 250: Performed by: STUDENT IN AN ORGANIZED HEALTH CARE EDUCATION/TRAINING PROGRAM

## 2024-03-18 PROCEDURE — 96375 TX/PRO/DX INJ NEW DRUG ADDON: CPT

## 2024-03-18 PROCEDURE — 11000001 HC ACUTE MED/SURG PRIVATE ROOM

## 2024-03-18 PROCEDURE — 96365 THER/PROPH/DIAG IV INF INIT: CPT

## 2024-03-18 PROCEDURE — 99285 EMERGENCY DEPT VISIT HI MDM: CPT | Mod: 25

## 2024-03-18 PROCEDURE — 63600175 PHARM REV CODE 636 W HCPCS: Performed by: PHYSICIAN ASSISTANT

## 2024-03-18 PROCEDURE — 25000003 PHARM REV CODE 250

## 2024-03-18 PROCEDURE — 63600175 PHARM REV CODE 636 W HCPCS

## 2024-03-18 PROCEDURE — 85610 PROTHROMBIN TIME: CPT | Performed by: PHYSICIAN ASSISTANT

## 2024-03-18 RX ORDER — OXYCODONE AND ACETAMINOPHEN 10; 325 MG/1; MG/1
1 TABLET ORAL
Status: COMPLETED | OUTPATIENT
Start: 2024-03-18 | End: 2024-03-18

## 2024-03-18 RX ORDER — GLUCAGON 1 MG
1 KIT INJECTION
Status: DISCONTINUED | OUTPATIENT
Start: 2024-03-18 | End: 2024-04-02 | Stop reason: HOSPADM

## 2024-03-18 RX ORDER — IBUPROFEN 200 MG
24 TABLET ORAL
Status: DISCONTINUED | OUTPATIENT
Start: 2024-03-18 | End: 2024-04-02 | Stop reason: HOSPADM

## 2024-03-18 RX ORDER — ONDANSETRON HYDROCHLORIDE 2 MG/ML
4 INJECTION, SOLUTION INTRAVENOUS
Status: COMPLETED | OUTPATIENT
Start: 2024-03-18 | End: 2024-03-18

## 2024-03-18 RX ORDER — HYDROXYZINE PAMOATE 25 MG/1
25 CAPSULE ORAL 3 TIMES DAILY
Status: DISCONTINUED | OUTPATIENT
Start: 2024-03-18 | End: 2024-04-02 | Stop reason: HOSPADM

## 2024-03-18 RX ORDER — GABAPENTIN 300 MG/1
600 CAPSULE ORAL 3 TIMES DAILY
Status: DISCONTINUED | OUTPATIENT
Start: 2024-03-18 | End: 2024-03-30

## 2024-03-18 RX ORDER — OXYCODONE AND ACETAMINOPHEN 10; 325 MG/1; MG/1
1 TABLET ORAL 2 TIMES DAILY PRN
Status: DISCONTINUED | OUTPATIENT
Start: 2024-03-18 | End: 2024-03-20

## 2024-03-18 RX ORDER — ACETAMINOPHEN 325 MG/1
650 TABLET ORAL EVERY 8 HOURS PRN
Status: DISCONTINUED | OUTPATIENT
Start: 2024-03-18 | End: 2024-04-02 | Stop reason: HOSPADM

## 2024-03-18 RX ORDER — ACETAMINOPHEN 325 MG/1
650 TABLET ORAL EVERY 4 HOURS PRN
Status: DISCONTINUED | OUTPATIENT
Start: 2024-03-18 | End: 2024-03-31

## 2024-03-18 RX ORDER — SODIUM CHLORIDE 0.9 % (FLUSH) 0.9 %
10 SYRINGE (ML) INJECTION EVERY 12 HOURS PRN
Status: DISCONTINUED | OUTPATIENT
Start: 2024-03-18 | End: 2024-04-02 | Stop reason: HOSPADM

## 2024-03-18 RX ORDER — IBUPROFEN 200 MG
16 TABLET ORAL
Status: DISCONTINUED | OUTPATIENT
Start: 2024-03-18 | End: 2024-04-02 | Stop reason: HOSPADM

## 2024-03-18 RX ORDER — ENOXAPARIN SODIUM 100 MG/ML
40 INJECTION SUBCUTANEOUS EVERY 24 HOURS
Status: DISCONTINUED | OUTPATIENT
Start: 2024-03-18 | End: 2024-03-23

## 2024-03-18 RX ORDER — MORPHINE SULFATE 4 MG/ML
4 INJECTION, SOLUTION INTRAMUSCULAR; INTRAVENOUS
Status: COMPLETED | OUTPATIENT
Start: 2024-03-18 | End: 2024-03-18

## 2024-03-18 RX ORDER — ONDANSETRON HYDROCHLORIDE 2 MG/ML
4 INJECTION, SOLUTION INTRAVENOUS EVERY 4 HOURS PRN
Status: DISCONTINUED | OUTPATIENT
Start: 2024-03-18 | End: 2024-04-02 | Stop reason: HOSPADM

## 2024-03-18 RX ADMIN — OXYCODONE AND ACETAMINOPHEN 1 TABLET: 10; 325 TABLET ORAL at 02:03

## 2024-03-18 RX ADMIN — GABAPENTIN 600 MG: 300 CAPSULE ORAL at 08:03

## 2024-03-18 RX ADMIN — ONDANSETRON 4 MG: 2 INJECTION INTRAMUSCULAR; INTRAVENOUS at 04:03

## 2024-03-18 RX ADMIN — MEROPENEM 1 G: 1 INJECTION, POWDER, FOR SOLUTION INTRAVENOUS at 10:03

## 2024-03-18 RX ADMIN — MEROPENEM 1 G: 1 INJECTION, POWDER, FOR SOLUTION INTRAVENOUS at 03:03

## 2024-03-18 RX ADMIN — ENOXAPARIN SODIUM 40 MG: 40 INJECTION SUBCUTANEOUS at 05:03

## 2024-03-18 RX ADMIN — OXYCODONE AND ACETAMINOPHEN 1 TABLET: 10; 325 TABLET ORAL at 08:03

## 2024-03-18 RX ADMIN — MORPHINE SULFATE 4 MG: 4 INJECTION, SOLUTION INTRAMUSCULAR; INTRAVENOUS at 04:03

## 2024-03-18 RX ADMIN — VANCOMYCIN HYDROCHLORIDE 1500 MG: 1.5 INJECTION, POWDER, LYOPHILIZED, FOR SOLUTION INTRAVENOUS at 04:03

## 2024-03-18 NOTE — PROGRESS NOTES
"Pharmacokinetic Initial Assessment: IV Vancomycin    Assessment/Plan:    Initiate intravenous vancomycin with loading dose of 1500 mg once followed by a maintenance dose of vancomycin 1250mg IV every 12 hours  Desired empiric serum trough concentration is 15 to 20 mcg/mL  Draw vancomycin trough level 60 min prior to fourth dose on 03/19 at approximately 1600  Pharmacy will continue to follow and monitor vancomycin.      Please contact pharmacy at extension [phone] with any questions regarding this assessment.     Thank you for the consult,   Diamond Castillo       Patient brief summary:  Hemal Guerrero is a 48 y.o. male initiated on antimicrobial therapy with IV Vancomycin for treatment of suspected skin & soft tissue infection    Drug Allergies:   Review of patient's allergies indicates:   Allergen Reactions    Amitriptyline        Actual Body Weight:   72.6 kg    Renal Function:   Estimated Creatinine Clearance: 141.2 mL/min (A) (based on SCr of 0.65 mg/dL (L)).,     Dialysis Method (if applicable):  N/A    CBC (last 72 hours):  Recent Labs   Lab Result Units 03/18/24  1114   WBC x10(3)/mcL 8.58   Hgb g/dL 11.7*   Hct % 38.6*   Platelet x10(3)/mcL 276   Mono % % 6.2   Eos % % 2.1   Basophil % % 1.2       Metabolic Panel (last 72 hours):  Recent Labs   Lab Result Units 03/18/24  1114 03/18/24  1253   Sodium Level mmol/L 141  --    Potassium Level mmol/L 4.3  --    Chloride mmol/L 109*  --    Carbon Dioxide mmol/L 21*  --    Glucose Level mg/dL 82  --    Glucose, UA   --  Normal   Blood Urea Nitrogen mg/dL 11.3  --    Creatinine mg/dL 0.65*  --    Albumin Level g/dL 3.2*  --    Bilirubin Total mg/dL 0.4  --    Alkaline Phosphatase unit/L 102  --    Aspartate Aminotransferase unit/L 10  --    Alanine Aminotransferase unit/L 7  --        Drug levels (last 3 results):  No results for input(s): "VANCOMYCINRA", "VANCORANDOM", "VANCOMYCINPE", "VANCOPEAK", "VANCOMYCINTR", "VANCOTROUGH" in the last 72 hours.    Microbiologic " Results:  Microbiology Results (last 7 days)       Procedure Component Value Units Date/Time    Urine culture [[id number]] Collected: 03/18/24 1253    Order Status: Sent Specimen: Urine, Supra Pubic Updated: 03/18/24 1326
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
## 2024-03-19 LAB
ALBUMIN SERPL-MCNC: 2.9 G/DL (ref 3.5–5)
ALBUMIN/GLOB SERPL: 0.8 RATIO (ref 1.1–2)
ALP SERPL-CCNC: 92 UNIT/L (ref 40–150)
ALT SERPL-CCNC: 8 UNIT/L (ref 0–55)
AST SERPL-CCNC: 10 UNIT/L (ref 5–34)
BACTERIA UR CULT: ABNORMAL
BASOPHILS # BLD AUTO: 0.13 X10(3)/MCL
BASOPHILS NFR BLD AUTO: 1.7 %
BILIRUB SERPL-MCNC: 0.4 MG/DL
BUN SERPL-MCNC: 11.9 MG/DL (ref 8.9–20.6)
CALCIUM SERPL-MCNC: 8.3 MG/DL (ref 8.4–10.2)
CHLORIDE SERPL-SCNC: 105 MMOL/L (ref 98–107)
CO2 SERPL-SCNC: 25 MMOL/L (ref 22–29)
CREAT SERPL-MCNC: 0.71 MG/DL (ref 0.73–1.18)
CRP SERPL-MCNC: 48.4 MG/L
EOSINOPHIL # BLD AUTO: 0.27 X10(3)/MCL (ref 0–0.9)
EOSINOPHIL NFR BLD AUTO: 3.6 %
ERYTHROCYTE [DISTWIDTH] IN BLOOD BY AUTOMATED COUNT: 14.3 % (ref 11.5–17)
ERYTHROCYTE [SEDIMENTATION RATE] IN BLOOD: 79 MM/HR (ref 0–15)
GFR SERPLBLD CREATININE-BSD FMLA CKD-EPI: >60 MLS/MIN/1.73/M2
GLOBULIN SER-MCNC: 3.8 GM/DL (ref 2.4–3.5)
GLUCOSE SERPL-MCNC: 96 MG/DL (ref 74–100)
HCT VFR BLD AUTO: 36.7 % (ref 42–52)
HGB BLD-MCNC: 11.1 G/DL (ref 14–18)
IMM GRANULOCYTES # BLD AUTO: 0.02 X10(3)/MCL (ref 0–0.04)
IMM GRANULOCYTES NFR BLD AUTO: 0.3 %
LYMPHOCYTES # BLD AUTO: 2.44 X10(3)/MCL (ref 0.6–4.6)
LYMPHOCYTES NFR BLD AUTO: 32.6 %
MCH RBC QN AUTO: 25.5 PG (ref 27–31)
MCHC RBC AUTO-ENTMCNC: 30.2 G/DL (ref 33–36)
MCV RBC AUTO: 84.4 FL (ref 80–94)
MONOCYTES # BLD AUTO: 0.62 X10(3)/MCL (ref 0.1–1.3)
MONOCYTES NFR BLD AUTO: 8.3 %
NEUTROPHILS # BLD AUTO: 4 X10(3)/MCL (ref 2.1–9.2)
NEUTROPHILS NFR BLD AUTO: 53.5 %
NRBC BLD AUTO-RTO: 0 %
PLATELET # BLD AUTO: 265 X10(3)/MCL (ref 130–400)
PMV BLD AUTO: 10.4 FL (ref 7.4–10.4)
POTASSIUM SERPL-SCNC: 4 MMOL/L (ref 3.5–5.1)
PROT SERPL-MCNC: 6.7 GM/DL (ref 6.4–8.3)
RBC # BLD AUTO: 4.35 X10(6)/MCL (ref 4.7–6.1)
SODIUM SERPL-SCNC: 139 MMOL/L (ref 136–145)
WBC # SPEC AUTO: 7.48 X10(3)/MCL (ref 4.5–11.5)

## 2024-03-19 PROCEDURE — 25000003 PHARM REV CODE 250: Performed by: INTERNAL MEDICINE

## 2024-03-19 PROCEDURE — 25000003 PHARM REV CODE 250: Performed by: STUDENT IN AN ORGANIZED HEALTH CARE EDUCATION/TRAINING PROGRAM

## 2024-03-19 PROCEDURE — 85025 COMPLETE CBC W/AUTO DIFF WBC: CPT | Performed by: PHYSICIAN ASSISTANT

## 2024-03-19 PROCEDURE — 11000001 HC ACUTE MED/SURG PRIVATE ROOM

## 2024-03-19 PROCEDURE — 86140 C-REACTIVE PROTEIN: CPT | Performed by: INTERNAL MEDICINE

## 2024-03-19 PROCEDURE — 85652 RBC SED RATE AUTOMATED: CPT | Performed by: INTERNAL MEDICINE

## 2024-03-19 PROCEDURE — 80053 COMPREHEN METABOLIC PANEL: CPT | Performed by: PHYSICIAN ASSISTANT

## 2024-03-19 PROCEDURE — 25000003 PHARM REV CODE 250: Performed by: PHYSICIAN ASSISTANT

## 2024-03-19 PROCEDURE — 63600175 PHARM REV CODE 636 W HCPCS: Mod: JZ,JG | Performed by: INTERNAL MEDICINE

## 2024-03-19 PROCEDURE — 63600175 PHARM REV CODE 636 W HCPCS: Performed by: PHYSICIAN ASSISTANT

## 2024-03-19 PROCEDURE — 63600175 PHARM REV CODE 636 W HCPCS: Performed by: STUDENT IN AN ORGANIZED HEALTH CARE EDUCATION/TRAINING PROGRAM

## 2024-03-19 RX ORDER — BACLOFEN 10 MG/1
20 TABLET ORAL NIGHTLY
Status: DISCONTINUED | OUTPATIENT
Start: 2024-03-19 | End: 2024-03-29

## 2024-03-19 RX ORDER — CYCLOBENZAPRINE HCL 10 MG
10 TABLET ORAL 2 TIMES DAILY PRN
Status: DISCONTINUED | OUTPATIENT
Start: 2024-03-19 | End: 2024-04-02 | Stop reason: HOSPADM

## 2024-03-19 RX ORDER — HYDRALAZINE HYDROCHLORIDE 20 MG/ML
10 INJECTION INTRAMUSCULAR; INTRAVENOUS EVERY 4 HOURS PRN
Status: DISCONTINUED | OUTPATIENT
Start: 2024-03-19 | End: 2024-04-02 | Stop reason: HOSPADM

## 2024-03-19 RX ORDER — MORPHINE SULFATE 4 MG/ML
1 INJECTION, SOLUTION INTRAMUSCULAR; INTRAVENOUS EVERY 4 HOURS PRN
Status: DISCONTINUED | OUTPATIENT
Start: 2024-03-19 | End: 2024-03-20

## 2024-03-19 RX ORDER — LABETALOL HYDROCHLORIDE 5 MG/ML
10 INJECTION, SOLUTION INTRAVENOUS EVERY 4 HOURS PRN
Status: DISCONTINUED | OUTPATIENT
Start: 2024-03-19 | End: 2024-04-02 | Stop reason: HOSPADM

## 2024-03-19 RX ADMIN — HYDROXYZINE PAMOATE 25 MG: 25 CAPSULE ORAL at 09:03

## 2024-03-19 RX ADMIN — MORPHINE SULFATE 1 MG: 4 INJECTION, SOLUTION INTRAMUSCULAR; INTRAVENOUS at 05:03

## 2024-03-19 RX ADMIN — MORPHINE SULFATE 1 MG: 4 INJECTION, SOLUTION INTRAMUSCULAR; INTRAVENOUS at 01:03

## 2024-03-19 RX ADMIN — CYCLOBENZAPRINE 10 MG: 10 TABLET, FILM COATED ORAL at 01:03

## 2024-03-19 RX ADMIN — GABAPENTIN 600 MG: 300 CAPSULE ORAL at 09:03

## 2024-03-19 RX ADMIN — HYDROXYZINE PAMOATE 25 MG: 25 CAPSULE ORAL at 01:03

## 2024-03-19 RX ADMIN — OXYCODONE AND ACETAMINOPHEN 1 TABLET: 10; 325 TABLET ORAL at 10:03

## 2024-03-19 RX ADMIN — MORPHINE SULFATE 1 MG: 4 INJECTION, SOLUTION INTRAMUSCULAR; INTRAVENOUS at 09:03

## 2024-03-19 RX ADMIN — VANCOMYCIN HYDROCHLORIDE 1250 MG: 1.25 INJECTION, POWDER, LYOPHILIZED, FOR SOLUTION INTRAVENOUS at 05:03

## 2024-03-19 RX ADMIN — OXYCODONE AND ACETAMINOPHEN 1 TABLET: 10; 325 TABLET ORAL at 02:03

## 2024-03-19 RX ADMIN — MEROPENEM 1 G: 1 INJECTION, POWDER, FOR SOLUTION INTRAVENOUS at 10:03

## 2024-03-19 RX ADMIN — ENOXAPARIN SODIUM 40 MG: 40 INJECTION SUBCUTANEOUS at 05:03

## 2024-03-19 RX ADMIN — MEROPENEM 1 G: 1 INJECTION, POWDER, FOR SOLUTION INTRAVENOUS at 06:03

## 2024-03-19 RX ADMIN — GABAPENTIN 600 MG: 300 CAPSULE ORAL at 01:03

## 2024-03-19 RX ADMIN — MEROPENEM 1 G: 1 INJECTION, POWDER, FOR SOLUTION INTRAVENOUS at 04:03

## 2024-03-19 RX ADMIN — GABAPENTIN 600 MG: 300 CAPSULE ORAL at 10:03

## 2024-03-19 RX ADMIN — BACLOFEN 20 MG: 10 TABLET ORAL at 09:03

## 2024-03-19 RX ADMIN — HYDROXYZINE PAMOATE 25 MG: 25 CAPSULE ORAL at 10:03

## 2024-03-19 RX ADMIN — ACETAMINOPHEN 650 MG: 325 TABLET, FILM COATED ORAL at 01:03

## 2024-03-19 NOTE — PROGRESS NOTES
[facility] - 9th Floor Med Surg  Wound Care    Patient Name:  Hemal Guerrero   MRN:  [id number]  Date: 3/19/2024  Diagnosis: <principal problem not specified>    History:     Past Medical History:   Diagnosis Date    Chronic UTI     Paraplegia        Social History     Socioeconomic History    Marital status:    Tobacco Use    Smoking status: Never    Smokeless tobacco: Never   Substance and Sexual Activity    Alcohol use: Not Currently    Drug use: Never       Precautions:     Allergies as of 03/18/2024 - Reviewed 03/18/2024   Allergen Reaction Noted    Amitriptyline  11/16/2022       WOC Assessment Details/Treatment   WOCN consulted for buttocks area. Discussed plan of care with nurse Meehan prior to visiting the patient. No family at bedside. Treatment recommendations put in place. Second attempt to evaluate patient today, continued to refused care at this time. Pain medications were administered by assigned nurse. Patient still refused. Will follow up with patient later in the day if time permits and schedule allows.   03/19/2024

## 2024-03-19 NOTE — NURSING
Nurses Note -- 4 Eyes      3/19/2024   2:00 AM      Skin assessed during: Admit      [] No Altered Skin Integrity Present    []Prevention Measures Documented      [x] Yes- Altered Skin Integrity Present or Discovered   [x] LDA Added if Not in Epic (Describe Wound)   [x] New Altered Skin Integrity was Present on Admit and Documented in LDA   [x] Wound Image Taken    Wound Care Consulted? Yes    Attending Nurse:  Milan Ndiaye RN    Second RN/Staff Member: Hope Johnson RN

## 2024-03-19 NOTE — CARE UPDATE
Mr. Guerrero is an established patient of Dr. Leblanc. I spoke with her and she requested transfer of service to Dr. Lamb. He is covering for her. Nurse, ANNABELLE Ndiaye, instructed to notify Dr. Lamb.

## 2024-03-19 NOTE — PLAN OF CARE
03/19/24 1247   Discharge Assessment   Assessment Type Discharge Planning Assessment   Confirmed/corrected address, phone number and insurance Yes   Confirmed Demographics Correct on Facesheet   Source of Information patient   Communicated IVORY with patient/caregiver Date not available/Unable to determine   Reason For Admission R foot pain, wound infection, UTI   People in Home child(arnol), adult   Do you expect to return to your current living situation? Other (see comments)  (TBD, pending pt progress)   Do you have help at home or someone to help you manage your care at home? Yes   Who are your caregiver(s) and their phone number(s)? harika aguirre, alla, [phone]   Prior to hospitilization cognitive status: Alert/Oriented   Current cognitive status: Alert/Oriented   Home Accessibility wheelchair accessible   Home Layout Able to live on 1st floor   Equipment Currently Used at Home walker, rolling;wheelchair;bedside commode;hospital bed;shower chair   Readmission within 30 days? No   Patient currently being followed by outpatient case management? No   Do you currently have service(s) that help you manage your care at home? Yes   Name and Contact number of agency [facility]   Is the pt/caregiver preference to resume services with current agency Yes   Do you take prescription medications? Yes   Do you have prescription coverage? Yes   Do you have any problems affording any of your prescribed medications? No   Is the patient taking medications as prescribed? yes   Who is going to help you get home at discharge? family   How do you get to doctors appointments? family or friend will provide   Are you on dialysis? No   Do you take coumadin? No   Discharge Plan A Home Health;Home with family   Discharge Plan B Other  (TBD, pending pt progress)   DME Needed Upon Discharge  none   Discharge Plan discussed with: Patient   Transition of Care Barriers None   Social Connections   Are you , , ,  , never , or living with a partner?    OTHER   Name(s) of People in Home Ahmet gold     Completed assessment with pt at bedside. Introduced self and explained role as SW. Pt verb understanding to all questions asked. D/c dispo pending pt progress. Pt is currently w/c dependent and received assistance from son. PCP is Margaret Leblanc and [facility] is [facility] [address]. Pt currently receiving wound care services with [facility] . DENISHA sent clinical updates to .     Jerri Arechiga LCSW

## 2024-03-20 LAB — VANCOMYCIN TROUGH SERPL-MCNC: 22.1 UG/ML (ref 15–20)

## 2024-03-20 PROCEDURE — 87070 CULTURE OTHR SPECIMN AEROBIC: CPT | Performed by: PODIATRIST

## 2024-03-20 PROCEDURE — 11000001 HC ACUTE MED/SURG PRIVATE ROOM

## 2024-03-20 PROCEDURE — 63600175 PHARM REV CODE 636 W HCPCS: Mod: JZ,JG | Performed by: INTERNAL MEDICINE

## 2024-03-20 PROCEDURE — 25000003 PHARM REV CODE 250: Performed by: PHYSICIAN ASSISTANT

## 2024-03-20 PROCEDURE — 80202 ASSAY OF VANCOMYCIN: CPT | Performed by: STUDENT IN AN ORGANIZED HEALTH CARE EDUCATION/TRAINING PROGRAM

## 2024-03-20 PROCEDURE — 25000003 PHARM REV CODE 250: Performed by: INTERNAL MEDICINE

## 2024-03-20 PROCEDURE — 63600175 PHARM REV CODE 636 W HCPCS: Performed by: STUDENT IN AN ORGANIZED HEALTH CARE EDUCATION/TRAINING PROGRAM

## 2024-03-20 PROCEDURE — 87075 CULTR BACTERIA EXCEPT BLOOD: CPT | Performed by: PODIATRIST

## 2024-03-20 PROCEDURE — 25000003 PHARM REV CODE 250: Performed by: STUDENT IN AN ORGANIZED HEALTH CARE EDUCATION/TRAINING PROGRAM

## 2024-03-20 PROCEDURE — 63600175 PHARM REV CODE 636 W HCPCS: Performed by: PHYSICIAN ASSISTANT

## 2024-03-20 RX ORDER — HYDROMORPHONE HYDROCHLORIDE 2 MG/ML
0.5 INJECTION, SOLUTION INTRAMUSCULAR; INTRAVENOUS; SUBCUTANEOUS EVERY 6 HOURS PRN
Status: DISCONTINUED | OUTPATIENT
Start: 2024-03-20 | End: 2024-03-23

## 2024-03-20 RX ORDER — OXYCODONE AND ACETAMINOPHEN 10; 325 MG/1; MG/1
1 TABLET ORAL EVERY 8 HOURS PRN
Status: DISCONTINUED | OUTPATIENT
Start: 2024-03-20 | End: 2024-03-29

## 2024-03-20 RX ORDER — HYDROMORPHONE HYDROCHLORIDE 2 MG/ML
1 INJECTION, SOLUTION INTRAMUSCULAR; INTRAVENOUS; SUBCUTANEOUS EVERY 6 HOURS PRN
Status: DISCONTINUED | OUTPATIENT
Start: 2024-03-20 | End: 2024-03-20

## 2024-03-20 RX ORDER — MORPHINE SULFATE 4 MG/ML
1 INJECTION, SOLUTION INTRAMUSCULAR; INTRAVENOUS EVERY 6 HOURS PRN
Status: DISCONTINUED | OUTPATIENT
Start: 2024-03-20 | End: 2024-03-20

## 2024-03-20 RX ADMIN — GABAPENTIN 600 MG: 300 CAPSULE ORAL at 08:03

## 2024-03-20 RX ADMIN — VANCOMYCIN HYDROCHLORIDE 1250 MG: 1.25 INJECTION, POWDER, LYOPHILIZED, FOR SOLUTION INTRAVENOUS at 04:03

## 2024-03-20 RX ADMIN — OXYCODONE AND ACETAMINOPHEN 1 TABLET: 10; 325 TABLET ORAL at 05:03

## 2024-03-20 RX ADMIN — MORPHINE SULFATE 1 MG: 4 INJECTION, SOLUTION INTRAMUSCULAR; INTRAVENOUS at 03:03

## 2024-03-20 RX ADMIN — GABAPENTIN 600 MG: 300 CAPSULE ORAL at 09:03

## 2024-03-20 RX ADMIN — CYCLOBENZAPRINE 10 MG: 10 TABLET, FILM COATED ORAL at 10:03

## 2024-03-20 RX ADMIN — HYDROXYZINE PAMOATE 25 MG: 25 CAPSULE ORAL at 09:03

## 2024-03-20 RX ADMIN — BACLOFEN 20 MG: 10 TABLET ORAL at 08:03

## 2024-03-20 RX ADMIN — HYDROXYZINE PAMOATE 25 MG: 25 CAPSULE ORAL at 03:03

## 2024-03-20 RX ADMIN — MEROPENEM 1 G: 1 INJECTION, POWDER, FOR SOLUTION INTRAVENOUS at 06:03

## 2024-03-20 RX ADMIN — HYDROMORPHONE HYDROCHLORIDE 0.5 MG: 2 INJECTION, SOLUTION INTRAMUSCULAR; INTRAVENOUS; SUBCUTANEOUS at 11:03

## 2024-03-20 RX ADMIN — HYDROXYZINE PAMOATE 25 MG: 25 CAPSULE ORAL at 08:03

## 2024-03-20 RX ADMIN — MEROPENEM 1 G: 1 INJECTION, POWDER, FOR SOLUTION INTRAVENOUS at 02:03

## 2024-03-20 RX ADMIN — MORPHINE SULFATE 1 MG: 4 INJECTION, SOLUTION INTRAMUSCULAR; INTRAVENOUS at 09:03

## 2024-03-20 RX ADMIN — OXYCODONE AND ACETAMINOPHEN 1 TABLET: 10; 325 TABLET ORAL at 03:03

## 2024-03-20 RX ADMIN — GABAPENTIN 600 MG: 300 CAPSULE ORAL at 03:03

## 2024-03-20 RX ADMIN — MEROPENEM 1 G: 1 INJECTION, POWDER, FOR SOLUTION INTRAVENOUS at 11:03

## 2024-03-20 RX ADMIN — ENOXAPARIN SODIUM 40 MG: 40 INJECTION SUBCUTANEOUS at 04:03

## 2024-03-20 RX ADMIN — VANCOMYCIN HYDROCHLORIDE 1250 MG: 1.25 INJECTION, POWDER, LYOPHILIZED, FOR SOLUTION INTRAVENOUS at 03:03

## 2024-03-20 RX ADMIN — HYDROMORPHONE HYDROCHLORIDE 1 MG: 2 INJECTION INTRAMUSCULAR; INTRAVENOUS; SUBCUTANEOUS at 05:03

## 2024-03-20 NOTE — PROGRESS NOTES
Pharmacokinetic Assessment Follow Up: IV Vancomycin    Vancomycin serum concentration assessment(s):    The trough level was drawn correctly and can be used to guide therapy at this time. The measurement is above the desired definitive target range of 15 to 20 mcg/mL.    Vancomycin Regimen Plan:    Re-dose when the random level is less than 20 mcg/mL, next level to be drawn at 0430 on 03/21    Scheduled Administration Times    N/A    Drug levels (last 3 results):  Recent Labs   Lab Result Units 03/20/24  1508   Vancomycin Trough ug/ml 22.1*       Vancomycin Administrations:  vancomycin given in the last 96 hours                     vancomycin 1.25 g in dextrose 5% 250 mL IVPB (ready to mix) (mg) 1,250 mg New Bag 03/20/24 1522     1,250 mg New Bag  0416     1,250 mg New Bag 03/19/24 1709     1,250 mg New Bag  0510    vancomycin 1.5 g in dextrose 5 % 250 mL IVPB (ready to mix) (mg) 1,500 mg New Bag 03/18/24 1658                    Pharmacy will continue to follow and monitor vancomycin.    Please contact pharmacy at extension [phone] for questions regarding this assessment.    Thank you for the consult,   La Nena Forte       Patient brief summary:  Hemal Guerrero is a 48 y.o. male initiated on antimicrobial therapy with IV Vancomycin for treatment of skin & soft tissue infection    The patient's current regimen is Vancomycin 1250 mg IV Q12h    Drug Allergies:   Review of patient's allergies indicates:   Allergen Reactions    Amitriptyline        Actual Body Weight:  Wt Readings from Last 1 Encounters:   03/19/24 69.5 kg (153 lb 4.8 oz)       Renal Function:   Estimated Creatinine Clearance: 123.7 mL/min (A) (based on SCr of 0.71 mg/dL (L)).,     Dialysis Method (if applicable):  N/A    CBC (last 72 hours):  Recent Labs   Lab Result Units 03/18/24  1114 03/19/24  0415   WBC x10(3)/mcL 8.58 7.48   Hgb g/dL 11.7* 11.1*   Hct % 38.6* 36.7*   Platelet x10(3)/mcL 276 265   Mono % % 6.2 8.3   Eos % % 2.1 3.6   Basophil % %  1.2 1.7       Metabolic Panel (last 72 hours):  Recent Labs   Lab Result Units 03/18/24  1114 03/18/24  1253 03/19/24  0415   Sodium Level mmol/L 141  --  139   Potassium Level mmol/L 4.3  --  4.0   Chloride mmol/L 109*  --  105   Carbon Dioxide mmol/L 21*  --  25   Glucose Level mg/dL 82  --  96   Glucose, UA   --  Normal  --    Blood Urea Nitrogen mg/dL 11.3  --  11.9   Creatinine mg/dL 0.65*  --  0.71*   Albumin Level g/dL 3.2*  --  2.9*   Bilirubin Total mg/dL 0.4  --  0.4   Alkaline Phosphatase unit/L 102  --  92   Aspartate Aminotransferase unit/L 10  --  10   Alanine Aminotransferase unit/L 7  --  8       Microbiologic Results:  Microbiology Results (last 7 days)       Procedure Component Value Units Date/Time    Urine culture [[id number]]  (Abnormal) Collected: 03/18/24 1253    Order Status: Completed Specimen: Urine, Supra Pubic Updated: 03/19/24 1032     Urine Culture Multiple organisms present indicate probable contamination. Recommend recollection.      >/= 100,000 colonies/ml - two different species of Gram-negative Rods      50,000-75,000 colonies/ml GAMMA STREPTOCOCCUS     Comment: We have not further evaluated these organisms because three or more species compatible with normal genital mei usually represents specimen contamination from the time of collection, rather than infection. If clinical circumstances warrant further   workup of these organisms, please contact the Microbiology dept at [phone]; otherwise please have the patient submit another specimen.
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
## 2024-03-21 LAB — VANCOMYCIN SERPL-MCNC: 19.8 UG/ML (ref 15–20)

## 2024-03-21 PROCEDURE — 63600175 PHARM REV CODE 636 W HCPCS: Performed by: INTERNAL MEDICINE

## 2024-03-21 PROCEDURE — 25000003 PHARM REV CODE 250: Performed by: INTERNAL MEDICINE

## 2024-03-21 PROCEDURE — 0T2BX0Z CHANGE DRAINAGE DEVICE IN BLADDER, EXTERNAL APPROACH: ICD-10-PCS | Performed by: INTERNAL MEDICINE

## 2024-03-21 PROCEDURE — 63600175 PHARM REV CODE 636 W HCPCS: Performed by: PHYSICIAN ASSISTANT

## 2024-03-21 PROCEDURE — 11000001 HC ACUTE MED/SURG PRIVATE ROOM

## 2024-03-21 PROCEDURE — 25000003 PHARM REV CODE 250: Performed by: PHYSICIAN ASSISTANT

## 2024-03-21 PROCEDURE — 80202 ASSAY OF VANCOMYCIN: CPT | Performed by: INTERNAL MEDICINE

## 2024-03-21 RX ORDER — VANCOMYCIN HCL IN 5 % DEXTROSE 1G/250ML
1000 PLASTIC BAG, INJECTION (ML) INTRAVENOUS
Status: DISCONTINUED | OUTPATIENT
Start: 2024-03-21 | End: 2024-03-23

## 2024-03-21 RX ADMIN — VANCOMYCIN HYDROCHLORIDE 1000 MG: 1 INJECTION, POWDER, LYOPHILIZED, FOR SOLUTION INTRAVENOUS at 10:03

## 2024-03-21 RX ADMIN — BACLOFEN 20 MG: 10 TABLET ORAL at 08:03

## 2024-03-21 RX ADMIN — OXYCODONE AND ACETAMINOPHEN 1 TABLET: 10; 325 TABLET ORAL at 01:03

## 2024-03-21 RX ADMIN — HYDROMORPHONE HYDROCHLORIDE 0.5 MG: 2 INJECTION, SOLUTION INTRAMUSCULAR; INTRAVENOUS; SUBCUTANEOUS at 05:03

## 2024-03-21 RX ADMIN — MEROPENEM 1 G: 1 INJECTION, POWDER, FOR SOLUTION INTRAVENOUS at 06:03

## 2024-03-21 RX ADMIN — CYCLOBENZAPRINE 10 MG: 10 TABLET, FILM COATED ORAL at 11:03

## 2024-03-21 RX ADMIN — VANCOMYCIN HYDROCHLORIDE 1000 MG: 1 INJECTION, POWDER, LYOPHILIZED, FOR SOLUTION INTRAVENOUS at 09:03

## 2024-03-21 RX ADMIN — OXYCODONE AND ACETAMINOPHEN 1 TABLET: 10; 325 TABLET ORAL at 09:03

## 2024-03-21 RX ADMIN — GABAPENTIN 600 MG: 300 CAPSULE ORAL at 09:03

## 2024-03-21 RX ADMIN — HYDROMORPHONE HYDROCHLORIDE 0.5 MG: 2 INJECTION, SOLUTION INTRAMUSCULAR; INTRAVENOUS; SUBCUTANEOUS at 08:03

## 2024-03-21 RX ADMIN — MEROPENEM 1 G: 1 INJECTION, POWDER, FOR SOLUTION INTRAVENOUS at 04:03

## 2024-03-21 RX ADMIN — ENOXAPARIN SODIUM 40 MG: 40 INJECTION SUBCUTANEOUS at 04:03

## 2024-03-21 RX ADMIN — HYDROXYZINE PAMOATE 25 MG: 25 CAPSULE ORAL at 04:03

## 2024-03-21 RX ADMIN — HYDROMORPHONE HYDROCHLORIDE 0.5 MG: 2 INJECTION, SOLUTION INTRAMUSCULAR; INTRAVENOUS; SUBCUTANEOUS at 12:03

## 2024-03-21 RX ADMIN — OXYCODONE AND ACETAMINOPHEN 1 TABLET: 10; 325 TABLET ORAL at 04:03

## 2024-03-21 RX ADMIN — GABAPENTIN 600 MG: 300 CAPSULE ORAL at 04:03

## 2024-03-21 RX ADMIN — HYDROXYZINE PAMOATE 25 MG: 25 CAPSULE ORAL at 08:03

## 2024-03-21 RX ADMIN — HYDROXYZINE PAMOATE 25 MG: 25 CAPSULE ORAL at 09:03

## 2024-03-21 RX ADMIN — GABAPENTIN 600 MG: 300 CAPSULE ORAL at 08:03
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
## 2024-03-21 NOTE — PROCEDURES
Hemal Guerrero is a 48 y.o. male patient.    Temp: 98.4 °F (36.9 °C) (03/21/24 1208)  Pulse: 75 (03/21/24 1208)  Resp: 18 (03/21/24 1206)  BP: 119/78 (03/21/24 1208)  SpO2: 100 % (03/21/24 1208)  Weight: 69.5 kg (153 lb 4.8 oz) (03/19/24 0133)  Height: 6' (182.9 cm) (03/19/24 0133)       Bladder Cath    Date/Time: 3/21/2024 3:28 PM  Location procedure was performed: [facility]    Performed by: Indigo Taylor AGACNP-BC  Authorized by: Indigo Taylor AGACNP-BC  Pre-operative diagnosis: neurogenic bladder, UTI  Post-operative diagnosis: neurogenic bladder, UTI  Indications: catheter change and neurogenic bladder  Local anesthesia used: no    Anesthesia:  Local anesthesia used: no    Patient sedated: no  Preparation: Patient was prepped and draped in the usual sterile fashion.  Catheter insertion: suprapubic.  Catheter type: Bhatti  Catheter size: 16 Fr  Complicated insertion: no  Number of attempts: 1  Complications: No  Patient tolerance: Patient tolerated the procedure well with no immediate complications          3/21/2024

## 2024-03-21 NOTE — PROGRESS NOTES
[facility]                       [address] [id number]    PATIENT NAME:       OWEN PIRES  YOB: 1975  CSN:                [id number]   MRN:                [id number]  ADMIT DATE:         03/18/2024 10:14:00  PHYSICIAN:          Miguel Lamb MD                            PROGRESS NOTE    DATE:  03/19/2024 00:00:00    SUBJECTIVE:  This is a 48-year-old  male, who was admitted on   the 18th.  He has a right foot infection and an acute hemorrhagic cystitis.  He   probably has osteomyelitis of his 5th distal metatarsal and distal phalanx.  He   denies any shortness of breath.  No chest pain or palpitations.  No fever,   chills, or other problems.  He complains of pain, even though he has paraplegia   to this foot, no significant issues.    REVIEW OF SYSTEMS:  X12 as above.    OBJECTIVE:  VITAL SIGNS:  Blood pressure was 105/69, pulse 82, temperature 98.3.  GENERAL APPEARANCE:  He is alert, in no acute distress.  HEART:  Regular rhythm and rate.  LUNGS:  Clear.  ABDOMEN:  Soft, nontender.  EXTREMITIES:  He has a right swollen foot, which is dressed.  NEUROLOGICAL:  Unchanged, paraplegic.    LABORATORY DATA:  There are no new labs today.    IMPRESSION:  Acute hemorrhagic cystitis in a patient with suprapubic catheter.    He has a right foot cellulitis and osteomyelitis.  He has history of paraplegia,   paroxysmal atrial fibrillation, hypertension, neurogenic bladder and bowel,   status post suprapubic catheter, mild chronic anemia, drug-seeking behavior.    PLAN:  We will go ahead and continue with antibiotics.  He is at the present   time on DVT prophylaxis.  He is on Merrem.  He is also receiving vancomycin per   pharmacy.  Podiatry will evaluate and assess the patient.        ______________________________  Miguel Lamb MD    JOHN/AQS  DD:  03/20/2024  Time:  09:45PM  DT:  03/20/2024   Time:  11:02PM  Job #:  [id number]      PROGRESS NOTE
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
## 2024-03-21 NOTE — PROGRESS NOTES
[facility]                       [address] [address]    PATIENT NAME:       OWEN PIRES  YOB: 1975  CSN:                [id number]   MRN:                [id number]  ADMIT DATE:         03/18/2024 10:14:00  PHYSICIAN:          Zafar Julio DPM                            PROGRESS NOTE    DATE:  03/21/2024 00:00:00    SUBJECTIVE:  The patient was seen today, doing about the same.  No major issues   reported.  Was seen by ID yesterday.  Cultures were obtained of the foot.    PHYSICAL EXAMINATION:  VITAL SIGNS:  Stable and afebrile.    Preliminary cultures from the foot are still pending.    Dressings are intact.  Site is stable.    ASSESSMENT:  Right foot wound with evidence of osteomyelitis.    PLAN:  I was able to get in touch with Tiesha at [facility] to discuss if   any previous surgical intervention and/or routine debridements were done or if   there was any confirmation of any sort of bone involvement in this area and they   do not have any recollection of such.  My suspicion is that this process   probably is a reactivation resulting in the dorsal blow out wound.  I   recommended, we continue with current care.  Wait for at least some preliminary   cultures before making any determinations as to the need for further surgical   intervention and/or simple long-term IV antibiotics.        ______________________________  Zafar Julio DPM    GAS/AQS  DD:  03/21/2024  Time:  04:57PM  DT:  03/21/2024  Time:  06:28PM  Job #:  [id number]      PROGRESS NOTE

## 2024-03-21 NOTE — PROGRESS NOTES
Pharmacokinetic Assessment Follow Up: IV Vancomycin    Vancomycin serum concentration assessment(s):    The trough level was drawn correctly and can be used to guide therapy at this time. The measurement is within the desired definitive target range of 15 to 20 mcg/mL.    Vancomycin Regimen Plan:    Change regimen to Vancomycin 1000 mg IV every q12h hours with next serum trough concentration measured at 2100 prior to 4th dose on 03/22    Scheduled Administration Times    1000/2200    Drug levels (last 3 results):  Recent Labs   Lab Result Units 03/20/24  1508 03/21/24  0400   Vanc Lvl Random ug/ml  --  19.8   Vancomycin Trough ug/ml 22.1*  --        Vancomycin Administrations:  vancomycin given in the last 96 hours                     vancomycin 1.25 g in dextrose 5% 250 mL IVPB (ready to mix) (mg) 1,250 mg New Bag 03/20/24 1522     1,250 mg New Bag  0416     1,250 mg New Bag 03/19/24 1709     1,250 mg New Bag  0510    vancomycin 1.5 g in dextrose 5 % 250 mL IVPB (ready to mix) (mg) 1,500 mg New Bag 03/18/24 1658                    Pharmacy will continue to follow and monitor vancomycin.    Please contact pharmacy at extension [phone] for questions regarding this assessment.    Thank you for the consult,   Ashlyn Harman       Patient brief summary:  Hemal Guerrero is a 48 y.o. male initiated on antimicrobial therapy     Drug Allergies:   Review of patient's allergies indicates:   Allergen Reactions    Amitriptyline        Actual Body Weight:  Wt Readings from Last 1 Encounters:   03/19/24 69.5 kg (153 lb 4.8 oz)       Renal Function:   Estimated Creatinine Clearance: 123.7 mL/min (A) (based on SCr of 0.71 mg/dL (L)).,     Dialysis Method (if applicable):  N/A    CBC (last 72 hours):  Recent Labs   Lab Result Units 03/18/24  1114 03/19/24  0415   WBC x10(3)/mcL 8.58 7.48   Hgb g/dL 11.7* 11.1*   Hct % 38.6* 36.7*   Platelet x10(3)/mcL 276 265   Mono % % 6.2 8.3   Eos % % 2.1 3.6   Basophil % % 1.2 1.7       Metabolic  Panel (last 72 hours):  Recent Labs   Lab Result Units 03/18/24  1114 03/18/24  1253 03/19/24  0415   Sodium Level mmol/L 141  --  139   Potassium Level mmol/L 4.3  --  4.0   Chloride mmol/L 109*  --  105   Carbon Dioxide mmol/L 21*  --  25   Glucose Level mg/dL 82  --  96   Glucose, UA   --  Normal  --    Blood Urea Nitrogen mg/dL 11.3  --  11.9   Creatinine mg/dL 0.65*  --  0.71*   Albumin Level g/dL 3.2*  --  2.9*   Bilirubin Total mg/dL 0.4  --  0.4   Alkaline Phosphatase unit/L 102  --  92   Aspartate Aminotransferase unit/L 10  --  10   Alanine Aminotransferase unit/L 7  --  8       Microbiologic Results:  Microbiology Results (last 7 days)       Procedure Component Value Units Date/Time    Wound Culture [[id number]] Collected: 03/20/24 1841    Order Status: Completed Specimen: Wound from Foot, Right Updated: 03/21/24 0651     Wound Culture No Growth At 24 Hours    Anaerobic Culture [[id number]] Collected: 03/20/24 1841    Order Status: Sent Specimen: Exudate from Foot, Right Updated: 03/20/24 1843    Urine culture [[id number]]  (Abnormal) Collected: 03/18/24 1253    Order Status: Completed Specimen: Urine, Supra Pubic Updated: 03/19/24 1032     Urine Culture Multiple organisms present indicate probable contamination. Recommend recollection.      >/= 100,000 colonies/ml - two different species of Gram-negative Rods      50,000-75,000 colonies/ml GAMMA STREPTOCOCCUS     Comment: We have not further evaluated these organisms because three or more species compatible with normal genital mei usually represents specimen contamination from the time of collection, rather than infection. If clinical circumstances warrant further   workup of these organisms, please contact the Microbiology dept at [phone]; otherwise please have the patient submit another specimen.

## 2024-03-22 LAB — VANCOMYCIN TROUGH SERPL-MCNC: 20.5 UG/ML (ref 15–20)

## 2024-03-22 PROCEDURE — 63600175 PHARM REV CODE 636 W HCPCS: Performed by: INTERNAL MEDICINE

## 2024-03-22 PROCEDURE — 63600175 PHARM REV CODE 636 W HCPCS: Performed by: PHYSICIAN ASSISTANT

## 2024-03-22 PROCEDURE — 25000003 PHARM REV CODE 250: Performed by: INTERNAL MEDICINE

## 2024-03-22 PROCEDURE — 83540 ASSAY OF IRON: CPT | Performed by: INTERNAL MEDICINE

## 2024-03-22 PROCEDURE — 25000003 PHARM REV CODE 250: Performed by: PHYSICIAN ASSISTANT

## 2024-03-22 PROCEDURE — 11000001 HC ACUTE MED/SURG PRIVATE ROOM

## 2024-03-22 PROCEDURE — 80202 ASSAY OF VANCOMYCIN: CPT | Performed by: INTERNAL MEDICINE

## 2024-03-22 RX ORDER — POLYETHYLENE GLYCOL 3350 17 G/17G
17 POWDER, FOR SOLUTION ORAL 2 TIMES DAILY PRN
Status: DISCONTINUED | OUTPATIENT
Start: 2024-03-22 | End: 2024-04-02 | Stop reason: HOSPADM

## 2024-03-22 RX ADMIN — POLYETHYLENE GLYCOL 3350 17 G: 17 POWDER, FOR SOLUTION ORAL at 12:03

## 2024-03-22 RX ADMIN — GABAPENTIN 600 MG: 300 CAPSULE ORAL at 08:03

## 2024-03-22 RX ADMIN — HYDROMORPHONE HYDROCHLORIDE 0.5 MG: 2 INJECTION, SOLUTION INTRAMUSCULAR; INTRAVENOUS; SUBCUTANEOUS at 06:03

## 2024-03-22 RX ADMIN — HYDROXYZINE PAMOATE 25 MG: 25 CAPSULE ORAL at 04:03

## 2024-03-22 RX ADMIN — ENOXAPARIN SODIUM 40 MG: 40 INJECTION SUBCUTANEOUS at 04:03

## 2024-03-22 RX ADMIN — POLYETHYLENE GLYCOL 3350 17 G: 17 POWDER, FOR SOLUTION ORAL at 08:03

## 2024-03-22 RX ADMIN — GABAPENTIN 600 MG: 300 CAPSULE ORAL at 04:03

## 2024-03-22 RX ADMIN — CYCLOBENZAPRINE 10 MG: 10 TABLET, FILM COATED ORAL at 01:03

## 2024-03-22 RX ADMIN — MEROPENEM 1 G: 1 INJECTION, POWDER, FOR SOLUTION INTRAVENOUS at 08:03

## 2024-03-22 RX ADMIN — OXYCODONE AND ACETAMINOPHEN 1 TABLET: 10; 325 TABLET ORAL at 08:03

## 2024-03-22 RX ADMIN — OXYCODONE AND ACETAMINOPHEN 1 TABLET: 10; 325 TABLET ORAL at 04:03

## 2024-03-22 RX ADMIN — VANCOMYCIN HYDROCHLORIDE 1000 MG: 1 INJECTION, POWDER, LYOPHILIZED, FOR SOLUTION INTRAVENOUS at 10:03

## 2024-03-22 RX ADMIN — HYDROXYZINE PAMOATE 25 MG: 25 CAPSULE ORAL at 08:03

## 2024-03-22 RX ADMIN — HYDROMORPHONE HYDROCHLORIDE 0.5 MG: 2 INJECTION, SOLUTION INTRAMUSCULAR; INTRAVENOUS; SUBCUTANEOUS at 11:03

## 2024-03-22 RX ADMIN — BACLOFEN 20 MG: 10 TABLET ORAL at 08:03

## 2024-03-22 RX ADMIN — VANCOMYCIN HYDROCHLORIDE 1000 MG: 1 INJECTION, POWDER, LYOPHILIZED, FOR SOLUTION INTRAVENOUS at 09:03

## 2024-03-22 RX ADMIN — MEROPENEM 1 G: 1 INJECTION, POWDER, FOR SOLUTION INTRAVENOUS at 04:03

## 2024-03-22 RX ADMIN — HYDROMORPHONE HYDROCHLORIDE 0.5 MG: 2 INJECTION, SOLUTION INTRAMUSCULAR; INTRAVENOUS; SUBCUTANEOUS at 02:03

## 2024-03-22 RX ADMIN — OXYCODONE AND ACETAMINOPHEN 1 TABLET: 10; 325 TABLET ORAL at 12:03

## 2024-03-22 RX ADMIN — MEROPENEM 1 G: 1 INJECTION, POWDER, FOR SOLUTION INTRAVENOUS at 12:03

## 2024-03-22 RX ADMIN — HYDROMORPHONE HYDROCHLORIDE 0.5 MG: 2 INJECTION, SOLUTION INTRAMUSCULAR; INTRAVENOUS; SUBCUTANEOUS at 10:03

## 2024-03-22 NOTE — PROGRESS NOTES
[facility]                       [address] [address]    PATIENT NAME:       OWEN PIRES  YOB: 1975  CSN:                [id number]   MRN:                [id number]  ADMIT DATE:         03/18/2024 10:14:00  PHYSICIAN:          Miguel Lamb MD                            PROGRESS NOTE    DATE:      SUBJECTIVE:  A 48-year-old  male with osteo of the right foot.    He was evaluated by Podiatry with awaiting for cultures and ID consult to see if   he is a candidate for procedures versus IV antibiotics only.  At the present   time, he is on vancomycin and Merrem IV.  Id wrote in  his note that he may need surgical   intervention, 5th ray amputation.  No other significant new issues.    REVIEW OF SYSTEMS:  X12 as above.    OBJECTIVE:  VITAL SIGNS:  Blood pressure is 115/79, pulse is 75, and temp 98.  GENERAL APPEARANCE:  Alert, in no acute distress.  HEART:  Regular rhythm and rate.  No murmur.  LUNGS:  Clear.  ABDOMEN:  Soft and nontender.  The urine is cloudy.  Suprapubic looks dirty.    There is no drainage from the suprapubic catheter.  EXTREMITIES:  Right foot is dressed.  NEUROLOGICAL:  Unchanged.    LABORATORY DATA:  There are no new labs.    IMPRESSION:  Right foot osteomyelitis, complicated urinary tract infection,   elevated inflammatory markers, paraplegia with neurogenic bladder and bowel,   status post suprapubic catheter, multiple pressure wounds, medical   noncompliance, and drug-seeking behavior, chronic anemia, hypertension,   paroxysmal atrial fibrillation, history of recurrent urinary tract infection,   vitamin D deficiency.    PLAN:  We will continue Merrem and vancomycin.  We will recheck labs tomorrow   including renal function.  We will decrease his Dilaudid to 0.5 every 6 p.r.n.    We will continue with other current  medications.        ______________________________  MD JOHN Aquino/JESENIA  DD:  03/21/2024  Time:  09:21PM  DT:  03/21/2024  Time:  11:28PM  Job #:  [id number]      PROGRESS NOTE
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
## 2024-03-23 LAB
BACTERIA SPEC ANAEROBE CULT: NORMAL
IRON SATN MFR SERPL: 20 % (ref 20–50)
IRON SERPL-MCNC: 50 UG/DL (ref 65–175)
TIBC SERPL-MCNC: 205 UG/DL (ref 69–240)
TIBC SERPL-MCNC: 255 UG/DL (ref 250–450)
TRANSFERRIN SERPL-MCNC: 231 MG/DL (ref 174–364)

## 2024-03-23 PROCEDURE — 11000001 HC ACUTE MED/SURG PRIVATE ROOM

## 2024-03-23 PROCEDURE — 25000003 PHARM REV CODE 250: Performed by: INTERNAL MEDICINE

## 2024-03-23 PROCEDURE — 63600175 PHARM REV CODE 636 W HCPCS: Performed by: INTERNAL MEDICINE

## 2024-03-23 PROCEDURE — 25000003 PHARM REV CODE 250: Performed by: PHYSICIAN ASSISTANT

## 2024-03-23 PROCEDURE — 63600175 PHARM REV CODE 636 W HCPCS: Performed by: PHYSICIAN ASSISTANT

## 2024-03-23 RX ORDER — SERTRALINE HYDROCHLORIDE 50 MG/1
50 TABLET, FILM COATED ORAL DAILY
Status: DISCONTINUED | OUTPATIENT
Start: 2024-03-23 | End: 2024-03-30

## 2024-03-23 RX ORDER — ENOXAPARIN SODIUM 100 MG/ML
1 INJECTION SUBCUTANEOUS EVERY 24 HOURS
Status: DISCONTINUED | OUTPATIENT
Start: 2024-03-23 | End: 2024-03-26

## 2024-03-23 RX ORDER — MIRTAZAPINE 15 MG/1
15 TABLET, FILM COATED ORAL NIGHTLY
Status: DISCONTINUED | OUTPATIENT
Start: 2024-03-23 | End: 2024-04-02 | Stop reason: HOSPADM

## 2024-03-23 RX ORDER — HYDROMORPHONE HYDROCHLORIDE 2 MG/ML
0.2 INJECTION, SOLUTION INTRAMUSCULAR; INTRAVENOUS; SUBCUTANEOUS EVERY 6 HOURS PRN
Status: DISCONTINUED | OUTPATIENT
Start: 2024-03-23 | End: 2024-03-28

## 2024-03-23 RX ORDER — LANOLIN ALCOHOL/MO/W.PET/CERES
1 CREAM (GRAM) TOPICAL DAILY
Status: DISCONTINUED | OUTPATIENT
Start: 2024-03-23 | End: 2024-04-02 | Stop reason: HOSPADM

## 2024-03-23 RX ORDER — OXYBUTYNIN CHLORIDE 5 MG/1
5 TABLET ORAL 2 TIMES DAILY
Status: DISCONTINUED | OUTPATIENT
Start: 2024-03-23 | End: 2024-04-02 | Stop reason: HOSPADM

## 2024-03-23 RX ADMIN — MEROPENEM 1 G: 1 INJECTION, POWDER, FOR SOLUTION INTRAVENOUS at 05:03

## 2024-03-23 RX ADMIN — BACLOFEN 20 MG: 10 TABLET ORAL at 08:03

## 2024-03-23 RX ADMIN — GABAPENTIN 600 MG: 300 CAPSULE ORAL at 03:03

## 2024-03-23 RX ADMIN — HYDROXYZINE PAMOATE 25 MG: 25 CAPSULE ORAL at 03:03

## 2024-03-23 RX ADMIN — MEROPENEM 1 G: 1 INJECTION, POWDER, FOR SOLUTION INTRAVENOUS at 12:03

## 2024-03-23 RX ADMIN — MIRTAZAPINE 15 MG: 15 TABLET, FILM COATED ORAL at 08:03

## 2024-03-23 RX ADMIN — ENOXAPARIN SODIUM 70 MG: 80 INJECTION SUBCUTANEOUS at 05:03

## 2024-03-23 RX ADMIN — VANCOMYCIN HYDROCHLORIDE 750 MG: 750 INJECTION, POWDER, LYOPHILIZED, FOR SOLUTION INTRAVENOUS at 09:03

## 2024-03-23 RX ADMIN — OXYCODONE AND ACETAMINOPHEN 1 TABLET: 10; 325 TABLET ORAL at 01:03

## 2024-03-23 RX ADMIN — HYDROMORPHONE HYDROCHLORIDE 0.5 MG: 2 INJECTION, SOLUTION INTRAMUSCULAR; INTRAVENOUS; SUBCUTANEOUS at 05:03

## 2024-03-23 RX ADMIN — HYDROXYZINE PAMOATE 25 MG: 25 CAPSULE ORAL at 09:03

## 2024-03-23 RX ADMIN — HYDROXYZINE PAMOATE 25 MG: 25 CAPSULE ORAL at 08:03

## 2024-03-23 RX ADMIN — OXYBUTYNIN CHLORIDE 5 MG: 5 TABLET ORAL at 08:03

## 2024-03-23 RX ADMIN — OXYCODONE AND ACETAMINOPHEN 1 TABLET: 10; 325 TABLET ORAL at 09:03

## 2024-03-23 RX ADMIN — GABAPENTIN 600 MG: 300 CAPSULE ORAL at 08:03

## 2024-03-23 RX ADMIN — SERTRALINE HYDROCHLORIDE 50 MG: 50 TABLET ORAL at 03:03

## 2024-03-23 RX ADMIN — HYDROMORPHONE HYDROCHLORIDE 0.5 MG: 2 INJECTION, SOLUTION INTRAMUSCULAR; INTRAVENOUS; SUBCUTANEOUS at 12:03

## 2024-03-23 RX ADMIN — FERROUS SULFATE TAB 325 MG (65 MG ELEMENTAL FE) 1 EACH: 325 (65 FE) TAB at 03:03

## 2024-03-23 RX ADMIN — GABAPENTIN 600 MG: 300 CAPSULE ORAL at 09:03

## 2024-03-23 RX ADMIN — OXYCODONE AND ACETAMINOPHEN 1 TABLET: 10; 325 TABLET ORAL at 05:03

## 2024-03-23 RX ADMIN — POLYETHYLENE GLYCOL 3350 17 G: 17 POWDER, FOR SOLUTION ORAL at 10:03

## 2024-03-23 RX ADMIN — VANCOMYCIN HYDROCHLORIDE 750 MG: 750 INJECTION, POWDER, LYOPHILIZED, FOR SOLUTION INTRAVENOUS at 12:03

## 2024-03-23 RX ADMIN — CYCLOBENZAPRINE 10 MG: 10 TABLET, FILM COATED ORAL at 12:03

## 2024-03-23 RX ADMIN — HYDROMORPHONE HYDROCHLORIDE 0.2 MG: 2 INJECTION INTRAMUSCULAR; INTRAVENOUS; SUBCUTANEOUS at 08:03

## 2024-03-23 RX ADMIN — MEROPENEM 1 G: 1 INJECTION, POWDER, FOR SOLUTION INTRAVENOUS at 09:03
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
## 2024-03-23 NOTE — PROGRESS NOTES
[facility]                       [address] [address]    PATIENT NAME:       OWEN PIRES  YOB: 1975  CSN:                [id number]   MRN:                [id number]  ADMIT DATE:         03/18/2024 10:14:00  PHYSICIAN:          Zafar Julio DPM                            PROGRESS NOTE    DATE:  03/22/2024 00:00:00    SUBJECTIVE:  The patient was seen today, doing well, not voicing any complaints.    No other issues reported.  Remains on antibiotics as per ID.    PHYSICAL EXAMINATION:  VITAL SIGNS:  Stable.  He is afebrile.    Cultures from the right foot, currently pending, but currently growing out   normal skin mei thus far.    The wound looks clean, viable.  Still with some bloody drainage from the area.    No pus or purulence.  No inflammatory changes.    ASSESSMENT:  Right foot wound with evidence of probable osteomyelitis, 5th MPJ.    PLAN:  Wounds were irrigated and re-bandaged.  We will continue with current   care and antibiotics.        ______________________________  Zafar Julio DPM    GAS/AQS  DD:  03/22/2024  Time:  06:32PM  DT:  03/22/2024  Time:  08:45PM  Job #:  [id number]      PROGRESS NOTE

## 2024-03-23 NOTE — PROGRESS NOTES
"Pharmacokinetic Assessment Follow Up: IV Vancomycin    Vancomycin serum concentration assessment(s):    The trough level was drawn correctly and can be used to guide therapy at this time. The measurement is above the desired definitive target range of 15 to 20 mcg/mL.    Vancomycin Regimen Plan:    Change regimen to Vancomycin 750 mg IV every 12 hours with next serum trough concentration measured at 03/25 prior to 5th dose on 0900    Drug levels (last 3 results):  Recent Labs   Lab Result Units 03/20/24  1508 03/21/24  0400 03/22/24  2107   Vanc Lvl Random ug/ml  --  19.8  --    Vancomycin Trough ug/ml 22.1*  --  20.5*       Pharmacy will continue to follow and monitor vancomycin.    Please contact pharmacy at extension [phone] for questions regarding this assessment.    Thank you for the consult,   Renea Slater       Patient brief summary:  Hemal Guerrero is a 48 y.o. male initiated on antimicrobial therapy with IV Vancomycin for treatment of bone/joint infection/SSTI    The patient's current regimen is 750mg Q12    Drug Allergies:   Review of patient's allergies indicates:   Allergen Reactions    Amitriptyline        Actual Body Weight:   69.5kg    Renal Function:   Estimated Creatinine Clearance: 123.7 mL/min (A) (based on SCr of 0.71 mg/dL (L)).,     Dialysis Method (if applicable):  N/A    CBC (last 72 hours):  No results for input(s): "WHITE BLOOD CELL COUNT", "HEMOGLOBIN", "HEMATOCRIT", "PLATELETS", "GRAN%", "LYMPH%", "MONO%", "EOSINOPHIL%", "BASOPHIL%", "DIFFERENTIAL METHOD" in the last 72 hours.    Metabolic Panel (last 72 hours):  No results for input(s): "SODIUM", "POTASSIUM", "CHLORIDE", "CO2", "GLUCOSE", "BUN BLD", "CREATININE", "ALBUMIN", "BILIRUBIN TOTAL", "ALK PHOS", "AST", "ALT", "MAGNESIUM", "PHOSPHORUS" in the last 72 hours.    Vancomycin Administrations:  vancomycin given in the last 96 hours                     vancomycin in dextrose 5 % 1 gram/250 mL IVPB 1,000 mg (mg) 1,000 mg New Bag 03/22/24 " 2129     1,000 mg New Bag  1048     1,000 mg New Bag 03/21/24 2236     1,000 mg New Bag  0925    vancomycin 1.25 g in dextrose 5% 250 mL IVPB (ready to mix) (mg) 1,250 mg New Bag 03/20/24 1522     1,250 mg New Bag  0416     1,250 mg New Bag 03/19/24 1709                    Microbiologic Results:  Microbiology Results (last 7 days)       Procedure Component Value Units Date/Time    Wound Culture [[id number]] Collected: 03/20/24 1841    Order Status: Completed Specimen: Wound from Foot, Right Updated: 03/22/24 0914     Wound Culture Normal Mei    Anaerobic Culture [[id number]] Collected: 03/20/24 1841    Order Status: Completed Specimen: Exudate from Foot, Right Updated: 03/22/24 0736     Anaerobe Culture No Anaerobes Isolated    Urine culture [[id number]]  (Abnormal) Collected: 03/18/24 1253    Order Status: Completed Specimen: Urine, Supra Pubic Updated: 03/19/24 1032     Urine Culture Multiple organisms present indicate probable contamination. Recommend recollection.      >/= 100,000 colonies/ml - two different species of Gram-negative Rods      50,000-75,000 colonies/ml GAMMA STREPTOCOCCUS     Comment: We have not further evaluated these organisms because three or more species compatible with normal genital mei usually represents specimen contamination from the time of collection, rather than infection. If clinical circumstances warrant further   workup of these organisms, please contact the Microbiology dept at [phone]; otherwise please have the patient submit another specimen.

## 2024-03-23 NOTE — PROGRESS NOTES
[facility]                       [address] [id number]    PATIENT NAME:       OWEN PIRES  YOB: 1975  CSN:                [id number]   MRN:                [id number]  ADMIT DATE:         03/18/2024 10:14:00  PHYSICIAN:          Zafar Julio DPM                            PROGRESS NOTE    DATE:  03/23/2024 00:00:00    SUBJECTIVE:  The patient re-evaluated.  Update cultures.  No reported issues   over the evening hours.  No reported fevers or chills.    PHYSICAL EXAMINATION:  VITAL SIGNS:  Stable and afebrile.  EXTREMITIES:  Dressings are intact.  No wound inspection today.  Boots in place.    LABORATORY DATA:  Preliminary cultures growing out moderate Staph aureus.    Anaerobes are still pending, but appear to be negative to date.    ASSESSMENT:  Right foot wound infection, suspected 5th MPJ osteomyelitis.    PLAN:  We will continue the current care.  Recheck wounds tomorrow.  Continue   antibiotics as per Infectious Disease.        ______________________________  Zafar Julio DPM    GAS/AQS  DD:  03/23/2024  Time:  12:36PM  DT:  03/23/2024  Time:  12:45PM  Job #:  [id number]      PROGRESS NOTE

## 2024-03-23 NOTE — PROGRESS NOTES
[facility]                       [address] [address]    PATIENT NAME:       OWEN PIRES  YOB: 1975  CSN:                [id number]   MRN:                [id number]  ADMIT DATE:         03/18/2024 10:14:00  PHYSICIAN:          Miguel Lamb MD                            PROGRESS NOTE    DATE:      SUBJECTIVE:  A 48-year-old  male, who came on the 18th to the   hospital with infected right foot, found to have osteomyelitis.  Also, he did   have some hematuria as well.  He was found to have hemorrhagic cystitis.  He   does have paraplegia.  He has not had any fever or chills.  Denies any chest   pain, palpitations, headaches, or any other problems.    REVIEW OF SYSTEMS:  X12 as above.    OBJECTIVE:  VITAL SIGNS:  Blood pressure 103/67, pulse is 82, and temperature   98.1.  GENERAL APPEARANCE:  He is alert, in no acute distress.  HEART:  Regular rhythm and rate.  LUNGS:  Clear.  ABDOMEN:  Soft, nontender.  Bowel sounds positive and normal.  Suprapubic in   place.  EXTREMITIES:  He has the right foot dressed.  NEUROLOGICAL:  Unchanged.    LABORATORY DATA:  Preliminary culture shows some moderate Staph aureus.  No   other labs at the present time.  Urine showed gamma streptococcus and some   gram-negative rods.    IMPRESSION:  Hemorrhagic cystitis, right foot osteomyelitis, has history of   paraplegia with neurogenic bladder and bowel, paroxysmal atrial fibrillation,   multiple pressure ulcers, medical noncompliance, drug-seeking behavior, chronic   anemia, recurrent urinary tract infections, and vitamin deficiency as well as   hypertension.    PLAN:  We will continue with Merrem and vancomycin at the present time.    Awaiting for the cultures.  We will recheck labs tomorrow including renal   function.        ______________________________  Miguel Lamb MD    JOHN/AQS  DD:  03/23/2024   Time:  02:29PM  DT:  03/23/2024  Time:  04:40PM  Job #:  [id number]      PROGRESS NOTE

## 2024-03-24 LAB
ALBUMIN SERPL-MCNC: 3.5 G/DL (ref 3.5–5)
ALBUMIN/GLOB SERPL: 0.8 RATIO (ref 1.1–2)
ALP SERPL-CCNC: 102 UNIT/L (ref 40–150)
ALT SERPL-CCNC: 39 UNIT/L (ref 0–55)
AST SERPL-CCNC: 29 UNIT/L (ref 5–34)
BACTERIA WND CULT: ABNORMAL
BASOPHILS # BLD AUTO: 0.17 X10(3)/MCL
BASOPHILS NFR BLD AUTO: 1.6 %
BILIRUB SERPL-MCNC: 0.4 MG/DL
BUN SERPL-MCNC: 17.2 MG/DL (ref 8.9–20.6)
CALCIUM SERPL-MCNC: 9.6 MG/DL (ref 8.4–10.2)
CHLORIDE SERPL-SCNC: 104 MMOL/L (ref 98–107)
CO2 SERPL-SCNC: 27 MMOL/L (ref 22–29)
CREAT SERPL-MCNC: 0.65 MG/DL (ref 0.73–1.18)
EOSINOPHIL # BLD AUTO: 0.28 X10(3)/MCL (ref 0–0.9)
EOSINOPHIL NFR BLD AUTO: 2.6 %
ERYTHROCYTE [DISTWIDTH] IN BLOOD BY AUTOMATED COUNT: 14.4 % (ref 11.5–17)
GFR SERPLBLD CREATININE-BSD FMLA CKD-EPI: >60 MLS/MIN/1.73/M2
GLOBULIN SER-MCNC: 4.4 GM/DL (ref 2.4–3.5)
GLUCOSE SERPL-MCNC: 109 MG/DL (ref 74–100)
HCT VFR BLD AUTO: 41.4 % (ref 42–52)
HGB BLD-MCNC: 12.6 G/DL (ref 14–18)
IMM GRANULOCYTES # BLD AUTO: 0.04 X10(3)/MCL (ref 0–0.04)
IMM GRANULOCYTES NFR BLD AUTO: 0.4 %
LYMPHOCYTES # BLD AUTO: 2.33 X10(3)/MCL (ref 0.6–4.6)
LYMPHOCYTES NFR BLD AUTO: 21.3 %
MCH RBC QN AUTO: 25.4 PG (ref 27–31)
MCHC RBC AUTO-ENTMCNC: 30.4 G/DL (ref 33–36)
MCV RBC AUTO: 83.3 FL (ref 80–94)
MONOCYTES # BLD AUTO: 0.58 X10(3)/MCL (ref 0.1–1.3)
MONOCYTES NFR BLD AUTO: 5.3 %
NEUTROPHILS # BLD AUTO: 7.54 X10(3)/MCL (ref 2.1–9.2)
NEUTROPHILS NFR BLD AUTO: 68.8 %
NRBC BLD AUTO-RTO: 0 %
PLATELET # BLD AUTO: 291 X10(3)/MCL (ref 130–400)
PMV BLD AUTO: 9.9 FL (ref 7.4–10.4)
POTASSIUM SERPL-SCNC: 4.7 MMOL/L (ref 3.5–5.1)
PREALB SERPL-MCNC: 26.3 MG/DL (ref 18–45)
PROT SERPL-MCNC: 7.9 GM/DL (ref 6.4–8.3)
RBC # BLD AUTO: 4.97 X10(6)/MCL (ref 4.7–6.1)
SODIUM SERPL-SCNC: 140 MMOL/L (ref 136–145)
WBC # SPEC AUTO: 10.94 X10(3)/MCL (ref 4.5–11.5)

## 2024-03-24 PROCEDURE — 63600175 PHARM REV CODE 636 W HCPCS: Performed by: INTERNAL MEDICINE

## 2024-03-24 PROCEDURE — 63600175 PHARM REV CODE 636 W HCPCS: Performed by: PHYSICIAN ASSISTANT

## 2024-03-24 PROCEDURE — 85025 COMPLETE CBC W/AUTO DIFF WBC: CPT | Performed by: INTERNAL MEDICINE

## 2024-03-24 PROCEDURE — 25000003 PHARM REV CODE 250: Performed by: PHYSICIAN ASSISTANT

## 2024-03-24 PROCEDURE — 84134 ASSAY OF PREALBUMIN: CPT | Performed by: INTERNAL MEDICINE

## 2024-03-24 PROCEDURE — 25000003 PHARM REV CODE 250: Performed by: INTERNAL MEDICINE

## 2024-03-24 PROCEDURE — 11000001 HC ACUTE MED/SURG PRIVATE ROOM

## 2024-03-24 PROCEDURE — 80053 COMPREHEN METABOLIC PANEL: CPT | Performed by: INTERNAL MEDICINE

## 2024-03-24 RX ADMIN — GABAPENTIN 600 MG: 300 CAPSULE ORAL at 03:03

## 2024-03-24 RX ADMIN — GABAPENTIN 600 MG: 300 CAPSULE ORAL at 08:03

## 2024-03-24 RX ADMIN — CYCLOBENZAPRINE 10 MG: 10 TABLET, FILM COATED ORAL at 11:03

## 2024-03-24 RX ADMIN — BACLOFEN 20 MG: 10 TABLET ORAL at 08:03

## 2024-03-24 RX ADMIN — MIRTAZAPINE 15 MG: 15 TABLET, FILM COATED ORAL at 08:03

## 2024-03-24 RX ADMIN — HYDROXYZINE PAMOATE 25 MG: 25 CAPSULE ORAL at 08:03

## 2024-03-24 RX ADMIN — MEROPENEM 1 G: 1 INJECTION, POWDER, FOR SOLUTION INTRAVENOUS at 05:03

## 2024-03-24 RX ADMIN — HYDROMORPHONE HYDROCHLORIDE 0.2 MG: 2 INJECTION INTRAMUSCULAR; INTRAVENOUS; SUBCUTANEOUS at 12:03

## 2024-03-24 RX ADMIN — POLYETHYLENE GLYCOL 3350 17 G: 17 POWDER, FOR SOLUTION ORAL at 11:03

## 2024-03-24 RX ADMIN — SERTRALINE HYDROCHLORIDE 50 MG: 50 TABLET ORAL at 08:03

## 2024-03-24 RX ADMIN — OXYBUTYNIN CHLORIDE 5 MG: 5 TABLET ORAL at 08:03

## 2024-03-24 RX ADMIN — FERROUS SULFATE TAB 325 MG (65 MG ELEMENTAL FE) 1 EACH: 325 (65 FE) TAB at 08:03

## 2024-03-24 RX ADMIN — HYDROMORPHONE HYDROCHLORIDE 0.2 MG: 2 INJECTION INTRAMUSCULAR; INTRAVENOUS; SUBCUTANEOUS at 07:03

## 2024-03-24 RX ADMIN — OXYCODONE AND ACETAMINOPHEN 1 TABLET: 10; 325 TABLET ORAL at 08:03

## 2024-03-24 RX ADMIN — MEROPENEM 1 G: 1 INJECTION, POWDER, FOR SOLUTION INTRAVENOUS at 08:03

## 2024-03-24 RX ADMIN — OXYCODONE AND ACETAMINOPHEN 1 TABLET: 10; 325 TABLET ORAL at 05:03

## 2024-03-24 RX ADMIN — VANCOMYCIN HYDROCHLORIDE 750 MG: 750 INJECTION, POWDER, LYOPHILIZED, FOR SOLUTION INTRAVENOUS at 09:03

## 2024-03-24 RX ADMIN — VANCOMYCIN HYDROCHLORIDE 750 MG: 750 INJECTION, POWDER, LYOPHILIZED, FOR SOLUTION INTRAVENOUS at 11:03

## 2024-03-24 RX ADMIN — HYDROMORPHONE HYDROCHLORIDE 0.2 MG: 2 INJECTION INTRAMUSCULAR; INTRAVENOUS; SUBCUTANEOUS at 05:03

## 2024-03-24 RX ADMIN — ENOXAPARIN SODIUM 70 MG: 80 INJECTION SUBCUTANEOUS at 05:03

## 2024-03-24 RX ADMIN — MEROPENEM 1 G: 1 INJECTION, POWDER, FOR SOLUTION INTRAVENOUS at 01:03

## 2024-03-24 RX ADMIN — HYDROXYZINE PAMOATE 25 MG: 25 CAPSULE ORAL at 03:03

## 2024-03-24 NOTE — PROGRESS NOTES
[facility]                       [address] [address]    PATIENT NAME:       OWEN PIRES  YOB: 1975  CSN:                [id number]   MRN:                [id number]  ADMIT DATE:         03/18/2024 10:14:00  PHYSICIAN:          Zafar Julio DPM                            PROGRESS NOTE    DATE:  03/24/2024 00:00:00    SUBJECTIVE:  The patient was seen this morning.  He is doing well, not voicing   any complaints.  No reported fevers or chills.    PHYSICAL EXAMINATION:  Vital signs are stable and afebrile.    LABORATORY STUDIES:  White cell counts 10.9, BUN and creatinine 17.2 and 0.65.    Cultures growing out moderate MRSA.  Anaerobes at this point are negative.    Wound looks better.  There is minimal exudate.  No purulence.  No fluctuance or   crepitation.  The 5th digit rectus, but flail.    ASSESSMENT:  Right foot wound infection, osteomyelitis.  Suspect more chronic.    PLAN:  The patient instructed about the findings of physical exam.  Clinically,   he is doing better.  White cell counts remain normalized.  Less drainage from   the site.  Discussed options with him, those being ray amputation versus simple   local care and IV antibiotics.  My feeling is this is probably more chronic   based upon the appearance of the bone and the previous healed wound plantarly.    It may or may not completely resolve with antibiotics alone, although there is   no definite in that sense.  He will think about it.  I will discuss with him   tomorrow.  The earliest I would be able to do this surgery is beyond Tuesday.    In the meantime, continue with all other care and dressings were placed.        ______________________________  Zafar Julio DPM    GAS/AQS  DD:  03/24/2024  Time:  09:39AM  DT:  03/24/2024  Time:  09:51AM  Job #:  [id number]      PROGRESS NOTE
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
## 2024-03-25 LAB
ALBUMIN SERPL-MCNC: 3.3 G/DL (ref 3.5–5)
ALBUMIN/GLOB SERPL: 0.8 RATIO (ref 1.1–2)
ALP SERPL-CCNC: 100 UNIT/L (ref 40–150)
ALT SERPL-CCNC: 41 UNIT/L (ref 0–55)
AST SERPL-CCNC: 26 UNIT/L (ref 5–34)
BILIRUB SERPL-MCNC: 0.4 MG/DL
BUN SERPL-MCNC: 18 MG/DL (ref 8.9–20.6)
CALCIUM SERPL-MCNC: 9.4 MG/DL (ref 8.4–10.2)
CHLORIDE SERPL-SCNC: 105 MMOL/L (ref 98–107)
CO2 SERPL-SCNC: 25 MMOL/L (ref 22–29)
CREAT SERPL-MCNC: 0.67 MG/DL (ref 0.73–1.18)
GFR SERPLBLD CREATININE-BSD FMLA CKD-EPI: >60 MLS/MIN/1.73/M2
GLOBULIN SER-MCNC: 4.2 GM/DL (ref 2.4–3.5)
GLUCOSE SERPL-MCNC: 97 MG/DL (ref 74–100)
POTASSIUM SERPL-SCNC: 4.8 MMOL/L (ref 3.5–5.1)
PROT SERPL-MCNC: 7.5 GM/DL (ref 6.4–8.3)
SODIUM SERPL-SCNC: 138 MMOL/L (ref 136–145)
VANCOMYCIN TROUGH SERPL-MCNC: 18.8 UG/ML (ref 15–20)

## 2024-03-25 PROCEDURE — 80202 ASSAY OF VANCOMYCIN: CPT | Performed by: INTERNAL MEDICINE

## 2024-03-25 PROCEDURE — 11000001 HC ACUTE MED/SURG PRIVATE ROOM

## 2024-03-25 PROCEDURE — 80053 COMPREHEN METABOLIC PANEL: CPT | Performed by: INTERNAL MEDICINE

## 2024-03-25 PROCEDURE — 25000003 PHARM REV CODE 250: Performed by: PHYSICIAN ASSISTANT

## 2024-03-25 PROCEDURE — 63600175 PHARM REV CODE 636 W HCPCS: Performed by: INTERNAL MEDICINE

## 2024-03-25 PROCEDURE — 25000003 PHARM REV CODE 250: Performed by: INTERNAL MEDICINE

## 2024-03-25 PROCEDURE — 63600175 PHARM REV CODE 636 W HCPCS: Performed by: PHYSICIAN ASSISTANT

## 2024-03-25 RX ORDER — CEFAZOLIN SODIUM 2 G/50ML
2 SOLUTION INTRAVENOUS
Status: DISCONTINUED | OUTPATIENT
Start: 2024-03-25 | End: 2024-04-02 | Stop reason: HOSPADM

## 2024-03-25 RX ADMIN — HYDROMORPHONE HYDROCHLORIDE 0.2 MG: 2 INJECTION INTRAMUSCULAR; INTRAVENOUS; SUBCUTANEOUS at 12:03

## 2024-03-25 RX ADMIN — MEROPENEM 1 G: 1 INJECTION, POWDER, FOR SOLUTION INTRAVENOUS at 05:03

## 2024-03-25 RX ADMIN — HYDROMORPHONE HYDROCHLORIDE 0.2 MG: 2 INJECTION INTRAMUSCULAR; INTRAVENOUS; SUBCUTANEOUS at 07:03

## 2024-03-25 RX ADMIN — ACETAMINOPHEN 650 MG: 325 TABLET, FILM COATED ORAL at 12:03

## 2024-03-25 RX ADMIN — MEROPENEM 1 G: 1 INJECTION, POWDER, FOR SOLUTION INTRAVENOUS at 08:03

## 2024-03-25 RX ADMIN — OXYCODONE AND ACETAMINOPHEN 1 TABLET: 10; 325 TABLET ORAL at 02:03

## 2024-03-25 RX ADMIN — GABAPENTIN 600 MG: 300 CAPSULE ORAL at 07:03

## 2024-03-25 RX ADMIN — OXYBUTYNIN CHLORIDE 5 MG: 5 TABLET ORAL at 07:03

## 2024-03-25 RX ADMIN — HYDROXYZINE PAMOATE 25 MG: 25 CAPSULE ORAL at 02:03

## 2024-03-25 RX ADMIN — OXYBUTYNIN CHLORIDE 5 MG: 5 TABLET ORAL at 08:03

## 2024-03-25 RX ADMIN — OXYCODONE AND ACETAMINOPHEN 1 TABLET: 10; 325 TABLET ORAL at 11:03

## 2024-03-25 RX ADMIN — SERTRALINE HYDROCHLORIDE 50 MG: 50 TABLET ORAL at 08:03

## 2024-03-25 RX ADMIN — FERROUS SULFATE TAB 325 MG (65 MG ELEMENTAL FE) 1 EACH: 325 (65 FE) TAB at 08:03

## 2024-03-25 RX ADMIN — POLYETHYLENE GLYCOL 3350 17 G: 17 POWDER, FOR SOLUTION ORAL at 02:03

## 2024-03-25 RX ADMIN — GABAPENTIN 600 MG: 300 CAPSULE ORAL at 02:03

## 2024-03-25 RX ADMIN — CYCLOBENZAPRINE 10 MG: 10 TABLET, FILM COATED ORAL at 12:03

## 2024-03-25 RX ADMIN — MIRTAZAPINE 15 MG: 15 TABLET, FILM COATED ORAL at 07:03

## 2024-03-25 RX ADMIN — VANCOMYCIN HYDROCHLORIDE 750 MG: 750 INJECTION, POWDER, LYOPHILIZED, FOR SOLUTION INTRAVENOUS at 11:03

## 2024-03-25 RX ADMIN — CEFAZOLIN SODIUM 2 G: 1 INJECTION, POWDER, FOR SOLUTION INTRAMUSCULAR; INTRAVENOUS at 10:03

## 2024-03-25 RX ADMIN — BACLOFEN 20 MG: 10 TABLET ORAL at 07:03

## 2024-03-25 RX ADMIN — HYDROXYZINE PAMOATE 25 MG: 25 CAPSULE ORAL at 07:03

## 2024-03-25 RX ADMIN — GABAPENTIN 600 MG: 300 CAPSULE ORAL at 08:03

## 2024-03-25 RX ADMIN — ENOXAPARIN SODIUM 70 MG: 80 INJECTION SUBCUTANEOUS at 05:03

## 2024-03-25 RX ADMIN — MEROPENEM 1 G: 1 INJECTION, POWDER, FOR SOLUTION INTRAVENOUS at 01:03

## 2024-03-25 RX ADMIN — HYDROMORPHONE HYDROCHLORIDE 0.2 MG: 2 INJECTION INTRAMUSCULAR; INTRAVENOUS; SUBCUTANEOUS at 01:03

## 2024-03-25 RX ADMIN — OXYCODONE AND ACETAMINOPHEN 1 TABLET: 10; 325 TABLET ORAL at 04:03

## 2024-03-25 RX ADMIN — HYDROMORPHONE HYDROCHLORIDE 0.2 MG: 2 INJECTION INTRAMUSCULAR; INTRAVENOUS; SUBCUTANEOUS at 06:03

## 2024-03-25 RX ADMIN — HYDROXYZINE PAMOATE 25 MG: 25 CAPSULE ORAL at 08:03

## 2024-03-25 NOTE — PROGRESS NOTES
[facility]                       [address] [id number]    PATIENT NAME:       OWEN PIRES  YOB: 1975  CSN:                [id number]   MRN:                [id number]  ADMIT DATE:         03/18/2024 10:14:00  PHYSICIAN:          Miguel Lamb MD                            PROGRESS NOTE    DATE:      SUBJECTIVE:  A 48-year-old  male.  He is doing fairly good.    Denies any shortness of breath, chest pain or palpitations.  No fever or chills.    He is on IV antibiotics for osteo of his right foot.  He has been followed by   Dr. Julio; podiatrist and Dr. harris, Infectious Disease.  No significant issues.    REVIEW OF SYSTEMS:  Review of systems x12 as above.    OBJECTIVE:  VITAL SIGNS:  Blood pressure 110/76, pulse 111, temp 98.2.  GENERAL APPEARANCE:  He is alert, in no acute distress.  HEART:  Slightly tachycardic.  S1, S2 audible.  LUNGS:  Clear.  ABDOMEN:  Soft, nontender.  There is a suprapubic in the area.  EXTREMITIES:  Right foot dressed.   NEUROLOGICAL:  Unchanged.    LABORATORY DATA:  White cell count 10.94, H and H 12.6 and 41.4 with a platelet   count of 291, iron level 50.  BUN 17.2, creatinine 2.65.  Albumin is 3.5.    IMPRESSION:  Hemorrhagic cystitis, right foot osteo, history of paraplegia,   neurogenic bladder and bowel, paroxysmal atrial fibrillation, multiple ulcers,   medical noncompliance, drug-seeking behavior, chronic anemia, recurrent urinary   tract infections and vitamin D deficiency, hypertension.    PLAN:  Continue with Merrem and vancomycin until cultures are back.  We will   continue to have DVT prophylaxis.  We will continue current medications.      ______________________________  Miguel Lamb MD    JOHN/AQS  DD:  03/25/2024  Time:  07:57AM  DT:  03/25/2024  Time:  08:47AM  Job #:  [id number]      PROGRESS NOTE

## 2024-03-26 LAB
ALBUMIN SERPL-MCNC: 3.2 G/DL (ref 3.5–5)
ALBUMIN/GLOB SERPL: 0.8 RATIO (ref 1.1–2)
ALP SERPL-CCNC: 94 UNIT/L (ref 40–150)
ALT SERPL-CCNC: 29 UNIT/L (ref 0–55)
AST SERPL-CCNC: 18 UNIT/L (ref 5–34)
BILIRUB SERPL-MCNC: 0.2 MG/DL
BUN SERPL-MCNC: 23.6 MG/DL (ref 8.9–20.6)
CALCIUM SERPL-MCNC: 9.4 MG/DL (ref 8.4–10.2)
CHLORIDE SERPL-SCNC: 103 MMOL/L (ref 98–107)
CO2 SERPL-SCNC: 26 MMOL/L (ref 22–29)
CREAT SERPL-MCNC: 0.69 MG/DL (ref 0.73–1.18)
GFR SERPLBLD CREATININE-BSD FMLA CKD-EPI: >60 MLS/MIN/1.73/M2
GLOBULIN SER-MCNC: 3.8 GM/DL (ref 2.4–3.5)
GLUCOSE SERPL-MCNC: 86 MG/DL (ref 74–100)
POTASSIUM SERPL-SCNC: 4.5 MMOL/L (ref 3.5–5.1)
PROT SERPL-MCNC: 7 GM/DL (ref 6.4–8.3)
SODIUM SERPL-SCNC: 138 MMOL/L (ref 136–145)

## 2024-03-26 PROCEDURE — 80053 COMPREHEN METABOLIC PANEL: CPT | Performed by: INTERNAL MEDICINE

## 2024-03-26 PROCEDURE — 63600175 PHARM REV CODE 636 W HCPCS: Performed by: INTERNAL MEDICINE

## 2024-03-26 PROCEDURE — 25000003 PHARM REV CODE 250: Performed by: INTERNAL MEDICINE

## 2024-03-26 PROCEDURE — 11000001 HC ACUTE MED/SURG PRIVATE ROOM

## 2024-03-26 PROCEDURE — 25000003 PHARM REV CODE 250: Performed by: PHYSICIAN ASSISTANT

## 2024-03-26 RX ADMIN — HYDROXYZINE PAMOATE 25 MG: 25 CAPSULE ORAL at 09:03

## 2024-03-26 RX ADMIN — APIXABAN 5 MG: 5 TABLET, FILM COATED ORAL at 09:03

## 2024-03-26 RX ADMIN — OXYBUTYNIN CHLORIDE 5 MG: 5 TABLET ORAL at 09:03

## 2024-03-26 RX ADMIN — GABAPENTIN 600 MG: 300 CAPSULE ORAL at 09:03

## 2024-03-26 RX ADMIN — FERROUS SULFATE TAB 325 MG (65 MG ELEMENTAL FE) 1 EACH: 325 (65 FE) TAB at 09:03

## 2024-03-26 RX ADMIN — OXYCODONE AND ACETAMINOPHEN 1 TABLET: 10; 325 TABLET ORAL at 05:03

## 2024-03-26 RX ADMIN — CYCLOBENZAPRINE 10 MG: 10 TABLET, FILM COATED ORAL at 09:03

## 2024-03-26 RX ADMIN — ENOXAPARIN SODIUM 70 MG: 80 INJECTION SUBCUTANEOUS at 05:03

## 2024-03-26 RX ADMIN — HYDROXYZINE PAMOATE 25 MG: 25 CAPSULE ORAL at 03:03

## 2024-03-26 RX ADMIN — CEFAZOLIN SODIUM 2 G: 1 INJECTION, POWDER, FOR SOLUTION INTRAMUSCULAR; INTRAVENOUS at 05:03

## 2024-03-26 RX ADMIN — OXYCODONE AND ACETAMINOPHEN 1 TABLET: 10; 325 TABLET ORAL at 08:03

## 2024-03-26 RX ADMIN — MIRTAZAPINE 15 MG: 15 TABLET, FILM COATED ORAL at 09:03

## 2024-03-26 RX ADMIN — CEFAZOLIN SODIUM 2 G: 1 INJECTION, POWDER, FOR SOLUTION INTRAMUSCULAR; INTRAVENOUS at 04:03

## 2024-03-26 RX ADMIN — HYDROMORPHONE HYDROCHLORIDE 0.2 MG: 2 INJECTION INTRAMUSCULAR; INTRAVENOUS; SUBCUTANEOUS at 01:03

## 2024-03-26 RX ADMIN — CEFAZOLIN SODIUM 2 G: 1 INJECTION, POWDER, FOR SOLUTION INTRAMUSCULAR; INTRAVENOUS at 09:03

## 2024-03-26 RX ADMIN — POLYETHYLENE GLYCOL 3350 17 G: 17 POWDER, FOR SOLUTION ORAL at 05:03

## 2024-03-26 RX ADMIN — SERTRALINE HYDROCHLORIDE 50 MG: 50 TABLET ORAL at 09:03

## 2024-03-26 RX ADMIN — BACLOFEN 20 MG: 10 TABLET ORAL at 09:03

## 2024-03-26 RX ADMIN — HYDROMORPHONE HYDROCHLORIDE 0.2 MG: 2 INJECTION INTRAMUSCULAR; INTRAVENOUS; SUBCUTANEOUS at 06:03

## 2024-03-26 RX ADMIN — HYDROMORPHONE HYDROCHLORIDE 0.2 MG: 2 INJECTION INTRAMUSCULAR; INTRAVENOUS; SUBCUTANEOUS at 12:03

## 2024-03-26 RX ADMIN — GABAPENTIN 600 MG: 300 CAPSULE ORAL at 03:03
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
## 2024-03-26 NOTE — PROGRESS NOTES
[facility]                       [address] [id number]    PATIENT NAME:       OWEN PIRES  YOB: 1975  CSN:                [id number]   MRN:                [id number]  ADMIT DATE:         03/18/2024 10:14:00  PHYSICIAN:          Zafar Julio DPM                            PROGRESS NOTE    DATE:  03/25/2024 05:53:12    SUBJECTIVE:  The patient was seen today he is doing well.  No major complaints.    No reported fevers or chills.    PHYSICAL EXAMINATION:  Vital signs stable and afebrile.    Wound site already looks markedly better.  Scant exudate.  No inflammatory   changes fluctuance or crepitation.  The 5th toe is viable.    LABORATORY DATA:  No labs posted for today.    Updates on his cultures grew out MRSA.  MSSA anaerobes are negative.    ASSESSMENT:    1. Right foot wound with evidence of underlying probable subacute osteomyelitis   based on history.  2. Sacral decubitus with possible osteomyelitis.    PLAN:  Again long conversation with the patient.  Simple debridement is going to   be of no benefit for this gentleman.  He requires either a ray amputation or   attempting conservative care with IV antibiotics and monitor resolution of the   wound and inflammatory markers.  Wait for ID input concerning long-term   antibiotic usage.  I explained to the gentleman if he chooses to proceed with   the ray amputation, we can put him on the schedule for tomorrow if he would like   to try the conservative route, especially if he is going to require long-term   antibiosis for his sacral area.  We can always try the conservative route for   while and if there is no improvement clinically and with his markers, then he   may need to proceed with amputation.  In the meantime, we will continue with   current care.  Dressings were changed.    ______________________________  Zafar Julio  DANIEL    GAS/AQS  DD:  03/25/2024  Time:  05:09PM  DT:  03/25/2024  Time:  07:28PM  Job #:  [id number]      PROGRESS NOTE
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
## 2024-03-26 NOTE — PROGRESS NOTES
[facility]                       [address] [address]    PATIENT NAME:       OWEN PIRES  YOB: 1975  CSN:                [id number]   MRN:                [id number]  ADMIT DATE:         03/18/2024 10:14:00  PHYSICIAN:          Miguel Lamb MD                            PROGRESS NOTE    DATE:      SUBJECTIVE:  A 48-year-old  male.  He is doing okay.  No   shortness of breath, chest pain, palpitations, or other new problems.  He has   been afebrile.  No other significant problems.  Vital signs have been stable.    REVIEW OF SYSTEMS:  Times 12 as above.    OBJECTIVE:  VITAL SIGNS:  Blood pressure was 112/78, pulse 79, temperature 98.1.  GENERAL APPEARANCE:  He is alert, in no acute distress.  HEART:  Regular rhythm and rate.  LUNGS:  Clear.  ABDOMEN:  Soft, nontender.  Suprapubic in place.  EXTREMITIES:  Right foot is dressed.    NEUROLOGICAL:  Unchanged.    LABS:  No labs for today.    IMPRESSION:  Right foot wound with osteo, history of paraplegia with neurogenic   bladder and bowel, urinary tract infection.  He has history of paroxysmal atrial   fibrillation, hypertension, drug-seeking behavior.    PLAN:  We will continue Merrem and vancomycin as per ID.  He will continue to be   followed by Podiatry, Dr. Julio.  We will continue with DVT prophylaxis.        ______________________________  MD JOHN Aquino/TENAS  DD:  03/25/2024  Time:  10:17PM  DT:  03/26/2024  Time:  12:28AM  Job #:  [id number]      PROGRESS NOTE
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
## 2024-03-26 NOTE — PLAN OF CARE
Clinical updates sent to STAT . Pending plan regarding further surgery intervention vs home with long-term IV abx.    Jerri Arechiga LCSW

## 2024-03-26 NOTE — PLAN OF CARE
Problem: Adult Inpatient Plan of Care  Goal: Plan of Care Review  Outcome: Ongoing, Progressing  Goal: Patient-Specific Goal (Individualized)  Outcome: Ongoing, Progressing  Goal: Absence of Hospital-Acquired Illness or Injury  Outcome: Ongoing, Progressing  Goal: Optimal Comfort and Wellbeing  Outcome: Ongoing, Progressing  Goal: Readiness for Transition of Care  Outcome: Ongoing, Progressing     Problem: Impaired Wound Healing  Goal: Optimal Wound Healing  Outcome: Ongoing, Progressing     Problem: Fall Injury Risk  Goal: Absence of Fall and Fall-Related Injury  Outcome: Ongoing, Progressing     Problem: Skin Injury Risk Increased  Goal: Skin Health and Integrity  Outcome: Ongoing, Progressing      Per DR. Lundy RECOMMEND TO STAY OM METOPROLOL 25 MG. DAILY. DISCUSS NEXT VISIT.        Pt daughter Redge Faster informed

## 2024-03-27 PROCEDURE — 63600175 PHARM REV CODE 636 W HCPCS: Performed by: INTERNAL MEDICINE

## 2024-03-27 PROCEDURE — 25000003 PHARM REV CODE 250: Performed by: INTERNAL MEDICINE

## 2024-03-27 PROCEDURE — 25000003 PHARM REV CODE 250: Performed by: PHYSICIAN ASSISTANT

## 2024-03-27 PROCEDURE — 11000001 HC ACUTE MED/SURG PRIVATE ROOM

## 2024-03-27 RX ADMIN — CEFAZOLIN SODIUM 2 G: 1 INJECTION, POWDER, FOR SOLUTION INTRAMUSCULAR; INTRAVENOUS at 07:03

## 2024-03-27 RX ADMIN — HYDROXYZINE PAMOATE 25 MG: 25 CAPSULE ORAL at 08:03

## 2024-03-27 RX ADMIN — HYDROMORPHONE HYDROCHLORIDE 0.2 MG: 2 INJECTION INTRAMUSCULAR; INTRAVENOUS; SUBCUTANEOUS at 08:03

## 2024-03-27 RX ADMIN — POLYETHYLENE GLYCOL 3350 17 G: 17 POWDER, FOR SOLUTION ORAL at 08:03

## 2024-03-27 RX ADMIN — CYCLOBENZAPRINE 10 MG: 10 TABLET, FILM COATED ORAL at 09:03

## 2024-03-27 RX ADMIN — APIXABAN 5 MG: 5 TABLET, FILM COATED ORAL at 08:03

## 2024-03-27 RX ADMIN — GABAPENTIN 600 MG: 300 CAPSULE ORAL at 08:03

## 2024-03-27 RX ADMIN — OXYCODONE AND ACETAMINOPHEN 1 TABLET: 10; 325 TABLET ORAL at 01:03

## 2024-03-27 RX ADMIN — OXYBUTYNIN CHLORIDE 5 MG: 5 TABLET ORAL at 08:03

## 2024-03-27 RX ADMIN — MIRTAZAPINE 15 MG: 15 TABLET, FILM COATED ORAL at 08:03

## 2024-03-27 RX ADMIN — CEFAZOLIN SODIUM 2 G: 1 INJECTION, POWDER, FOR SOLUTION INTRAMUSCULAR; INTRAVENOUS at 01:03

## 2024-03-27 RX ADMIN — HYDROMORPHONE HYDROCHLORIDE 0.2 MG: 2 INJECTION INTRAMUSCULAR; INTRAVENOUS; SUBCUTANEOUS at 02:03

## 2024-03-27 RX ADMIN — HYDROXYZINE PAMOATE 25 MG: 25 CAPSULE ORAL at 02:03

## 2024-03-27 RX ADMIN — SERTRALINE HYDROCHLORIDE 50 MG: 50 TABLET ORAL at 08:03

## 2024-03-27 RX ADMIN — OXYCODONE AND ACETAMINOPHEN 1 TABLET: 10; 325 TABLET ORAL at 09:03

## 2024-03-27 RX ADMIN — FERROUS SULFATE TAB 325 MG (65 MG ELEMENTAL FE) 1 EACH: 325 (65 FE) TAB at 08:03

## 2024-03-27 RX ADMIN — OXYCODONE AND ACETAMINOPHEN 1 TABLET: 10; 325 TABLET ORAL at 05:03

## 2024-03-27 RX ADMIN — GABAPENTIN 600 MG: 300 CAPSULE ORAL at 02:03

## 2024-03-27 RX ADMIN — BACLOFEN 20 MG: 10 TABLET ORAL at 08:03

## 2024-03-27 NOTE — PROGRESS NOTES
[facility]                       [address] [id number]    PATIENT NAME:       OWEN PIRES  YOB: 1975  CSN:                [id number]   MRN:                [id number]  ADMIT DATE:         03/18/2024 10:14:00  PHYSICIAN:          Zafar Julio DPM                            PROGRESS NOTE    DATE:  03/26/2024 00:00:00    SUBJECTIVE:  The patient is seen today.  He is doing well.  He is in good   spirits.  No major issues.  Remains on antibiotics.  Currently working on   possible LTAC placement.  He has had no reported fevers.    OBJECTIVE:  VITAL SIGNS:  Stable and afebrile.  EXTREMITIES:  Peripherally intact on the left side.  Heel lift boot on the   right.  Dressings are intact.  Wound continues to improve.  Scant exudate.  No   fluctuance, crepitation, or bogginess to the area.  No necrosis.  Overall foot   position is stable.    LABORATORY DATA:  Labs posted today reveal BUN of 23.6 and creatinine 0.69.    ASSESSMENT:  Right foot wound infection, osteomyelitis.    PLAN:  We will continue with the current care.  The patient wishes to try the   conservative care for the time being.  We will continue to follow.        ______________________________  Zafar Julio DPM    GAS/AQS  DD:  03/26/2024  Time:  07:32PM  DT:  03/26/2024  Time:  10:11PM  Job #:  [id number]      PROGRESS NOTE

## 2024-03-27 NOTE — PROGRESS NOTES
[facility]                       [address] [address]    PATIENT NAME:       OWEN PIRES  YOB: 1975  CSN:                [id number]   MRN:                [id number]  ADMIT DATE:         03/18/2024 10:14:00  PHYSICIAN:          Zafar Julio DPM                            PROGRESS NOTE    DATE:  03/27/2024 00:00:00    SUBJECTIVE:  The patient is seen today, doing well.  No major issues.  Tentative   plans for possible [facility] transfer on Monday.  No other issues.    PHYSICAL EXAMINATION:  VITAL SIGNS:  Stable and afebrile.    Right foot wound looks great.  It is down to approximately 2.5 mm.  There is no   active bleeding drainage.  No fluctuance or crepitation.  Some trophic skin   changes surrounding.  Remainder of integument is intact.  Boots are in place.    ASSESSMENT:  Right foot wound osteomyelitis, improved.    PLAN:  Continue with current care.  Dressings placed.  Continue with antibiotics   and conservative care.        ______________________________  Zafar Julio DPM    GAS/AQS  DD:  03/27/2024  Time:  03:43PM  DT:  03/27/2024  Time:  06:33PM  Job #:  [id number]      PROGRESS NOTE
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
## 2024-03-27 NOTE — PLAN OF CARE
SW met with pt at bedside to discuss d/c POC and post-acute placement. Pt wishes to go to LTACH at d/c. SW provided list of facilities. Pt requested referral be sen to [facility] as preference and [facility] as second choice. Referral sent to [facility] via [facility].     Jerri Arechiga LCSW    1300 Received update from Denisha at [facility] stating they can likely take pt on Monday once he has had 3 midnights on telemetry.

## 2024-03-27 NOTE — PROGRESS NOTES
[facility]                       [address] [address]    PATIENT NAME:       OWEN PIRES  YOB: 1975  CSN:                [id number]   MRN:                [id number]  ADMIT DATE:         03/18/2024 10:14:00  PHYSICIAN:          Miguel Lamb MD                            PROGRESS NOTE    DATE:      SUBJECTIVE:  A 48-year-old  male.  He is doing fairly decent.    He has not had fever or chills.  No shortness of breath.  No chest pain or   palpitations or any other new problems.  Vital signs have been stable.  He has   been afebrile.  He is eating fairly decent.    REVIEW OF SYSTEMS:  Times 12 as above.    OBJECTIVE:  VITAL SIGNS:  Blood pressure 109/67, pulse 93, temp 98.5.  GENERAL APPEARANCE:  Alert, in no acute distress.  HEART:  Regular rhythm and rate.  LUNGS:  Clear.  ABDOMEN:  Soft and nontender.  Suprapubic in place.  NEUROLOGIC:  Unchanged, right foot is dressed.    LABORATORY DATA:  BUN 23.6, creatinine 0.69, albumin 3.2.  Wound cultures are   remarkable for moderate methicillin-sensitive Staph aureus.    IMPRESSION:    1. Right foot osteomyelitis with methicillin-sensitive Staphylococcus aureus.  2. He has paroxysmal atrial fibrillation.  3. Hypertension.  4. Vitamin D deficiency.  5. Paraplegia with neurogenic bladder and bowel.  6. Some iron deficiency.  7. Mild anemia.  8. Has some drug-seeking behavior.    PLAN:  Antibiotics as per Infectious Disease and Ancef 2 g every 6 hours.    Continue with iron and other medications.  He is on DVT prophylaxis with   Lovenox.  At this point, probably we can switch him to Eliquis since he is not   having debridement or surgery.        ______________________________  MD JOHN Aquino/TENAS  DD:  03/26/2024  Time:  09:12PM  DT:  03/26/2024  Time:  11:07PM  Job #:  [id number]      PROGRESS NOTE

## 2024-03-28 PROCEDURE — 25000003 PHARM REV CODE 250: Performed by: PHYSICIAN ASSISTANT

## 2024-03-28 PROCEDURE — 63600175 PHARM REV CODE 636 W HCPCS: Performed by: INTERNAL MEDICINE

## 2024-03-28 PROCEDURE — 21400001 HC TELEMETRY ROOM

## 2024-03-28 PROCEDURE — 25000003 PHARM REV CODE 250: Performed by: INTERNAL MEDICINE

## 2024-03-28 RX ADMIN — CEFAZOLIN SODIUM 2 G: 1 INJECTION, POWDER, FOR SOLUTION INTRAMUSCULAR; INTRAVENOUS at 01:03

## 2024-03-28 RX ADMIN — CEFAZOLIN SODIUM 2 G: 1 INJECTION, POWDER, FOR SOLUTION INTRAMUSCULAR; INTRAVENOUS at 09:03

## 2024-03-28 RX ADMIN — OXYCODONE AND ACETAMINOPHEN 1 TABLET: 10; 325 TABLET ORAL at 07:03

## 2024-03-28 RX ADMIN — CYCLOBENZAPRINE 10 MG: 10 TABLET, FILM COATED ORAL at 11:03

## 2024-03-28 RX ADMIN — OXYBUTYNIN CHLORIDE 5 MG: 5 TABLET ORAL at 08:03

## 2024-03-28 RX ADMIN — HYDROMORPHONE HYDROCHLORIDE 0.2 MG: 2 INJECTION INTRAMUSCULAR; INTRAVENOUS; SUBCUTANEOUS at 03:03

## 2024-03-28 RX ADMIN — OXYCODONE AND ACETAMINOPHEN 1 TABLET: 10; 325 TABLET ORAL at 11:03

## 2024-03-28 RX ADMIN — GABAPENTIN 600 MG: 300 CAPSULE ORAL at 09:03

## 2024-03-28 RX ADMIN — CEFAZOLIN SODIUM 2 G: 1 INJECTION, POWDER, FOR SOLUTION INTRAMUSCULAR; INTRAVENOUS at 08:03

## 2024-03-28 RX ADMIN — GABAPENTIN 600 MG: 300 CAPSULE ORAL at 03:03

## 2024-03-28 RX ADMIN — APIXABAN 5 MG: 5 TABLET, FILM COATED ORAL at 09:03

## 2024-03-28 RX ADMIN — BACLOFEN 20 MG: 10 TABLET ORAL at 08:03

## 2024-03-28 RX ADMIN — OXYCODONE AND ACETAMINOPHEN 1 TABLET: 10; 325 TABLET ORAL at 02:03

## 2024-03-28 RX ADMIN — HYDROMORPHONE HYDROCHLORIDE 0.2 MG: 2 INJECTION INTRAMUSCULAR; INTRAVENOUS; SUBCUTANEOUS at 09:03

## 2024-03-28 RX ADMIN — CEFAZOLIN SODIUM 2 G: 1 INJECTION, POWDER, FOR SOLUTION INTRAMUSCULAR; INTRAVENOUS at 02:03

## 2024-03-28 RX ADMIN — MIRTAZAPINE 15 MG: 15 TABLET, FILM COATED ORAL at 08:03

## 2024-03-28 RX ADMIN — HYDROXYZINE PAMOATE 25 MG: 25 CAPSULE ORAL at 03:03

## 2024-03-28 RX ADMIN — APIXABAN 5 MG: 5 TABLET, FILM COATED ORAL at 08:03

## 2024-03-28 RX ADMIN — SERTRALINE HYDROCHLORIDE 50 MG: 50 TABLET ORAL at 09:03

## 2024-03-28 RX ADMIN — FERROUS SULFATE TAB 325 MG (65 MG ELEMENTAL FE) 1 EACH: 325 (65 FE) TAB at 09:03

## 2024-03-28 RX ADMIN — GABAPENTIN 600 MG: 300 CAPSULE ORAL at 08:03

## 2024-03-28 RX ADMIN — OXYBUTYNIN CHLORIDE 5 MG: 5 TABLET ORAL at 09:03

## 2024-03-28 RX ADMIN — HYDROXYZINE PAMOATE 25 MG: 25 CAPSULE ORAL at 09:03

## 2024-03-28 RX ADMIN — POLYETHYLENE GLYCOL 3350 17 G: 17 POWDER, FOR SOLUTION ORAL at 03:03

## 2024-03-28 RX ADMIN — HYDROXYZINE PAMOATE 25 MG: 25 CAPSULE ORAL at 08:03

## 2024-03-28 NOTE — PLAN OF CARE
Updates sent to [facility] via [facility]. Plan for possible d/c on Monday.     Jerri Arechiga LCSW

## 2024-03-28 NOTE — PROGRESS NOTES
[facility]                       [address] [address]    PATIENT NAME:       OWEN PIRES  YOB: 1975  CSN:                [id number]   MRN:                [id number]  ADMIT DATE:         03/18/2024 10:14:00  PHYSICIAN:          Miguel Lamb MD                            PROGRESS NOTE    DATE:      SUBJECTIVE:  A 48-year-old  male.  He is doing fairly well.  He   has not had any fever or chills.  No shortness of breath.  No chest pain.  No   palpitations.  He seems to be eating fairly decent.  Vital signs have been   stable.  No other new problems.    REVIEW OF SYSTEMS:  Times 12 as above.    OBJECTIVE:  VITAL SIGNS:  Blood pressure 114/75, pulse is 88, and temp 97.9.  GENERAL APPEARANCE:  He is alert, in no acute distress.  HEART:  Regular rhythm and rate.  LUNGS:  Clear.  ABDOMEN:  Soft and nontender.  Suprapubic in place.  EXTREMITIES:  He has the right foot dressed.  NEUROLOGICAL:  Unchanged, paraplegic.  SKIN:  Please see Wound Care notes.    LABORATORY DATA:  There are no new labs.  No inflammatory markers since the   19th.    IMPRESSION:    1. Right foot osteomyelitis with methicillin-resistant Staphylococcus aureus.  2. Paroxysmal atrial fibrillation in sinus.  3. Hypertension.  4. Vitamin D deficiency.  5. Paraplegia with neurogenic bladder and bowel.  6. Iron deficiency.  7. Mild anemia.  8. Drug-seeking behavior.    PLAN:  We will continue with antibiotics as per ID.  We will continue with wound   care.  We will continue with other current medications and DVT prophylaxis.  We   will check labs tomorrow.        ______________________________  Miguel Lamb MD    JOHN/AQS  DD:  03/27/2024  Time:  06:31PM  DT:  03/27/2024  Time:  08:16PM  Job #:  [id number]      PROGRESS NOTE

## 2024-03-28 NOTE — PROGRESS NOTES
[facility]                       [address] [address]    PATIENT NAME:       OWEN PIRES  YOB: 1975  CSN:                [id number]   MRN:                [id number]  ADMIT DATE:         03/18/2024 10:14:00  PHYSICIAN:          Zafar Julio DPM                            PROGRESS NOTE    DATE:  03/28/2024 00:00:00    SUBJECTIVE:  The patient is seen today.  No major changes in his status.    Tentative plans are possible [facility] transfer on Monday.    PHYSICAL EXAMINATION:  VITAL SIGNS:  Stable and afebrile.    PRAFO was on the left side, Heelift boot on the right.  Dressings are clean,   dry, intact.  No significant bleeding or strike through.  Good capillary refill   to the digits.  No edema to the extremities.  No severe contractures.    ASSESSMENT:  Right foot wound with underlying osteomyelitis, currently improved.    PLAN:  We will continue with current care.  The patient would like to continue   with conservative options for now.        ______________________________  Zafar Julio DPM    GAS/AQS  DD:  03/28/2024  Time:  03:50PM  DT:  03/28/2024  Time:  06:06PM  Job #:  [id number]      PROGRESS NOTE
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
## 2024-03-29 PROCEDURE — 25000003 PHARM REV CODE 250: Performed by: PHYSICIAN ASSISTANT

## 2024-03-29 PROCEDURE — 25000003 PHARM REV CODE 250: Performed by: INTERNAL MEDICINE

## 2024-03-29 PROCEDURE — 63600175 PHARM REV CODE 636 W HCPCS: Performed by: INTERNAL MEDICINE

## 2024-03-29 PROCEDURE — 21400001 HC TELEMETRY ROOM

## 2024-03-29 RX ORDER — OXYCODONE HYDROCHLORIDE 5 MG/1
5 TABLET ORAL EVERY 6 HOURS PRN
Status: DISCONTINUED | OUTPATIENT
Start: 2024-03-29 | End: 2024-03-31

## 2024-03-29 RX ORDER — BACLOFEN 10 MG/1
20 TABLET ORAL EVERY 6 HOURS PRN
Status: DISCONTINUED | OUTPATIENT
Start: 2024-03-29 | End: 2024-04-02 | Stop reason: HOSPADM

## 2024-03-29 RX ORDER — OXYCODONE AND ACETAMINOPHEN 5; 325 MG/1; MG/1
1 TABLET ORAL EVERY 6 HOURS PRN
Status: DISCONTINUED | OUTPATIENT
Start: 2024-03-29 | End: 2024-03-29

## 2024-03-29 RX ADMIN — MIRTAZAPINE 15 MG: 15 TABLET, FILM COATED ORAL at 09:03

## 2024-03-29 RX ADMIN — FERROUS SULFATE TAB 325 MG (65 MG ELEMENTAL FE) 1 EACH: 325 (65 FE) TAB at 09:03

## 2024-03-29 RX ADMIN — APIXABAN 5 MG: 5 TABLET, FILM COATED ORAL at 09:03

## 2024-03-29 RX ADMIN — GABAPENTIN 600 MG: 300 CAPSULE ORAL at 02:03

## 2024-03-29 RX ADMIN — HYDROXYZINE PAMOATE 25 MG: 25 CAPSULE ORAL at 09:03

## 2024-03-29 RX ADMIN — GABAPENTIN 600 MG: 300 CAPSULE ORAL at 09:03

## 2024-03-29 RX ADMIN — SERTRALINE HYDROCHLORIDE 50 MG: 50 TABLET ORAL at 09:03

## 2024-03-29 RX ADMIN — CEFAZOLIN SODIUM 2 G: 1 INJECTION, POWDER, FOR SOLUTION INTRAMUSCULAR; INTRAVENOUS at 01:03

## 2024-03-29 RX ADMIN — CYCLOBENZAPRINE 10 MG: 10 TABLET, FILM COATED ORAL at 09:03

## 2024-03-29 RX ADMIN — OXYCODONE 5 MG: 5 TABLET ORAL at 11:03

## 2024-03-29 RX ADMIN — HYDROXYZINE PAMOATE 25 MG: 25 CAPSULE ORAL at 02:03

## 2024-03-29 RX ADMIN — OXYCODONE AND ACETAMINOPHEN 1 TABLET: 10; 325 TABLET ORAL at 11:03

## 2024-03-29 RX ADMIN — OXYCODONE AND ACETAMINOPHEN 1 TABLET: 10; 325 TABLET ORAL at 04:03

## 2024-03-29 RX ADMIN — CEFAZOLIN SODIUM 2 G: 1 INJECTION, POWDER, FOR SOLUTION INTRAMUSCULAR; INTRAVENOUS at 09:03

## 2024-03-29 RX ADMIN — OXYBUTYNIN CHLORIDE 5 MG: 5 TABLET ORAL at 09:03

## 2024-03-29 RX ADMIN — ACETAMINOPHEN 650 MG: 325 TABLET, FILM COATED ORAL at 02:03

## 2024-03-29 RX ADMIN — CEFAZOLIN SODIUM 2 G: 1 INJECTION, POWDER, FOR SOLUTION INTRAMUSCULAR; INTRAVENOUS at 02:03

## 2024-03-29 RX ADMIN — BACLOFEN 20 MG: 10 TABLET ORAL at 09:03

## 2024-03-29 RX ADMIN — OXYCODONE 5 MG: 5 TABLET ORAL at 04:03
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
## 2024-03-29 NOTE — PROGRESS NOTES
[facility]                       [address] [address]    PATIENT NAME:       OWEN PIRES  YOB: 1975  CSN:                [id number]   MRN:                [id number]  ADMIT DATE:         03/18/2024 10:14:00  PHYSICIAN:          Zafar Julio DPM                            PROGRESS NOTE    DATE:  03/29/2024 00:00:00    SUBJECTIVE:  The patient was seen today.  He is doing well.  No major issues   reported.  Current plan is to transfer to [place] on Monday.  No other issues.  No   fevers or chills.  No GI complaints.    PHYSICAL EXAMINATION:  VITAL SIGNS:  Stable and afebrile.  EXTREMITIES: Boots in place.  Right foot wound is pretty much consolidated.  No   inflammatory changes.  Some scaling skin.  No fluctuance, crepitation, or   bogginess to the area.  5th MPJ stable looking with healed plantar wound site.    Remainder of integument is intact.  Heels are stable.  Paresis noted.  Pedal   pulses intact.    LABORATORY DATA:  No labs posted.    ASSESSMENT:  Right foot wound infection, osteomyelitis, improved.    PLAN:  We will continue with current care, antibiotics.  Tentative transfer.    Plan on repeating inflammatory markers in the morning.  Last time, they were   drawn were back on the 19th.        ______________________________  Zafar Julio DPM    GAS/AQS  DD:  03/29/2024  Time:  04:03PM  DT:  03/29/2024  Time:  06:13PM  Job #:  [id number]      PROGRESS NOTE

## 2024-03-29 NOTE — PROGRESS NOTES
[facility]                       [address] [address]    PATIENT NAME:       OWEN PIRES  YOB: 1975  CSN:                [id number]   MRN:                [id number]  ADMIT DATE:         03/18/2024 10:14:00  PHYSICIAN:          Miguel Lamb MD                            PROGRESS NOTE    DATE:      SUBJECTIVE:  A 48-year-old  male.  He is doing fairly well.  He   has been on IV antibiotics for osteomyelitis of the right foot.  He has not had   any fever or chills.  No shortness of breath.  No chest pain or palpitations or   other new problems.    REVIEW OF SYSTEMS:  Times 12 as above.    OBJECTIVE:  VITAL SIGNS:  Blood pressure 110/74, pulse 91, and temp 98.5.  GENERAL APPEARANCE:  He is alert. In no acute distress, afebrile.  HEART:  Regular rhythm and rate.  LUNGS:  Clear.  ABDOMEN:  Soft, nontender.  Bowel sounds positive and normal.  Suprapubic in   place.  EXTREMITIES:  Right foot is dressed.  NEUROLOGIC:  Unchanged, paraplegic.    LABORATORY DATA:  There are no new labs.    IMPRESSION:    1. Right foot osteomyelitis.  2. He has paroxysmal atrial fibrillation in sinus.  3. Hypertension.  4. Vitamin D deficiency.  5. Paraplegia with neurogenic bladder and bowel.  6. He had a urinary tract infection.  7. Iron deficiency.  8. Mild anemia.  9. Drug-seeking behavior.    PLAN:  We will continue with current antibiotics as per ID, which is cefazolin.    We will continue with apixaban for DVT prophylaxis and other current   medications.  We will discontinue hydromorphone.        ______________________________  Miguel Lamb MD    JOHN/AQS  DD:  03/28/2024  Time:  06:10PM  DT:  03/28/2024  Time:  10:25PM  Job #:  [id number]      PROGRESS NOTE

## 2024-03-30 LAB
CRP SERPL-MCNC: 10.6 MG/L
ERYTHROCYTE [SEDIMENTATION RATE] IN BLOOD: 70 MM/HR (ref 0–15)

## 2024-03-30 PROCEDURE — 25000003 PHARM REV CODE 250: Performed by: INTERNAL MEDICINE

## 2024-03-30 PROCEDURE — 80053 COMPREHEN METABOLIC PANEL: CPT | Performed by: INTERNAL MEDICINE

## 2024-03-30 PROCEDURE — 25000003 PHARM REV CODE 250: Performed by: PHYSICIAN ASSISTANT

## 2024-03-30 PROCEDURE — 21400001 HC TELEMETRY ROOM

## 2024-03-30 PROCEDURE — 85652 RBC SED RATE AUTOMATED: CPT | Performed by: PODIATRIST

## 2024-03-30 PROCEDURE — 86140 C-REACTIVE PROTEIN: CPT | Performed by: PODIATRIST

## 2024-03-30 PROCEDURE — 63600175 PHARM REV CODE 636 W HCPCS: Performed by: INTERNAL MEDICINE

## 2024-03-30 RX ORDER — GABAPENTIN 400 MG/1
800 CAPSULE ORAL 3 TIMES DAILY
Status: DISCONTINUED | OUTPATIENT
Start: 2024-03-30 | End: 2024-04-02 | Stop reason: HOSPADM

## 2024-03-30 RX ORDER — SERTRALINE HYDROCHLORIDE 25 MG/1
25 TABLET, FILM COATED ORAL DAILY
Status: DISCONTINUED | OUTPATIENT
Start: 2024-03-31 | End: 2024-04-02 | Stop reason: HOSPADM

## 2024-03-30 RX ORDER — DULOXETIN HYDROCHLORIDE 20 MG/1
20 CAPSULE, DELAYED RELEASE ORAL 2 TIMES DAILY
Status: DISCONTINUED | OUTPATIENT
Start: 2024-03-30 | End: 2024-04-02 | Stop reason: HOSPADM

## 2024-03-30 RX ADMIN — OXYCODONE 5 MG: 5 TABLET ORAL at 06:03

## 2024-03-30 RX ADMIN — HYDROXYZINE PAMOATE 25 MG: 25 CAPSULE ORAL at 03:03

## 2024-03-30 RX ADMIN — APIXABAN 5 MG: 5 TABLET, FILM COATED ORAL at 09:03

## 2024-03-30 RX ADMIN — POLYETHYLENE GLYCOL 3350 17 G: 17 POWDER, FOR SOLUTION ORAL at 08:03

## 2024-03-30 RX ADMIN — GABAPENTIN 800 MG: 400 CAPSULE ORAL at 06:03

## 2024-03-30 RX ADMIN — MIRTAZAPINE 15 MG: 15 TABLET, FILM COATED ORAL at 09:03

## 2024-03-30 RX ADMIN — FERROUS SULFATE TAB 325 MG (65 MG ELEMENTAL FE) 1 EACH: 325 (65 FE) TAB at 08:03

## 2024-03-30 RX ADMIN — CEFAZOLIN SODIUM 2 G: 1 INJECTION, POWDER, FOR SOLUTION INTRAMUSCULAR; INTRAVENOUS at 09:03

## 2024-03-30 RX ADMIN — HYDROXYZINE PAMOATE 25 MG: 25 CAPSULE ORAL at 06:03

## 2024-03-30 RX ADMIN — OXYCODONE 5 MG: 5 TABLET ORAL at 05:03

## 2024-03-30 RX ADMIN — SERTRALINE HYDROCHLORIDE 50 MG: 50 TABLET ORAL at 08:03

## 2024-03-30 RX ADMIN — CYCLOBENZAPRINE 10 MG: 10 TABLET, FILM COATED ORAL at 01:03

## 2024-03-30 RX ADMIN — OXYBUTYNIN CHLORIDE 5 MG: 5 TABLET ORAL at 09:03

## 2024-03-30 RX ADMIN — GABAPENTIN 800 MG: 400 CAPSULE ORAL at 03:03

## 2024-03-30 RX ADMIN — GABAPENTIN 800 MG: 400 CAPSULE ORAL at 09:03

## 2024-03-30 RX ADMIN — BACLOFEN 20 MG: 10 TABLET ORAL at 09:03

## 2024-03-30 RX ADMIN — OXYBUTYNIN CHLORIDE 5 MG: 5 TABLET ORAL at 08:03

## 2024-03-30 RX ADMIN — CEFAZOLIN SODIUM 2 G: 1 INJECTION, POWDER, FOR SOLUTION INTRAMUSCULAR; INTRAVENOUS at 10:03

## 2024-03-30 RX ADMIN — ACETAMINOPHEN 650 MG: 325 TABLET, FILM COATED ORAL at 08:03

## 2024-03-30 RX ADMIN — CYCLOBENZAPRINE 10 MG: 10 TABLET, FILM COATED ORAL at 09:03

## 2024-03-30 RX ADMIN — CEFAZOLIN SODIUM 2 G: 1 INJECTION, POWDER, FOR SOLUTION INTRAMUSCULAR; INTRAVENOUS at 03:03

## 2024-03-30 RX ADMIN — APIXABAN 5 MG: 5 TABLET, FILM COATED ORAL at 08:03

## 2024-03-30 RX ADMIN — ACETAMINOPHEN 650 MG: 325 TABLET, FILM COATED ORAL at 01:03

## 2024-03-30 RX ADMIN — BACLOFEN 20 MG: 10 TABLET ORAL at 03:03

## 2024-03-30 RX ADMIN — OXYCODONE 5 MG: 5 TABLET ORAL at 11:03

## 2024-03-30 RX ADMIN — HYDROXYZINE PAMOATE 25 MG: 25 CAPSULE ORAL at 08:03

## 2024-03-30 RX ADMIN — CYCLOBENZAPRINE 10 MG: 10 TABLET, FILM COATED ORAL at 08:03

## 2024-03-30 RX ADMIN — ACETAMINOPHEN 650 MG: 325 TABLET, FILM COATED ORAL at 03:03

## 2024-03-30 RX ADMIN — GABAPENTIN 600 MG: 300 CAPSULE ORAL at 08:03

## 2024-03-30 RX ADMIN — BACLOFEN 20 MG: 10 TABLET ORAL at 11:03

## 2024-03-30 RX ADMIN — HYDROXYZINE PAMOATE 25 MG: 25 CAPSULE ORAL at 09:03

## 2024-03-30 NOTE — PROGRESS NOTES
[facility]                       [address] [address]    PATIENT NAME:       OWEN PIRES  YOB: 1975  CSN:                [id number]   MRN:                [id number]  ADMIT DATE:         03/18/2024 10:14:00  PHYSICIAN:          Zafar Julio DPM                            PROGRESS NOTE    DATE:  03/30/2024 00:00:00    SUBJECTIVE:  The patient is seen today.  Doing well.  No major issues.  Again,   transfer date is early next week.  No fevers or chills.    PHYSICAL EXAMINATION:  VITAL SIGNS:  Stable, afebrile.    EXTREMITIES:  Allevyn dressing is in place to the right foot.  Lesion site is   dry.  No signs of active infection.  No drainage.  No fluctuance or crepitation.    Paresis noted to the extremities.  PRAFO is in place on the left side.  Heel   lift boot on the right.    ASSESSMENT:  Right foot wound, improved with underlying osteomyelitis.    PLAN:  Continue with current care.  Sed rate is down to 70.  CRP down to 10 from   79 and 48 respectively.        ______________________________  Zafar Julio DPM    GAS/AQS  DD:  03/30/2024  Time:  09:13AM  DT:  03/30/2024  Time:  09:20AM  Job #:  [id number]      PROGRESS NOTE

## 2024-03-30 NOTE — PROGRESS NOTES
[facility]                       [address] [address]    PATIENT NAME:       OWEN PIRES  YOB: 1975  CSN:                [id number]   MRN:                [id number]  ADMIT DATE:         03/18/2024 10:14:00  PHYSICIAN:          Miguel Lamb MD                            PROGRESS NOTE    DATE:      SUBJECTIVE:  A 48-year-old  male.  He is doing fairly good.  He   has not had any fever or chills.  No shortness of breath.  No chest pain or   palpitations.  No headaches or any other new problems or complaints.  He is   still asking for IV pain medications round the clock.  He still complains of   some pulsating pain, even though he cannot feel anything from the waist down   because of his T12 paraplegia.  No significant issues.    REVIEW OF SYSTEMS:  X12 as above.    OBJECTIVE:  VITAL SIGNS:  Blood pressure was 106/72, pulse is 87, and   temperature 98.  GENERAL APPEARANCE:  He is alert, in no acute distress.  HEART:  Regular rhythm and rate.  LUNGS:  Clear.  ABDOMEN:  Soft, nontender.  Suprapubic in place.  No discharge and no edema.  No   swelling.  EXTREMITIES:  He has the right foot dressed.  NEUROLOGICAL:  Unchanged, paraplegic.    LABORATORY DATA:  There are no new labs.    IMPRESSION:  Right foot osteomyelitis, paroxysmal atrial fibrillation in sinus,   hypertension, vitamin D deficiency, paraplegia with neurogenic bladder and   bowel, urinary tract infection, iron deficiency, mild anemia, and drug-seeking   behavior.    PLAN:  Continue with current antibiotics as per ID.  We are waiting for   placement in LTAC.  We will continue with apixaban for DVT prophylaxis.  We will   just decrease his oxycodone to 5 mg every 6 p.r.n.    ______________________________  MD JOHN Aquino/TENAS  DD:  03/29/2024  Time:  05:34PM  DT:  03/29/2024  Time:  07:28PM  Job #:   [id number]      PROGRESS NOTE

## 2024-03-31 LAB
ALBUMIN SERPL-MCNC: 3.4 G/DL (ref 3.5–5)
ALBUMIN/GLOB SERPL: 0.9 RATIO (ref 1.1–2)
ALP SERPL-CCNC: 93 UNIT/L (ref 40–150)
ALT SERPL-CCNC: 8 UNIT/L (ref 0–55)
AST SERPL-CCNC: 13 UNIT/L (ref 5–34)
BASOPHILS # BLD AUTO: 0.16 X10(3)/MCL
BASOPHILS NFR BLD AUTO: 1.8 %
BILIRUB SERPL-MCNC: 0.2 MG/DL
BUN SERPL-MCNC: 18.5 MG/DL (ref 8.9–20.6)
CALCIUM SERPL-MCNC: 9.1 MG/DL (ref 8.4–10.2)
CHLORIDE SERPL-SCNC: 104 MMOL/L (ref 98–107)
CO2 SERPL-SCNC: 27 MMOL/L (ref 22–29)
CREAT SERPL-MCNC: 0.69 MG/DL (ref 0.73–1.18)
EOSINOPHIL # BLD AUTO: 0.42 X10(3)/MCL (ref 0–0.9)
EOSINOPHIL NFR BLD AUTO: 4.7 %
ERYTHROCYTE [DISTWIDTH] IN BLOOD BY AUTOMATED COUNT: 14.6 % (ref 11.5–17)
GFR SERPLBLD CREATININE-BSD FMLA CKD-EPI: >60 MLS/MIN/1.73/M2
GLOBULIN SER-MCNC: 3.7 GM/DL (ref 2.4–3.5)
GLUCOSE SERPL-MCNC: 92 MG/DL (ref 74–100)
HCT VFR BLD AUTO: 36.4 % (ref 42–52)
HGB BLD-MCNC: 11.2 G/DL (ref 14–18)
IMM GRANULOCYTES # BLD AUTO: 0.03 X10(3)/MCL (ref 0–0.04)
IMM GRANULOCYTES NFR BLD AUTO: 0.3 %
LYMPHOCYTES # BLD AUTO: 3.38 X10(3)/MCL (ref 0.6–4.6)
LYMPHOCYTES NFR BLD AUTO: 37.6 %
MCH RBC QN AUTO: 25.8 PG (ref 27–31)
MCHC RBC AUTO-ENTMCNC: 30.8 G/DL (ref 33–36)
MCV RBC AUTO: 83.9 FL (ref 80–94)
MONOCYTES # BLD AUTO: 0.5 X10(3)/MCL (ref 0.1–1.3)
MONOCYTES NFR BLD AUTO: 5.6 %
NEUTROPHILS # BLD AUTO: 4.51 X10(3)/MCL (ref 2.1–9.2)
NEUTROPHILS NFR BLD AUTO: 50 %
NRBC BLD AUTO-RTO: 0 %
PLATELET # BLD AUTO: 284 X10(3)/MCL (ref 130–400)
PMV BLD AUTO: 10 FL (ref 7.4–10.4)
POTASSIUM SERPL-SCNC: 4.5 MMOL/L (ref 3.5–5.1)
PROT SERPL-MCNC: 7.1 GM/DL (ref 6.4–8.3)
RBC # BLD AUTO: 4.34 X10(6)/MCL (ref 4.7–6.1)
SODIUM SERPL-SCNC: 140 MMOL/L (ref 136–145)
WBC # SPEC AUTO: 9 X10(3)/MCL (ref 4.5–11.5)

## 2024-03-31 PROCEDURE — 21400001 HC TELEMETRY ROOM

## 2024-03-31 PROCEDURE — 63600175 PHARM REV CODE 636 W HCPCS: Performed by: INTERNAL MEDICINE

## 2024-03-31 PROCEDURE — 85025 COMPLETE CBC W/AUTO DIFF WBC: CPT | Performed by: INTERNAL MEDICINE

## 2024-03-31 PROCEDURE — 25000003 PHARM REV CODE 250: Performed by: INTERNAL MEDICINE

## 2024-03-31 PROCEDURE — 25000003 PHARM REV CODE 250: Performed by: PHYSICIAN ASSISTANT

## 2024-03-31 RX ORDER — HYDROCODONE BITARTRATE AND ACETAMINOPHEN 7.5; 325 MG/1; MG/1
1 TABLET ORAL EVERY 6 HOURS PRN
Status: DISCONTINUED | OUTPATIENT
Start: 2024-03-31 | End: 2024-04-02 | Stop reason: HOSPADM

## 2024-03-31 RX ADMIN — OXYBUTYNIN CHLORIDE 5 MG: 5 TABLET ORAL at 09:03

## 2024-03-31 RX ADMIN — OXYCODONE 5 MG: 5 TABLET ORAL at 12:03

## 2024-03-31 RX ADMIN — HYDROCODONE BITARTRATE AND ACETAMINOPHEN 1 TABLET: 7.5; 325 TABLET ORAL at 06:03

## 2024-03-31 RX ADMIN — CEFAZOLIN SODIUM 2 G: 1 INJECTION, POWDER, FOR SOLUTION INTRAMUSCULAR; INTRAVENOUS at 10:03

## 2024-03-31 RX ADMIN — GABAPENTIN 800 MG: 400 CAPSULE ORAL at 09:03

## 2024-03-31 RX ADMIN — BACLOFEN 20 MG: 10 TABLET ORAL at 03:03

## 2024-03-31 RX ADMIN — CEFAZOLIN SODIUM 2 G: 1 INJECTION, POWDER, FOR SOLUTION INTRAMUSCULAR; INTRAVENOUS at 03:03

## 2024-03-31 RX ADMIN — CEFAZOLIN SODIUM 2 G: 1 INJECTION, POWDER, FOR SOLUTION INTRAMUSCULAR; INTRAVENOUS at 09:03

## 2024-03-31 RX ADMIN — FERROUS SULFATE TAB 325 MG (65 MG ELEMENTAL FE) 1 EACH: 325 (65 FE) TAB at 09:03

## 2024-03-31 RX ADMIN — GABAPENTIN 800 MG: 400 CAPSULE ORAL at 03:03

## 2024-03-31 RX ADMIN — OXYCODONE 5 MG: 5 TABLET ORAL at 07:03

## 2024-03-31 RX ADMIN — APIXABAN 5 MG: 5 TABLET, FILM COATED ORAL at 09:03

## 2024-03-31 RX ADMIN — CEFAZOLIN SODIUM 2 G: 1 INJECTION, POWDER, FOR SOLUTION INTRAMUSCULAR; INTRAVENOUS at 04:03

## 2024-03-31 RX ADMIN — HYDROXYZINE PAMOATE 25 MG: 25 CAPSULE ORAL at 09:03

## 2024-03-31 RX ADMIN — HYDROXYZINE PAMOATE 25 MG: 25 CAPSULE ORAL at 03:03

## 2024-03-31 RX ADMIN — OXYCODONE 5 MG: 5 TABLET ORAL at 01:03
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
## 2024-03-31 NOTE — PROGRESS NOTES
[facility]                       [address] [address]    PATIENT NAME:       OWEN PIRES  YOB: 1975  CSN:                [id number]   MRN:                [id number]  ADMIT DATE:         03/18/2024 10:14:00  PHYSICIAN:          Miguel Lamb MD                            PROGRESS NOTE    DATE:      SUBJECTIVE:  This is a 48-year-old  male.  He is doing fairly   well.  He is still afebrile.  No shortness of breath.  No chest pain or   palpitations or any other problems.  He is still complaining about pain even   though his gabapentin was increased and he was started on Cymbalta.  He wants to   get back to his 10 mg 4 times a day of the Percocet.  He has not had any other   significant issues.  He does not look like he is having any pain.    REVIEW OF SYSTEMS:  Times 12 as above.    PHYSICAL EXAMINATION:  VITAL SIGNS:  Blood pressure is 106/71, pulse 84, temp 98.4.  GENERAL APPEARANCE:  He is alert, in no acute distress.  HEART:  Regular rhythm and rate.  LUNGS:  Clear.  ABDOMEN:  Soft, nontender.  Suprapubic in place.    EXTREMITIES:  No clubbing, cyanosis, or edema.  Right foot is dressed.  NEUROLOGIC:  Unchanged.  Paraplegic.    LABORATORY DATA:  H and H are 11.2 and 36.4.  Chemistry showed a BUN of 18.5,   creatinine 0.69, albumin 3.4.    IMPRESSION:    1. Right foot osteomyelitis.  2. Paroxysmal atrial fibrillation, sinus.  3. Hypertension.  4. Vitamin D deficiency.  5. Paraplegia with neurogenic bladder and bowel, status post suprapubic.  6. Urinary tract infection.  7. Iron deficiency.  8. Mild anemia.  9. Drug-seeking behavior.    PLAN:  We will continue with the same medications at the present time.  He is   awaiting for possible LTAC.  Dr. Bar to come and take over tomorrow.        ______________________________  MD JOHN Aquino/JESENIA  DD:  03/31/2024  Time:   03:23PM  DT:  03/31/2024  Time:  04:40PM  Job #:  [id number]      PROGRESS NOTE

## 2024-03-31 NOTE — PROGRESS NOTES
[facility]                       [address] [address]    PATIENT NAME:       OWEN PIRES  YOB: 1975  CSN:                [id number]   MRN:                [id number]  ADMIT DATE:         03/18/2024 10:14:00  PHYSICIAN:          Zafar Julio DPM                            PROGRESS NOTE    DATE:  03/31/2024 00:00:00    The patient was seen today.  He is doing well.  No major issues reported.  No   fevers, no chills.  No GI issues.    PHYSICAL EXAMINATION:  Vital signs are stable.  He is currently afebrile.    No labs posted for today.    Heelift boots in place on the right side, PRAFO on the left.  No breakdown to the   left side.  Right dorsal foot lesion is pretty much closed down.  No fluctuance,   crepitation, bogginess to the area.  No drainage.  The previous plantar wound   area is also healed.  Remainder integument is intact.    ASSESSMENT:  Right foot wound with history of underlying osteomyelitis 5th MPJ.    Overall improved.    PLAN:  Continue with current care.         ______________________________  Zafar Julio DPM    GAS/AQS  DD:  03/31/2024  Time:  01:06PM  DT:  03/31/2024  Time:  01:29PM  Job #:  [id number]      PROGRESS NOTE

## 2024-04-01 VITALS
BODY MASS INDEX: 20.77 KG/M2 | TEMPERATURE: 98 F | OXYGEN SATURATION: 99 % | HEART RATE: 83 BPM | DIASTOLIC BLOOD PRESSURE: 76 MMHG | HEIGHT: 72 IN | SYSTOLIC BLOOD PRESSURE: 115 MMHG | RESPIRATION RATE: 18 BRPM | WEIGHT: 153.31 LBS

## 2024-04-01 PROBLEM — M86.9 OSTEOMYELITIS: Status: ACTIVE | Noted: 2024-04-01

## 2024-04-01 PROBLEM — M86.9 OSTEOMYELITIS: Status: RESOLVED | Noted: 2024-04-01 | Resolved: 2024-04-01

## 2024-04-01 PROCEDURE — 63600175 PHARM REV CODE 636 W HCPCS: Performed by: INTERNAL MEDICINE

## 2024-04-01 PROCEDURE — 21400001 HC TELEMETRY ROOM

## 2024-04-01 PROCEDURE — 25000003 PHARM REV CODE 250: Performed by: INTERNAL MEDICINE

## 2024-04-01 PROCEDURE — 25000003 PHARM REV CODE 250: Performed by: PHYSICIAN ASSISTANT

## 2024-04-01 RX ORDER — CEFAZOLIN SODIUM 2 G/50ML
2000 SOLUTION INTRAVENOUS EVERY 6 HOURS
Qty: 6000 ML | Refills: 0 | Status: SHIPPED | OUTPATIENT
Start: 2024-04-01 | End: 2024-05-01

## 2024-04-01 RX ORDER — HYDROMORPHONE HYDROCHLORIDE 2 MG/ML
1 INJECTION, SOLUTION INTRAMUSCULAR; INTRAVENOUS; SUBCUTANEOUS ONCE
Status: COMPLETED | OUTPATIENT
Start: 2024-04-01 | End: 2024-04-01

## 2024-04-01 RX ADMIN — HYDROMORPHONE HYDROCHLORIDE 1 MG: 2 INJECTION INTRAMUSCULAR; INTRAVENOUS; SUBCUTANEOUS at 11:04

## 2024-04-01 RX ADMIN — HYDROXYZINE PAMOATE 25 MG: 25 CAPSULE ORAL at 03:04

## 2024-04-01 RX ADMIN — OXYBUTYNIN CHLORIDE 5 MG: 5 TABLET ORAL at 08:04

## 2024-04-01 RX ADMIN — DULOXETINE HYDROCHLORIDE 20 MG: 20 CAPSULE, DELAYED RELEASE ORAL at 08:04

## 2024-04-01 RX ADMIN — CEFAZOLIN SODIUM 2 G: 1 INJECTION, POWDER, FOR SOLUTION INTRAMUSCULAR; INTRAVENOUS at 04:04

## 2024-04-01 RX ADMIN — CEFAZOLIN SODIUM 2 G: 1 INJECTION, POWDER, FOR SOLUTION INTRAMUSCULAR; INTRAVENOUS at 09:04

## 2024-04-01 RX ADMIN — HYDROCODONE BITARTRATE AND ACETAMINOPHEN 1 TABLET: 7.5; 325 TABLET ORAL at 08:04

## 2024-04-01 RX ADMIN — APIXABAN 5 MG: 5 TABLET, FILM COATED ORAL at 08:04

## 2024-04-01 RX ADMIN — BACLOFEN 20 MG: 10 TABLET ORAL at 02:04

## 2024-04-01 RX ADMIN — BACLOFEN 20 MG: 10 TABLET ORAL at 08:04

## 2024-04-01 RX ADMIN — HYDROXYZINE PAMOATE 25 MG: 25 CAPSULE ORAL at 08:04

## 2024-04-01 RX ADMIN — OXYBUTYNIN CHLORIDE 5 MG: 5 TABLET ORAL at 09:04

## 2024-04-01 RX ADMIN — DULOXETINE HYDROCHLORIDE 20 MG: 20 CAPSULE, DELAYED RELEASE ORAL at 09:04

## 2024-04-01 RX ADMIN — FERROUS SULFATE TAB 325 MG (65 MG ELEMENTAL FE) 1 EACH: 325 (65 FE) TAB at 08:04

## 2024-04-01 RX ADMIN — HYDROCODONE BITARTRATE AND ACETAMINOPHEN 1 TABLET: 7.5; 325 TABLET ORAL at 01:04

## 2024-04-01 RX ADMIN — HYDROCODONE BITARTRATE AND ACETAMINOPHEN 1 TABLET: 7.5; 325 TABLET ORAL at 04:04

## 2024-04-01 RX ADMIN — HYDROXYZINE PAMOATE 25 MG: 25 CAPSULE ORAL at 09:04

## 2024-04-01 RX ADMIN — MIRTAZAPINE 15 MG: 15 TABLET, FILM COATED ORAL at 09:04

## 2024-04-01 RX ADMIN — SERTRALINE HYDROCHLORIDE 25 MG: 25 TABLET ORAL at 08:04

## 2024-04-01 RX ADMIN — BACLOFEN 20 MG: 10 TABLET ORAL at 09:04

## 2024-04-01 RX ADMIN — BACLOFEN 20 MG: 10 TABLET ORAL at 03:04

## 2024-04-01 RX ADMIN — CYCLOBENZAPRINE 10 MG: 10 TABLET, FILM COATED ORAL at 02:04

## 2024-04-01 RX ADMIN — GABAPENTIN 800 MG: 400 CAPSULE ORAL at 03:04

## 2024-04-01 RX ADMIN — APIXABAN 5 MG: 5 TABLET, FILM COATED ORAL at 09:04

## 2024-04-01 RX ADMIN — GABAPENTIN 800 MG: 400 CAPSULE ORAL at 08:04

## 2024-04-01 RX ADMIN — CYCLOBENZAPRINE 10 MG: 10 TABLET, FILM COATED ORAL at 03:04

## 2024-04-01 RX ADMIN — GABAPENTIN 800 MG: 400 CAPSULE ORAL at 09:04

## 2024-04-01 NOTE — PLAN OF CARE
Contact Denisha at [facility] requesting update on pt's possible admit today. Notified of updated rev code. Pending confirmation on admit to [facility].    Jerri Arechiga, LCSW    1030 Pt approved to admit to [facility] today. Will arrange [facility] transport once d/c orders are signed.

## 2024-04-01 NOTE — PROGRESS NOTES
[facility]                       [address] [address]    PATIENT NAME:       OWEN PIRES  YOB: 1975  CSN:                [id number]   MRN:                [id number]  ADMIT DATE:         03/18/2024 10:14:00  PHYSICIAN:          Zafar Julio DPM                            PROGRESS NOTE    DATE:  04/01/2024 00:00:00    SUBJECTIVE:  The patient is seen today.  Tentative plans are for him to be   transferred to [place].  Waiting on discharge.  No other issues.    PHYSICAL EXAMINATION:  Vital signs are stable and afebrile.    No labs posted.    The foot remains stable.  Heelift boot and PRAFO are in place.  No active   bleeding or drainage from the area.  Appears to be fairly consolidate.    ASSESSMENT:  Right foot wound infection, osteomyelitis.    PLAN:  Continue with current care.  The patient is to be transferred to [facility]   today.        ______________________________  Zafar Julio DPM    GAS/AQS  DD:  04/01/2024  Time:  03:07PM  DT:  04/01/2024  Time:  06:22PM  Job #:  [id number]      PROGRESS NOTE

## 2024-04-01 NOTE — PLAN OF CARE
04/01/24 1536   Final Note   Assessment Type Final Discharge Note   Anticipated Discharge Disposition Long Term   Hospital Resources/Appts/Education Provided Post-Acute resouces added to AVS   Post-Acute Status   Post-Acute Authorization Placement   Post-Acute Placement Status Set-up Complete/Auth obtained   Discharge Delays None known at this time     Pt discharging to [facility]. Will arrange [facility] transport once report info is given.    Jerri Arechiga LCSW     Report info given to nurse. Nurse to call report at 6:30 and notify [facility].

## 2024-04-01 NOTE — NURSING
"Upon entry in room, pt had asked for pain medication. All pt had available was baclofen and flexaril. I brought both and when I told pt that was what I had, pt stated those were not pain medications. I stated that was all that was available and pt stated he didn't want them then. He would just stay in pain like he has been in. I asked if he would like his other medications and he stated "No. I dont need anything from you". I asked pt if I could listen to him, pt stated you have listened enough. I then asked the patient if I could listen to his heart and lungs at least and pt stated "no. I'm good. You dont need to listen to me." I reiterated the importance of listening to heart and lung sounds and importance of the medications he is taking but pt still refused both the assessment and the night medications.   "
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
## 2024-04-02 NOTE — NURSING
Patient was taken by [facility] ambulance services at around 23:10. Left in good condition

## 2024-04-02 NOTE — NURSING
Report given to [facility] nurse - KAMLA Chance at 1935. Contact number [phone]. [facility] called at 1944 to schedule transport to accepting facility [facility]. Estimated time for  is one hour. Patient night nurse KAMLA Palomo notified of discharge and transfer.

## 2024-06-07 DIAGNOSIS — N31.9 NEUROGENIC BLADDER: Primary | ICD-10-CM

## 2024-06-07 DIAGNOSIS — Z93.59 SUPRAPUBIC CATHETER: ICD-10-CM

## 2024-07-03 ENCOUNTER — HOSPITAL ENCOUNTER (EMERGENCY)
Facility: HOSPITAL | Age: 49
Discharge: HOME | End: 2024-07-03
Attending: EMERGENCY MEDICINE
Payer: MEDICARE

## 2024-07-03 VITALS
BODY MASS INDEX: 20.72 KG/M2 | TEMPERATURE: 99 F | HEIGHT: 72 IN | OXYGEN SATURATION: 100 % | DIASTOLIC BLOOD PRESSURE: 100 MMHG | WEIGHT: 153 LBS | HEART RATE: 89 BPM | RESPIRATION RATE: 20 BRPM | SYSTOLIC BLOOD PRESSURE: 152 MMHG

## 2024-07-03 DIAGNOSIS — N39.0 URINARY TRACT INFECTION WITH HEMATURIA, SITE UNSPECIFIED: Primary | ICD-10-CM

## 2024-07-03 DIAGNOSIS — R31.9 URINARY TRACT INFECTION WITH HEMATURIA, SITE UNSPECIFIED: Primary | ICD-10-CM

## 2024-07-03 LAB
ALBUMIN SERPL-MCNC: 3.7 G/DL (ref 3.5–5)
ALBUMIN/GLOB SERPL: 1.1 RATIO (ref 1.1–2)
ALP SERPL-CCNC: 93 UNIT/L (ref 40–150)
ALT SERPL-CCNC: 6 UNIT/L (ref 0–55)
ANION GAP SERPL CALC-SCNC: 10 MEQ/L
AST SERPL-CCNC: 11 UNIT/L (ref 5–34)
BACTERIA #/AREA URNS AUTO: ABNORMAL /HPF
BASOPHILS # BLD AUTO: 0.13 X10(3)/MCL
BASOPHILS NFR BLD AUTO: 1.1 %
BILIRUB SERPL-MCNC: 0.3 MG/DL
BILIRUB UR QL STRIP.AUTO: ABNORMAL
BUN SERPL-MCNC: 13.1 MG/DL (ref 8.9–20.6)
CALCIUM SERPL-MCNC: 9.1 MG/DL (ref 8.4–10.2)
CHLORIDE SERPL-SCNC: 110 MMOL/L (ref 98–107)
CLARITY UR: ABNORMAL
CO2 SERPL-SCNC: 23 MMOL/L (ref 22–29)
COLOR UR AUTO: ABNORMAL
CREAT SERPL-MCNC: 0.77 MG/DL (ref 0.73–1.18)
CREAT/UREA NIT SERPL: 17
EOSINOPHIL # BLD AUTO: 0.36 X10(3)/MCL (ref 0–0.9)
EOSINOPHIL NFR BLD AUTO: 3.1 %
ERYTHROCYTE [DISTWIDTH] IN BLOOD BY AUTOMATED COUNT: 14.4 % (ref 11.5–17)
GFR SERPLBLD CREATININE-BSD FMLA CKD-EPI: >60 ML/MIN/1.73/M2
GLOBULIN SER-MCNC: 3.5 GM/DL (ref 2.4–3.5)
GLUCOSE SERPL-MCNC: 120 MG/DL (ref 74–100)
GLUCOSE UR QL STRIP: NEGATIVE
HCT VFR BLD AUTO: 39.9 % (ref 42–52)
HGB BLD-MCNC: 12.8 G/DL (ref 14–18)
HGB UR QL STRIP: ABNORMAL
IMM GRANULOCYTES # BLD AUTO: 0.02 X10(3)/MCL (ref 0–0.04)
IMM GRANULOCYTES NFR BLD AUTO: 0.2 %
KETONES UR QL STRIP: ABNORMAL
LEUKOCYTE ESTERASE UR QL STRIP: ABNORMAL
LYMPHOCYTES # BLD AUTO: 3.06 X10(3)/MCL (ref 0.6–4.6)
LYMPHOCYTES NFR BLD AUTO: 26.3 %
MCH RBC QN AUTO: 26.2 PG (ref 27–31)
MCHC RBC AUTO-ENTMCNC: 32.1 G/DL (ref 33–36)
MCV RBC AUTO: 81.6 FL (ref 80–94)
MONOCYTES # BLD AUTO: 0.51 X10(3)/MCL (ref 0.1–1.3)
MONOCYTES NFR BLD AUTO: 4.4 %
NEUTROPHILS # BLD AUTO: 7.55 X10(3)/MCL (ref 2.1–9.2)
NEUTROPHILS NFR BLD AUTO: 64.9 %
NITRITE UR QL STRIP: POSITIVE
NRBC BLD AUTO-RTO: 0 %
PH UR STRIP: 6.5 [PH]
PLATELET # BLD AUTO: 238 X10(3)/MCL (ref 130–400)
PMV BLD AUTO: 10.6 FL (ref 7.4–10.4)
POTASSIUM SERPL-SCNC: 3.7 MMOL/L (ref 3.5–5.1)
PROT SERPL-MCNC: 7.2 GM/DL (ref 6.4–8.3)
PROT UR QL STRIP: NEGATIVE
RBC # BLD AUTO: 4.89 X10(6)/MCL (ref 4.7–6.1)
RBC #/AREA URNS AUTO: >100 /HPF
SODIUM SERPL-SCNC: 143 MMOL/L (ref 136–145)
SP GR UR STRIP.AUTO: 1.02 (ref 1–1.03)
SQUAMOUS #/AREA URNS LPF: ABNORMAL /HPF
UROBILINOGEN UR STRIP-ACNC: 4
WBC # BLD AUTO: 11.63 X10(3)/MCL (ref 4.5–11.5)
WBC #/AREA URNS AUTO: >100 /HPF

## 2024-07-03 PROCEDURE — 85025 COMPLETE CBC W/AUTO DIFF WBC: CPT

## 2024-07-03 PROCEDURE — 96374 THER/PROPH/DIAG INJ IV PUSH: CPT

## 2024-07-03 PROCEDURE — 96375 TX/PRO/DX INJ NEW DRUG ADDON: CPT

## 2024-07-03 PROCEDURE — 63600175 PHARM REV CODE 636 W HCPCS: Performed by: NURSE PRACTITIONER

## 2024-07-03 PROCEDURE — 96376 TX/PRO/DX INJ SAME DRUG ADON: CPT

## 2024-07-03 PROCEDURE — 80053 COMPREHEN METABOLIC PANEL: CPT

## 2024-07-03 PROCEDURE — 87086 URINE CULTURE/COLONY COUNT: CPT

## 2024-07-03 PROCEDURE — 87077 CULTURE AEROBIC IDENTIFY: CPT | Mod: 59

## 2024-07-03 PROCEDURE — 99285 EMERGENCY DEPT VISIT HI MDM: CPT | Mod: 25

## 2024-07-03 PROCEDURE — 25500020 PHARM REV CODE 255: Performed by: NURSE PRACTITIONER

## 2024-07-03 PROCEDURE — 81001 URINALYSIS AUTO W/SCOPE: CPT

## 2024-07-03 RX ORDER — SULFAMETHOXAZOLE AND TRIMETHOPRIM 800; 160 MG/1; MG/1
1 TABLET ORAL 2 TIMES DAILY
Qty: 20 TABLET | Refills: 0 | Status: SHIPPED | OUTPATIENT
Start: 2024-07-03 | End: 2024-07-08 | Stop reason: ALTCHOICE

## 2024-07-03 RX ORDER — HYDROMORPHONE HYDROCHLORIDE 2 MG/ML
1 INJECTION, SOLUTION INTRAMUSCULAR; INTRAVENOUS; SUBCUTANEOUS
Status: COMPLETED | OUTPATIENT
Start: 2024-07-03 | End: 2024-07-03

## 2024-07-03 RX ORDER — METHOCARBAMOL 100 MG/ML
500 INJECTION, SOLUTION INTRAMUSCULAR; INTRAVENOUS ONCE
Status: COMPLETED | OUTPATIENT
Start: 2024-07-03 | End: 2024-07-03

## 2024-07-03 RX ORDER — ONDANSETRON HYDROCHLORIDE 2 MG/ML
4 INJECTION, SOLUTION INTRAVENOUS
Status: COMPLETED | OUTPATIENT
Start: 2024-07-03 | End: 2024-07-03

## 2024-07-03 RX ADMIN — METHOCARBAMOL 500 MG: 100 INJECTION INTRAMUSCULAR; INTRAVENOUS at 04:07

## 2024-07-03 RX ADMIN — HYDROMORPHONE HYDROCHLORIDE 1 MG: 2 INJECTION INTRAMUSCULAR; INTRAVENOUS; SUBCUTANEOUS at 04:07

## 2024-07-03 RX ADMIN — IOHEXOL 100 ML: 350 INJECTION, SOLUTION INTRAVENOUS at 06:07

## 2024-07-03 RX ADMIN — HYDROMORPHONE HYDROCHLORIDE 1 MG: 2 INJECTION INTRAMUSCULAR; INTRAVENOUS; SUBCUTANEOUS at 06:07

## 2024-07-03 RX ADMIN — ONDANSETRON 4 MG: 2 INJECTION INTRAMUSCULAR; INTRAVENOUS at 04:07

## 2024-07-03 RX ADMIN — HYDROMORPHONE HYDROCHLORIDE 1 MG: 2 INJECTION INTRAMUSCULAR; INTRAVENOUS; SUBCUTANEOUS at 09:07

## 2024-07-03 NOTE — ED PROVIDER NOTES
Encounter Date: 7/3/2024       History     Chief Complaint   Patient presents with    Abdominal Pain     Presents via [facility] with c/o lower abdominal pain that worsened over the past couple days. Also reports hematuria. Denies fevers. Hx of paraplegia with chronic catheter.      See MDM    The history is provided by the patient. No  was used.     Review of patient's allergies indicates:   Allergen Reactions    Amitriptyline      Past Medical History:   Diagnosis Date    Chronic UTI     Paraplegia      Past Surgical History:   Procedure Laterality Date    INSERTION OF SUPRAPUBIC CATHETER      LAPAROTOMY       No family history on file.  Social History     Tobacco Use    Smoking status: Never    Smokeless tobacco: Never   Substance Use Topics    Alcohol use: Not Currently    Drug use: Never     Review of Systems   Constitutional:  Negative for fever.   Respiratory:  Negative for cough and shortness of breath.    Cardiovascular:  Negative for chest pain.   Gastrointestinal:  Positive for abdominal pain.   Genitourinary:  Positive for hematuria. Negative for difficulty urinating and dysuria.   Musculoskeletal:  Negative for gait problem.   Skin:  Negative for color change.   Neurological:  Negative for dizziness, speech difficulty and headaches.   Psychiatric/Behavioral:  Negative for hallucinations and suicidal ideas.    All other systems reviewed and are negative.      Physical Exam     Initial Vitals [07/03/24 1509]   BP Pulse Resp Temp SpO2   117/75 75 19 98.8 °F (37.1 °C) 100 %      MAP       --         Physical Exam    Nursing note and vitals reviewed.  Constitutional: He appears well-developed and well-nourished.   HENT:   Head: Normocephalic.   Eyes: EOM are normal.   Neck: Neck supple.   Normal range of motion.  Cardiovascular:  Normal rate, regular rhythm, normal heart sounds and intact distal pulses.           Pulmonary/Chest: Breath sounds normal.   Abdominal: Abdomen is soft. Bowel sounds  are normal.   Musculoskeletal:      Cervical back: Normal range of motion and neck supple.      Comments: Paraplegia      Neurological: He is alert and oriented to person, place, and time.   Skin: Skin is warm and dry. Capillary refill takes less than 2 seconds.   Left ischium wound    Psychiatric: He has a normal mood and affect. His behavior is normal. Judgment and thought content normal.         ED Course   Procedures  Labs Reviewed   COMPREHENSIVE METABOLIC PANEL - Abnormal; Notable for the following components:       Result Value    Chloride 110 (*)     Glucose 120 (*)     All other components within normal limits   URINALYSIS, REFLEX TO URINE CULTURE - Abnormal; Notable for the following components:    Color, UA Red (*)     Appearance, UA Turbid (*)     Ketones, UA Trace (*)     Blood, UA 3+ (*)     Bilirubin, UA 2+ (*)     Urobilinogen, UA 4.0 (*)     Nitrites, UA Positive (*)     Leukocyte Esterase, UA 3+ (*)     RBC, UA >100 (*)     WBC, UA >100 (*)     All other components within normal limits   CBC WITH DIFFERENTIAL - Abnormal; Notable for the following components:    WBC 11.63 (*)     Hgb 12.8 (*)     Hct 39.9 (*)     MCH 26.2 (*)     MCHC 32.1 (*)     MPV 10.6 (*)     All other components within normal limits   CULTURE, URINE   CBC W/ AUTO DIFFERENTIAL    Narrative:     The following orders were created for panel order CBC auto differential.  Procedure                               Abnormality         Status                     ---------                               -----------         ------                     CBC with Differential[[id number]]       Abnormal            Final result                 Please view results for these tests on the individual orders.          Imaging Results              CT Abdomen Pelvis With IV Contrast NO Oral Contrast (Final result)  Result time 07/03/24 19:53:30      Final result by Sadie Mckeon MD (07/03/24 19:53:30)                   Impression:      1. Large  colonic stool volume without bowel obstruction.  2. Diffuse urinary bladder wall thickening.      Electronically signed by: Sadie Mckeon  Date:    07/03/2024  Time:    19:53               Narrative:    EXAMINATION:  CT ABDOMEN PELVIS WITH IV CONTRAST    CLINICAL HISTORY:  lower abdominal pain;    TECHNIQUE:  CT imaging was performed of the abdomen and pelvis after the administration of intravenous contrast. Dose length product is 346 mGycm. Automatic exposure control, adjustment of mA/kV or iterative reconstruction technique was used to limit radiation dose.    COMPARISON:  CT abdomen pelvis dated 01/08/2024    FINDINGS:  Liver: Normal.    Gallbladder and biliary tree: No calcified gallstones. No intra or extrahepatic biliary ductal dilation.    Pancreas: Normal.    Spleen: Normal.    Adrenals: Normal.    Kidneys and ureters: No hydronephrosis.  8 mm nonobstructing left renal calculus.    Bladder: Diffuse urinary bladder wall thickening with urinary catheter in place    Reproductive organs: No pelvic masses.    Stomach/bowel: There is a moderate colonic stool volume.  There is no bowel obstruction.    Lymph nodes: No pathologically enlarged lymph node identified.    Peritoneum: No ascites or free air. No fluid collection.    Vessels: No abdominal aortic aneurysm.    Abdominal wall: Normal.    Lung bases: Bibasilar subsegmental atelectasis.    Bones/soft tissues: Similar appearance of chronic sacral decubitus ulcer.  No drainable fluid collection.  Postoperative changes at L5-S1 with posterior fusion hardware.                                       Medications   HYDROmorphone (PF) injection 1 mg (has no administration in time range)   methocarbamoL injection 500 mg (500 mg Intravenous Given 7/3/24 1641)   HYDROmorphone (PF) injection 1 mg (1 mg Intravenous Given 7/3/24 1640)   ondansetron injection 4 mg (4 mg Intravenous Given 7/3/24 1641)   HYDROmorphone (PF) injection 1 mg (1 mg Intravenous Given 7/3/24 1833)    iohexoL (OMNIPAQUE 350) injection 100 mL (100 mLs Intravenous Given 7/3/24 1824)     Medical Decision Making  Historian:  Patient.  Patient is a 48-year-old male  that presents with lower abdominal pain that has been present few days. Associated symptoms hematuria. Surrounding information is patient has a suprapubic catheter. Exacerbated by nothing. Relieved by nothing. Patient treatment prior to arrival none. Risk factors include none. Other history pertaining to this complaint nothing.   Assessment:  See physical exam.  DD:  Hemorrhagic cystitis, colitis, diverticulitis, intestinal obstruction  ED Course: History was obtained.  Physical was performed.  Patient will be sent home on Bactrim. Medical or surgical consults:  Internal Medicine. Social determinants that affect healthcare:  None.       Amount and/or Complexity of Data Reviewed  Labs:      Details: Labs unremarkable  Radiology: ordered.  Discussion of management or test interpretation with external provider(s): Spoke to Mireya Rios, nurse practitioner for Dr. Bar, and she feels patient can be discharged on Bactrim.  We did review previous cultures and show sensitivity to Bactrim    Risk  Prescription drug management.               ED Course as of 07/03/24 2130 Wed Jul 03, 2024 2115 Paged Dr Bar [CL]   2126 NITRITE UA(!): Positive [CL]   2126 RBC, UA(!): >100 [CL]   2126 WBC, UA(!): >100 [CL]   2126 Bacteria, UA: Rare [CL]      ED Course User Index  [CL] Jackson Bernard FNP                             Clinical Impression:  Final diagnoses:  [N39.0, R31.9] Urinary tract infection with hematuria, site unspecified (Primary)          ED Disposition Condition    Discharge Stable          ED Prescriptions       Medication Sig Dispense Start Date End Date Auth. Provider    sulfamethoxazole-trimethoprim 800-160mg (BACTRIM DS) 800-160 mg Tab Take 1 tablet by mouth 2 (two) times daily. for 10 days 20 tablet 7/3/2024 7/13/2024 Jackson Bernard FNP           Follow-up Information       Follow up With Specialties Details Why Contact Info    Your Primary Care Provider  Call in 3 days ed follow up              Jackson Bernard, JOSE ALFREDO  07/03/24 [id number]

## 2024-07-06 LAB — BACTERIA UR CULT: ABNORMAL

## 2024-07-08 RX ORDER — CEFUROXIME AXETIL 500 MG/1
500 TABLET ORAL EVERY 12 HOURS
Qty: 20 TABLET | Refills: 0 | Status: SHIPPED | OUTPATIENT
Start: 2024-07-08 | End: 2024-07-18

## 2024-09-26 ENCOUNTER — HOSPITAL ENCOUNTER (INPATIENT)
Facility: HOSPITAL | Age: 49
LOS: 5 days | Discharge: NURSING, REHAB OR LONG-TERM CARE | DRG: 539 | End: 2024-10-02
Attending: EMERGENCY MEDICINE | Admitting: INTERNAL MEDICINE
Payer: MEDICARE

## 2024-09-26 DIAGNOSIS — L89.324 PRESSURE INJURY OF LEFT BUTTOCK, STAGE 4: Primary | ICD-10-CM

## 2024-09-26 DIAGNOSIS — T14.8XXA WOUND INFECTION: ICD-10-CM

## 2024-09-26 DIAGNOSIS — L08.9 WOUND INFECTION: ICD-10-CM

## 2024-09-26 LAB
ALBUMIN SERPL-MCNC: 3.3 G/DL (ref 3.5–5)
ALBUMIN/GLOB SERPL: 0.7 RATIO (ref 1.1–2)
ALP SERPL-CCNC: 94 UNIT/L (ref 40–150)
ALT SERPL-CCNC: 8 UNIT/L (ref 0–55)
ANION GAP SERPL CALC-SCNC: 14 MEQ/L
AST SERPL-CCNC: 11 UNIT/L (ref 5–34)
BACTERIA #/AREA URNS AUTO: ABNORMAL /HPF
BASOPHILS # BLD AUTO: 0.15 X10(3)/MCL
BASOPHILS NFR BLD AUTO: 1.2 %
BILIRUB SERPL-MCNC: 0.5 MG/DL
BILIRUB UR QL STRIP.AUTO: ABNORMAL
BUN SERPL-MCNC: 12.7 MG/DL (ref 8.9–20.6)
CALCIUM SERPL-MCNC: 9.1 MG/DL (ref 8.4–10.2)
CAOX CRY UR QL COMP ASSIST: ABNORMAL
CHLORIDE SERPL-SCNC: 107 MMOL/L (ref 98–107)
CLARITY UR: ABNORMAL
CO2 SERPL-SCNC: 17 MMOL/L (ref 22–29)
COLOR UR AUTO: YELLOW
CREAT SERPL-MCNC: 0.76 MG/DL (ref 0.73–1.18)
CREAT/UREA NIT SERPL: 17
EOSINOPHIL # BLD AUTO: 0.15 X10(3)/MCL (ref 0–0.9)
EOSINOPHIL NFR BLD AUTO: 1.2 %
ERYTHROCYTE [DISTWIDTH] IN BLOOD BY AUTOMATED COUNT: 14.4 % (ref 11.5–17)
GFR SERPLBLD CREATININE-BSD FMLA CKD-EPI: >60 ML/MIN/1.73/M2
GLOBULIN SER-MCNC: 4.7 GM/DL (ref 2.4–3.5)
GLUCOSE SERPL-MCNC: 81 MG/DL (ref 74–100)
GLUCOSE UR QL STRIP: NORMAL
HCT VFR BLD AUTO: 40.2 % (ref 42–52)
HGB BLD-MCNC: 12.5 G/DL (ref 14–18)
HGB UR QL STRIP: ABNORMAL
IMM GRANULOCYTES # BLD AUTO: 0.06 X10(3)/MCL (ref 0–0.04)
IMM GRANULOCYTES NFR BLD AUTO: 0.5 %
KETONES UR QL STRIP: ABNORMAL
LEUKOCYTE ESTERASE UR QL STRIP: 500
LYMPHOCYTES # BLD AUTO: 3.23 X10(3)/MCL (ref 0.6–4.6)
LYMPHOCYTES NFR BLD AUTO: 25.8 %
MCH RBC QN AUTO: 25.5 PG (ref 27–31)
MCHC RBC AUTO-ENTMCNC: 31.1 G/DL (ref 33–36)
MCV RBC AUTO: 81.9 FL (ref 80–94)
MONOCYTES # BLD AUTO: 0.92 X10(3)/MCL (ref 0.1–1.3)
MONOCYTES NFR BLD AUTO: 7.3 %
MUCOUS THREADS URNS QL MICRO: ABNORMAL /LPF
NEUTROPHILS # BLD AUTO: 8.01 X10(3)/MCL (ref 2.1–9.2)
NEUTROPHILS NFR BLD AUTO: 64 %
NITRITE UR QL STRIP: NEGATIVE
NRBC BLD AUTO-RTO: 0 %
PH UR STRIP: 6 [PH]
PLATELET # BLD AUTO: 385 X10(3)/MCL (ref 130–400)
PMV BLD AUTO: 10 FL (ref 7.4–10.4)
POTASSIUM SERPL-SCNC: 3.6 MMOL/L (ref 3.5–5.1)
PROT SERPL-MCNC: 8 GM/DL (ref 6.4–8.3)
PROT UR QL STRIP: ABNORMAL
RBC # BLD AUTO: 4.91 X10(6)/MCL (ref 4.7–6.1)
RBC #/AREA URNS AUTO: >100 /HPF
SODIUM SERPL-SCNC: 138 MMOL/L (ref 136–145)
SP GR UR STRIP.AUTO: 1.03 (ref 1–1.03)
SQUAMOUS #/AREA URNS LPF: ABNORMAL /HPF
UROBILINOGEN UR STRIP-ACNC: 4
WBC # BLD AUTO: 12.52 X10(3)/MCL (ref 4.5–11.5)
WBC #/AREA URNS AUTO: >100 /HPF
YEAST BUDDING URNS QL: ABNORMAL /HPF

## 2024-09-26 PROCEDURE — 25500020 PHARM REV CODE 255: Performed by: EMERGENCY MEDICINE

## 2024-09-26 PROCEDURE — 96374 THER/PROPH/DIAG INJ IV PUSH: CPT

## 2024-09-26 PROCEDURE — 81001 URINALYSIS AUTO W/SCOPE: CPT | Performed by: EMERGENCY MEDICINE

## 2024-09-26 PROCEDURE — 87070 CULTURE OTHR SPECIMN AEROBIC: CPT | Performed by: EMERGENCY MEDICINE

## 2024-09-26 PROCEDURE — 87077 CULTURE AEROBIC IDENTIFY: CPT | Performed by: EMERGENCY MEDICINE

## 2024-09-26 PROCEDURE — 86140 C-REACTIVE PROTEIN: CPT | Performed by: EMERGENCY MEDICINE

## 2024-09-26 PROCEDURE — 96361 HYDRATE IV INFUSION ADD-ON: CPT

## 2024-09-26 PROCEDURE — 99285 EMERGENCY DEPT VISIT HI MDM: CPT | Mod: 25

## 2024-09-26 PROCEDURE — 96375 TX/PRO/DX INJ NEW DRUG ADDON: CPT

## 2024-09-26 PROCEDURE — 85025 COMPLETE CBC W/AUTO DIFF WBC: CPT | Performed by: EMERGENCY MEDICINE

## 2024-09-26 PROCEDURE — 80053 COMPREHEN METABOLIC PANEL: CPT | Performed by: EMERGENCY MEDICINE

## 2024-09-26 PROCEDURE — 96376 TX/PRO/DX INJ SAME DRUG ADON: CPT

## 2024-09-26 PROCEDURE — 63600175 PHARM REV CODE 636 W HCPCS: Performed by: EMERGENCY MEDICINE

## 2024-09-26 RX ORDER — ONDANSETRON HYDROCHLORIDE 2 MG/ML
4 INJECTION, SOLUTION INTRAVENOUS
Status: COMPLETED | OUTPATIENT
Start: 2024-09-26 | End: 2024-09-26

## 2024-09-26 RX ORDER — MORPHINE SULFATE 4 MG/ML
4 INJECTION, SOLUTION INTRAMUSCULAR; INTRAVENOUS
Status: COMPLETED | OUTPATIENT
Start: 2024-09-26 | End: 2024-09-26

## 2024-09-26 RX ORDER — METHOCARBAMOL 100 MG/ML
1000 INJECTION, SOLUTION INTRAMUSCULAR; INTRAVENOUS
Status: COMPLETED | OUTPATIENT
Start: 2024-09-26 | End: 2024-09-26

## 2024-09-26 RX ADMIN — SODIUM CHLORIDE, POTASSIUM CHLORIDE, SODIUM LACTATE AND CALCIUM CHLORIDE 1000 ML: 600; 310; 30; 20 INJECTION, SOLUTION INTRAVENOUS at 08:09

## 2024-09-26 RX ADMIN — MORPHINE SULFATE 4 MG: 4 INJECTION, SOLUTION INTRAMUSCULAR; INTRAVENOUS at 11:09

## 2024-09-26 RX ADMIN — IOHEXOL 100 ML: 350 INJECTION, SOLUTION INTRAVENOUS at 09:09

## 2024-09-26 RX ADMIN — MORPHINE SULFATE 4 MG: 4 INJECTION, SOLUTION INTRAMUSCULAR; INTRAVENOUS at 08:09

## 2024-09-26 RX ADMIN — METHOCARBAMOL 1000 MG: 100 INJECTION INTRAMUSCULAR; INTRAVENOUS at 11:09

## 2024-09-26 RX ADMIN — ONDANSETRON 4 MG: 2 INJECTION INTRAMUSCULAR; INTRAVENOUS at 08:09

## 2024-09-26 NOTE — Clinical Note
Diagnosis: Wound infection [599018]   Future Attending Provider: SANAM MONROE [[id number]]   Admit to which facility:: [facility] [[id number]]   Reason for IP Medical Treatment  (Clinical interventions that can only be accomplished in the IP setting? ) :: IV antibiotics   Plans for Post-Acute care--if anticipated (pick the single best option):: C. Discharge home with home health services   Special Needs:: Fall Risk [15]

## 2024-09-27 PROBLEM — E44.0 MODERATE MALNUTRITION: Status: ACTIVE | Noted: 2024-09-27

## 2024-09-27 LAB
ALBUMIN SERPL-MCNC: 2.9 G/DL (ref 3.5–5)
ALBUMIN/GLOB SERPL: 0.9 RATIO (ref 1.1–2)
ALP SERPL-CCNC: 84 UNIT/L (ref 40–150)
ALT SERPL-CCNC: 5 UNIT/L (ref 0–55)
ANION GAP SERPL CALC-SCNC: 14 MEQ/L
AST SERPL-CCNC: 8 UNIT/L (ref 5–34)
BASOPHILS # BLD AUTO: 0.09 X10(3)/MCL
BASOPHILS NFR BLD AUTO: 0.9 %
BILIRUB SERPL-MCNC: 0.4 MG/DL
BUN SERPL-MCNC: 9.5 MG/DL (ref 8.9–20.6)
CALCIUM SERPL-MCNC: 8.7 MG/DL (ref 8.4–10.2)
CHLORIDE SERPL-SCNC: 107 MMOL/L (ref 98–107)
CO2 SERPL-SCNC: 17 MMOL/L (ref 22–29)
CREAT SERPL-MCNC: 0.65 MG/DL (ref 0.73–1.18)
CREAT/UREA NIT SERPL: 15
CRP SERPL-MCNC: 39.1 MG/L
EOSINOPHIL # BLD AUTO: 0.1 X10(3)/MCL (ref 0–0.9)
EOSINOPHIL NFR BLD AUTO: 1 %
ERYTHROCYTE [DISTWIDTH] IN BLOOD BY AUTOMATED COUNT: 14.6 % (ref 11.5–17)
ERYTHROCYTE [SEDIMENTATION RATE] IN BLOOD: 50 MM/HR (ref 0–15)
GFR SERPLBLD CREATININE-BSD FMLA CKD-EPI: >60 ML/MIN/1.73/M2
GLOBULIN SER-MCNC: 3.2 GM/DL (ref 2.4–3.5)
GLUCOSE SERPL-MCNC: 73 MG/DL (ref 74–100)
HCT VFR BLD AUTO: 35.1 % (ref 42–52)
HGB BLD-MCNC: 10.6 G/DL (ref 14–18)
IMM GRANULOCYTES # BLD AUTO: 0.04 X10(3)/MCL (ref 0–0.04)
IMM GRANULOCYTES NFR BLD AUTO: 0.4 %
LACTATE SERPL-SCNC: 2 MMOL/L (ref 0.5–2.2)
LYMPHOCYTES # BLD AUTO: 3.09 X10(3)/MCL (ref 0.6–4.6)
LYMPHOCYTES NFR BLD AUTO: 31.7 %
MCH RBC QN AUTO: 25.9 PG (ref 27–31)
MCHC RBC AUTO-ENTMCNC: 30.2 G/DL (ref 33–36)
MCV RBC AUTO: 85.8 FL (ref 80–94)
MONOCYTES # BLD AUTO: 0.69 X10(3)/MCL (ref 0.1–1.3)
MONOCYTES NFR BLD AUTO: 7.1 %
NEUTROPHILS # BLD AUTO: 5.73 X10(3)/MCL (ref 2.1–9.2)
NEUTROPHILS NFR BLD AUTO: 58.9 %
NRBC BLD AUTO-RTO: 0 %
PLATELET # BLD AUTO: 301 X10(3)/MCL (ref 130–400)
PMV BLD AUTO: 9.9 FL (ref 7.4–10.4)
POTASSIUM SERPL-SCNC: 4.5 MMOL/L (ref 3.5–5.1)
PROT SERPL-MCNC: 6.1 GM/DL (ref 6.4–8.3)
RBC # BLD AUTO: 4.09 X10(6)/MCL (ref 4.7–6.1)
SODIUM SERPL-SCNC: 138 MMOL/L (ref 136–145)
WBC # BLD AUTO: 9.74 X10(3)/MCL (ref 4.5–11.5)

## 2024-09-27 PROCEDURE — 63600175 PHARM REV CODE 636 W HCPCS: Performed by: INTERNAL MEDICINE

## 2024-09-27 PROCEDURE — 25000003 PHARM REV CODE 250: Performed by: INTERNAL MEDICINE

## 2024-09-27 PROCEDURE — 25000003 PHARM REV CODE 250: Performed by: EMERGENCY MEDICINE

## 2024-09-27 PROCEDURE — 85025 COMPLETE CBC W/AUTO DIFF WBC: CPT | Performed by: EMERGENCY MEDICINE

## 2024-09-27 PROCEDURE — 11000001 HC ACUTE MED/SURG PRIVATE ROOM

## 2024-09-27 PROCEDURE — 63600175 PHARM REV CODE 636 W HCPCS: Performed by: EMERGENCY MEDICINE

## 2024-09-27 PROCEDURE — 80053 COMPREHEN METABOLIC PANEL: CPT | Performed by: EMERGENCY MEDICINE

## 2024-09-27 PROCEDURE — 83605 ASSAY OF LACTIC ACID: CPT | Performed by: EMERGENCY MEDICINE

## 2024-09-27 PROCEDURE — 99285 EMERGENCY DEPT VISIT HI MDM: CPT

## 2024-09-27 PROCEDURE — 87040 BLOOD CULTURE FOR BACTERIA: CPT | Performed by: EMERGENCY MEDICINE

## 2024-09-27 PROCEDURE — 21400001 HC TELEMETRY ROOM

## 2024-09-27 PROCEDURE — 85652 RBC SED RATE AUTOMATED: CPT | Performed by: EMERGENCY MEDICINE

## 2024-09-27 RX ORDER — VANCOMYCIN HCL IN 5 % DEXTROSE 1G/250ML
1000 PLASTIC BAG, INJECTION (ML) INTRAVENOUS
Status: DISCONTINUED | OUTPATIENT
Start: 2024-09-27 | End: 2024-09-28

## 2024-09-27 RX ORDER — LEVOFLOXACIN 500 MG/1
1 TABLET, FILM COATED ORAL DAILY
Status: ON HOLD | COMMUNITY
Start: 2024-09-17 | End: 2024-10-02 | Stop reason: HOSPADM

## 2024-09-27 RX ORDER — ONDANSETRON HYDROCHLORIDE 2 MG/ML
4 INJECTION, SOLUTION INTRAVENOUS EVERY 8 HOURS PRN
Status: DISCONTINUED | OUTPATIENT
Start: 2024-09-27 | End: 2024-10-02 | Stop reason: HOSPADM

## 2024-09-27 RX ORDER — HYDROMORPHONE HYDROCHLORIDE 2 MG/ML
0.5 INJECTION, SOLUTION INTRAMUSCULAR; INTRAVENOUS; SUBCUTANEOUS EVERY 6 HOURS PRN
Status: DISCONTINUED | OUTPATIENT
Start: 2024-09-27 | End: 2024-09-28

## 2024-09-27 RX ORDER — ACETAMINOPHEN 325 MG/1
650 TABLET ORAL EVERY 8 HOURS PRN
Status: DISCONTINUED | OUTPATIENT
Start: 2024-09-27 | End: 2024-10-02 | Stop reason: HOSPADM

## 2024-09-27 RX ORDER — GABAPENTIN 400 MG/1
800 CAPSULE ORAL 3 TIMES DAILY
Status: DISCONTINUED | OUTPATIENT
Start: 2024-09-27 | End: 2024-09-27

## 2024-09-27 RX ORDER — HYDROXYZINE PAMOATE 25 MG/1
25 CAPSULE ORAL 3 TIMES DAILY
Status: DISCONTINUED | OUTPATIENT
Start: 2024-09-27 | End: 2024-10-02 | Stop reason: HOSPADM

## 2024-09-27 RX ORDER — BACLOFEN 10 MG/1
20 TABLET ORAL 3 TIMES DAILY
Status: DISCONTINUED | OUTPATIENT
Start: 2024-09-27 | End: 2024-09-29

## 2024-09-27 RX ORDER — AMLODIPINE BESYLATE 5 MG/1
5 TABLET ORAL DAILY
Status: DISCONTINUED | OUTPATIENT
Start: 2024-09-27 | End: 2024-10-02 | Stop reason: HOSPADM

## 2024-09-27 RX ORDER — OXYBUTYNIN CHLORIDE 5 MG/1
5 TABLET ORAL DAILY
Status: DISCONTINUED | OUTPATIENT
Start: 2024-09-27 | End: 2024-10-02 | Stop reason: HOSPADM

## 2024-09-27 RX ORDER — GABAPENTIN 400 MG/1
800 CAPSULE ORAL 3 TIMES DAILY
Status: DISCONTINUED | OUTPATIENT
Start: 2024-09-27 | End: 2024-10-02 | Stop reason: HOSPADM

## 2024-09-27 RX ORDER — BACLOFEN 10 MG/1
20 TABLET ORAL 3 TIMES DAILY
Status: DISCONTINUED | OUTPATIENT
Start: 2024-09-27 | End: 2024-09-27

## 2024-09-27 RX ORDER — OXYCODONE AND ACETAMINOPHEN 10; 325 MG/1; MG/1
1 TABLET ORAL EVERY 8 HOURS PRN
Status: DISCONTINUED | OUTPATIENT
Start: 2024-09-27 | End: 2024-09-28

## 2024-09-27 RX ORDER — HYDROXYZINE PAMOATE 25 MG/1
25 CAPSULE ORAL 3 TIMES DAILY
Status: DISCONTINUED | OUTPATIENT
Start: 2024-09-27 | End: 2024-09-27

## 2024-09-27 RX ORDER — ACETAMINOPHEN 325 MG/1
650 TABLET ORAL
Status: COMPLETED | OUTPATIENT
Start: 2024-09-27 | End: 2024-09-27

## 2024-09-27 RX ORDER — MIRTAZAPINE 15 MG/1
15 TABLET, FILM COATED ORAL NIGHTLY
Status: DISCONTINUED | OUTPATIENT
Start: 2024-09-27 | End: 2024-10-02 | Stop reason: HOSPADM

## 2024-09-27 RX ORDER — SODIUM CHLORIDE 0.9 % (FLUSH) 0.9 %
10 SYRINGE (ML) INJECTION
Status: DISCONTINUED | OUTPATIENT
Start: 2024-09-27 | End: 2024-10-02 | Stop reason: HOSPADM

## 2024-09-27 RX ORDER — SERTRALINE HYDROCHLORIDE 50 MG/1
50 TABLET, FILM COATED ORAL DAILY
Status: DISCONTINUED | OUTPATIENT
Start: 2024-09-27 | End: 2024-10-02 | Stop reason: HOSPADM

## 2024-09-27 RX ORDER — OXYCODONE AND ACETAMINOPHEN 10; 325 MG/1; MG/1
1 TABLET ORAL
Status: COMPLETED | OUTPATIENT
Start: 2024-09-27 | End: 2024-09-27

## 2024-09-27 RX ORDER — METRONIDAZOLE 500 MG/1
500 TABLET ORAL 3 TIMES DAILY
Status: ON HOLD | COMMUNITY
Start: 2024-09-17 | End: 2024-10-02 | Stop reason: HOSPADM

## 2024-09-27 RX ORDER — ACETAMINOPHEN 10 MG/ML
1000 INJECTION, SOLUTION INTRAVENOUS ONCE
Status: DISCONTINUED | OUTPATIENT
Start: 2024-09-27 | End: 2024-09-27

## 2024-09-27 RX ADMIN — SODIUM CHLORIDE, POTASSIUM CHLORIDE, SODIUM LACTATE AND CALCIUM CHLORIDE 1000 ML: 600; 310; 30; 20 INJECTION, SOLUTION INTRAVENOUS at 12:09

## 2024-09-27 RX ADMIN — GABAPENTIN 800 MG: 400 CAPSULE ORAL at 12:09

## 2024-09-27 RX ADMIN — APIXABAN 5 MG: 5 TABLET, FILM COATED ORAL at 10:09

## 2024-09-27 RX ADMIN — MEROPENEM 1 G: 1 INJECTION, POWDER, FOR SOLUTION INTRAVENOUS at 02:09

## 2024-09-27 RX ADMIN — AMLODIPINE BESYLATE 5 MG: 5 TABLET ORAL at 03:09

## 2024-09-27 RX ADMIN — ACETAMINOPHEN 650 MG: 325 TABLET ORAL at 12:09

## 2024-09-27 RX ADMIN — BACLOFEN 20 MG: 10 TABLET ORAL at 08:09

## 2024-09-27 RX ADMIN — MEROPENEM 1 G: 1 INJECTION, POWDER, FOR SOLUTION INTRAVENOUS at 06:09

## 2024-09-27 RX ADMIN — HYDROMORPHONE HYDROCHLORIDE 0.5 MG: 2 INJECTION, SOLUTION INTRAMUSCULAR; INTRAVENOUS; SUBCUTANEOUS at 05:09

## 2024-09-27 RX ADMIN — BACLOFEN 20 MG: 10 TABLET ORAL at 03:09

## 2024-09-27 RX ADMIN — MEROPENEM 1 G: 1 INJECTION, POWDER, FOR SOLUTION INTRAVENOUS at 09:09

## 2024-09-27 RX ADMIN — GABAPENTIN 800 MG: 400 CAPSULE ORAL at 04:09

## 2024-09-27 RX ADMIN — ONDANSETRON 4 MG: 2 INJECTION INTRAMUSCULAR; INTRAVENOUS at 03:09

## 2024-09-27 RX ADMIN — HYDROMORPHONE HYDROCHLORIDE 0.5 MG: 2 INJECTION, SOLUTION INTRAMUSCULAR; INTRAVENOUS; SUBCUTANEOUS at 10:09

## 2024-09-27 RX ADMIN — BACLOFEN 20 MG: 10 TABLET ORAL at 10:09

## 2024-09-27 RX ADMIN — GABAPENTIN 800 MG: 400 CAPSULE ORAL at 08:09

## 2024-09-27 RX ADMIN — VANCOMYCIN HYDROCHLORIDE 1500 MG: 1.5 INJECTION, POWDER, LYOPHILIZED, FOR SOLUTION INTRAVENOUS at 01:09

## 2024-09-27 RX ADMIN — VANCOMYCIN HYDROCHLORIDE 1000 MG: 1 INJECTION, POWDER, LYOPHILIZED, FOR SOLUTION INTRAVENOUS at 01:09

## 2024-09-27 RX ADMIN — OXYCODONE AND ACETAMINOPHEN 1 TABLET: 10; 325 TABLET ORAL at 01:09

## 2024-09-27 RX ADMIN — OXYCODONE AND ACETAMINOPHEN 1 TABLET: 10; 325 TABLET ORAL at 12:09

## 2024-09-27 RX ADMIN — HYDROMORPHONE HYDROCHLORIDE 0.5 MG: 2 INJECTION, SOLUTION INTRAMUSCULAR; INTRAVENOUS; SUBCUTANEOUS at 02:09

## 2024-09-27 RX ADMIN — OXYCODONE AND ACETAMINOPHEN 1 TABLET: 10; 325 TABLET ORAL at 08:09

## 2024-09-27 RX ADMIN — APIXABAN 5 MG: 5 TABLET, FILM COATED ORAL at 08:09

## 2024-09-27 NOTE — ED PROVIDER NOTES
Encounter Date: 9/26/2024    SCRIBE #1 NOTE: I, Pro Arie, am scribing for, and in the presence of,  Amrita Echevarria MD. I have scribed the following portions of the note - Other sections scribed: HPI, ROS, PE.       History     Chief Complaint   Patient presents with    Wound Check     Presents via [facility] with c/o yellow/green drainage from a wound to the LLE. Pt reports the wound is chronic, however, the drainage began today. Also reports occasional diaphoresis, however, denies known fevers.      49 y/o male with a hx of paraplegia, suprapubic catheter, chronic UTI and chronic ischial wound presents to the ED for a wound check. Pt has a wound on his buttocks for which he is taking antibiotics. Home health checked the wound earlier today, noted yellow-green drainage from the wound, and recommended he be evaluated in the ED. He also reports diaphoresis, decreased appetite, nausea with vomiting.  No documented fever.  He states he is currently on antibiotics, noting that he will finish them on 9/30.  Lastly he reports body spasms. He has been taking gabapentin 800 mg TID, baclofen 20 mg TID, and percocet 10/325 mg with only minimal relief. He is asking for pain medication. Pt states he last took meds at 1200 today.     The history is provided by the patient. No  was used.     Review of patient's allergies indicates:   Allergen Reactions    Amitriptyline      Past Medical History:   Diagnosis Date    Chronic UTI     Paraplegia      Past Surgical History:   Procedure Laterality Date    INSERTION OF SUPRAPUBIC CATHETER      LAPAROTOMY       No family history on file.  Social History     Tobacco Use    Smoking status: Never    Smokeless tobacco: Never   Substance Use Topics    Alcohol use: Not Currently    Drug use: Never     Review of Systems   Constitutional:  Positive for appetite change and diaphoresis. Negative for fever.   HENT:  Negative for sore throat.    Eyes:  Negative for visual  disturbance.   Respiratory:  Negative for cough and shortness of breath.    Cardiovascular:  Negative for chest pain.   Gastrointestinal:  Positive for nausea and vomiting. Negative for abdominal pain, blood in stool, constipation and diarrhea.   Genitourinary:         Suprapubic catheter   Musculoskeletal:  Positive for myalgias.   Skin:  Positive for wound. Negative for rash.   Neurological:  Negative for syncope and headaches.   All other systems reviewed and are negative.      Physical Exam     Initial Vitals [09/26/24 1622]   BP Pulse Resp Temp SpO2   (!) 151/106 (!) 124 20 97.7 °F (36.5 °C) 100 %      MAP       --         Physical Exam    Nursing note and vitals reviewed.  Constitutional: He appears well-developed and well-nourished. He is not diaphoretic. No distress.   HENT:   Head: Normocephalic and atraumatic.   Lips dry   Eyes: Conjunctivae and EOM are normal.   Neck: Neck supple.   Normal range of motion.  Cardiovascular:  Regular rhythm and intact distal pulses.   Tachycardia present.         Pulmonary/Chest: Breath sounds normal. No respiratory distress. He has no wheezes. He has no rhonchi. He has no rales.   Abdominal: Abdomen is soft. Bowel sounds are normal. He exhibits no distension. There is no abdominal tenderness.   Genitourinary:    Genitourinary Comments: Suprapubic catheter in place, clean dry and intact with no surrounding erythema      Musculoskeletal:         General: No edema.      Cervical back: Normal range of motion and neck supple.      Comments: Decubitus wound to the left ischium. Non malodorous. No purulent drainage. No surrounding erythema.      Neurological: He is alert and oriented to person, place, and time. GCS eye subscore is 4. GCS verbal subscore is 5. GCS motor subscore is 6.   Paraplegic at baseline   Skin: Skin is warm and dry. Capillary refill takes less than 2 seconds.   Psychiatric: He has a normal mood and affect.               ED Course   Procedures  Labs Reviewed    COMPREHENSIVE METABOLIC PANEL - Abnormal       Result Value    Sodium 138      Potassium 3.6      Chloride 107      CO2 17 (*)     Glucose 81      Blood Urea Nitrogen 12.7      Creatinine 0.76      Calcium 9.1      Protein Total 8.0      Albumin 3.3 (*)     Globulin 4.7 (*)     Albumin/Globulin Ratio 0.7 (*)     Bilirubin Total 0.5      ALP 94      ALT 8      AST 11      eGFR >60      Anion Gap 14.0      BUN/Creatinine Ratio 17     CBC WITH DIFFERENTIAL - Abnormal    WBC 12.52 (*)     RBC 4.91      Hgb 12.5 (*)     Hct 40.2 (*)     MCV 81.9      MCH 25.5 (*)     MCHC 31.1 (*)     RDW 14.4      Platelet 385      MPV 10.0      Neut % 64.0      Lymph % 25.8      Mono % 7.3      Eos % 1.2      Basophil % 1.2      Lymph # 3.23      Neut # 8.01      Mono # 0.92      Eos # 0.15      Baso # 0.15      IG# 0.06 (*)     IG% 0.5      NRBC% 0.0     URINALYSIS, REFLEX TO URINE CULTURE - Abnormal    Color, UA Yellow      Appearance, UA Turbid (*)     Specific Gravity, UA 1.031 (*)     pH, UA 6.0      Protein, UA 1+ (*)     Glucose, UA Normal      Ketones, UA 4+ (*)     Blood, UA 3+ (*)     Bilirubin, UA 1+ (*)     Urobilinogen, UA 4.0 (*)     Nitrites, UA Negative      Leukocyte Esterase,  (*)     RBC, UA >100 (*)     WBC, UA >100 (*)     Bacteria, UA Occasional (*)     Budding Yeast, UA Few (*)     Squamous Epithelial Cells, UA Trace      Mucous, UA Trace (*)     Calcium Oxalate Crystals, UA Trace (*)    C-REACTIVE PROTEIN - Abnormal    CRP 39.10 (*)    WOUND CULTURE ([facility])   CULTURE, URINE   BLOOD CULTURE [facility]   BLOOD CULTURE [facility]   CBC W/ AUTO DIFFERENTIAL    Narrative:     The following orders were created for panel order CBC auto differential.  Procedure                               Abnormality         Status                     ---------                               -----------         ------                     CBC with Differential[[id number]]       Abnormal            Final result                 Please view  results for these tests on the individual orders.   SEDIMENTATION RATE   LACTIC ACID, PLASMA   CBC W/ AUTO DIFFERENTIAL    Narrative:     The following orders were created for panel order CBC auto differential.  Procedure                               Abnormality         Status                     ---------                               -----------         ------                     CBC with Differential[[id number]]                                                        Please view results for these tests on the individual orders.   COMPREHENSIVE METABOLIC PANEL   CBC WITH DIFFERENTIAL          Imaging Results              CT Pelvis With IV Contrast NO Oral Contrast (Final result)  Result time 09/26/24 21:50:32      Final result by Shaun Lopez MD (09/26/24 21:50:32)                   Impression:      Decubitus ulcer in the left buttock extending to the inferior pubic ramus on the left.  Changes most consistent with chronic osteomyelitis of the inferior pubic ramus bilaterally.  A soft tissue abscess is not demonstrated      Electronically signed by: Shaun Lopez MD  Date:    09/26/2024  Time:    21:50               Narrative:    EXAMINATION:  CT PELVIS WITH IV CONTRAST    CLINICAL HISTORY:  decubitus wound;    TECHNIQUE:  Patient was injected with 100 cc of Omnipaque 350.    Automatic exposure control (AEC) was utilized for dose reduction.    Dose: 465 mGycm    COMPARISON:  07/03/2024    FINDINGS:  There is soft tissue density posterior to the sacrum best seen on series 2, image 41 this may represent a cubitus ulcer there is significant calcification anterior to the right hip there is dysplastic changes to the inferior pubic rami bilaterally.  This most likely represents chronic osteomyelitis.  There is ulceration in the left buttock extending to the inferior pubic ramus best seen on series 2, image 80.  Active osteomyelitis cannot be determined with certainty.  MRI with contrast would be more sensitive.                                        Medications   lactated ringers bolus 1,000 mL (1,000 mLs Intravenous New Bag 9/27/24 0010)   vancomycin 1.5 g in dextrose 5 % 250 mL IVPB (ready to mix) (has no administration in time range)   meropenem (MERREM) 1 g in 0.9% NaCl 100 mL IVPB (MB+) (has no administration in time range)   sodium chloride 0.9% flush 10 mL (has no administration in time range)   ondansetron injection 4 mg (has no administration in time range)   acetaminophen tablet 650 mg (has no administration in time range)   vancomycin in dextrose 5 % 1 gram/250 mL IVPB 1,000 mg (has no administration in time range)   vancomycin - pharmacy to dose (has no administration in time range)   morphine injection 4 mg (4 mg Intravenous Given 9/26/24 2029)   ondansetron injection 4 mg (4 mg Intravenous Given 9/26/24 2029)   lactated ringers bolus 1,000 mL (0 mLs Intravenous Stopped 9/26/24 2213)   iohexoL (OMNIPAQUE 350) injection 100 mL (100 mLs Intravenous Given 9/26/24 2140)   morphine injection 4 mg (4 mg Intravenous Given 9/26/24 2303)   methocarbamoL injection 1,000 mg (1,000 mg Intravenous Given 9/26/24 2317)   acetaminophen tablet 650 mg (650 mg Oral Given 9/27/24 0010)     Medical Decision Making  48-year-old male with history of paraplegia with a suprapubic catheter, chronic UTIs, chronic wounds, currently on antibiotic therapy for suspected infection, here for presumed purulent drainage from the wound despite antibiotic therapy.  Upon review of the medical chart, he has been taking Levaquin, Flagyl, and doxycycline since last week.  Two days ago he was started on linezolid.  He reports compliance with these medications.  He is having sweats at home which could be fever, tachycardic here, although the wound on my exam does not have any obvious purulent drainage or surrounding infection.  I will obtain CT scan of the pelvis to further assess the extent of the wound.  Reassess    Differential diagnosis includes but is  not limited to: wound infection, UTI, dehydration, electrolyte abnormality, anemia and others        Amount and/or Complexity of Data Reviewed  External Data Reviewed: labs, radiology and notes.  Labs: ordered. Decision-making details documented in ED Course.  Radiology: ordered. Decision-making details documented in ED Course.    Risk  Decision regarding hospitalization.            Scribe Attestation:   Scribe #1: I performed the above scribed service and the documentation accurately describes the services I performed. I attest to the accuracy of the note.    Attending Attestation:           Physician Attestation for Scribe:  Physician Attestation Statement for Scribe #1: I, Amrita Echevarria MD, reviewed documentation, as scribed by Pro Sargent in my presence, and it is both accurate and complete.             ED Course as of 09/27/24 0121   Fri Sep 27, 2024   0009 Labs remarkable for mild leukocytosis, occasional bacteria in the urine but there are squamous cells.  CT of the pelvis shows changes of chronic osteomyelitis at the site of the wound, no obvious abscesses appreciated.  Patient's temperature is mildly elevated at 99.1F.  His heart rate had improved after pain medication and IV fluids.  He has otherwise remained hemodynamically stable.  I discussed the case with his PCP, Dr. Leblanc.  She tells me he had a wound culture as an outpatient that was suspicious for infection despite antibiotic therapy and she would like him admitted to the hospital.  I will start vancomycin and meropenem to cover the wound as well as possible UTI.  She is in agreement with these antibiotics.  ESR and CRP along with blood cultures and lactic acid added.  Wound culture has been sent and is pending.  Patient is amenable to admission [RB]      ED Course User Index  [RB] Amrita Echevarria MD          /72   Pulse 82   Temp 99.1 °F (37.3 °C)   Resp 15   Ht 6' (1.829 m)   Wt 69.4 kg (153 lb)   SpO2 99%   BMI 20.75 kg/m²                     Clinical Impression:  Final diagnoses:  [T14.8XXA, L08.9] Wound infection          ED Disposition Condition    Admit Stable                Amrita Echevarria MD  09/27/24 0122

## 2024-09-27 NOTE — CONSULTS
Inpatient Nutrition Assessment    Admit Date: 9/26/2024   Total duration of encounter: 1 day   Patient Age: 48 y.o.    Nutrition Recommendation/Prescription     -Continue Heart Healthy Diet as tolerated. Encourage intake of meals.   -Trial Boost VHC TID for additional nourishment; provides 530 kcal and 22 gm protein per container.   -Brandon BID for wound healing.   -Consider an Multi-vitamin with minerals as medically feasible.   -Monitor need for possible appetite stimulant if appetite remains poor.   -Medical management of nausea per MD.   -Monitor wt, labs, and intake.     Communication of Recommendations: reviewed with patient    Nutrition Assessment     Malnutrition Assessment/Nutrition-Focused Physical Exam    Malnutrition Context: acute illness or injury (09/27/24 1058)  Malnutrition Level: moderate (09/27/24 1058)  Energy Intake (Malnutrition): less than 75% for greater than 7 days (09/27/24 1058)  Weight Loss (Malnutrition): 1-2% in 1 week (09/27/24 1058)  Subcutaneous Fat (Malnutrition): moderate depletion (09/27/24 1058)  Orbital Region (Subcutaneous Fat Loss): moderate depletion        Muscle Mass (Malnutrition): moderate depletion (09/27/24 1058)  Rastafari Region (Muscle Loss): moderate depletion  Clavicle Bone Region (Muscle Loss): moderate depletion  Clavicle and Acromion Bone Region (Muscle Loss): moderate depletion                 Fluid Accumulation (Malnutrition): other (see comments) (does not meet criteria) (09/27/24 1058)  Hand  Strength, Left (Malnutrition): unabel to evaluate (09/27/24 1058)  Hand  Strength, Right (Malnutrition): unable to evaluate (09/27/24 1058)  A minimum of two characteristics is recommended for diagnosis of either severe or non-severe malnutrition.    Chart Review    Reason Seen: continuous nutrition monitoring and physician consult for decreased oral intake    Malnutrition Screening Tool Results   Have you recently lost weight without trying?: Unsure  Have you been  eating poorly because of a decreased appetite?: Yes   MST Score: 3   Diagnosis:  Wound infection     Relevant Medical History:   Chronic UTI  Paraplegia     Scheduled Medications:  apixaban, 5 mg, BID  baclofen, 20 mg, TID  gabapentin, 800 mg, TID  hydrOXYzine pamoate, 25 mg, TID  meropenem IV (PEDS and ADULTS), 1 g, Q8H  mirtazapine, 15 mg, Nightly  oxybutynin, 5 mg, Daily  sertraline, 50 mg, Daily  vancomycin (VANCOCIN) IV (PEDS and ADULTS), 1,000 mg, Q12H    Continuous Infusions:   PRN Medications:  acetaminophen, 650 mg, Q8H PRN  docusate sodium, 250 mg, BID PRN  HYDROmorphone, 0.5 mg, Q6H PRN  influenza, 0.5 mL, vaccine x 1 dose  ondansetron, 4 mg, Q8H PRN  oxyCODONE-acetaminophen, 1 tablet, Q8H PRN  sodium chloride 0.9%, 10 mL, PRN  vancomycin - pharmacy to dose, , pharmacy to manage frequency    Calorie Containing IV Medications: no significant kcals from medications at this time    Recent Labs   Lab 09/26/24 2035 09/27/24  0120    138   K 3.6 4.5   CALCIUM 9.1 8.7    107   CO2 17* 17*   BUN 12.7 9.5   CREATININE 0.76 0.65*   EGFRNORACEVR >60 >60   GLUCOSE 81 73*   BILITOT 0.5 0.4   ALKPHOS 94 84   ALT 8 5   AST 11 8   ALBUMIN 3.3* 2.9*   CRP 39.10*  --    WBC 12.52* 9.74   HGB 12.5* 10.6*   HCT 40.2* 35.1*     Nutrition Orders:  Diet Heart Healthy      Appetite/Oral Intake: poor/0-25% of meals  Factors Affecting Nutritional Intake: decreased appetite and nausea  Social Needs Impacting Access to Food: none identified  Food/Christian/Cultural Preferences: none reported  Food Allergies: no known food allergies  Last Bowel Movement: 09/26/24  Wound(s):  Decubitus wound to the left ischium     Comments    9/27/24: Pt reports that he is not eating well; states that he has not had an appetite for the past 2-3 weeks; states that his usual wt is ~160 lbs. Pt also reports nausea but no vomiting. Pt is receptive to a strawberry flavored oral supplement. Noted pt also with an infected wound.   "    Anthropometrics    Height: 6' 0.01" (182.9 cm),    Last Weight: 69.4 kg (153 lb) (24 1054),    BMI (Calculated): 20.7  BMI Classification: normal (BMI 18.5-24.9)        Ideal Body Weight (IBW), Male: 178.06 lb     % Ideal Body Weight, Male (lb): 85.93 %           Paraplegia Ideal Body Weight (IBW) Adjustment: 169 lb 8.5 oz     Usual Body Weight (UBW), k.7 kg  % Usual Body Weight: 95.66  % Weight Change From Usual Weight: -4.54 %  Usual Weight Provided By: patient    Wt Readings from Last 5 Encounters:   24 69.4 kg (153 lb)   24 69.4 kg (153 lb)   24 69.5 kg (153 lb 4.8 oz)   24 68 kg (149 lb 14.6 oz)   23 74.8 kg (165 lb)     Weight Change(s) Since Admission:   Wt Readings from Last 1 Encounters:   24 1054 69.4 kg (153 lb)   24 1622 69.4 kg (153 lb)   Admit Weight: 69.4 kg (153 lb) (24 1622),      Estimated Needs    Weight Used For Calorie Calculations: 69.4 kg (153 lb)  Energy Calorie Requirements (kcal): 5179-4536 kcal (25-30 kcal/kg)  Energy Need Method: Kcal/kg  Weight Used For Protein Calculations: 69.4 kg (153 lb)  Protein Requirements: 104 gm (1.5g/kg)  Fluid Requirements (mL): 2082 mL        Enteral Nutrition     Patient not receiving enteral nutrition at this time.    Parenteral Nutrition     Patient not receiving parenteral nutrition support at this time.    Evaluation of Received Nutrient Intake    Calories: not meeting estimated needs  Protein: not meeting estimated needs    Patient Education     Not applicable.    Nutrition Diagnosis     PES: Inadequate enteral nutrition infusion related to acute illness as evidenced by pt report of decreased appetite/intake x 2-3 weeks. (new)     PES: Moderate acute disease or injury related malnutrition related to suboptimal protein/energy intake as evidenced by less than 75% needs met for greater than 7 days, moderate fat depletion, moderate muscle depletion, and 1-2% weight loss in 1 week. " (new)    Nutrition Interventions     Intervention(s): general/healthful diet, commercial beverage, commercial food, multivitamin/mineral supplement therapy, and collaboration with other providers    Goal: Meet greater than 80% of nutritional needs by follow-up. (new)  Goal: Maintain weight throughout hospitalization. (new)    Nutrition Goals & Monitoring     Dietitian will monitor: energy intake and weight  Discharge planning: continue Heart Healthy diet with Boost Plus and Brandon oral supplements  Nutrition Risk/Follow-Up: high (follow-up in 1-4 days)   Please consult if re-assessment needed sooner.

## 2024-09-27 NOTE — NURSING
Nurses Note -- 4 Eyes      9/27/2024   4:00PM      Skin assessed during: Admit      [] No Altered Skin Integrity Present    []Prevention Measures Documented      [x] Yes- Altered Skin Integrity Present or Discovered   [x] LDA Added if Not in Epic (Describe Wound)   [x] New Altered Skin Integrity was Present on Admit and Documented in LDA   [x] Wound Image Taken    Wound Care Consulted? Yes    Attending Nurse:  KAMLA Ledbetter    Second RN/Staff Member:   KAMLA Cortes

## 2024-09-27 NOTE — PROGRESS NOTES
"Pharmacokinetic Initial Assessment: IV Vancomycin    Assessment/Plan:    Initiate intravenous vancomycin with loading dose of 1500 mg once followed by a maintenance dose of vancomycin 1000 mg IV every 12 hours  Desired empiric serum trough concentration is 15 to 20 mcg/mL  Draw vancomycin trough level 60 min prior to fourth dose on 09/28 at approximately 1200  Pharmacy will continue to follow and monitor vancomycin.      Please contact pharmacy at extension [phone] with any questions regarding this assessment.     Thank you for the consult,   Belle Garciaeder       Patient brief summary:  Hemal Guerrero is a 48 y.o. male initiated on antimicrobial therapy with IV Vancomycin for treatment of suspected skin & soft tissue infection    Drug Allergies:   Review of patient's allergies indicates:   Allergen Reactions    Amitriptyline        Actual Body Weight:   69.4 kg    Renal Function:   Estimated Creatinine Clearance: 115.4 mL/min (based on SCr of 0.76 mg/dL).,     Dialysis Method (if applicable):  N/A    CBC (last 72 hours):  Recent Labs   Lab Result Units 09/26/24 2035   WBC x10(3)/mcL 12.52*   Hgb g/dL 12.5*   Hct % 40.2*   Platelet x10(3)/mcL 385   Mono % % 7.3   Eos % % 1.2   Basophil % % 1.2       Metabolic Panel (last 72 hours):  Recent Labs   Lab Result Units 09/26/24 2035 09/26/24 2054   Sodium mmol/L 138  --    Potassium mmol/L 3.6  --    Chloride mmol/L 107  --    CO2 mmol/L 17*  --    Glucose mg/dL 81  --    Glucose, UA   --  Normal   Blood Urea Nitrogen mg/dL 12.7  --    Creatinine mg/dL 0.76  --    Albumin g/dL 3.3*  --    Bilirubin Total mg/dL 0.5  --    ALP unit/L 94  --    AST unit/L 11  --    ALT unit/L 8  --        Drug levels (last 3 results):  No results for input(s): "VANCOMYCINRA", "VANCORANDOM", "VANCOMYCINPE", "VANCOPEAK", "VANCOMYCINTR", "VANCOTROUGH" in the last 72 hours.    Microbiologic Results:  Microbiology Results (last 7 days)       Procedure Component Value Units Date/Time    Urine " culture [[id number]] Collected: 09/26/24 2054    Order Status: Sent Specimen: Urine Updated: 09/26/24 2138    Wound Culture [[id number]] Collected: 09/26/24 2035    Order Status: Sent Specimen: Wound from Sacral Updated: 09/26/24 2037

## 2024-09-27 NOTE — NURSING
Admission documentation completed by the admit nurse. Home med rec completed by ED nurse. Pt did not elect for meds to bed with [facility]. 4 Eyes and standing weight to be completed by the admitting floor nurse. Pressure Injury Prevention order set ordered for pt, admitting floor nurse to order PUP packet separately. Pt requests flu shot, vaccine indicated, flu vaccine ordered.

## 2024-09-27 NOTE — H&P
[facility]  Internal Medicine History & Physical Examination       Patient Name: Hemal Guerrero  MRN: [id number]  Patient Class: IP- Inpatient   Admission Date: 9/26/2024   Admitting Physician: HM Service   Length of Stay: 0  Attending Physician: Margaret Leblanc MD  Primary Care Provider: Margaret Leblanc MD  Face-to-Face encounter date: 09/27/2024  Code Status:full  Chief Complaint: Wound Check (Presents via [facility] with c/o yellow/green drainage from a wound to the LLE. Pt reports the wound is chronic, however, the drainage began today. Also reports occasional diaphoresis, however, denies known fevers. )          Patient information was obtained from patient, patient's family, past medical records and ER records.  ED records were reviewed in detail and documented below    HISTORY OF PRESENT ILLNESS:   Hemal Guerrero is a 48 y.o. male who  has a past medical history of Chronic UTI and Paraplegia. And chronic sacral decubitus ulcer and left ischial ulcer. The patient presented to [facility] on 9/26/2024 with a primary complaint of pain and reported drainage from left gluteal ulcer as reported by HH nurse . CT abd and pelvis-Decubitus ulcer in the left buttock extending to the inferior pubic ramus on the left. Changes most consistent with chronic osteomyelitis of the inferior pubic ramus bilaterally. A soft tissue abscess is not demonstrated .started on iv vanc and merrem and admitted to medicine services.       PAST MEDICAL HISTORY:     Past Medical History:   Diagnosis Date    Chronic UTI     Paraplegia        PAST SURGICAL HISTORY:     Past Surgical History:   Procedure Laterality Date    INSERTION OF SUPRAPUBIC CATHETER      LAPAROTOMY         ALLERGIES:   Amitriptyline    FAMILY HISTORY:   Reviewed and negative    SOCIAL HISTORY:     Social History     Tobacco Use    Smoking status: Never    Smokeless tobacco: Never   Substance Use Topics    Alcohol use: Not Currently        HOME  MEDICATIONS:     Prior to Admission medications    Medication Sig Start Date End Date Taking? Authorizing Provider   baclofen (LIORESAL) 20 MG tablet Take 20 mg by mouth 3 (three) times daily. 5/12/23  Yes Provider, Historical   ELIQUIS 5 mg Tab Take 5 mg by mouth 2 (two) times daily. 3/23/23  Yes Provider, Historical   gabapentin (NEURONTIN) 800 MG tablet Take 800 mg by mouth 3 (three) times daily. 5/12/23  Yes Provider, Historical   levoFLOXacin (LEVAQUIN) 500 MG tablet Take 1 tablet by mouth once daily. 9/17/24  Yes Provider, Historical   metroNIDAZOLE (FLAGYL) 500 MG tablet Take 500 mg by mouth 3 (three) times daily. 9/17/24  Yes Provider, Historical   oxyCODONE-acetaminophen (PERCOCET)  mg per tablet Take 1 tablet by mouth 2 (two) times daily as needed. 5/20/23  Yes Provider, Historical   amLODIPine (NORVASC) 5 MG tablet Take 1 tablet (5 mg total) by mouth once daily. 2/3/23 3/5/23  Shila Graham MD   cyclobenzaprine (FLEXERIL) 10 MG tablet Take 10 mg by mouth 2 (two) times daily as needed. 6/13/23   Provider, Historical   docusate sodium (COLACE) 250 MG capsule Take 250 mg by mouth daily as needed.    Provider, Historical   erythromycin (ROMYCIN) ophthalmic ointment Place a 1/2 inch ribbon of ointment into the lower eyelid. 8/8/22   Marlin Chaparro, JOSE ALFREDO   FEROSUL 325 mg (65 mg iron) Tab tablet Take 1 tablet by mouth every morning. 2/3/23   Provider, Historical   fluticasone propionate (FLONASE) 50 mcg/actuation nasal spray 1 spray by Each Nostril route 2 (two) times daily. 5/12/23   Provider, Historical   HYDROcodone-acetaminophen (NORCO) 5-325 mg per tablet Take 1 tablet by mouth every 6 (six) hours as needed for Pain. 7/5/23   Nikolay Goel IV, MD   HYDROcodone-acetaminophen (NORCO) 5-325 mg per tablet Take 1 tablet by mouth every 6 (six) hours as needed for Pain. 7/5/23   Nikolay Goel IV, MD   hydrOXYzine pamoate (VISTARIL) 25 MG Cap Take 25 mg by mouth 3 (three) times daily. 5/12/23    Provider, Historical   mirtazapine (REMERON) 15 MG tablet Take 1 tablet (15 mg total) by mouth nightly. 2/3/23 3/5/23  Shila Graham MD   oxybutynin (DITROPAN-XL) 10 MG 24 hr tablet Take 1 tablet by mouth every morning.    Provider, Historical   sertraline (ZOLOFT) 50 MG tablet Take 1 tablet (50 mg total) by mouth once daily. 2/3/23 3/5/23  Shila Graham MD       REVIEW OF SYSTEMS:   Except as documented, all other systems reviewed and negative     PHYSICAL EXAM:     VITAL SIGNS: 24 HRS MIN & MAX LAST   Temp  Min: 97.7 °F (36.5 °C)  Max: 99.1 °F (37.3 °C) 98.2 °F (36.8 °C)   BP  Min: 94/62  Max: 152/95 (!) 152/95   Pulse  Min: 71  Max: 124  88   Resp  Min: 12  Max: 21 (!) 21   SpO2  Min: 97 %  Max: 100 % 100 %     General appearance: Well-developed, well-nourished male in no apparent distress.  HENT: Atraumatic head. Moist mucous membranes of oral cavity.  Eyes: Normal extraocular movements.   Neck: Supple.   Lungs: Clear to auscultation bilaterally. No wheezing present.   Heart: Regular rate and rhythm. S1 and S2 present with no murmurs/gallop/rub. No pedal edema. No JVD present.   Abdomen: Soft, non-distended, non-tender. No rebound tenderness/guarding. Bowel sounds are normal.   Extremities: paraplegic  Skin: sacral wound almost closed . Left buttock ulcer, deep, minimal drainage   Neuro: paraplegic  Psych/mental status: Appropriate mood and affect. Responds appropriately to questions.     LABS AND IMAGING:     Recent Labs   Lab 09/26/24 2035 09/27/24  0120   WBC 12.52* 9.74   RBC 4.91 4.09*   HGB 12.5* 10.6*   HCT 40.2* 35.1*   MCV 81.9 85.8   MCH 25.5* 25.9*   MCHC 31.1* 30.2*   RDW 14.4 14.6    301   MPV 10.0 9.9       Recent Labs   Lab 09/26/24  2035 09/27/24  0120    138   K 3.6 4.5    107   CO2 17* 17*   BUN 12.7 9.5   CREATININE 0.76 0.65*   CALCIUM 9.1 8.7   ALBUMIN 3.3* 2.9*   ALKPHOS 94 84   ALT 8 5   AST 11 8   BILITOT 0.5 0.4       Microbiology Results (last 7 days)        Procedure Component Value Units Date/Time    Wound Culture [[id number]] Collected: 09/26/24 2035    Order Status: Completed Specimen: Wound from Sacral Updated: 09/27/24 0636     Wound Culture No Growth At 24 Hours    Urine culture [[id number]] Collected: 09/26/24 2054    Order Status: Completed Specimen: Urine Updated: 09/27/24 0624     Urine Culture No Growth At 24 Hours    Blood Culture [[id number]] Collected: 09/27/24 0121    Order Status: Resulted Specimen: Blood Updated: 09/27/24 0135    Blood Culture [[id number]] Collected: 09/27/24 0120    Order Status: Resulted Specimen: Blood Updated: 09/27/24 0135             CT Pelvis With IV Contrast NO Oral Contrast  Narrative: EXAMINATION:  CT PELVIS WITH IV CONTRAST    CLINICAL HISTORY:  decubitus wound;    TECHNIQUE:  Patient was injected with 100 cc of Omnipaque 350.    Automatic exposure control (AEC) was utilized for dose reduction.    Dose: 465 mGycm    COMPARISON:  07/03/2024    FINDINGS:  There is soft tissue density posterior to the sacrum best seen on series 2, image 41 this may represent a cubitus ulcer there is significant calcification anterior to the right hip there is dysplastic changes to the inferior pubic rami bilaterally.  This most likely represents chronic osteomyelitis.  There is ulceration in the left buttock extending to the inferior pubic ramus best seen on series 2, image 80.  Active osteomyelitis cannot be determined with certainty.  MRI with contrast would be more sensitive.  Impression: Decubitus ulcer in the left buttock extending to the inferior pubic ramus on the left.  Changes most consistent with chronic osteomyelitis of the inferior pubic ramus bilaterally.  A soft tissue abscess is not demonstrated    Electronically signed by: Shaun Lopez MD  Date:    09/26/2024  Time:    21:50      ASSESSMENT & PLAN:     Chronic osteomyelitis of inferior pubic rami  Stage IV left gluteal decubitus ulcer  Sacral decubitis  ulcer  Paraplegic  Neurogenic bladder w chronic indwelling vogt   Uti-poa  Deconditioning   PAF  Hx of R foot osteomyelitis  Htn  Iron def  Anxiety/depression    Plan  Cont iv vanc and zosyn  Consult ID  Consult wound care   F/u on cx results  Uti- poa, f/u on cx   Resume home meds   Start norvasc 5 mg qd  Labs in the am      VTE Prophylaxis:eliquis __________________________________________________________________________  INPATIENT LIST OF MEDICATIONS     Scheduled Meds:   apixaban  5 mg Oral BID    baclofen  20 mg Oral TID    gabapentin  800 mg Oral TID    hydrOXYzine pamoate  25 mg Oral TID    meropenem IV (PEDS and ADULTS)  1 g Intravenous Q8H    mirtazapine  15 mg Oral Nightly    oxybutynin  5 mg Oral Daily    sertraline  50 mg Oral Daily    vancomycin (VANCOCIN) IV (PEDS and ADULTS)  1,000 mg Intravenous Q12H     Continuous Infusions:  PRN Meds:.  Current Facility-Administered Medications:     acetaminophen, 650 mg, Oral, Q8H PRN    docusate sodium, 250 mg, Oral, BID PRN    HYDROmorphone, 0.5 mg, Intravenous, Q6H PRN    influenza, 0.5 mL, Intramuscular, vaccine x 1 dose    ondansetron, 4 mg, Intravenous, Q8H PRN    oxyCODONE-acetaminophen, 1 tablet, Oral, Q8H PRN    sodium chloride 0.9%, 10 mL, Intravenous, PRN    vancomycin - pharmacy to dose, , Intravenous, pharmacy to manage frequency        Margaret Leblanc MD   09/27/2024

## 2024-09-27 NOTE — PROGRESS NOTES
[facility] - Emergency Dept  Wound Care    Patient Name:  Hemal Guerrero   MRN:  [id number]  Date: 9/27/2024  Diagnosis: <principal problem not specified>    History:     Past Medical History:   Diagnosis Date    Chronic UTI     Paraplegia        Social History     Socioeconomic History    Marital status:    Tobacco Use    Smoking status: Never    Smokeless tobacco: Never   Substance and Sexual Activity    Alcohol use: Not Currently    Drug use: Never     Social Determinants of Health     Transportation Needs: No Transportation Needs (8/31/2020)    [facility]    PRAPARE - Transportation     Lack of Transportation (Medical): No     Lack of Transportation (Non-Medical): No   Physical Activity: Unknown (8/31/2020)    [facility]    Exercise Vital Sign     Days of Exercise per Week: 0 days   Stress: No Stress Concern Present (8/31/2020)    [facility]    [employer] - Occupational Stress Questionnaire     Feeling of Stress : Only a little       Precautions:     Allergies as of 09/26/2024 - Reviewed 09/26/2024   Allergen Reaction Noted    Amitriptyline  11/16/2022       [facility] Assessment Details/Treatment      09/27/24 1013   WO Assessment   Visit Date 09/27/24   Visit Time 1013   Consult Type New   [facility] Speciality Wound   Intervention chart review;assessed;applied;orders   Teaching on-going        Wound 09/27/24 Pressure Injury Left Ischial tuberosity    Date First Assessed: 09/27/24   Present on Original Admission: Yes  Primary Wound Type: Pressure Injury  Side: Left  Location: Ischial tuberosity   Wound Image    Appearance Pink;Red;Yellow;Moist;Bone   Tissue loss description Full thickness   Black (%), Wound Tissue Color 0 %   Red (%), Wound Tissue Color 60 %   Yellow (%), Wound Tissue Color 40 %   Periwound Area Moist;Pale white   Wound Edges Defined   Wound Length (cm) 4.5 cm   Wound Width (cm) 4.3 cm   Wound Depth (cm) 1.3 cm   Wound Volume (cm^3) 25.155 cm^3   Wound Surface Area (cm^2) 19.35 cm^2   Care Cleansed with:;Antimicrobial agent;Wound cleanser;Other (see comments)  (Vashe)   Dressing Applied;Gauze;Rolled gauze     WOCN consulted for ischium wound. Discussed plan of care with nurse Cameron prior to visiting the patient. Introduced self and explained reason for visit, patient agreeable to be seen. Treatment recommendations put in place. Left ischium: Cleanse with vashe, dry well. Apply vashe moistened gauze then cover with dry gauze and secure with medipore tape BID and PRN with soilage. Nursing to continue with treatment recommendations and other preventative measures. Educated the patient and staff on the importance of offloading, all verbalized understanding. Will follow up.   09/27/2024

## 2024-09-28 LAB
ANION GAP SERPL CALC-SCNC: 10 MEQ/L
BASOPHILS # BLD AUTO: 0.15 X10(3)/MCL
BASOPHILS NFR BLD AUTO: 1.3 %
BUN SERPL-MCNC: 9.5 MG/DL (ref 8.9–20.6)
CALCIUM SERPL-MCNC: 9.2 MG/DL (ref 8.4–10.2)
CHLORIDE SERPL-SCNC: 104 MMOL/L (ref 98–107)
CO2 SERPL-SCNC: 28 MMOL/L (ref 22–29)
CREAT SERPL-MCNC: 0.68 MG/DL (ref 0.73–1.18)
CREAT/UREA NIT SERPL: 14
EOSINOPHIL # BLD AUTO: 0.22 X10(3)/MCL (ref 0–0.9)
EOSINOPHIL NFR BLD AUTO: 1.9 %
ERYTHROCYTE [DISTWIDTH] IN BLOOD BY AUTOMATED COUNT: 14.6 % (ref 11.5–17)
GFR SERPLBLD CREATININE-BSD FMLA CKD-EPI: >60 ML/MIN/1.73/M2
GLUCOSE SERPL-MCNC: 94 MG/DL (ref 74–100)
HCT VFR BLD AUTO: 37.1 % (ref 42–52)
HGB BLD-MCNC: 11.2 G/DL (ref 14–18)
IMM GRANULOCYTES # BLD AUTO: 0.05 X10(3)/MCL (ref 0–0.04)
IMM GRANULOCYTES NFR BLD AUTO: 0.4 %
LYMPHOCYTES # BLD AUTO: 2.37 X10(3)/MCL (ref 0.6–4.6)
LYMPHOCYTES NFR BLD AUTO: 21 %
MCH RBC QN AUTO: 25.4 PG (ref 27–31)
MCHC RBC AUTO-ENTMCNC: 30.2 G/DL (ref 33–36)
MCV RBC AUTO: 84.1 FL (ref 80–94)
MONOCYTES # BLD AUTO: 0.87 X10(3)/MCL (ref 0.1–1.3)
MONOCYTES NFR BLD AUTO: 7.7 %
NEUTROPHILS # BLD AUTO: 7.65 X10(3)/MCL (ref 2.1–9.2)
NEUTROPHILS NFR BLD AUTO: 67.7 %
NRBC BLD AUTO-RTO: 0 %
PLATELET # BLD AUTO: 369 X10(3)/MCL (ref 130–400)
PMV BLD AUTO: 11.2 FL (ref 7.4–10.4)
POTASSIUM SERPL-SCNC: 4.4 MMOL/L (ref 3.5–5.1)
RBC # BLD AUTO: 4.41 X10(6)/MCL (ref 4.7–6.1)
SODIUM SERPL-SCNC: 142 MMOL/L (ref 136–145)
VANCOMYCIN TROUGH SERPL-MCNC: 12.3 UG/ML (ref 15–20)
WBC # BLD AUTO: 11.31 X10(3)/MCL (ref 4.5–11.5)

## 2024-09-28 PROCEDURE — 80202 ASSAY OF VANCOMYCIN: CPT | Performed by: EMERGENCY MEDICINE

## 2024-09-28 PROCEDURE — 80048 BASIC METABOLIC PNL TOTAL CA: CPT | Performed by: INTERNAL MEDICINE

## 2024-09-28 PROCEDURE — 63600175 PHARM REV CODE 636 W HCPCS: Performed by: EMERGENCY MEDICINE

## 2024-09-28 PROCEDURE — 63600175 PHARM REV CODE 636 W HCPCS: Performed by: INTERNAL MEDICINE

## 2024-09-28 PROCEDURE — 36415 COLL VENOUS BLD VENIPUNCTURE: CPT | Performed by: INTERNAL MEDICINE

## 2024-09-28 PROCEDURE — 36415 COLL VENOUS BLD VENIPUNCTURE: CPT | Performed by: EMERGENCY MEDICINE

## 2024-09-28 PROCEDURE — 25000003 PHARM REV CODE 250: Performed by: INTERNAL MEDICINE

## 2024-09-28 PROCEDURE — 85025 COMPLETE CBC W/AUTO DIFF WBC: CPT | Performed by: INTERNAL MEDICINE

## 2024-09-28 PROCEDURE — 25000003 PHARM REV CODE 250: Performed by: EMERGENCY MEDICINE

## 2024-09-28 PROCEDURE — 21400001 HC TELEMETRY ROOM

## 2024-09-28 RX ORDER — HYDROMORPHONE HYDROCHLORIDE 2 MG/ML
1 INJECTION, SOLUTION INTRAMUSCULAR; INTRAVENOUS; SUBCUTANEOUS EVERY 6 HOURS PRN
Status: DISCONTINUED | OUTPATIENT
Start: 2024-09-28 | End: 2024-10-02 | Stop reason: HOSPADM

## 2024-09-28 RX ORDER — OXYCODONE AND ACETAMINOPHEN 10; 325 MG/1; MG/1
1 TABLET ORAL EVERY 6 HOURS PRN
Status: DISCONTINUED | OUTPATIENT
Start: 2024-09-28 | End: 2024-10-02 | Stop reason: HOSPADM

## 2024-09-28 RX ADMIN — HYDROXYZINE PAMOATE 25 MG: 25 CAPSULE ORAL at 03:09

## 2024-09-28 RX ADMIN — HYDROMORPHONE HYDROCHLORIDE 1 MG: 2 INJECTION INTRAMUSCULAR; INTRAVENOUS; SUBCUTANEOUS at 12:09

## 2024-09-28 RX ADMIN — OXYCODONE AND ACETAMINOPHEN 1 TABLET: 10; 325 TABLET ORAL at 04:09

## 2024-09-28 RX ADMIN — BACLOFEN 20 MG: 10 TABLET ORAL at 08:09

## 2024-09-28 RX ADMIN — APIXABAN 5 MG: 5 TABLET, FILM COATED ORAL at 09:09

## 2024-09-28 RX ADMIN — ONDANSETRON 4 MG: 2 INJECTION INTRAMUSCULAR; INTRAVENOUS at 10:09

## 2024-09-28 RX ADMIN — MEROPENEM 1 G: 1 INJECTION, POWDER, FOR SOLUTION INTRAVENOUS at 05:09

## 2024-09-28 RX ADMIN — HYDROMORPHONE HYDROCHLORIDE 0.5 MG: 2 INJECTION, SOLUTION INTRAMUSCULAR; INTRAVENOUS; SUBCUTANEOUS at 05:09

## 2024-09-28 RX ADMIN — OXYCODONE AND ACETAMINOPHEN 1 TABLET: 10; 325 TABLET ORAL at 10:09

## 2024-09-28 RX ADMIN — BACLOFEN 20 MG: 10 TABLET ORAL at 03:09

## 2024-09-28 RX ADMIN — HYDROXYZINE PAMOATE 25 MG: 25 CAPSULE ORAL at 09:09

## 2024-09-28 RX ADMIN — MEROPENEM 1 G: 1 INJECTION, POWDER, FOR SOLUTION INTRAVENOUS at 09:09

## 2024-09-28 RX ADMIN — MIRTAZAPINE 15 MG: 15 TABLET, FILM COATED ORAL at 08:09

## 2024-09-28 RX ADMIN — VANCOMYCIN HYDROCHLORIDE 1500 MG: 1.5 INJECTION, POWDER, LYOPHILIZED, FOR SOLUTION INTRAVENOUS at 01:09

## 2024-09-28 RX ADMIN — GABAPENTIN 800 MG: 400 CAPSULE ORAL at 03:09

## 2024-09-28 RX ADMIN — GABAPENTIN 800 MG: 400 CAPSULE ORAL at 09:09

## 2024-09-28 RX ADMIN — HYDROXYZINE PAMOATE 25 MG: 25 CAPSULE ORAL at 08:09

## 2024-09-28 RX ADMIN — VANCOMYCIN HYDROCHLORIDE 1000 MG: 1 INJECTION, POWDER, LYOPHILIZED, FOR SOLUTION INTRAVENOUS at 12:09

## 2024-09-28 RX ADMIN — MEROPENEM 1 G: 1 INJECTION, POWDER, FOR SOLUTION INTRAVENOUS at 12:09

## 2024-09-28 RX ADMIN — BACLOFEN 20 MG: 10 TABLET ORAL at 09:09

## 2024-09-28 RX ADMIN — HYDROMORPHONE HYDROCHLORIDE 1 MG: 2 INJECTION INTRAMUSCULAR; INTRAVENOUS; SUBCUTANEOUS at 07:09

## 2024-09-28 RX ADMIN — APIXABAN 5 MG: 5 TABLET, FILM COATED ORAL at 08:09

## 2024-09-28 RX ADMIN — GABAPENTIN 800 MG: 400 CAPSULE ORAL at 08:09

## 2024-09-28 NOTE — PROGRESS NOTES
[facility] l Medicine Progress Note        Chief Complaint: Inpatient Follow-up for     HPI: Hemal Guerrero is a 48 y.o. male who  has a past medical history of Chronic UTI and Paraplegia. And chronic sacral decubitus ulcer and left ischial ulcer. The patient presented to [facility] on 9/26/2024 with a primary complaint of pain and reported drainage from left gluteal ulcer as reported by HH nurse . CT abd and pelvis-Decubitus ulcer in the left buttock extending to the inferior pubic ramus on the left. Changes most consistent with chronic osteomyelitis of the inferior pubic ramus bilaterally. A soft tissue abscess is not demonstrated .started on iv vanc and merrem and admitted to medicine services.           Interval Hx:   Seen and examined  No acute events overnight  Wound cx growing staph   C/o pain   Will adjust his pain meds   Case was discussed with patient's nurse     Objective/physical exam:  General appearance: Well-developed, well-nourished male in no apparent distress.  HENT: Atraumatic head. Moist mucous membranes of oral cavity.  Eyes: Normal extraocular movements.   Neck: Supple.   Lungs: Clear to auscultation bilaterally. No wheezing present.   Heart: Regular rate and rhythm. S1 and S2 present with no murmurs/gallop/rub. No pedal edema. No JVD present.   Abdomen: Soft, non-distended, non-tender. No rebound tenderness/guarding. Bowel sounds are normal.   Extremities: paraplegic  Skin: sacral wound almost closed . Left buttock ulcer, deep, minimal drainage   Neuro: paraplegic  Psych/mental status: Appropriate mood and affect    VITAL SIGNS: 24 HRS MIN & MAX LAST   Temp  Min: 98.1 °F (36.7 °C)  Max: 99.1 °F (37.3 °C) 99.1 °F (37.3 °C)   BP  Min: 100/65  Max: 152/95 122/87   Pulse  Min: 74  Max: 106  106   Resp  Min: 11  Max: 21 18   SpO2  Min: 96 %  Max: 100 % 96 %     I have reviewed the following labs:  Recent Labs   Lab 09/26/24 2035 09/27/24  0120 09/28/24  0557   WBC 12.52*  9.74 11.31   RBC 4.91 4.09* 4.41*   HGB 12.5* 10.6* 11.2*   HCT 40.2* 35.1* 37.1*   MCV 81.9 85.8 84.1   MCH 25.5* 25.9* 25.4*   MCHC 31.1* 30.2* 30.2*   RDW 14.4 14.6 14.6    301 369   MPV 10.0 9.9 11.2*     Recent Labs   Lab 09/26/24 2035 09/27/24 0120 09/28/24  0557    138 142   K 3.6 4.5 4.4    107 104   CO2 17* 17* 28   BUN 12.7 9.5 9.5   CREATININE 0.76 0.65* 0.68*   CALCIUM 9.1 8.7 9.2   ALBUMIN 3.3* 2.9*  --    ALKPHOS 94 84  --    ALT 8 5  --    AST 11 8  --    BILITOT 0.5 0.4  --      Microbiology Results (last 7 days)       Procedure Component Value Units Date/Time    Wound Culture [[id number]]  (Abnormal) Collected: 09/26/24 2035    Order Status: Completed Specimen: Wound from Sacral Updated: 09/28/24 0757     Wound Culture Few Gram-positive coccus probable staph    Blood Culture [[id number]]  (Normal) Collected: 09/27/24 0120    Order Status: Completed Specimen: Blood Updated: 09/28/24 0402     Blood Culture No Growth At 24 Hours    Blood Culture [[id number]]  (Normal) Collected: 09/27/24 0121    Order Status: Completed Specimen: Blood Updated: 09/28/24 0402     Blood Culture No Growth At 24 Hours    Urine culture [[id number]] Collected: 09/26/24 2054    Order Status: Completed Specimen: Urine Updated: 09/27/24 0624     Urine Culture No Growth At 24 Hours             See below for Radiology    Assessment/Plan:  Chronic osteomyelitis of inferior pubic rami  Stage IV left gluteal decubitus ulcer  Sacral decubitis ulcer  Paraplegic  Neurogenic bladder w chronic indwelling vogt   Uti-poa  Deconditioning   PAF  Hx of R foot osteomyelitis  Htn  Iron def  Anxiety/depression     Plan  Cont iv vanc and zosyn  Wound cx - staph   Consult ID  Consult wound care   F/u on cx results  Uti- poa, f/u on cx   Resume home meds   cont norvasc 5 mg qd  Labs in the am        VTE Prophylaxis:eliquis               All diagnosis and differential diagnosis have been reviewed; assessment and plan has been  documented; I have personally reviewed the labs and test results that are presently available; I have reviewed the patients medication list; I have reviewed the consulting providers response and recommendations. I have reviewed or attempted to review medical records based upon their availability    All of the patient's questions have been  addressed and answered. Patient's is agreeable to the above stated plan. I will continue to monitor closely and make adjustments to medical management as needed.    Portions of this note dictated using EMR integrated voice recognition software, and may be subject to voice recognition errors not corrected at proofreading. Please contact writer for clarification if needed.   _____________________________________________________________________    Malnutrition Status:    Scheduled Med:   amLODIPine  5 mg Oral Daily    apixaban  5 mg Oral BID    baclofen  20 mg Oral TID    gabapentin  800 mg Oral TID    hydrOXYzine pamoate  25 mg Oral TID    meropenem IV (PEDS and ADULTS)  1 g Intravenous Q8H    mirtazapine  15 mg Oral Nightly    oxybutynin  5 mg Oral Daily    sertraline  50 mg Oral Daily    vancomycin (VANCOCIN) IV (PEDS and ADULTS)  1,000 mg Intravenous Q12H      Continuous Infusions:     PRN Meds:    Current Facility-Administered Medications:     acetaminophen, 650 mg, Oral, Q8H PRN    docusate sodium, 250 mg, Oral, BID PRN    HYDROmorphone, 0.5 mg, Intravenous, Q6H PRN    influenza, 0.5 mL, Intramuscular, vaccine x 1 dose    ondansetron, 4 mg, Intravenous, Q8H PRN    oxyCODONE-acetaminophen, 1 tablet, Oral, Q6H PRN    sodium chloride 0.9%, 10 mL, Intravenous, PRN    vancomycin - pharmacy to dose, , Intravenous, pharmacy to manage frequency     Radiology:  I have personally reviewed the following imaging and agree with the radiologist.     CT Pelvis With IV Contrast NO Oral Contrast  Narrative: EXAMINATION:  CT PELVIS WITH IV CONTRAST    CLINICAL HISTORY:  decubitus  wound;    TECHNIQUE:  Patient was injected with 100 cc of Omnipaque 350.    Automatic exposure control (AEC) was utilized for dose reduction.    Dose: 465 mGycm    COMPARISON:  07/03/2024    FINDINGS:  There is soft tissue density posterior to the sacrum best seen on series 2, image 41 this may represent a cubitus ulcer there is significant calcification anterior to the right hip there is dysplastic changes to the inferior pubic rami bilaterally.  This most likely represents chronic osteomyelitis.  There is ulceration in the left buttock extending to the inferior pubic ramus best seen on series 2, image 80.  Active osteomyelitis cannot be determined with certainty.  MRI with contrast would be more sensitive.  Impression: Decubitus ulcer in the left buttock extending to the inferior pubic ramus on the left.  Changes most consistent with chronic osteomyelitis of the inferior pubic ramus bilaterally.  A soft tissue abscess is not demonstrated    Electronically signed by: Shaun Lopez MD  Date:    09/26/2024  Time:    21:50      Margaret Leblanc MD  [facility]   09/28/2024

## 2024-09-28 NOTE — NURSING
Nurses Note -- 4 Eyes      9/27/2024   10:29 PM      Skin assessed during: Q Shift Change      [] No Altered Skin Integrity Present    [x]Prevention Measures Documented      [x] Yes- Altered Skin Integrity Present or Discovered   [] LDA Added if Not in Epic (Describe Wound)   [] New Altered Skin Integrity was Present on Admit and Documented in LDA   [] Wound Image Taken    Wound Care Consulted? No    Attending Nurse:  KAMLA Oropeza    Second RN/Staff Member:   DarnellRN

## 2024-09-28 NOTE — PLAN OF CARE
Problem: Adult Inpatient Plan of Care  Goal: Plan of Care Review  Outcome: Progressing  Goal: Patient-Specific Goal (Individualized)  Outcome: Progressing  Goal: Absence of Hospital-Acquired Illness or Injury  Outcome: Progressing  Goal: Optimal Comfort and Wellbeing  Outcome: Progressing  Goal: Readiness for Transition of Care  Outcome: Progressing     Problem: Skin Injury Risk Increased  Goal: Skin Health and Integrity  Outcome: Progressing     Problem: Wound  Goal: Optimal Coping  Outcome: Progressing  Goal: Optimal Functional Ability  Outcome: Progressing  Goal: Absence of Infection Signs and Symptoms  Outcome: Progressing  Goal: Improved Oral Intake  Outcome: Progressing  Goal: Optimal Pain Control and Function  Outcome: Progressing  Goal: Skin Health and Integrity  Outcome: Progressing  Goal: Optimal Wound Healing  Outcome: Progressing     Problem: Fall Injury Risk  Goal: Absence of Fall and Fall-Related Injury  Outcome: Progressing     Problem: Pain Acute  Goal: Optimal Pain Control and Function  Outcome: Progressing

## 2024-09-28 NOTE — NURSING
"Pt. States, "I am still in very bad pain and nothing is working." I then consulted Dr. Leblanc via text message to make her aware. She then ok'd an increase in Dilaudid from 0.5 mg to 1 mg.  Order modified.  "

## 2024-09-28 NOTE — PROGRESS NOTES
Pharmacokinetic Assessment Follow Up: IV Vancomycin    Vancomycin serum concentration assessment(s):    The trough level was drawn correctly and can be used to guide therapy at this time. The measurement is below the desired definitive target range of 15 to 20 mcg/mL.    Vancomycin Regimen Plan:    Change regimen to Vancomycin 1500 mg IV every 12 hours with next serum trough concentration measured at 1200 prior to 3rd dose on 09/29    Drug levels (last 3 results):  Recent Labs   Lab Result Units 09/28/24  1147   Vancomycin Trough ug/ml 12.3*       Pharmacy will continue to follow and monitor vancomycin.    Please contact pharmacy at extension [phone] for questions regarding this assessment.    Thank you for the consult,   Diamond Castillo       Patient brief summary:  Hemal Guerrero is a 48 y.o. male initiated on antimicrobial therapy with IV Vancomycin for treatment of bone/joint infection    The patient's current regimen is 1000mg q12h    Drug Allergies:   Review of patient's allergies indicates:   Allergen Reactions    Amitriptyline        Actual Body Weight:   69.4 kg    Renal Function:   Estimated Creatinine Clearance: 129 mL/min (A) (based on SCr of 0.68 mg/dL (L)).,     Dialysis Method (if applicable):  N/A    CBC (last 72 hours):  Recent Labs   Lab Result Units 09/26/24 2035 09/27/24 0120 09/28/24  0557   WBC x10(3)/mcL 12.52* 9.74 11.31   Hgb g/dL 12.5* 10.6* 11.2*   Hct % 40.2* 35.1* 37.1*   Platelet x10(3)/mcL 385 301 369   Mono % % 7.3 7.1 7.7   Eos % % 1.2 1.0 1.9   Basophil % % 1.2 0.9 1.3       Metabolic Panel (last 72 hours):  Recent Labs   Lab Result Units 09/26/24 2035 09/26/24 2054 09/27/24 0120 09/28/24  0557   Sodium mmol/L 138  --  138 142   Potassium mmol/L 3.6  --  4.5 4.4   Chloride mmol/L 107  --  107 104   CO2 mmol/L 17*  --  17* 28   Glucose mg/dL 81  --  73* 94   Glucose, UA   --  Normal  --   --    Blood Urea Nitrogen mg/dL 12.7  --  9.5 9.5   Creatinine mg/dL 0.76  --  0.65* 0.68*    Albumin g/dL 3.3*  --  2.9*  --    Bilirubin Total mg/dL 0.5  --  0.4  --    ALP unit/L 94  --  84  --    AST unit/L 11  --  8  --    ALT unit/L 8  --  5  --        Vancomycin Administrations:  vancomycin given in the last 96 hours                     vancomycin in dextrose 5 % 1 gram/250 mL IVPB 1,000 mg (mg) 1,000 mg New Bag 09/28/24 0034      Restarted 09/27/24 1405     1,000 mg New Bag  1353    vancomycin 1.5 g in dextrose 5 % 250 mL IVPB (ready to mix) (mg) 1,500 mg New Bag 09/27/24 0127                    Microbiologic Results:  Microbiology Results (last 7 days)       Procedure Component Value Units Date/Time    Wound Culture [[id number]]  (Abnormal) Collected: 09/26/24 2035    Order Status: Completed Specimen: Wound from Sacral Updated: 09/28/24 0757     Wound Culture Few Gram-positive coccus probable staph    Blood Culture [[id number]]  (Normal) Collected: 09/27/24 0120    Order Status: Completed Specimen: Blood Updated: 09/28/24 0402     Blood Culture No Growth At 24 Hours    Blood Culture [[id number]]  (Normal) Collected: 09/27/24 0121    Order Status: Completed Specimen: Blood Updated: 09/28/24 0402     Blood Culture No Growth At 24 Hours    Urine culture [[id number]] Collected: 09/26/24 2054    Order Status: Completed Specimen: Urine Updated: 09/27/24 0624     Urine Culture No Growth At 24 Hours

## 2024-09-29 LAB
ANION GAP SERPL CALC-SCNC: 8 MEQ/L
BACTERIA WND CULT: ABNORMAL
BACTERIA WND CULT: ABNORMAL
BASOPHILS # BLD AUTO: 0.15 X10(3)/MCL
BASOPHILS NFR BLD AUTO: 1.6 %
BUN SERPL-MCNC: 12.8 MG/DL (ref 8.9–20.6)
CALCIUM SERPL-MCNC: 9 MG/DL (ref 8.4–10.2)
CHLORIDE SERPL-SCNC: 102 MMOL/L (ref 98–107)
CO2 SERPL-SCNC: 31 MMOL/L (ref 22–29)
CREAT SERPL-MCNC: 0.72 MG/DL (ref 0.73–1.18)
CREAT/UREA NIT SERPL: 18
EOSINOPHIL # BLD AUTO: 0.24 X10(3)/MCL (ref 0–0.9)
EOSINOPHIL NFR BLD AUTO: 2.5 %
ERYTHROCYTE [DISTWIDTH] IN BLOOD BY AUTOMATED COUNT: 14.8 % (ref 11.5–17)
GFR SERPLBLD CREATININE-BSD FMLA CKD-EPI: >60 ML/MIN/1.73/M2
GLUCOSE SERPL-MCNC: 101 MG/DL (ref 74–100)
HCT VFR BLD AUTO: 38.4 % (ref 42–52)
HGB BLD-MCNC: 11.8 G/DL (ref 14–18)
IMM GRANULOCYTES # BLD AUTO: 0.03 X10(3)/MCL (ref 0–0.04)
IMM GRANULOCYTES NFR BLD AUTO: 0.3 %
LYMPHOCYTES # BLD AUTO: 3.07 X10(3)/MCL (ref 0.6–4.6)
LYMPHOCYTES NFR BLD AUTO: 32.3 %
MCH RBC QN AUTO: 26 PG (ref 27–31)
MCHC RBC AUTO-ENTMCNC: 30.7 G/DL (ref 33–36)
MCV RBC AUTO: 84.8 FL (ref 80–94)
MONOCYTES # BLD AUTO: 0.7 X10(3)/MCL (ref 0.1–1.3)
MONOCYTES NFR BLD AUTO: 7.4 %
NEUTROPHILS # BLD AUTO: 5.3 X10(3)/MCL (ref 2.1–9.2)
NEUTROPHILS NFR BLD AUTO: 55.9 %
NRBC BLD AUTO-RTO: 0 %
PLATELET # BLD AUTO: 337 X10(3)/MCL (ref 130–400)
PMV BLD AUTO: 10.9 FL (ref 7.4–10.4)
POTASSIUM SERPL-SCNC: 4.7 MMOL/L (ref 3.5–5.1)
RBC # BLD AUTO: 4.53 X10(6)/MCL (ref 4.7–6.1)
SODIUM SERPL-SCNC: 141 MMOL/L (ref 136–145)
VANCOMYCIN TROUGH SERPL-MCNC: 10.2 UG/ML (ref 15–20)
WBC # BLD AUTO: 9.49 X10(3)/MCL (ref 4.5–11.5)

## 2024-09-29 PROCEDURE — 80202 ASSAY OF VANCOMYCIN: CPT | Performed by: INTERNAL MEDICINE

## 2024-09-29 PROCEDURE — 63600175 PHARM REV CODE 636 W HCPCS: Performed by: EMERGENCY MEDICINE

## 2024-09-29 PROCEDURE — 85025 COMPLETE CBC W/AUTO DIFF WBC: CPT | Performed by: INTERNAL MEDICINE

## 2024-09-29 PROCEDURE — 63600175 PHARM REV CODE 636 W HCPCS: Performed by: INTERNAL MEDICINE

## 2024-09-29 PROCEDURE — 36415 COLL VENOUS BLD VENIPUNCTURE: CPT | Performed by: INTERNAL MEDICINE

## 2024-09-29 PROCEDURE — 80048 BASIC METABOLIC PNL TOTAL CA: CPT | Performed by: INTERNAL MEDICINE

## 2024-09-29 PROCEDURE — 25000003 PHARM REV CODE 250: Performed by: INTERNAL MEDICINE

## 2024-09-29 PROCEDURE — 21400001 HC TELEMETRY ROOM

## 2024-09-29 PROCEDURE — 25000003 PHARM REV CODE 250: Performed by: EMERGENCY MEDICINE

## 2024-09-29 RX ORDER — VANCOMYCIN HCL IN 5 % DEXTROSE 1G/250ML
1000 PLASTIC BAG, INJECTION (ML) INTRAVENOUS
Status: DISCONTINUED | OUTPATIENT
Start: 2024-09-29 | End: 2024-09-30

## 2024-09-29 RX ORDER — ZOLPIDEM TARTRATE 5 MG/1
5 TABLET ORAL NIGHTLY PRN
Status: DISCONTINUED | OUTPATIENT
Start: 2024-09-29 | End: 2024-10-02 | Stop reason: HOSPADM

## 2024-09-29 RX ORDER — METHOCARBAMOL 750 MG/1
750 TABLET, FILM COATED ORAL 3 TIMES DAILY
Status: DISCONTINUED | OUTPATIENT
Start: 2024-09-29 | End: 2024-10-02 | Stop reason: HOSPADM

## 2024-09-29 RX ADMIN — OXYCODONE AND ACETAMINOPHEN 1 TABLET: 10; 325 TABLET ORAL at 05:09

## 2024-09-29 RX ADMIN — APIXABAN 5 MG: 5 TABLET, FILM COATED ORAL at 08:09

## 2024-09-29 RX ADMIN — HYDROMORPHONE HYDROCHLORIDE 1 MG: 2 INJECTION INTRAMUSCULAR; INTRAVENOUS; SUBCUTANEOUS at 08:09

## 2024-09-29 RX ADMIN — HYDROMORPHONE HYDROCHLORIDE 1 MG: 2 INJECTION INTRAMUSCULAR; INTRAVENOUS; SUBCUTANEOUS at 02:09

## 2024-09-29 RX ADMIN — VANCOMYCIN HYDROCHLORIDE 1000 MG: 1 INJECTION, POWDER, LYOPHILIZED, FOR SOLUTION INTRAVENOUS at 09:09

## 2024-09-29 RX ADMIN — MIRTAZAPINE 15 MG: 15 TABLET, FILM COATED ORAL at 08:09

## 2024-09-29 RX ADMIN — HYDROXYZINE PAMOATE 25 MG: 25 CAPSULE ORAL at 02:09

## 2024-09-29 RX ADMIN — MEROPENEM 1 G: 1 INJECTION, POWDER, FOR SOLUTION INTRAVENOUS at 10:09

## 2024-09-29 RX ADMIN — OXYCODONE AND ACETAMINOPHEN 1 TABLET: 10; 325 TABLET ORAL at 06:09

## 2024-09-29 RX ADMIN — ZOLPIDEM TARTRATE 5 MG: 5 TABLET ORAL at 11:09

## 2024-09-29 RX ADMIN — METHOCARBAMOL 750 MG: 750 TABLET ORAL at 02:09

## 2024-09-29 RX ADMIN — GABAPENTIN 800 MG: 400 CAPSULE ORAL at 05:09

## 2024-09-29 RX ADMIN — OXYCODONE AND ACETAMINOPHEN 1 TABLET: 10; 325 TABLET ORAL at 11:09

## 2024-09-29 RX ADMIN — OXYBUTYNIN CHLORIDE 5 MG: 5 TABLET ORAL at 08:09

## 2024-09-29 RX ADMIN — GABAPENTIN 800 MG: 400 CAPSULE ORAL at 08:09

## 2024-09-29 RX ADMIN — GABAPENTIN 800 MG: 400 CAPSULE ORAL at 02:09

## 2024-09-29 RX ADMIN — VANCOMYCIN HYDROCHLORIDE 1000 MG: 1 INJECTION, POWDER, LYOPHILIZED, FOR SOLUTION INTRAVENOUS at 03:09

## 2024-09-29 RX ADMIN — METHOCARBAMOL 750 MG: 750 TABLET ORAL at 08:09

## 2024-09-29 RX ADMIN — DOCUSATE SODIUM 250 MG: 50 CAPSULE, LIQUID FILLED ORAL at 08:09

## 2024-09-29 RX ADMIN — MEROPENEM 1 G: 1 INJECTION, POWDER, FOR SOLUTION INTRAVENOUS at 12:09

## 2024-09-29 RX ADMIN — AMLODIPINE BESYLATE 5 MG: 5 TABLET ORAL at 08:09

## 2024-09-29 RX ADMIN — SERTRALINE HYDROCHLORIDE 50 MG: 50 TABLET ORAL at 08:09

## 2024-09-29 RX ADMIN — HYDROXYZINE PAMOATE 25 MG: 25 CAPSULE ORAL at 08:09

## 2024-09-29 RX ADMIN — MEROPENEM 1 G: 1 INJECTION, POWDER, FOR SOLUTION INTRAVENOUS at 06:09

## 2024-09-29 RX ADMIN — VANCOMYCIN HYDROCHLORIDE 1500 MG: 1.5 INJECTION, POWDER, LYOPHILIZED, FOR SOLUTION INTRAVENOUS at 12:09

## 2024-09-29 RX ADMIN — BACLOFEN 20 MG: 10 TABLET ORAL at 05:09

## 2024-09-29 NOTE — PLAN OF CARE
Problem: Adult Inpatient Plan of Care  Goal: Plan of Care Review  Outcome: Progressing  Goal: Patient-Specific Goal (Individualized)  Outcome: Progressing  Goal: Absence of Hospital-Acquired Illness or Injury  Outcome: Progressing  Goal: Optimal Comfort and Wellbeing  Outcome: Progressing  Goal: Readiness for Transition of Care  Outcome: Progressing     Problem: Skin Injury Risk Increased  Goal: Skin Health and Integrity  Outcome: Progressing     Problem: Wound  Goal: Optimal Coping  Outcome: Progressing  Goal: Optimal Functional Ability  Outcome: Progressing  Goal: Absence of Infection Signs and Symptoms  Outcome: Progressing  Goal: Improved Oral Intake  Outcome: Progressing  Goal: Optimal Pain Control and Function  Outcome: Progressing  Goal: Skin Health and Integrity  Outcome: Progressing  Goal: Optimal Wound Healing  Outcome: Progressing     Problem: Fall Injury Risk  Goal: Absence of Fall and Fall-Related Injury  Outcome: Progressing     Problem: Pain Acute  Goal: Optimal Pain Control and Function  Outcome: Progressing      None

## 2024-09-29 NOTE — NURSING
Pt. C/o inability to stay asleep and wanting to change his muscle relaxer and diet. Consulted Dr. Leblanc. Orders updated per telephone read back.

## 2024-09-29 NOTE — PLAN OF CARE
Nurses Note -- 4 Eyes      9/28/2024   08:45 AM      Skin assessed during: Q Shift Change      [] No Altered Skin Integrity Present    []Prevention Measures Documented      [x] Yes- Altered Skin Integrity Present or Discovered   [] LDA Added if Not in Epic (Describe Wound)   [] New Altered Skin Integrity was Present on Admit and Documented in LDA   [] Wound Image Taken    Wound Care Consulted? No    Attending Nurse:  Annetta BERG RN    Second RN/Staff Member:  Romelia PRABHAKAR RN

## 2024-09-29 NOTE — PROGRESS NOTES
Pharmacokinetic Assessment Follow Up: IV Vancomycin    Vancomycin serum concentration assessment(s):    The trough level was drawn correctly and can be used to guide therapy at this time. The measurement is below the desired definitive target range of 15 to 20 mcg/mL.    Vancomycin Regimen Plan:    Change regimen to Vancomycin 1000 mg IV every 8 hours with next serum trough concentration measured at 1300 prior to 4th dose on 09/30    Drug levels (last 3 results):  Recent Labs   Lab Result Units 09/28/24  1147 09/29/24  1229   Vancomycin Trough ug/ml 12.3* 10.2*       Pharmacy will continue to follow and monitor vancomycin.    Please contact pharmacy at extension [phone] for questions regarding this assessment.    Thank you for the consult,   Diamond Castillo       Patient brief summary:  Hemal Guerrero is a 48 y.o. male initiated on antimicrobial therapy with IV Vancomycin for treatment of skin & soft tissue infection    The patient's current regimen is 1500mg q12h    Drug Allergies:   Review of patient's allergies indicates:   Allergen Reactions    Amitriptyline        Actual Body Weight:   69.4 kg    Renal Function:   Estimated Creatinine Clearance: 121.8 mL/min (A) (based on SCr of 0.72 mg/dL (L)).,     Dialysis Method (if applicable):  N/A    CBC (last 72 hours):  Recent Labs   Lab Result Units 09/26/24 2035 09/27/24 0120 09/28/24 0557 09/29/24  0412   WBC x10(3)/mcL 12.52* 9.74 11.31 9.49   Hgb g/dL 12.5* 10.6* 11.2* 11.8*   Hct % 40.2* 35.1* 37.1* 38.4*   Platelet x10(3)/mcL 385 301 369 337   Mono % % 7.3 7.1 7.7 7.4   Eos % % 1.2 1.0 1.9 2.5   Basophil % % 1.2 0.9 1.3 1.6       Metabolic Panel (last 72 hours):  Recent Labs   Lab Result Units 09/26/24 2035 09/26/24 2054 09/27/24 0120 09/28/24  0557 09/29/24  0412   Sodium mmol/L 138  --  138 142 141   Potassium mmol/L 3.6  --  4.5 4.4 4.7   Chloride mmol/L 107  --  107 104 102   CO2 mmol/L 17*  --  17* 28 31*   Glucose mg/dL 81  --  73* 94 101*   Glucose, UA    --  Normal  --   --   --    Blood Urea Nitrogen mg/dL 12.7  --  9.5 9.5 12.8   Creatinine mg/dL 0.76  --  0.65* 0.68* 0.72*   Albumin g/dL 3.3*  --  2.9*  --   --    Bilirubin Total mg/dL 0.5  --  0.4  --   --    ALP unit/L 94  --  84  --   --    AST unit/L 11  --  8  --   --    ALT unit/L 8  --  5  --   --        Vancomycin Administrations:  vancomycin given in the last 96 hours                     vancomycin 1.5 g in dextrose 5 % 250 mL IVPB (ready to mix) (mg) 1,500 mg New Bag 09/29/24 0041     1,500 mg New Bag 09/28/24 1355    vancomycin in dextrose 5 % 1 gram/250 mL IVPB 1,000 mg (mg) 1,000 mg New Bag 09/28/24 0034      Restarted 09/27/24 1405     1,000 mg New Bag  1353    vancomycin 1.5 g in dextrose 5 % 250 mL IVPB (ready to mix) (mg) 1,500 mg New Bag 09/27/24 0127                    Microbiologic Results:  Microbiology Results (last 7 days)       Procedure Component Value Units Date/Time    Wound Culture [[id number]]  (Abnormal)  (Susceptibility) Collected: 09/26/24 2035    Order Status: Completed Specimen: Wound from Sacral Updated: 09/29/24 1301     Wound Culture Few Staphylococcus epidermidis      Rare Yeast    Blood Culture [[id number]]  (Normal) Collected: 09/27/24 0120    Order Status: Completed Specimen: Blood Updated: 09/29/24 0400     Blood Culture No Growth At 48 Hours    Blood Culture [[id number]]  (Normal) Collected: 09/27/24 0121    Order Status: Completed Specimen: Blood Updated: 09/29/24 0400     Blood Culture No Growth At 48 Hours    Urine culture [[id number]]  (Abnormal) Collected: 09/26/24 2054    Order Status: Completed Specimen: Urine Updated: 09/28/24 1400     Urine Culture Less than 10,000 colonies/ml Enterococcus faecalis     Comment: [place] counts of <10,000colonies/ml are of questionable significance. Therefore organisms are identified only.

## 2024-09-29 NOTE — PLAN OF CARE
Problem: Adult Inpatient Plan of Care  Goal: Plan of Care Review  9/28/2024 2012 by Annetta Portillo RN  Outcome: Progressing  9/28/2024 2010 by Annetta Portillo RN  Outcome: Progressing  Goal: Patient-Specific Goal (Individualized)  9/28/2024 2012 by Annetta Portillo RN  Outcome: Progressing  9/28/2024 2010 by Annetta Portillo RN  Outcome: Progressing  Goal: Absence of Hospital-Acquired Illness or Injury  9/28/2024 2012 by Annetta Portillo RN  Outcome: Progressing  9/28/2024 2010 by Annetta Portillo RN  Outcome: Progressing  Goal: Optimal Comfort and Wellbeing  9/28/2024 2012 by Annetta Portillo RN  Outcome: Progressing  9/28/2024 2010 by Annetta Portillo RN  Outcome: Progressing  Goal: Readiness for Transition of Care  9/28/2024 2012 by Annetta Portillo RN  Outcome: Progressing  9/28/2024 2010 by Annetta Portillo RN  Outcome: Progressing     Problem: Skin Injury Risk Increased  Goal: Skin Health and Integrity  9/28/2024 2012 by Annetta Portillo RN  Outcome: Progressing  9/28/2024 2010 by Annetta Portillo RN  Outcome: Progressing     Problem: Wound  Goal: Optimal Coping  9/28/2024 2012 by Annetta Portillo RN  Outcome: Progressing  9/28/2024 2010 by Annetta Portillo RN  Outcome: Progressing  Goal: Optimal Functional Ability  9/28/2024 2012 by Annetta Portillo RN  Outcome: Progressing  9/28/2024 2010 by Annetta Portillo RN  Outcome: Progressing  Goal: Absence of Infection Signs and Symptoms  9/28/2024 2012 by Annetta Portillo RN  Outcome: Progressing  9/28/2024 2010 by Annetta Portillo RN  Outcome: Progressing  Goal: Improved Oral Intake  9/28/2024 2012 by Annetta Portillo RN  Outcome: Progressing  9/28/2024 2010 by Annetta Portillo RN  Outcome: Progressing  Goal: Optimal Pain Control and Function  9/28/2024 2012 by Annetta Portillo RN  Outcome: Progressing  9/28/2024 2010 by Annetta Portillo RN  Outcome: Progressing  Goal: Skin Health and Integrity  9/28/2024 2012 by Annetta Portillo RN  Outcome: Progressing  9/28/2024 2010 by Annetta Portillo RN  Outcome:  Progressing  Goal: Optimal Wound Healing  9/28/2024 2012 by Annetta Portillo RN  Outcome: Progressing  9/28/2024 2010 by Annetta Portillo RN  Outcome: Progressing     Problem: Fall Injury Risk  Goal: Absence of Fall and Fall-Related Injury  9/28/2024 2012 by Annetta Portillo RN  Outcome: Progressing  9/28/2024 2010 by Annetta Portillo RN  Outcome: Progressing     Problem: Pain Acute  Goal: Optimal Pain Control and Function  9/28/2024 2012 by Annetta Portillo RN  Outcome: Progressing  9/28/2024 2010 by Annetta Portillo RN  Outcome: Progressing

## 2024-09-29 NOTE — PROGRESS NOTES
[facility] l Medicine Progress Note        Chief Complaint: Inpatient Follow-up for     HPI: Hemal Guerrero is a 48 y.o. male who  has a past medical history of Chronic UTI and Paraplegia. And chronic sacral decubitus ulcer and left ischial ulcer. The patient presented to [facility] on 9/26/2024 with a primary complaint of pain and reported drainage from left gluteal ulcer as reported by HH nurse . CT abd and pelvis-Decubitus ulcer in the left buttock extending to the inferior pubic ramus on the left. Changes most consistent with chronic osteomyelitis of the inferior pubic ramus bilaterally. A soft tissue abscess is not demonstrated .started on iv vanc and merrem and admitted to medicine services.           Interval Hx:   Seen and examined  C/o spasms   On baclofen   Wound cx growing staph   C/o insomnia  Pain controlled     Objective/physical exam:  General appearance: Well-developed, well-nourished male in no apparent distress.  HENT: Atraumatic head. Moist mucous membranes of oral cavity.  Eyes: Normal extraocular movements.   Neck: Supple.   Lungs: Clear to auscultation bilaterally. No wheezing present.   Heart: Regular rate and rhythm. S1 and S2 present with no murmurs/gallop/rub. No pedal edema. No JVD present.   Abdomen: Soft, non-distended, non-tender. No rebound tenderness/guarding. Bowel sounds are normal.   Extremities: paraplegic  Skin: sacral wound almost closed . Left buttock ulcer, deep, minimal drainage   Neuro: paraplegic  Psych/mental status: Appropriate mood and affect    VITAL SIGNS: 24 HRS MIN & MAX LAST   Temp  Min: 98.3 °F (36.8 °C)  Max: 99.1 °F (37.3 °C) 98.5 °F (36.9 °C)   BP  Min: 97/65  Max: 124/74 124/74   Pulse  Min: 79  Max: 106  79   Resp  Min: 16  Max: 18 18   SpO2  Min: 96 %  Max: 99 % 98 %     I have reviewed the following labs:  Recent Labs   Lab 09/27/24  0120 09/28/24  0557 09/29/24  0412   WBC 9.74 11.31 9.49   RBC 4.09* 4.41* 4.53*   HGB 10.6* 11.2*  11.8*   HCT 35.1* 37.1* 38.4*   MCV 85.8 84.1 84.8   MCH 25.9* 25.4* 26.0*   MCHC 30.2* 30.2* 30.7*   RDW 14.6 14.6 14.8    369 337   MPV 9.9 11.2* 10.9*     Recent Labs   Lab 09/26/24 2035 09/27/24 0120 09/28/24 0557 09/29/24 0412    138 142 141   K 3.6 4.5 4.4 4.7    107 104 102   CO2 17* 17* 28 31*   BUN 12.7 9.5 9.5 12.8   CREATININE 0.76 0.65* 0.68* 0.72*   CALCIUM 9.1 8.7 9.2 9.0   ALBUMIN 3.3* 2.9*  --   --    ALKPHOS 94 84  --   --    ALT 8 5  --   --    AST 11 8  --   --    BILITOT 0.5 0.4  --   --      Microbiology Results (last 7 days)       Procedure Component Value Units Date/Time    Wound Culture [[id number]]  (Abnormal)  (Susceptibility) Collected: 09/26/24 2035    Order Status: Completed Specimen: Wound from Sacral Updated: 09/29/24 0945     Wound Culture Few Staphylococcus epidermidis      Rare Yeast    Blood Culture [[id number]]  (Normal) Collected: 09/27/24 0120    Order Status: Completed Specimen: Blood Updated: 09/29/24 0400     Blood Culture No Growth At 48 Hours    Blood Culture [[id number]]  (Normal) Collected: 09/27/24 0121    Order Status: Completed Specimen: Blood Updated: 09/29/24 0400     Blood Culture No Growth At 48 Hours    Urine culture [[id number]]  (Abnormal) Collected: 09/26/24 2054    Order Status: Completed Specimen: Urine Updated: 09/28/24 1400     Urine Culture Less than 10,000 colonies/ml Enterococcus faecalis     Comment: [place] counts of <10,000colonies/ml are of questionable significance. Therefore organisms are identified only.                See below for Radiology    Assessment/Plan:  Chronic osteomyelitis of inferior pubic rami  Stage IV left gluteal decubitus ulcer  Sacral decubitis ulcer  Paraplegic  Neurogenic bladder w chronic indwelling vogt   Uti-poa  Deconditioning   PAF  Hx of R foot osteomyelitis  Htn  Iron def  Anxiety/depression     Plan  Cont iv vanc and zosyn  Wound cx - staph   Consulted ID  Cont wound care   F/u on cx  results  Uti- poa, f/u on cx   Switch baclofen to robaxin tid   Cont remaining meds   Ambien prn for insomnia  Labs in the am        VTE Prophylaxis:eliquis               All diagnosis and differential diagnosis have been reviewed; assessment and plan has been documented; I have personally reviewed the labs and test results that are presently available; I have reviewed the patients medication list; I have reviewed the consulting providers response and recommendations. I have reviewed or attempted to review medical records based upon their availability    All of the patient's questions have been  addressed and answered. Patient's is agreeable to the above stated plan. I will continue to monitor closely and make adjustments to medical management as needed.    Portions of this note dictated using EMR integrated voice recognition software, and may be subject to voice recognition errors not corrected at proofreading. Please contact writer for clarification if needed.   _____________________________________________________________________    Malnutrition Status:    Scheduled Med:   amLODIPine  5 mg Oral Daily    apixaban  5 mg Oral BID    gabapentin  800 mg Oral TID    hydrOXYzine pamoate  25 mg Oral TID    meropenem IV (PEDS and ADULTS)  1 g Intravenous Q8H    methocarbamoL  750 mg Oral TID    mirtazapine  15 mg Oral Nightly    oxybutynin  5 mg Oral Daily    sertraline  50 mg Oral Daily    vancomycin (VANCOCIN) IV (PEDS and ADULTS)  1,500 mg Intravenous Q12H      Continuous Infusions:     PRN Meds:    Current Facility-Administered Medications:     acetaminophen, 650 mg, Oral, Q8H PRN    docusate sodium, 250 mg, Oral, BID PRN    HYDROmorphone, 1 mg, Intravenous, Q6H PRN    influenza, 0.5 mL, Intramuscular, vaccine x 1 dose    ondansetron, 4 mg, Intravenous, Q8H PRN    oxyCODONE-acetaminophen, 1 tablet, Oral, Q6H PRN    sodium chloride 0.9%, 10 mL, Intravenous, PRN    vancomycin - pharmacy to dose, , Intravenous, pharmacy  to manage frequency    zolpidem, 5 mg, Oral, Nightly PRN     Radiology:  I have personally reviewed the following imaging and agree with the radiologist.     CT Pelvis With IV Contrast NO Oral Contrast  Narrative: EXAMINATION:  CT PELVIS WITH IV CONTRAST    CLINICAL HISTORY:  decubitus wound;    TECHNIQUE:  Patient was injected with 100 cc of Omnipaque 350.    Automatic exposure control (AEC) was utilized for dose reduction.    Dose: 465 mGycm    COMPARISON:  07/03/2024    FINDINGS:  There is soft tissue density posterior to the sacrum best seen on series 2, image 41 this may represent a cubitus ulcer there is significant calcification anterior to the right hip there is dysplastic changes to the inferior pubic rami bilaterally.  This most likely represents chronic osteomyelitis.  There is ulceration in the left buttock extending to the inferior pubic ramus best seen on series 2, image 80.  Active osteomyelitis cannot be determined with certainty.  MRI with contrast would be more sensitive.  Impression: Decubitus ulcer in the left buttock extending to the inferior pubic ramus on the left.  Changes most consistent with chronic osteomyelitis of the inferior pubic ramus bilaterally.  A soft tissue abscess is not demonstrated    Electronically signed by: Shaun Lopez MD  Date:    09/26/2024  Time:    21:50      Margaret Leblanc MD  [facility]   09/29/2024

## 2024-09-29 NOTE — NURSING
Nurses Note -- 4 Eyes      9/28/2024   9:55 PM      Skin assessed during: Q Shift Change      [] No Altered Skin Integrity Present    []Prevention Measures Documented      [x] Yes- Altered Skin Integrity Present or Discovered   [] LDA Added if Not in Epic (Describe Wound)   [] New Altered Skin Integrity was Present on Admit and Documented in LDA   [] Wound Image Taken    Wound Care Consulted? No    Attending Nurse:  Alejandra Marroquin RN/Staff Member:   Sylvester Garcia

## 2024-09-29 NOTE — NURSING
Nurses Note -- 4 Eyes      9/28/2024   8:10 PM      Skin assessed during: Q Shift Change      [] No Altered Skin Integrity Present    []Prevention Measures Documented      [x] Yes- Altered Skin Integrity Present or Discovered   [] LDA Added if Not in Epic (Describe Wound)   [] New Altered Skin Integrity was Present on Admit and Documented in LDA   [] Wound Image Taken    Wound Care Consulted? No    Attending Nurse:  Annetta BERG RN    Second RN/Staff Member:   oRmelia PRABHAKAR RN

## 2024-09-30 LAB
ANION GAP SERPL CALC-SCNC: 7 MEQ/L
BACTERIA UR CULT: ABNORMAL
BACTERIA UR CULT: ABNORMAL
BASOPHILS # BLD AUTO: 0.14 X10(3)/MCL
BASOPHILS NFR BLD AUTO: 1.6 %
BUN SERPL-MCNC: 14.5 MG/DL (ref 8.9–20.6)
CALCIUM SERPL-MCNC: 9 MG/DL (ref 8.4–10.2)
CHLORIDE SERPL-SCNC: 100 MMOL/L (ref 98–107)
CO2 SERPL-SCNC: 33 MMOL/L (ref 22–29)
CREAT SERPL-MCNC: 0.69 MG/DL (ref 0.73–1.18)
CREAT/UREA NIT SERPL: 21
EOSINOPHIL # BLD AUTO: 0.43 X10(3)/MCL (ref 0–0.9)
EOSINOPHIL NFR BLD AUTO: 4.8 %
ERYTHROCYTE [DISTWIDTH] IN BLOOD BY AUTOMATED COUNT: 14.7 % (ref 11.5–17)
GFR SERPLBLD CREATININE-BSD FMLA CKD-EPI: >60 ML/MIN/1.73/M2
GLUCOSE SERPL-MCNC: 88 MG/DL (ref 74–100)
HCT VFR BLD AUTO: 37.6 % (ref 42–52)
HGB BLD-MCNC: 11.6 G/DL (ref 14–18)
IMM GRANULOCYTES # BLD AUTO: 0.02 X10(3)/MCL (ref 0–0.04)
IMM GRANULOCYTES NFR BLD AUTO: 0.2 %
LYMPHOCYTES # BLD AUTO: 3.43 X10(3)/MCL (ref 0.6–4.6)
LYMPHOCYTES NFR BLD AUTO: 38.4 %
MCH RBC QN AUTO: 25.9 PG (ref 27–31)
MCHC RBC AUTO-ENTMCNC: 30.9 G/DL (ref 33–36)
MCV RBC AUTO: 83.9 FL (ref 80–94)
MONOCYTES # BLD AUTO: 0.6 X10(3)/MCL (ref 0.1–1.3)
MONOCYTES NFR BLD AUTO: 6.7 %
NEUTROPHILS # BLD AUTO: 4.32 X10(3)/MCL (ref 2.1–9.2)
NEUTROPHILS NFR BLD AUTO: 48.3 %
NRBC BLD AUTO-RTO: 0 %
PLATELET # BLD AUTO: 356 X10(3)/MCL (ref 130–400)
PMV BLD AUTO: 11.1 FL (ref 7.4–10.4)
POTASSIUM SERPL-SCNC: 5 MMOL/L (ref 3.5–5.1)
RBC # BLD AUTO: 4.48 X10(6)/MCL (ref 4.7–6.1)
SODIUM SERPL-SCNC: 140 MMOL/L (ref 136–145)
VANCOMYCIN TROUGH SERPL-MCNC: 19.7 UG/ML (ref 15–20)
WBC # BLD AUTO: 8.94 X10(3)/MCL (ref 4.5–11.5)

## 2024-09-30 PROCEDURE — 36415 COLL VENOUS BLD VENIPUNCTURE: CPT | Performed by: INTERNAL MEDICINE

## 2024-09-30 PROCEDURE — 80048 BASIC METABOLIC PNL TOTAL CA: CPT | Performed by: INTERNAL MEDICINE

## 2024-09-30 PROCEDURE — 63600175 PHARM REV CODE 636 W HCPCS: Performed by: INTERNAL MEDICINE

## 2024-09-30 PROCEDURE — 25000003 PHARM REV CODE 250: Performed by: INTERNAL MEDICINE

## 2024-09-30 PROCEDURE — 25000003 PHARM REV CODE 250: Performed by: EMERGENCY MEDICINE

## 2024-09-30 PROCEDURE — 85025 COMPLETE CBC W/AUTO DIFF WBC: CPT | Performed by: INTERNAL MEDICINE

## 2024-09-30 PROCEDURE — 21400001 HC TELEMETRY ROOM

## 2024-09-30 PROCEDURE — 02HV33Z INSERTION OF INFUSION DEVICE INTO SUPERIOR VENA CAVA, PERCUTANEOUS APPROACH: ICD-10-PCS | Performed by: INTERNAL MEDICINE

## 2024-09-30 PROCEDURE — 80202 ASSAY OF VANCOMYCIN: CPT | Performed by: INTERNAL MEDICINE

## 2024-09-30 PROCEDURE — C1751 CATH, INF, PER/CENT/MIDLINE: HCPCS

## 2024-09-30 PROCEDURE — 36569 INSJ PICC 5 YR+ W/O IMAGING: CPT

## 2024-09-30 PROCEDURE — A4216 STERILE WATER/SALINE, 10 ML: HCPCS | Performed by: INTERNAL MEDICINE

## 2024-09-30 PROCEDURE — 63600175 PHARM REV CODE 636 W HCPCS: Performed by: EMERGENCY MEDICINE

## 2024-09-30 RX ORDER — SODIUM CHLORIDE 0.9 % (FLUSH) 0.9 %
10 SYRINGE (ML) INJECTION
Status: DISCONTINUED | OUTPATIENT
Start: 2024-09-30 | End: 2024-10-02 | Stop reason: HOSPADM

## 2024-09-30 RX ORDER — FLUCONAZOLE 200 MG/1
200 TABLET ORAL DAILY
Status: DISCONTINUED | OUTPATIENT
Start: 2024-09-30 | End: 2024-09-30

## 2024-09-30 RX ORDER — SODIUM CHLORIDE 0.9 % (FLUSH) 0.9 %
10 SYRINGE (ML) INJECTION EVERY 6 HOURS
Status: DISCONTINUED | OUTPATIENT
Start: 2024-09-30 | End: 2024-10-02 | Stop reason: HOSPADM

## 2024-09-30 RX ORDER — OXYCODONE AND ACETAMINOPHEN 10; 325 MG/1; MG/1
1 TABLET ORAL ONCE
Status: COMPLETED | OUTPATIENT
Start: 2024-09-30 | End: 2024-09-30

## 2024-09-30 RX ADMIN — HYDROMORPHONE HYDROCHLORIDE 1 MG: 2 INJECTION INTRAMUSCULAR; INTRAVENOUS; SUBCUTANEOUS at 04:09

## 2024-09-30 RX ADMIN — MIRTAZAPINE 15 MG: 15 TABLET, FILM COATED ORAL at 08:09

## 2024-09-30 RX ADMIN — OXYCODONE HYDROCHLORIDE AND ACETAMINOPHEN 1 TABLET: 10; 325 TABLET ORAL at 12:09

## 2024-09-30 RX ADMIN — METHOCARBAMOL 750 MG: 750 TABLET ORAL at 04:09

## 2024-09-30 RX ADMIN — GABAPENTIN 800 MG: 400 CAPSULE ORAL at 09:09

## 2024-09-30 RX ADMIN — HYDROXYZINE PAMOATE 25 MG: 25 CAPSULE ORAL at 08:09

## 2024-09-30 RX ADMIN — VANCOMYCIN HYDROCHLORIDE 750 MG: 750 INJECTION, POWDER, LYOPHILIZED, FOR SOLUTION INTRAVENOUS at 04:09

## 2024-09-30 RX ADMIN — HYDROXYZINE PAMOATE 25 MG: 25 CAPSULE ORAL at 04:09

## 2024-09-30 RX ADMIN — HYDROXYZINE PAMOATE 25 MG: 25 CAPSULE ORAL at 09:09

## 2024-09-30 RX ADMIN — HYDROMORPHONE HYDROCHLORIDE 1 MG: 2 INJECTION INTRAMUSCULAR; INTRAVENOUS; SUBCUTANEOUS at 10:09

## 2024-09-30 RX ADMIN — OXYCODONE AND ACETAMINOPHEN 1 TABLET: 10; 325 TABLET ORAL at 06:09

## 2024-09-30 RX ADMIN — ONDANSETRON 4 MG: 2 INJECTION INTRAMUSCULAR; INTRAVENOUS at 10:09

## 2024-09-30 RX ADMIN — APIXABAN 5 MG: 5 TABLET, FILM COATED ORAL at 08:09

## 2024-09-30 RX ADMIN — DOCUSATE SODIUM 250 MG: 50 CAPSULE, LIQUID FILLED ORAL at 09:09

## 2024-09-30 RX ADMIN — VANCOMYCIN HYDROCHLORIDE 1000 MG: 1 INJECTION, POWDER, LYOPHILIZED, FOR SOLUTION INTRAVENOUS at 06:09

## 2024-09-30 RX ADMIN — OXYCODONE AND ACETAMINOPHEN 1 TABLET: 10; 325 TABLET ORAL at 12:09

## 2024-09-30 RX ADMIN — METHOCARBAMOL 750 MG: 750 TABLET ORAL at 08:09

## 2024-09-30 RX ADMIN — SERTRALINE HYDROCHLORIDE 50 MG: 50 TABLET ORAL at 09:09

## 2024-09-30 RX ADMIN — MEROPENEM 1 G: 1 INJECTION, POWDER, FOR SOLUTION INTRAVENOUS at 04:09

## 2024-09-30 RX ADMIN — OXYBUTYNIN CHLORIDE 5 MG: 5 TABLET ORAL at 09:09

## 2024-09-30 RX ADMIN — Medication 10 ML: at 07:09

## 2024-09-30 RX ADMIN — AMLODIPINE BESYLATE 5 MG: 5 TABLET ORAL at 09:09

## 2024-09-30 RX ADMIN — FLUCONAZOLE 200 MG: 200 TABLET ORAL at 04:09

## 2024-09-30 RX ADMIN — ONDANSETRON 4 MG: 2 INJECTION INTRAMUSCULAR; INTRAVENOUS at 04:09

## 2024-09-30 RX ADMIN — APIXABAN 5 MG: 5 TABLET, FILM COATED ORAL at 09:09

## 2024-09-30 RX ADMIN — METHOCARBAMOL 750 MG: 750 TABLET ORAL at 09:09

## 2024-09-30 RX ADMIN — HYDROMORPHONE HYDROCHLORIDE 1 MG: 2 INJECTION INTRAMUSCULAR; INTRAVENOUS; SUBCUTANEOUS at 02:09

## 2024-09-30 RX ADMIN — MEROPENEM 1 G: 1 INJECTION, POWDER, FOR SOLUTION INTRAVENOUS at 02:09

## 2024-09-30 RX ADMIN — GABAPENTIN 800 MG: 400 CAPSULE ORAL at 08:09

## 2024-09-30 RX ADMIN — GABAPENTIN 800 MG: 400 CAPSULE ORAL at 04:09

## 2024-09-30 RX ADMIN — OXYCODONE AND ACETAMINOPHEN 1 TABLET: 10; 325 TABLET ORAL at 07:09

## 2024-09-30 NOTE — NURSING
Nurses Note -- 4 Eyes      9/29/2024   7:39 PM      Skin assessed during: Q Shift Change      [] No Altered Skin Integrity Present    []Prevention Measures Documented      [x] Yes- Altered Skin Integrity Present or Discovered   [] LDA Added if Not in Epic (Describe Wound)   [] New Altered Skin Integrity was Present on Admit and Documented in LDA   [] Wound Image Taken    Wound Care Consulted? No    Attending Nurse:  Annetta BERG RN    Second RN/Staff Member:   Alejandra MILLAN RN

## 2024-09-30 NOTE — NURSING
Nurses Note -- 4 Eyes      9/29/2024   10:02 PM      Skin assessed during: Q Shift Change      [] No Altered Skin Integrity Present    [x]Prevention Measures Documented      [x] Yes- Altered Skin Integrity Present or Discovered   [] LDA Added if Not in Epic (Describe Wound)   [] New Altered Skin Integrity was Present on Admit and Documented in LDA   [] Wound Image Taken    Wound Care Consulted? No    Attending Nurse:  KAMLA Oropeza    Second RN/Staff Member:   KAMLA Banerjee

## 2024-09-30 NOTE — PROGRESS NOTES
[facility] l Medicine Progress Note        Chief Complaint: Inpatient Follow-up for     HPI: Hemal Guerrero is a 48 y.o. male who  has a past medical history of Chronic UTI and Paraplegia. And chronic sacral decubitus ulcer and left ischial ulcer. The patient presented to [facility] on 9/26/2024 with a primary complaint of pain and reported drainage from left gluteal ulcer as reported by HH nurse . CT abd and pelvis-Decubitus ulcer in the left buttock extending to the inferior pubic ramus on the left. Changes most consistent with chronic osteomyelitis of the inferior pubic ramus bilaterally. A soft tissue abscess is not demonstrated .started on iv vanc and merrem and admitted to medicine services.           Interval Hx:   Seen and examined  No acute events reported overnight  Pain controlled  Afebrile  Remains on iv abx  Id consulted    Objective/physical exam:  General appearance: Well-developed, well-nourished male in no apparent distress.  HENT: Atraumatic head. Moist mucous membranes of oral cavity.  Eyes: Normal extraocular movements.   Neck: Supple.   Lungs: Clear to auscultation bilaterally. No wheezing present.   Heart: Regular rate and rhythm. S1 and S2 present with no murmurs/gallop/rub. No pedal edema. No JVD present.   Abdomen: Soft, non-distended, non-tender. No rebound tenderness/guarding. Bowel sounds are normal.   Extremities: paraplegic  Skin: sacral wound almost closed . Left buttock ulcer, deep, minimal drainage   Neuro: paraplegic  Psych/mental status: Appropriate mood and affect    VITAL SIGNS: 24 HRS MIN & MAX LAST   Temp  Min: 98 °F (36.7 °C)  Max: 98.8 °F (37.1 °C) 98.8 °F (37.1 °C)   BP  Min: 113/73  Max: 146/83 130/89   Pulse  Min: 66  Max: 110  93   Resp  Min: 16  Max: 18 18   SpO2  Min: 98 %  Max: 99 % 98 %     I have reviewed the following labs:  Recent Labs   Lab 09/28/24  0557 09/29/24  0412 09/30/24  0543   WBC 11.31 9.49 8.94   RBC 4.41* 4.53* 4.48*   HGB  11.2* 11.8* 11.6*   HCT 37.1* 38.4* 37.6*   MCV 84.1 84.8 83.9   MCH 25.4* 26.0* 25.9*   MCHC 30.2* 30.7* 30.9*   RDW 14.6 14.8 14.7    337 356   MPV 11.2* 10.9* 11.1*     Recent Labs   Lab 09/26/24 2035 09/27/24 0120 09/28/24  0557 09/29/24  0412 09/30/24  0543    138 142 141 140   K 3.6 4.5 4.4 4.7 5.0    107 104 102 100   CO2 17* 17* 28 31* 33*   BUN 12.7 9.5 9.5 12.8 14.5   CREATININE 0.76 0.65* 0.68* 0.72* 0.69*   CALCIUM 9.1 8.7 9.2 9.0 9.0   ALBUMIN 3.3* 2.9*  --   --   --    ALKPHOS 94 84  --   --   --    ALT 8 5  --   --   --    AST 11 8  --   --   --    BILITOT 0.5 0.4  --   --   --      Microbiology Results (last 7 days)       Procedure Component Value Units Date/Time    Urine culture [[id number]]  (Abnormal) Collected: 09/26/24 2054    Order Status: Completed Specimen: Urine Updated: 09/30/24 0749     Urine Culture >/= 100,000 colonies/ml Candida tropicalis      Less than 10,000 colonies/ml Enterococcus faecalis     Comment: [place] counts of <10,000colonies/ml are of questionable significance. Therefore organisms are identified only.       Blood Culture [[id number]]  (Normal) Collected: 09/27/24 0120    Order Status: Completed Specimen: Blood Updated: 09/30/24 0402     Blood Culture No Growth At 72 Hours    Blood Culture [[id number]]  (Normal) Collected: 09/27/24 0121    Order Status: Completed Specimen: Blood Updated: 09/30/24 0402     Blood Culture No Growth At 72 Hours    Wound Culture [[id number]]  (Abnormal)  (Susceptibility) Collected: 09/26/24 2035    Order Status: Completed Specimen: Wound from Sacral Updated: 09/29/24 1301     Wound Culture Few Staphylococcus epidermidis      Rare Yeast             See below for Radiology    Assessment/Plan:  Chronic osteomyelitis of inferior pubic rami  Stage IV left gluteal decubitus ulcer  Sacral decubitis ulcer  Paraplegic  Neurogenic bladder w chronic indwelling vogt   Uti-poa  Deconditioning   PAF  Hx of R foot  osteomyelitis  Htn  Iron def  Anxiety/depression  candiduria     Plan  Cont iv vanc and zosyn  Add diflucan  Wound cx - staph   Id reccs  Cont wound care   F/u on cx results  Uti- poa, f/u on cx   Switch baclofen to robaxin tid   Cont remaining meds   Ambien prn for insomnia  Labs in the am        VTE Prophylaxis:eliquis               All diagnosis and differential diagnosis have been reviewed; assessment and plan has been documented; I have personally reviewed the labs and test results that are presently available; I have reviewed the patients medication list; I have reviewed the consulting providers response and recommendations. I have reviewed or attempted to review medical records based upon their availability    All of the patient's questions have been  addressed and answered. Patient's is agreeable to the above stated plan. I will continue to monitor closely and make adjustments to medical management as needed.    Portions of this note dictated using EMR integrated voice recognition software, and may be subject to voice recognition errors not corrected at proofreading. Please contact writer for clarification if needed.   _____________________________________________________________________    Malnutrition Status:    Scheduled Med:   amLODIPine  5 mg Oral Daily    apixaban  5 mg Oral BID    gabapentin  800 mg Oral TID    hydrOXYzine pamoate  25 mg Oral TID    meropenem IV (PEDS and ADULTS)  1 g Intravenous Q8H    methocarbamoL  750 mg Oral TID    mirtazapine  15 mg Oral Nightly    oxybutynin  5 mg Oral Daily    sertraline  50 mg Oral Daily    sodium chloride 0.9%  10 mL Intravenous Q6H    vancomycin (VANCOCIN) IV (PEDS and ADULTS)  750 mg Intravenous Q8H      Continuous Infusions:     PRN Meds:    Current Facility-Administered Medications:     acetaminophen, 650 mg, Oral, Q8H PRN    docusate sodium, 250 mg, Oral, BID PRN    HYDROmorphone, 1 mg, Intravenous, Q6H PRN    influenza, 0.5 mL, Intramuscular, vaccine x  1 dose    ondansetron, 4 mg, Intravenous, Q8H PRN    oxyCODONE-acetaminophen, 1 tablet, Oral, Q6H PRN    sodium chloride 0.9%, 10 mL, Intravenous, PRN    Flushing PICC/Midline Protocol, , , Until Discontinued **AND** sodium chloride 0.9%, 10 mL, Intravenous, Q6H **AND** sodium chloride 0.9%, 10 mL, Intravenous, PRN    vancomycin - pharmacy to dose, , Intravenous, pharmacy to manage frequency    zolpidem, 5 mg, Oral, Nightly PRN     Radiology:  I have personally reviewed the following imaging and agree with the radiologist.     CT Pelvis With IV Contrast NO Oral Contrast  Narrative: EXAMINATION:  CT PELVIS WITH IV CONTRAST    CLINICAL HISTORY:  decubitus wound;    TECHNIQUE:  Patient was injected with 100 cc of Omnipaque 350.    Automatic exposure control (AEC) was utilized for dose reduction.    Dose: 465 mGycm    COMPARISON:  07/03/2024    FINDINGS:  There is soft tissue density posterior to the sacrum best seen on series 2, image 41 this may represent a cubitus ulcer there is significant calcification anterior to the right hip there is dysplastic changes to the inferior pubic rami bilaterally.  This most likely represents chronic osteomyelitis.  There is ulceration in the left buttock extending to the inferior pubic ramus best seen on series 2, image 80.  Active osteomyelitis cannot be determined with certainty.  MRI with contrast would be more sensitive.  Impression: Decubitus ulcer in the left buttock extending to the inferior pubic ramus on the left.  Changes most consistent with chronic osteomyelitis of the inferior pubic ramus bilaterally.  A soft tissue abscess is not demonstrated    Electronically signed by: Shaun Lopez MD  Date:    09/26/2024  Time:    21:50      Yosvany Simms MD  [facility]   09/30/2024

## 2024-09-30 NOTE — PROCEDURES
"Hemal Guerrero is a 48 y.o. male patient.    Temp: 98.8 °F (37.1 °C) (09/30/24 1128)  Pulse: 93 (09/30/24 1128)  Resp: 18 (09/30/24 1209)  BP: 130/89 (09/30/24 1128)  SpO2: 98 % (09/30/24 1128)  Weight: 69.4 kg (153 lb) (09/27/24 1054)  Height: 6' 0.01" (182.9 cm) (09/27/24 1054)    PICC  Date/Time: 9/30/2024 2:31 PM  Performed by: Kiya Broussard, KAMLA  Consent Done: Yes  Time out: Immediately prior to procedure a time out was called to verify the correct patient, procedure, equipment, support staff and site/side marked as required  Indications: med administration and vascular access  Anesthesia: local infiltration  Local anesthetic: lidocaine 1% without epinephrine  Anesthetic Total (mL): 4  Preparation: skin prepped with ChloraPrep  Skin prep agent dried: skin prep agent completely dried prior to procedure  Sterile barriers: all five maximum sterile barriers used - cap, mask, sterile gown, sterile gloves, and large sterile sheet  Hand hygiene: hand hygiene performed prior to central venous catheter insertion  Location details: right brachial  Catheter type: double lumen  Catheter size: 5 Fr  Catheter Length: 37cm    Ultrasound guidance: yes  Vessel Caliber: medium and patent, compressibility normal  Needle advanced into vessel with real time Ultrasound guidance.  Guidewire confirmed in vessel.  Sterile sheath used.  Number of attempts: 1  Post-procedure: blood return through all ports, sterile dressing applied and chlorhexidine patch    Assessment: placement verified by x-ray  Complications: none          Kiya broussard RN  9/30/2024    "

## 2024-09-30 NOTE — PLAN OF CARE
09/30/24 1510   Discharge Reassessment   Assessment Type Discharge Planning Reassessment   Discharge Plan discussed with: Patient   Discharge Plan A Long-term acute care facility (LTAC)   Discharge Plan B Skilled Nursing Facility   Post-Acute Status   Post-Acute Authorization Placement   Post-Acute Placement Status Referrals Sent  ([facility])     Dr. Bar recommending [facility], spoke to patient. He is in agreement with referral.

## 2024-09-30 NOTE — PROGRESS NOTES
Pharmacokinetic Assessment Follow Up: IV Vancomycin    Vancomycin serum concentration assessment(s):    The trough level was drawn correctly and can be used to guide therapy at this time. The measurement is within the desired definitive target range of 10 to 20 mcg/mL.    Vancomycin Regimen Plan:    Change regimen to Vancomycin 750 mg IV every 8 hours with next serum trough concentration measured at 1400 prior to 1500(4th) dose on 10/1    Scheduled Administration Times    750mg q8h (0700,1500,2300)    Drug levels (last 3 results):  Recent Labs   Lab Result Units 09/28/24  1147 09/29/24  1229 09/30/24  1315   Vancomycin Trough ug/ml 12.3* 10.2* 19.7       Vancomycin Administrations:  vancomycin given in the last 96 hours                     vancomycin in dextrose 5 % 1 gram/250 mL IVPB 1,000 mg (mg) 1,000 mg New Bag 09/30/24 0637     1,000 mg New Bag 09/29/24 2134     1,000 mg New Bag  1537    vancomycin 1.5 g in dextrose 5 % 250 mL IVPB (ready to mix) (mg) 1,500 mg New Bag 09/29/24 0041     1,500 mg New Bag 09/28/24 1355    vancomycin in dextrose 5 % 1 gram/250 mL IVPB 1,000 mg (mg) 1,000 mg New Bag 09/28/24 0034      Restarted 09/27/24 1405     1,000 mg New Bag  1353    vancomycin 1.5 g in dextrose 5 % 250 mL IVPB (ready to mix) (mg) 1,500 mg New Bag 09/27/24 0127                    Pharmacy will continue to follow and monitor vancomycin.    Please contact pharmacy at extension [phone] for questions regarding this assessment.    Thank you for the consult,   Lkoi Boogie       Patient brief summary:  Hemal Guerrero is a 48 y.o. male initiated on antimicrobial therapy with IV Vancomycin for treatment of skin & soft tissue infection    The patient's current regimen is 1000mg q8h    Drug Allergies:   Review of patient's allergies indicates:   Allergen Reactions    Amitriptyline        Actual Body Weight:  Wt Readings from Last 1 Encounters:   09/27/24 69.4 kg (153 lb)       Renal Function:   Estimated Creatinine  Clearance: 127.1 mL/min (A) (based on SCr of 0.69 mg/dL (L)).,     Dialysis Method (if applicable):  N/A    CBC (last 72 hours):  Recent Labs   Lab Result Units 09/28/24 0557 09/29/24 0412 09/30/24  0543   WBC x10(3)/mcL 11.31 9.49 8.94   Hgb g/dL 11.2* 11.8* 11.6*   Hct % 37.1* 38.4* 37.6*   Platelet x10(3)/mcL 369 337 356   Mono % % 7.7 7.4 6.7   Eos % % 1.9 2.5 4.8   Basophil % % 1.3 1.6 1.6       Metabolic Panel (last 72 hours):  Recent Labs   Lab Result Units 09/28/24 0557 09/29/24 0412 09/30/24  0543   Sodium mmol/L 142 141 140   Potassium mmol/L 4.4 4.7 5.0   Chloride mmol/L 104 102 100   CO2 mmol/L 28 31* 33*   Glucose mg/dL 94 101* 88   Blood Urea Nitrogen mg/dL 9.5 12.8 14.5   Creatinine mg/dL 0.68* 0.72* 0.69*       Microbiologic Results:  Microbiology Results (last 7 days)       Procedure Component Value Units Date/Time    Urine culture [[id number]]  (Abnormal) Collected: 09/26/24 2054    Order Status: Completed Specimen: Urine Updated: 09/30/24 0749     Urine Culture >/= 100,000 colonies/ml Candida tropicalis      Less than 10,000 colonies/ml Enterococcus faecalis     Comment: [place] counts of <10,000colonies/ml are of questionable significance. Therefore organisms are identified only.       Blood Culture [[id number]]  (Normal) Collected: 09/27/24 0120    Order Status: Completed Specimen: Blood Updated: 09/30/24 0402     Blood Culture No Growth At 72 Hours    Blood Culture [[id number]]  (Normal) Collected: 09/27/24 0121    Order Status: Completed Specimen: Blood Updated: 09/30/24 0402     Blood Culture No Growth At 72 Hours    Wound Culture [[id number]]  (Abnormal)  (Susceptibility) Collected: 09/26/24 2035    Order Status: Completed Specimen: Wound from Sacral Updated: 09/29/24 1301     Wound Culture Few Staphylococcus epidermidis      Rare Yeast

## 2024-09-30 NOTE — PLAN OF CARE
Information gathered from patient and chart due to patient's pain. Updates sent to [facility] via [employer].        09/30/24 1013   Discharge Assessment   Assessment Type Discharge Planning Assessment   Confirmed/corrected address, phone number and insurance Yes   Confirmed Demographics Correct on Facesheet   Source of Information patient   Reason For Admission Presents via [facility] with c/o yellow/green drainage from a wound to the LLE. Pt reports the wound is chronic, however, the drainage began today. Also reports occasional diaphoresis, however, denies known fevers.   People in Home child(arnol), adult   Do you expect to return to your current living situation? Yes   Do you have help at home or someone to help you manage your care at home? Yes   Who are your caregiver(s) and their phone number(s)? Ahmet (son) [phone]   Prior to hospitilization cognitive status: Alert/Oriented   Current cognitive status: Alert/Oriented   Walking or Climbing Stairs Difficulty yes   Dressing/Bathing Difficulty yes   Home Accessibility wheelchair accessible   Home Layout Able to live on 1st floor   Equipment Currently Used at Home wheelchair;hospital bed;shower chair   Readmission within 30 days? No   Patient currently being followed by outpatient case management? No   Do you currently have service(s) that help you manage your care at home? Yes   [facility]   Is the pt/caregiver preference to resume services with current agency Yes   Do you take prescription medications? Yes   Do you have prescription coverage? Yes   Coverage Medicare   Do you have any problems affording any of your prescribed medications? No   Is the patient taking medications as prescribed? yes   How do you get to doctors appointments? family or friend will provide   Are you on dialysis? No   Do you take coumadin? No   Discharge Plan A Home Health   Discharge Plan B Other  (TBD)   DME Needed Upon Discharge  other (see  comments)  (TBD)   Discharge Plan discussed with: Patient   Transition of Care Barriers None

## 2024-10-01 LAB
ALBUMIN SERPL-MCNC: 3.6 G/DL (ref 3.5–5)
ALBUMIN/GLOB SERPL: 0.9 RATIO (ref 1.1–2)
ALP SERPL-CCNC: 108 UNIT/L (ref 40–150)
ALT SERPL-CCNC: 34 UNIT/L (ref 0–55)
ANION GAP SERPL CALC-SCNC: 9 MEQ/L
AST SERPL-CCNC: 57 UNIT/L (ref 5–34)
BASOPHILS # BLD AUTO: 0.15 X10(3)/MCL
BASOPHILS NFR BLD AUTO: 1.5 %
BILIRUB SERPL-MCNC: 0.4 MG/DL
BUN SERPL-MCNC: 12.6 MG/DL (ref 8.9–20.6)
CALCIUM SERPL-MCNC: 9.2 MG/DL (ref 8.4–10.2)
CHLORIDE SERPL-SCNC: 100 MMOL/L (ref 98–107)
CO2 SERPL-SCNC: 32 MMOL/L (ref 22–29)
CREAT SERPL-MCNC: 0.73 MG/DL (ref 0.73–1.18)
CREAT/UREA NIT SERPL: 17
EOSINOPHIL # BLD AUTO: 0.46 X10(3)/MCL (ref 0–0.9)
EOSINOPHIL NFR BLD AUTO: 4.5 %
ERYTHROCYTE [DISTWIDTH] IN BLOOD BY AUTOMATED COUNT: 14.9 % (ref 11.5–17)
GFR SERPLBLD CREATININE-BSD FMLA CKD-EPI: >60 ML/MIN/1.73/M2
GLOBULIN SER-MCNC: 4 GM/DL (ref 2.4–3.5)
GLUCOSE SERPL-MCNC: 96 MG/DL (ref 74–100)
HCT VFR BLD AUTO: 39 % (ref 42–52)
HGB BLD-MCNC: 12.1 G/DL (ref 14–18)
IMM GRANULOCYTES # BLD AUTO: 0.05 X10(3)/MCL (ref 0–0.04)
IMM GRANULOCYTES NFR BLD AUTO: 0.5 %
LYMPHOCYTES # BLD AUTO: 3.41 X10(3)/MCL (ref 0.6–4.6)
LYMPHOCYTES NFR BLD AUTO: 33.1 %
MCH RBC QN AUTO: 26.2 PG (ref 27–31)
MCHC RBC AUTO-ENTMCNC: 31 G/DL (ref 33–36)
MCV RBC AUTO: 84.4 FL (ref 80–94)
MONOCYTES # BLD AUTO: 0.7 X10(3)/MCL (ref 0.1–1.3)
MONOCYTES NFR BLD AUTO: 6.8 %
NEUTROPHILS # BLD AUTO: 5.52 X10(3)/MCL (ref 2.1–9.2)
NEUTROPHILS NFR BLD AUTO: 53.6 %
NRBC BLD AUTO-RTO: 0 %
PLATELET # BLD AUTO: 324 X10(3)/MCL (ref 130–400)
PMV BLD AUTO: 10.2 FL (ref 7.4–10.4)
POTASSIUM SERPL-SCNC: 4.7 MMOL/L (ref 3.5–5.1)
PROT SERPL-MCNC: 7.6 GM/DL (ref 6.4–8.3)
RBC # BLD AUTO: 4.62 X10(6)/MCL (ref 4.7–6.1)
SODIUM SERPL-SCNC: 141 MMOL/L (ref 136–145)
VANCOMYCIN TROUGH SERPL-MCNC: 19.4 UG/ML (ref 15–20)
WBC # BLD AUTO: 10.29 X10(3)/MCL (ref 4.5–11.5)

## 2024-10-01 PROCEDURE — 36415 COLL VENOUS BLD VENIPUNCTURE: CPT | Performed by: INTERNAL MEDICINE

## 2024-10-01 PROCEDURE — 63600175 PHARM REV CODE 636 W HCPCS: Performed by: NURSE PRACTITIONER

## 2024-10-01 PROCEDURE — 25000003 PHARM REV CODE 250: Performed by: INTERNAL MEDICINE

## 2024-10-01 PROCEDURE — 80053 COMPREHEN METABOLIC PANEL: CPT | Performed by: INTERNAL MEDICINE

## 2024-10-01 PROCEDURE — 25000003 PHARM REV CODE 250: Performed by: EMERGENCY MEDICINE

## 2024-10-01 PROCEDURE — 63600175 PHARM REV CODE 636 W HCPCS: Performed by: INTERNAL MEDICINE

## 2024-10-01 PROCEDURE — 25000003 PHARM REV CODE 250: Performed by: NURSE PRACTITIONER

## 2024-10-01 PROCEDURE — 80202 ASSAY OF VANCOMYCIN: CPT | Performed by: INTERNAL MEDICINE

## 2024-10-01 PROCEDURE — 85025 COMPLETE CBC W/AUTO DIFF WBC: CPT | Performed by: INTERNAL MEDICINE

## 2024-10-01 PROCEDURE — 63600175 PHARM REV CODE 636 W HCPCS: Performed by: EMERGENCY MEDICINE

## 2024-10-01 PROCEDURE — A4216 STERILE WATER/SALINE, 10 ML: HCPCS | Performed by: INTERNAL MEDICINE

## 2024-10-01 PROCEDURE — 21400001 HC TELEMETRY ROOM

## 2024-10-01 RX ADMIN — Medication 10 ML: at 06:10

## 2024-10-01 RX ADMIN — MEROPENEM 1 G: 1 INJECTION, POWDER, FOR SOLUTION INTRAVENOUS at 02:10

## 2024-10-01 RX ADMIN — OXYBUTYNIN CHLORIDE 5 MG: 5 TABLET ORAL at 07:10

## 2024-10-01 RX ADMIN — OXYCODONE AND ACETAMINOPHEN 1 TABLET: 10; 325 TABLET ORAL at 07:10

## 2024-10-01 RX ADMIN — SERTRALINE HYDROCHLORIDE 50 MG: 50 TABLET ORAL at 09:10

## 2024-10-01 RX ADMIN — HYDROMORPHONE HYDROCHLORIDE 1 MG: 2 INJECTION INTRAMUSCULAR; INTRAVENOUS; SUBCUTANEOUS at 05:10

## 2024-10-01 RX ADMIN — APIXABAN 5 MG: 5 TABLET, FILM COATED ORAL at 08:10

## 2024-10-01 RX ADMIN — MIRTAZAPINE 15 MG: 15 TABLET, FILM COATED ORAL at 08:10

## 2024-10-01 RX ADMIN — VANCOMYCIN HYDROCHLORIDE 750 MG: 750 INJECTION, POWDER, LYOPHILIZED, FOR SOLUTION INTRAVENOUS at 07:10

## 2024-10-01 RX ADMIN — MEROPENEM 1 G: 1 INJECTION, POWDER, FOR SOLUTION INTRAVENOUS at 09:10

## 2024-10-01 RX ADMIN — METHOCARBAMOL 750 MG: 750 TABLET ORAL at 08:10

## 2024-10-01 RX ADMIN — HYDROMORPHONE HYDROCHLORIDE 1 MG: 2 INJECTION INTRAMUSCULAR; INTRAVENOUS; SUBCUTANEOUS at 11:10

## 2024-10-01 RX ADMIN — Medication 10 ML: at 11:10

## 2024-10-01 RX ADMIN — APIXABAN 5 MG: 5 TABLET, FILM COATED ORAL at 09:10

## 2024-10-01 RX ADMIN — VANCOMYCIN HYDROCHLORIDE 750 MG: 750 INJECTION, POWDER, LYOPHILIZED, FOR SOLUTION INTRAVENOUS at 10:10

## 2024-10-01 RX ADMIN — AMLODIPINE BESYLATE 5 MG: 5 TABLET ORAL at 09:10

## 2024-10-01 RX ADMIN — VANCOMYCIN HYDROCHLORIDE 750 MG: 750 INJECTION, POWDER, LYOPHILIZED, FOR SOLUTION INTRAVENOUS at 01:10

## 2024-10-01 RX ADMIN — GABAPENTIN 800 MG: 400 CAPSULE ORAL at 09:10

## 2024-10-01 RX ADMIN — HYDROXYZINE PAMOATE 25 MG: 25 CAPSULE ORAL at 08:10

## 2024-10-01 RX ADMIN — GABAPENTIN 800 MG: 400 CAPSULE ORAL at 03:10

## 2024-10-01 RX ADMIN — ONDANSETRON 4 MG: 2 INJECTION INTRAMUSCULAR; INTRAVENOUS at 05:10

## 2024-10-01 RX ADMIN — Medication 10 ML: at 01:10

## 2024-10-01 RX ADMIN — METHOCARBAMOL 750 MG: 750 TABLET ORAL at 03:10

## 2024-10-01 RX ADMIN — HYDROXYZINE PAMOATE 25 MG: 25 CAPSULE ORAL at 03:10

## 2024-10-01 RX ADMIN — HYDROXYZINE PAMOATE 25 MG: 25 CAPSULE ORAL at 09:10

## 2024-10-01 RX ADMIN — Medication 10 ML: at 05:10

## 2024-10-01 RX ADMIN — OXYCODONE AND ACETAMINOPHEN 1 TABLET: 10; 325 TABLET ORAL at 01:10

## 2024-10-01 RX ADMIN — MICAFUNGIN SODIUM 100 MG: 100 INJECTION, POWDER, LYOPHILIZED, FOR SOLUTION INTRAVENOUS at 10:10

## 2024-10-01 RX ADMIN — VANCOMYCIN HYDROCHLORIDE 750 MG: 750 INJECTION, POWDER, LYOPHILIZED, FOR SOLUTION INTRAVENOUS at 03:10

## 2024-10-01 RX ADMIN — MICAFUNGIN SODIUM 100 MG: 100 INJECTION, POWDER, LYOPHILIZED, FOR SOLUTION INTRAVENOUS at 01:10

## 2024-10-01 RX ADMIN — GABAPENTIN 800 MG: 400 CAPSULE ORAL at 08:10

## 2024-10-01 RX ADMIN — METHOCARBAMOL 750 MG: 750 TABLET ORAL at 09:10

## 2024-10-01 RX ADMIN — DOCUSATE SODIUM 250 MG: 50 CAPSULE, LIQUID FILLED ORAL at 03:10

## 2024-10-01 RX ADMIN — MEROPENEM 1 G: 1 INJECTION, POWDER, FOR SOLUTION INTRAVENOUS at 05:10

## 2024-10-01 NOTE — PROGRESS NOTES
Inpatient Nutrition Assessment    Admit Date: 9/26/2024   Total duration of encounter: 5 days   Patient Age: 48 y.o.    Nutrition Recommendation/Prescription     Continue regular diet as tolerated  Continue Boost VHC TID (530 kcal, 22 g protein per serving)  Continue Brandon BID (90 kcal, 2.5 g protein per serving)  Continue antiemetic PRN  Monitor labs, intake and weight    Communication of Recommendations:  EMR    Nutrition Assessment     Malnutrition Assessment/Nutrition-Focused Physical Exam    Malnutrition Context: acute illness or injury (10/01/24 1320)  Malnutrition Level: moderate (10/01/24 1320)  Energy Intake (Malnutrition): less than 75% for greater than 7 days (10/01/24 1320)  Weight Loss (Malnutrition): other (see comments) (does not meet criteria) (10/01/24 1320)  Subcutaneous Fat (Malnutrition): moderate depletion (10/01/24 1320)  Orbital Region (Subcutaneous Fat Loss): moderate depletion        Muscle Mass (Malnutrition): moderate depletion (10/01/24 1320)  Mormonism Region (Muscle Loss): moderate depletion  Clavicle Bone Region (Muscle Loss): moderate depletion  Clavicle and Acromion Bone Region (Muscle Loss): moderate depletion                 Fluid Accumulation (Malnutrition): other (see comments) (does not meet criteria) (09/27/24 1058)  Hand  Strength, Left (Malnutrition): unabel to evaluate (09/27/24 1058)  Hand  Strength, Right (Malnutrition): unable to evaluate (09/27/24 1058)  A minimum of two characteristics is recommended for diagnosis of either severe or non-severe malnutrition.    Chart Review    Reason Seen: follow-up    Malnutrition Screening Tool Results   Have you recently lost weight without trying?: Unsure  Have you been eating poorly because of a decreased appetite?: Yes   MST Score: 3   Diagnosis:  Chronic osteomyelitis of inferior pubic rami  Stage IV left gluteal decubitus ulcer  Sacral decubitis ulcer  Paraplegic  Neurogenic bladder w chronic indwelling vogt    UTI-poa  Deconditioning   Candiduria    Relevant Medical History: chronic UTI, paraplegia, HTN, PAF, VICTORIANO, anxiety, depression    Scheduled Medications:  amLODIPine, 5 mg, Daily  apixaban, 5 mg, BID  gabapentin, 800 mg, TID  hydrOXYzine pamoate, 25 mg, TID  meropenem IV (PEDS and ADULTS), 1 g, Q8H  methocarbamoL, 750 mg, TID  micafungin, 100 mg, Q24H  mirtazapine, 15 mg, Nightly  oxybutynin, 5 mg, Daily  sertraline, 50 mg, Daily  sodium chloride 0.9%, 10 mL, Q6H  vancomycin (VANCOCIN) IV (PEDS and ADULTS), 750 mg, Q8H    Continuous Infusions:   PRN Medications:  acetaminophen, 650 mg, Q8H PRN  docusate sodium, 250 mg, BID PRN  HYDROmorphone, 1 mg, Q6H PRN  influenza, 0.5 mL, vaccine x 1 dose  ondansetron, 4 mg, Q8H PRN  oxyCODONE-acetaminophen, 1 tablet, Q6H PRN  sodium chloride 0.9%, 10 mL, PRN  sodium chloride 0.9%, 10 mL, PRN  vancomycin - pharmacy to dose, , pharmacy to manage frequency  zolpidem, 5 mg, Nightly PRN    Calorie Containing IV Medications: no significant kcals from medications at this time    Recent Labs   Lab 09/26/24  2035 09/27/24  0120 09/28/24  0557 09/29/24  0412 09/30/24  0543 10/01/24  0623    138 142 141 140 141   K 3.6 4.5 4.4 4.7 5.0 4.7   CALCIUM 9.1 8.7 9.2 9.0 9.0 9.2    107 104 102 100 100   CO2 17* 17* 28 31* 33* 32*   BUN 12.7 9.5 9.5 12.8 14.5 12.6   CREATININE 0.76 0.65* 0.68* 0.72* 0.69* 0.73   EGFRNORACEVR >60 >60 >60 >60 >60 >60   GLUCOSE 81 73* 94 101* 88 96   BILITOT 0.5 0.4  --   --   --  0.4   ALKPHOS 94 84  --   --   --  108   ALT 8 5  --   --   --  34   AST 11 8  --   --   --  57*   ALBUMIN 3.3* 2.9*  --   --   --  3.6   CRP 39.10*  --   --   --   --   --    WBC 12.52* 9.74 11.31 9.49 8.94 10.29   HGB 12.5* 10.6* 11.2* 11.8* 11.6* 12.1*   HCT 40.2* 35.1* 37.1* 38.4* 37.6* 39.0*     Nutrition Orders:  Diet Adult Regular  Dietary nutrition supplements BID; Brandon - Fruit Punch,Dietary nutrition supplements TID; Boost Very High Calorie Nutritional Drink -  "Strawberry    Appetite/Oral Intake: fair/50-75% of meals  Factors Affecting Nutritional Intake: decreased appetite  Social Needs Impacting Access to Food: none identified  Food/Orthodoxy/Cultural Preferences: none reported  Food Allergies: no known food allergies  Last Bowel Movement: 24  Wound(s):     Wound 24 Pressure Injury Left Ischial tuberosity-Tissue loss description: Full thickness     Comments    24: Pt reports that he is not eating well; states that he has not had an appetite for the past 2-3 weeks; states that his usual wt is ~160 lbs. Pt also reports nausea but no vomiting. Pt is receptive to a strawberry flavored oral supplement. Noted pt also with an infected wound.      10/1/24: Pt busy with patient care at time of rounds. No reports of n/v/d/c per notes, last BM . % intake documented per EMR, will continue current regimen at this time. Per EMR malissa history, weight appears stable past year. Will continue to monitor.    Anthropometrics    Height: 6' 0.01" (182.9 cm),    Last Weight: 69.4 kg (153 lb) (24 1054), Weight Method: Standard Scale  BMI (Calculated): 20.7  BMI Classification: normal (BMI 18.5-24.9)        Ideal Body Weight (IBW), Male: 178.06 lb     % Ideal Body Weight, Male (lb): 85.93 %           Paraplegia Ideal Body Weight (IBW) Adjustment: 169 lb 8.5 oz     Usual Body Weight (UBW), k.7 kg  % Usual Body Weight: 95.66  % Weight Change From Usual Weight: -4.54 %  Usual Weight Provided By: patient and EMR weight history    Wt Readings from Last 5 Encounters:   24 69.4 kg (153 lb)   24 69.4 kg (153 lb)   24 69.5 kg (153 lb 4.8 oz)   24 68 kg (149 lb 14.6 oz)   23 74.8 kg (165 lb)     Weight Change(s) Since Admission:   Wt Readings from Last 1 Encounters:   24 1054 69.4 kg (153 lb)   24 1622 69.4 kg (153 lb)   Admit Weight: 69.4 kg (153 lb) (24 0672), Weight Method: Standard Scale    Estimated Needs    Weight " Used For Calorie Calculations: 69.4 kg (153 lb)  Energy Calorie Requirements (kcal): 4624-3856 kcal (25-30 kcal/kg)  Energy Need Method: Kcal/kg  Weight Used For Protein Calculations: 69.4 kg (153 lb)  Protein Requirements:  gm (1.3-1.5 gm/kg)  Fluid Requirements (mL): 2082 mL        Enteral Nutrition     Patient not receiving enteral nutrition at this time.    Parenteral Nutrition     Patient not receiving parenteral nutrition support at this time.    Evaluation of Received Nutrient Intake    Calories: meeting estimated needs (likely meeting needs with ONS and meal intake)  Protein: meeting estimated needs (likely meeting needs with ONS and meal intake)    Patient Education     Not applicable.    Nutrition Diagnosis     PES: Inadequate oral intake related to acute illness as evidenced by pt report of decreased appetite/intake x 2-3 weeks. (active)     PES: Moderate acute disease or injury related malnutrition related to suboptimal protein/energy intake as evidenced by less than 75% needs met for greater than 7 days, moderate fat depletion, and moderate muscle depletion. (active)    Nutrition Interventions     Intervention(s): general/healthful diet, commercial beverage, multivitamin/mineral supplement therapy, and collaboration with other providers    Goal: Meet greater than 80% of nutritional needs by follow-up. (goal progressing)  Goal: Maintain weight throughout hospitalization. (goal progressing)    Nutrition Goals & Monitoring     Dietitian will monitor: food and beverage intake, weight, glucose/endocrine profile, and gastrointestinal profile  Discharge planning: continue regular diet with Boost or equivalent and Brandon oral supplements   Nutrition Risk/Follow-Up: moderate (follow-up in 3-5 days)   Please consult if re-assessment needed sooner.

## 2024-10-01 NOTE — PHYSICIAN QUERY
Please clarify if there is any clinical correlation between UTI and suprapubic cath.   Due to or associated with each other

## 2024-10-01 NOTE — PROGRESS NOTES
Seen by ID, full detailed consult to follow    Impression  SIRS with leukocytosis  Sacral wound infection with chronic osteomyelitis of B/L inferior pubic rami  Complicated UTI - Candida isolated  Paraplegia  Neurogenic bladder / Suprapubic catheter  Enterococcus bacteriuria  Anemia  Depression / Anxiety    See orders

## 2024-10-01 NOTE — PROGRESS NOTES
Pharmacokinetic Assessment Follow Up: IV Vancomycin    Vancomycin serum concentration assessment(s):  The trough level was drawn correctly and can be used to guide therapy at this time. The measurement is within the desired definitive target range of 15 to 20 mcg/mL.    Vancomycin Regimen Plan:  Continue regimen to Vancomycin 750 mg IV every 8 hours with next serum trough concentration measured at 1400 prior to 1500 dose on 10/02      Drug levels (last 3 results):  Recent Labs   Lab Result Units 09/29/24  1229 09/30/24  1315 10/01/24  1408   Vancomycin Trough ug/ml 10.2* 19.7 19.4       Vancomycin Administrations:  vancomycin given in the last 96 hours                     vancomycin 750 mg in dextrose 5 % 250 mL IVPB (ready to mix) (mg) 750 mg New Bag 10/01/24 0709     750 mg New Bag  0125     750 mg New Bag 09/30/24 1623    vancomycin in dextrose 5 % 1 gram/250 mL IVPB 1,000 mg (mg) 1,000 mg New Bag 09/30/24 0637     1,000 mg New Bag 09/29/24 2134     1,000 mg New Bag  1537    vancomycin 1.5 g in dextrose 5 % 250 mL IVPB (ready to mix) (mg) 1,500 mg New Bag 09/29/24 0041     1,500 mg New Bag 09/28/24 1355    vancomycin in dextrose 5 % 1 gram/250 mL IVPB 1,000 mg (mg) 1,000 mg New Bag 09/28/24 0034                    Pharmacy will continue to follow and monitor vancomycin.    Please contact pharmacy at extension [phone] for questions regarding this assessment.    Thank you for the consult,   Porsha Downey       Patient brief summary:  Hemal Guerrero is a 48 y.o. male initiated on antimicrobial therapy with IV Vancomycin for treatment of bone/joint infection    The patient's current regimen is 750 mg IVPB Q8H    Drug Allergies:   Review of patient's allergies indicates:   Allergen Reactions    Amitriptyline        Actual Body Weight:  Wt Readings from Last 1 Encounters:   09/27/24 69.4 kg (153 lb)       Renal Function:   Estimated Creatinine Clearance: 120.2 mL/min (based on SCr of 0.73 mg/dL).,     Dialysis Method (if  applicable):  N/A    CBC (last 72 hours):  Recent Labs   Lab Result Units 09/29/24 0412 09/30/24  0543 10/01/24  0623   WBC x10(3)/mcL 9.49 8.94 10.29   Hgb g/dL 11.8* 11.6* 12.1*   Hct % 38.4* 37.6* 39.0*   Platelet x10(3)/mcL 337 356 324   Mono % % 7.4 6.7 6.8   Eos % % 2.5 4.8 4.5   Basophil % % 1.6 1.6 1.5       Metabolic Panel (last 72 hours):  Recent Labs   Lab Result Units 09/29/24 0412 09/30/24  0543 10/01/24  0623   Sodium mmol/L 141 140 141   Potassium mmol/L 4.7 5.0 4.7   Chloride mmol/L 102 100 100   CO2 mmol/L 31* 33* 32*   Glucose mg/dL 101* 88 96   Blood Urea Nitrogen mg/dL 12.8 14.5 12.6   Creatinine mg/dL 0.72* 0.69* 0.73   Albumin g/dL  --   --  3.6   Bilirubin Total mg/dL  --   --  0.4   ALP unit/L  --   --  108   AST unit/L  --   --  57*   ALT unit/L  --   --  34       Microbiologic Results:  Microbiology Results (last 7 days)       Procedure Component Value Units Date/Time    Blood Culture [[id number]]  (Normal) Collected: 09/27/24 0120    Order Status: Completed Specimen: Blood Updated: 10/01/24 0405     Blood Culture No Growth At 96 Hours    Blood Culture [[id number]]  (Normal) Collected: 09/27/24 0121    Order Status: Completed Specimen: Blood Updated: 10/01/24 0405     Blood Culture No Growth At 96 Hours    Urine culture [[id number]]  (Abnormal) Collected: 09/26/24 2054    Order Status: Completed Specimen: Urine Updated: 09/30/24 0749     Urine Culture >/= 100,000 colonies/ml Candida tropicalis      Less than 10,000 colonies/ml Enterococcus faecalis     Comment: [place] counts of <10,000colonies/ml are of questionable significance. Therefore organisms are identified only.       Wound Culture [[id number]]  (Abnormal)  (Susceptibility) Collected: 09/26/24 2035    Order Status: Completed Specimen: Wound from Sacral Updated: 09/29/24 1301     Wound Culture Few Staphylococcus epidermidis      Rare Yeast

## 2024-10-01 NOTE — PHYSICIAN QUERY
Please clarify the nutritional diagnosis associated with the below clinical findings.   Moderate protein calorie malnutrition

## 2024-10-01 NOTE — PLAN OF CARE
Problem: Adult Inpatient Plan of Care  Goal: Plan of Care Review  Outcome: Progressing  Goal: Patient-Specific Goal (Individualized)  Outcome: Progressing  Goal: Absence of Hospital-Acquired Illness or Injury  Outcome: Progressing  Goal: Optimal Comfort and Wellbeing  Outcome: Progressing  Goal: Readiness for Transition of Care  Outcome: Progressing     Problem: Skin Injury Risk Increased  Goal: Skin Health and Integrity  Outcome: Progressing     Problem: Wound  Goal: Optimal Coping  Outcome: Progressing  Goal: Optimal Functional Ability  Outcome: Progressing  Goal: Absence of Infection Signs and Symptoms  Outcome: Progressing  Goal: Improved Oral Intake  Outcome: Progressing  Goal: Optimal Pain Control and Function  Outcome: Progressing  Goal: Skin Health and Integrity  Outcome: Progressing  Goal: Optimal Wound Healing  Outcome: Progressing     Problem: Fall Injury Risk  Goal: Absence of Fall and Fall-Related Injury  Outcome: Progressing     Problem: Pain Acute  Goal: Optimal Pain Control and Function  Outcome: Progressing     Problem: Infection  Goal: Absence of Infection Signs and Symptoms  Outcome: Progressing

## 2024-10-01 NOTE — PROGRESS NOTES
[facility] l Medicine Progress Note        Chief Complaint: Inpatient Follow-up for     HPI: Hemal Guerrero is a 48 y.o. male who  has a past medical history of Chronic UTI and Paraplegia. And chronic sacral decubitus ulcer and left ischial ulcer. The patient presented to [facility] on 9/26/2024 with a primary complaint of pain and reported drainage from left gluteal ulcer as reported by HH nurse . CT abd and pelvis-Decubitus ulcer in the left buttock extending to the inferior pubic ramus on the left. Changes most consistent with chronic osteomyelitis of the inferior pubic ramus bilaterally. A soft tissue abscess is not demonstrated .started on iv vanc and merrem and admitted to medicine services.           Interval Hx:   Seen and examined  No acute events reported overnight  Pain controlled  Afebrile  Remains on iv abx  Id reccs noted  Ltac in am    Objective/physical exam:  General appearance: Well-developed, well-nourished male in no apparent distress.  HENT: Atraumatic head. Moist mucous membranes of oral cavity.  Eyes: Normal extraocular movements.   Neck: Supple.   Lungs: Clear to auscultation bilaterally. No wheezing present.   Heart: Regular rate and rhythm. S1 and S2 present with no murmurs/gallop/rub. No pedal edema. No JVD present.   Abdomen: Soft, non-distended, non-tender. No rebound tenderness/guarding. Bowel sounds are normal.   Extremities: paraplegic  Skin: sacral wound almost closed . Left buttock ulcer, deep, minimal drainage   Neuro: paraplegic  Psych/mental status: Appropriate mood and affect    VITAL SIGNS: 24 HRS MIN & MAX LAST   Temp  Min: 98.1 °F (36.7 °C)  Max: 98.9 °F (37.2 °C) 98.6 °F (37 °C)   BP  Min: 108/73  Max: 125/81 114/74   Pulse  Min: 79  Max: 94  79   Resp  Min: 18  Max: 20 18   SpO2  Min: 97 %  Max: 99 % 98 %     I have reviewed the following labs:  Recent Labs   Lab 09/29/24  0412 09/30/24  0543 10/01/24  0623   WBC 9.49 8.94 10.29   RBC 4.53* 4.48*  4.62*   HGB 11.8* 11.6* 12.1*   HCT 38.4* 37.6* 39.0*   MCV 84.8 83.9 84.4   MCH 26.0* 25.9* 26.2*   MCHC 30.7* 30.9* 31.0*   RDW 14.8 14.7 14.9    356 324   MPV 10.9* 11.1* 10.2     Recent Labs   Lab 09/26/24 2035 09/27/24 0120 09/28/24  0557 09/29/24  0412 09/30/24  0543 10/01/24  0623    138   < > 141 140 141   K 3.6 4.5   < > 4.7 5.0 4.7    107   < > 102 100 100   CO2 17* 17*   < > 31* 33* 32*   BUN 12.7 9.5   < > 12.8 14.5 12.6   CREATININE 0.76 0.65*   < > 0.72* 0.69* 0.73   CALCIUM 9.1 8.7   < > 9.0 9.0 9.2   ALBUMIN 3.3* 2.9*  --   --   --  3.6   ALKPHOS 94 84  --   --   --  108   ALT 8 5  --   --   --  34   AST 11 8  --   --   --  57*   BILITOT 0.5 0.4  --   --   --  0.4    < > = values in this interval not displayed.     Microbiology Results (last 7 days)       Procedure Component Value Units Date/Time    Blood Culture [[id number]]  (Normal) Collected: 09/27/24 0120    Order Status: Completed Specimen: Blood Updated: 10/01/24 0405     Blood Culture No Growth At 96 Hours    Blood Culture [[id number]]  (Normal) Collected: 09/27/24 0121    Order Status: Completed Specimen: Blood Updated: 10/01/24 0405     Blood Culture No Growth At 96 Hours    Urine culture [[id number]]  (Abnormal) Collected: 09/26/24 2054    Order Status: Completed Specimen: Urine Updated: 09/30/24 0749     Urine Culture >/= 100,000 colonies/ml Candida tropicalis      Less than 10,000 colonies/ml Enterococcus faecalis     Comment: [place] counts of <10,000colonies/ml are of questionable significance. Therefore organisms are identified only.       Wound Culture [[id number]]  (Abnormal)  (Susceptibility) Collected: 09/26/24 2035    Order Status: Completed Specimen: Wound from Sacral Updated: 09/29/24 1301     Wound Culture Few Staphylococcus epidermidis      Rare Yeast             See below for Radiology    Assessment/Plan:  Chronic osteomyelitis of inferior pubic rami  Stage IV left gluteal decubitus ulcer  Sacral  decubitis ulcer  Paraplegic  Neurogenic bladder w chronic indwelling vogt   Uti-poa  Deconditioning   PAF  Hx of R foot osteomyelitis  Htn  Iron def  Anxiety/depression  candiduria     Plan  Cont iv vanc and zosyn  Add diflucan  Wound cx - staph   Id reccs  Cont wound care   F/u on cx results  Uti- poa, f/u on cx   Switch baclofen to robaxin tid   Cont remaining meds   Ambien prn for insomnia  Labs in the am        VTE Prophylaxis:eliquis               All diagnosis and differential diagnosis have been reviewed; assessment and plan has been documented; I have personally reviewed the labs and test results that are presently available; I have reviewed the patients medication list; I have reviewed the consulting providers response and recommendations. I have reviewed or attempted to review medical records based upon their availability    All of the patient's questions have been  addressed and answered. Patient's is agreeable to the above stated plan. I will continue to monitor closely and make adjustments to medical management as needed.    Portions of this note dictated using EMR integrated voice recognition software, and may be subject to voice recognition errors not corrected at proofreading. Please contact writer for clarification if needed.   _____________________________________________________________________    Malnutrition Status:    Scheduled Med:   amLODIPine  5 mg Oral Daily    apixaban  5 mg Oral BID    gabapentin  800 mg Oral TID    hydrOXYzine pamoate  25 mg Oral TID    meropenem IV (PEDS and ADULTS)  1 g Intravenous Q8H    methocarbamoL  750 mg Oral TID    micafungin  100 mg Intravenous Q24H    mirtazapine  15 mg Oral Nightly    oxybutynin  5 mg Oral Daily    sertraline  50 mg Oral Daily    sodium chloride 0.9%  10 mL Intravenous Q6H    vancomycin (VANCOCIN) IV (PEDS and ADULTS)  750 mg Intravenous Q8H      Continuous Infusions:     PRN Meds:    Current Facility-Administered Medications:      acetaminophen, 650 mg, Oral, Q8H PRN    docusate sodium, 250 mg, Oral, BID PRN    HYDROmorphone, 1 mg, Intravenous, Q6H PRN    influenza, 0.5 mL, Intramuscular, vaccine x 1 dose    ondansetron, 4 mg, Intravenous, Q8H PRN    oxyCODONE-acetaminophen, 1 tablet, Oral, Q6H PRN    sodium chloride 0.9%, 10 mL, Intravenous, PRN    Flushing PICC/Midline Protocol, , , Until Discontinued **AND** sodium chloride 0.9%, 10 mL, Intravenous, Q6H **AND** sodium chloride 0.9%, 10 mL, Intravenous, PRN    vancomycin - pharmacy to dose, , Intravenous, pharmacy to manage frequency    zolpidem, 5 mg, Oral, Nightly PRN     Radiology:  I have personally reviewed the following imaging and agree with the radiologist.     X-Ray Chest 1 View for Line/Tube Placement  Narrative: EXAMINATION:  XR CHEST 1 VIEW FOR LINE/TUBE PLACEMENT    CLINICAL HISTORY:  PICC;    COMPARISON:  Chest x-ray dated 04/11/2023    FINDINGS:  Right-sided PICC line has its tip over the superior vena cava.  The heart is normal in size.  The lungs are clear.  There is no pleural effusion or visible pneumothorax.  Impression: Right-sided PICC line with tip over the superior vena cava.    Electronically signed by: Sadie Mckeon  Date:    09/30/2024  Time:    15:18      Yosvany Simsm MD  [facility]   10/01/2024

## 2024-10-02 VITALS
HEART RATE: 92 BPM | TEMPERATURE: 98 F | HEIGHT: 72 IN | BODY MASS INDEX: 20.72 KG/M2 | WEIGHT: 153 LBS | SYSTOLIC BLOOD PRESSURE: 136 MMHG | OXYGEN SATURATION: 97 % | DIASTOLIC BLOOD PRESSURE: 92 MMHG | RESPIRATION RATE: 18 BRPM

## 2024-10-02 PROBLEM — T14.8XXA WOUND INFECTION: Status: ACTIVE | Noted: 2024-10-02

## 2024-10-02 PROBLEM — L08.9 WOUND INFECTION: Status: ACTIVE | Noted: 2024-10-02

## 2024-10-02 PROBLEM — E44.0 MODERATE MALNUTRITION: Status: RESOLVED | Noted: 2024-09-27 | Resolved: 2024-10-02

## 2024-10-02 PROBLEM — T14.8XXA WOUND INFECTION: Status: RESOLVED | Noted: 2024-10-02 | Resolved: 2024-10-02

## 2024-10-02 PROBLEM — L08.9 WOUND INFECTION: Status: RESOLVED | Noted: 2024-10-02 | Resolved: 2024-10-02

## 2024-10-02 LAB
ALBUMIN SERPL-MCNC: 3.4 G/DL (ref 3.5–5)
ALBUMIN/GLOB SERPL: 0.9 RATIO (ref 1.1–2)
ALP SERPL-CCNC: 110 UNIT/L (ref 40–150)
ALT SERPL-CCNC: 37 UNIT/L (ref 0–55)
ANION GAP SERPL CALC-SCNC: 10 MEQ/L
AST SERPL-CCNC: 24 UNIT/L (ref 5–34)
BACTERIA BLD CULT: NORMAL
BACTERIA BLD CULT: NORMAL
BASOPHILS # BLD AUTO: 0.18 X10(3)/MCL
BASOPHILS NFR BLD AUTO: 1.6 %
BILIRUB SERPL-MCNC: 0.4 MG/DL
BUN SERPL-MCNC: 14.7 MG/DL (ref 8.9–20.6)
CALCIUM SERPL-MCNC: 8.9 MG/DL (ref 8.4–10.2)
CHLORIDE SERPL-SCNC: 99 MMOL/L (ref 98–107)
CO2 SERPL-SCNC: 31 MMOL/L (ref 22–29)
CREAT SERPL-MCNC: 0.69 MG/DL (ref 0.73–1.18)
CREAT/UREA NIT SERPL: 21
EOSINOPHIL # BLD AUTO: 0.51 X10(3)/MCL (ref 0–0.9)
EOSINOPHIL NFR BLD AUTO: 4.6 %
ERYTHROCYTE [DISTWIDTH] IN BLOOD BY AUTOMATED COUNT: 14.9 % (ref 11.5–17)
GFR SERPLBLD CREATININE-BSD FMLA CKD-EPI: >60 ML/MIN/1.73/M2
GLOBULIN SER-MCNC: 3.8 GM/DL (ref 2.4–3.5)
GLUCOSE SERPL-MCNC: 111 MG/DL (ref 74–100)
HCT VFR BLD AUTO: 37.8 % (ref 42–52)
HGB BLD-MCNC: 11.5 G/DL (ref 14–18)
IMM GRANULOCYTES # BLD AUTO: 0.08 X10(3)/MCL (ref 0–0.04)
IMM GRANULOCYTES NFR BLD AUTO: 0.7 %
LYMPHOCYTES # BLD AUTO: 2.61 X10(3)/MCL (ref 0.6–4.6)
LYMPHOCYTES NFR BLD AUTO: 23.4 %
MCH RBC QN AUTO: 26.1 PG (ref 27–31)
MCHC RBC AUTO-ENTMCNC: 30.4 G/DL (ref 33–36)
MCV RBC AUTO: 85.9 FL (ref 80–94)
MONOCYTES # BLD AUTO: 0.82 X10(3)/MCL (ref 0.1–1.3)
MONOCYTES NFR BLD AUTO: 7.3 %
NEUTROPHILS # BLD AUTO: 6.97 X10(3)/MCL (ref 2.1–9.2)
NEUTROPHILS NFR BLD AUTO: 62.4 %
NRBC BLD AUTO-RTO: 0 %
PLATELET # BLD AUTO: 312 X10(3)/MCL (ref 130–400)
PMV BLD AUTO: 10.2 FL (ref 7.4–10.4)
POTASSIUM SERPL-SCNC: 5 MMOL/L (ref 3.5–5.1)
PROT SERPL-MCNC: 7.2 GM/DL (ref 6.4–8.3)
RBC # BLD AUTO: 4.4 X10(6)/MCL (ref 4.7–6.1)
SODIUM SERPL-SCNC: 140 MMOL/L (ref 136–145)
VANCOMYCIN TROUGH SERPL-MCNC: 17.5 UG/ML (ref 15–20)
WBC # BLD AUTO: 11.17 X10(3)/MCL (ref 4.5–11.5)

## 2024-10-02 PROCEDURE — 85025 COMPLETE CBC W/AUTO DIFF WBC: CPT | Performed by: INTERNAL MEDICINE

## 2024-10-02 PROCEDURE — 63600175 PHARM REV CODE 636 W HCPCS: Performed by: EMERGENCY MEDICINE

## 2024-10-02 PROCEDURE — 25000003 PHARM REV CODE 250: Performed by: INTERNAL MEDICINE

## 2024-10-02 PROCEDURE — 63600175 PHARM REV CODE 636 W HCPCS: Performed by: INTERNAL MEDICINE

## 2024-10-02 PROCEDURE — 25000003 PHARM REV CODE 250: Performed by: EMERGENCY MEDICINE

## 2024-10-02 PROCEDURE — 80202 ASSAY OF VANCOMYCIN: CPT | Performed by: INTERNAL MEDICINE

## 2024-10-02 PROCEDURE — 90471 IMMUNIZATION ADMIN: CPT | Performed by: INTERNAL MEDICINE

## 2024-10-02 PROCEDURE — A4216 STERILE WATER/SALINE, 10 ML: HCPCS | Performed by: INTERNAL MEDICINE

## 2024-10-02 PROCEDURE — 36415 COLL VENOUS BLD VENIPUNCTURE: CPT | Performed by: INTERNAL MEDICINE

## 2024-10-02 PROCEDURE — 90656 IIV3 VACC NO PRSV 0.5 ML IM: CPT | Performed by: INTERNAL MEDICINE

## 2024-10-02 PROCEDURE — 3E02340 INTRODUCTION OF INFLUENZA VACCINE INTO MUSCLE, PERCUTANEOUS APPROACH: ICD-10-PCS | Performed by: INTERNAL MEDICINE

## 2024-10-02 PROCEDURE — G0008 ADMIN INFLUENZA VIRUS VAC: HCPCS | Performed by: INTERNAL MEDICINE

## 2024-10-02 PROCEDURE — 80053 COMPREHEN METABOLIC PANEL: CPT | Performed by: INTERNAL MEDICINE

## 2024-10-02 RX ADMIN — METHOCARBAMOL 750 MG: 750 TABLET ORAL at 02:10

## 2024-10-02 RX ADMIN — OXYBUTYNIN CHLORIDE 5 MG: 5 TABLET ORAL at 08:10

## 2024-10-02 RX ADMIN — MEROPENEM 1 G: 1 INJECTION, POWDER, FOR SOLUTION INTRAVENOUS at 08:10

## 2024-10-02 RX ADMIN — MEROPENEM 1 G: 1 INJECTION, POWDER, FOR SOLUTION INTRAVENOUS at 01:10

## 2024-10-02 RX ADMIN — Medication 10 ML: at 11:10

## 2024-10-02 RX ADMIN — HYDROMORPHONE HYDROCHLORIDE 1 MG: 2 INJECTION INTRAMUSCULAR; INTRAVENOUS; SUBCUTANEOUS at 05:10

## 2024-10-02 RX ADMIN — INFLUENZA VIRUS VACCINE 0.5 ML: 15; 15; 15 SUSPENSION INTRAMUSCULAR at 02:10

## 2024-10-02 RX ADMIN — GABAPENTIN 800 MG: 400 CAPSULE ORAL at 08:10

## 2024-10-02 RX ADMIN — APIXABAN 5 MG: 5 TABLET, FILM COATED ORAL at 08:10

## 2024-10-02 RX ADMIN — OXYCODONE AND ACETAMINOPHEN 1 TABLET: 10; 325 TABLET ORAL at 02:10

## 2024-10-02 RX ADMIN — HYDROXYZINE PAMOATE 25 MG: 25 CAPSULE ORAL at 08:10

## 2024-10-02 RX ADMIN — METHOCARBAMOL 750 MG: 750 TABLET ORAL at 08:10

## 2024-10-02 RX ADMIN — GABAPENTIN 800 MG: 400 CAPSULE ORAL at 02:10

## 2024-10-02 RX ADMIN — VANCOMYCIN HYDROCHLORIDE 750 MG: 750 INJECTION, POWDER, LYOPHILIZED, FOR SOLUTION INTRAVENOUS at 06:10

## 2024-10-02 RX ADMIN — HYDROXYZINE PAMOATE 25 MG: 25 CAPSULE ORAL at 02:10

## 2024-10-02 RX ADMIN — HYDROMORPHONE HYDROCHLORIDE 1 MG: 2 INJECTION INTRAMUSCULAR; INTRAVENOUS; SUBCUTANEOUS at 11:10

## 2024-10-02 RX ADMIN — OXYCODONE AND ACETAMINOPHEN 1 TABLET: 10; 325 TABLET ORAL at 01:10

## 2024-10-02 RX ADMIN — Medication 10 ML: at 06:10

## 2024-10-02 RX ADMIN — OXYCODONE AND ACETAMINOPHEN 1 TABLET: 10; 325 TABLET ORAL at 08:10

## 2024-10-02 NOTE — DISCHARGE SUMMARY
[facility] Medicine  Discharge Summary      Patient Name: Hemal Guerrero  MRN: [id number]  Admission Date: 9/26/2024  Hospital Length of Stay: 5 days  Discharge Date and Time:  10/02/2024 11:21 AM  Attending Physician: Yosvany Simms MD   Discharging Provider: Yosvany Simms MD  Discharge Provider Team: Networked reference to record PCT   Primary Care Provider: Sanam Monroe MD        AR DIAGNOSES :  Chronic osteomyelitis of inferior pubic rami  Stage IV left gluteal decubitus ulcer  Sacral decubitis ulcer  Paraplegic  Neurogenic bladder w chronic indwelling vogt   Uti-poa  Deconditioning   PAF  Hx of R foot osteomyelitis  Htn  Iron def  Anxiety/depression  candiduria    * No surgery found *      Hospital Course:   Hemal Guerrero is a 48 y.o. male who  has a past medical history of Chronic UTI and Paraplegia. And chronic sacral decubitus ulcer and left ischial ulcer. The patient presented to [facility] on 9/26/2024 with a primary complaint of pain and reported drainage from left gluteal ulcer as reported by HH nurse . CT abd and pelvis-Decubitus ulcer in the left buttock extending to the inferior pubic ramus on the left. Changes most consistent with chronic osteomyelitis of the inferior pubic ramus bilaterally. A soft tissue abscess is not demonstrated .started on iv vanc and merrem and admitted to medicine services.   Subsequently ended up having sacral osteomyelitis cx positive for candida and staph  Id consulted and subsequenlydced to ltac for ongoing comple medical managament iv abx and wound care    Consults:   Consults (From admission, onward)          Status Ordering Provider     Inpatient consult to Infectious Diseases  Once        Provider:  Diamond Garcia MD    Acknowledged SANAM MONROE     Inpatient consult to Registered Dietitian/Nutritionist  Once        Provider:  (Not yet assigned)    HAO Mcfarlane A     Pharmacy to  dose Vancomycin consult  Once        Provider:  (Not yet assigned)    Acknowledged HAO TREJO            Final Active Diagnoses:      Problems Resolved During this Admission:    Diagnosis Date Noted Date Resolved POA    PRINCIPAL PROBLEM:  Wound infection [T14.8XXA, L08.9] 10/02/2024 10/02/2024 Unknown    Moderate malnutrition [E44.0] 09/27/2024 10/02/2024 Yes      Discharged Condition: fair    Disposition: Long Term Care    Follow Up:    Patient Instructions:      Diet Adult Regular     Medications:  Reconciled Home Medications:      Medication List        START taking these medications      0.9% NaCl PgBk 100 mL with meropenem 1 gram SolR 1 g  Inject 1 g into the vein every 8 (eight) hours.     0.9% NaCl PgBk 100 mL with micafungin 100 mg SolR 100 mg  Inject 100 mg into the vein once daily.     VANCOMYCIN 750 MG/250 ML D5W (READY TO MIX)  Inject 250 mLs (750 mg total) into the vein every 8 (eight) hours.            CHANGE how you take these medications      HYDROcodone-acetaminophen 5-325 mg per tablet  Commonly known as: NORCO  Take 1 tablet by mouth every 6 (six) hours as needed for Pain.  What changed: Another medication with the same name was removed. Continue taking this medication, and follow the directions you see here.            CONTINUE taking these medications      amLODIPine 5 MG tablet  Commonly known as: NORVASC  Take 1 tablet (5 mg total) by mouth once daily.     baclofen 20 MG tablet  Commonly known as: LIORESAL  Take 20 mg by mouth 3 (three) times daily.     cyclobenzaprine 10 MG tablet  Commonly known as: FLEXERIL  Take 10 mg by mouth 2 (two) times daily as needed.     docusate sodium 250 MG capsule  Commonly known as: COLACE  Take 250 mg by mouth daily as needed.     ELIQUIS 5 mg Tab  Generic drug: apixaban  Take 5 mg by mouth 2 (two) times daily.     erythromycin ophthalmic ointment  Commonly known as: ROMYCIN  Place a 1/2 inch ribbon of ointment into the lower eyelid.     FeroSuL 325 mg  (65 mg iron) Tab tablet  Generic drug: ferrous sulfate  Take 1 tablet by mouth every morning.     fluticasone propionate 50 mcg/actuation nasal spray  Commonly known as: FLONASE  1 spray by Each Nostril route 2 (two) times daily.     gabapentin 800 MG tablet  Commonly known as: NEURONTIN  Take 800 mg by mouth 3 (three) times daily.     hydrOXYzine pamoate 25 MG Cap  Commonly known as: VISTARIL  Take 25 mg by mouth 3 (three) times daily.     mirtazapine 15 MG tablet  Commonly known as: REMERON  Take 1 tablet (15 mg total) by mouth nightly.     oxybutynin 10 MG 24 hr tablet  Commonly known as: DITROPAN-XL  Take 1 tablet by mouth every morning.     oxyCODONE-acetaminophen  mg per tablet  Commonly known as: PERCOCET  Take 1 tablet by mouth 2 (two) times daily as needed.     sertraline 50 MG tablet  Commonly known as: ZOLOFT  Take 1 tablet (50 mg total) by mouth once daily.            STOP taking these medications      levoFLOXacin 500 MG tablet  Commonly known as: LEVAQUIN     metroNIDAZOLE 500 MG tablet  Commonly known as: FLAGYL              Significant Diagnostic Studies: Labs: BMP:   Recent Labs   Lab 10/01/24  0623 10/02/24  0628    140   K 4.7 5.0    99   CO2 32* 31*   BUN 12.6 14.7   CREATININE 0.73 0.69*   CALCIUM 9.2 8.9       Pending Diagnostic Studies:       Procedure Component Value Units Date/Time    VANCOMYCIN, TROUGH [[id number]]     Order Status: Sent Lab Status: No result     Specimen: Blood           Indwelling Lines/Drains at time of discharge:   Lines/Drains/Airways       Peripherally Inserted Central Catheter Line  Duration             PICC Double Lumen 09/30/24 1432 right brachial 1 day              Drain  Duration                  Suprapubic Catheter -- days                    Time spent on the discharge of patient: 32 minutes         Yosvany Simms MD  Department of Hospital Medicine  [facility]

## 2024-10-02 NOTE — PLAN OF CARE
10/02/24 1336   Final Note   Assessment Type Final Discharge Note   Anticipated Discharge Disposition LT[facility]   Post-Acute Status   Post-Acute Authorization Placement   Post-Acute Placement Status Set-up Complete/Auth obtained  ([facility])   Discharge Delays (!) Ambulance Transport/Facility Transport     Transport via [facility]

## 2024-10-02 NOTE — PROGRESS NOTES
[facility] [address] [facility]  Wound Care    Patient Name:  Hemal Guerrero   MRN:  [id number]  Date: 10/2/2024  Diagnosis: Wound infection    History:     Past Medical History:   Diagnosis Date    Chronic UTI     Paraplegia        Social History     Socioeconomic History    Marital status:    Tobacco Use    Smoking status: Never    Smokeless tobacco: Never   Substance and Sexual Activity    Alcohol use: Not Currently    Drug use: Never     Social Drivers of Health     Transportation Needs: No Transportation Needs (8/31/2020)    [facility]    PRAPARE - Transportation     Lack of Transportation (Medical): No     Lack of Transportation (Non-Medical): No   Physical Activity: Unknown (8/31/2020)    [facility], [facility]    Exercise Vital Sign     Days of Exercise per Week: 0 days   Stress: No Stress Concern Present (8/31/2020)    [facility], [facility]    [employer] - Occupational Stress Questionnaire     Feeling of Stress : Only a little       Precautions:     Allergies as of 09/26/2024 - Reviewed 09/26/2024   Allergen Reaction Noted    Amitriptyline  11/16/2022       [facility] Assessment Details/Treatment      10/02/24 1207   WO Assessment   Visit Date 10/02/24   Visit Time 1207   Consult Type Follow Up   [facility] Speciality Wound   Intervention chart review;assessed;applied   Teaching on-going        Wound 09/27/24 Pressure Injury Left Ischial tuberosity   Date First Assessed:  09/27/24   Present on Original Admission: Yes  Primary Wound Type: Pressure Injury  Side: Left  Location: Ischial tuberosity   Wound Image    Dressing Appearance Intact;Moist drainage   Drainage Amount Small   Drainage Characteristics/Odor No odor   Appearance Pink;Yellow;Moist;Bone   Tissue loss description Full thickness   Black (%), Wound Tissue Color 0 %   Red (%), Wound Tissue Color 30 %   Yellow (%), Wound Tissue Color 70 %   Periwound Area Macerated;Pale white;Moist   Wound Edges Defined   Wound Length (cm) 4.5 cm   Wound Width (cm) 4 cm   Wound Depth (cm) 1.2 cm   Wound Volume (cm^3) 21.6 cm^3   Wound Surface Area (cm^2) 18 cm^2   Care Cleansed with:;Antimicrobial agent;Wound cleanser;Other (see comments)  (Vashe)   Dressing Applied;Gauze     WOCN follow up for ischium. Discussed plan of care with nurse Matthews. Treatment recommendations put in place. Removed old dressing, new photograph taken for EMR, redressed area. Reinforced education from previous visit. Nursing to continue with treatment recommendations and other preventative measures. Will follow up.   10/02/2024

## 2024-10-05 NOTE — CONSULTS
Infectious Diseases Consultation    Inpatient consult to Infectious diseases  Consult performed by: Katy Weiss NP  Consult ordered by: Margaret Leblanc MD     HPI:   47 y/o male with Hx of paraplegia, neurogenic bladder with suprapubic catheter and non healing wounds who presented to ER on 9/26 with worsening wounds with increased drainage, decreased appetite, N/V and diaphoresis. On admit he was afebrile with leukocytosis, WBC 12.52. CRP 39.1 and ESR 50. U/A negative nitrites, 500 LE, occasional bacteria, >100 WBC, >100 RBC. Urine culture revealed >100k Candida Tropicalis and <10k Enterococcus. Sacral wound culture revealing few Staph Epi and rare yeast. CT pelvis with contrast showed decubitus ulcer in L buttock extending to the inferior pubic ramus on L, changes most consistent with chronic osteomyelitis of the inferior pubic ramus bilaterally. Blood cultures negative thus far.  He is currently on Vancomycin and Merrem    Past Medical and Surgical History  Allergies :   Amitriptyline    Medical :   He has a past medical history of Chronic UTI and Paraplegia.    Surgical :   He has a past surgical history that includes Insertion of suprapubic catheter and Laparotomy.     Family History  His family history is not on file.    Social History  He reports that he has never smoked. He has never used smokeless tobacco. He reports that he does not currently use alcohol. He reports that he does not use drugs.     Review of Systems   Constitutional:  Positive for malaise/fatigue.   HENT: Negative.     Eyes: Negative.    Respiratory: Negative.     Cardiovascular: Negative.    Gastrointestinal: Negative.    Musculoskeletal: Negative.    Skin:         Multiple wounds   Neurological:  Positive for focal weakness and weakness.   All other systems reviewed and are negative.    Objective   Physical Exam  Vitals reviewed.   HENT:      Head: Normocephalic.   Cardiovascular:      Rate and Rhythm: Normal rate and  "regular rhythm.   Pulmonary:      Effort: Pulmonary effort is normal. No respiratory distress.      Breath sounds: Normal breath sounds. No wheezing.   Abdominal:      General: Bowel sounds are normal. There is no distension.      Palpations: Abdomen is soft.      Tenderness: There is no abdominal tenderness.   Genitourinary:     Comments: Suprapubic catheter  Musculoskeletal:      Cervical back: Normal range of motion.      Comments: Paraplegia   Skin:     Findings: No rash.      Comments: Wounds dressed   Neurological:      Mental Status: He is alert and oriented to person, place, and time.   Psychiatric:         Mood and Affect: Mood normal.         Behavior: Behavior normal.       VITAL SIGNS: 24 HR MIN & MAX LAST    No data recorded  98.2 °F (36.8 °C)        No data recorded  (!) 136/92     No data recorded  92     No data recorded  18    No data recorded  97 %      HT: 6' 0.01" (182.9 cm)  WT: 69.4 kg (153 lb)  BMI: 20.7     No results found for this or any previous visit (from the past 24 hours).    Imaging  9/26/24 CT Pelvis with  Impression:   Decubitus ulcer in the left buttock extending to the inferior pubic ramus on the left.  Changes most consistent with chronic osteomyelitis of the inferior pubic ramus bilaterally.  A soft tissue abscess is not demonstrated    Impression  SIRS with leukocytosis  Sacral wound infection with chronic osteomyelitis of B/L inferior pubic rami  Complicated UTI - Candida isolated  Paraplegia  Neurogenic bladder / Suprapubic catheter  Enterococcus bacteriuria  Anemia  Depression / Anxiety    Recommendations  I agree with the management of Mr Guerrero. This is a 49 y/o male who presented to ER with worsening wounds with increased drainage, decreased appetite, N/V and diaphoresis. On admit he was afebrile with leukocytosis. U/A abnormal with urine culture >100k Candida Tropicalis and <10k Enterococcus. Sacral wound culture revealing few Staph Epi and rare yeast. CT pelvis with " contrast showed findings of osteomyelitis of bilateral inferior pubic ramus. Continue wound care. Continue Vancomycin, Merrem and add Micafungin for now. He will need a 6 week course following inflammatory markers. Case discussed with Dr Williamson as well as with the patient and nursing. Thank you for this consultation, we will continue to follow Mr Guerrero with you.

## 2024-12-09 ENCOUNTER — HOSPITAL ENCOUNTER (EMERGENCY)
Facility: HOSPITAL | Age: 49
Discharge: HOME | End: 2024-12-10
Attending: STUDENT IN AN ORGANIZED HEALTH CARE EDUCATION/TRAINING PROGRAM
Payer: MEDICARE

## 2024-12-09 VITALS
SYSTOLIC BLOOD PRESSURE: 116 MMHG | HEIGHT: 72 IN | TEMPERATURE: 98 F | RESPIRATION RATE: 18 BRPM | DIASTOLIC BLOOD PRESSURE: 75 MMHG | HEART RATE: 62 BPM | WEIGHT: 165 LBS | OXYGEN SATURATION: 100 % | BODY MASS INDEX: 22.35 KG/M2

## 2024-12-09 DIAGNOSIS — T83.510A URINARY TRACT INFECTION ASSOCIATED WITH CYSTOSTOMY CATHETER, INITIAL ENCOUNTER: Primary | ICD-10-CM

## 2024-12-09 DIAGNOSIS — N39.0 URINARY TRACT INFECTION ASSOCIATED WITH CYSTOSTOMY CATHETER, INITIAL ENCOUNTER: Primary | ICD-10-CM

## 2024-12-09 LAB
ALBUMIN SERPL-MCNC: 3.4 G/DL (ref 3.5–5)
ALBUMIN/GLOB SERPL: 0.9 RATIO (ref 1.1–2)
ALP SERPL-CCNC: 112 UNIT/L (ref 40–150)
ALT SERPL-CCNC: 9 UNIT/L (ref 0–55)
ANION GAP SERPL CALC-SCNC: 10 MEQ/L
AST SERPL-CCNC: 10 UNIT/L (ref 5–34)
BACTERIA #/AREA URNS AUTO: ABNORMAL /HPF
BASOPHILS # BLD AUTO: 0.09 X10(3)/MCL
BASOPHILS NFR BLD AUTO: 1 %
BILIRUB SERPL-MCNC: 0.2 MG/DL
BILIRUB UR QL STRIP.AUTO: NEGATIVE
BUN SERPL-MCNC: 12 MG/DL (ref 8.4–25.7)
CALCIUM SERPL-MCNC: 9.2 MG/DL (ref 8.4–10.2)
CHLORIDE SERPL-SCNC: 108 MMOL/L (ref 98–107)
CLARITY UR: ABNORMAL
CO2 SERPL-SCNC: 25 MMOL/L (ref 22–29)
COLOR UR AUTO: ABNORMAL
CREAT SERPL-MCNC: 0.72 MG/DL (ref 0.72–1.25)
CREAT/UREA NIT SERPL: 17
EOSINOPHIL # BLD AUTO: 0.2 X10(3)/MCL (ref 0–0.9)
EOSINOPHIL NFR BLD AUTO: 2.2 %
ERYTHROCYTE [DISTWIDTH] IN BLOOD BY AUTOMATED COUNT: 13.9 % (ref 11.5–17)
GFR SERPLBLD CREATININE-BSD FMLA CKD-EPI: >60 ML/MIN/1.73/M2
GLOBULIN SER-MCNC: 3.9 GM/DL (ref 2.4–3.5)
GLUCOSE SERPL-MCNC: 111 MG/DL (ref 74–100)
GLUCOSE UR QL STRIP: NEGATIVE
HCT VFR BLD AUTO: 35.9 % (ref 42–52)
HGB BLD-MCNC: 11.3 G/DL (ref 14–18)
HGB UR QL STRIP: ABNORMAL
IMM GRANULOCYTES # BLD AUTO: 0.02 X10(3)/MCL (ref 0–0.04)
IMM GRANULOCYTES NFR BLD AUTO: 0.2 %
KETONES UR QL STRIP: NEGATIVE
LEUKOCYTE ESTERASE UR QL STRIP: ABNORMAL
LYMPHOCYTES # BLD AUTO: 2.85 X10(3)/MCL (ref 0.6–4.6)
LYMPHOCYTES NFR BLD AUTO: 31.6 %
MCH RBC QN AUTO: 25.6 PG (ref 27–31)
MCHC RBC AUTO-ENTMCNC: 31.5 G/DL (ref 33–36)
MCV RBC AUTO: 81.2 FL (ref 80–94)
MONOCYTES # BLD AUTO: 0.57 X10(3)/MCL (ref 0.1–1.3)
MONOCYTES NFR BLD AUTO: 6.3 %
NEUTROPHILS # BLD AUTO: 5.29 X10(3)/MCL (ref 2.1–9.2)
NEUTROPHILS NFR BLD AUTO: 58.7 %
NITRITE UR QL STRIP: POSITIVE
NRBC BLD AUTO-RTO: 0 %
PH UR STRIP: 8.5 [PH]
PLATELET # BLD AUTO: 286 X10(3)/MCL (ref 130–400)
PMV BLD AUTO: 11.6 FL (ref 7.4–10.4)
POTASSIUM SERPL-SCNC: 3.3 MMOL/L (ref 3.5–5.1)
PROT SERPL-MCNC: 7.3 GM/DL (ref 6.4–8.3)
PROT UR QL STRIP: 100
RBC # BLD AUTO: 4.42 X10(6)/MCL (ref 4.7–6.1)
RBC #/AREA URNS AUTO: >100 /HPF
SODIUM SERPL-SCNC: 143 MMOL/L (ref 136–145)
SP GR UR STRIP.AUTO: 1.01 (ref 1–1.03)
SQUAMOUS #/AREA URNS AUTO: ABNORMAL /HPF
TRI-PHOS CRY UR QL COMP ASSIST: ABNORMAL
UROBILINOGEN UR STRIP-ACNC: 1
WBC # BLD AUTO: 9.02 X10(3)/MCL (ref 4.5–11.5)
WBC #/AREA URNS AUTO: ABNORMAL /HPF

## 2024-12-09 PROCEDURE — 85025 COMPLETE CBC W/AUTO DIFF WBC: CPT | Performed by: STUDENT IN AN ORGANIZED HEALTH CARE EDUCATION/TRAINING PROGRAM

## 2024-12-09 PROCEDURE — 63600175 PHARM REV CODE 636 W HCPCS: Performed by: STUDENT IN AN ORGANIZED HEALTH CARE EDUCATION/TRAINING PROGRAM

## 2024-12-09 PROCEDURE — 80053 COMPREHEN METABOLIC PANEL: CPT | Performed by: STUDENT IN AN ORGANIZED HEALTH CARE EDUCATION/TRAINING PROGRAM

## 2024-12-09 PROCEDURE — 51705 CHANGE OF BLADDER TUBE: CPT

## 2024-12-09 PROCEDURE — 81003 URINALYSIS AUTO W/O SCOPE: CPT | Performed by: STUDENT IN AN ORGANIZED HEALTH CARE EDUCATION/TRAINING PROGRAM

## 2024-12-09 PROCEDURE — 99285 EMERGENCY DEPT VISIT HI MDM: CPT | Mod: 25

## 2024-12-09 PROCEDURE — 25500020 PHARM REV CODE 255: Performed by: STUDENT IN AN ORGANIZED HEALTH CARE EDUCATION/TRAINING PROGRAM

## 2024-12-09 PROCEDURE — 96374 THER/PROPH/DIAG INJ IV PUSH: CPT

## 2024-12-09 PROCEDURE — 87077 CULTURE AEROBIC IDENTIFY: CPT | Performed by: STUDENT IN AN ORGANIZED HEALTH CARE EDUCATION/TRAINING PROGRAM

## 2024-12-09 PROCEDURE — 25000003 PHARM REV CODE 250: Performed by: STUDENT IN AN ORGANIZED HEALTH CARE EDUCATION/TRAINING PROGRAM

## 2024-12-09 RX ORDER — MORPHINE SULFATE 4 MG/ML
4 INJECTION, SOLUTION INTRAMUSCULAR; INTRAVENOUS
Status: COMPLETED | OUTPATIENT
Start: 2024-12-09 | End: 2024-12-09

## 2024-12-09 RX ORDER — OXYCODONE HYDROCHLORIDE 5 MG/1
10 TABLET ORAL
Status: COMPLETED | OUTPATIENT
Start: 2024-12-09 | End: 2024-12-09

## 2024-12-09 RX ORDER — CEFUROXIME AXETIL 500 MG/1
500 TABLET ORAL EVERY 12 HOURS
Qty: 14 TABLET | Refills: 0 | Status: SHIPPED | OUTPATIENT
Start: 2024-12-09 | End: 2024-12-16

## 2024-12-09 RX ADMIN — OXYCODONE HYDROCHLORIDE 10 MG: 5 TABLET ORAL at 09:12

## 2024-12-09 RX ADMIN — MORPHINE SULFATE 4 MG: 4 INJECTION INTRAVENOUS at 07:12

## 2024-12-09 RX ADMIN — IOHEXOL 100 ML: 350 INJECTION, SOLUTION INTRAVENOUS at 09:12
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
## 2024-12-10 NOTE — ED NOTES
Bed: 01  Expected date:   Expected time:   Means of arrival:   Comments:  49 M Hematuria / Pelvic Pain

## 2024-12-13 LAB
BACTERIA UR CULT: ABNORMAL
BACTERIA UR CULT: ABNORMAL

## 2025-01-13 DIAGNOSIS — G82.20 PARAPLEGIA: Primary | ICD-10-CM

## 2025-01-16 PROCEDURE — 87077 CULTURE AEROBIC IDENTIFY: CPT | Performed by: UROLOGY

## 2025-01-18 ENCOUNTER — LAB REQUISITION (OUTPATIENT)
Dept: LAB | Facility: HOSPITAL | Age: 50
End: 2025-01-18
Payer: MEDICARE

## 2025-01-18 DIAGNOSIS — N39.0 URINARY TRACT INFECTION, SITE NOT SPECIFIED: ICD-10-CM

## 2025-01-22 LAB — BACTERIA UR CULT: ABNORMAL

## 2025-01-25 ENCOUNTER — HOSPITAL ENCOUNTER (INPATIENT)
Facility: HOSPITAL | Age: 50
LOS: 8 days | Discharge: HOME HEALTH | DRG: 557 | End: 2025-02-03
Attending: EMERGENCY MEDICINE | Admitting: INTERNAL MEDICINE
Payer: MEDICARE

## 2025-01-25 DIAGNOSIS — N39.0 URINARY TRACT INFECTION ASSOCIATED WITH CATHETERIZATION OF URINARY TRACT, UNSPECIFIED INDWELLING URINARY CATHETER TYPE, INITIAL ENCOUNTER: Primary | ICD-10-CM

## 2025-01-25 DIAGNOSIS — T83.511A URINARY TRACT INFECTION ASSOCIATED WITH CATHETERIZATION OF URINARY TRACT, UNSPECIFIED INDWELLING URINARY CATHETER TYPE, INITIAL ENCOUNTER: Primary | ICD-10-CM

## 2025-01-25 DIAGNOSIS — L02.91 ABSCESS: ICD-10-CM

## 2025-01-25 LAB
ALBUMIN SERPL-MCNC: 3.4 G/DL (ref 3.5–5)
ALBUMIN/GLOB SERPL: 0.7 RATIO (ref 1.1–2)
ALP SERPL-CCNC: 115 UNIT/L (ref 40–150)
ALT SERPL-CCNC: <5 UNIT/L (ref 0–55)
ANION GAP SERPL CALC-SCNC: 10 MEQ/L
AST SERPL-CCNC: 6 UNIT/L (ref 5–34)
BACTERIA #/AREA URNS AUTO: ABNORMAL /HPF
BASOPHILS # BLD AUTO: 0.12 X10(3)/MCL
BASOPHILS NFR BLD AUTO: 1.1 %
BILIRUB SERPL-MCNC: 0.4 MG/DL
BILIRUB UR QL STRIP.AUTO: ABNORMAL
BUN SERPL-MCNC: 10.6 MG/DL (ref 8.4–25.7)
CALCIUM SERPL-MCNC: 9.4 MG/DL (ref 8.4–10.2)
CHLORIDE SERPL-SCNC: 107 MMOL/L (ref 98–107)
CLARITY UR: ABNORMAL
CO2 SERPL-SCNC: 23 MMOL/L (ref 22–29)
COLOR UR AUTO: YELLOW
CREAT SERPL-MCNC: 0.98 MG/DL (ref 0.72–1.25)
CREAT/UREA NIT SERPL: 11
CRP SERPL HS-MCNC: 83.7 MG/L
EOSINOPHIL # BLD AUTO: 0.12 X10(3)/MCL (ref 0–0.9)
EOSINOPHIL NFR BLD AUTO: 1.1 %
ERYTHROCYTE [DISTWIDTH] IN BLOOD BY AUTOMATED COUNT: 14.6 % (ref 11.5–17)
ERYTHROCYTE [SEDIMENTATION RATE] IN BLOOD: >130 MM/HR (ref 0–15)
FLUAV AG UPPER RESP QL IA.RAPID: NOT DETECTED
FLUBV AG UPPER RESP QL IA.RAPID: NOT DETECTED
GFR SERPLBLD CREATININE-BSD FMLA CKD-EPI: >60 ML/MIN/1.73/M2
GLOBULIN SER-MCNC: 5 GM/DL (ref 2.4–3.5)
GLUCOSE SERPL-MCNC: 134 MG/DL (ref 74–100)
GLUCOSE UR QL STRIP: NEGATIVE
HCT VFR BLD AUTO: 36.5 % (ref 42–52)
HGB BLD-MCNC: 11.5 G/DL (ref 14–18)
HGB UR QL STRIP: ABNORMAL
IMM GRANULOCYTES # BLD AUTO: 0.03 X10(3)/MCL (ref 0–0.04)
IMM GRANULOCYTES NFR BLD AUTO: 0.3 %
KETONES UR QL STRIP: 15
LACTATE SERPL-SCNC: 0.9 MMOL/L (ref 0.5–2.2)
LEUKOCYTE ESTERASE UR QL STRIP: ABNORMAL
LIPASE SERPL-CCNC: 13 U/L
LYMPHOCYTES # BLD AUTO: 2.11 X10(3)/MCL (ref 0.6–4.6)
LYMPHOCYTES NFR BLD AUTO: 19.3 %
MCH RBC QN AUTO: 24.8 PG (ref 27–31)
MCHC RBC AUTO-ENTMCNC: 31.5 G/DL (ref 33–36)
MCV RBC AUTO: 78.7 FL (ref 80–94)
MONOCYTES # BLD AUTO: 0.66 X10(3)/MCL (ref 0.1–1.3)
MONOCYTES NFR BLD AUTO: 6 %
NEUTROPHILS # BLD AUTO: 7.89 X10(3)/MCL (ref 2.1–9.2)
NEUTROPHILS NFR BLD AUTO: 72.2 %
NITRITE UR QL STRIP: POSITIVE
NRBC BLD AUTO-RTO: 0 %
PH UR STRIP: 5.5 [PH]
PLATELET # BLD AUTO: 514 X10(3)/MCL (ref 130–400)
PMV BLD AUTO: 9.6 FL (ref 7.4–10.4)
POTASSIUM SERPL-SCNC: 3.3 MMOL/L (ref 3.5–5.1)
PROT SERPL-MCNC: 8.4 GM/DL (ref 6.4–8.3)
PROT UR QL STRIP: 100
RBC # BLD AUTO: 4.64 X10(6)/MCL (ref 4.7–6.1)
RBC #/AREA URNS AUTO: ABNORMAL /HPF
RSV A 5' UTR RNA NPH QL NAA+PROBE: NOT DETECTED
SARS-COV-2 RNA RESP QL NAA+PROBE: NOT DETECTED
SODIUM SERPL-SCNC: 140 MMOL/L (ref 136–145)
SP GR UR STRIP.AUTO: >=1.03 (ref 1–1.03)
SQUAMOUS #/AREA URNS AUTO: ABNORMAL /HPF
UROBILINOGEN UR STRIP-ACNC: 2
WBC # BLD AUTO: 10.93 X10(3)/MCL (ref 4.5–11.5)
WBC #/AREA URNS AUTO: ABNORMAL /HPF

## 2025-01-25 PROCEDURE — 87040 BLOOD CULTURE FOR BACTERIA: CPT | Performed by: EMERGENCY MEDICINE

## 2025-01-25 PROCEDURE — 83690 ASSAY OF LIPASE: CPT | Performed by: EMERGENCY MEDICINE

## 2025-01-25 PROCEDURE — 87086 URINE CULTURE/COLONY COUNT: CPT | Performed by: EMERGENCY MEDICINE

## 2025-01-25 PROCEDURE — 87077 CULTURE AEROBIC IDENTIFY: CPT | Performed by: EMERGENCY MEDICINE

## 2025-01-25 PROCEDURE — 96376 TX/PRO/DX INJ SAME DRUG ADON: CPT

## 2025-01-25 PROCEDURE — 63600175 PHARM REV CODE 636 W HCPCS: Performed by: EMERGENCY MEDICINE

## 2025-01-25 PROCEDURE — 85025 COMPLETE CBC W/AUTO DIFF WBC: CPT | Performed by: EMERGENCY MEDICINE

## 2025-01-25 PROCEDURE — 81001 URINALYSIS AUTO W/SCOPE: CPT | Performed by: EMERGENCY MEDICINE

## 2025-01-25 PROCEDURE — 96375 TX/PRO/DX INJ NEW DRUG ADDON: CPT

## 2025-01-25 PROCEDURE — 80053 COMPREHEN METABOLIC PANEL: CPT | Performed by: EMERGENCY MEDICINE

## 2025-01-25 PROCEDURE — 0241U COVID/RSV/FLU A&B PCR: CPT | Performed by: EMERGENCY MEDICINE

## 2025-01-25 PROCEDURE — 85652 RBC SED RATE AUTOMATED: CPT | Performed by: EMERGENCY MEDICINE

## 2025-01-25 PROCEDURE — 96374 THER/PROPH/DIAG INJ IV PUSH: CPT

## 2025-01-25 PROCEDURE — 99285 EMERGENCY DEPT VISIT HI MDM: CPT | Mod: 25

## 2025-01-25 PROCEDURE — 86141 C-REACTIVE PROTEIN HS: CPT | Performed by: EMERGENCY MEDICINE

## 2025-01-25 PROCEDURE — 83605 ASSAY OF LACTIC ACID: CPT | Performed by: EMERGENCY MEDICINE

## 2025-01-25 RX ORDER — MORPHINE SULFATE 4 MG/ML
4 INJECTION, SOLUTION INTRAMUSCULAR; INTRAVENOUS
Status: COMPLETED | OUTPATIENT
Start: 2025-01-26 | End: 2025-01-25

## 2025-01-25 RX ORDER — MORPHINE SULFATE 4 MG/ML
4 INJECTION, SOLUTION INTRAMUSCULAR; INTRAVENOUS
Status: COMPLETED | OUTPATIENT
Start: 2025-01-25 | End: 2025-01-25

## 2025-01-25 RX ORDER — ONDANSETRON HYDROCHLORIDE 2 MG/ML
4 INJECTION, SOLUTION INTRAVENOUS
Status: COMPLETED | OUTPATIENT
Start: 2025-01-25 | End: 2025-01-25

## 2025-01-25 RX ADMIN — MORPHINE SULFATE 4 MG: 4 INJECTION INTRAVENOUS at 11:01

## 2025-01-25 RX ADMIN — ONDANSETRON 4 MG: 2 INJECTION INTRAMUSCULAR; INTRAVENOUS at 11:01

## 2025-01-26 PROBLEM — L02.91 ABSCESS: Status: ACTIVE | Noted: 2025-01-26

## 2025-01-26 PROCEDURE — 25000003 PHARM REV CODE 250: Performed by: INTERNAL MEDICINE

## 2025-01-26 PROCEDURE — 63600175 PHARM REV CODE 636 W HCPCS: Mod: JZ,TB | Performed by: INTERNAL MEDICINE

## 2025-01-26 PROCEDURE — 25500020 PHARM REV CODE 255: Performed by: EMERGENCY MEDICINE

## 2025-01-26 PROCEDURE — 11000001 HC ACUTE MED/SURG PRIVATE ROOM

## 2025-01-26 PROCEDURE — 63600175 PHARM REV CODE 636 W HCPCS: Performed by: INTERNAL MEDICINE

## 2025-01-26 PROCEDURE — 96375 TX/PRO/DX INJ NEW DRUG ADDON: CPT

## 2025-01-26 PROCEDURE — 63600175 PHARM REV CODE 636 W HCPCS: Mod: TB | Performed by: INTERNAL MEDICINE

## 2025-01-26 PROCEDURE — 25000003 PHARM REV CODE 250: Performed by: EMERGENCY MEDICINE

## 2025-01-26 PROCEDURE — 63600175 PHARM REV CODE 636 W HCPCS: Performed by: EMERGENCY MEDICINE

## 2025-01-26 RX ORDER — OXYCODONE AND ACETAMINOPHEN 10; 325 MG/1; MG/1
1 TABLET ORAL EVERY 6 HOURS PRN
Status: DISCONTINUED | OUTPATIENT
Start: 2025-01-26 | End: 2025-01-26

## 2025-01-26 RX ORDER — FLUTICASONE PROPIONATE 50 MCG
1 SPRAY, SUSPENSION (ML) NASAL 2 TIMES DAILY
Status: DISCONTINUED | OUTPATIENT
Start: 2025-01-26 | End: 2025-02-03 | Stop reason: HOSPADM

## 2025-01-26 RX ORDER — OXYCODONE AND ACETAMINOPHEN 10; 325 MG/1; MG/1
1 TABLET ORAL
Status: COMPLETED | OUTPATIENT
Start: 2025-01-26 | End: 2025-01-26

## 2025-01-26 RX ORDER — HYDRALAZINE HYDROCHLORIDE 20 MG/ML
10 INJECTION INTRAMUSCULAR; INTRAVENOUS
Status: DISCONTINUED | OUTPATIENT
Start: 2025-01-26 | End: 2025-02-03 | Stop reason: HOSPADM

## 2025-01-26 RX ORDER — MIRTAZAPINE 15 MG/1
15 TABLET, FILM COATED ORAL NIGHTLY
Status: DISCONTINUED | OUTPATIENT
Start: 2025-01-26 | End: 2025-02-03 | Stop reason: HOSPADM

## 2025-01-26 RX ORDER — HYDROXYZINE PAMOATE 25 MG/1
25 CAPSULE ORAL 3 TIMES DAILY
Status: DISCONTINUED | OUTPATIENT
Start: 2025-01-26 | End: 2025-02-03 | Stop reason: HOSPADM

## 2025-01-26 RX ORDER — OXYCODONE AND ACETAMINOPHEN 10; 325 MG/1; MG/1
1 TABLET ORAL EVERY 6 HOURS PRN
Status: DISCONTINUED | OUTPATIENT
Start: 2025-01-26 | End: 2025-02-03 | Stop reason: HOSPADM

## 2025-01-26 RX ORDER — DOCUSATE SODIUM 100 MG/1
100 CAPSULE, LIQUID FILLED ORAL 2 TIMES DAILY PRN
Status: DISCONTINUED | OUTPATIENT
Start: 2025-01-26 | End: 2025-02-03 | Stop reason: HOSPADM

## 2025-01-26 RX ORDER — LABETALOL HYDROCHLORIDE 5 MG/ML
10 INJECTION, SOLUTION INTRAVENOUS EVERY 4 HOURS PRN
Status: DISCONTINUED | OUTPATIENT
Start: 2025-01-26 | End: 2025-02-03 | Stop reason: HOSPADM

## 2025-01-26 RX ORDER — BACLOFEN 10 MG/1
20 TABLET ORAL 3 TIMES DAILY
Status: DISCONTINUED | OUTPATIENT
Start: 2025-01-26 | End: 2025-01-30

## 2025-01-26 RX ORDER — OXYBUTYNIN CHLORIDE 5 MG/1
10 TABLET, EXTENDED RELEASE ORAL EVERY MORNING
Status: DISCONTINUED | OUTPATIENT
Start: 2025-01-27 | End: 2025-01-31

## 2025-01-26 RX ORDER — SERTRALINE HYDROCHLORIDE 50 MG/1
50 TABLET, FILM COATED ORAL DAILY
Status: DISCONTINUED | OUTPATIENT
Start: 2025-01-26 | End: 2025-02-03 | Stop reason: HOSPADM

## 2025-01-26 RX ORDER — ONDANSETRON HYDROCHLORIDE 2 MG/ML
4 INJECTION, SOLUTION INTRAVENOUS EVERY 4 HOURS PRN
Status: DISCONTINUED | OUTPATIENT
Start: 2025-01-26 | End: 2025-02-03 | Stop reason: HOSPADM

## 2025-01-26 RX ORDER — CYCLOBENZAPRINE HCL 10 MG
10 TABLET ORAL 2 TIMES DAILY PRN
Status: DISCONTINUED | OUTPATIENT
Start: 2025-01-26 | End: 2025-02-03 | Stop reason: HOSPADM

## 2025-01-26 RX ORDER — HYDROCODONE BITARTRATE AND ACETAMINOPHEN 5; 325 MG/1; MG/1
1 TABLET ORAL EVERY 6 HOURS PRN
Status: DISCONTINUED | OUTPATIENT
Start: 2025-01-26 | End: 2025-02-03 | Stop reason: HOSPADM

## 2025-01-26 RX ORDER — HYDROMORPHONE HYDROCHLORIDE 2 MG/ML
1 INJECTION, SOLUTION INTRAMUSCULAR; INTRAVENOUS; SUBCUTANEOUS
Status: COMPLETED | OUTPATIENT
Start: 2025-01-26 | End: 2025-01-26

## 2025-01-26 RX ORDER — BACLOFEN 10 MG/1
20 TABLET ORAL 3 TIMES DAILY
Status: DISCONTINUED | OUTPATIENT
Start: 2025-01-26 | End: 2025-01-26

## 2025-01-26 RX ORDER — AMLODIPINE BESYLATE 5 MG/1
5 TABLET ORAL DAILY
Status: DISCONTINUED | OUTPATIENT
Start: 2025-01-26 | End: 2025-02-03 | Stop reason: HOSPADM

## 2025-01-26 RX ORDER — HYDROMORPHONE HYDROCHLORIDE 2 MG/ML
1 INJECTION, SOLUTION INTRAMUSCULAR; INTRAVENOUS; SUBCUTANEOUS EVERY 4 HOURS PRN
Status: DISCONTINUED | OUTPATIENT
Start: 2025-01-26 | End: 2025-02-03 | Stop reason: HOSPADM

## 2025-01-26 RX ORDER — PANTOPRAZOLE SODIUM 40 MG/10ML
40 INJECTION, POWDER, LYOPHILIZED, FOR SOLUTION INTRAVENOUS DAILY
Status: DISCONTINUED | OUTPATIENT
Start: 2025-01-26 | End: 2025-01-30

## 2025-01-26 RX ORDER — ACETAMINOPHEN 325 MG/1
325 TABLET ORAL EVERY 4 HOURS PRN
Status: DISCONTINUED | OUTPATIENT
Start: 2025-01-26 | End: 2025-02-03 | Stop reason: HOSPADM

## 2025-01-26 RX ORDER — TRAZODONE HYDROCHLORIDE 50 MG/1
50 TABLET ORAL NIGHTLY PRN
Status: DISCONTINUED | OUTPATIENT
Start: 2025-01-26 | End: 2025-02-03 | Stop reason: HOSPADM

## 2025-01-26 RX ORDER — CEFEPIME HYDROCHLORIDE 2 G/1
2 INJECTION, POWDER, FOR SOLUTION INTRAVENOUS
Status: DISCONTINUED | OUTPATIENT
Start: 2025-01-26 | End: 2025-01-29

## 2025-01-26 RX ORDER — GABAPENTIN 400 MG/1
800 CAPSULE ORAL 3 TIMES DAILY
Status: DISCONTINUED | OUTPATIENT
Start: 2025-01-26 | End: 2025-01-26

## 2025-01-26 RX ADMIN — BACLOFEN 20 MG: 10 TABLET ORAL at 07:01

## 2025-01-26 RX ADMIN — VANCOMYCIN HYDROCHLORIDE 750 MG: 750 INJECTION, POWDER, LYOPHILIZED, FOR SOLUTION INTRAVENOUS at 05:01

## 2025-01-26 RX ADMIN — IOHEXOL 100 ML: 350 INJECTION, SOLUTION INTRAVENOUS at 12:01

## 2025-01-26 RX ADMIN — SERTRALINE HYDROCHLORIDE 50 MG: 50 TABLET ORAL at 03:01

## 2025-01-26 RX ADMIN — CEFEPIME 2 G: 2 INJECTION, POWDER, FOR SOLUTION INTRAVENOUS at 02:01

## 2025-01-26 RX ADMIN — GABAPENTIN 800 MG: 300 CAPSULE ORAL at 02:01

## 2025-01-26 RX ADMIN — HYDROCODONE BITARTRATE AND ACETAMINOPHEN 1 TABLET: 5; 325 TABLET ORAL at 03:01

## 2025-01-26 RX ADMIN — HYDROXYZINE PAMOATE 25 MG: 25 CAPSULE ORAL at 03:01

## 2025-01-26 RX ADMIN — PANTOPRAZOLE SODIUM 40 MG: 40 INJECTION, POWDER, LYOPHILIZED, FOR SOLUTION INTRAVENOUS at 01:01

## 2025-01-26 RX ADMIN — OXYCODONE HYDROCHLORIDE AND ACETAMINOPHEN 1 TABLET: 10; 325 TABLET ORAL at 02:01

## 2025-01-26 RX ADMIN — OXYCODONE AND ACETAMINOPHEN 1 TABLET: 10; 325 TABLET ORAL at 09:01

## 2025-01-26 RX ADMIN — HYDROMORPHONE HYDROCHLORIDE 1 MG: 2 INJECTION, SOLUTION INTRAMUSCULAR; INTRAVENOUS; SUBCUTANEOUS at 09:01

## 2025-01-26 RX ADMIN — HYDROMORPHONE HYDROCHLORIDE 1 MG: 2 INJECTION INTRAMUSCULAR; INTRAVENOUS; SUBCUTANEOUS at 01:01

## 2025-01-26 RX ADMIN — HYDROMORPHONE HYDROCHLORIDE 1 MG: 2 INJECTION, SOLUTION INTRAMUSCULAR; INTRAVENOUS; SUBCUTANEOUS at 04:01

## 2025-01-26 RX ADMIN — ONDANSETRON 4 MG: 2 INJECTION INTRAMUSCULAR; INTRAVENOUS at 04:01

## 2025-01-26 RX ADMIN — HYDROMORPHONE HYDROCHLORIDE 1 MG: 2 INJECTION, SOLUTION INTRAMUSCULAR; INTRAVENOUS; SUBCUTANEOUS at 12:01

## 2025-01-26 RX ADMIN — BACLOFEN 20 MG: 10 TABLET ORAL at 09:01

## 2025-01-26 RX ADMIN — BACLOFEN 20 MG: 10 TABLET ORAL at 02:01

## 2025-01-26 RX ADMIN — VANCOMYCIN HYDROCHLORIDE 1000 MG: 1 INJECTION, POWDER, LYOPHILIZED, FOR SOLUTION INTRAVENOUS at 03:01

## 2025-01-26 RX ADMIN — GABAPENTIN 800 MG: 300 CAPSULE ORAL at 09:01

## 2025-01-26 RX ADMIN — AMLODIPINE BESYLATE 5 MG: 5 TABLET ORAL at 03:01

## 2025-01-26 RX ADMIN — CEFEPIME 2 G: 2 INJECTION, POWDER, FOR SOLUTION INTRAVENOUS at 09:01

## 2025-01-26 RX ADMIN — CYCLOBENZAPRINE 10 MG: 10 TABLET, FILM COATED ORAL at 04:01

## 2025-01-26 RX ADMIN — GABAPENTIN 800 MG: 300 CAPSULE ORAL at 07:01

## 2025-01-26 RX ADMIN — OXYCODONE AND ACETAMINOPHEN 1 TABLET: 10; 325 TABLET ORAL at 07:01

## 2025-01-26 NOTE — CONSULTS
Inpatient Nutrition Assessment    Admit Date: 2025   Total duration of encounter: 1 day   Patient Age: 49 y.o.    Nutrition Recommendation/Prescription     NPO at this time. Rec'd adv diet once medically feasible. Goal diet: Regular/low residue.  Add Brandon TID once diet is advanced (provides 90 kcal and 2.5 g protein per serving),  Rec'd add Boost VHC TID once diet is advanced (provides 530 kcal and 22 g protein per serving).  Rec'd consider vitamin/mineral regimen for wound healin MVI daily   10,000 IU Vitamin A daily   220 mg Zinc sulfate daily   500 mg Ascorbic Acid BID  Will continue to monitor wt, labs, and NPO status.    Communication of Recommendations:  EMR    Nutrition Assessment     Malnutrition Assessment/Nutrition-Focused Physical Exam       Malnutrition Level: other (see comments) (Unable to assess) (25 111)  Energy Intake (Malnutrition): other (see comments) (Unable to assess) (25 111)  Weight Loss (Malnutrition): other (see comments) (Does not meet criteria) (25 111)                                         Fluid Accumulation (Malnutrition): other (see comments) (Unable to assess) (25 111)     Hand  Strength, Right (Malnutrition): Unable to assess (25 111)  A minimum of two characteristics is recommended for diagnosis of either severe or non-severe malnutrition.    Chart Review    Reason Seen: physician consult for wounds    Malnutrition Screening Tool Results              Diagnosis:  Cellulitis 2022      Pressure injury of left buttock, stage 4      Relevant Medical History: chronic UTI, paraplegia    Scheduled Medications:  baclofen, 20 mg, TID  ceFEPime IV (PEDS and ADULTS), 2 g, Q8H  gabapentin, 800 mg, TID  vancomycin (VANCOCIN) 1,000 mg in D5W 250 mL IVPB (admixture device), 1,000 mg, Q12H    Continuous Infusions:   PRN Medications:  oxyCODONE-acetaminophen, 1 tablet, Q6H PRN    Calorie Containing IV Medications: no significant kcals from  medications at this time    Recent Labs   Lab 01/25/25  2243      K 3.3*   CALCIUM 9.4      CO2 23   BUN 10.6   CREATININE 0.98   EGFRNORACEVR >60   GLUCOSE 134*   BILITOT 0.4   ALKPHOS 115   ALT <5   AST 6   ALBUMIN 3.4*   HSCRP 83.70*   LIPASE 13   WBC 10.93   HGB 11.5*   HCT 36.5*     Nutrition Orders:  Diet NPO      Appetite/Oral Intake: NPO/NPO  Factors Affecting Nutritional Intake: abdominal pain and NPO  Social Needs Impacting Access to Food: unable to assess at this time; will attempt on follow-up  Food/Cheondoism/Cultural Preferences: unable to obtain  Food Allergies: no known food allergies     Wound(s):     Altered Skin Integrity 01/06/24 1640 Sacral spine #1 Pressure Injury-Tissue loss description: Not applicable       Altered Skin Integrity 03/31/24 0400 Left posterior Greater trochanter #3 Non pressure chronic ulcer Full thickness tissue loss. Subcutaneous fat may be visible but bone, tendon or muscle are not exposed-Tissue loss description: Partial thickness     Comments    1/26/25: Pt NPO at this time 2/2 abd pain. Rec'd adv diet once medically feasible. Rec'd Brandon and Boost TID once diet adv, as well as vitamin and mineral regimen for wound healing. Wt appears stable over last year per EMR, will monitor and trend wts. Labs and meds reviewed.     Anthropometrics    Height: 6' (182.9 cm), Height Method: Stated  Last Weight: 72.6 kg (160 lb) (01/25/25 2113), Weight Method: Stated  BMI (Calculated): 21.7  BMI Classification: normal (BMI 18.5-24.9)        Ideal Body Weight (IBW), Male: 178 lb     % Ideal Body Weight, Male (lb): 89.89 %                          Usual Weight Provided By: EMR weight history    Wt Readings from Last 5 Encounters:   01/25/25 72.6 kg (160 lb)   12/09/24 74.8 kg (165 lb)   09/27/24 69.4 kg (153 lb)   07/03/24 69.4 kg (153 lb)   03/19/24 69.5 kg (153 lb 4.8 oz)     Weight Change(s) Since Admission: new admit  Wt Readings from Last 1 Encounters:   01/25/25 2113 72.6  kg (160 lb)   Admit Weight: 72.6 kg (160 lb) (01/25/25 2113), Weight Method: Stated    Estimated Needs    Weight Used For Calorie Calculations: 72.6 kg (160 lb 0.9 oz)  Energy Calorie Requirements (kcal): 1357-1355 kcal (1.2-1.3 SFxMSJ)  Energy Need Method: [place]-St Jeor  Weight Used For Protein Calculations: 72.6 kg (160 lb 0.9 oz)  Protein Requirements: 109-145 g (1.5-2.0 g/kg)  Fluid Requirements (mL): 2178 mL/d (30 mL/kg)        Enteral Nutrition     Patient not receiving enteral nutrition at this time.    Parenteral Nutrition     Patient not receiving parenteral nutrition support at this time.    Evaluation of Received Nutrient Intake    Calories: not meeting estimated needs  Protein: not meeting estimated needs    Patient Education     Not applicable.    Nutrition Diagnosis     PES: Inadequate oral intake related to inability to consume sufficient nutrients as evidenced by NPO. (new)     PES:            Nutrition Interventions     Intervention(s): general/healthful diet, commercial beverage, multivitamin/mineral supplement therapy, and collaboration with other providers  Intervention(s): Oral diet/nutrient modifications;Oral nutritional supplement    Goal: Meet greater than 80% of nutritional needs by follow-up. (new)  Goal: Consume % of oral supplements by follow-up. (new)    Nutrition Goals & Monitoring     Dietitian will monitor: energy intake, weight, electrolyte/renal panel, glucose/endocrine profile, and gastrointestinal profile  Discharge planning: resume home regimen  Nutrition Risk/Follow-Up: moderate (follow-up in 3-5 days)   Please consult if re-assessment needed sooner.
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
## 2025-01-26 NOTE — H&P
[facility] - Emergency Dept    History & Physical      Patient Name: Hemal Guerrero  MRN: [id number]  Admission Date: 1/25/2025  Attending Physician: Yosvany Simms MD   Primary Care Provider: Margaret Leblanc MD         Patient information was obtained from ER records.     Subjective:     Principal Problem:Abscess    Chief Complaint:   Chief Complaint   Patient presents with    Abdominal Pain     Pt to er with abd pain x 4 days        HPI: 49 y.o. male who  has a past medical history of Chronic UTI and Paraplegia. And chronic sacral decubitus ulcer and left ischial ulcer and repeated prolonged hospital and ltac stays for ostemyelitis and complicated uti presented to the ed with complaints of worsening right thigh pain with fevers and worsening nausea and vomiting and subsequent work up and imaging suggestive of complicated uti and     Past Medical History:   Diagnosis Date    Chronic UTI     Paraplegia        Past Surgical History:   Procedure Laterality Date    INSERTION OF SUPRAPUBIC CATHETER      LAPAROTOMY         Review of patient's allergies indicates:   Allergen Reactions    Adhesive     Amitriptyline        No current facility-administered medications on file prior to encounter.     Current Outpatient Medications on File Prior to Encounter   Medication Sig    baclofen (LIORESAL) 20 MG tablet Take 20 mg by mouth 3 (three) times daily.    cyclobenzaprine (FLEXERIL) 10 MG tablet Take 10 mg by mouth 2 (two) times daily as needed.    docusate sodium (COLACE) 250 MG capsule Take 250 mg by mouth daily as needed.    ELIQUIS 5 mg Tab Take 5 mg by mouth 2 (two) times daily.    gabapentin (NEURONTIN) 800 MG tablet Take 800 mg by mouth 3 (three) times daily.    oxyCODONE-acetaminophen (PERCOCET)  mg per tablet Take 1 tablet by mouth 2 (two) times daily as needed.    0.9% NaCl PgBk 100 mL with meropenem 1 gram SolR 1 g Inject 1 g into the vein every 8 (eight) hours.    0.9% NaCl PgBk  100 mL with micafungin 100 mg SolR 100 mg Inject 100 mg into the vein once daily.    amLODIPine (NORVASC) 5 MG tablet Take 1 tablet (5 mg total) by mouth once daily.    erythromycin (ROMYCIN) ophthalmic ointment Place a 1/2 inch ribbon of ointment into the lower eyelid.    FEROSUL 325 mg (65 mg iron) Tab tablet Take 1 tablet by mouth every morning.    fluticasone propionate (FLONASE) 50 mcg/actuation nasal spray 1 spray by Each Nostril route 2 (two) times daily.    HYDROcodone-acetaminophen (NORCO) 5-325 mg per tablet Take 1 tablet by mouth every 6 (six) hours as needed for Pain.    hydrOXYzine pamoate (VISTARIL) 25 MG Cap Take 25 mg by mouth 3 (three) times daily.    mirtazapine (REMERON) 15 MG tablet Take 1 tablet (15 mg total) by mouth nightly.    oxybutynin (DITROPAN-XL) 10 MG 24 hr tablet Take 1 tablet by mouth every morning.    sertraline (ZOLOFT) 50 MG tablet Take 1 tablet (50 mg total) by mouth once daily.    [DISCONTINUED] vancomycin HCl (VANCOMYCIN 750 MG/250 ML D5W, READY TO MIX,) Inject 250 mLs (750 mg total) into the vein every 8 (eight) hours.     Family History       Problem Relation (Age of Onset)    Lymphoma Mother    Rheum arthritis Mother          Tobacco Use    Smoking status: Never    Smokeless tobacco: Never   Substance and Sexual Activity    Alcohol use: Not Currently    Drug use: Yes     Types: Marijuana    Sexual activity: Not on file     Review of Systems   Constitutional: Negative.    HENT: Negative.     Eyes: Negative.    Respiratory: Negative.     Cardiovascular: Negative.    Gastrointestinal:  Positive for nausea and vomiting.   Endocrine: Negative.    Genitourinary: Negative.    Musculoskeletal:  Positive for arthralgias, back pain, gait problem and myalgias.   Skin: Negative.    Allergic/Immunologic: Negative.  Food allergies: manthena.   Neurological:  Positive for weakness.   Hematological: Negative.    Psychiatric/Behavioral: Negative.       Objective:     Vital Signs (Most  Recent):  Temp: 98.1 °F (36.7 °C) (01/26/25 1200)  Pulse: 75 (01/26/25 1200)  Resp: 18 (01/26/25 1250)  BP: 124/73 (01/26/25 1200)  SpO2: 98 % (01/26/25 1200) Vital Signs (24h Range):  Temp:  [98.1 °F (36.7 °C)-98.6 °F (37 °C)] 98.1 °F (36.7 °C)  Pulse:  [68-96] 75  Resp:  [16-20] 18  SpO2:  [98 %-100 %] 98 %  BP: ()/(63-92) 124/73     Weight: 72.6 kg (160 lb)  Body mass index is 21.7 kg/m².    Physical Exam  Vitals reviewed.   Constitutional:       Appearance: Normal appearance.   HENT:      Head: Normocephalic and atraumatic.      Right Ear: Tympanic membrane and external ear normal.      Nose: Nose normal.      Mouth/Throat:      Mouth: Mucous membranes are moist.   Eyes:      Extraocular Movements: Extraocular movements intact.      Pupils: Pupils are equal, round, and reactive to light.   Cardiovascular:      Rate and Rhythm: Normal rate and regular rhythm.      Pulses: Normal pulses.      Heart sounds: Normal heart sounds.   Pulmonary:      Effort: Pulmonary effort is normal.      Breath sounds: Normal breath sounds.   Abdominal:      General: Abdomen is flat. Bowel sounds are normal.      Palpations: Abdomen is soft.   Musculoskeletal:         General: Swelling, tenderness and deformity present. Normal range of motion.      Cervical back: Normal range of motion and neck supple.   Skin:     General: Skin is warm and dry.   Neurological:      General: No focal deficit present.      Mental Status: He is alert and oriented to person, place, and time.      Motor: Weakness present.   Psychiatric:         Mood and Affect: Mood normal.         Behavior: Behavior normal.           CRANIAL NERVES     CN III, IV, VI   Pupils are equal, round, and reactive to light.      Significant Labs: All pertinent labs within the past 24 hours have been reviewed.  Lab Results   Component Value Date    WBC 10.93 01/25/2025    HGB 11.5 (L) 01/25/2025    HCT 36.5 (L) 01/25/2025    MCV 78.7 (L) 01/25/2025     (H) 01/25/2025          Recent Labs   Lab 01/25/25  2243      K 3.3*      CO2 23   BUN 10.6   CREATININE 0.98   GLUCOSE 134*   CALCIUM 9.4       Significant Imaging: I have reviewed all pertinent imaging results/findings within the past 24 hours.    Assessment/Plan:     Active Diagnoses:    Diagnosis Date Noted POA    PRINCIPAL PROBLEM:  Abscess [L02.91] 01/26/2025 Yes    Pressure injury of left buttock, stage 4 [L89.324] 01/23/2023 Yes      Problems Resolved During this Admission:     VTE Risk Mitigation (From admission, onward)      None          Sepsis  Right thigh abscess  Complicated uti  Chronic osteomyelitis of inferior pubic rami  Sacral decubitis ulcer  Paraplegic  Neurogenic bladder w chronic indwelling vogt   Uti-poa  Deconditioning   PAF  Hx of R foot osteomyelitis  Htn  Iron def  Anxiety/depression     Plan  Cont iv abx  Symptomatic management  Follow cx  Consult ID  Consult wound care   F/u on cx results  Uti- poa, f/u on cx   Resume home meds   Labs in the am  Gi and dvt ppx  Full code    Yosvany Simms MD  Department of Hospital Medicine   [facility] - Emergency Dept

## 2025-01-26 NOTE — PROGRESS NOTES
"Pharmacokinetic Initial Assessment: IV Vancomycin    Assessment/Plan:    Initiate intravenous vancomycin with loading dose of 1750 mg once followed by a maintenance dose of vancomycin 1000 mg IV every 12 hours  Desired empiric serum trough concentration is 15 to 20 mcg/mL  Draw vancomycin trough level 60 min prior to fourth dose on 01/27 at approximately 1500  Pharmacy will continue to follow and monitor vancomycin.      Please contact pharmacy at extension [phone] with any questions regarding this assessment.     Thank you for the consult,   Jaciel Hernandezlly       Patient brief summary:  Hemal Guerrero is a 49 y.o. male initiated on antimicrobial therapy with IV Vancomycin for treatment of suspected skin & soft tissue infection    Drug Allergies:   Review of patient's allergies indicates:   Allergen Reactions    Adhesive     Amitriptyline        Actual Body Weight:   72.6kg    Renal Function:   Estimated Creatinine Clearance: 92.6 mL/min (based on SCr of 0.98 mg/dL).,     Dialysis Method (if applicable):  N/A    CBC (last 72 hours):  Recent Labs   Lab Result Units 01/25/25  2243   WBC x10(3)/mcL 10.93   Hgb g/dL 11.5*   Hct % 36.5*   Platelet x10(3)/mcL 514*   Mono % % 6.0   Eos % % 1.1   Basophil % % 1.1       Metabolic Panel (last 72 hours):  Recent Labs   Lab Result Units 01/25/25  2243 01/25/25  2258   Sodium mmol/L 140  --    Potassium mmol/L 3.3*  --    Chloride mmol/L 107  --    CO2 mmol/L 23  --    Glucose mg/dL 134*  --    Glucose, UA   --  Negative   Blood Urea Nitrogen mg/dL 10.6  --    Creatinine mg/dL 0.98  --    Albumin g/dL 3.4*  --    Bilirubin Total mg/dL 0.4  --    ALP unit/L 115  --    AST unit/L 6  --    ALT unit/L <5  --        Drug levels (last 3 results):  No results for input(s): "VANCOMYCINRA", "VANCORANDOM", "VANCOMYCINPE", "VANCOPEAK", "VANCOMYCINTR", "VANCOTROUGH" in the last 72 hours.    Microbiologic Results:  Microbiology Results (last 7 days)       Procedure Component Value Units Date/Time "    Urine culture [[id number]] Collected: 01/25/25 2258    Order Status: Sent Specimen: Urine Updated: 01/25/25 2304    Blood Culture #1 **CANNOT BE ORDERED STAT** [[id number]] Collected: 01/25/25 2243    Order Status: Resulted Specimen: Blood from Hand, Right Updated: 01/25/25 2243    Blood Culture #2 **CANNOT BE ORDERED STAT** [[id number]] Collected: 01/25/25 2243    Order Status: Resulted Specimen: Blood from Hand, Left Updated: 01/25/25 2243

## 2025-01-26 NOTE — ED NOTES
Pt noted to have soft foam dressing on left heel. Pt states to prevent breakdown denies wound. Pt did not want to remove

## 2025-01-26 NOTE — ED PROVIDER NOTES
Encounter Date: 1/25/2025       History     Chief Complaint   Patient presents with    Abdominal Pain     Pt to er with abd pain x 4 days     Patient is a 49-year-old male presenting with nausea, vomiting, diffuse abdominal pain, diffuse pelvic pain, and acute exacerbation of chronic left lower extremity pain.  He is a paraplegic and also has a chronic left hip pressure wound which is being followed by wound care.  He lives with his sons and home health will come do wound care.  He was told approximately 5 days ago that there was a concern  osteomyelitis but he did not follow up to get a CT.  Since that time he has developed these new symptoms with the generalized body aches, fatigue, nausea, vomiting, and increasing pain.  He denies any fevers, or chest pain.  He has had some shortness of breath. He has been on ciprofloxacin since approx 1/13 and has 1 dose left.              Review of patient's allergies indicates:   Allergen Reactions    Adhesive     Amitriptyline      Past Medical History:   Diagnosis Date    Chronic UTI     Paraplegia      Past Surgical History:   Procedure Laterality Date    INSERTION OF SUPRAPUBIC CATHETER      LAPAROTOMY       Family History   Problem Relation Name Age of Onset    Lymphoma Mother      Rheum arthritis Mother       Social History     Tobacco Use    Smoking status: Never    Smokeless tobacco: Never   Substance Use Topics    Alcohol use: Not Currently    Drug use: Yes     Types: Marijuana     Review of Systems   All other systems reviewed and are negative.      Physical Exam     Initial Vitals [01/25/25 2113]   BP Pulse Resp Temp SpO2   (!) 140/92 96 20 98.3 °F (36.8 °C) 99 %      MAP       --         Physical Exam    Nursing note and vitals reviewed.  Constitutional: He appears well-developed and well-nourished. He is not diaphoretic. No distress.   HENT:   Head: Normocephalic and atraumatic.   Neck: Neck supple.   Cardiovascular:  Normal rate, regular rhythm and normal heart  sounds.           Pulmonary/Chest: Breath sounds normal. No respiratory distress. He has no wheezes. He has no rhonchi.   Abdominal: Abdomen is soft. Bowel sounds are normal. He exhibits no distension. There is abdominal tenderness. There is guarding. There is no rebound.   Genitourinary:    Penis normal.      Genitourinary Comments: Normal appearing testicles. Suprapubic catheter in place and without any erythema or drainage     Musculoskeletal:         General: No edema. Normal range of motion.      Cervical back: Neck supple.     Neurological: He is alert and oriented to person, place, and time.   Skin: Skin is warm and dry. No rash and no abscess noted.   No overlying erythema of the abdomen, groin or legs bilaterally   Psychiatric: He has a normal mood and affect. Thought content normal.         ED Course   Procedures  Labs Reviewed   CULTURE, URINE - Abnormal       Result Value    Urine Culture >/= 100,000 colonies/ml Klebsiella pneumoniae (*)    COMPREHENSIVE METABOLIC PANEL - Abnormal    Sodium 140      Potassium 3.3 (*)     Chloride 107      CO2 23      Glucose 134 (*)     Blood Urea Nitrogen 10.6      Creatinine 0.98      Calcium 9.4      Protein Total 8.4 (*)     Albumin 3.4 (*)     Globulin 5.0 (*)     Albumin/Globulin Ratio 0.7 (*)     Bilirubin Total 0.4            ALT <5      AST 6      eGFR >60      Anion Gap 10.0      BUN/Creatinine Ratio 11     URINALYSIS, REFLEX TO URINE CULTURE - Abnormal    Color, UA Yellow      Appearance, UA Cloudy (*)     Specific Gravity, UA >=1.030      pH, UA 5.5      Protein,  (*)     Glucose, UA Negative      Ketones, UA 15 (*)     Blood, UA Small (*)     Bilirubin, UA Moderate (*)     Urobilinogen, UA 2.0 (*)     Nitrites, UA Positive (*)     Leukocyte Esterase, UA Moderate (*)    HIGH SENSITIVITY CRP - Abnormal    C-Reactive Protein High Sensitivity 83.70 (*)    SEDIMENTATION RATE, AUTOMATED - Abnormal    Sed Rate >130 (*)    CBC WITH DIFFERENTIAL -  Abnormal    WBC 10.93      RBC 4.64 (*)     Hgb 11.5 (*)     Hct 36.5 (*)     MCV 78.7 (*)     MCH 24.8 (*)     MCHC 31.5 (*)     RDW 14.6      Platelet 514 (*)     MPV 9.6      Neut % 72.2      Lymph % 19.3      Mono % 6.0      Eos % 1.1      Basophil % 1.1      Imm Grans % 0.3      Neut # 7.89      Lymph # 2.11      Mono # 0.66      Eos # 0.12      Baso # 0.12      Imm Gran # 0.03      NRBC% 0.0     URINALYSIS, MICROSCOPIC - Abnormal    Bacteria, UA Moderate (*)     RBC, UA 0-2      WBC, UA 51-99 (*)     Squamous Epithelial Cells, UA Rare      Narrative:     Urine microscopic results may be inaccurate, <3cc sample submitted. Microscopic performed on unspun urine   LIPASE - Normal    Lipase Level 13     COVID/RSV/FLU A&B PCR - Normal    Influenza A PCR Not Detected      Influenza B PCR Not Detected      Respiratory Syncytial Virus PCR Not Detected      SARS-CoV-2 PCR Not Detected      Narrative:     The Xpert Xpress SARS-CoV-2/FLU/RSV plus is a rapid, multiplexed real-time PCR test intended for the simultaneous qualitative detection and differentiation of SARS-CoV-2, Influenza A, Influenza B, and respiratory syncytial virus (RSV) viral RNA in either nasopharyngeal swab or nasal swab specimens.         LACTIC ACID, PLASMA - Normal    Lactic Acid Level 0.9     CBC W/ AUTO DIFFERENTIAL    Narrative:     The following orders were created for panel order CBC W/ AUTO DIFFERENTIAL.  Procedure                               Abnormality         Status                     ---------                               -----------         ------                     CBC with Differential[[id number]]       Abnormal            Final result                 Please view results for these tests on the individual orders.          Imaging Results              CT Abdomen Pelvis With IV Contrast NO Oral Contrast (Final result)  Result time 01/26/25 08:09:17      Final result by Maxwell Lopez MD (01/26/25 08:09:17)                    Impression:      1. Moderate to large volume colonic stool compatible with constipation.  2. Interval development of peripherally enhancing fluid collection anterior right thigh extending to the anterior aspect of the proximal right femur suspicious for abscess.  3. Mildly enlarged bilateral inguinal lymph nodes likely reactive.  4. Additional findings as above.  5. [facility] concordance.      Electronically signed by: Maxwell Lopez MD  Date:    01/26/2025  Time:    08:09               Narrative:    EXAMINATION:  CT ABDOMEN PELVIS WITH IV CONTRAST    CLINICAL HISTORY:  Diffuse abdominal pain.  Chronic sacral decubitus ulcer.    TECHNIQUE:  Axial CT images of the abdomen and pelvis were obtained with intravenous contrast.  Coronal and sagittal reformations were obtained. Automated exposure control was utilized. Total exam DLP is 288 mGy cm.    COMPARISON:  12/09/2024    FINDINGS:  There is mild dependent atelectasis at the lung bases.  The liver, gallbladder, pancreas, spleen, and adrenal glands appear unremarkable.  Kidneys demonstrate no hydronephrosis or obstructing calculi.  Nonobstructing left renal calculi are present.  There is moderate to large volume colonic stool present suggestive of constipation.  There is no bowel obstruction.  There is no evidence of acute appendicitis.  There is suprapubic catheter within a decompressed urinary bladder.  The abdominal aorta is not aneurysmal.  There is no intraperitoneal free air or free fluid.  There is similar decubitus ulcer overlying the left ischial tuberosity.  Similar sclerotic changes within the underlying bone compatible with chronic osteomyelitis.  No underlying fluid collection.  There is interval anterior right thigh fat stranding with development of an approximately 4.6 x 3.4 cm peripherally enhancing fluid collection suspicious for abscess extending near the anterior aspect of the proximal right femur.  There are prominent bilateral inguinal lymph nodes  likely reactive.                        Preliminary result by Arnav Rosa MD (01/26/25 01:01:28)                   Impression:    1. There is interval development of mild fat stranding densities in the anteromedial aspect of the right proximal thigh and inguinal regions. There is a focal hypodensity measuring 3.4 x 4.6 cm with subtle rim enhancement (Series 2, Image 98). This is consistent with cellulitis with an associated abscess. The right inguinal lymph nodes are prominent which are likely reactive. There may be a phlegmonous component anterior to the femoral head on image 84 series 2 measuring approximately 2.5 cm in diameter. Recommend additional evaluation and follow-up as indicated.  2. No acute intraabdominal or pelvic solid organ or bowel pathology identified. Details and other findings as discussed above.               Narrative:    START OF REPORT:  Technique: CT of the abdomen and pelvis was performed with axial images as well as sagittal and coronal reconstruction images without intravenous contrast.    Comparison: Comparison is with study dated 2024-12-09 21:04:16.    Clinical History: Diffuse abdominal pain; also has chronic sacral wound to the left buttock scanned all the way through the buttocks per ordering physician Dr. James.    Dosage Information: Automated Exposure Control was utilized 288.15 mGy.cm.    Findings:  Lines and Tubes: None.  Thorax:  Lungs: The visualized lung bases appear unremarkable.  Pleura: No effusions or thickening are seen.  Heart: The heart size is within normal limits.  Abdomen:  Abdominal Wall: No abdominal wall pathology is seen.  Liver: The liver appears unremarkable.  Biliary System: No intrahepatic or extrahepatic biliary duct dilatation is seen.  Gallbladder: The gallbladder is non-distended and appears otherwise unremarkable.  Pancreas: The pancreas appears unremarkable.  Spleen: The spleen appears unremarkable.  Adrenals: The adrenal glands appear  unremarkable.  Kidneys: The kidneys appear unremarkable with no stones cysts masses or hydronephrosis with IV contrast decreasing sensitivity and specificity for stones.  Aorta: The visualized abdominal aorta appears unremarkable. Unremarkable abdominal aorta without specific evidence of aneurysm or dissection.  IVC: Unremarkable.  Bowel:  Esophagus: The visualized distal esophagus appears unremarkable.  Stomach: The stomach appears unremarkable.  Duodenum: Unremarkable appearing duodenum.  Small Bowel: The small bowel appears unremarkable.  Colon: There is moderate stool in the ascending transverse and descending colon which could reflect an element of constipation.  Appendix: The appendix is not identified but no inflammatory changes are seen in the right lower quadrant to suggest appendicitis.  Peritoneum: No intraperitoneal free air or ascites is seen.    Pelvis: The ulceration in the left gluteal region remains stable, with no discrete organized fluid collection is identified to suggest abscess at this time. The underlying pelvic bone shows unchanged sclerotic changes, which may indicate chronic osteomyelitis. There is interval development of mild fat stranding densities in the anteromedial aspect of the right proximal thigh and inguinal regions. There is a focal hypodensity measuring 3.4 x 4.6 cm with subtle rim enhancement (Series 2, Image 98). This is consistent with cellulitis with an associated abscess. The right inguinal lymph nodes are prominent which are likely reactive. There may be a phlegmonous component anterior to the femoral head on image 84 series 2 measuring approximately 2.5 cm in diameter.  Bladder: The bladder is nondistended and cannot be definitively evaluated. A vogt catheter is in place.  Male:  Prostate gland: The prostate gland appears unremarkable.    Bony structures:  Dorsal Spine: There is subtle spondylosis of the visualized dorsal spine. There is posterior fusion hardware in L5-S1  level.  Bony Pelvis: The visualized bony structures of the pelvis appear unremarkable.                                         Medications   ceFEPIme injection 2 g (2 g Intravenous Given 1/27/25 0124)   baclofen tablet 20 mg (20 mg Oral Given 1/26/25 1945)   gabapentin capsule 800 mg (800 mg Oral Given 1/26/25 1945)   vancomycin (VANCOCIN) 1,000 mg in D5W 250 mL IVPB (admixture device) (has no administration in time range)   oxyCODONE-acetaminophen  mg per tablet 1 tablet (1 tablet Oral Given 1/26/25 1945)   traZODone tablet 50 mg (has no administration in time range)   labetaloL injection 10 mg (has no administration in time range)   hydrALAZINE injection 10 mg (has no administration in time range)   ondansetron injection 4 mg (4 mg Intravenous Given 1/26/25 1620)   docusate sodium capsule 100 mg (has no administration in time range)   acetaminophen tablet 325 mg (has no administration in time range)   HYDROmorphone (PF) injection 1 mg (1 mg Intravenous Given 1/27/25 0125)   pantoprazole injection 40 mg (40 mg Intravenous Given 1/26/25 1310)   amLODIPine tablet 5 mg (5 mg Oral Given 1/26/25 1555)   cyclobenzaprine tablet 10 mg (10 mg Oral Given 1/26/25 1647)   fluticasone propionate 50 mcg/actuation nasal spray 50 mcg (50 mcg Each Nostril Not Given 1/26/25 2100)   HYDROcodone-acetaminophen 5-325 mg per tablet 1 tablet (1 tablet Oral Given 1/26/25 1554)   hydrOXYzine pamoate capsule 25 mg (25 mg Oral Not Given 1/26/25 2100)   mirtazapine tablet 15 mg (15 mg Oral Not Given 1/26/25 2100)   oxybutynin 24 hr tablet 10 mg (has no administration in time range)   sertraline tablet 50 mg (50 mg Oral Given 1/26/25 1555)   morphine injection 4 mg (4 mg Intravenous Given 1/25/25 2300)   ondansetron injection 4 mg (4 mg Intravenous Given 1/25/25 2300)   morphine injection 4 mg (4 mg Intravenous Given 1/25/25 2354)   iohexoL (OMNIPAQUE 350) injection 100 mL (100 mLs Intravenous Given 1/26/25 0015)   HYDROmorphone (PF)  injection 1 mg (1 mg Intravenous Given 1/26/25 0123)   oxyCODONE-acetaminophen  mg per tablet 1 tablet (1 tablet Oral Given 1/26/25 0258)   vancomycin (VANCOCIN) 1,000 mg in D5W 250 mL IVPB (admixture device) (0 mg Intravenous Stopped 1/26/25 0500)     And   vancomycin 750 mg in D5W 250 mL IVPB (admixture device) (0 mg Intravenous Stopped 1/26/25 0632)     Medical Decision Making  Patient is a 49-year-old paraplegic with a history of suprapubic catheter who presents with diffuse abdominal pain pelvic pain and left lower extremity pain.  Workup today shows UTI which patient's suprapubic catheter appears chronically infected.  In addition CT revealed a new possible abscess on the right anterior thigh.  Patient covered with broad-spectrum antibiotics with plan to admit with general surgery consult.  Patient agreeable to plan.    Amount and/or Complexity of Data Reviewed  Labs: ordered. Decision-making details documented in ED Course.  Radiology: ordered.    Risk  Prescription drug management.  Decision regarding hospitalization.               ED Course as of 01/27/25 0145   Sun Jan 26, 2025   0034 Review of patient's previous UA show they appear chronically infected. Appears to have last been on ciprofloxacin on 1/9/25 [GM]   0041 NITRITE UA(!): Positive [GM]   0041 Leukocyte Esterase, UA(!): Moderate [GM]   0041 WBC, UA(!): 51-99 [GM]   0101 Sed Rate(!): >130 [GM]      ED Course User Index  [GM] Nneka James MD                             Clinical Impression:  Final diagnoses:  [L02.91] Abscess  [T83.511A, N39.0] Urinary tract infection associated with catheterization of urinary tract, unspecified indwelling urinary catheter type, initial encounter (Primary)          ED Disposition Condition    Admit Stable                Nneka James MD  01/27/25 0145

## 2025-01-27 LAB
ALBUMIN SERPL-MCNC: 3.2 G/DL (ref 3.5–5)
ALBUMIN/GLOB SERPL: 0.8 RATIO (ref 1.1–2)
ALP SERPL-CCNC: 99 UNIT/L (ref 40–150)
ALT SERPL-CCNC: <5 UNIT/L (ref 0–55)
ANION GAP SERPL CALC-SCNC: 11 MEQ/L
AST SERPL-CCNC: 7 UNIT/L (ref 5–34)
BASOPHILS # BLD AUTO: 0.15 X10(3)/MCL
BASOPHILS NFR BLD AUTO: 1.8 %
BILIRUB SERPL-MCNC: 0.4 MG/DL
BUN SERPL-MCNC: 11.1 MG/DL (ref 8.4–25.7)
CALCIUM SERPL-MCNC: 8.9 MG/DL (ref 8.4–10.2)
CHLORIDE SERPL-SCNC: 106 MMOL/L (ref 98–107)
CO2 SERPL-SCNC: 23 MMOL/L (ref 22–29)
CREAT SERPL-MCNC: 0.67 MG/DL (ref 0.72–1.25)
CREAT/UREA NIT SERPL: 17
EOSINOPHIL # BLD AUTO: 0.36 X10(3)/MCL (ref 0–0.9)
EOSINOPHIL NFR BLD AUTO: 4.2 %
ERYTHROCYTE [DISTWIDTH] IN BLOOD BY AUTOMATED COUNT: 15 % (ref 11.5–17)
GFR SERPLBLD CREATININE-BSD FMLA CKD-EPI: >60 ML/MIN/1.73/M2
GLOBULIN SER-MCNC: 3.9 GM/DL (ref 2.4–3.5)
GLUCOSE SERPL-MCNC: 145 MG/DL (ref 74–100)
HCT VFR BLD AUTO: 33.9 % (ref 42–52)
HGB BLD-MCNC: 10.3 G/DL (ref 14–18)
IMM GRANULOCYTES # BLD AUTO: 0.02 X10(3)/MCL (ref 0–0.04)
IMM GRANULOCYTES NFR BLD AUTO: 0.2 %
LYMPHOCYTES # BLD AUTO: 2.12 X10(3)/MCL (ref 0.6–4.6)
LYMPHOCYTES NFR BLD AUTO: 24.8 %
MAGNESIUM SERPL-MCNC: 1.8 MG/DL (ref 1.6–2.6)
MCH RBC QN AUTO: 24.3 PG (ref 27–31)
MCHC RBC AUTO-ENTMCNC: 30.4 G/DL (ref 33–36)
MCV RBC AUTO: 80.1 FL (ref 80–94)
MONOCYTES # BLD AUTO: 0.49 X10(3)/MCL (ref 0.1–1.3)
MONOCYTES NFR BLD AUTO: 5.7 %
NEUTROPHILS # BLD AUTO: 5.4 X10(3)/MCL (ref 2.1–9.2)
NEUTROPHILS NFR BLD AUTO: 63.3 %
NRBC BLD AUTO-RTO: 0 %
PLATELET # BLD AUTO: 394 X10(3)/MCL (ref 130–400)
PMV BLD AUTO: 10.2 FL (ref 7.4–10.4)
POTASSIUM SERPL-SCNC: 4.2 MMOL/L (ref 3.5–5.1)
PROT SERPL-MCNC: 7.1 GM/DL (ref 6.4–8.3)
RBC # BLD AUTO: 4.23 X10(6)/MCL (ref 4.7–6.1)
SODIUM SERPL-SCNC: 140 MMOL/L (ref 136–145)
VANCOMYCIN TROUGH SERPL-MCNC: 14.7 UG/ML (ref 15–20)
WBC # BLD AUTO: 8.54 X10(3)/MCL (ref 4.5–11.5)

## 2025-01-27 PROCEDURE — 85025 COMPLETE CBC W/AUTO DIFF WBC: CPT | Performed by: INTERNAL MEDICINE

## 2025-01-27 PROCEDURE — 36415 COLL VENOUS BLD VENIPUNCTURE: CPT | Performed by: INTERNAL MEDICINE

## 2025-01-27 PROCEDURE — 21400001 HC TELEMETRY ROOM

## 2025-01-27 PROCEDURE — 63600175 PHARM REV CODE 636 W HCPCS: Performed by: INTERNAL MEDICINE

## 2025-01-27 PROCEDURE — 80202 ASSAY OF VANCOMYCIN: CPT | Performed by: INTERNAL MEDICINE

## 2025-01-27 PROCEDURE — 83735 ASSAY OF MAGNESIUM: CPT | Performed by: INTERNAL MEDICINE

## 2025-01-27 PROCEDURE — 63600175 PHARM REV CODE 636 W HCPCS: Mod: JZ,TB | Performed by: INTERNAL MEDICINE

## 2025-01-27 PROCEDURE — 25000003 PHARM REV CODE 250: Performed by: INTERNAL MEDICINE

## 2025-01-27 PROCEDURE — 80053 COMPREHEN METABOLIC PANEL: CPT | Performed by: INTERNAL MEDICINE

## 2025-01-27 PROCEDURE — 25000242 PHARM REV CODE 250 ALT 637 W/ HCPCS: Performed by: INTERNAL MEDICINE

## 2025-01-27 RX ADMIN — CEFEPIME 2 G: 2 INJECTION, POWDER, FOR SOLUTION INTRAVENOUS at 07:01

## 2025-01-27 RX ADMIN — OXYCODONE AND ACETAMINOPHEN 1 TABLET: 10; 325 TABLET ORAL at 09:01

## 2025-01-27 RX ADMIN — VANCOMYCIN HYDROCHLORIDE 1000 MG: 1 INJECTION, POWDER, LYOPHILIZED, FOR SOLUTION INTRAVENOUS at 04:01

## 2025-01-27 RX ADMIN — BACLOFEN 20 MG: 10 TABLET ORAL at 03:01

## 2025-01-27 RX ADMIN — FLUTICASONE PROPIONATE 50 MCG: 50 SPRAY, METERED NASAL at 09:01

## 2025-01-27 RX ADMIN — OXYCODONE AND ACETAMINOPHEN 1 TABLET: 10; 325 TABLET ORAL at 04:01

## 2025-01-27 RX ADMIN — CEFEPIME 2 G: 2 INJECTION, POWDER, FOR SOLUTION INTRAVENOUS at 09:01

## 2025-01-27 RX ADMIN — VANCOMYCIN HYDROCHLORIDE 1000 MG: 1 INJECTION, POWDER, LYOPHILIZED, FOR SOLUTION INTRAVENOUS at 03:01

## 2025-01-27 RX ADMIN — BACLOFEN 20 MG: 10 TABLET ORAL at 09:01

## 2025-01-27 RX ADMIN — GABAPENTIN 800 MG: 300 CAPSULE ORAL at 03:01

## 2025-01-27 RX ADMIN — OXYCODONE AND ACETAMINOPHEN 1 TABLET: 10; 325 TABLET ORAL at 03:01

## 2025-01-27 RX ADMIN — PANTOPRAZOLE SODIUM 40 MG: 40 INJECTION, POWDER, LYOPHILIZED, FOR SOLUTION INTRAVENOUS at 09:01

## 2025-01-27 RX ADMIN — HYDROMORPHONE HYDROCHLORIDE 1 MG: 2 INJECTION, SOLUTION INTRAMUSCULAR; INTRAVENOUS; SUBCUTANEOUS at 06:01

## 2025-01-27 RX ADMIN — HYDROMORPHONE HYDROCHLORIDE 1 MG: 2 INJECTION, SOLUTION INTRAMUSCULAR; INTRAVENOUS; SUBCUTANEOUS at 11:01

## 2025-01-27 RX ADMIN — BACLOFEN 20 MG: 10 TABLET ORAL at 08:01

## 2025-01-27 RX ADMIN — CYCLOBENZAPRINE 10 MG: 10 TABLET, FILM COATED ORAL at 01:01

## 2025-01-27 RX ADMIN — HYDROCODONE BITARTRATE AND ACETAMINOPHEN 1 TABLET: 5; 325 TABLET ORAL at 08:01

## 2025-01-27 RX ADMIN — HYDROMORPHONE HYDROCHLORIDE 1 MG: 2 INJECTION, SOLUTION INTRAMUSCULAR; INTRAVENOUS; SUBCUTANEOUS at 01:01

## 2025-01-27 RX ADMIN — GABAPENTIN 800 MG: 300 CAPSULE ORAL at 09:01

## 2025-01-27 RX ADMIN — CEFEPIME 2 G: 2 INJECTION, POWDER, FOR SOLUTION INTRAVENOUS at 01:01

## 2025-01-27 RX ADMIN — GABAPENTIN 800 MG: 300 CAPSULE ORAL at 08:01

## 2025-01-27 NOTE — PROGRESS NOTES
[facility] [address]  Wound Care    Patient Name:  Hemal Guerrero   MRN:  [id number]  Date: 1/27/2025  Diagnosis: Abscess    History:     Past Medical History:   Diagnosis Date    Chronic UTI     Paraplegia        Social History     Socioeconomic History    Marital status:    Tobacco Use    Smoking status: Never    Smokeless tobacco: Never   Substance and Sexual Activity    Alcohol use: Not Currently    Drug use: Yes     Types: Marijuana     Social Drivers of Health     Transportation Needs: No Transportation Needs (8/31/2020)    [facility], [facility]    PRAPARE - Transportation     Lack of Transportation (Medical): No     Lack of Transportation (Non-Medical): No   Physical Activity: Unknown (8/31/2020)    [facility]    Exercise Vital Sign     Days of Exercise per Week: 0 days   Stress: No Stress Concern Present (8/31/2020)    [facility]    [place] [place] [employer] - Occupational Stress Questionnaire     Feeling of Stress : Only a little       Precautions:     Allergies as of 01/25/2025 - Reviewed 01/25/2025   Allergen Reaction Noted    Adhesive  02/05/2023    Amitriptyline  11/16/2022       WO Assessment Details/Treatment      01/27/25 1055   WO Assessment   Visit Date 01/27/25   Visit Time 1055   Consult Type New   [facility] Wound   Wound pressure   Intervention chart review;assessed;applied;orders   Teaching on-going        Altered Skin Integrity  01/06/24 1640 Sacral spine #1 Pressure Injury   Date First Assessed/Time First Assessed: 01/06/24 1640   Altered Skin Integrity Present on Admission - Did Patient arrive to the hospital with altered skin?: yes  Location: Sacral spine  Wound Number: #1  Is this injury device related?: No  Primary Wo...   Wound Image    Description of Altered Skin Integrity   (Resolved)   Dressing Appearance Intact;Clean;Dry   Drainage Amount None   Drainage Characteristics/Odor No odor   Appearance Pink;Dry   Tissue loss description Full thickness  (Resolved)   Black (%), Wound Tissue Color 0 %   Red (%), Wound Tissue Color 100 %   Yellow (%), Wound Tissue Color 0 %   Periwound Area Dry;Scar tissue   Wound Edges Irregular;Jagged   Care Cleansed with:   Dressing Reinforced;Foam        Altered Skin Integrity 03/31/24 0400 Left posterior Greater trochanter #3 Non pressure chronic ulcer Full thickness tissue loss. Subcutaneous fat may be visible but bone, tendon or muscle are not exposed   Date First Assessed/Time First Assessed: 03/31/24 0400   Altered Skin Integrity Present on Admission - Did Patient arrive to the hospital with altered skin?: yes  Side: Left  Orientation: posterior  Location: Greater trochanter  Wound Number: #3  Is t...   Wound Image    Description of Altered Skin Integrity Full thickness tissue loss with exposed bone, tendon, or muscle. Often includes undermining and tunneling. May extend into muscle and/or supporting structures.   Dressing Appearance Intact;Moist drainage   Drainage Amount Small   Drainage Characteristics/Odor Serous;Yellow;Malodorous   Tissue loss description Full thickness   Black (%), Wound Tissue Color 0 %   Red (%), Wound Tissue Color 90 %   Yellow (%), Wound Tissue Color 10 %   Periwound Area Macerated;Pale white;Pink   Wound Edges Defined   Wound Length (cm) 4.5 cm   Wound Width (cm) 5 cm   Wound Depth (cm) 0.9 cm   Wound Volume (cm^3) 20.25 cm^3   Wound Surface Area (cm^2) 22.5 cm^2   Care  Cleansed with:;Antimicrobial agent;Wound cleanser;Other (see comments)  (Vashe)   Dressing Applied;Gauze;Absorptive Pad     WOCN consulted for left butt. Discussed plan of care with nurse Gonzales prior to visiting the patient. I am known to patient and treatment recommendations put in place. Left butt: Cleanse with vashe, dry well. Apply vashe moistened gauze, abd pad and secure with tape BID and PRN with soilage. Truvue ordered for patient. Nursing to continue with treatment recommendations. MONTY mattress with sizewise. Answered all questions to the satisfaction of the patient. Will follow up.   01/27/2025

## 2025-01-27 NOTE — PROGRESS NOTES
[facility] Medicine  Progress Note    Patient Name: Hemal Guerrero  MRN: [id number]  Patient Class: IP- Inpatient   Admission Date: 1/25/2025  Length of Stay: 1 days  Attending Physician: Yosvany Simms MD  Primary Care Provider: Margaret Leblanc MD        Subjective:     Principal Problem:Abscess    Interval History:   Today's info : seen and examined, no acute events overnight. Continues to improve   Afebrile  Remains on iv abx  Cx and id eval pending  Pain controlled    Review of Systems   Constitutional:  Positive for fever.   HENT: Negative.     Eyes: Negative.    Respiratory:  Positive for shortness of breath.    Cardiovascular: Negative.    Gastrointestinal: Negative.    Endocrine: Negative.    Genitourinary: Negative.    Musculoskeletal:  Positive for gait problem and joint swelling.   Skin:  Positive for wound.   Allergic/Immunologic: Negative.    Neurological:  Positive for weakness.   Hematological: Negative.    Psychiatric/Behavioral: Negative.       Objective:     Vital Signs (Most Recent):  Temp: 97.5 °F (36.4 °C) (01/27/25 1115)  Pulse: 94 (01/27/25 1115)  Resp: 17 (01/27/25 1310)  BP: 137/85 (01/27/25 1115)  SpO2: 100 % (01/27/25 1115) Vital Signs (24h Range):  Temp:  [97.5 °F (36.4 °C)-98.4 °F (36.9 °C)] 97.5 °F (36.4 °C)  Pulse:  [64-94] 94  Resp:  [17-18] 17  SpO2:  [97 %-100 %] 100 %  BP: (106-137)/(70-85) 137/85     Weight: 72.6 kg (160 lb)  Body mass index is 21.7 kg/m².  No intake or output data in the 24 hours ending 01/27/25 1431   Physical Exam  Vitals reviewed.   Constitutional:       Appearance: Normal appearance.   HENT:      Head: Normocephalic and atraumatic.      Right Ear: Tympanic membrane and external ear normal.      Nose: Nose normal.      Mouth/Throat:      Mouth: Mucous membranes are moist.   Eyes:      Extraocular Movements: Extraocular movements intact.      Pupils: Pupils are equal, round, and reactive to light.    Cardiovascular:      Rate and Rhythm: Normal rate and regular rhythm.      Pulses: Normal pulses.      Heart sounds: Normal heart sounds.   Pulmonary:      Effort: Pulmonary effort is normal.      Breath sounds: Normal breath sounds.   Abdominal:      General: Abdomen is flat. Bowel sounds are normal.      Palpations: Abdomen is soft.   Musculoskeletal:         General: Normal range of motion.      Cervical back: Normal range of motion and neck supple.   Skin:     General: Skin is warm and dry.   Neurological:      General: No focal deficit present.      Mental Status: He is alert and oriented to person, place, and time.      Motor: Weakness present.   Psychiatric:         Mood and Affect: Mood normal.         Behavior: Behavior normal.           Overview/Hospital Course: stable    Significant Labs: All pertinent labs within the past 24 hours have been reviewed.  Lab Results   Component Value Date    WBC 8.54 01/27/2025    HGB 10.3 (L) 01/27/2025    HCT 33.9 (L) 01/27/2025    MCV 80.1 01/27/2025     01/27/2025         Recent Labs   Lab 01/27/25  0323      K 4.2      CO2 23   BUN 11.1   CREATININE 0.67*   GLUCOSE 145*   CALCIUM 8.9       Significant Imaging: I have reviewed all pertinent imaging results/findings within the past 24 hours.    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] 01/23/2023 Yes      Problems Resolved During this Admission:     VTE Risk Mitigation (From admission, onward)      None            Sepsis  Right thigh abscess  Complicated uti  Chronic osteomyelitis of inferior pubic rami  Sacral decubitis ulcer  Paraplegic  Neurogenic bladder w chronic indwelling vogt   Uti-poa  Deconditioning   PAF  Hx of R foot osteomyelitis  Htn  Iron def  Anxiety/depression     Plan  Cont iv abx  Symptomatic management  Follow cx  Consult ID  Consult wound care    F/u on cx results  Uti- poa, f/u on cx   Resume home meds   Labs in the am  Gi and dvt ppx    Yosvany Simms MD  Department of Hospital Medicine   [facility]

## 2025-01-27 NOTE — PROGRESS NOTES
Pharmacokinetic Assessment Follow Up: IV Vancomycin    Vancomycin serum concentration assessment(s):    The trough level was drawn correctly and can be used to guide therapy at this time. The measurement is below the desired definitive target range of 15 to 20 mcg/mL.  Trough drawn correctly but 1600 dose on 1/26 was not charted as administered so the actual trough should be above 15 mcg/ml when all doses are given    Vancomycin Regimen Plan:    Continue 1 gram q12h.  Check trough on 1/29 at 1500.    Drug levels (last 3 results):  Recent Labs   Lab Result Units 01/27/25  1500   Vancomycin Trough ug/ml 14.7*        Patient brief summary:  Hemal Guerrero is a 49 y.o. male initiated on antimicrobial therapy with IV Vancomycin for treatment of skin & soft tissue infection      Actual Body Weight:   72.6 kg    Renal Function:   Estimated Creatinine Clearance: 135.4 mL/min (A) (based on SCr of 0.67 mg/dL (L)).,     Dialysis Method (if applicable):  N/A    CBC (last 72 hours):  Recent Labs   Lab Result Units 01/25/25 2243 01/27/25  0323   WBC x10(3)/mcL 10.93 8.54   Hgb g/dL 11.5* 10.3*   Hct % 36.5* 33.9*   Platelet x10(3)/mcL 514* 394   Mono % % 6.0 5.7   Eos % % 1.1 4.2   Basophil % % 1.1 1.8       Metabolic Panel (last 72 hours):  Recent Labs   Lab Result Units 01/25/25 2243 01/25/25 2258 01/27/25  0323   Sodium mmol/L 140  --  140   Potassium mmol/L 3.3*  --  4.2   Chloride mmol/L 107  --  106   CO2 mmol/L 23  --  23   Glucose mg/dL 134*  --  145*   Glucose, UA   --  Negative  --    Blood Urea Nitrogen mg/dL 10.6  --  11.1   Creatinine mg/dL 0.98  --  0.67*   Albumin g/dL 3.4*  --  3.2*   Bilirubin Total mg/dL 0.4  --  0.4   ALP unit/L 115  --  99   AST unit/L 6  --  7   ALT unit/L <5  --  <5   Magnesium Level mg/dL  --   --  1.80       Vancomycin Administrations:  vancomycin given in the last 96 hours                     vancomycin (VANCOCIN) 1,000 mg in D5W 250 mL IVPB (admixture device) (mg) 1,000 mg New Bag  01/27/25 1535     1,000 mg New Bag  0434    vancomycin 750 mg in D5W 250 mL IVPB (admixture device) (mg) 750 mg New Bag 01/26/25 0502    vancomycin (VANCOCIN) 1,000 mg in D5W 250 mL IVPB (admixture device) (mg) 1,000 mg New Bag 01/26/25 0300                    Microbiologic Results:  Microbiology Results (last 7 days)       Procedure Component Value Units Date/Time    Urine culture [[id number]]  (Abnormal)  (Susceptibility) Collected: 01/25/25 2258    Order Status: Completed Specimen: Urine Updated: 01/27/25 0823     Urine Culture >/= 100,000 colonies/ml Klebsiella pneumoniae esbl    Blood Culture #1 **CANNOT BE ORDERED STAT** [[id number]]  (Normal) Collected: 01/25/25 2243    Order Status: Completed Specimen: Blood from Hand, Right Updated: 01/27/25 0100     Blood Culture No Growth At 24 Hours    Blood Culture #2 **CANNOT BE ORDERED STAT** [[id number]]  (Normal) Collected: 01/25/25 2243    Order Status: Completed Specimen: Blood from Hand, Left Updated: 01/27/25 0100     Blood Culture No Growth At 24 Hours

## 2025-01-28 LAB
CREAT SERPL-MCNC: 0.68 MG/DL (ref 0.72–1.25)
GFR SERPLBLD CREATININE-BSD FMLA CKD-EPI: >60 ML/MIN/1.73/M2

## 2025-01-28 PROCEDURE — 36415 COLL VENOUS BLD VENIPUNCTURE: CPT | Performed by: INTERNAL MEDICINE

## 2025-01-28 PROCEDURE — 82565 ASSAY OF CREATININE: CPT | Performed by: INTERNAL MEDICINE

## 2025-01-28 PROCEDURE — 27000207 HC ISOLATION

## 2025-01-28 PROCEDURE — 21400001 HC TELEMETRY ROOM

## 2025-01-28 PROCEDURE — 25000003 PHARM REV CODE 250: Performed by: INTERNAL MEDICINE

## 2025-01-28 PROCEDURE — 63600175 PHARM REV CODE 636 W HCPCS: Performed by: INTERNAL MEDICINE

## 2025-01-28 RX ORDER — POLYETHYLENE GLYCOL 3350 17 G/17G
17 POWDER, FOR SOLUTION ORAL DAILY
Status: DISCONTINUED | OUTPATIENT
Start: 2025-01-28 | End: 2025-02-03 | Stop reason: HOSPADM

## 2025-01-28 RX ADMIN — VANCOMYCIN HYDROCHLORIDE 1000 MG: 1 INJECTION, POWDER, LYOPHILIZED, FOR SOLUTION INTRAVENOUS at 03:01

## 2025-01-28 RX ADMIN — GABAPENTIN 800 MG: 300 CAPSULE ORAL at 03:01

## 2025-01-28 RX ADMIN — HYDROMORPHONE HYDROCHLORIDE 1 MG: 2 INJECTION, SOLUTION INTRAMUSCULAR; INTRAVENOUS; SUBCUTANEOUS at 04:01

## 2025-01-28 RX ADMIN — OXYBUTYNIN CHLORIDE 10 MG: 5 TABLET, EXTENDED RELEASE ORAL at 06:01

## 2025-01-28 RX ADMIN — BACLOFEN 20 MG: 10 TABLET ORAL at 03:01

## 2025-01-28 RX ADMIN — FLUTICASONE PROPIONATE 50 MCG: 50 SPRAY, METERED NASAL at 09:01

## 2025-01-28 RX ADMIN — HYDROMORPHONE HYDROCHLORIDE 1 MG: 2 INJECTION, SOLUTION INTRAMUSCULAR; INTRAVENOUS; SUBCUTANEOUS at 10:01

## 2025-01-28 RX ADMIN — HYDROCODONE BITARTRATE AND ACETAMINOPHEN 1 TABLET: 5; 325 TABLET ORAL at 12:01

## 2025-01-28 RX ADMIN — HYDROXYZINE PAMOATE 25 MG: 25 CAPSULE ORAL at 08:01

## 2025-01-28 RX ADMIN — HYDROCODONE BITARTRATE AND ACETAMINOPHEN 1 TABLET: 5; 325 TABLET ORAL at 06:01

## 2025-01-28 RX ADMIN — MIRTAZAPINE 15 MG: 15 TABLET, FILM COATED ORAL at 08:01

## 2025-01-28 RX ADMIN — BACLOFEN 20 MG: 10 TABLET ORAL at 09:01

## 2025-01-28 RX ADMIN — GABAPENTIN 800 MG: 300 CAPSULE ORAL at 09:01

## 2025-01-28 RX ADMIN — OXYCODONE AND ACETAMINOPHEN 1 TABLET: 10; 325 TABLET ORAL at 03:01

## 2025-01-28 RX ADMIN — CEFEPIME 2 G: 2 INJECTION, POWDER, FOR SOLUTION INTRAVENOUS at 02:01

## 2025-01-28 RX ADMIN — HYDROMORPHONE HYDROCHLORIDE 1 MG: 2 INJECTION, SOLUTION INTRAMUSCULAR; INTRAVENOUS; SUBCUTANEOUS at 09:01

## 2025-01-28 RX ADMIN — APIXABAN 5 MG: 5 TABLET, FILM COATED ORAL at 08:01

## 2025-01-28 RX ADMIN — GABAPENTIN 800 MG: 300 CAPSULE ORAL at 08:01

## 2025-01-28 RX ADMIN — POLYETHYLENE GLYCOL 3350 17 G: 17 POWDER, FOR SOLUTION ORAL at 04:01

## 2025-01-28 RX ADMIN — CYCLOBENZAPRINE 10 MG: 10 TABLET, FILM COATED ORAL at 11:01

## 2025-01-28 RX ADMIN — OXYCODONE AND ACETAMINOPHEN 1 TABLET: 10; 325 TABLET ORAL at 11:01

## 2025-01-28 RX ADMIN — VANCOMYCIN HYDROCHLORIDE 1000 MG: 1 INJECTION, POWDER, LYOPHILIZED, FOR SOLUTION INTRAVENOUS at 04:01

## 2025-01-28 RX ADMIN — CEFEPIME 2 G: 2 INJECTION, POWDER, FOR SOLUTION INTRAVENOUS at 10:01

## 2025-01-28 RX ADMIN — CEFEPIME 2 G: 2 INJECTION, POWDER, FOR SOLUTION INTRAVENOUS at 06:01

## 2025-01-28 RX ADMIN — PANTOPRAZOLE SODIUM 40 MG: 40 INJECTION, POWDER, LYOPHILIZED, FOR SOLUTION INTRAVENOUS at 09:01

## 2025-01-28 RX ADMIN — BACLOFEN 20 MG: 10 TABLET ORAL at 08:01

## 2025-01-28 RX ADMIN — HYDROMORPHONE HYDROCHLORIDE 1 MG: 2 INJECTION, SOLUTION INTRAMUSCULAR; INTRAVENOUS; SUBCUTANEOUS at 06:01

## 2025-01-28 RX ADMIN — OXYCODONE AND ACETAMINOPHEN 1 TABLET: 10; 325 TABLET ORAL at 09:01

## 2025-01-28 NOTE — PROGRESS NOTES
Seen by ID, full detailed consult to follow     Impression  SIRS  R thigh fluid collection / abscess  Chronic osteomyelitis of B/L inferior pubic rami  ESBL Klebsiella complicated UTI  L hip non healing wound  Paraplegia  Neurogenic bladder / Chronic vogt catheter  Depression / Anxiety  Anemia  Reactive thrombocytosis    See orders

## 2025-01-28 NOTE — PLAN OF CARE
01/28/25 1542   Discharge Assessment   Assessment Type Discharge Planning Assessment   Confirmed/corrected address, phone number and insurance Yes   Confirmed Demographics Correct on Facesheet   Source of Information patient   Communicated IVORY with patient/caregiver Date not available/Unable to determine   Reason For Admission Anscess   People in Home child(arnol), adult   Do you expect to return to your current living situation? Yes   Do you have help at home or someone to help you manage your care at home? Yes   Who are your caregiver(s) and their phone number(s)? Ahmet (son)   Prior to hospitilization cognitive status: Unable to Assess   Current cognitive status: Alert/Oriented   Walking or Climbing Stairs Difficulty yes   Walking or Climbing Stairs ambulation difficulty, dependent;stair climbing difficulty, dependent;transferring difficulty, dependent   Mobility Management wc   Dressing/Bathing Difficulty yes   Dressing/Bathing bathing difficulty, dependent;dressing difficulty, dependent   Dressing/Bathing Management Son assist   Home Accessibility wheelchair accessible   Home Layout Able to live on 1st floor   Equipment Currently Used at Home wheelchair;hospital bed   Do you currently have service(s) that help you manage your care at home? Yes   Name and Contact number of agency [facility]   Is the pt/caregiver preference to resume services with current agency Yes   Do you have prescription coverage? Yes   Coverage Medicare   Who is going to help you get home at discharge? [facility]   How do you get to doctors appointments?   ([facility])   Are you on dialysis? No   Do you take coumadin? No   Discharge Plan A Home Health   Discharge Plan B Home Health   DME Needed Upon Discharge  none   Discharge Plan discussed with: Patient   Transition of Care Barriers None   OTHER   Name(s) of People in Home Ahmet     Patient lives with his son in an apartment. Patient states home health does wound care twice a week. Clinical  updates sent to [employer] via [facility]. DME used at home : hospital bed and wc. Patient explains he uses [facility] for transportation.     PCP Margaret Leblanc MD  Pharmacy [facility]

## 2025-01-28 NOTE — PROGRESS NOTES
[facility] Medicine  Progress Note    Patient Name: Hemal Guerrero  MRN: [id number]  Patient Class: IP- Inpatient   Admission Date: 1/25/2025  Length of Stay: 2 days  Attending Physician: Yosvany Simms MD  Primary Care Provider: Margaret Leblanc MD        Subjective:     Principal Problem:Abscess    Interval History:   Today's info : seen and examined, no acute events overnight. Continues to improve   Afebrile  Remains on iv abx  Cx pending    Review of Systems   Constitutional:  Positive for fever.   HENT: Negative.     Eyes: Negative.    Respiratory:  Positive for shortness of breath.    Cardiovascular: Negative.    Gastrointestinal: Negative.    Endocrine: Negative.    Genitourinary: Negative.    Musculoskeletal:  Positive for gait problem and joint swelling.   Skin:  Positive for wound.   Allergic/Immunologic: Negative.    Neurological:  Positive for weakness.   Hematological: Negative.    Psychiatric/Behavioral: Negative.       Objective:     Vital Signs (Most Recent):  Temp: 98.4 °F (36.9 °C) (01/28/25 1138)  Pulse: 75 (01/28/25 1138)  Resp: (!) 21 (01/28/25 1508)  BP: 121/77 (01/28/25 1138)  SpO2: 99 % (01/28/25 1138) Vital Signs (24h Range):  Temp:  [97.9 °F (36.6 °C)-98.4 °F (36.9 °C)] 98.4 °F (36.9 °C)  Pulse:  [75-87] 75  Resp:  [17-23] 21  SpO2:  [98 %-100 %] 99 %  BP: ()/(63-87) 121/77     Weight: 72.6 kg (160 lb)  Body mass index is 21.7 kg/m².    Intake/Output Summary (Last 24 hours) at 1/28/2025 1531  Last data filed at 1/28/2025 0747  Gross per 24 hour   Intake 2554.68 ml   Output 2300 ml   Net 254.68 ml      Physical Exam  Vitals reviewed.   Constitutional:       Appearance: Normal appearance.   HENT:      Head: Normocephalic and atraumatic.      Right Ear: Tympanic membrane and external ear normal.      Nose: Nose normal.      Mouth/Throat:      Mouth: Mucous membranes are moist.   Eyes:      Extraocular Movements: Extraocular  movements intact.      Pupils: Pupils are equal, round, and reactive to light.   Cardiovascular:      Rate and Rhythm: Normal rate and regular rhythm.      Pulses: Normal pulses.      Heart sounds: Normal heart sounds.   Pulmonary:      Effort: Pulmonary effort is normal.      Breath sounds: Normal breath sounds.   Abdominal:      General: Abdomen is flat. Bowel sounds are normal.      Palpations: Abdomen is soft.   Musculoskeletal:         General: Normal range of motion.      Cervical back: Normal range of motion and neck supple.   Skin:     General: Skin is warm and dry.   Neurological:      General: No focal deficit present.      Mental Status: He is alert and oriented to person, place, and time.      Motor: Weakness present.   Psychiatric:         Mood and Affect: Mood normal.         Behavior: Behavior normal.           Overview/Hospital Course: stable    Significant Labs: All pertinent labs within the past 24 hours have been reviewed.  Lab Results   Component Value Date    WBC 8.54 01/27/2025    HGB 10.3 (L) 01/27/2025    HCT 33.9 (L) 01/27/2025    MCV 80.1 01/27/2025     01/27/2025         Recent Labs   Lab 01/27/25  0323 01/28/25  0744     --    K 4.2  --      --    CO2 23  --    BUN 11.1  --    CREATININE 0.67* 0.68*   GLUCOSE 145*  --    CALCIUM 8.9  --        Significant Imaging: I have reviewed all pertinent imaging results/findings within the past 24 hours.    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] 01/23/2023 Yes      Problems Resolved During this Admission:     VTE Risk Mitigation (From admission, onward)      None            Sepsis  Right thigh abscess  Complicated uti  Chronic osteomyelitis of inferior pubic rami  Sacral decubitis ulcer  Paraplegic  Neurogenic bladder w chronic indwelling vogt   Uti-poa  Deconditioning   PAF  Hx of R foot  osteomyelitis  Htn  Iron def  Anxiety/depression     Plan  Cont iv abx  Symptomatic management  Follow cx  Consult ID  Consult wound care   F/u on cx results  Uti- poa, f/u on cx   Resume home meds   Labs in the am  Gi and dvt ppx    Yosvany Simms MD  Department of Hospital Medicine   [facility]

## 2025-01-29 LAB
BASOPHILS # BLD AUTO: 0.18 X10(3)/MCL
BASOPHILS NFR BLD AUTO: 1.8 %
CREAT SERPL-MCNC: 0.61 MG/DL (ref 0.72–1.25)
EOSINOPHIL # BLD AUTO: 0.5 X10(3)/MCL (ref 0–0.9)
EOSINOPHIL NFR BLD AUTO: 5.1 %
ERYTHROCYTE [DISTWIDTH] IN BLOOD BY AUTOMATED COUNT: 15.4 % (ref 11.5–17)
GFR SERPLBLD CREATININE-BSD FMLA CKD-EPI: >60 ML/MIN/1.73/M2
HCT VFR BLD AUTO: 30.2 % (ref 42–52)
HGB BLD-MCNC: 9.5 G/DL (ref 14–18)
IMM GRANULOCYTES # BLD AUTO: 0.04 X10(3)/MCL (ref 0–0.04)
IMM GRANULOCYTES NFR BLD AUTO: 0.4 %
LYMPHOCYTES # BLD AUTO: 3.36 X10(3)/MCL (ref 0.6–4.6)
LYMPHOCYTES NFR BLD AUTO: 34 %
MCH RBC QN AUTO: 25.6 PG (ref 27–31)
MCHC RBC AUTO-ENTMCNC: 31.5 G/DL (ref 33–36)
MCV RBC AUTO: 81.4 FL (ref 80–94)
MONOCYTES # BLD AUTO: 0.53 X10(3)/MCL (ref 0.1–1.3)
MONOCYTES NFR BLD AUTO: 5.4 %
NEUTROPHILS # BLD AUTO: 5.28 X10(3)/MCL (ref 2.1–9.2)
NEUTROPHILS NFR BLD AUTO: 53.3 %
NRBC BLD AUTO-RTO: 0 %
PLATELET # BLD AUTO: 313 X10(3)/MCL (ref 130–400)
PMV BLD AUTO: 9.8 FL (ref 7.4–10.4)
RBC # BLD AUTO: 3.71 X10(6)/MCL (ref 4.7–6.1)
VANCOMYCIN TROUGH SERPL-MCNC: 12.3 UG/ML (ref 15–20)
WBC # BLD AUTO: 9.89 X10(3)/MCL (ref 4.5–11.5)

## 2025-01-29 PROCEDURE — 21400001 HC TELEMETRY ROOM

## 2025-01-29 PROCEDURE — 63600175 PHARM REV CODE 636 W HCPCS: Performed by: INTERNAL MEDICINE

## 2025-01-29 PROCEDURE — 63600175 PHARM REV CODE 636 W HCPCS: Mod: JZ,TB | Performed by: NURSE PRACTITIONER

## 2025-01-29 PROCEDURE — 82565 ASSAY OF CREATININE: CPT | Performed by: INTERNAL MEDICINE

## 2025-01-29 PROCEDURE — 63600175 PHARM REV CODE 636 W HCPCS: Mod: JZ,TB | Performed by: INTERNAL MEDICINE

## 2025-01-29 PROCEDURE — 25000003 PHARM REV CODE 250: Performed by: INTERNAL MEDICINE

## 2025-01-29 PROCEDURE — 99233 SBSQ HOSP IP/OBS HIGH 50: CPT | Mod: ,,, | Performed by: SURGERY

## 2025-01-29 PROCEDURE — 36415 COLL VENOUS BLD VENIPUNCTURE: CPT | Performed by: INTERNAL MEDICINE

## 2025-01-29 PROCEDURE — 80202 ASSAY OF VANCOMYCIN: CPT | Performed by: INTERNAL MEDICINE

## 2025-01-29 PROCEDURE — 25000003 PHARM REV CODE 250: Performed by: NURSE PRACTITIONER

## 2025-01-29 PROCEDURE — 27000207 HC ISOLATION

## 2025-01-29 PROCEDURE — 85025 COMPLETE CBC W/AUTO DIFF WBC: CPT

## 2025-01-29 RX ADMIN — CEFEPIME 2 G: 2 INJECTION, POWDER, FOR SOLUTION INTRAVENOUS at 02:01

## 2025-01-29 RX ADMIN — FLUTICASONE PROPIONATE 50 MCG: 50 SPRAY, METERED NASAL at 08:01

## 2025-01-29 RX ADMIN — HYDROCODONE BITARTRATE AND ACETAMINOPHEN 1 TABLET: 5; 325 TABLET ORAL at 08:01

## 2025-01-29 RX ADMIN — HYDROXYZINE PAMOATE 25 MG: 25 CAPSULE ORAL at 08:01

## 2025-01-29 RX ADMIN — BACLOFEN 20 MG: 10 TABLET ORAL at 03:01

## 2025-01-29 RX ADMIN — HYDROMORPHONE HYDROCHLORIDE 1 MG: 2 INJECTION, SOLUTION INTRAMUSCULAR; INTRAVENOUS; SUBCUTANEOUS at 07:01

## 2025-01-29 RX ADMIN — AMLODIPINE BESYLATE 5 MG: 5 TABLET ORAL at 08:01

## 2025-01-29 RX ADMIN — OXYCODONE AND ACETAMINOPHEN 1 TABLET: 10; 325 TABLET ORAL at 04:01

## 2025-01-29 RX ADMIN — VANCOMYCIN HYDROCHLORIDE 1000 MG: 1 INJECTION, POWDER, LYOPHILIZED, FOR SOLUTION INTRAVENOUS at 04:01

## 2025-01-29 RX ADMIN — HYDROMORPHONE HYDROCHLORIDE 1 MG: 2 INJECTION, SOLUTION INTRAMUSCULAR; INTRAVENOUS; SUBCUTANEOUS at 08:01

## 2025-01-29 RX ADMIN — HYDROCODONE BITARTRATE AND ACETAMINOPHEN 1 TABLET: 5; 325 TABLET ORAL at 03:01

## 2025-01-29 RX ADMIN — OXYCODONE AND ACETAMINOPHEN 1 TABLET: 10; 325 TABLET ORAL at 05:01

## 2025-01-29 RX ADMIN — CEFTAZIDIME, AVIBACTAM 2.5 G: 2; .5 POWDER, FOR SOLUTION INTRAVENOUS at 09:01

## 2025-01-29 RX ADMIN — OXYCODONE AND ACETAMINOPHEN 1 TABLET: 10; 325 TABLET ORAL at 10:01

## 2025-01-29 RX ADMIN — CYCLOBENZAPRINE 10 MG: 10 TABLET, FILM COATED ORAL at 08:01

## 2025-01-29 RX ADMIN — SERTRALINE HYDROCHLORIDE 50 MG: 50 TABLET ORAL at 08:01

## 2025-01-29 RX ADMIN — APIXABAN 5 MG: 5 TABLET, FILM COATED ORAL at 08:01

## 2025-01-29 RX ADMIN — HYDROMORPHONE HYDROCHLORIDE 1 MG: 2 INJECTION, SOLUTION INTRAMUSCULAR; INTRAVENOUS; SUBCUTANEOUS at 12:01

## 2025-01-29 RX ADMIN — HYDROMORPHONE HYDROCHLORIDE 1 MG: 2 INJECTION, SOLUTION INTRAMUSCULAR; INTRAVENOUS; SUBCUTANEOUS at 02:01

## 2025-01-29 RX ADMIN — GABAPENTIN 800 MG: 300 CAPSULE ORAL at 03:01

## 2025-01-29 RX ADMIN — PANTOPRAZOLE SODIUM 40 MG: 40 INJECTION, POWDER, LYOPHILIZED, FOR SOLUTION INTRAVENOUS at 08:01

## 2025-01-29 RX ADMIN — BACLOFEN 20 MG: 10 TABLET ORAL at 08:01

## 2025-01-29 RX ADMIN — POLYETHYLENE GLYCOL 3350 17 G: 17 POWDER, FOR SOLUTION ORAL at 09:01

## 2025-01-29 RX ADMIN — HYDROMORPHONE HYDROCHLORIDE 1 MG: 2 INJECTION, SOLUTION INTRAMUSCULAR; INTRAVENOUS; SUBCUTANEOUS at 04:01

## 2025-01-29 RX ADMIN — GABAPENTIN 800 MG: 300 CAPSULE ORAL at 08:01

## 2025-01-29 RX ADMIN — FLUTICASONE PROPIONATE 50 MCG: 50 SPRAY, METERED NASAL at 09:01

## 2025-01-29 RX ADMIN — CEFEPIME 2 G: 2 INJECTION, POWDER, FOR SOLUTION INTRAVENOUS at 08:01

## 2025-01-29 RX ADMIN — CEFTAZIDIME, AVIBACTAM 2.5 G: 2; .5 POWDER, FOR SOLUTION INTRAVENOUS at 01:01

## 2025-01-29 RX ADMIN — OXYCODONE AND ACETAMINOPHEN 1 TABLET: 10; 325 TABLET ORAL at 11:01

## 2025-01-29 NOTE — PROGRESS NOTES
[facility] Medicine  Progress Note    Patient Name: Hemal Guerrero  MRN: [id number]  Patient Class: IP- Inpatient   Admission Date: 1/25/2025  Length of Stay: 3 days  Attending Physician: Yosvany Simms MD  Primary Care Provider: Margaret Leblanc MD        Subjective:     Principal Problem:Abscess    Interval History:   Today's info : seen and examined, no acute events overnight. Continues to improve   Afebrile  Remains on iv abx  Cx pending    Review of Systems   Constitutional:  Positive for fever.   HENT: Negative.     Eyes: Negative.    Respiratory:  Positive for shortness of breath.    Cardiovascular: Negative.    Gastrointestinal: Negative.    Endocrine: Negative.    Genitourinary: Negative.    Musculoskeletal:  Positive for gait problem and joint swelling.   Skin:  Positive for wound.   Allergic/Immunologic: Negative.    Neurological:  Positive for weakness.   Hematological: Negative.    Psychiatric/Behavioral: Negative.       Objective:     Vital Signs (Most Recent):  Temp: 98.2 °F (36.8 °C) (01/29/25 1103)  Pulse: 84 (01/29/25 1103)  Resp: 18 (01/29/25 1219)  BP: 122/63 (01/29/25 1103)  SpO2: 100 % (01/29/25 1103) Vital Signs (24h Range):  Temp:  [97.7 °F (36.5 °C)-98.3 °F (36.8 °C)] 98.2 °F (36.8 °C)  Pulse:  [76-98] 84  Resp:  [16-21] 18  SpO2:  [99 %-100 %] 100 %  BP: (113-134)/(63-87) 122/63     Weight: 72.6 kg (160 lb)  Body mass index is 21.7 kg/m².    Intake/Output Summary (Last 24 hours) at 1/29/2025 1418  Last data filed at 1/29/2025 0450  Gross per 24 hour   Intake 250 ml   Output 1800 ml   Net -1550 ml      Physical Exam  Vitals reviewed.   Constitutional:       Appearance: Normal appearance.   HENT:      Head: Normocephalic and atraumatic.      Right Ear: Tympanic membrane and external ear normal.      Nose: Nose normal.      Mouth/Throat:      Mouth: Mucous membranes are moist.   Eyes:      Extraocular Movements: Extraocular movements  intact.      Pupils: Pupils are equal, round, and reactive to light.   Cardiovascular:      Rate and Rhythm: Normal rate and regular rhythm.      Pulses: Normal pulses.      Heart sounds: Normal heart sounds.   Pulmonary:      Effort: Pulmonary effort is normal.      Breath sounds: Normal breath sounds.   Abdominal:      General: Abdomen is flat. Bowel sounds are normal.      Palpations: Abdomen is soft.   Musculoskeletal:         General: Normal range of motion.      Cervical back: Normal range of motion and neck supple.   Skin:     General: Skin is warm and dry.   Neurological:      General: No focal deficit present.      Mental Status: He is alert and oriented to person, place, and time.      Motor: Weakness present.   Psychiatric:         Mood and Affect: Mood normal.         Behavior: Behavior normal.           Overview/Hospital Course: stable    Significant Labs: All pertinent labs within the past 24 hours have been reviewed.  Lab Results   Component Value Date    WBC 8.54 01/27/2025    HGB 10.3 (L) 01/27/2025    HCT 33.9 (L) 01/27/2025    MCV 80.1 01/27/2025     01/27/2025         Recent Labs   Lab 01/27/25  0323 01/28/25  0744     --    K 4.2  --      --    CO2 23  --    BUN 11.1  --    CREATININE 0.67* 0.68*   GLUCOSE 145*  --    CALCIUM 8.9  --        Significant Imaging: I have reviewed all pertinent imaging results/findings within the past 24 hours.    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] 01/23/2023 Yes      Problems Resolved During this Admission:     VTE Risk Mitigation (From admission, onward)           Ordered     apixaban tablet 5 mg  2 times daily         01/28/25 1549                    Sepsis  Right thigh abscess  Complicated uti  Chronic osteomyelitis of inferior pubic rami  Sacral decubitis ulcer  Paraplegic  Neurogenic bladder w chronic  indwelling vogt   Uti-poa  Deconditioning   PAF  Hx of R foot osteomyelitis  Htn  Iron def  Anxiety/depression     Plan  Cont iv abx  Symptomatic management  Follow cx  Consult ID  Consult wound care   F/u on cx results  Uti- poa, f/u on cx   Resume home meds   Labs in the am  Gi and dvt ppx    Yosvany Simms MD  Department of Hospital Medicine   [facility]

## 2025-01-29 NOTE — PROGRESS NOTES
Pharmacokinetic Assessment Follow Up: IV Vancomycin    Vancomycin serum concentration assessment(s):    The trough level was drawn correctly and can be used to guide therapy at this time. The measurement is below the desired definitive target range of 15 to 20 mcg/mL.    Vancomycin Regimen Plan:    Change to 1250 mg q12h.  Check trough at 1600 on 1/31    Drug levels (last 3 results):  Recent Labs   Lab Result Units 01/27/25  1500 01/29/25  1630   Vancomycin Trough ug/ml 14.7* 12.3*      Patient brief summary:  Hemal Guerrero is a 49 y.o. male initiated on antimicrobial therapy with IV Vancomycin for treatment of skin & soft tissue infection      Actual Body Weight:   72.6 kg    Renal Function:   Estimated Creatinine Clearance: 148.8 mL/min (A) (based on SCr of 0.61 mg/dL (L)).,     Dialysis Method (if applicable):  N/A    CBC (last 72 hours):  Recent Labs   Lab Result Units 01/27/25  0323 01/29/25  1630   WBC x10(3)/mcL 8.54 9.89   Hgb g/dL 10.3* 9.5*   Hct % 33.9* 30.2*   Platelet x10(3)/mcL 394 313   Mono % % 5.7 5.4   Eos % % 4.2 5.1   Basophil % % 1.8 1.8       Metabolic Panel (last 72 hours):  Recent Labs   Lab Result Units 01/27/25  0323 01/28/25  0744 01/29/25  1630   Sodium mmol/L 140  --   --    Potassium mmol/L 4.2  --   --    Chloride mmol/L 106  --   --    CO2 mmol/L 23  --   --    Glucose mg/dL 145*  --   --    Blood Urea Nitrogen mg/dL 11.1  --   --    Creatinine mg/dL 0.67* 0.68* 0.61*   Albumin g/dL 3.2*  --   --    Bilirubin Total mg/dL 0.4  --   --    ALP unit/L 99  --   --    AST unit/L 7  --   --    ALT unit/L <5  --   --    Magnesium Level mg/dL 1.80  --   --        Vancomycin Administrations:  vancomycin given in the last 96 hours                     vancomycin (VANCOCIN) 1,000 mg in D5W 250 mL IVPB (admixture device) (mg) 1,000 mg New Bag 01/29/25 1641     1,000 mg New Bag  0430      Restarted 01/28/25 1639     1,000 mg New Bag  1556     1,000 mg New Bag  0407    vancomycin (VANCOCIN) 1,000 mg  in D5W 250 mL IVPB (admixture device) (mg) 1,000 mg New Bag 01/27/25 1535     1,000 mg New Bag  0434    vancomycin 750 mg in D5W 250 mL IVPB (admixture device) (mg) 750 mg New Bag 01/26/25 0502    vancomycin (VANCOCIN) 1,000 mg in D5W 250 mL IVPB (admixture device) (mg) 1,000 mg New Bag 01/26/25 0300                    Microbiologic Results:  Microbiology Results (last 7 days)       Procedure Component Value Units Date/Time    Urine culture [[id number]]  (Abnormal) Collected: 01/25/25 2258    Order Status: Completed Specimen: Urine Updated: 01/29/25 1210     Urine Culture >/= 100,000 colonies/ml Klebsiella pneumoniae esbl    Blood Culture #1 **CANNOT BE ORDERED STAT** [[id number]]  (Normal) Collected: 01/25/25 2243    Order Status: Completed Specimen: Blood from Hand, Right Updated: 01/29/25 0006     Blood Culture No Growth At 72 Hours    Blood Culture #2 **CANNOT BE ORDERED STAT** [[id number]]  (Normal) Collected: 01/25/25 2243    Order Status: Completed Specimen: Blood from Hand, Left Updated: 01/29/25 0006     Blood Culture No Growth At 72 Hours

## 2025-01-29 NOTE — PROGRESS NOTES
Infectious Diseases Progress Note  48 y/o male with Hx of paraplegia, neurogenic bladder with suprapubic catheter and non healing wound to L hip, sacral wound with osteomyelitis of B/L inferior pubic rami s/p treatment who presented to ER on 1/25 with c/o abdominal pain, N/V and LLE pain. On admit he was afebrile without leukocytosis. Lactic acid 0.9 and ESR >130 with CRP 83.70. Influenza A/B Ag (-), RSV PCR (-), SARS-CoV-2 PCR (-). Blood cultures remain negative thus far. U/A with positive nitrites, 0-2 RBC, 51-99 WBC, moderate bacteria, moderate LE. Urine culture >100k ESBL Klebsiella. CT abdomen/pelvis with contrast revealed moderate to large volume colonic stool compatible with constipation, interval development of peripherally enhancing fluid collection anterior right thigh extending to the anterior aspect of the proximal right femur suspicious for abscess, mildly enlarged bilateral inguinal lymph nodes likely reactive. Surgery has been consulted.   He is currently on Vancomycin and Cefepime    Subjective:  Lying in bed, no acute distress. No new complaints voiced. Afebrile    Review of Systems   Constitutional:  Positive for malaise/fatigue.   HENT: Negative.     Eyes: Negative.    Respiratory: Negative.     Cardiovascular: Negative.    Gastrointestinal:  Positive for abdominal pain, nausea and vomiting.   Musculoskeletal: Negative.    Skin:         L hip wound   Neurological:  Positive for focal weakness and weakness.   All other systems reviewed and are negative.      Review of patient's allergies indicates:   Allergen Reactions    Adhesive     Amitriptyline        Past Medical History:   Diagnosis Date    Chronic UTI     Paraplegia        Past Surgical History:   Procedure Laterality Date    INSERTION OF SUPRAPUBIC CATHETER      LAPAROTOMY         Social History     Socioeconomic History    Marital status:    Tobacco Use    Smoking status: Never    Smokeless tobacco: Never   Substance and Sexual  Activity    Alcohol use: Not Currently    Drug use: Yes     Types: Marijuana     Social Drivers of Health     Transportation Needs: No Transportation Needs (8/31/2020)    Received from [facility], [facility]    PRAPARE - Transportation     Lack of Transportation (Medical): No     Lack of Transportation (Non-Medical): No   Physical Activity: Unknown (8/31/2020)    [facility]    Exercise Vital Sign     Days of Exercise per Week: 0 days   Stress: No Stress Concern Present (8/31/2020)    [facility], [facility] - Occupational Stress Questionnaire     Feeling of Stress : Only a little         Scheduled Meds:   amLODIPine  5 mg Oral Daily    apixaban  5 mg Oral BID    baclofen  20 mg Oral TID    ceFEPime IV (PEDS and ADULTS)  2 g Intravenous Q8H    fluticasone propionate  1 spray Each Nostril BID    gabapentin  800 mg Oral TID    hydrOXYzine pamoate  25 mg Oral TID    mirtazapine  15 mg Oral Nightly    oxybutynin  10 mg Oral QAM    pantoprazole  40 mg Intravenous Daily    polyethylene glycol  17 g Oral Daily    sertraline  50 mg Oral Daily    vancomycin (VANCOCIN) 1,000 mg in D5W 250 mL IVPB (admixture device)  1,000 mg Intravenous Q12H     Continuous Infusions:  PRN Meds:  Current Facility-Administered Medications:     acetaminophen, 325 mg, Oral, Q4H PRN    cyclobenzaprine, 10 mg, Oral, BID PRN    docusate sodium, 100 mg, Oral, BID PRN    hydrALAZINE, 10 mg, Intravenous, Q3H PRN     HYDROcodone-acetaminophen, 1 tablet, Oral, Q6H PRN    HYDROmorphone, 1 mg, Intravenous, Q4H PRN    labetalol, 10 mg, Intravenous, Q4H PRN    ondansetron, 4 mg, Intravenous, Q4H PRN    oxyCODONE-acetaminophen, 1 tablet, Oral, Q6H PRN    traZODone, 50 mg, Oral, Nightly PRN    vancomycin - pharmacy to dose, , Intravenous, pharmacy to manage frequency    Objective:  /63   Pulse 84   Temp 98.2 °F (36.8 °C) (Oral)   Resp 16   Ht 6' (1.829 m)   Wt 72.6 kg (160 lb)   SpO2 100%   BMI 21.70 kg/m²     Physical Exam:   Physical Exam  Vitals reviewed.   HENT:      Head: Normocephalic.   Cardiovascular:      Rate and Rhythm: Normal rate and regular rhythm.   Pulmonary:      Effort: Pulmonary effort is normal. No respiratory distress.      Breath sounds: Normal breath sounds. No wheezing.   Abdominal:      General: Bowel sounds are normal. There is no distension.      Palpations: Abdomen is soft.      Tenderness: There is no abdominal tenderness.   Genitourinary:     Comments: Suprapubic catheter  Musculoskeletal:      Cervical back: Normal range of motion.      Comments: Paraplegia   Skin:     Findings: No rash.      Comments: Wounds dressed   Neurological:      Mental Status: He is alert and oriented to person, place, and time.   Psychiatric:         Mood and Affect: Mood normal.         Behavior: Behavior normal.       Imaging      Lab Review   No results found for this or any previous visit (from the past 24 hours).      Assessment/Plan:  SIRS  R thigh fluid collection / abscess  Chronic osteomyelitis of B/L inferior pubic rami  CR Klebsiella complicated UTI  L hip non healing wound  Paraplegia  Neurogenic bladder / Chronic vogt catheter  Depression / Anxiety  Anemia  Reactive thrombocytosis    -Continue Vancomycin and place on Avycaz. D/C Cefepime  -Afebrile without leukocytosis  -U/A abnormal with urine culture >100k ESBL Klebsiella  -Blood cultures negative thus far  -S/P suprapubic catheter exchanged  1/28  -Continue wound care  -CT abdomen/pelvis with findings of fluid collection / abscess to R thigh  -Surgery consulted, follow recs  -Discussed with patient and nursing

## 2025-01-29 NOTE — CONSULTS
Inpatient Nutrition Assessment    Admit Date: 2025   Total duration of encounter: 4 days   Patient Age: 49 y.o.    Nutrition Recommendation/Prescription     Continue Regular Diet as tolerated.   Medical management of nausea per MD.   Add Brandon TID once diet is advanced (provides 90 kcal and 2.5 g protein per serving),  Add Boost VHC TID for additional nourishment (provides 530 kcal and 22 g protein per serving).  Rec'd consider vitamin/mineral regimen for wound healin MVI daily   10,000 IU Vitamin A daily   220 mg Zinc sulfate daily   500 mg Ascorbic Acid BID  Will continue to monitor wt, labs, and intake.    Communication of Recommendations: reviewed with patient    Nutrition Assessment     Malnutrition Assessment/Nutrition-Focused Physical Exam       Malnutrition Level: other (see comments) (Unable to assess) (25 111)  Energy Intake (Malnutrition): other (see comments) (Unable to assess) (25 111)  Weight Loss (Malnutrition): other (see comments) (Does not meet criteria) (25 111)                                         Fluid Accumulation (Malnutrition): other (see comments) (Unable to assess) (25 111)     Hand  Strength, Right (Malnutrition): Unable to assess (25)  A minimum of two characteristics is recommended for diagnosis of either severe or non-severe malnutrition.    Chart Review    Reason Seen: physician consult for wounds and follow-up    Malnutrition Screening Tool Results   Have you recently lost weight without trying?: Unsure  Have you been eating poorly because of a decreased appetite?: Yes   MST Score: 3   Diagnosis:  Cellulitis 2022      Pressure injury of left buttock, stage 4      Relevant Medical History: chronic UTI, paraplegia    Scheduled Medications:  amLODIPine, 5 mg, Daily  apixaban, 5 mg, BID  baclofen, 20 mg, TID  cefTAZidime-avibactam (Avycaz) IV (PEDS and ADULTS), 2.5 g, Q8H  fluticasone propionate, 1 spray, BID  gabapentin, 800  mg, TID  hydrOXYzine pamoate, 25 mg, TID  mirtazapine, 15 mg, Nightly  oxybutynin, 10 mg, QAM  pantoprazole, 40 mg, Daily  polyethylene glycol, 17 g, Daily  sertraline, 50 mg, Daily  vancomycin (VANCOCIN) 1,000 mg in D5W 250 mL IVPB (admixture device), 1,000 mg, Q12H    Continuous Infusions:   PRN Medications:  acetaminophen, 325 mg, Q4H PRN  cyclobenzaprine, 10 mg, BID PRN  docusate sodium, 100 mg, BID PRN  hydrALAZINE, 10 mg, Q3H PRN  HYDROcodone-acetaminophen, 1 tablet, Q6H PRN  HYDROmorphone, 1 mg, Q4H PRN  labetalol, 10 mg, Q4H PRN  ondansetron, 4 mg, Q4H PRN  oxyCODONE-acetaminophen, 1 tablet, Q6H PRN  traZODone, 50 mg, Nightly PRN  vancomycin - pharmacy to dose, , pharmacy to manage frequency    Calorie Containing IV Medications: no significant kcals from medications at this time    Recent Labs   Lab 01/25/25  2243 01/27/25  0323 01/28/25  0744    140  --    K 3.3* 4.2  --    CALCIUM 9.4 8.9  --    MG  --  1.80  --     106  --    CO2 23 23  --    BUN 10.6 11.1  --    CREATININE 0.98 0.67* 0.68*   EGFRNORACEVR >60 >60 >60   GLUCOSE 134* 145*  --    BILITOT 0.4 0.4  --    ALKPHOS 115 99  --    ALT <5 <5  --    AST 6 7  --    ALBUMIN 3.4* 3.2*  --    HSCRP 83.70*  --   --    LIPASE 13  --   --    WBC 10.93 8.54  --    HGB 11.5* 10.3*  --    HCT 36.5* 33.9*  --      Nutrition Orders:  Diet Adult Regular      Appetite/Oral Intake: good/% of meals  Factors Affecting Nutritional Intake: nausea  Social Needs Impacting Access to Food: none identified  Food/Adventism/Cultural Preferences: unable to obtain  Food Allergies: no known food allergies  Last Bowel Movement: 01/29/25  Wound(s):     Altered Skin Integrity 01/06/24 1640 Sacral spine #1 Pressure Injury-Tissue loss description: Full thickness (Resolved)       Altered Skin Integrity 03/31/24 0400 Left posterior Greater trochanter #3 Non pressure chronic ulcer Full thickness tissue loss. Subcutaneous fat may be visible but bone, tendon or muscle  are not exposed-Tissue loss description: Full thickness     Comments    1/26/25: Pt NPO at this time 2/2 abd pain. Rec'd adv diet once medically feasible. Rec'd Brandon and Boost TID once diet adv, as well as vitamin and mineral regimen for wound healing. Wt appears stable over last year per EMR, will monitor and trend wts. Labs and meds reviewed.     1/29/25: Pt had just finished eating all of lunch during rounds; reports that he is eating better; having some nausea; would like a strawberry oral supplement.     Anthropometrics    Height: 6' (182.9 cm), Height Method: Stated  Last Weight: 72.6 kg (160 lb) (01/26/25 1945), Weight Method: Bed Scale  BMI (Calculated): 21.7  BMI Classification: normal (BMI 18.5-24.9)        Ideal Body Weight (IBW), Male: 178 lb     % Ideal Body Weight, Male (lb): 89.89 %                          Usual Weight Provided By: EMR weight history    Wt Readings from Last 5 Encounters:   01/26/25 72.6 kg (160 lb)   12/09/24 74.8 kg (165 lb)   09/27/24 69.4 kg (153 lb)   07/03/24 69.4 kg (153 lb)   03/19/24 69.5 kg (153 lb 4.8 oz)     Weight Change(s) Since Admission: new admit  Wt Readings from Last 1 Encounters:   01/26/25 1945 72.6 kg (160 lb)   01/25/25 2113 72.6 kg (160 lb)   Admit Weight: 72.6 kg (160 lb) (01/25/25 2113), Weight Method: Stated    Estimated Needs    Weight Used For Calorie Calculations: 72.6 kg (160 lb 0.9 oz)  Energy Calorie Requirements (kcal): 2730-4332 kcal (1.2-1.3 SFxMSJ)  Energy Need Method: [place]- Jeor  Weight Used For Protein Calculations: 72.6 kg (160 lb 0.9 oz)  Protein Requirements: 109-145 g (1.5-2.0 g/kg)  Fluid Requirements (mL): 2178 mL/d (30 mL/kg)        Enteral Nutrition     Patient not receiving enteral nutrition at this time.    Parenteral Nutrition     Patient not receiving parenteral nutrition support at this time.    Evaluation of Received Nutrient Intake    Calories: meeting estimated needs  Protein: meeting estimated needs    Patient Education      Not applicable.    Nutrition Diagnosis     PES: Inadequate oral intake related to inability to consume sufficient nutrients as evidenced by NPO. (resolved)     PES: PES: Increased nutrient needs (protein) related to increased protein energy demand for wound healing as evidenced by multiple full thickness tissue loss wounds. (new)     PES:            Nutrition Interventions     Intervention(s): general/healthful diet, commercial beverage, multivitamin/mineral supplement therapy, and collaboration with other providers  Intervention(s): Oral diet/nutrient modifications;Oral nutritional supplement    Goal: Meet greater than 80% of nutritional needs by follow-up. (goal progressing)  Goal: Consume % of oral supplements by follow-up. (goal progressing)    Nutrition Goals & Monitoring     Dietitian will monitor: energy intake, weight, electrolyte/renal panel, glucose/endocrine profile, and gastrointestinal profile  Discharge planning: continue regular diet with Boost VHC/[facility] oral supplements  Nutrition Risk/Follow-Up: moderate (follow-up in 3-5 days)   Please consult if re-assessment needed sooner.
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
## 2025-01-30 LAB
ALBUMIN SERPL-MCNC: 3.3 G/DL (ref 3.5–5)
ALBUMIN/GLOB SERPL: 0.8 RATIO (ref 1.1–2)
ALP SERPL-CCNC: 92 UNIT/L (ref 40–150)
ALT SERPL-CCNC: 8 UNIT/L (ref 0–55)
ANION GAP SERPL CALC-SCNC: 10 MEQ/L
AST SERPL-CCNC: 11 UNIT/L (ref 5–34)
BASOPHILS # BLD AUTO: 0.15 X10(3)/MCL
BASOPHILS NFR BLD AUTO: 1.8 %
BILIRUB SERPL-MCNC: 0.4 MG/DL
BUN SERPL-MCNC: 13.9 MG/DL (ref 8.4–25.7)
CALCIUM SERPL-MCNC: 9 MG/DL (ref 8.4–10.2)
CHLORIDE SERPL-SCNC: 101 MMOL/L (ref 98–107)
CO2 SERPL-SCNC: 29 MMOL/L (ref 22–29)
CREAT SERPL-MCNC: 0.66 MG/DL (ref 0.72–1.25)
CREAT/UREA NIT SERPL: 21
EOSINOPHIL # BLD AUTO: 0.48 X10(3)/MCL (ref 0–0.9)
EOSINOPHIL NFR BLD AUTO: 5.7 %
ERYTHROCYTE [DISTWIDTH] IN BLOOD BY AUTOMATED COUNT: 15.6 % (ref 11.5–17)
GFR SERPLBLD CREATININE-BSD FMLA CKD-EPI: >60 ML/MIN/1.73/M2
GLOBULIN SER-MCNC: 4.4 GM/DL (ref 2.4–3.5)
GLUCOSE SERPL-MCNC: 88 MG/DL (ref 74–100)
GRAM STN SPEC: NORMAL
GRAM STN SPEC: NORMAL
HCT VFR BLD AUTO: 34.7 % (ref 42–52)
HGB BLD-MCNC: 10.5 G/DL (ref 14–18)
IMM GRANULOCYTES # BLD AUTO: 0.03 X10(3)/MCL (ref 0–0.04)
IMM GRANULOCYTES NFR BLD AUTO: 0.4 %
INR PPP: 1
LYMPHOCYTES # BLD AUTO: 2.61 X10(3)/MCL (ref 0.6–4.6)
LYMPHOCYTES NFR BLD AUTO: 30.8 %
MCH RBC QN AUTO: 24.7 PG (ref 27–31)
MCHC RBC AUTO-ENTMCNC: 30.3 G/DL (ref 33–36)
MCV RBC AUTO: 81.6 FL (ref 80–94)
MONOCYTES # BLD AUTO: 0.58 X10(3)/MCL (ref 0.1–1.3)
MONOCYTES NFR BLD AUTO: 6.8 %
NEUTROPHILS # BLD AUTO: 4.63 X10(3)/MCL (ref 2.1–9.2)
NEUTROPHILS NFR BLD AUTO: 54.5 %
NRBC BLD AUTO-RTO: 0 %
PLATELET # BLD AUTO: 334 X10(3)/MCL (ref 130–400)
PMV BLD AUTO: 10.3 FL (ref 7.4–10.4)
POTASSIUM SERPL-SCNC: 4 MMOL/L (ref 3.5–5.1)
PROT SERPL-MCNC: 7.7 GM/DL (ref 6.4–8.3)
PROTHROMBIN TIME: 13.5 SECONDS (ref 12.5–14.5)
RBC # BLD AUTO: 4.25 X10(6)/MCL (ref 4.7–6.1)
SODIUM SERPL-SCNC: 140 MMOL/L (ref 136–145)
WBC # BLD AUTO: 8.48 X10(3)/MCL (ref 4.5–11.5)

## 2025-01-30 PROCEDURE — 25000003 PHARM REV CODE 250: Performed by: INTERNAL MEDICINE

## 2025-01-30 PROCEDURE — 99233 SBSQ HOSP IP/OBS HIGH 50: CPT | Mod: ,,, | Performed by: SURGERY

## 2025-01-30 PROCEDURE — 36415 COLL VENOUS BLD VENIPUNCTURE: CPT | Performed by: INTERNAL MEDICINE

## 2025-01-30 PROCEDURE — 87205 SMEAR GRAM STAIN: CPT | Performed by: INTERNAL MEDICINE

## 2025-01-30 PROCEDURE — 85025 COMPLETE CBC W/AUTO DIFF WBC: CPT | Performed by: INTERNAL MEDICINE

## 2025-01-30 PROCEDURE — 21400001 HC TELEMETRY ROOM

## 2025-01-30 PROCEDURE — 85610 PROTHROMBIN TIME: CPT | Performed by: RADIOLOGY

## 2025-01-30 PROCEDURE — 63600175 PHARM REV CODE 636 W HCPCS: Performed by: INTERNAL MEDICINE

## 2025-01-30 PROCEDURE — 97162 PT EVAL MOD COMPLEX 30 MIN: CPT

## 2025-01-30 PROCEDURE — 80053 COMPREHEN METABOLIC PANEL: CPT | Performed by: INTERNAL MEDICINE

## 2025-01-30 PROCEDURE — 0K9Q30Z DRAINAGE OF RIGHT UPPER LEG MUSCLE WITH DRAINAGE DEVICE, PERCUTANEOUS APPROACH: ICD-10-PCS | Performed by: PREVENTIVE MEDICINE

## 2025-01-30 PROCEDURE — 36415 COLL VENOUS BLD VENIPUNCTURE: CPT | Performed by: RADIOLOGY

## 2025-01-30 PROCEDURE — 87102 FUNGUS ISOLATION CULTURE: CPT | Performed by: INTERNAL MEDICINE

## 2025-01-30 PROCEDURE — 25000003 PHARM REV CODE 250: Performed by: NURSE PRACTITIONER

## 2025-01-30 PROCEDURE — 87075 CULTR BACTERIA EXCEPT BLOOD: CPT | Performed by: INTERNAL MEDICINE

## 2025-01-30 PROCEDURE — 87070 CULTURE OTHR SPECIMN AEROBIC: CPT | Performed by: INTERNAL MEDICINE

## 2025-01-30 PROCEDURE — 63600175 PHARM REV CODE 636 W HCPCS: Mod: JZ,TB | Performed by: NURSE PRACTITIONER

## 2025-01-30 PROCEDURE — 27000207 HC ISOLATION

## 2025-01-30 RX ORDER — BACLOFEN 10 MG/1
20 TABLET ORAL ONCE
Status: COMPLETED | OUTPATIENT
Start: 2025-01-30 | End: 2025-01-30

## 2025-01-30 RX ORDER — BACLOFEN 10 MG/1
20 TABLET ORAL EVERY 8 HOURS
Status: DISCONTINUED | OUTPATIENT
Start: 2025-01-30 | End: 2025-02-03 | Stop reason: HOSPADM

## 2025-01-30 RX ORDER — PANTOPRAZOLE SODIUM 40 MG/1
40 TABLET, DELAYED RELEASE ORAL DAILY
Status: DISCONTINUED | OUTPATIENT
Start: 2025-01-31 | End: 2025-02-03 | Stop reason: HOSPADM

## 2025-01-30 RX ADMIN — HYDROMORPHONE HYDROCHLORIDE 1 MG: 2 INJECTION, SOLUTION INTRAMUSCULAR; INTRAVENOUS; SUBCUTANEOUS at 07:01

## 2025-01-30 RX ADMIN — CYCLOBENZAPRINE 10 MG: 10 TABLET, FILM COATED ORAL at 10:01

## 2025-01-30 RX ADMIN — OXYCODONE AND ACETAMINOPHEN 1 TABLET: 10; 325 TABLET ORAL at 09:01

## 2025-01-30 RX ADMIN — GABAPENTIN 800 MG: 300 CAPSULE ORAL at 05:01

## 2025-01-30 RX ADMIN — MIRTAZAPINE 15 MG: 15 TABLET, FILM COATED ORAL at 07:01

## 2025-01-30 RX ADMIN — BACLOFEN 20 MG: 10 TABLET ORAL at 10:01

## 2025-01-30 RX ADMIN — HYDROXYZINE PAMOATE 25 MG: 25 CAPSULE ORAL at 09:01

## 2025-01-30 RX ADMIN — CYCLOBENZAPRINE 10 MG: 10 TABLET, FILM COATED ORAL at 04:01

## 2025-01-30 RX ADMIN — HYDROCODONE BITARTRATE AND ACETAMINOPHEN 1 TABLET: 5; 325 TABLET ORAL at 12:01

## 2025-01-30 RX ADMIN — CYCLOBENZAPRINE 10 MG: 10 TABLET, FILM COATED ORAL at 09:01

## 2025-01-30 RX ADMIN — HYDROMORPHONE HYDROCHLORIDE 1 MG: 2 INJECTION, SOLUTION INTRAMUSCULAR; INTRAVENOUS; SUBCUTANEOUS at 11:01

## 2025-01-30 RX ADMIN — VANCOMYCIN HYDROCHLORIDE 1250 MG: 1.25 INJECTION, POWDER, LYOPHILIZED, FOR SOLUTION INTRAVENOUS at 07:01

## 2025-01-30 RX ADMIN — BACLOFEN 20 MG: 10 TABLET ORAL at 01:01

## 2025-01-30 RX ADMIN — HYDROXYZINE PAMOATE 25 MG: 25 CAPSULE ORAL at 07:01

## 2025-01-30 RX ADMIN — HYDROMORPHONE HYDROCHLORIDE 1 MG: 2 INJECTION, SOLUTION INTRAMUSCULAR; INTRAVENOUS; SUBCUTANEOUS at 02:01

## 2025-01-30 RX ADMIN — OXYCODONE AND ACETAMINOPHEN 1 TABLET: 10; 325 TABLET ORAL at 10:01

## 2025-01-30 RX ADMIN — CEFTAZIDIME, AVIBACTAM 2.5 G: 2; .5 POWDER, FOR SOLUTION INTRAVENOUS at 10:01

## 2025-01-30 RX ADMIN — PANTOPRAZOLE SODIUM 40 MG: 40 INJECTION, POWDER, LYOPHILIZED, FOR SOLUTION INTRAVENOUS at 09:01

## 2025-01-30 RX ADMIN — HYDROCODONE BITARTRATE AND ACETAMINOPHEN 1 TABLET: 5; 325 TABLET ORAL at 06:01

## 2025-01-30 RX ADMIN — POLYETHYLENE GLYCOL 3350 17 G: 17 POWDER, FOR SOLUTION ORAL at 09:01

## 2025-01-30 RX ADMIN — CEFTAZIDIME, AVIBACTAM 2.5 G: 2; .5 POWDER, FOR SOLUTION INTRAVENOUS at 11:01

## 2025-01-30 RX ADMIN — OXYCODONE AND ACETAMINOPHEN 1 TABLET: 10; 325 TABLET ORAL at 04:01

## 2025-01-30 RX ADMIN — APIXABAN 5 MG: 5 TABLET, FILM COATED ORAL at 07:01

## 2025-01-30 RX ADMIN — HYDROCODONE BITARTRATE AND ACETAMINOPHEN 1 TABLET: 5; 325 TABLET ORAL at 01:01

## 2025-01-30 RX ADMIN — GABAPENTIN 800 MG: 300 CAPSULE ORAL at 01:01

## 2025-01-30 RX ADMIN — BACLOFEN 20 MG: 10 TABLET ORAL at 05:01

## 2025-01-30 RX ADMIN — VANCOMYCIN HYDROCHLORIDE 1250 MG: 1.25 INJECTION, POWDER, LYOPHILIZED, FOR SOLUTION INTRAVENOUS at 06:01

## 2025-01-30 RX ADMIN — GABAPENTIN 800 MG: 300 CAPSULE ORAL at 10:01

## 2025-01-30 RX ADMIN — FLUTICASONE PROPIONATE 50 MCG: 50 SPRAY, METERED NASAL at 10:01

## 2025-01-30 RX ADMIN — CEFTAZIDIME, AVIBACTAM 2.5 G: 2; .5 POWDER, FOR SOLUTION INTRAVENOUS at 05:01

## 2025-01-30 RX ADMIN — HYDROMORPHONE HYDROCHLORIDE 1 MG: 2 INJECTION, SOLUTION INTRAMUSCULAR; INTRAVENOUS; SUBCUTANEOUS at 03:01

## 2025-01-30 RX ADMIN — OXYBUTYNIN CHLORIDE 10 MG: 5 TABLET, EXTENDED RELEASE ORAL at 05:01

## 2025-01-30 NOTE — PLAN OF CARE
Problem: Physical Therapy  Goal: Physical Therapy Goal  Description: Pt will be seen for the following goals  1. Pt will be mod ind with rolling left and right, sit to supine,and supine to sit  2. Pt will be mod ind with tranfers bed to wc and wc to bed with a slide board  Outcome: Progressing

## 2025-01-30 NOTE — PROGRESS NOTES
Pharmacist Intervention IV to PO Note    Hemal Guerrero is a 49 y.o. male being treated with IV medication pantoprazole    Patient Data:    Vital Signs (Most Recent):  Temp: 98 °F (36.7 °C) (01/30/25 1141)  Pulse: 80 (01/30/25 1141)  Resp: 20 (01/30/25 1141)  BP: 133/82 (01/30/25 1141)  SpO2: 100 % (01/30/25 1141) Vital Signs (72h Range):  Temp:  [97.7 °F (36.5 °C)-98.5 °F (36.9 °C)]   Pulse:  []   Resp:  [16-23]   BP: ()/(44-89)   SpO2:  [96 %-100 %]      CBC:  Recent Labs   Lab 01/27/25  0323 01/29/25  1630 01/30/25  0457   WBC 8.54 9.89 8.48   RBC 4.23* 3.71* 4.25*   HGB 10.3* 9.5* 10.5*   HCT 33.9* 30.2* 34.7*    313 334   MCV 80.1 81.4 81.6   MCH 24.3* 25.6* 24.7*   MCHC 30.4* 31.5* 30.3*     CMP:     Recent Labs   Lab 01/25/25  2243 01/27/25  0323 01/28/25  0744 01/29/25  1630 01/30/25  0457   CALCIUM 9.4 8.9  --   --  9.0   ALBUMIN 3.4* 3.2*  --   --  3.3*    140  --   --  140   K 3.3* 4.2  --   --  4.0   CO2 23 23  --   --  29    106  --   --  101   BUN 10.6 11.1  --   --  13.9   CREATININE 0.98 0.67* 0.68* 0.61* 0.66*   ALKPHOS 115 99  --   --  92   ALT <5 <5  --   --  8   AST 6 7  --   --  11   BILITOT 0.4 0.4  --   --  0.4       Dietary Orders:  Diet Orders            Dietary nutrition supplements BID; Brandon - Any flavor starting at 01/29 1326    Dietary nutrition supplements TID; Boost Very High Calorie Nutritional Drink - Strawberry starting at 01/29 1317    Diet Adult Regular: Regular starting at 01/26 1311            Based on the following criteria, this patient qualifies for intravenous to oral conversion:  [x] The patients gastrointestinal tract is functioning (tolerating medications via oral or enteral route for 24 hours and tolerating food or enteral feeds for 24 hours).     [     IV medication protonix will be changed to oral medication protonix    Pharmacist's Name: Ria Rodríguez  Pharmacist's Extension: [phone]

## 2025-01-30 NOTE — BRIEF OP NOTE
[facility] - [facility]  Radiology - Brief Procedure Note    Procedure Performed by: Orville Saucedo MD  Date: 01/30/2025 Time: 2:21 PM    Procedure: US-guided drain placement    Pre-Op Diagnosis: right thigh collection  Post-Op Diagnosis: right thigh collection    Procedure Findings: Anterior right thigh prepped/draped in usual sterile technique. Ultrasound then utilized in typical fashion to achieve needle access into target fluid collection, followed by insertion of wire and placement of 8F pigtail drain.    Estimated Blood Loss: Minimal    Specimen/Tissue Removed: Yes - approx 10mL serosanguinous fluid aspirated and sent to lab for further eval.    Complications: No immediate post-procedure complications identified.    Please refer to dedicated guidance imaging report for additional details.      ORVILLE SAUCEDO MD  Radiology  [phone] // .[phone] // .[phone]

## 2025-01-30 NOTE — CONSULTS
[facility]  Interventional Radiology - Consultation Note    SUBJECTIVE   History of Present Illness: Hemal Guerrero is a 49 y.o. male with anterior right thigh collection seen on recent CT.    IR service consulted for consideration of image-guided drainage.    Scheduled Meds:    amLODIPine  5 mg Oral Daily    apixaban  5 mg Oral BID    baclofen  20 mg Oral Q8H    cefTAZidime-avibactam (Avycaz) IV (PEDS and ADULTS)  2.5 g Intravenous Q8H    fluticasone propionate  1 spray Each Nostril BID    gabapentin  800 mg Oral Q8H    hydrOXYzine pamoate  25 mg Oral TID    mirtazapine  15 mg Oral Nightly    oxybutynin  10 mg Oral QAM    [START ON 1/31/2025] pantoprazole  40 mg Oral Daily    polyethylene glycol  17 g Oral Daily    sertraline  50 mg Oral Daily    vancomycin 1,250 mg in D5W 250 mL IVPB (admixture device)  1,250 mg Intravenous Q12H       Continuous Infusions:       PRN Meds:    Current Facility-Administered Medications:     acetaminophen, 325 mg, Oral, Q4H PRN    cyclobenzaprine, 10 mg, Oral, BID PRN    docusate sodium, 100 mg, Oral, BID PRN    hydrALAZINE, 10 mg, Intravenous, Q3H PRN    HYDROcodone-acetaminophen, 1 tablet, Oral, Q6H PRN    HYDROmorphone, 1 mg, Intravenous, Q4H PRN    labetalol, 10 mg, Intravenous, Q4H PRN    ondansetron, 4 mg, Intravenous, Q4H PRN    oxyCODONE-acetaminophen, 1 tablet, Oral, Q6H PRN    traZODone, 50 mg, Oral, Nightly PRN    vancomycin - pharmacy to dose, , Intravenous, pharmacy to manage frequency    Review of patient's allergies indicates:   Allergen Reactions    Adhesive     Amitriptyline        Past Medical History:   Diagnosis Date    Chronic UTI     Paraplegia        Review of Systems: Intake ROS reviewed; no significant interval changes.    OBJECTIVE     VITAL SIGNS: 24 HR MIN & MAX LAST   Temp  Min: 97.7 °F (36.5 °C)  Max: 98.1 °F (36.7 °C)  98 °F (36.7 °C)   BP  Min: 119/80  Max: 142/89  133/82    Pulse  Min: 75  Max: 96  80    Resp  Min: 16   Max: 22  20    SpO2  Min: 98 %  Max: 100 %  100 %      Laboratory:  Lab Results   Component Value Date/Time    WBC 8.48 2025 04:57 AM    HGB 10.5 (L) 2025 04:57 AM    HCT 34.7 (L) 2025 04:57 AM     Lab Results   Component Value Date/Time     2025 04:57 AM    INR 1.0 2025 08:44 AM    INR 1.0 10/22/2021 03:09 AM       Imagin CT abd/pelv - peripherally enhancing intramuscular collection now seen at the anterior right thigh, newly developed since CT dated 9 Dec 24    ASSESSMENT/PLAN   49 y.o. male with newly developed anterior right thigh collection.      IR plan/recs:  Will bring patient to US for aspiration vs drain placement.      Thank you for including us in the care of this patient. Please call with any questions.    Orville Saucedo MD  Radiology  [phone] // [phone] // [phone]
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
## 2025-01-30 NOTE — PROGRESS NOTES
Acute Care Surgery   Progress Note  Admit Date: 1/25/2025  HD#4  POD#* No surgery found *    Subjective:   Interval history:  AF  Denies nausea or vomit  Denies chills  No pain or changes in sensation RLE   Tolerating current diet   Home Meds:  Current Outpatient Medications   Medication Instructions    0.9% NaCl PgBk 100 mL with meropenem 1 gram SolR 1 g 1 g, Intravenous, Every 8 hours    0.9% NaCl PgBk 100 mL with micafungin 100 mg SolR 100 mg 100 mg, Intravenous, Daily    amLODIPine (NORVASC) 5 mg, Oral, Daily    baclofen (LIORESAL) 20 mg, 3 times daily    cyclobenzaprine (FLEXERIL) 10 mg, 2 times daily PRN    docusate sodium (COLACE) 250 mg, Daily PRN    ELIQUIS 5 mg, 2 times daily    erythromycin (ROMYCIN) ophthalmic ointment Place a 1/2 inch ribbon of ointment into the lower eyelid.    FEROSUL 325 mg (65 mg iron) Tab tablet 1 tablet, Every morning    fluticasone propionate (FLONASE) 50 mcg/actuation nasal spray 1 spray, 2 times daily    gabapentin (NEURONTIN) 800 mg, 3 times daily    HYDROcodone-acetaminophen (NORCO) 5-325 mg per tablet 1 tablet, Oral, Every 6 hours PRN    hydrOXYzine pamoate (VISTARIL) 25 mg, 3 times daily    mirtazapine (REMERON) 15 mg, Oral, Nightly    oxybutynin (DITROPAN-XL) 10 MG 24 hr tablet 1 tablet, Every morning    oxyCODONE-acetaminophen (PERCOCET)  mg per tablet 1 tablet, 2 times daily PRN    sertraline (ZOLOFT) 50 mg, Oral, Daily      Scheduled Meds:   amLODIPine  5 mg Oral Daily    apixaban  5 mg Oral BID    baclofen  20 mg Oral Q8H    cefTAZidime-avibactam (Avycaz) IV (PEDS and ADULTS)  2.5 g Intravenous Q8H    fluticasone propionate  1 spray Each Nostril BID    gabapentin  800 mg Oral Q8H    hydrOXYzine pamoate  25 mg Oral TID    mirtazapine  15 mg Oral Nightly    oxybutynin  10 mg Oral QAM    pantoprazole  40 mg Intravenous Daily    polyethylene glycol  17 g Oral Daily    sertraline  50 mg Oral Daily    vancomycin 1,250 mg in D5W 250 mL IVPB (admixture device)  1,250  mg Intravenous Q12H     Continuous Infusions:  PRN Meds:  Current Facility-Administered Medications:     acetaminophen, 325 mg, Oral, Q4H PRN    cyclobenzaprine, 10 mg, Oral, BID PRN    docusate sodium, 100 mg, Oral, BID PRN    hydrALAZINE, 10 mg, Intravenous, Q3H PRN    HYDROcodone-acetaminophen, 1 tablet, Oral, Q6H PRN    HYDROmorphone, 1 mg, Intravenous, Q4H PRN    labetalol, 10 mg, Intravenous, Q4H PRN    ondansetron, 4 mg, Intravenous, Q4H PRN    oxyCODONE-acetaminophen, 1 tablet, Oral, Q6H PRN    traZODone, 50 mg, Oral, Nightly PRN    vancomycin - pharmacy to dose, , Intravenous, pharmacy to manage frequency     Objective:     VITAL SIGNS: 24 HR MIN & MAX LAST   Temp  Min: 97.7 °F (36.5 °C)  Max: 98.2 °F (36.8 °C)  98 °F (36.7 °C)   BP  Min: 119/80  Max: 142/89  128/82    Pulse  Min: 75  Max: 96  84    Resp  Min: 16  Max: 22  20    SpO2  Min: 98 %  Max: 100 %  98 %      HT: 6' (182.9 cm)  WT: 72.6 kg (160 lb)  BMI: 21.7     Intake/output:  Intake/Output - Last 3 Shifts         01/28 0700  01/29 0659 01/29 0700 01/30 0659 01/30 0700  01/31 0659    P.O.  780     IV Piggyback 441.7      Total Intake(mL/kg) 441.7 (6.1) 780 (10.7)     Urine (mL/kg/hr) 1800 (1) 3150 (1.8)     Stool 0 0     Total Output 1800 3150     Net -1358.3 -2370            Stool Occurrence 0 x 1 x             Intake/Output Summary (Last 24 hours) at 1/30/2025 0851  Last data filed at 1/30/2025 0620  Gross per 24 hour   Intake 780 ml   Output 3150 ml   Net -2370 ml           Lines/drains/airway:       Peripheral IV - Single Lumen 01/28/25 1832 20 G Left;Posterior;Proximal Forearm (Active)   Site Assessment Clean;Dry;Intact 01/30/25 0735   Line Status Saline locked 01/30/25 0735   Dressing Status Clean;Dry;Intact 01/30/25 0735   Dressing Intervention Integrity maintained 01/30/25 0735   Number of days: 1            Suprapubic Catheter (Active)   Balloon Size (mL) 10 01/29/25 0036   Clamp Status unclamped 01/30/25 0400   Dressing no dressing  "01/30/25 0400   Characteristics dry 01/30/25 0400   Urine Color Yellow 01/30/25 0400   Collection Container Standard drainage bag 01/30/25 0400   Securement secured to abdomen w/ adhesive device 01/30/25 0400   Intermittent Irrigation W/ sterile normal saline 01/29/25 0036   Instillation Of sterile normal saline 01/29/25 0036   Site Assessment Clean;Intact 01/30/25 0400   Catheter Care Performed yes 01/29/25 0036   Number of days:        Physical examination:  General: Well developed, well nourished, no acute distress  HEENT: Normocephalic, PERRL  CV: RR  Resp: No increased work of breathing  GI:  Abdomen soft, non-tender, non-distended, multiple well healed midline scars  :  Deferred  MSK: Right thigh without erythema or fluctuance, slightly indurated, no palpable fluid collection, LLE with ischial wound, clean and dry  Skin/wounds:  No rashes, ulcers, erythema  Neuro:  CNII-XII grossly intact, alert and oriented to person, place, and time    Labs:  Renal:  Recent Labs     01/28/25  0744 01/29/25  1630 01/30/25  0457   BUN  --   --  13.9   CREATININE 0.68* 0.61* 0.66*     No results for input(s): "LACTIC" in the last 72 hours.  FENGI:  Recent Labs     01/30/25  0457      K 4.0      CO2 29   CALCIUM 9.0   ALBUMIN 3.3*   BILITOT 0.4   AST 11   ALKPHOS 92   ALT 8     Heme:  Recent Labs     01/29/25  1630 01/30/25  0457   HGB 9.5* 10.5*   HCT 30.2* 34.7*    334     ID:  Recent Labs     01/29/25  1630 01/30/25  0457   WBC 9.89 8.48     CBG:  Recent Labs     01/30/25  0457   GLUCOSE 88      Cardiovascular:  No results for input(s): "TROPONINI", "CKTOTAL", "CKMB", "BNP" in the last 168 hours.  ABG:  No results for input(s): "PH", "PO2", "PCO2", "HCO3", "BE" in the last 168 hours.   I have reviewed all pertinent lab results within the past 24 hours.    Imaging:  CT Abdomen Pelvis With IV Contrast NO Oral Contrast   Final Result      1. Moderate to large volume colonic stool compatible with " constipation.   2. Interval development of peripherally enhancing fluid collection anterior right thigh extending to the anterior aspect of the proximal right femur suspicious for abscess.   3. Mildly enlarged bilateral inguinal lymph nodes likely reactive.   4. Additional findings as above.   5. [place] concordance.         Electronically signed by: Maxwell Lopez MD   Date:    01/26/2025   Time:    08:09         I have reviewed all pertinent imaging results/findings within the past 24 hours.    Micro/Path/Other:  Microbiology Results (last 7 days)       Procedure Component Value Units Date/Time    Blood Culture #1 **CANNOT BE ORDERED STAT** [[id number]]  (Normal) Collected: 01/25/25 2243    Order Status: Completed Specimen: Blood from Hand, Right Updated: 01/30/25 0008     Blood Culture No Growth At 96 Hours    Blood Culture #2 **CANNOT BE ORDERED STAT** [[id number]]  (Normal) Collected: 01/25/25 2243    Order Status: Completed Specimen: Blood from Hand, Left Updated: 01/30/25 0008     Blood Culture No Growth At 96 Hours    Urine culture [[id number]]  (Abnormal) Collected: 01/25/25 2258    Order Status: Completed Specimen: Urine Updated: 01/29/25 1210     Urine Culture >/= 100,000 colonies/ml Klebsiella pneumoniae esbl           Pathology Results  (Last 7 days)      None             Assessment & Plan:    50 y/o M with complicated UTI now with right thigh enhancing fluid collection on CT scan. Deep within muscle wall without superficial fluctuance.     - IR consult for drain placement  - Continue abx per ID and medicine team  - Will continue to follow    Harpal Bosch MD  [employer] General Surgery- PGY1

## 2025-01-30 NOTE — PLAN OF CARE
Problem: Adult Inpatient Plan of Care  Goal: Plan of Care Review  Outcome: Progressing  Goal: Absence of Hospital-Acquired Illness or Injury  Outcome: Progressing  Goal: Optimal Comfort and Wellbeing  Outcome: Progressing  Goal: Readiness for Transition of Care  Outcome: Progressing     Problem: Skin Injury Risk Increased  Goal: Skin Health and Integrity  Outcome: Progressing     Problem: Infection  Goal: Absence of Infection Signs and Symptoms  Outcome: Progressing     Problem: Pain Acute  Goal: Optimal Pain Control and Function  Outcome: Progressing     Problem: Fall Injury Risk  Goal: Absence of Fall and Fall-Related Injury  Outcome: Progressing

## 2025-01-30 NOTE — NURSING
01/30/25 0116 Patient was concerned that at home he takes Gabapentin and Baclofen q 8h. They were ordered as TID with a 12h gap between 2100 dose and 0900. Patient stated he is used to take it q 8h and would not be able to tolerate the pain. ANDRAE Rios was notified. PA ordered meds to be changed to q 8h.

## 2025-01-30 NOTE — PROGRESS NOTES
[facility] Medicine  Progress Note    Patient Name: Hemal Guerrero  MRN: [id number]  Patient Class: IP- Inpatient   Admission Date: 1/25/2025  Length of Stay: 4 days  Attending Physician: Yosvany Simms MD  Primary Care Provider: Margaret Leblanc MD        Subjective:     Principal Problem:Abscess    Interval History:   Today's info : seen and examined, no acute events overnight. Continues to improve   Afebrile  Remains on iv abx  Cx pending  Gen surgery reccomed ir consult for drain placement    Review of Systems   Constitutional:  Positive for fever.   HENT: Negative.     Eyes: Negative.    Respiratory:  Positive for shortness of breath.    Cardiovascular: Negative.    Gastrointestinal: Negative.    Endocrine: Negative.    Genitourinary: Negative.    Musculoskeletal:  Positive for gait problem and joint swelling.   Skin:  Positive for wound.   Allergic/Immunologic: Negative.    Neurological:  Positive for weakness.   Hematological: Negative.    Psychiatric/Behavioral: Negative.       Objective:     Vital Signs (Most Recent):  Temp: 97.7 °F (36.5 °C) (01/30/25 0327)  Pulse: 75 (01/30/25 0327)  Resp: 18 (01/30/25 0214)  BP: 128/79 (01/30/25 0327)  SpO2: 99 % (01/30/25 0327) Vital Signs (24h Range):  Temp:  [97.7 °F (36.5 °C)-98.3 °F (36.8 °C)] 97.7 °F (36.5 °C)  Pulse:  [75-96] 75  Resp:  [16-22] 18  SpO2:  [99 %-100 %] 99 %  BP: (119-142)/(63-89) 128/79     Weight: 72.6 kg (160 lb)  Body mass index is 21.7 kg/m².    Intake/Output Summary (Last 24 hours) at 1/30/2025 0709  Last data filed at 1/30/2025 0620  Gross per 24 hour   Intake 780 ml   Output 3150 ml   Net -2370 ml      Physical Exam  Vitals reviewed.   Constitutional:       Appearance: Normal appearance.   HENT:      Head: Normocephalic and atraumatic.      Right Ear: Tympanic membrane and external ear normal.      Nose: Nose normal.      Mouth/Throat:      Mouth: Mucous membranes are moist.   Eyes:       Extraocular Movements: Extraocular movements intact.      Pupils: Pupils are equal, round, and reactive to light.   Cardiovascular:      Rate and Rhythm: Normal rate and regular rhythm.      Pulses: Normal pulses.      Heart sounds: Normal heart sounds.   Pulmonary:      Effort: Pulmonary effort is normal.      Breath sounds: Normal breath sounds.   Abdominal:      General: Abdomen is flat. Bowel sounds are normal.      Palpations: Abdomen is soft.   Musculoskeletal:         General: Normal range of motion.      Cervical back: Normal range of motion and neck supple.   Skin:     General: Skin is warm and dry.   Neurological:      General: No focal deficit present.      Mental Status: He is alert and oriented to person, place, and time.      Motor: Weakness present.   Psychiatric:         Mood and Affect: Mood normal.         Behavior: Behavior normal.           Overview/Hospital Course: stable    Significant Labs: All pertinent labs within the past 24 hours have been reviewed.  Lab Results   Component Value Date    WBC 8.48 01/30/2025    HGB 10.5 (L) 01/30/2025    HCT 34.7 (L) 01/30/2025    MCV 81.6 01/30/2025     01/30/2025         Recent Labs   Lab 01/30/25  0457      K 4.0      CO2 29   BUN 13.9   CREATININE 0.66*   GLUCOSE 88   CALCIUM 9.0       Significant Imaging: I have reviewed all pertinent imaging results/findings within the past 24 hours.    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] 01/23/2023 Yes      Problems Resolved During this Admission:     VTE Risk Mitigation (From admission, onward)           Ordered     apixaban tablet 5 mg  2 times daily         01/28/25 1549                    Sepsis  Right thigh abscess  Complicated uti  Chronic osteomyelitis of inferior pubic rami  Sacral decubitis ulcer  Paraplegic  Neurogenic bladder w chronic  indwelling vogt   Uti-poa  Deconditioning   PAF  Hx of R foot osteomyelitis  Htn  Iron def  Anxiety/depression     Plan  Cont iv abx  Symptomatic management  Follow cx  Consult ID  Consult wound care   F/u on cx results  Uti- poa, f/u on cx   Resume home meds   Labs in the am  Gi and dvt ppx    Yosvany Simsm MD  Department of Hospital Medicine   [facility]

## 2025-01-31 LAB
ALBUMIN SERPL-MCNC: 3 G/DL (ref 3.5–5)
ALBUMIN/GLOB SERPL: 0.9 RATIO (ref 1.1–2)
ALP SERPL-CCNC: 85 UNIT/L (ref 40–150)
ALT SERPL-CCNC: 11 UNIT/L (ref 0–55)
ANION GAP SERPL CALC-SCNC: 11 MEQ/L
AST SERPL-CCNC: 14 UNIT/L (ref 5–34)
BACTERIA BLD CULT: NORMAL
BACTERIA BLD CULT: NORMAL
BACTERIA UR CULT: ABNORMAL
BASOPHILS # BLD AUTO: 0.13 X10(3)/MCL
BASOPHILS NFR BLD AUTO: 1.7 %
BILIRUB SERPL-MCNC: 0.2 MG/DL
BUN SERPL-MCNC: 12.6 MG/DL (ref 8.4–25.7)
CALCIUM SERPL-MCNC: 8.6 MG/DL (ref 8.4–10.2)
CHLORIDE SERPL-SCNC: 103 MMOL/L (ref 98–107)
CO2 SERPL-SCNC: 25 MMOL/L (ref 22–29)
CREAT SERPL-MCNC: 0.61 MG/DL (ref 0.72–1.25)
CREAT/UREA NIT SERPL: 21
EOSINOPHIL # BLD AUTO: 0.39 X10(3)/MCL (ref 0–0.9)
EOSINOPHIL NFR BLD AUTO: 5.1 %
ERYTHROCYTE [DISTWIDTH] IN BLOOD BY AUTOMATED COUNT: 15.7 % (ref 11.5–17)
GFR SERPLBLD CREATININE-BSD FMLA CKD-EPI: >60 ML/MIN/1.73/M2
GLOBULIN SER-MCNC: 3.5 GM/DL (ref 2.4–3.5)
GLUCOSE SERPL-MCNC: 87 MG/DL (ref 74–100)
HCT VFR BLD AUTO: 33.7 % (ref 42–52)
HGB BLD-MCNC: 10 G/DL (ref 14–18)
IMM GRANULOCYTES # BLD AUTO: 0.03 X10(3)/MCL (ref 0–0.04)
IMM GRANULOCYTES NFR BLD AUTO: 0.4 %
LYMPHOCYTES # BLD AUTO: 2.71 X10(3)/MCL (ref 0.6–4.6)
LYMPHOCYTES NFR BLD AUTO: 35.8 %
MAGNESIUM SERPL-MCNC: 2.3 MG/DL (ref 1.6–2.6)
MCH RBC QN AUTO: 24.8 PG (ref 27–31)
MCHC RBC AUTO-ENTMCNC: 29.7 G/DL (ref 33–36)
MCV RBC AUTO: 83.4 FL (ref 80–94)
MONOCYTES # BLD AUTO: 0.54 X10(3)/MCL (ref 0.1–1.3)
MONOCYTES NFR BLD AUTO: 7.1 %
NEUTROPHILS # BLD AUTO: 3.78 X10(3)/MCL (ref 2.1–9.2)
NEUTROPHILS NFR BLD AUTO: 49.9 %
NRBC BLD AUTO-RTO: 0 %
PHOSPHATE SERPL-MCNC: 3.3 MG/DL (ref 2.3–4.7)
PLATELET # BLD AUTO: 273 X10(3)/MCL (ref 130–400)
PMV BLD AUTO: 9.8 FL (ref 7.4–10.4)
POTASSIUM SERPL-SCNC: 4.7 MMOL/L (ref 3.5–5.1)
PROT SERPL-MCNC: 6.5 GM/DL (ref 6.4–8.3)
RBC # BLD AUTO: 4.04 X10(6)/MCL (ref 4.7–6.1)
SODIUM SERPL-SCNC: 139 MMOL/L (ref 136–145)
WBC # BLD AUTO: 7.58 X10(3)/MCL (ref 4.5–11.5)

## 2025-01-31 PROCEDURE — 36415 COLL VENOUS BLD VENIPUNCTURE: CPT

## 2025-01-31 PROCEDURE — 25000003 PHARM REV CODE 250: Performed by: INTERNAL MEDICINE

## 2025-01-31 PROCEDURE — 05HY33Z INSERTION OF INFUSION DEVICE INTO UPPER VEIN, PERCUTANEOUS APPROACH: ICD-10-PCS | Performed by: INTERNAL MEDICINE

## 2025-01-31 PROCEDURE — 36410 VNPNXR 3YR/> PHY/QHP DX/THER: CPT

## 2025-01-31 PROCEDURE — 85025 COMPLETE CBC W/AUTO DIFF WBC: CPT

## 2025-01-31 PROCEDURE — 25000003 PHARM REV CODE 250: Performed by: NURSE PRACTITIONER

## 2025-01-31 PROCEDURE — 84100 ASSAY OF PHOSPHORUS: CPT

## 2025-01-31 PROCEDURE — 63600175 PHARM REV CODE 636 W HCPCS: Performed by: INTERNAL MEDICINE

## 2025-01-31 PROCEDURE — 80053 COMPREHEN METABOLIC PANEL: CPT

## 2025-01-31 PROCEDURE — 63600175 PHARM REV CODE 636 W HCPCS: Performed by: NURSE PRACTITIONER

## 2025-01-31 PROCEDURE — 76937 US GUIDE VASCULAR ACCESS: CPT

## 2025-01-31 PROCEDURE — C1751 CATH, INF, PER/CENT/MIDLINE: HCPCS

## 2025-01-31 PROCEDURE — 27000207 HC ISOLATION

## 2025-01-31 PROCEDURE — 83735 ASSAY OF MAGNESIUM: CPT

## 2025-01-31 PROCEDURE — 63600175 PHARM REV CODE 636 W HCPCS: Mod: TB,JZ | Performed by: INTERNAL MEDICINE

## 2025-01-31 PROCEDURE — 21400001 HC TELEMETRY ROOM

## 2025-01-31 PROCEDURE — S0039 INJECTION, SULFAMETHOXAZOLE: HCPCS | Performed by: NURSE PRACTITIONER

## 2025-01-31 RX ORDER — SODIUM CHLORIDE 0.9 % (FLUSH) 0.9 %
10 SYRINGE (ML) INJECTION EVERY 12 HOURS PRN
Status: DISCONTINUED | OUTPATIENT
Start: 2025-01-31 | End: 2025-02-03 | Stop reason: HOSPADM

## 2025-01-31 RX ORDER — OXYBUTYNIN CHLORIDE 5 MG/1
10 TABLET, EXTENDED RELEASE ORAL DAILY
Status: DISCONTINUED | OUTPATIENT
Start: 2025-02-01 | End: 2025-02-03 | Stop reason: HOSPADM

## 2025-01-31 RX ORDER — CIPROFLOXACIN 2 MG/ML
400 INJECTION, SOLUTION INTRAVENOUS
Status: DISCONTINUED | OUTPATIENT
Start: 2025-01-31 | End: 2025-02-03 | Stop reason: HOSPADM

## 2025-01-31 RX ADMIN — GABAPENTIN 800 MG: 300 CAPSULE ORAL at 01:01

## 2025-01-31 RX ADMIN — HYDROXYZINE PAMOATE 25 MG: 25 CAPSULE ORAL at 08:01

## 2025-01-31 RX ADMIN — POLYETHYLENE GLYCOL 3350 17 G: 17 POWDER, FOR SOLUTION ORAL at 10:01

## 2025-01-31 RX ADMIN — BACLOFEN 20 MG: 10 TABLET ORAL at 01:01

## 2025-01-31 RX ADMIN — OXYCODONE AND ACETAMINOPHEN 1 TABLET: 10; 325 TABLET ORAL at 11:01

## 2025-01-31 RX ADMIN — AMLODIPINE BESYLATE 5 MG: 5 TABLET ORAL at 10:01

## 2025-01-31 RX ADMIN — FLUTICASONE PROPIONATE 50 MCG: 50 SPRAY, METERED NASAL at 10:01

## 2025-01-31 RX ADMIN — FLUTICASONE PROPIONATE 50 MCG: 50 SPRAY, METERED NASAL at 08:01

## 2025-01-31 RX ADMIN — CIPROFLOXACIN 400 MG: 2 INJECTION, SOLUTION INTRAVENOUS at 08:01

## 2025-01-31 RX ADMIN — GABAPENTIN 800 MG: 300 CAPSULE ORAL at 04:01

## 2025-01-31 RX ADMIN — SERTRALINE HYDROCHLORIDE 50 MG: 50 TABLET ORAL at 10:01

## 2025-01-31 RX ADMIN — OXYBUTYNIN CHLORIDE 10 MG: 5 TABLET, EXTENDED RELEASE ORAL at 06:01

## 2025-01-31 RX ADMIN — PANTOPRAZOLE SODIUM 40 MG: 40 TABLET, DELAYED RELEASE ORAL at 10:01

## 2025-01-31 RX ADMIN — VANCOMYCIN HYDROCHLORIDE 1250 MG: 1.25 INJECTION, POWDER, LYOPHILIZED, FOR SOLUTION INTRAVENOUS at 10:01

## 2025-01-31 RX ADMIN — HYDROMORPHONE HYDROCHLORIDE 1 MG: 2 INJECTION, SOLUTION INTRAMUSCULAR; INTRAVENOUS; SUBCUTANEOUS at 12:01

## 2025-01-31 RX ADMIN — BACLOFEN 20 MG: 10 TABLET ORAL at 04:01

## 2025-01-31 RX ADMIN — HYDROMORPHONE HYDROCHLORIDE 1 MG: 2 INJECTION, SOLUTION INTRAMUSCULAR; INTRAVENOUS; SUBCUTANEOUS at 03:01

## 2025-01-31 RX ADMIN — HYDROMORPHONE HYDROCHLORIDE 1 MG: 2 INJECTION, SOLUTION INTRAMUSCULAR; INTRAVENOUS; SUBCUTANEOUS at 08:01

## 2025-01-31 RX ADMIN — CYCLOBENZAPRINE 10 MG: 10 TABLET, FILM COATED ORAL at 08:01

## 2025-01-31 RX ADMIN — CEFTAZIDIME, AVIBACTAM 2.5 G: 2; .5 POWDER, FOR SOLUTION INTRAVENOUS at 03:01

## 2025-01-31 RX ADMIN — CYCLOBENZAPRINE 10 MG: 10 TABLET, FILM COATED ORAL at 10:01

## 2025-01-31 RX ADMIN — BACLOFEN 20 MG: 10 TABLET ORAL at 08:01

## 2025-01-31 RX ADMIN — HYDROMORPHONE HYDROCHLORIDE 1 MG: 2 INJECTION, SOLUTION INTRAMUSCULAR; INTRAVENOUS; SUBCUTANEOUS at 07:01

## 2025-01-31 RX ADMIN — OXYCODONE AND ACETAMINOPHEN 1 TABLET: 10; 325 TABLET ORAL at 04:01

## 2025-01-31 RX ADMIN — APIXABAN 5 MG: 5 TABLET, FILM COATED ORAL at 08:01

## 2025-01-31 RX ADMIN — VANCOMYCIN HYDROCHLORIDE 1250 MG: 1.25 INJECTION, POWDER, LYOPHILIZED, FOR SOLUTION INTRAVENOUS at 05:01

## 2025-01-31 RX ADMIN — CEFTAZIDIME, AVIBACTAM 2.5 G: 2; .5 POWDER, FOR SOLUTION INTRAVENOUS at 04:01

## 2025-01-31 RX ADMIN — GABAPENTIN 800 MG: 300 CAPSULE ORAL at 08:01

## 2025-01-31 RX ADMIN — OXYCODONE AND ACETAMINOPHEN 1 TABLET: 10; 325 TABLET ORAL at 05:01

## 2025-01-31 RX ADMIN — SULFAMETHOXAZOLE AND TRIMETHOPRIM 363 MG: 80; 16 INJECTION INTRAVENOUS at 09:01

## 2025-01-31 NOTE — PROCEDURES
Hemal Guerrero is a 49 y.o. male patient.    Temp: 98.1 °F (36.7 °C) (01/31/25 1124)  Pulse: 80 (01/31/25 1124)  Resp: 18 (01/31/25 1124)  BP: 123/80 (01/31/25 1124)  SpO2: 99 % (01/31/25 1124)  Weight: 72.6 kg (160 lb) (01/26/25 1945)  Height: 6' (182.9 cm) (01/26/25 1945)    PICC  Time out: Immediately prior to procedure a time out was called to verify the correct patient, procedure, equipment, support staff and site/side marked as required  Indications: med administration  Preparation: skin prepped with ChloraPrep  Skin prep agent dried: skin prep agent completely dried prior to procedure  Sterile barriers: all five maximum sterile barriers used - cap, mask, sterile gown, sterile gloves, and large sterile sheet  Hand hygiene: hand hygiene performed prior to central venous catheter insertion  Location details: left brachial  Catheter type: single lumen  Catheter size: 4 Fr  Catheter Length: 15cm    Ultrasound guidance: yes  Needle advanced into vessel with real time Ultrasound guidance.  Guidewire confirmed in vessel.  Sterile sheath used.  Number of attempts: 1  Post-procedure: blood return through all ports and sterile dressing applied            Name jamie  1/31/2025

## 2025-01-31 NOTE — PROGRESS NOTES
[facility] Medicine  Progress Note    Patient Name: Hemal Guerrero  MRN: [id number]  Patient Class: IP- Inpatient   Admission Date: 1/25/2025  Length of Stay: 5 days  Attending Physician: Yosvany Simms MD  Primary Care Provider: Margaret Leblanc MD        Subjective:     Principal Problem:Abscess    Interval History:   Today's info : seen and examined, no acute events overnight. Continues to improve   Afebrile  Remains on iv abx  Cx pending  Plans for ir drain placement    Review of Systems   Constitutional:  Positive for fever.   HENT: Negative.     Eyes: Negative.    Respiratory:  Positive for shortness of breath.    Cardiovascular: Negative.    Gastrointestinal: Negative.    Endocrine: Negative.    Genitourinary: Negative.    Musculoskeletal:  Positive for gait problem and joint swelling.   Skin:  Positive for wound.   Allergic/Immunologic: Negative.    Neurological:  Positive for weakness.   Hematological: Negative.    Psychiatric/Behavioral: Negative.       Objective:     Vital Signs (Most Recent):  Temp: 98.1 °F (36.7 °C) (01/31/25 1124)  Pulse: 80 (01/31/25 1124)  Resp: 18 (01/31/25 1124)  BP: 123/80 (01/31/25 1124)  SpO2: 99 % (01/31/25 1124) Vital Signs (24h Range):  Temp:  [97.9 °F (36.6 °C)-98.3 °F (36.8 °C)] 98.1 °F (36.7 °C)  Pulse:  [78-85] 80  Resp:  [16-18] 18  SpO2:  [98 %-100 %] 99 %  BP: (102-126)/(65-80) 123/80     Weight: 72.6 kg (160 lb)  Body mass index is 21.7 kg/m².    Intake/Output Summary (Last 24 hours) at 1/31/2025 1434  Last data filed at 1/31/2025 1345  Gross per 24 hour   Intake --   Output 2300 ml   Net -2300 ml      Physical Exam  Vitals reviewed.   Constitutional:       Appearance: Normal appearance.   HENT:      Head: Normocephalic and atraumatic.      Right Ear: Tympanic membrane and external ear normal.      Nose: Nose normal.      Mouth/Throat:      Mouth: Mucous membranes are moist.   Eyes:      Extraocular Movements:  Extraocular movements intact.      Pupils: Pupils are equal, round, and reactive to light.   Cardiovascular:      Rate and Rhythm: Normal rate and regular rhythm.      Pulses: Normal pulses.      Heart sounds: Normal heart sounds.   Pulmonary:      Effort: Pulmonary effort is normal.      Breath sounds: Normal breath sounds.   Abdominal:      General: Abdomen is flat. Bowel sounds are normal.      Palpations: Abdomen is soft.   Musculoskeletal:         General: Normal range of motion.      Cervical back: Normal range of motion and neck supple.   Skin:     General: Skin is warm and dry.   Neurological:      General: No focal deficit present.      Mental Status: He is alert and oriented to person, place, and time.      Motor: Weakness present.   Psychiatric:         Mood and Affect: Mood normal.         Behavior: Behavior normal.           Overview/Hospital Course: stable    Significant Labs: All pertinent labs within the past 24 hours have been reviewed.  Lab Results   Component Value Date    WBC 7.58 01/31/2025    HGB 10.0 (L) 01/31/2025    HCT 33.7 (L) 01/31/2025    MCV 83.4 01/31/2025     01/31/2025         Recent Labs   Lab 01/31/25  0446      K 4.7      CO2 25   BUN 12.6   CREATININE 0.61*   GLUCOSE 87   CALCIUM 8.6   PHOS 3.3       Significant Imaging: I have reviewed all pertinent imaging results/findings within the past 24 hours.    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] 01/23/2023 Yes      Problems Resolved During this Admission:     VTE Risk Mitigation (From admission, onward)           Ordered     apixaban tablet 5 mg  2 times daily         01/28/25 1549                    Sepsis  Right thigh abscess  Complicated uti  Chronic osteomyelitis of inferior pubic rami  Sacral decubitis ulcer  Paraplegic  Neurogenic bladder w chronic indwelling vogt    Uti-poa  Deconditioning   PAF  Hx of R foot osteomyelitis  Htn  Iron def  Anxiety/depression     Plan  Cont iv abx  Symptomatic management  Follow cx  Consult ID  Consult wound care   F/u on cx results  Uti- poa, f/u on cx   Resume home meds   Labs in the am  Gi and dvt ppx    Yosvany Simms MD  Department of [place] [facility]   [facility]

## 2025-01-31 NOTE — PT/OT/SLP PROGRESS
Physical Therapy      Patient Name:  Hemal Guerrero   MRN:  [id number]    Patient not seen today secondary to Pain, Patient unwilling to participate.

## 2025-02-01 LAB
ALBUMIN SERPL-MCNC: 3.1 G/DL (ref 3.5–5)
ALBUMIN/GLOB SERPL: 0.7 RATIO (ref 1.1–2)
ALP SERPL-CCNC: 88 UNIT/L (ref 40–150)
ALT SERPL-CCNC: 10 UNIT/L (ref 0–55)
ANION GAP SERPL CALC-SCNC: 8 MEQ/L
AST SERPL-CCNC: 10 UNIT/L (ref 5–34)
BASOPHILS # BLD AUTO: 0.11 X10(3)/MCL
BASOPHILS NFR BLD AUTO: 1.3 %
BILIRUB SERPL-MCNC: 0.2 MG/DL
BUN SERPL-MCNC: 11 MG/DL (ref 8.4–25.7)
CALCIUM SERPL-MCNC: 9.1 MG/DL (ref 8.4–10.2)
CHLORIDE SERPL-SCNC: 101 MMOL/L (ref 98–107)
CO2 SERPL-SCNC: 29 MMOL/L (ref 22–29)
CREAT SERPL-MCNC: 0.78 MG/DL (ref 0.72–1.25)
CREAT/UREA NIT SERPL: 14
EOSINOPHIL # BLD AUTO: 0.41 X10(3)/MCL (ref 0–0.9)
EOSINOPHIL NFR BLD AUTO: 5 %
ERYTHROCYTE [DISTWIDTH] IN BLOOD BY AUTOMATED COUNT: 15.9 % (ref 11.5–17)
GFR SERPLBLD CREATININE-BSD FMLA CKD-EPI: >60 ML/MIN/1.73/M2
GLOBULIN SER-MCNC: 4.2 GM/DL (ref 2.4–3.5)
GLUCOSE SERPL-MCNC: 96 MG/DL (ref 74–100)
HCT VFR BLD AUTO: 32 % (ref 42–52)
HGB BLD-MCNC: 9.7 G/DL (ref 14–18)
IMM GRANULOCYTES # BLD AUTO: 0.03 X10(3)/MCL (ref 0–0.04)
IMM GRANULOCYTES NFR BLD AUTO: 0.4 %
LYMPHOCYTES # BLD AUTO: 2.55 X10(3)/MCL (ref 0.6–4.6)
LYMPHOCYTES NFR BLD AUTO: 30.9 %
MAGNESIUM SERPL-MCNC: 2.3 MG/DL (ref 1.6–2.6)
MCH RBC QN AUTO: 25.2 PG (ref 27–31)
MCHC RBC AUTO-ENTMCNC: 30.3 G/DL (ref 33–36)
MCV RBC AUTO: 83.1 FL (ref 80–94)
MONOCYTES # BLD AUTO: 0.6 X10(3)/MCL (ref 0.1–1.3)
MONOCYTES NFR BLD AUTO: 7.3 %
NEUTROPHILS # BLD AUTO: 4.54 X10(3)/MCL (ref 2.1–9.2)
NEUTROPHILS NFR BLD AUTO: 55.1 %
NRBC BLD AUTO-RTO: 0 %
PHOSPHATE SERPL-MCNC: 3 MG/DL (ref 2.3–4.7)
PLATELET # BLD AUTO: 318 X10(3)/MCL (ref 130–400)
PMV BLD AUTO: 10.8 FL (ref 7.4–10.4)
POTASSIUM SERPL-SCNC: 4 MMOL/L (ref 3.5–5.1)
PROT SERPL-MCNC: 7.3 GM/DL (ref 6.4–8.3)
RBC # BLD AUTO: 3.85 X10(6)/MCL (ref 4.7–6.1)
SODIUM SERPL-SCNC: 138 MMOL/L (ref 136–145)
WBC # BLD AUTO: 8.24 X10(3)/MCL (ref 4.5–11.5)

## 2025-02-01 PROCEDURE — 21400001 HC TELEMETRY ROOM

## 2025-02-01 PROCEDURE — 25000003 PHARM REV CODE 250: Performed by: NURSE PRACTITIONER

## 2025-02-01 PROCEDURE — 83735 ASSAY OF MAGNESIUM: CPT

## 2025-02-01 PROCEDURE — 25000003 PHARM REV CODE 250: Performed by: INTERNAL MEDICINE

## 2025-02-01 PROCEDURE — 36415 COLL VENOUS BLD VENIPUNCTURE: CPT

## 2025-02-01 PROCEDURE — 85025 COMPLETE CBC W/AUTO DIFF WBC: CPT

## 2025-02-01 PROCEDURE — S0039 INJECTION, SULFAMETHOXAZOLE: HCPCS | Performed by: NURSE PRACTITIONER

## 2025-02-01 PROCEDURE — 84100 ASSAY OF PHOSPHORUS: CPT

## 2025-02-01 PROCEDURE — 63600175 PHARM REV CODE 636 W HCPCS: Mod: TB,JZ | Performed by: INTERNAL MEDICINE

## 2025-02-01 PROCEDURE — 63600175 PHARM REV CODE 636 W HCPCS: Performed by: INTERNAL MEDICINE

## 2025-02-01 PROCEDURE — 80053 COMPREHEN METABOLIC PANEL: CPT

## 2025-02-01 PROCEDURE — 27000207 HC ISOLATION

## 2025-02-01 PROCEDURE — 63600175 PHARM REV CODE 636 W HCPCS: Performed by: NURSE PRACTITIONER

## 2025-02-01 RX ADMIN — OXYCODONE AND ACETAMINOPHEN 1 TABLET: 10; 325 TABLET ORAL at 02:02

## 2025-02-01 RX ADMIN — HYDROMORPHONE HYDROCHLORIDE 1 MG: 2 INJECTION, SOLUTION INTRAMUSCULAR; INTRAVENOUS; SUBCUTANEOUS at 08:02

## 2025-02-01 RX ADMIN — HYDROMORPHONE HYDROCHLORIDE 1 MG: 2 INJECTION, SOLUTION INTRAMUSCULAR; INTRAVENOUS; SUBCUTANEOUS at 12:02

## 2025-02-01 RX ADMIN — POLYETHYLENE GLYCOL 3350 17 G: 17 POWDER, FOR SOLUTION ORAL at 08:02

## 2025-02-01 RX ADMIN — APIXABAN 5 MG: 5 TABLET, FILM COATED ORAL at 08:02

## 2025-02-01 RX ADMIN — OXYCODONE AND ACETAMINOPHEN 1 TABLET: 10; 325 TABLET ORAL at 10:02

## 2025-02-01 RX ADMIN — HYDROXYZINE PAMOATE 25 MG: 25 CAPSULE ORAL at 08:02

## 2025-02-01 RX ADMIN — PANTOPRAZOLE SODIUM 40 MG: 40 TABLET, DELAYED RELEASE ORAL at 08:02

## 2025-02-01 RX ADMIN — HYDROMORPHONE HYDROCHLORIDE 1 MG: 2 INJECTION, SOLUTION INTRAMUSCULAR; INTRAVENOUS; SUBCUTANEOUS at 11:02

## 2025-02-01 RX ADMIN — CIPROFLOXACIN 400 MG: 2 INJECTION, SOLUTION INTRAVENOUS at 09:02

## 2025-02-01 RX ADMIN — BACLOFEN 20 MG: 10 TABLET ORAL at 06:02

## 2025-02-01 RX ADMIN — SULFAMETHOXAZOLE AND TRIMETHOPRIM 363 MG: 80; 16 INJECTION INTRAVENOUS at 10:02

## 2025-02-01 RX ADMIN — HYDROMORPHONE HYDROCHLORIDE 1 MG: 2 INJECTION, SOLUTION INTRAMUSCULAR; INTRAVENOUS; SUBCUTANEOUS at 06:02

## 2025-02-01 RX ADMIN — OXYCODONE AND ACETAMINOPHEN 1 TABLET: 10; 325 TABLET ORAL at 08:02

## 2025-02-01 RX ADMIN — FLUTICASONE PROPIONATE 50 MCG: 50 SPRAY, METERED NASAL at 08:02

## 2025-02-01 RX ADMIN — FLUTICASONE PROPIONATE 50 MCG: 50 SPRAY, METERED NASAL at 09:02

## 2025-02-01 RX ADMIN — HYDROMORPHONE HYDROCHLORIDE 1 MG: 2 INJECTION, SOLUTION INTRAMUSCULAR; INTRAVENOUS; SUBCUTANEOUS at 04:02

## 2025-02-01 RX ADMIN — METHYLNALTREXONE BROMIDE 12 MG: 12 INJECTION, SOLUTION SUBCUTANEOUS at 04:02

## 2025-02-01 RX ADMIN — GABAPENTIN 800 MG: 300 CAPSULE ORAL at 08:02

## 2025-02-01 RX ADMIN — SULFAMETHOXAZOLE AND TRIMETHOPRIM 363 MG: 80; 16 INJECTION INTRAVENOUS at 06:02

## 2025-02-01 RX ADMIN — BACLOFEN 20 MG: 10 TABLET ORAL at 08:02

## 2025-02-01 RX ADMIN — HYDROXYZINE PAMOATE 25 MG: 25 CAPSULE ORAL at 02:02

## 2025-02-01 RX ADMIN — TRAZODONE HYDROCHLORIDE 50 MG: 50 TABLET ORAL at 08:02

## 2025-02-01 RX ADMIN — BACLOFEN 20 MG: 10 TABLET ORAL at 02:02

## 2025-02-01 RX ADMIN — GABAPENTIN 800 MG: 300 CAPSULE ORAL at 06:02

## 2025-02-01 RX ADMIN — GABAPENTIN 800 MG: 300 CAPSULE ORAL at 02:02

## 2025-02-01 RX ADMIN — SULFAMETHOXAZOLE AND TRIMETHOPRIM 363 MG: 80; 16 INJECTION INTRAVENOUS at 03:02

## 2025-02-01 NOTE — PT/OT/SLP PROGRESS
Physical Therapy      Patient Name:  Hemal Guerrero   MRN:  [id number]    Patient not seen today secondary to Patient unwilling to participate. Will follow-up 2/2.

## 2025-02-01 NOTE — NURSING
Patient declined wound care stating he felt too uncomfortable and in pain. Offered to completed wound care during pain med administration, still declined.

## 2025-02-01 NOTE — PROGRESS NOTES
Infectious Diseases Progress Note  50 y/o male with Hx of paraplegia, neurogenic bladder with suprapubic catheter and non healing wound to L hip, sacral wound with osteomyelitis of B/L inferior pubic rami s/p treatment who presented to ER on 1/25 with c/o abdominal pain, N/V and LLE pain. On admit he was afebrile without leukocytosis. Lactic acid 0.9 and ESR >130 with CRP 83.70. Influenza A/B Ag (-), RSV PCR (-), SARS-CoV-2 PCR (-). Blood cultures remain negative thus far. U/A with positive nitrites, 0-2 RBC, 51-99 WBC, moderate bacteria, moderate LE. Urine culture >100k CR Klebsiella. CT abdomen/pelvis with contrast revealed moderate to large volume colonic stool compatible with constipation, interval development of peripherally enhancing fluid collection anterior right thigh extending to the anterior aspect of the proximal right femur suspicious for abscess, mildly enlarged bilateral inguinal lymph nodes likely reactive. Surgery has been consulted.   He is currently on Vancomycin and Cefepime    Subjective:  Lying in bed, no acute distress. No new complaints voiced. Afebrile    Review of Systems   Constitutional:  Positive for malaise/fatigue.   HENT: Negative.     Eyes: Negative.  Negative for pain.   Respiratory: Negative.     Cardiovascular: Negative.    Gastrointestinal:  Positive for abdominal pain, nausea and vomiting.   Musculoskeletal: Negative.    Skin:         L hip wound   Neurological:  Positive for focal weakness and weakness.   All other systems reviewed and are negative.      Review of patient's allergies indicates:   Allergen Reactions    Adhesive     Amitriptyline        Past Medical History:   Diagnosis Date    Chronic UTI     Paraplegia        Past Surgical History:   Procedure Laterality Date    INSERTION OF SUPRAPUBIC CATHETER      LAPAROTOMY         Social History     Socioeconomic History    Marital status:    Tobacco Use    Smoking status: Never    Smokeless tobacco: Never    Substance and Sexual Activity    Alcohol use: Not Currently    Drug use: Yes     Types: Marijuana     Social Drivers of Health     Transportation Needs: No Transportation Needs (8/31/2020)    Received from [facility], [facility]    PRAPARE - Transportation     Lack of Transportation (Medical): No     Lack of Transportation (Non-Medical): No   Physical Activity: Unknown (8/31/2020)    Received from [facility], [facility]    Exercise Vital Sign     Days of Exercise per Week: 0 days   Stress: No Stress Concern Present (8/31/2020)    [facility]    [employer] - Occupational Stress Questionnaire     Feeling of Stress : Only a little         Scheduled Meds:   amLODIPine  5 mg Oral Daily    apixaban  5 mg Oral BID    baclofen  20 mg Oral Q8H    ciprofloxacin  400 mg Intravenous Q12H    fluticasone propionate  1 spray Each Nostril BID    gabapentin  800 mg Oral Q8H    hydrOXYzine pamoate  25 mg Oral TID    mirtazapine  15 mg Oral Nightly    [START ON 2/1/2025] oxybutynin  10 mg Oral Daily    pantoprazole  40 mg Oral Daily    polyethylene glycol  17 g Oral Daily    sertraline  50 mg Oral Daily    trimethoprim-sulfamethoxazole (Bactrim) IV (PEDS and ADULTS)  5 mg/kg (Dosing Weight) Intravenous Q8H     Continuous Infusions:  PRN Meds:  Current Facility-Administered Medications:     acetaminophen, 325 mg, Oral, Q4H PRN    cyclobenzaprine, 10 mg, Oral, BID PRN    docusate sodium, 100 mg, Oral, BID PRN    hydrALAZINE, 10 mg,  Intravenous, Q3H PRN    HYDROcodone-acetaminophen, 1 tablet, Oral, Q6H PRN    HYDROmorphone, 1 mg, Intravenous, Q4H PRN    labetalol, 10 mg, Intravenous, Q4H PRN    ondansetron, 4 mg, Intravenous, Q4H PRN    oxyCODONE-acetaminophen, 1 tablet, Oral, Q6H PRN    Flushing PICC/Midline Protocol, , , Until Discontinued **AND** sodium chloride 0.9%, 10 mL, Intravenous, Q12H PRN    traZODone, 50 mg, Oral, Nightly PRN    Objective:  /67   Pulse 93   Temp 98.2 °F (36.8 °C) (Oral)   Resp 18   Ht 6' (1.829 m)   Wt 72.6 kg (160 lb)   SpO2 98%   BMI 21.70 kg/m²     Physical Exam:   Physical Exam  Vitals reviewed.   HENT:      Head: Normocephalic.   Cardiovascular:      Rate and Rhythm: Normal rate and regular rhythm.   Pulmonary:      Effort: Pulmonary effort is normal. No respiratory distress.      Breath sounds: Normal breath sounds. No wheezing.   Abdominal:      General: Bowel sounds are normal. There is no distension.      Palpations: Abdomen is soft.      Tenderness: There is no abdominal tenderness.   Genitourinary:     Comments: Suprapubic catheter  Musculoskeletal:      Cervical back: Normal range of motion.      Comments: Paraplegia   Skin:     Findings: No rash.      Comments: Wounds dressed   Neurological:      Mental Status: He is alert and oriented to person, place, and time.   Psychiatric:         Mood and Affect: Mood normal.         Behavior: Behavior normal.       Imaging      Lab Review   Recent Results (from the past 24 hours)   Comprehensive Metabolic Panel    Collection Time: 01/31/25  4:46 AM   Result Value Ref Range    Sodium 139 136 - 145 mmol/L    Potassium 4.7 3.5 - 5.1 mmol/L    Chloride 103 98 - 107 mmol/L    CO2 25 22 - 29 mmol/L    Glucose 87 74 - 100 mg/dL    Blood Urea Nitrogen 12.6 8.4 - 25.7 mg/dL    Creatinine 0.61 (L) 0.72 - 1.25 mg/dL    Calcium 8.6 8.4 - 10.2 mg/dL    Protein Total 6.5 6.4 - 8.3 gm/dL    Albumin 3.0 (L) 3.5 - 5.0 g/dL    Globulin 3.5 2.4 - 3.5 gm/dL     Albumin/Globulin Ratio 0.9 (L) 1.1 - 2.0 ratio    Bilirubin Total 0.2 <=1.5 mg/dL    ALP 85 40 - 150 unit/L    ALT 11 0 - 55 unit/L    AST 14 5 - 34 unit/L    eGFR >60 mL/min/1.73/m2    Anion Gap 11.0 mEq/L    BUN/Creatinine Ratio 21    Magnesium    Collection Time: 01/31/25  4:46 AM   Result Value Ref Range    Magnesium Level 2.30 1.60 - 2.60 mg/dL   Phosphorus    Collection Time: 01/31/25  4:46 AM   Result Value Ref Range    Phosphorus Level 3.3 2.3 - 4.7 mg/dL   CBC with Differential    Collection Time: 01/31/25  4:46 AM   Result Value Ref Range    WBC 7.58 4.50 - 11.50 x10(3)/mcL    RBC 4.04 (L) 4.70 - 6.10 x10(6)/mcL    Hgb 10.0 (L) 14.0 - 18.0 g/dL    Hct 33.7 (L) 42.0 - 52.0 %    MCV 83.4 80.0 - 94.0 fL    MCH 24.8 (L) 27.0 - 31.0 pg    MCHC 29.7 (L) 33.0 - 36.0 g/dL    RDW 15.7 11.5 - 17.0 %    Platelet 273 130 - 400 x10(3)/mcL    MPV 9.8 7.4 - 10.4 fL    Neut % 49.9 %    Lymph % 35.8 %    Mono % 7.1 %    Eos % 5.1 %    Basophil % 1.7 %    Imm Grans % 0.4 %    Neut # 3.78 2.1 - 9.2 x10(3)/mcL    Lymph # 2.71 0.6 - 4.6 x10(3)/mcL    Mono # 0.54 0.1 - 1.3 x10(3)/mcL    Eos # 0.39 0 - 0.9 x10(3)/mcL    Baso # 0.13 <=0.2 x10(3)/mcL    Imm Gran # 0.03 0.00 - 0.04 x10(3)/mcL    NRBC% 0.0 %         Assessment/Plan:  SIRS  R thigh fluid collection / abscess  Chronic osteomyelitis of B/L inferior pubic rami  CR Klebsiella complicated UTI  L hip non healing wound  Paraplegia  Neurogenic bladder / Chronic vogt catheter  Depression / Anxiety  Anemia  Reactive thrombocytosis    -Will D/C Vancomycin and Avycaz. Place on Bactrim #1 and Cipro #1  -Afebrile without leukocytosis  -U/A abnormal with urine culture >100k ESBL Klebsiella  -1/25 Blood cultures negative   -S/P suprapubic catheter exchanged 1/28  -Continue wound care  -CT abdomen/pelvis with findings of fluid collection / abscess to R thigh  -Seen by Surgery, inputs noted.   -S/P ultrasound guided drainage of R thigh fluid collection on 1/30, cultures negative thus  far  -Discussed with patient and nursing

## 2025-02-02 LAB
ALBUMIN SERPL-MCNC: 3.4 G/DL (ref 3.5–5)
ALBUMIN/GLOB SERPL: 0.7 RATIO (ref 1.1–2)
ALP SERPL-CCNC: 96 UNIT/L (ref 40–150)
ALT SERPL-CCNC: 9 UNIT/L (ref 0–55)
ANION GAP SERPL CALC-SCNC: 11 MEQ/L
AST SERPL-CCNC: 12 UNIT/L (ref 5–34)
BACTERIA SPEC ANAEROBE CULT: NORMAL
BASOPHILS # BLD AUTO: 0.09 X10(3)/MCL
BASOPHILS NFR BLD AUTO: 1 %
BILIRUB SERPL-MCNC: 0.2 MG/DL
BUN SERPL-MCNC: 11.2 MG/DL (ref 8.4–25.7)
CALCIUM SERPL-MCNC: 9.3 MG/DL (ref 8.4–10.2)
CHLORIDE SERPL-SCNC: 100 MMOL/L (ref 98–107)
CO2 SERPL-SCNC: 27 MMOL/L (ref 22–29)
CREAT SERPL-MCNC: 0.84 MG/DL (ref 0.72–1.25)
CREAT/UREA NIT SERPL: 13
EOSINOPHIL # BLD AUTO: 0.36 X10(3)/MCL (ref 0–0.9)
EOSINOPHIL NFR BLD AUTO: 3.9 %
ERYTHROCYTE [DISTWIDTH] IN BLOOD BY AUTOMATED COUNT: 15.8 % (ref 11.5–17)
GFR SERPLBLD CREATININE-BSD FMLA CKD-EPI: >60 ML/MIN/1.73/M2
GLOBULIN SER-MCNC: 4.6 GM/DL (ref 2.4–3.5)
GLUCOSE SERPL-MCNC: 102 MG/DL (ref 74–100)
HCT VFR BLD AUTO: 34.8 % (ref 42–52)
HGB BLD-MCNC: 10.5 G/DL (ref 14–18)
IMM GRANULOCYTES # BLD AUTO: 0.05 X10(3)/MCL (ref 0–0.04)
IMM GRANULOCYTES NFR BLD AUTO: 0.5 %
LYMPHOCYTES # BLD AUTO: 2.55 X10(3)/MCL (ref 0.6–4.6)
LYMPHOCYTES NFR BLD AUTO: 27.5 %
MAGNESIUM SERPL-MCNC: 2.3 MG/DL (ref 1.6–2.6)
MCH RBC QN AUTO: 25.1 PG (ref 27–31)
MCHC RBC AUTO-ENTMCNC: 30.2 G/DL (ref 33–36)
MCV RBC AUTO: 83.1 FL (ref 80–94)
MONOCYTES # BLD AUTO: 0.66 X10(3)/MCL (ref 0.1–1.3)
MONOCYTES NFR BLD AUTO: 7.1 %
NEUTROPHILS # BLD AUTO: 5.55 X10(3)/MCL (ref 2.1–9.2)
NEUTROPHILS NFR BLD AUTO: 60 %
NRBC BLD AUTO-RTO: 0 %
PHOSPHATE SERPL-MCNC: 3.1 MG/DL (ref 2.3–4.7)
PLATELET # BLD AUTO: 299 X10(3)/MCL (ref 130–400)
PMV BLD AUTO: 10.1 FL (ref 7.4–10.4)
POTASSIUM SERPL-SCNC: 4.9 MMOL/L (ref 3.5–5.1)
PROT SERPL-MCNC: 8 GM/DL (ref 6.4–8.3)
RBC # BLD AUTO: 4.19 X10(6)/MCL (ref 4.7–6.1)
SODIUM SERPL-SCNC: 138 MMOL/L (ref 136–145)
WBC # BLD AUTO: 9.26 X10(3)/MCL (ref 4.5–11.5)

## 2025-02-02 PROCEDURE — 25000003 PHARM REV CODE 250: Performed by: NURSE PRACTITIONER

## 2025-02-02 PROCEDURE — 63600175 PHARM REV CODE 636 W HCPCS: Mod: TB,JZ | Performed by: INTERNAL MEDICINE

## 2025-02-02 PROCEDURE — 84100 ASSAY OF PHOSPHORUS: CPT

## 2025-02-02 PROCEDURE — S0039 INJECTION, SULFAMETHOXAZOLE: HCPCS | Performed by: NURSE PRACTITIONER

## 2025-02-02 PROCEDURE — 80053 COMPREHEN METABOLIC PANEL: CPT

## 2025-02-02 PROCEDURE — 83735 ASSAY OF MAGNESIUM: CPT

## 2025-02-02 PROCEDURE — 27000207 HC ISOLATION

## 2025-02-02 PROCEDURE — 36415 COLL VENOUS BLD VENIPUNCTURE: CPT

## 2025-02-02 PROCEDURE — 25000003 PHARM REV CODE 250: Performed by: INTERNAL MEDICINE

## 2025-02-02 PROCEDURE — 63600175 PHARM REV CODE 636 W HCPCS: Performed by: NURSE PRACTITIONER

## 2025-02-02 PROCEDURE — 63600175 PHARM REV CODE 636 W HCPCS: Performed by: INTERNAL MEDICINE

## 2025-02-02 PROCEDURE — 21400001 HC TELEMETRY ROOM

## 2025-02-02 PROCEDURE — 85025 COMPLETE CBC W/AUTO DIFF WBC: CPT

## 2025-02-02 RX ADMIN — BACLOFEN 20 MG: 10 TABLET ORAL at 09:02

## 2025-02-02 RX ADMIN — HYDROMORPHONE HYDROCHLORIDE 1 MG: 2 INJECTION, SOLUTION INTRAMUSCULAR; INTRAVENOUS; SUBCUTANEOUS at 10:02

## 2025-02-02 RX ADMIN — HYDROXYZINE PAMOATE 25 MG: 25 CAPSULE ORAL at 08:02

## 2025-02-02 RX ADMIN — BACLOFEN 20 MG: 10 TABLET ORAL at 05:02

## 2025-02-02 RX ADMIN — BACLOFEN 20 MG: 10 TABLET ORAL at 01:02

## 2025-02-02 RX ADMIN — CYCLOBENZAPRINE 10 MG: 10 TABLET, FILM COATED ORAL at 08:02

## 2025-02-02 RX ADMIN — APIXABAN 5 MG: 5 TABLET, FILM COATED ORAL at 08:02

## 2025-02-02 RX ADMIN — METHYLNALTREXONE BROMIDE 12 MG: 12 INJECTION, SOLUTION SUBCUTANEOUS at 04:02

## 2025-02-02 RX ADMIN — SULFAMETHOXAZOLE AND TRIMETHOPRIM 363 MG: 80; 16 INJECTION INTRAVENOUS at 05:02

## 2025-02-02 RX ADMIN — HYDROMORPHONE HYDROCHLORIDE 1 MG: 2 INJECTION, SOLUTION INTRAMUSCULAR; INTRAVENOUS; SUBCUTANEOUS at 01:02

## 2025-02-02 RX ADMIN — GABAPENTIN 800 MG: 300 CAPSULE ORAL at 05:02

## 2025-02-02 RX ADMIN — FLUTICASONE PROPIONATE 50 MCG: 50 SPRAY, METERED NASAL at 08:02

## 2025-02-02 RX ADMIN — PANTOPRAZOLE SODIUM 40 MG: 40 TABLET, DELAYED RELEASE ORAL at 08:02

## 2025-02-02 RX ADMIN — POLYETHYLENE GLYCOL 3350 17 G: 17 POWDER, FOR SOLUTION ORAL at 08:02

## 2025-02-02 RX ADMIN — OXYCODONE AND ACETAMINOPHEN 1 TABLET: 10; 325 TABLET ORAL at 01:02

## 2025-02-02 RX ADMIN — SULFAMETHOXAZOLE AND TRIMETHOPRIM 363 MG: 80; 16 INJECTION INTRAVENOUS at 02:02

## 2025-02-02 RX ADMIN — OXYCODONE AND ACETAMINOPHEN 1 TABLET: 10; 325 TABLET ORAL at 08:02

## 2025-02-02 RX ADMIN — SULFAMETHOXAZOLE AND TRIMETHOPRIM 363 MG: 80; 16 INJECTION INTRAVENOUS at 09:02

## 2025-02-02 RX ADMIN — GABAPENTIN 800 MG: 300 CAPSULE ORAL at 01:02

## 2025-02-02 RX ADMIN — HYDROMORPHONE HYDROCHLORIDE 1 MG: 2 INJECTION, SOLUTION INTRAMUSCULAR; INTRAVENOUS; SUBCUTANEOUS at 04:02

## 2025-02-02 RX ADMIN — HYDROMORPHONE HYDROCHLORIDE 1 MG: 2 INJECTION, SOLUTION INTRAMUSCULAR; INTRAVENOUS; SUBCUTANEOUS at 05:02

## 2025-02-02 RX ADMIN — CIPROFLOXACIN 400 MG: 2 INJECTION, SOLUTION INTRAVENOUS at 08:02

## 2025-02-02 RX ADMIN — GABAPENTIN 800 MG: 300 CAPSULE ORAL at 09:02

## 2025-02-02 RX ADMIN — HYDROXYZINE PAMOATE 25 MG: 25 CAPSULE ORAL at 01:02

## 2025-02-02 NOTE — PROGRESS NOTES
[facility] Medicine  Progress Note    Patient Name: Hemal Guerrero  MRN: [id number]  Patient Class: IP- Inpatient   Admission Date: 1/25/2025  Length of Stay: 7 days  Attending Physician: Yosvany Simms MD  Primary Care Provider: Margaret Leblanc MD        Subjective:     Principal Problem:Abscess    Interval History:   Today's info : seen and examined, no acute events overnight. Continues to improve   Afebrile  Remains on iv abx  Cx pending  S/P ultrasound guided drainage of R thigh fluid collection on 1/30, cultures negative thus far   On po abx    Dc in am    Review of Systems   Constitutional:  Positive for fever.   HENT: Negative.     Eyes: Negative.    Respiratory:  Positive for shortness of breath.    Cardiovascular: Negative.    Gastrointestinal: Negative.    Endocrine: Negative.    Genitourinary: Negative.    Musculoskeletal:  Positive for gait problem and joint swelling.   Skin:  Positive for wound.   Allergic/Immunologic: Negative.    Neurological:  Positive for weakness.   Hematological: Negative.    Psychiatric/Behavioral: Negative.       Objective:     Vital Signs (Most Recent):  Temp: 98.6 °F (37 °C) (02/02/25 1146)  Pulse: 110 (02/02/25 1146)  Resp: 18 (02/02/25 1359)  BP: 116/79 (02/02/25 1146)  SpO2: 99 % (02/02/25 1146) Vital Signs (24h Range):  Temp:  [98 °F (36.7 °C)-98.7 °F (37.1 °C)] 98.6 °F (37 °C)  Pulse:  [] 110  Resp:  [16-20] 18  SpO2:  [97 %-100 %] 99 %  BP: (103-123)/(65-80) 116/79     Weight: 72.6 kg (160 lb)  Body mass index is 21.7 kg/m².    Intake/Output Summary (Last 24 hours) at 2/2/2025 1437  Last data filed at 2/2/2025 0700  Gross per 24 hour   Intake 240 ml   Output 3950 ml   Net -3710 ml      Physical Exam  Vitals reviewed.   Constitutional:       Appearance: Normal appearance.   HENT:      Head: Normocephalic and atraumatic.      Right Ear: Tympanic membrane and external ear normal.      Nose: Nose normal.       Mouth/Throat:      Mouth: Mucous membranes are moist.   Eyes:      Extraocular Movements: Extraocular movements intact.      Pupils: Pupils are equal, round, and reactive to light.   Cardiovascular:      Rate and Rhythm: Normal rate and regular rhythm.      Pulses: Normal pulses.      Heart sounds: Normal heart sounds.   Pulmonary:      Effort: Pulmonary effort is normal.      Breath sounds: Normal breath sounds.   Abdominal:      General: Abdomen is flat. Bowel sounds are normal.      Palpations: Abdomen is soft.   Musculoskeletal:         General: Normal range of motion.      Cervical back: Normal range of motion and neck supple.   Skin:     General: Skin is warm and dry.   Neurological:      General: No focal deficit present.      Mental Status: He is alert and oriented to person, place, and time.      Motor: Weakness present.   Psychiatric:         Mood and Affect: Mood normal.         Behavior: Behavior normal.           Overview/Hospital Course: stable    Significant Labs: All pertinent labs within the past 24 hours have been reviewed.  Lab Results   Component Value Date    WBC 9.26 02/02/2025    HGB 10.5 (L) 02/02/2025    HCT 34.8 (L) 02/02/2025    MCV 83.1 02/02/2025     02/02/2025         Recent Labs   Lab 02/02/25  0449      K 4.9      CO2 27   BUN 11.2   CREATININE 0.84   GLUCOSE 102*   CALCIUM 9.3   PHOS 3.1       Significant Imaging: I have reviewed all pertinent imaging results/findings within the past 24 hours.    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] 01/23/2023 Yes      Problems Resolved During this Admission:     VTE Risk Mitigation (From admission, onward)           Ordered     apixaban tablet 5 mg  2 times daily         01/28/25 1549                    Sepsis  Right thigh abscess  Complicated uti  Chronic osteomyelitis of inferior pubic  rami  Sacral decubitis ulcer  Paraplegic  Neurogenic bladder w chronic indwelling vogt   Uti-poa  Deconditioning   PAF  Hx of R foot osteomyelitis  Htn  Iron def  Anxiety/depression     Plan  Cont iv abx  Symptomatic management  Follow cx  Consult ID  Consult wound care   F/u on cx results  Uti- poa, f/u on cx   Resume home meds   Labs in the am  Gi and dvt ppx    Yosvany Simms MD  Department of [facility] Medicine   [facility]

## 2025-02-02 NOTE — PLAN OF CARE
Problem: Adult Inpatient Plan of Care  Goal: Plan of Care Review  Outcome: Progressing  Goal: Patient-Specific Goal (Individualized)  Outcome: Progressing  Goal: Absence of Hospital-Acquired Illness or Injury  Outcome: Progressing  Goal: Optimal Comfort and Wellbeing  Outcome: Progressing  Goal: Readiness for Transition of Care  Outcome: Progressing     Problem: Wound  Goal: Optimal Coping  Outcome: Progressing  Goal: Optimal Functional Ability  Outcome: Progressing  Goal: Absence of Infection Signs and Symptoms  Outcome: Progressing  Goal: Improved Oral Intake  Outcome: Progressing  Goal: Optimal Pain Control and Function  Outcome: Progressing  Goal: Skin Health and Integrity  Outcome: Progressing  Goal: Optimal Wound Healing  Outcome: Progressing     Problem: Skin Injury Risk Increased  Goal: Skin Health and Integrity  Outcome: Progressing     Problem: Infection  Goal: Absence of Infection Signs and Symptoms  Outcome: Progressing     Problem: Pain Acute  Goal: Optimal Pain Control and Function  Outcome: Progressing     Problem: Fall Injury Risk  Goal: Absence of Fall and Fall-Related Injury  Outcome: Progressing

## 2025-02-03 ENCOUNTER — TELEPHONE (OUTPATIENT)
Facility: CLINIC | Age: 50
End: 2025-02-03
Payer: MEDICARE

## 2025-02-03 VITALS
SYSTOLIC BLOOD PRESSURE: 114 MMHG | OXYGEN SATURATION: 100 % | RESPIRATION RATE: 18 BRPM | BODY MASS INDEX: 21.67 KG/M2 | HEIGHT: 72 IN | HEART RATE: 102 BPM | WEIGHT: 160 LBS | TEMPERATURE: 98 F | DIASTOLIC BLOOD PRESSURE: 53 MMHG

## 2025-02-03 PROBLEM — L02.91 ABSCESS: Status: RESOLVED | Noted: 2025-01-26 | Resolved: 2025-02-03

## 2025-02-03 LAB
ALBUMIN SERPL-MCNC: 3.7 G/DL (ref 3.5–5)
ALBUMIN/GLOB SERPL: 0.8 RATIO (ref 1.1–2)
ALP SERPL-CCNC: 97 UNIT/L (ref 40–150)
ALT SERPL-CCNC: 10 UNIT/L (ref 0–55)
ANION GAP SERPL CALC-SCNC: 10 MEQ/L
AST SERPL-CCNC: 10 UNIT/L (ref 5–34)
BASOPHILS # BLD AUTO: 0.1 X10(3)/MCL
BASOPHILS NFR BLD AUTO: 1.2 %
BILIRUB SERPL-MCNC: 0.3 MG/DL
BUN SERPL-MCNC: 12.7 MG/DL (ref 8.4–25.7)
CALCIUM SERPL-MCNC: 10 MG/DL (ref 8.4–10.2)
CHLORIDE SERPL-SCNC: 101 MMOL/L (ref 98–107)
CO2 SERPL-SCNC: 25 MMOL/L (ref 22–29)
CREAT SERPL-MCNC: 1.07 MG/DL (ref 0.72–1.25)
CREAT/UREA NIT SERPL: 12
EOSINOPHIL # BLD AUTO: 0.41 X10(3)/MCL (ref 0–0.9)
EOSINOPHIL NFR BLD AUTO: 5 %
ERYTHROCYTE [DISTWIDTH] IN BLOOD BY AUTOMATED COUNT: 15.9 % (ref 11.5–17)
GFR SERPLBLD CREATININE-BSD FMLA CKD-EPI: >60 ML/MIN/1.73/M2
GLOBULIN SER-MCNC: 4.9 GM/DL (ref 2.4–3.5)
GLUCOSE SERPL-MCNC: 83 MG/DL (ref 74–100)
HCT VFR BLD AUTO: 36 % (ref 42–52)
HGB BLD-MCNC: 10.9 G/DL (ref 14–18)
IMM GRANULOCYTES # BLD AUTO: 0.04 X10(3)/MCL (ref 0–0.04)
IMM GRANULOCYTES NFR BLD AUTO: 0.5 %
LYMPHOCYTES # BLD AUTO: 2.47 X10(3)/MCL (ref 0.6–4.6)
LYMPHOCYTES NFR BLD AUTO: 30.4 %
MAGNESIUM SERPL-MCNC: 2.2 MG/DL (ref 1.6–2.6)
MCH RBC QN AUTO: 25 PG (ref 27–31)
MCHC RBC AUTO-ENTMCNC: 30.3 G/DL (ref 33–36)
MCV RBC AUTO: 82.6 FL (ref 80–94)
MONOCYTES # BLD AUTO: 0.73 X10(3)/MCL (ref 0.1–1.3)
MONOCYTES NFR BLD AUTO: 9 %
NEUTROPHILS # BLD AUTO: 4.38 X10(3)/MCL (ref 2.1–9.2)
NEUTROPHILS NFR BLD AUTO: 53.9 %
NRBC BLD AUTO-RTO: 0 %
PHOSPHATE SERPL-MCNC: 3.3 MG/DL (ref 2.3–4.7)
PLATELET # BLD AUTO: 313 X10(3)/MCL (ref 130–400)
PMV BLD AUTO: 10.7 FL (ref 7.4–10.4)
POTASSIUM SERPL-SCNC: 4.8 MMOL/L (ref 3.5–5.1)
PROT SERPL-MCNC: 8.6 GM/DL (ref 6.4–8.3)
RBC # BLD AUTO: 4.36 X10(6)/MCL (ref 4.7–6.1)
SODIUM SERPL-SCNC: 136 MMOL/L (ref 136–145)
WBC # BLD AUTO: 8.13 X10(3)/MCL (ref 4.5–11.5)

## 2025-02-03 PROCEDURE — 25000003 PHARM REV CODE 250: Performed by: NURSE PRACTITIONER

## 2025-02-03 PROCEDURE — 63600175 PHARM REV CODE 636 W HCPCS: Performed by: INTERNAL MEDICINE

## 2025-02-03 PROCEDURE — 25000003 PHARM REV CODE 250: Performed by: INTERNAL MEDICINE

## 2025-02-03 PROCEDURE — 80053 COMPREHEN METABOLIC PANEL: CPT

## 2025-02-03 PROCEDURE — 36415 COLL VENOUS BLD VENIPUNCTURE: CPT

## 2025-02-03 PROCEDURE — 63600175 PHARM REV CODE 636 W HCPCS: Mod: TB,JZ | Performed by: INTERNAL MEDICINE

## 2025-02-03 PROCEDURE — S0039 INJECTION, SULFAMETHOXAZOLE: HCPCS | Performed by: NURSE PRACTITIONER

## 2025-02-03 PROCEDURE — 83735 ASSAY OF MAGNESIUM: CPT

## 2025-02-03 PROCEDURE — 63600175 PHARM REV CODE 636 W HCPCS: Performed by: NURSE PRACTITIONER

## 2025-02-03 PROCEDURE — 85025 COMPLETE CBC W/AUTO DIFF WBC: CPT

## 2025-02-03 PROCEDURE — 84100 ASSAY OF PHOSPHORUS: CPT

## 2025-02-03 RX ORDER — OXYCODONE AND ACETAMINOPHEN 10; 325 MG/1; MG/1
1 TABLET ORAL 2 TIMES DAILY PRN
Qty: 14 TABLET | Refills: 0 | Status: SHIPPED | OUTPATIENT
Start: 2025-02-03

## 2025-02-03 RX ORDER — CIPROFLOXACIN 500 MG/1
500 TABLET ORAL EVERY 12 HOURS
Qty: 28 TABLET | Refills: 0 | Status: SHIPPED | OUTPATIENT
Start: 2025-02-03

## 2025-02-03 RX ORDER — NALOXEGOL OXALATE 25 MG/1
25 TABLET, FILM COATED ORAL DAILY
Qty: 30 TABLET | Refills: 0 | Status: SHIPPED | OUTPATIENT
Start: 2025-02-03 | End: 2025-02-07

## 2025-02-03 RX ORDER — SULFAMETHOXAZOLE AND TRIMETHOPRIM 800; 160 MG/1; MG/1
1 TABLET ORAL 2 TIMES DAILY
Qty: 28 TABLET | Refills: 0 | Status: SHIPPED | OUTPATIENT
Start: 2025-02-03

## 2025-02-03 RX ADMIN — CIPROFLOXACIN 400 MG: 2 INJECTION, SOLUTION INTRAVENOUS at 09:02

## 2025-02-03 RX ADMIN — HYDROXYZINE PAMOATE 25 MG: 25 CAPSULE ORAL at 09:02

## 2025-02-03 RX ADMIN — BACLOFEN 20 MG: 10 TABLET ORAL at 05:02

## 2025-02-03 RX ADMIN — OXYCODONE AND ACETAMINOPHEN 1 TABLET: 10; 325 TABLET ORAL at 09:02

## 2025-02-03 RX ADMIN — OXYCODONE AND ACETAMINOPHEN 1 TABLET: 10; 325 TABLET ORAL at 03:02

## 2025-02-03 RX ADMIN — FLUTICASONE PROPIONATE 50 MCG: 50 SPRAY, METERED NASAL at 09:02

## 2025-02-03 RX ADMIN — GABAPENTIN 800 MG: 300 CAPSULE ORAL at 05:02

## 2025-02-03 RX ADMIN — ONDANSETRON 4 MG: 2 INJECTION INTRAMUSCULAR; INTRAVENOUS at 10:02

## 2025-02-03 RX ADMIN — APIXABAN 5 MG: 5 TABLET, FILM COATED ORAL at 09:02

## 2025-02-03 RX ADMIN — PANTOPRAZOLE SODIUM 40 MG: 40 TABLET, DELAYED RELEASE ORAL at 09:02

## 2025-02-03 RX ADMIN — HYDROMORPHONE HYDROCHLORIDE 1 MG: 2 INJECTION, SOLUTION INTRAMUSCULAR; INTRAVENOUS; SUBCUTANEOUS at 05:02

## 2025-02-03 RX ADMIN — HYDROMORPHONE HYDROCHLORIDE 1 MG: 2 INJECTION, SOLUTION INTRAMUSCULAR; INTRAVENOUS; SUBCUTANEOUS at 12:02

## 2025-02-03 RX ADMIN — POLYETHYLENE GLYCOL 3350 17 G: 17 POWDER, FOR SOLUTION ORAL at 09:02

## 2025-02-03 RX ADMIN — SULFAMETHOXAZOLE AND TRIMETHOPRIM 363 MG: 80; 16 INJECTION INTRAVENOUS at 05:02

## 2025-02-03 NOTE — PLAN OF CARE
Pt states he cannot transport with [facility] , they refused in the past.  I spoke to Jose Ayaa who is here to transport him and she is speaking with the pt.  I also called his son and provided above info to him , he will ensure pt kepp his f/u appt. On 2/11/25  Ahmet [phone]

## 2025-02-03 NOTE — DISCHARGE SUMMARY
[facility] Medicine  Discharge Summary      Patient Name: Hemal Guerrero  MRN: [id number]  Admission Date: 1/25/2025  Hospital Length of Stay: 8 days  Discharge Date and Time:  02/03/2025 8:55 AM  Attending Physician: Yosvany Mendoza MD   Discharging Provider: Yosvany Mendoza MD  Discharge Provider Team: Networked reference to record PCT   Primary Care Provider: Margaret Leblanc MD        DC DIAGNOSES :  SIRS  R thigh fluid collection / abscess  Chronic osteomyelitis of B/L inferior pubic rami  CR Klebsiella complicated UTI  L hip non healing wound  Paraplegia  Neurogenic bladder / Chronic vogt catheter  Depression / Anxiety  Anemia  Reactive thrombocytosis       * No surgery found *      Hospital Course: 49 y.o. male who  has a past medical history of Chronic UTI and Paraplegia. And chronic sacral decubitus ulcer and left ischial ulcer and repeated prolonged hospital and ltac stays for ostemyelitis and complicated uti presented to the ed with complaints of worsening right thigh pain with fevers and worsening nausea and vomiting and subsequent work up and imaging suggestive of complicated uti and thigh abscess admitted on iv abx with id consult and consulted ir and ended up having ultrasound guided drainage of R thigh fluid collection on 1/30, cultures negative thus far   Afebrile  Finished ashley bx  Dc home with hh on orala bx with close pcp follow up    Consults:   Consults (From admission, onward)          Status Ordering Provider     Inpatient consult to Midline team  Once        Provider:  (Not yet assigned)    Acknowledged YOSVANY MENDOZA     Inpatient consult to Interventional Radiology  Once        Provider:  (Not yet assigned)    Completed ISAAC STAFFORD     Inpatient consult to General Surgery  Once        Provider:  Neptali Garibay MD    Completed YOSVANY MENDOZA     Inpatient consult to Infectious Diseases  Once         Provider:  Diamond Garcia MD    Acknowledged JOSE MENDOZA     Inpatient consult to Registered Dietitian/Nutritionist  Once        Provider:  (Not yet assigned)    Completed NANI CHAUDHARY     Inpatient consult to General surgery  Once        Provider:  Neptali Garibay MD    Acknowledged NANI CHAUDHARY            Final Active Diagnoses:    Diagnosis Date Noted POA    Pressure injury of left buttock, 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    Why: drain evaluation  Contact information:  [address]  [address]  [facility] [id number]  [phone]               Margaret Leblanc MD Follow up in 1 week(s).    Specialty: Internal Medicine  Contact information:  [address] [place] [address] [id number] [phone]                           Patient Instructions:      Diet Adult Regular     SUBSEQUENT HOME HEALTH ORDERS   Order Comments: Resume Home Health services; eval and treat for all disciplines   PCP:  Margaret Leblanc MD     Order Specific Question Answer Comments   What Home Health Agency is the patient currently using? Other/External      Activity as tolerated     Medications:  Reconciled Home Medications:      Medication List        START taking these medications      ciprofloxacin HCl 500 MG tablet  Commonly known as: CIPRO  Take 1 tablet (500 mg total) by mouth every 12 (twelve) hours.     linaCLOtide 290 mcg Cap capsule  Commonly known as: LINZESS  Take 1 capsule (290 mcg total) by mouth before breakfast.     MOVANTIK 25 mg tablet  Generic drug: naloxegoL  Take 1 tablet (25 mg total) by mouth once daily.     sulfamethoxazole-trimethoprim 800-160mg 800-160 mg Tab  Commonly  known as: BACTRIM DS  Take 1 tablet by mouth 2 (two) times daily.            CHANGE how you take these medications      oxyCODONE-acetaminophen  mg per tablet  Commonly known as: PERCOCET  Take 1 tablet by mouth 2 (two) times daily as needed for Pain.  What changed: reasons to take this            CONTINUE taking these medications      amLODIPine 5 MG tablet  Commonly known as: NORVASC  Take 1 tablet (5 mg total) by mouth once daily.     baclofen 20 MG tablet  Commonly known as: LIORESAL  Take 20 mg by mouth 3 (three) times daily.     cyclobenzaprine 10 MG tablet  Commonly known as: FLEXERIL  Take 10 mg by mouth 2 (two) times daily as needed.     docusate sodium 250 MG capsule  Commonly known as: COLACE  Take 250 mg by mouth daily as needed.     ELIQUIS 5 mg Tab  Generic drug: apixaban  Take 5 mg by mouth 2 (two) times daily.     FeroSuL 325 mg (65 mg iron) Tab tablet  Generic drug: ferrous sulfate  Take 1 tablet by mouth every morning.     fluticasone propionate 50 mcg/actuation nasal spray  Commonly known as: FLONASE  1 spray by Each Nostril route 2 (two) times daily.     gabapentin 800 MG tablet  Commonly known as: NEURONTIN  Take 800 mg by mouth 3 (three) times daily.     HYDROcodone-acetaminophen 5-325 mg per tablet  Commonly known as: NORCO  Take 1 tablet by mouth every 6 (six) hours as needed for Pain.     hydrOXYzine pamoate 25 MG Cap  Commonly known as: VISTARIL  Take 25 mg by mouth 3 (three) times daily.     mirtazapine 15 MG tablet  Commonly known as: REMERON  Take 1 tablet (15 mg total) by mouth nightly.     oxybutynin 10 MG 24 hr tablet  Commonly known as: DITROPAN-XL  Take 1 tablet by mouth every morning.     sertraline 50 MG tablet  Commonly known as: ZOLOFT  Take 1 tablet (50 mg total) by mouth once daily.            STOP taking these medications      0.9% NaCl PgBk 100 mL with meropenem 1 gram SolR 1 g     0.9% NaCl PgBk 100 mL with micafungin 100 mg SolR 100 mg     erythromycin ophthalmic  ointment  Commonly known as: ROMYCIN              Significant Diagnostic Studies: Labs: BMP:   Recent Labs   Lab 02/02/25  0449 02/03/25  0315    136   K 4.9 4.8    101   CO2 27 25   BUN 11.2 12.7   CREATININE 0.84 1.07   CALCIUM 9.3 10.0   MG 2.30 2.20       Pending Diagnostic Studies:       None          Indwelling Lines/Drains at time of discharge:   Lines/Drains/Airways       Drain  Duration                  Suprapubic Catheter -- days         Closed/Suction Drain 01/30/25 1408 Tube - 1 Right Thigh Bulb 8 Fr. 3 days                    Time spent on the discharge of patient: 32 minutes         Yosvany Simms MD  Department of [facility] Medicine  [facility]

## 2025-02-03 NOTE — PROGRESS NOTES
[facility] Medicine  Progress Note    Patient Name: Hemal Guerrero  MRN: [id number]  Patient Class: IP- Inpatient   Admission Date: 1/25/2025  Length of Stay: 8 days  Attending Physician: Yosvany Simms MD  Primary Care Provider: Margaret Leblanc MD        Subjective:     Principal Problem:Abscess    Interval History:   Today's info : seen and examined, no acute events overnight. Continues to improve   Afebrile  Remains on iv abx  Cx pending  S/P ultrasound guided drainage of R thigh fluid collection on 1/30, cultures negative thus far   On po abx    Dc in am    Constipated added relistor sc and movantik and linzess on dc    Review of Systems   Constitutional:  Positive for fever.   HENT: Negative.     Eyes: Negative.    Respiratory:  Positive for shortness of breath.    Cardiovascular: Negative.    Gastrointestinal: Negative.    Endocrine: Negative.    Genitourinary: Negative.    Musculoskeletal:  Positive for gait problem and joint swelling.   Skin:  Positive for wound.   Allergic/Immunologic: Negative.    Neurological:  Positive for weakness.   Hematological: Negative.    Psychiatric/Behavioral: Negative.       Objective:     Vital Signs (Most Recent):  Temp: 98.3 °F (36.8 °C) (02/03/25 0725)  Pulse: 102 (02/03/25 0725)  Resp: 18 (02/03/25 0532)  BP: (!) 114/53 (02/03/25 0725)  SpO2: 100 % (02/03/25 0725) Vital Signs (24h Range):  Temp:  [97.3 °F (36.3 °C)-98.6 °F (37 °C)] 98.3 °F (36.8 °C)  Pulse:  [] 102  Resp:  [18-20] 18  SpO2:  [99 %-100 %] 100 %  BP: (106-127)/(53-89) 114/53     Weight: 72.6 kg (160 lb)  Body mass index is 21.7 kg/m².    Intake/Output Summary (Last 24 hours) at 2/3/2025 8620  Last data filed at 2/3/2025 0311  Gross per 24 hour   Intake --   Output 3900 ml   Net -3900 ml      Physical Exam  Vitals reviewed.   Constitutional:       Appearance: Normal appearance.   HENT:      Head: Normocephalic and atraumatic.      Right Ear:  Tympanic membrane and external ear normal.      Nose: Nose normal.      Mouth/Throat:      Mouth: Mucous membranes are moist.   Eyes:      Extraocular Movements: Extraocular movements intact.      Pupils: Pupils are equal, round, and reactive to light.   Cardiovascular:      Rate and Rhythm: Normal rate and regular rhythm.      Pulses: Normal pulses.      Heart sounds: Normal heart sounds.   Pulmonary:      Effort: Pulmonary effort is normal.      Breath sounds: Normal breath sounds.   Abdominal:      General: Abdomen is flat. Bowel sounds are normal.      Palpations: Abdomen is soft.   Musculoskeletal:         General: Normal range of motion.      Cervical back: Normal range of motion and neck supple.   Skin:     General: Skin is warm and dry.   Neurological:      General: No focal deficit present.      Mental Status: He is alert and oriented to person, place, and time.      Motor: Weakness present.   Psychiatric:         Mood and Affect: Mood normal.         Behavior: Behavior normal.           Overview/Hospital Course: stable    Significant Labs: All pertinent labs within the past 24 hours have been reviewed.  Lab Results   Component Value Date    WBC 8.13 02/03/2025    HGB 10.9 (L) 02/03/2025    HCT 36.0 (L) 02/03/2025    MCV 82.6 02/03/2025     02/03/2025         Recent Labs   Lab 02/03/25  0315      K 4.8      CO2 25   BUN 12.7   CREATININE 1.07   GLUCOSE 83   CALCIUM 10.0   PHOS 3.3       Significant Imaging: I have reviewed all pertinent imaging results/findings within the past 24 hours.    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] 01/23/2023 Yes      Problems Resolved During this Admission:    Diagnosis Date Noted Date Resolved POA    PRINCIPAL PROBLEM:  Abscess [L02.91] 01/26/2025 02/03/2025 Yes     VTE Risk Mitigation (From admission, onward)           Ordered     apixaban tablet 5 mg  2 times daily          01/28/25 1549                    Sepsis  Right thigh abscess  Complicated uti  Chronic osteomyelitis of inferior pubic rami  Sacral decubitis ulcer  Paraplegic  Neurogenic bladder w chronic indwelling vogt   Uti-poa  Deconditioning   PAF  Hx of R foot osteomyelitis  Htn  Iron def  Anxiety/depression     Plan  Cont iv abx  Symptomatic management  Follow cx  Consult ID  Consult wound care   F/u on cx results  Uti- poa, f/u on cx   Resume home meds   Labs in the am  Gi and dvt ppx    Yosvany Simms MD  Department of [facility] Medicine   [facility]

## 2025-02-03 NOTE — PLAN OF CARE
Current with Joie  updated clinicals sent. Along with wound care orders.  Wound care orders faxed as requested

## 2025-02-03 NOTE — CONSULTS
Inpatient Nutrition Assessment    Admit Date: 2025   Total duration of encounter: 9 days   Patient Age: 49 y.o.    Nutrition Recommendation/Prescription     Continue Regular Diet as tolerated.    Continue Brandon TID once diet is advanced (provides 90 kcal and 2.5 g protein per serving),  Continue Boost VHC TID for additional nourishment (provides 530 kcal and 22 g protein per serving).  Rec'd consider vitamin/mineral regimen for wound healin MVI daily   10,000 IU Vitamin A daily   220 mg Zinc sulfate daily   500 mg Ascorbic Acid BID  Will continue to monitor wt, labs, and intake.    Communication of Recommendations: reviewed with nurse    Nutrition Assessment     Malnutrition Assessment/Nutrition-Focused Physical Exam       Malnutrition Level: other (see comments) (Unable to assess) (25 111)  Energy Intake (Malnutrition): other (see comments) (Unable to assess) (25 111)  Weight Loss (Malnutrition): other (see comments) (Does not meet criteria) (25 111)                                         Fluid Accumulation (Malnutrition): other (see comments) (Unable to assess) (25 111)     Hand  Strength, Right (Malnutrition): Unable to assess (25)  A minimum of two characteristics is recommended for diagnosis of either severe or non-severe malnutrition.    Chart Review    Reason Seen: physician consult for wounds and follow-up    Malnutrition Screening Tool Results   Have you recently lost weight without trying?: Unsure  Have you been eating poorly because of a decreased appetite?: Yes   MST Score: 3   Diagnosis:  Cellulitis 2022      Pressure injury of left buttock, stage 4      Relevant Medical History: chronic UTI, paraplegia    Scheduled Medications:  amLODIPine, 5 mg, Daily  apixaban, 5 mg, BID  baclofen, 20 mg, Q8H  ciprofloxacin, 400 mg, Q12H  fluticasone propionate, 1 spray, BID  gabapentin, 800 mg, Q8H  hydrOXYzine pamoate, 25 mg, TID  mirtazapine, 15 mg,  Nightly  oxybutynin, 10 mg, Daily  pantoprazole, 40 mg, Daily  polyethylene glycol, 17 g, Daily  sertraline, 50 mg, Daily  trimethoprim-sulfamethoxazole (Bactrim) IV (PEDS and ADULTS), 5 mg/kg (Dosing Weight), Q8H    Continuous Infusions:   PRN Medications:  acetaminophen, 325 mg, Q4H PRN  cyclobenzaprine, 10 mg, BID PRN  docusate sodium, 100 mg, BID PRN  hydrALAZINE, 10 mg, Q3H PRN  HYDROcodone-acetaminophen, 1 tablet, Q6H PRN  HYDROmorphone, 1 mg, Q4H PRN  labetalol, 10 mg, Q4H PRN  ondansetron, 4 mg, Q4H PRN  oxyCODONE-acetaminophen, 1 tablet, Q6H PRN  sodium chloride 0.9%, 10 mL, Q12H PRN  traZODone, 50 mg, Nightly PRN    Calorie Containing IV Medications: no significant kcals from medications at this time    Recent Labs   Lab 01/28/25  0744 01/29/25  1630 01/30/25  0457 01/31/25  0446 02/01/25  0240 02/02/25  0449 02/03/25  0315   NA  --   --  140 139 138 138 136   K  --   --  4.0 4.7 4.0 4.9 4.8   CALCIUM  --   --  9.0 8.6 9.1 9.3 10.0   PHOS  --   --   --  3.3 3.0 3.1 3.3   MG  --   --   --  2.30 2.30 2.30 2.20   CL  --   --  101 103 101 100 101   CO2  --   --  29 25 29 27 25   BUN  --   --  13.9 12.6 11.0 11.2 12.7   CREATININE 0.68* 0.61* 0.66* 0.61* 0.78 0.84 1.07   EGFRNORACEVR >60 >60 >60 >60 >60 >60 >60   GLUCOSE  --   --  88 87 96 102* 83   BILITOT  --   --  0.4 0.2 0.2 0.2 0.3   ALKPHOS  --   --  92 85 88 96 97   ALT  --   --  8 11 10 9 10   AST  --   --  11 14 10 12 10   ALBUMIN  --   --  3.3* 3.0* 3.1* 3.4* 3.7   WBC  --  9.89 8.48 7.58 8.24 9.26 8.13   HGB  --  9.5* 10.5* 10.0* 9.7* 10.5* 10.9*   HCT  --  30.2* 34.7* 33.7* 32.0* 34.8* 36.0*     Nutrition Orders:  Diet Adult Regular  Diet Adult Regular  Dietary nutrition supplements TID; Boost Very High Calorie Nutritional Drink - Strawberry,Dietary nutrition supplements BID; Brandon - Any flavor    Appetite/Oral Intake: good/% of meals  Factors Affecting Nutritional Intake: nausea  Social Needs Impacting Access to Food: none  identified  Food/Evangelical/Cultural Preferences: unable to obtain  Food Allergies: no known food allergies  Last Bowel Movement: 01/29/25  Wound(s):     Altered Skin Integrity 01/06/24 1640 Sacral spine #1 Pressure Injury-Tissue loss description: Full thickness (Resolved)       Altered Skin Integrity 03/31/24 0400 Left posterior Greater trochanter #3 Non pressure chronic ulcer Full thickness tissue loss. Subcutaneous fat may be visible but bone, tendon or muscle are not exposed-Tissue loss description: Full thickness     Comments    1/26/25: Pt NPO at this time 2/2 abd pain. Rec'd adv diet once medically feasible. Rec'd Brandon and Boost TID once diet adv, as well as vitamin and mineral regimen for wound healing. Wt appears stable over last year per EMR, will monitor and trend wts. Labs and meds reviewed.     1/29/25: Pt had just finished eating all of lunch during rounds; reports that he is eating better; having some nausea; would like a strawberry oral supplement.     2/3/25: Pt is eating ok per nurse.     Anthropometrics    Height: 6' (182.9 cm), Height Method: Stated  Last Weight: 72.6 kg (160 lb) (01/26/25 1945), Weight Method: Bed Scale  BMI (Calculated): 21.7  BMI Classification: normal (BMI 18.5-24.9)        Ideal Body Weight (IBW), Male: 178 lb     % Ideal Body Weight, Male (lb): 89.89 %                          Usual Weight Provided By: EMR weight history    Wt Readings from Last 5 Encounters:   01/26/25 72.6 kg (160 lb)   12/09/24 74.8 kg (165 lb)   09/27/24 69.4 kg (153 lb)   07/03/24 69.4 kg (153 lb)   03/19/24 69.5 kg (153 lb 4.8 oz)     Weight Change(s) Since Admission: new admit  Wt Readings from Last 1 Encounters:   01/26/25 1945 72.6 kg (160 lb)   01/25/25 2113 72.6 kg (160 lb)   Admit Weight: 72.6 kg (160 lb) (01/25/25 2113), Weight Method: Stated    Estimated Needs    Weight Used For Calorie Calculations: 72.6 kg (160 lb 0.9 oz)  Energy Calorie Requirements (kcal): 5186-8794 kcal (1.2-1.3  SFxMSJ)  Energy Need Method: Francisco Rowe  Weight Used For Protein Calculations: 72.6 kg (160 lb 0.9 oz)  Protein Requirements: 109-145 g (1.5-2.0 g/kg)  Fluid Requirements (mL): 2178 mL/d (30 mL/kg)        Enteral Nutrition     Patient not receiving enteral nutrition at this time.    Parenteral Nutrition     Patient not receiving parenteral nutrition support at this time.    Evaluation of Received Nutrient Intake    Calories: meeting estimated needs  Protein: meeting estimated needs    Patient Education     Not applicable.    Nutrition Diagnosis     PES: Inadequate oral intake related to inability to consume sufficient nutrients as evidenced by NPO. (resolved)     PES: PES: Increased nutrient needs (protein) related to increased protein energy demand for wound healing as evidenced by multiple full thickness tissue loss wounds. (active)     PES:            Nutrition Interventions     Intervention(s): general/healthful diet, commercial beverage, multivitamin/mineral supplement therapy, and collaboration with other providers  Intervention(s): Oral diet/nutrient modifications;Oral nutritional supplement    Goal: Meet greater than 80% of nutritional needs by follow-up. (goal progressing)  Goal: Consume % of oral supplements by follow-up. (goal progressing)    Nutrition Goals & Monitoring     Dietitian will monitor: energy intake, weight, electrolyte/renal panel, glucose/endocrine profile, and gastrointestinal profile  Discharge planning: continue regular diet with Boost VHC/Brandon oral supplements  Nutrition Risk/Follow-Up: moderate (follow-up in 3-5 days)   Please consult if re-assessment needed sooner.

## 2025-02-03 NOTE — TELEPHONE ENCOUNTER
Name....: Nicole  ......w/: [facility]  Phone #.: [phone]  Patient.: Hemal Guerrero  Pt .: 1975  Details.: needs to make follow up appt for pt     Called and spoke w  with pt appt info

## 2025-02-04 LAB — BACTERIA FLD CULT: NORMAL

## 2025-02-05 ENCOUNTER — PATIENT OUTREACH (OUTPATIENT)
Dept: ADMINISTRATIVE | Facility: CLINIC | Age: 50
End: 2025-02-05
Payer: MEDICARE

## 2025-02-05 NOTE — PROGRESS NOTES
C3 nurse spoke with Hemal Guerrero  for a TCC post hospital discharge follow up call. Patient was asleep and consented to be called back. The patient has a scheduled HOSFU appointment with Margaret Leblanc MD  on 02/06/2025 @ 815 am.  Appointment with [facility] on 02/11/2025 @ 1030 am.

## 2025-02-05 NOTE — PROGRESS NOTES
C3 nurse spoke with Hemal Guerrero  for a TCC post hospital discharge follow up call. The patient has a scheduled [facility] appointment with Margaret Leblanc MD  on 02/18/2025. Stated appointment date changed due to transportation.  Appointment with [facility] on 02/11/2025 @ 1030 am.

## 2025-02-06 ENCOUNTER — HOSPITAL ENCOUNTER (INPATIENT)
Facility: HOSPITAL | Age: 50
LOS: 4 days | Discharge: HOME HEALTH | DRG: 391 | End: 2025-02-11
Attending: STUDENT IN AN ORGANIZED HEALTH CARE EDUCATION/TRAINING PROGRAM | Admitting: INTERNAL MEDICINE
Payer: MEDICARE

## 2025-02-06 DIAGNOSIS — K59.00 CONSTIPATION, UNSPECIFIED CONSTIPATION TYPE: Primary | ICD-10-CM

## 2025-02-06 DIAGNOSIS — L89.324 PRESSURE INJURY OF LEFT BUTTOCK, STAGE 4: ICD-10-CM

## 2025-02-06 DIAGNOSIS — T14.8XXA CHRONIC WOUND: ICD-10-CM

## 2025-02-06 PROCEDURE — 25000003 PHARM REV CODE 250

## 2025-02-06 PROCEDURE — 99285 EMERGENCY DEPT VISIT HI MDM: CPT

## 2025-02-06 RX ORDER — ONDANSETRON 4 MG/1
4 TABLET, ORALLY DISINTEGRATING ORAL
Status: COMPLETED | OUTPATIENT
Start: 2025-02-06 | End: 2025-02-06

## 2025-02-06 RX ORDER — DICYCLOMINE HYDROCHLORIDE 10 MG/1
20 CAPSULE ORAL
Status: COMPLETED | OUTPATIENT
Start: 2025-02-06 | End: 2025-02-06

## 2025-02-06 RX ADMIN — DICYCLOMINE HYDROCHLORIDE 20 MG: 10 CAPSULE ORAL at 11:02

## 2025-02-06 RX ADMIN — ONDANSETRON 4 MG: 4 TABLET, ORALLY DISINTEGRATING ORAL at 11:02

## 2025-02-07 LAB
ALBUMIN SERPL-MCNC: 3.4 G/DL (ref 3.5–5)
ALBUMIN/GLOB SERPL: 0.8 RATIO (ref 1.1–2)
ALP SERPL-CCNC: 99 UNIT/L (ref 40–150)
ALT SERPL-CCNC: 7 UNIT/L (ref 0–55)
ANION GAP SERPL CALC-SCNC: 9 MEQ/L
AST SERPL-CCNC: 9 UNIT/L (ref 5–34)
BASOPHILS # BLD AUTO: 0.12 X10(3)/MCL
BASOPHILS NFR BLD AUTO: 1.3 %
BILIRUB SERPL-MCNC: 0.4 MG/DL
BUN SERPL-MCNC: 15.6 MG/DL (ref 8.4–25.7)
CALCIUM SERPL-MCNC: 9 MG/DL (ref 8.4–10.2)
CHLORIDE SERPL-SCNC: 104 MMOL/L (ref 98–107)
CO2 SERPL-SCNC: 22 MMOL/L (ref 22–29)
CREAT SERPL-MCNC: 0.73 MG/DL (ref 0.72–1.25)
CREAT/UREA NIT SERPL: 21
EOSINOPHIL # BLD AUTO: 0.41 X10(3)/MCL (ref 0–0.9)
EOSINOPHIL NFR BLD AUTO: 4.5 %
ERYTHROCYTE [DISTWIDTH] IN BLOOD BY AUTOMATED COUNT: 15.7 % (ref 11.5–17)
GFR SERPLBLD CREATININE-BSD FMLA CKD-EPI: >60 ML/MIN/1.73/M2
GLOBULIN SER-MCNC: 4.5 GM/DL (ref 2.4–3.5)
GLUCOSE SERPL-MCNC: 91 MG/DL (ref 74–100)
HCT VFR BLD AUTO: 31.4 % (ref 42–52)
HGB BLD-MCNC: 9.6 G/DL (ref 14–18)
IMM GRANULOCYTES # BLD AUTO: 0.03 X10(3)/MCL (ref 0–0.04)
IMM GRANULOCYTES NFR BLD AUTO: 0.3 %
LIPASE SERPL-CCNC: 7 U/L
LYMPHOCYTES # BLD AUTO: 3.85 X10(3)/MCL (ref 0.6–4.6)
LYMPHOCYTES NFR BLD AUTO: 42.2 %
MAGNESIUM SERPL-MCNC: 2.1 MG/DL (ref 1.6–2.6)
MCH RBC QN AUTO: 25.1 PG (ref 27–31)
MCHC RBC AUTO-ENTMCNC: 30.6 G/DL (ref 33–36)
MCV RBC AUTO: 82 FL (ref 80–94)
MONOCYTES # BLD AUTO: 0.63 X10(3)/MCL (ref 0.1–1.3)
MONOCYTES NFR BLD AUTO: 6.9 %
NEUTROPHILS # BLD AUTO: 4.09 X10(3)/MCL (ref 2.1–9.2)
NEUTROPHILS NFR BLD AUTO: 44.8 %
NRBC BLD AUTO-RTO: 0 %
PLATELET # BLD AUTO: 274 X10(3)/MCL (ref 130–400)
PMV BLD AUTO: 10 FL (ref 7.4–10.4)
POTASSIUM SERPL-SCNC: 3.9 MMOL/L (ref 3.5–5.1)
PROT SERPL-MCNC: 7.9 GM/DL (ref 6.4–8.3)
RBC # BLD AUTO: 3.83 X10(6)/MCL (ref 4.7–6.1)
SODIUM SERPL-SCNC: 135 MMOL/L (ref 136–145)
WBC # BLD AUTO: 9.13 X10(3)/MCL (ref 4.5–11.5)

## 2025-02-07 PROCEDURE — 25000003 PHARM REV CODE 250

## 2025-02-07 PROCEDURE — 96374 THER/PROPH/DIAG INJ IV PUSH: CPT

## 2025-02-07 PROCEDURE — 25000003 PHARM REV CODE 250: Performed by: STUDENT IN AN ORGANIZED HEALTH CARE EDUCATION/TRAINING PROGRAM

## 2025-02-07 PROCEDURE — 25000003 PHARM REV CODE 250: Performed by: INTERNAL MEDICINE

## 2025-02-07 PROCEDURE — 63600175 PHARM REV CODE 636 W HCPCS: Mod: JZ,TB | Performed by: INTERNAL MEDICINE

## 2025-02-07 PROCEDURE — 80053 COMPREHEN METABOLIC PANEL: CPT

## 2025-02-07 PROCEDURE — 85025 COMPLETE CBC W/AUTO DIFF WBC: CPT

## 2025-02-07 PROCEDURE — 96375 TX/PRO/DX INJ NEW DRUG ADDON: CPT

## 2025-02-07 PROCEDURE — 25500020 PHARM REV CODE 255: Performed by: STUDENT IN AN ORGANIZED HEALTH CARE EDUCATION/TRAINING PROGRAM

## 2025-02-07 PROCEDURE — 83735 ASSAY OF MAGNESIUM: CPT

## 2025-02-07 PROCEDURE — 96372 THER/PROPH/DIAG INJ SC/IM: CPT | Performed by: STUDENT IN AN ORGANIZED HEALTH CARE EDUCATION/TRAINING PROGRAM

## 2025-02-07 PROCEDURE — 63600175 PHARM REV CODE 636 W HCPCS: Performed by: STUDENT IN AN ORGANIZED HEALTH CARE EDUCATION/TRAINING PROGRAM

## 2025-02-07 PROCEDURE — 83690 ASSAY OF LIPASE: CPT

## 2025-02-07 PROCEDURE — 11000001 HC ACUTE MED/SURG PRIVATE ROOM

## 2025-02-07 RX ORDER — ERYTHROMYCIN 250 MG/1
500 TABLET, DELAYED RELEASE ORAL 2 TIMES DAILY WITH MEALS
Status: DISCONTINUED | OUTPATIENT
Start: 2025-02-07 | End: 2025-02-07

## 2025-02-07 RX ORDER — DIPHENHYDRAMINE HYDROCHLORIDE 50 MG/ML
25 INJECTION INTRAMUSCULAR; INTRAVENOUS
Status: COMPLETED | OUTPATIENT
Start: 2025-02-07 | End: 2025-02-07

## 2025-02-07 RX ORDER — GABAPENTIN 400 MG/1
800 CAPSULE ORAL 3 TIMES DAILY
Status: DISCONTINUED | OUTPATIENT
Start: 2025-02-07 | End: 2025-02-11 | Stop reason: HOSPADM

## 2025-02-07 RX ORDER — METOCLOPRAMIDE HYDROCHLORIDE 5 MG/ML
5 INJECTION INTRAMUSCULAR; INTRAVENOUS
Status: COMPLETED | OUTPATIENT
Start: 2025-02-07 | End: 2025-02-07

## 2025-02-07 RX ORDER — POLYETHYLENE GLYCOL 3350 17 G/17G
17 POWDER, FOR SOLUTION ORAL DAILY
Qty: 30 EACH | Refills: 0 | Status: SHIPPED | OUTPATIENT
Start: 2025-02-07 | End: 2025-02-11

## 2025-02-07 RX ORDER — CIPROFLOXACIN 500 MG/1
500 TABLET ORAL EVERY 12 HOURS
Status: DISCONTINUED | OUTPATIENT
Start: 2025-02-07 | End: 2025-02-11 | Stop reason: HOSPADM

## 2025-02-07 RX ORDER — POLYETHYLENE GLYCOL 3350, SODIUM SULFATE ANHYDROUS, SODIUM BICARBONATE, SODIUM CHLORIDE, POTASSIUM CHLORIDE 236; 22.74; 6.74; 5.86; 2.97 G/4L; G/4L; G/4L; G/4L; G/4L
4000 POWDER, FOR SOLUTION ORAL ONCE
Status: COMPLETED | OUTPATIENT
Start: 2025-02-07 | End: 2025-02-07

## 2025-02-07 RX ORDER — MORPHINE SULFATE 4 MG/ML
4 INJECTION, SOLUTION INTRAMUSCULAR; INTRAVENOUS
Status: COMPLETED | OUTPATIENT
Start: 2025-02-07 | End: 2025-02-07

## 2025-02-07 RX ORDER — KETOROLAC TROMETHAMINE 30 MG/ML
15 INJECTION, SOLUTION INTRAMUSCULAR; INTRAVENOUS 2 TIMES DAILY PRN
Status: DISPENSED | OUTPATIENT
Start: 2025-02-08 | End: 2025-02-11

## 2025-02-07 RX ORDER — HYDROXYZINE PAMOATE 25 MG/1
25 CAPSULE ORAL EVERY 8 HOURS PRN
Status: DISCONTINUED | OUTPATIENT
Start: 2025-02-07 | End: 2025-02-11 | Stop reason: HOSPADM

## 2025-02-07 RX ORDER — SODIUM CHLORIDE 9 MG/ML
500 INJECTION, SOLUTION INTRAVENOUS CONTINUOUS
Status: ACTIVE | OUTPATIENT
Start: 2025-02-07 | End: 2025-02-08

## 2025-02-07 RX ORDER — SULFAMETHOXAZOLE AND TRIMETHOPRIM 800; 160 MG/1; MG/1
1 TABLET ORAL 2 TIMES DAILY
Status: DISCONTINUED | OUTPATIENT
Start: 2025-02-07 | End: 2025-02-11 | Stop reason: HOSPADM

## 2025-02-07 RX ORDER — NALOXEGOL OXALATE 25 MG/1
25 TABLET, FILM COATED ORAL DAILY
Qty: 30 TABLET | Refills: 0 | Status: SHIPPED | OUTPATIENT
Start: 2025-02-07 | End: 2025-02-11

## 2025-02-07 RX ORDER — BACLOFEN 10 MG/1
20 TABLET ORAL 3 TIMES DAILY
Status: DISCONTINUED | OUTPATIENT
Start: 2025-02-07 | End: 2025-02-08

## 2025-02-07 RX ORDER — KETOROLAC TROMETHAMINE 30 MG/ML
30 INJECTION, SOLUTION INTRAMUSCULAR; INTRAVENOUS ONCE
Status: COMPLETED | OUTPATIENT
Start: 2025-02-07 | End: 2025-02-07

## 2025-02-07 RX ORDER — SYRING-NEEDL,DISP,INSUL,0.3 ML 29 G X1/2"
296 SYRINGE, EMPTY DISPOSABLE MISCELLANEOUS
Status: DISCONTINUED | OUTPATIENT
Start: 2025-02-07 | End: 2025-02-07

## 2025-02-07 RX ORDER — ACETAMINOPHEN 325 MG/1
650 TABLET ORAL EVERY 6 HOURS PRN
Status: DISCONTINUED | OUTPATIENT
Start: 2025-02-08 | End: 2025-02-11 | Stop reason: HOSPADM

## 2025-02-07 RX ADMIN — Medication 1 ENEMA: at 12:02

## 2025-02-07 RX ADMIN — LACTULOSE 10 G: 10 SOLUTION ORAL at 07:02

## 2025-02-07 RX ADMIN — BACLOFEN 20 MG: 10 TABLET ORAL at 03:02

## 2025-02-07 RX ADMIN — METHYLNALTREXONE BROMIDE 12 MG: 12 INJECTION, SOLUTION SUBCUTANEOUS at 07:02

## 2025-02-07 RX ADMIN — DIPHENHYDRAMINE HYDROCHLORIDE 25 MG: 50 INJECTION INTRAMUSCULAR; INTRAVENOUS at 07:02

## 2025-02-07 RX ADMIN — SODIUM CHLORIDE 500 ML: 9 INJECTION, SOLUTION INTRAVENOUS at 03:02

## 2025-02-07 RX ADMIN — GABAPENTIN 800 MG: 400 CAPSULE ORAL at 03:02

## 2025-02-07 RX ADMIN — KETOROLAC TROMETHAMINE 30 MG: 30 INJECTION, SOLUTION INTRAMUSCULAR at 12:02

## 2025-02-07 RX ADMIN — MORPHINE SULFATE 4 MG: 4 INJECTION, SOLUTION INTRAMUSCULAR; INTRAVENOUS at 07:02

## 2025-02-07 RX ADMIN — APIXABAN 5 MG: 5 TABLET, FILM COATED ORAL at 09:02

## 2025-02-07 RX ADMIN — IOHEXOL 100 ML: 350 INJECTION, SOLUTION INTRAVENOUS at 04:02

## 2025-02-07 RX ADMIN — SULFAMETHOXAZOLE AND TRIMETHOPRIM 1 TABLET: 800; 160 TABLET ORAL at 09:02

## 2025-02-07 RX ADMIN — GABAPENTIN 800 MG: 400 CAPSULE ORAL at 09:02

## 2025-02-07 RX ADMIN — CIPROFOLXACIN 500 MG: 500 TABLET ORAL at 09:02

## 2025-02-07 RX ADMIN — KETOROLAC TROMETHAMINE 15 MG: 30 INJECTION, SOLUTION INTRAMUSCULAR at 11:02

## 2025-02-07 RX ADMIN — BACLOFEN 20 MG: 10 TABLET ORAL at 09:02

## 2025-02-07 RX ADMIN — SULFAMETHOXAZOLE AND TRIMETHOPRIM 1 TABLET: 800; 160 TABLET ORAL at 08:02

## 2025-02-07 RX ADMIN — METOCLOPRAMIDE HYDROCHLORIDE 5 MG: 5 INJECTION INTRAMUSCULAR; INTRAVENOUS at 07:02

## 2025-02-07 RX ADMIN — POLYETHYLENE GLYCOL 3350, SODIUM SULFATE ANHYDROUS, SODIUM BICARBONATE, SODIUM CHLORIDE, POTASSIUM CHLORIDE 4000 ML: 236; 22.74; 6.74; 5.86; 2.97 POWDER, FOR SOLUTION ORAL at 05:02

## 2025-02-07 RX ADMIN — BACLOFEN 20 MG: 10 TABLET ORAL at 08:02

## 2025-02-07 RX ADMIN — LINACLOTIDE 290 MCG: 145 CAPSULE, GELATIN COATED ORAL at 08:02

## 2025-02-07 RX ADMIN — CIPROFOLXACIN 500 MG: 500 TABLET ORAL at 08:02

## 2025-02-07 NOTE — DISCHARGE INSTRUCTIONS
Continue taking your antibiotics as prescribed.      Began taking MiraLax as prescribed.      Return to the emergency department if any new or worsening symptoms.
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
## 2025-02-07 NOTE — NURSING
Admission documentation completed by the admit nurse. Home med rec not complete. Pt did not elect for meds to bed with [facility]. Pressure ulcer prevention set ordered. PUP, 4 Eyes, and standing weight to be completed by the admitting floor nurse.

## 2025-02-07 NOTE — ED NOTES
No BM after enema, pt diaper changed and wound to left buttuck cleaned and wet to dry dressing applied

## 2025-02-07 NOTE — H&P
[facility] Medicine History & Physical Examination       Patient Name: Hemal Guerrero  MRN: [id number]  Patient Class: IP- Inpatient   Admission Date: 2/6/2025   Admitting Physician:  Service   Length of Stay: 0  Attending Physician: Margaret Leblanc MD  Primary Care Provider: Margaret Leblanc MD  Face-to-Face encounter date: 02/07/2025  Code Status:full code  Chief Complaint: Constipation (Pt arrives via [facility], EMS reports paraplegic, pt was recently Dc'd from [facility], however pt reports that he has not had BM in 3 weeks  )        HISTORY OF PRESENT ILLNESS:   Hemal Guerrero is a 49 y.o. male  with Hx of paraplegia, neurogenic bladder with suprapubic catheter and non healing wounds , chronic sacral decubitus ulcer and left ischial ulcer,chronic osteomyelitis of the inferior pubic ramus bilaterally  who presented to ER on 2/6 c/o constipation and abd pain ,. No BM for weeks . CT abd- massive stool burden. Was given enema , lactulose, relistor and admitted to medicine services w GI consult .   PAST MEDICAL HISTORY:     Past Medical History:   Diagnosis Date    Chronic UTI     Paraplegia        PAST SURGICAL HISTORY:     Past Surgical History:   Procedure Laterality Date    INSERTION OF SUPRAPUBIC CATHETER      LAPAROTOMY         ALLERGIES:   Adhesive and Amitriptyline    FAMILY HISTORY:   Reviewed and negative    SOCIAL HISTORY:     Social History     Tobacco Use    Smoking status: Never    Smokeless tobacco: Never   Substance Use Topics    Alcohol use: Not Currently        HOME MEDICATIONS:     Prior to Admission medications    Medication Sig Start Date End Date Taking? Authorizing Provider   amLODIPine (NORVASC) 5 MG tablet Take 1 tablet (5 mg total) by mouth once daily. 2/3/23 3/5/23  Shila Graham MD   baclofen (LIORESAL) 20 MG tablet Take 20 mg by mouth 3 (three) times daily. 5/12/23   Provider, Historical   ciprofloxacin HCl (CIPRO) 500 MG tablet Take 1  tablet (500 mg total) by mouth every 12 (twelve) hours. 2/3/25   Yosvany Simms MD   ELIQUIS 5 mg Tab Take 5 mg by mouth 2 (two) times daily. 3/23/23   Provider, Historical   FEROSUL 325 mg (65 mg iron) Tab tablet Take 1 tablet by mouth every morning.  Patient not taking: Reported on 2/5/2025 2/3/23   Provider, Historical   fluticasone propionate (FLONASE) 50 mcg/actuation nasal spray 1 spray by Each Nostril route 2 (two) times daily. 5/12/23   Provider, Historical   gabapentin (NEURONTIN) 800 MG tablet Take 800 mg by mouth 3 (three) times daily. 5/12/23   Provider, Historical   HYDROcodone-acetaminophen (NORCO) 5-325 mg per tablet Take 1 tablet by mouth every 6 (six) hours as needed for Pain.  Patient not taking: Reported on 2/5/2025 7/5/23   Nikolay Goel IV, MD   hydrOXYzine pamoate (VISTARIL) 25 MG Cap Take 25 mg by mouth 3 (three) times daily. 5/12/23   Provider, Historical   linaCLOtide (LINZESS) 290 mcg Cap capsule Take 1 capsule (290 mcg total) by mouth before breakfast. 2/3/25   Yosvany Simms MD   mirtazapine (REMERON) 15 MG tablet Take 1 tablet (15 mg total) by mouth nightly. 2/3/23 3/5/23  Shila Graham MD   naloxegoL (MOVANTIK) 25 mg tablet Take 1 tablet (25 mg total) by mouth once daily. 2/7/25   Nito Ralph MD   oxybutynin (DITROPAN-XL) 10 MG 24 hr tablet Take 1 tablet by mouth every morning.  Patient not taking: Reported on 2/5/2025    Provider, Historical   oxyCODONE-acetaminophen (PERCOCET)  mg per tablet Take 1 tablet by mouth 2 (two) times daily as needed for Pain. 2/3/25   Yosvany Simms MD   polyethylene glycol (GLYCOLAX) 17 gram PwPk Take 17 g by mouth once daily. 2/7/25 3/9/25  Nito Ralph MD   sertraline (ZOLOFT) 50 MG tablet Take 1 tablet (50 mg total) by mouth once daily. 2/3/23 3/5/23  Shila Graham MD   sulfamethoxazole-trimethoprim 800-160mg (BACTRIM DS) 800-160 mg Tab Take 1 tablet by mouth 2 (two) times daily. 2/3/25   Yosvany Simms MD    cyclobenzaprine (FLEXERIL) 10 MG tablet Take 10 mg by mouth 2 (two) times daily as needed.  Patient not taking: Reported on 2/5/2025 6/13/23 2/7/25  Provider, Historical   docusate sodium (COLACE) 250 MG capsule Take 250 mg by mouth daily as needed.  2/7/25  Provider, Historical   naloxegoL (MOVANTIK) 25 mg tablet Take 1 tablet (25 mg total) by mouth once daily. 2/3/25 2/7/25  Yosvany Simms MD       REVIEW OF SYSTEMS:   Except as documented, all other systems reviewed and negative     PHYSICAL EXAM:     VITAL SIGNS: 24 HRS MIN & MAX LAST   Temp  Min: 98 °F (36.7 °C)  Max: 98.4 °F (36.9 °C) 98.1 °F (36.7 °C)   BP  Min: 99/67  Max: 138/95 (!) 138/95   Pulse  Min: 83  Max: 102  102   Resp  Min: 16  Max: 19 16   SpO2  Min: 98 %  Max: 100 % 100 %     General appearance: Well-developed, well-nourished male in no apparent distress.  HENT: Atraumatic head. Moist mucous membranes of oral cavity.  Eyes: Normal extraocular movements.   Neck: Supple.   Lungs: Clear to auscultation bilaterally. No wheezing present.   Heart: Regular rate and rhythm. S1 and S2 present with no murmurs/gallop/rub. No pedal edema. No JVD present.   Abdomen: + ttp in the hypogastric region, + bs  Extremities: paraplegic  Skin: sacral wound almost closed . Left buttock ulcer, deep, minimal drainage   Neuro: paraplegic  Psych/mental status: Appropriate mood and affect    LABS AND IMAGING:     Recent Labs   Lab 02/02/25  0449 02/03/25  0315 02/07/25  0006   WBC 9.26 8.13 9.13   RBC 4.19* 4.36* 3.83*   HGB 10.5* 10.9* 9.6*   HCT 34.8* 36.0* 31.4*   MCV 83.1 82.6 82.0   MCH 25.1* 25.0* 25.1*   MCHC 30.2* 30.3* 30.6*   RDW 15.8 15.9 15.7    313 274   MPV 10.1 10.7* 10.0       Recent Labs   Lab 02/02/25  0449 02/03/25  0315 02/07/25  0006    136 135*   K 4.9 4.8 3.9    101 104   CO2 27 25 22   BUN 11.2 12.7 15.6   CREATININE 0.84 1.07 0.73   CALCIUM 9.3 10.0 9.0   MG 2.30 2.20 2.10   ALBUMIN 3.4* 3.7 3.4*   ALKPHOS 96 97 99   ALT  9 10 7   AST 12 10 9   BILITOT 0.2 0.3 0.4       Microbiology Results (last 7 days)       ** No results found for the last 168 hours. **             CT Abdomen Pelvis With IV Contrast NO Oral Contrast  Narrative: EXAMINATION:  CT ABDOMEN PELVIS WITH IV CONTRAST    CLINICAL HISTORY:  Bowel obstruction suspected;    TECHNIQUE:  Helical acquisition through the abdomen and pelvis with IV contrast.  Three plane reconstructions were provided for review.  mGycm. Automatic exposure control, adjustment of mA/kV or iterative reconstruction technique was used to reduce radiation.    COMPARISON:  26 January 2025    FINDINGS:  Mild atelectasis at the lung bases.    There is no significant abnormality of the solid abdominal organs.    No significantly dilated small bowel loops.  Massive stool burden.  No free air.    There is a suprapubic catheter.  No pelvic free fluid.  Abdominal aorta normal in caliber.    No acute osseous findings.  Similar appearance of left ischial decubitus ulcer extending deep to the bone with sclerosis of the underlying ischium.  Impression: 1. Massive stool burden.  2. Otherwise little interval change compared to the prior CT.  3. No significant discrepancy with the preliminary report.    Electronically signed by: Man Naylor  Date:    02/07/2025  Time:    08:26      ASSESSMENT & PLAN:     Constipation w massive stool burden  Chronic osteomyelitis of B/L inferior pubic rami  Paraplegia  Neurogenic bladder / Chronic vogt catheter  Depression / Anxiety  Anemia  Reactive thrombocytosis  Stage IV left gluteal decubitus ulcer  PAF  Hx of R foot osteomyelitis    Plan  Cont relistor , bowel regimen  GI consulted  Appreciate their recs   Pain control  Wound care consulted  Resume gabapentin and baclofen   Resume eliquis   Labs in the am  Ivf     Dvt prophylaxis- on eliquis         __________________________________________________________________________  INPATIENT LIST OF MEDICATIONS     Scheduled  Meds:   apixaban  5 mg Oral BID    baclofen  20 mg Oral TID    ciprofloxacin HCl  500 mg Oral Q12H    gabapentin  800 mg Oral TID    lactulose 10 gram/15 ml  10 g Oral TID    linaCLOtide  290 mcg Oral Before breakfast    methylnaltrexone  12 mg Subcutaneous Every other day    sulfamethoxazole-trimethoprim 800-160mg  1 tablet Oral BID     Continuous Infusions:   0.9% NaCl  500 mL Intravenous Continuous         PRN Meds:.  Current Facility-Administered Medications:     hydrOXYzine pamoate, 25 mg, Oral, Q8H PRN      Margaret Leblanc MD   02/07/2025

## 2025-02-07 NOTE — PLAN OF CARE
Pt is , 3 children, no living will or POA. Lives with son. Has equipment but primarily bedbound at present. [facility] .    02/07/25 0958   Discharge Assessment   Assessment Type Discharge Planning Assessment   Confirmed/corrected address, phone number and insurance Yes   Confirmed Demographics Correct on Facesheet   Source of Information patient   When was your last doctors appointment?   (PCP [facility])   Communicated IVORY with patient/caregiver Date not available/Unable to determine   People in Home child(arnol), adult   Do you expect to return to your current living situation? Yes   Do you have help at home or someone to help you manage your care at home? Yes   Who are your caregiver(s) and their phone number(s)? alla Guerrero [phone]   Prior to hospitilization cognitive status: Unable to Assess   Current cognitive status: Alert/Oriented   Walking or Climbing Stairs Difficulty yes   Walking or Climbing Stairs ambulation difficulty, requires equipment   Mobility Management wc,  power chair, although reporting primarily bedbound at present   Dressing/Bathing Difficulty yes   Dressing/Bathing bathing difficulty, requires equipment   Dressing/Bathing Management son assists, has bedside commode and shower chair but bed baths at present   Home Accessibility wheelchair accessible   Home Layout Able to live on 1st floor   Equipment Currently Used at Home wheelchair;shower chair;power chair;bedside commode   Readmission within 30 days? Yes   Patient currently being followed by outpatient case management? No   Do you currently have service(s) that help you manage your care at home? Yes   Name and Contact number of agency [facility]    Is the pt/caregiver preference to resume services with current agency Yes   Do you take prescription medications? Yes  (Fills with [facility] on [address])   Do you have prescription coverage? Yes   Coverage Medicare   Do you have any problems affording any of your  prescribed medications? TBD   Is the patient taking medications as prescribed? yes   How do you get to doctors appointments? other (see comments)  (ambulance at present)   Are you on dialysis? No   Do you take coumadin? No   Discharge Plan A Other  (TBD)   DME Needed Upon Discharge  other (see comments)  (TBD)   Discharge Plan discussed with: Patient   Transition of Care Barriers None   Physical Activity   On average, how many days per week do you engage in moderate to strenuous exercise (like a brisk walk)? 0 days   On average, how many minutes do you engage in exercise at this level? 0 min   Financial Resource Strain   How hard is it for you to pay for the very basics like food, housing, medical care, and heating? Somewhat   Housing Stability   In the last 12 months, was there a time when you were not able to pay the mortgage or rent on time? N   At any time in the past 12 months, were you homeless or living in a shelter (including now)? N   Transportation Needs   Has the lack of transportation kept you from medical appointments, meetings, work or from getting things needed for daily living? Yes, it has kept me from non-medical meetings, appointments, work or from getting things that I need.   Food Insecurity   Within the past 12 months, you worried that your food would run out before you got the money to buy more. Sometimes   Within the past 12 months, the food you bought just didn't last and you didn't have money to get more. Never true   Stress   Do you feel stress - tense, restless, nervous, or anxious, or unable to sleep at night because your mind is troubled all the time - these days? Rather much   Social Isolation   How often do you feel lonely or isolated from those around you?  Never   Alcohol Use   Q1: How often do you have a drink containing alcohol? Never   Q2: How many drinks containing alcohol do you have on a typical day when you are drinking? None   Q3: How often do you have six or more drinks on  one occasion? Never   Utilities   In the past 12 months has the electric, gas, oil, or water company threatened to shut off services in your home? No   Health Literacy   How often do you need to have someone help you when you read instructions, pamphlets, or other written material from your doctor or pharmacy? Rarely   OTHER   Name(s) of People in Home alla Leo

## 2025-02-07 NOTE — ED PROVIDER NOTES
Encounter Date: 2/6/2025    I reviewed the NP/PA documentation and agree with the NP/PA assessment and plan of care.  I had face-to-face time with the patient.  I have independently evaluated the patient, performed physical exam, and reviewed diagnostic tests.  I performed a substantive portion of this patient's ED visit and MDM. I have reviewed and discussed the assessment, plan, and pertinent lab/radiology results with both [facility]INA and patient.  I personally made/approved the management plan for this patient and take responsibility for the patient management.  See my independent MDM below.     HPI:  Patient is a 49-year-old male with a past medical history of paraplegia Crohn's chronic UTIs presents to the emergency department for constipation, unable to have a bowel movement for the last 3 weeks.  Complaining of diffuse abdominal pain.  Patient has been taking MiraLax, stool softeners, doing self rectal stimulation prior to arrival without relief of symptoms.  Patient has been given enema without BM.      Physical exam:   GEN: Awake, alert, no distress  HEENT: NC/AT  CV: RRR, no murmurs   PULM: Lungs CTAB, normal work of breathing  ABD:  Diffuse abdominal tenderness to palpation  NEURO:  Paraplegic  SkIn:  Decubitus ulcer to left buttock  Pigtail catheter drain to the right thigh        My MDM:  ED assessment: Abdominal pain  Pertinent vitals/exam findings: Diffuse abdominal TTP  DDx: Judging by the patient's chief complaint and pertinent history, the patient has the following possible differential diagnoses, including but not limited to the following.  Some of these are deemed to be lower likelihood and some more likely based on my physical exam and history combined with possible lab work and/or imaging studies.   Please see the pertinent studies, and refer to the HPI.  Some of these diagnoses will take further evaluation to fully rule out, perhaps as an outpatient and the patient was encouraged to follow up when  discharged for more comprehensive evaluation.    SBO, constipation, biliary disease, diverticulitis,  mesenteric ischemia, intraabdominal abscess, retroperitoneal abscess, gastritis, gastroenteritis, hepatitis, hernia, pancreatitis, inflammatory bowel disease, PUD, gastroparesis, nephrolithiasis, constipation, GERD, IBS    Review of labs/results:  Reviewed  Discussion of management with other providers:  Discussed with the patient's PCP.  Recommends placing patient on bowel regimen, consultation with GI.  Will place in observation.  ED management:  Patient is a 49-year-old male presents to emergency department complaining of constipation, able to have bowel movement.  Recent admission for wound, antibiotics, abscess.  No relief with enema.  Imaging obtained.  Large amount of stool burden appreciated.  Will place patient on bowel regimen.  Will place on observation.  All results have been discussed with the patient.  Answered all questions at this time.  Dr. Leblanc will admit.   ED disposition/plan:  Admission  ED Diagnoses:  Constipation, unspecified constipation type (Primary)  Chronic wound          ED Disposition Condition    Observation Stable                     History     Chief Complaint   Patient presents with    Constipation     Pt arrives via [facility], EMS reports paraplegic, [facility], however pt reports that he has not had BM in 3 weeks       See MDM.    The history is provided by the patient. No  was used.     Review of patient's allergies indicates:   Allergen Reactions    Adhesive     Amitriptyline      Past Medical History:   Diagnosis Date    Chronic UTI     Paraplegia      Past Surgical History:   Procedure Laterality Date    INSERTION OF SUPRAPUBIC CATHETER      LAPAROTOMY       Family History   Problem Relation Name Age of Onset    Lymphoma Mother      Rheum arthritis Mother       Social History     Tobacco Use    Smoking status: Never    Smokeless  "tobacco: Never   Substance Use Topics    Alcohol use: Not Currently    Drug use: Yes     Types: Marijuana     Review of Systems   Constitutional:         Constipation   All other systems reviewed and are negative.      Physical Exam     Initial Vitals [02/06/25 2046]   BP Pulse Resp Temp SpO2   116/88 99 16 98.4 °F (36.9 °C) 100 %      MAP       --         Physical Exam    Nursing note and vitals reviewed.  Constitutional: He appears well-developed and well-nourished.   HENT:   Head: Normocephalic and atraumatic.   Eyes: EOM are normal.   Neck:   Normal range of motion.  Cardiovascular:  Normal rate, regular rhythm and intact distal pulses.           Pulmonary/Chest: Breath sounds normal.   Abdominal: Abdomen is soft and flat. Bowel sounds are normal. He exhibits no distension. A surgical scar is present. There is generalized abdominal tenderness.   Suprapubic catheter in place, area is clean and dry.  There is guarding (Pt abdomen appearing to be un-voluntarily guarding on exam but pt states this is baseline for him and is "due to his nerve damage"). There is no rebound.   Musculoskeletal:      Cervical back: Normal range of motion.      Comments: Pt is paraplegic at baseline. He has regained a small amount of active ROM of the BLLE.      Neurological: He is alert and oriented to person, place, and time. GCS score is 15. GCS eye subscore is 4. GCS verbal subscore is 5. GCS motor subscore is 6.   Skin: Skin is warm and dry. Capillary refill takes less than 2 seconds. Lesion noted.   Stave IV pressure wound to left ischium with green discharge noted overlying the wound and on the bandage (see media tab)   Psychiatric: He has a normal mood and affect.         ED Course   Procedures  Labs Reviewed   COMPREHENSIVE METABOLIC PANEL - Abnormal       Result Value    Sodium 135 (*)     Potassium 3.9      Chloride 104      CO2 22      Glucose 91      Blood Urea Nitrogen 15.6      Creatinine 0.73      Calcium 9.0      Protein " Total 7.9      Albumin 3.4 (*)     Globulin 4.5 (*)     Albumin/Globulin Ratio 0.8 (*)     Bilirubin Total 0.4      ALP 99      ALT 7      AST 9      eGFR >60      Anion Gap 9.0      BUN/Creatinine Ratio 21     CBC WITH DIFFERENTIAL - Abnormal    WBC 9.13      RBC 3.83 (*)     Hgb 9.6 (*)     Hct 31.4 (*)     MCV 82.0      MCH 25.1 (*)     MCHC 30.6 (*)     RDW 15.7      Platelet 274      MPV 10.0      Neut % 44.8      Lymph % 42.2      Mono % 6.9      Eos % 4.5      Basophil % 1.3      Imm Grans % 0.3      Neut # 4.09      Lymph # 3.85      Mono # 0.63      Eos # 0.41      Baso # 0.12      Imm Gran # 0.03      NRBC% 0.0     LIPASE - Normal    Lipase Level 7     MAGNESIUM - Normal    Magnesium Level 2.10     CBC W/ AUTO DIFFERENTIAL    Narrative:     The following orders were created for panel order CBC Auto Differential.  Procedure                               Abnormality         Status                     ---------                               -----------         ------                     CBC with Differential[[id number]]       Abnormal            Final result                 Please view results for these tests on the individual orders.          Imaging Results              CT Abdomen Pelvis With IV Contrast NO Oral Contrast (Final result)  Result time 02/07/25 08:26:05      Final result by Man Naylor MD (02/07/25 08:26:05)                   Impression:      1. Massive stool burden.  2. Otherwise little interval change compared to the prior CT.  3. No significant discrepancy with the preliminary report.      Electronically signed by: Man Naylor  Date:    02/07/2025  Time:    08:26               Narrative:    EXAMINATION:  CT ABDOMEN PELVIS WITH IV CONTRAST    CLINICAL HISTORY:  Bowel obstruction suspected;    TECHNIQUE:  Helical acquisition through the abdomen and pelvis with IV contrast.  Three plane reconstructions were provided for review.  mGycm. Automatic exposure control, adjustment of  mA/kV or iterative reconstruction technique was used to reduce radiation.    COMPARISON:  26 January 2025    FINDINGS:  Mild atelectasis at the lung bases.    There is no significant abnormality of the solid abdominal organs.    No significantly dilated small bowel loops.  Massive stool burden.  No free air.    There is a suprapubic catheter.  No pelvic free fluid.  Abdominal aorta normal in caliber.    No acute osseous findings.  Similar appearance of left ischial decubitus ulcer extending deep to the bone with sclerosis of the underlying ischium.                        Preliminary result by Arnav Rosa MD (02/07/25 05:52:48)                   Impression:    1. There is profound stool in the ascending, transverse, and descending colon. This is consistent with constipation. Correlate with clinical and laboratory findings as regards further evlauation and follow-up.  2. Details and other findings as discussed above.               Narrative:    START OF REPORT:  Technique: CT of the abdomen and pelvis was performed with axial images as well as sagittal and coronal reconstruction images with intravenous contrast.    Comparison: Comparison is with study dated 2025-01-25 23:45:01.    Clinical History: Constipation (Pt arrives via [facility], EMS reports paraplegic, [facility], however pt reports that he has not had BM in 3 weeks ).    Dosage Information: Automated Exposure Control was utilized 404.83 mGy.cm.    Findings:  Lines and Tubes: None.  Thorax:  Lungs: There is minimal nonspecific dependent change at the lung bases. No focal infiltrate or consolidation is seen.  Pleura: No effusions or thickening are seen.  Heart: The heart size is within normal limits.  Abdomen:  Abdominal Wall: No abdominal wall pathology is seen. Metallic density objects are again seen within the soft tissues of the right paramedian upper posterior abdominal wall on Series 2 Image 55-66.  Liver: The liver appears  unremarkable.  Biliary System: No intrahepatic or extrahepatic biliary duct dilatation is seen.  Gallbladder: The gallbladder appears unremarkable.  Pancreas: The pancreas appears unremarkable.  Spleen: The spleen appears unremarkable.  Adrenals: The adrenal glands appear unremarkable.  Kidneys: The kidneys appear unremarkable with no stones cysts masses or hydronephrosis.  Aorta: The visualized abdominal aorta appears unremarkable.  IVC: Unremarkable.  Bowel:  Esophagus: The visualized distal esophagus appears unremarkable.  Stomach: The stomach appears unremarkable.  Duodenum: Unremarkable appearing duodenum.  Small Bowel: The small bowel appears unremarkable.  Colon: There is profound stool in the ascending, transverse, and descending colon. This is consistent with constipation.  Appendix: The appendix is not identified but no inflammatory changes are seen in the right lower quadrant to suggest appendicitis.  Peritoneum: No intraperitoneal free air or ascites is seen.    Pelvis: There is a stable appearing ulceration overlying the left gluteal region on Series 2 Image 170. No definite underlying organized fluid collection is seen to suggest abscess formation. There is stable appearing sclerotic change of the underlying pelvic bone which may indicate chronic osteomyelitis.  Bladder: The bladder is nondistended and cannot be definitively evaluated. A suprapubic catheter is seen in place.  Male:  Prostate gland: The prostate gland appears unremarkable.  Inguinal Findings: Incidental note is made of a few stable appearing prominent and enlarged left greater than right inguinal lymph nodes.    Bony structures:  Dorsal Spine: There is subtle stable appearing multilevel spondylosis of the visualized dorsal spine. There is posterior fusion hardware at L5-S1.  Bony Pelvis: There are several stable appearing bony exostoses arising from the left iliac wing, bilateral pubic and ischial bones, as well as in the bilateral  greater trochanters. These likely represent osteochondromas.    Miscellaneous: There is slight interval decrease in size of the irregularly shaped fluid collection with subtle rim enhancement along anterior soft tissues of the visualized right thigh. It measures 3.6 x 2.8 cm (APxT) centered on Series 2 Image 177. A pigtail drainage catheter is seen in place with its pigtail within the fluid collection.                                         Medications   methylnaltrexone 12 mg/0.6 mL subcutaneous injection 12 mg (12 mg Subcutaneous Given 2/9/25 0810)   ciprofloxacin HCl tablet 500 mg (500 mg Oral Given 2/9/25 0809)   linaCLOtide capsule 290 mcg (290 mcg Oral Not Given 2/9/25 0600)   sulfamethoxazole-trimethoprim 800-160mg per tablet 1 tablet (1 tablet Oral Given 2/9/25 0810)   0.9% NaCl infusion (500 mLs Intravenous New Bag 2/7/25 1543)   apixaban tablet 5 mg (5 mg Oral Given 2/9/25 0809)   gabapentin capsule 800 mg (800 mg Oral Given 2/9/25 0810)   hydrOXYzine pamoate capsule 25 mg (25 mg Oral Given 2/8/25 2107)   acetaminophen tablet 650 mg (has no administration in time range)   ketorolac injection 15 mg (15 mg Intravenous Given 2/9/25 0407)   ondansetron injection 4 mg (4 mg Intravenous Given 2/8/25 1155)   baclofen tablet 20 mg (20 mg Oral Given 2/9/25 1312)   oxyCODONE-acetaminophen  mg per tablet 1 tablet (1 tablet Oral Given 2/9/25 1312)   polyethylene glycol packet 17 g (17 g Oral Given 2/9/25 1312)   ondansetron disintegrating tablet 4 mg (4 mg Oral Given 2/6/25 2353)   dicyclomine capsule 20 mg (20 mg Oral Given 2/6/25 2353)   sodium phosphates 19-7 gram/118 mL enema 1 enema (1 enema Rectal Given by Provider 2/7/25 0045)   iohexoL (OMNIPAQUE 350) injection 100 mL (100 mLs Intravenous Given 2/7/25 0453)   morphine injection 4 mg (4 mg Intravenous Given 2/7/25 0723)   metoclopramide injection 5 mg (5 mg Intravenous Given 2/7/25 0723)   diphenhydrAMINE injection 25 mg (25 mg Intravenous Given 2/7/25  "8072)   lactulose 10 gram/15 ml solution 10 g (10 g Oral Given 2/7/25 8976)   ketorolac injection 30 mg (30 mg Intravenous Given 2/7/25 1480)   polyethylene glycol (GoLYTELY) solution (4,000 mLs Oral Given 2/7/25 2336)     Medical Decision Making  The patient is a 49 y.o. male with a pertinent PMHX of paraplegia, chronic UTI's, chronic LLE pain who presents to the Emergency Department via EMS with a chief complaint of constipation. Symptoms began 3 weeks ago and have been constant since onset. Associated symptoms include abdominal pain, nausea. The abd pain is currently rated as a 7/10 in severity and described as cramping with no radiation.  Symptoms are aggravated with nothing and alleviated with nothing. The patient denies CP, SOB, F, vomiting, HA, swelling. He endorses passing gas. They report taking Miralax, stool softeners, doing self rectal stimulation prior to arrival with no relief of symptoms. No other reported symptoms at this time. Pt is currently on bactrim and cipro since being d/c from the hospital on 2/3 due to the wound on his left buttock. He is being followed by wound care.    Pertinent physical exam findings include patient with generalized abdominal TTP, pt abdomen appearing to be un-voluntarily guarding on exam but pt states this is baseline for him and is "due to his nerve damage",Stave IV pressure wound to left ischium with green discharge noted overlying the wound and on the bandage (see media tab).  Vital signs stable. Enema attempted with Dr Ralph without successful BM.    Lipase, Mg WNL. CMP with no emergent findings. CBC without sign of acute infection.   CT AP Impression:  1. Massive stool burden.  2. Otherwise little interval change compared to the prior CT.  3. No significant discrepancy with the preliminary report.    Portions of this note have been created with voice recognition software. Occasional "wrong-words" or "sound alike" substitutions may have occurred due to inherent " limitations of voice software. Please read the note carefully and recognize, using context, word substitutions may have occurred.       Amount and/or Complexity of Data Reviewed  External Data Reviewed: labs, radiology and notes.     Details: Previous ED visit reviewed.   Labs: ordered. Decision-making details documented in ED Course.  Radiology: ordered. Decision-making details documented in ED Course.  Discussion of management or test interpretation with external provider(s): Discussed pt with Dr Ralph who had FTF with pt and will take over as my shift is ending. See his note/attestation for further information.    Risk  OTC drugs.  Prescription drug management.                                      Clinical Impression:  Final diagnoses:  [K59.00] Constipation, unspecified constipation type (Primary)  [T14.8XXA] Chronic wound          ED Disposition Condition    Observation Stable                Dominique Salas PA-C  02/09/25 1405       Nito Ralph MD  02/11/25 8822

## 2025-02-08 PROCEDURE — 25000003 PHARM REV CODE 250: Performed by: STUDENT IN AN ORGANIZED HEALTH CARE EDUCATION/TRAINING PROGRAM

## 2025-02-08 PROCEDURE — 63600175 PHARM REV CODE 636 W HCPCS: Performed by: INTERNAL MEDICINE

## 2025-02-08 PROCEDURE — 11000001 HC ACUTE MED/SURG PRIVATE ROOM

## 2025-02-08 PROCEDURE — 25000003 PHARM REV CODE 250: Performed by: INTERNAL MEDICINE

## 2025-02-08 RX ORDER — BACLOFEN 10 MG/1
20 TABLET ORAL 4 TIMES DAILY
Status: DISCONTINUED | OUTPATIENT
Start: 2025-02-08 | End: 2025-02-11 | Stop reason: HOSPADM

## 2025-02-08 RX ORDER — ONDANSETRON HYDROCHLORIDE 2 MG/ML
4 INJECTION, SOLUTION INTRAVENOUS 3 TIMES DAILY PRN
Status: DISCONTINUED | OUTPATIENT
Start: 2025-02-08 | End: 2025-02-11 | Stop reason: HOSPADM

## 2025-02-08 RX ADMIN — CIPROFOLXACIN 500 MG: 500 TABLET ORAL at 09:02

## 2025-02-08 RX ADMIN — BACLOFEN 20 MG: 10 TABLET ORAL at 09:02

## 2025-02-08 RX ADMIN — GABAPENTIN 800 MG: 400 CAPSULE ORAL at 09:02

## 2025-02-08 RX ADMIN — APIXABAN 5 MG: 5 TABLET, FILM COATED ORAL at 09:02

## 2025-02-08 RX ADMIN — GABAPENTIN 800 MG: 400 CAPSULE ORAL at 02:02

## 2025-02-08 RX ADMIN — ONDANSETRON 4 MG: 2 INJECTION INTRAMUSCULAR; INTRAVENOUS at 11:02

## 2025-02-08 RX ADMIN — KETOROLAC TROMETHAMINE 15 MG: 30 INJECTION, SOLUTION INTRAMUSCULAR at 09:02

## 2025-02-08 RX ADMIN — SULFAMETHOXAZOLE AND TRIMETHOPRIM 1 TABLET: 800; 160 TABLET ORAL at 09:02

## 2025-02-08 RX ADMIN — BACLOFEN 20 MG: 10 TABLET ORAL at 02:02

## 2025-02-08 RX ADMIN — HYDROXYZINE PAMOATE 25 MG: 25 CAPSULE ORAL at 09:02
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
## 2025-02-08 NOTE — CONSULTS
Inpatient Nutrition Evaluation    Admit Date: 2/6/2025   Total duration of encounter: 2 days   Patient Age: 49 y.o.    Nutrition Recommendation/Prescription     Continue regular diet as tolerated  Trial Boost Plus BID (provides 360 kcal and 14 gm protein per serving)  Brandon BID (provides 90 kcal and 2.5 gm protein per serving)  Continue bowel regimen   Antiemetic PRN  Monitor PO intake, labs and weight    Nutrition Assessment     Chart Review    Reason Seen: physician consult for non-healing wound    Malnutrition Screening Tool Results   Have you recently lost weight without trying?: No  Have you been eating poorly because of a decreased appetite?: No   MST Score: 0   Diagnosis:  Constipation w massive stool burden   Chronic osteomyelitis of B/L inferior pubic rami   Paraplegia   Neurogenic bladder / Chronic vogt catheter  Depression / Anxiety  Anemia  Reactive thrombocytosis  Stage IV left gluteal decubitus ulcer  PAF    Relevant Medical History: paraplegia, neurogenic bladder with suprapubic catheter and non healing wounds , chronic sacral decubitus ulcer and left ischial ulcer,chronic osteomyelitis of the inferior pubic ramus bilaterally     Scheduled Medications:  apixaban, 5 mg, BID  baclofen, 20 mg, TID  ciprofloxacin HCl, 500 mg, Q12H  gabapentin, 800 mg, TID  lactulose 10 gram/15 ml, 10 g, TID  linaCLOtide, 290 mcg, Before breakfast  methylnaltrexone, 12 mg, Every other day  sulfamethoxazole-trimethoprim 800-160mg, 1 tablet, BID    Continuous Infusions:   PRN Medications:   Current Facility-Administered Medications:     acetaminophen, 650 mg, Oral, Q6H PRN    hydrOXYzine pamoate, 25 mg, Oral, Q8H PRN    ketorolac, 15 mg, Intravenous, BID PRN    ondansetron, 4 mg, Intravenous, TID PRN    Recent Labs   Lab 02/02/25  0449 02/03/25  0315 02/07/25  0006    136 135*   K 4.9 4.8 3.9   CALCIUM 9.3 10.0 9.0   PHOS 3.1 3.3  --    MG 2.30 2.20 2.10    101 104   CO2 27 25 22   BUN 11.2 12.7 15.6    CREATININE 0.84 1.07 0.73   EGFRNORACEVR >60 >60 >60   GLUCOSE 102* 83 91   BILITOT 0.2 0.3 0.4   ALKPHOS 96 97 99   ALT 9 10 7   AST 12 10 9   ALBUMIN 3.4* 3.7 3.4*   LIPASE  --   --  7   WBC 9.26 8.13 9.13   HGB 10.5* 10.9* 9.6*   HCT 34.8* 36.0* 31.4*     Nutrition Orders:  Diet Adult Regular  Dietary nutrition supplements BID; Brandon - Any flavor, Boost Plus Nutritional Drink - Very Vanilla    Appetite/Oral Intake: fair/50-75% of meals  Factors Affecting Nutritional Intake: constipation, decreased appetite, and nausea  Food/Religion/Cultural Preferences: none reported  Food Allergies: no known food allergies  Last Bowel Movement: 25  Wound(s):  stage 4 sacral ulcer    Comments    25: Pt reports fair-good intake since admit, decreased appetite PTA for few days. Agreeable to ONS and Brandon BID. +constipation with massive stool burden, +nausea. Pt unsure of UBW or unintentional weight loss. Bed weight of 71.5 kg (157#) taken during rounds. No significant weight loss noted per EMR weight history review. Pt with mild muscle depletion per NFPE.    Anthropometrics    Height: 6' (182.9 cm), Height Method: Measured  Last Weight: 71.5 kg (157 lb 10.1 oz) (25 1309), Weight Method: Bed Scale  BMI (Calculated): 21.4  BMI Classification: normal (BMI 18.5-24.9)        Ideal Body Weight (IBW), Male: 178 lb     % Ideal Body Weight, Male (lb): 89.89 %                 Usual Body Weight (UBW), k.6 kg  % Usual Body Weight: 98.69  % Weight Change From Usual Weight: -1.52 %  Usual Weight Provided By: EMR weight history    Wt Readings from Last 5 Encounters:   25 71.5 kg (157 lb 10.1 oz)   25 72.6 kg (160 lb)   24 74.8 kg (165 lb)   24 69.4 kg (153 lb)   24 69.4 kg (153 lb)     Weight Change(s) Since Admission:   Wt Readings from Last 1 Encounters:   25 1309 71.5 kg (157 lb 10.1 oz)   25 1340 72.6 kg (160 lb)   25 2046 72.6 kg (160 lb)   Admit Weight: 72.6 kg (160  lb) (02/06/25 2046), Weight Method: Estimated    2/8: bed weight 71.5 kg (157#)    Patient Education     Not applicable.    Nutrition Goals & Monitoring     Dietitian will monitor: food and beverage intake, weight change, and gastrointestinal profile    Nutrition Risk/Follow-Up: low (follow-up in 5-7 days)  Patients assigned 'low nutrition risk' status do not qualify for a full nutritional assessment but will be monitored and re-evaluated in a 5-7 day time period. Please consult if re-evaluation needed sooner.

## 2025-02-08 NOTE — PROGRESS NOTES
[facility] Hospital Medicine Progress Note        Chief Complaint: Inpatient Follow-up for     HPI: Hemal Guerrero is a 49 y.o. male  with Hx of paraplegia, neurogenic bladder with suprapubic catheter and non healing wounds , chronic sacral decubitus ulcer and left ischial ulcer,chronic osteomyelitis of the inferior pubic ramus bilaterally  who presented to ER on 2/6 c/o constipation and abd pain ,. No BM for weeks . CT abd- massive stool burden. Was given enema , lactulose, relistor and admitted to medicine services w GI consult .     Interval Hx:   Seen and examined  No acute events overnight  Still no BM  GI on board  On bowel prep  Case was discussed with patient's nurse and  on the floor.    Objective/physical exam:  General: In no acute distress, afebrile  Chest: Clear to auscultation bilaterally  Heart: RRR, +S1, S2, no appreciable murmur  Abdomen: Soft, nontender, BS +  MSK: paraplegia  Skin- left buttock wound  Neurologic: paraplegia    VITAL SIGNS: 24 HRS MIN & MAX LAST   Temp  Min: 97.2 °F (36.2 °C)  Max: 98.1 °F (36.7 °C) 97.5 °F (36.4 °C)   BP  Min: 101/69  Max: 138/95 117/76   Pulse  Min: 81  Max: 102  81   Resp  Min: 16  Max: 16 16   SpO2  Min: 99 %  Max: 100 % 100 %     I have reviewed the following labs:  Recent Labs   Lab 02/02/25 0449 02/03/25 0315 02/07/25  0006   WBC 9.26 8.13 9.13   RBC 4.19* 4.36* 3.83*   HGB 10.5* 10.9* 9.6*   HCT 34.8* 36.0* 31.4*   MCV 83.1 82.6 82.0   MCH 25.1* 25.0* 25.1*   MCHC 30.2* 30.3* 30.6*   RDW 15.8 15.9 15.7    313 274   MPV 10.1 10.7* 10.0     Recent Labs   Lab 02/02/25 0449 02/03/25 0315 02/07/25  0006    136 135*   K 4.9 4.8 3.9    101 104   CO2 27 25 22   BUN 11.2 12.7 15.6   CREATININE 0.84 1.07 0.73   CALCIUM 9.3 10.0 9.0   MG 2.30 2.20 2.10   ALBUMIN 3.4* 3.7 3.4*   ALKPHOS 96 97 99   ALT 9 10 7   AST 12 10 9   BILITOT 0.2 0.3 0.4     Microbiology Results (last 7 days)       ** No results  found for the last 168 hours. **             See below for Radiology    Assessment/Plan:  Constipation w massive stool burden  Chronic osteomyelitis of B/L inferior pubic rami  Paraplegia  Neurogenic bladder / Chronic vogt catheter  Depression / Anxiety  Anemia  Reactive thrombocytosis  Stage IV left gluteal decubitus ulcer  PAF  Hx of R foot osteomyelitis     Plan  Cont relistor , bowel regimen  GI consulted , on golytely bowel prep  Appreciate their recs   Pain control  Wound care following  cont gabapentin and baclofen   Cont  eliquis   Labs in the am  Ivf      Dvt prophylaxis- on eliquis         All diagnosis and differential diagnosis have been reviewed; assessment and plan has been documented; I have personally reviewed the labs and test results that are presently available; I have reviewed the patients medication list; I have reviewed the consulting providers response and recommendations. I have reviewed or attempted to review medical records based upon their availability    All of the patient's questions have been  addressed and answered. Patient's is agreeable to the above stated plan. I will continue to monitor closely and make adjustments to medical management as needed.    Portions of this note dictated using EMR integrated voice recognition software, and may be subject to voice recognition errors not corrected at proofreading. Please contact writer for clarification if needed.   _____________________________________________________________________    Malnutrition Status:  Nutrition consulted. Most recent weight and BMI monitored-     Measurements:  Wt Readings from Last 1 Encounters:   02/07/25 72.6 kg (160 lb)   Body mass index is 21.7 kg/m².    Patient has been screened and assessed by RD.    Malnutrition Type:  Context:    Level:      Malnutrition Characteristic Summary:       Interventions/Recommendations (treatment strategy):        Scheduled Med:   apixaban  5 mg Oral BID    baclofen  20 mg Oral  TID    ciprofloxacin HCl  500 mg Oral Q12H    gabapentin  800 mg Oral TID    lactulose 10 gram/15 ml  10 g Oral TID    linaCLOtide  290 mcg Oral Before breakfast    methylnaltrexone  12 mg Subcutaneous Every other day    sulfamethoxazole-trimethoprim 800-160mg  1 tablet Oral BID      Continuous Infusions:     PRN Meds:    Current Facility-Administered Medications:     acetaminophen, 650 mg, Oral, Q6H PRN    hydrOXYzine pamoate, 25 mg, Oral, Q8H PRN    ketorolac, 15 mg, Intravenous, BID PRN     Radiology:  I have personally reviewed the following imaging and agree with the radiologist.     CT Abdomen Pelvis With IV Contrast NO Oral Contrast  Narrative: EXAMINATION:  CT ABDOMEN PELVIS WITH IV CONTRAST    CLINICAL HISTORY:  Bowel obstruction suspected;    TECHNIQUE:  Helical acquisition through the abdomen and pelvis with IV contrast.  Three plane reconstructions were provided for review.  mGycm. Automatic exposure control, adjustment of mA/kV or iterative reconstruction technique was used to reduce radiation.    COMPARISON:  26 January 2025    FINDINGS:  Mild atelectasis at the lung bases.    There is no significant abnormality of the solid abdominal organs.    No significantly dilated small bowel loops.  Massive stool burden.  No free air.    There is a suprapubic catheter.  No pelvic free fluid.  Abdominal aorta normal in caliber.    No acute osseous findings.  Similar appearance of left ischial decubitus ulcer extending deep to the bone with sclerosis of the underlying ischium.  Impression: 1. Massive stool burden.  2. Otherwise little interval change compared to the prior CT.  3. No significant discrepancy with the preliminary report.    Electronically signed by: Man Naylor  Date:    02/07/2025  Time:    08:26      Margaret Leblanc MD  Department of Hospital Medicine   [facility]   02/08/2025

## 2025-02-08 NOTE — PLAN OF CARE
Problem: Adult Inpatient Plan of Care  Goal: Plan of Care Review  Outcome: Progressing  Flowsheets (Taken 2/8/2025 0800)  Plan of Care Reviewed With: patient  Goal: Patient-Specific Goal (Individualized)  Outcome: Progressing  Goal: Absence of Hospital-Acquired Illness or Injury  Outcome: Progressing  Intervention: Prevent and Manage VTE (Venous Thromboembolism) Risk  Flowsheets (Taken 2/8/2025 0800)  VTE Prevention/Management: ROM (passive) performed  Intervention: Prevent Infection  Flowsheets (Taken 2/8/2025 0800)  Infection Prevention:   hand hygiene promoted   environmental surveillance performed  Goal: Optimal Comfort and Wellbeing  Outcome: Progressing  Intervention: Monitor Pain and Promote Comfort  Flowsheets (Taken 2/8/2025 0800)  Pain Management Interventions:   pillow support provided   medication offered   position adjusted  Intervention: Provide Person-Centered Care  Flowsheets (Taken 2/8/2025 0800)  Trust Relationship/Rapport:   care explained   choices provided   emotional support provided   empathic listening provided   questions answered   questions encouraged   reassurance provided   thoughts/feelings acknowledged  Goal: Readiness for Transition of Care  Outcome: Progressing     Problem: Skin Injury Risk Increased  Goal: Skin Health and Integrity  Outcome: Progressing  Intervention: Optimize Skin Protection  Flowsheets (Taken 2/8/2025 0800)  Pressure Reduction Techniques: frequent weight shift encouraged     Problem: Wound  Goal: Optimal Coping  Outcome: Progressing  Goal: Optimal Functional Ability  Outcome: Progressing  Goal: Absence of Infection Signs and Symptoms  Outcome: Progressing  Intervention: Prevent or Manage Infection  Flowsheets (Taken 2/8/2025 0800)  Fever Reduction/Comfort Measures:   lightweight bedding   lightweight clothing  Goal: Improved Oral Intake  Outcome: Progressing  Goal: Optimal Pain Control and Function  Outcome: Progressing  Intervention: Prevent or Manage  Pain  Flowsheets (Taken 2/8/2025 0800)  Pain Management Interventions:   pillow support provided   medication offered   position adjusted  Goal: Skin Health and Integrity  Outcome: Progressing  Intervention: Optimize Skin Protection  Flowsheets (Taken 2/8/2025 0800)  Pressure Reduction Techniques: frequent weight shift encouraged  Goal: Optimal Wound Healing  Outcome: Progressing

## 2025-02-09 LAB
ANION GAP SERPL CALC-SCNC: 6 MEQ/L
BASOPHILS # BLD AUTO: 0.09 X10(3)/MCL
BASOPHILS NFR BLD AUTO: 1 %
BUN SERPL-MCNC: 12 MG/DL (ref 8.4–25.7)
CALCIUM SERPL-MCNC: 8.5 MG/DL (ref 8.4–10.2)
CHLORIDE SERPL-SCNC: 107 MMOL/L (ref 98–107)
CO2 SERPL-SCNC: 23 MMOL/L (ref 22–29)
CREAT SERPL-MCNC: 0.69 MG/DL (ref 0.72–1.25)
CREAT/UREA NIT SERPL: 17
EOSINOPHIL # BLD AUTO: 0.29 X10(3)/MCL (ref 0–0.9)
EOSINOPHIL NFR BLD AUTO: 3.3 %
ERYTHROCYTE [DISTWIDTH] IN BLOOD BY AUTOMATED COUNT: 15.8 % (ref 11.5–17)
GFR SERPLBLD CREATININE-BSD FMLA CKD-EPI: >60 ML/MIN/1.73/M2
GLUCOSE SERPL-MCNC: 88 MG/DL (ref 74–100)
HCT VFR BLD AUTO: 28.9 % (ref 42–52)
HGB BLD-MCNC: 9.1 G/DL (ref 14–18)
IMM GRANULOCYTES # BLD AUTO: 0.02 X10(3)/MCL (ref 0–0.04)
IMM GRANULOCYTES NFR BLD AUTO: 0.2 %
LYMPHOCYTES # BLD AUTO: 1.91 X10(3)/MCL (ref 0.6–4.6)
LYMPHOCYTES NFR BLD AUTO: 21.7 %
MCH RBC QN AUTO: 25.4 PG (ref 27–31)
MCHC RBC AUTO-ENTMCNC: 31.5 G/DL (ref 33–36)
MCV RBC AUTO: 80.7 FL (ref 80–94)
MONOCYTES # BLD AUTO: 0.39 X10(3)/MCL (ref 0.1–1.3)
MONOCYTES NFR BLD AUTO: 4.4 %
NEUTROPHILS # BLD AUTO: 6.12 X10(3)/MCL (ref 2.1–9.2)
NEUTROPHILS NFR BLD AUTO: 69.4 %
NRBC BLD AUTO-RTO: 0 %
PLATELET # BLD AUTO: 256 X10(3)/MCL (ref 130–400)
PMV BLD AUTO: 10.2 FL (ref 7.4–10.4)
POTASSIUM SERPL-SCNC: 3.9 MMOL/L (ref 3.5–5.1)
RBC # BLD AUTO: 3.58 X10(6)/MCL (ref 4.7–6.1)
SODIUM SERPL-SCNC: 136 MMOL/L (ref 136–145)
WBC # BLD AUTO: 8.82 X10(3)/MCL (ref 4.5–11.5)

## 2025-02-09 PROCEDURE — 36415 COLL VENOUS BLD VENIPUNCTURE: CPT | Performed by: INTERNAL MEDICINE

## 2025-02-09 PROCEDURE — 25000003 PHARM REV CODE 250: Performed by: INTERNAL MEDICINE

## 2025-02-09 PROCEDURE — 80048 BASIC METABOLIC PNL TOTAL CA: CPT | Performed by: INTERNAL MEDICINE

## 2025-02-09 PROCEDURE — 63600175 PHARM REV CODE 636 W HCPCS: Mod: JZ,TB | Performed by: INTERNAL MEDICINE

## 2025-02-09 PROCEDURE — 11000001 HC ACUTE MED/SURG PRIVATE ROOM

## 2025-02-09 PROCEDURE — 25000003 PHARM REV CODE 250: Performed by: STUDENT IN AN ORGANIZED HEALTH CARE EDUCATION/TRAINING PROGRAM

## 2025-02-09 PROCEDURE — 63600175 PHARM REV CODE 636 W HCPCS: Performed by: INTERNAL MEDICINE

## 2025-02-09 PROCEDURE — 85025 COMPLETE CBC W/AUTO DIFF WBC: CPT | Performed by: INTERNAL MEDICINE

## 2025-02-09 RX ORDER — OXYCODONE AND ACETAMINOPHEN 10; 325 MG/1; MG/1
1 TABLET ORAL EVERY 8 HOURS PRN
Status: DISCONTINUED | OUTPATIENT
Start: 2025-02-09 | End: 2025-02-11 | Stop reason: HOSPADM

## 2025-02-09 RX ORDER — POLYETHYLENE GLYCOL 3350 17 G/17G
17 POWDER, FOR SOLUTION ORAL 2 TIMES DAILY
Status: DISCONTINUED | OUTPATIENT
Start: 2025-02-09 | End: 2025-02-11 | Stop reason: HOSPADM

## 2025-02-09 RX ORDER — POLYETHYLENE GLYCOL 3350 17 G/17G
17 POWDER, FOR SOLUTION ORAL DAILY
Status: DISCONTINUED | OUTPATIENT
Start: 2025-02-09 | End: 2025-02-09

## 2025-02-09 RX ORDER — OXYCODONE AND ACETAMINOPHEN 10; 325 MG/1; MG/1
1 TABLET ORAL 2 TIMES DAILY PRN
Status: DISCONTINUED | OUTPATIENT
Start: 2025-02-09 | End: 2025-02-09

## 2025-02-09 RX ORDER — HYDROCODONE BITARTRATE AND ACETAMINOPHEN 7.5; 325 MG/1; MG/1
1 TABLET ORAL 2 TIMES DAILY PRN
Status: DISCONTINUED | OUTPATIENT
Start: 2025-02-09 | End: 2025-02-09

## 2025-02-09 RX ADMIN — BACLOFEN 20 MG: 10 TABLET ORAL at 04:02

## 2025-02-09 RX ADMIN — GABAPENTIN 800 MG: 400 CAPSULE ORAL at 09:02

## 2025-02-09 RX ADMIN — OXYCODONE AND ACETAMINOPHEN 1 TABLET: 10; 325 TABLET ORAL at 09:02

## 2025-02-09 RX ADMIN — KETOROLAC TROMETHAMINE 15 MG: 30 INJECTION, SOLUTION INTRAMUSCULAR at 04:02

## 2025-02-09 RX ADMIN — APIXABAN 5 MG: 5 TABLET, FILM COATED ORAL at 08:02

## 2025-02-09 RX ADMIN — BACLOFEN 20 MG: 10 TABLET ORAL at 09:02

## 2025-02-09 RX ADMIN — OXYCODONE AND ACETAMINOPHEN 1 TABLET: 10; 325 TABLET ORAL at 04:02

## 2025-02-09 RX ADMIN — CIPROFOLXACIN 500 MG: 500 TABLET ORAL at 09:02

## 2025-02-09 RX ADMIN — CIPROFOLXACIN 500 MG: 500 TABLET ORAL at 08:02

## 2025-02-09 RX ADMIN — METHYLNALTREXONE BROMIDE 12 MG: 12 INJECTION, SOLUTION SUBCUTANEOUS at 08:02

## 2025-02-09 RX ADMIN — APIXABAN 5 MG: 5 TABLET, FILM COATED ORAL at 09:02

## 2025-02-09 RX ADMIN — POLYETHYLENE GLYCOL 3350 17 G: 17 POWDER, FOR SOLUTION ORAL at 09:02

## 2025-02-09 RX ADMIN — SULFAMETHOXAZOLE AND TRIMETHOPRIM 1 TABLET: 800; 160 TABLET ORAL at 08:02

## 2025-02-09 RX ADMIN — OXYCODONE AND ACETAMINOPHEN 1 TABLET: 10; 325 TABLET ORAL at 01:02

## 2025-02-09 RX ADMIN — SULFAMETHOXAZOLE AND TRIMETHOPRIM 1 TABLET: 800; 160 TABLET ORAL at 09:02

## 2025-02-09 RX ADMIN — BACLOFEN 20 MG: 10 TABLET ORAL at 08:02

## 2025-02-09 RX ADMIN — POLYETHYLENE GLYCOL 3350 17 G: 17 POWDER, FOR SOLUTION ORAL at 01:02

## 2025-02-09 RX ADMIN — GABAPENTIN 800 MG: 400 CAPSULE ORAL at 08:02

## 2025-02-09 RX ADMIN — BACLOFEN 20 MG: 10 TABLET ORAL at 01:02

## 2025-02-09 RX ADMIN — GABAPENTIN 800 MG: 400 CAPSULE ORAL at 02:02

## 2025-02-09 NOTE — CONSULTS
[facility] [address] Surg  Wound Care    Patient Name:  Hemal Guerrero   MRN:  [id number]  Date: 2/9/2025  Diagnosis: <principal problem not specified>    History:     Past Medical History:   Diagnosis Date    Chronic UTI     Paraplegia        Social History     Socioeconomic History    Marital status:    Tobacco Use    Smoking status: Never    Smokeless tobacco: Never   Substance and Sexual Activity    Alcohol use: Not Currently    Drug use: Yes     Types: Marijuana     Social Drivers of Health     Financial Resource Strain: Medium Risk (2/7/2025)    Overall Financial Resource Strain (CARDIA)     Difficulty of Paying Living Expenses: Somewhat hard   Food Insecurity: Food Insecurity Present (2/7/2025)    Hunger Vital Sign     Worried About Running Out of Food in the Last Year: Sometimes true     Ran Out of Food in the Last Year: Never true   Transportation Needs: Unmet Transportation Needs (2/7/2025)    TRANSPORTATION NEEDS     Transportation : Yes, it has kept me from non-medical meetings, appointments, work or from getting things that I need.   Physical Activity: Inactive (2/7/2025)    Exercise Vital Sign     Days of Exercise per Week: 0 days     Minutes of Exercise per Session: 0 min   Stress: Stress Concern Present (2/7/2025)    [place] [employer] - Occupational Stress Questionnaire     Feeling of Stress : Rather much   Housing Stability: Low Risk  (2/7/2025)    Housing Stability Vital Sign     Unable to Pay for Housing in the Last Year: No     Homeless in the Last Year: No       Precautions:     Allergies as of 02/06/2025 - Reviewed 02/06/2025   Allergen Reaction Noted    Adhesive  02/05/2023    Amitriptyline  11/16/2022       WO Assessment Details/Treatment      02/08/25 0846   [facility] Assessment   Visit Date 02/08/25   Visit Time 0846   Consult Type New   [facility] Speciality Wound   Wound pressure   Intervention chart review;assessed;applied;orders   Teaching on-going         Altered Skin Integrity 01/06/24 1640 Sacral spine #1 Pressure Injury   Date First Assessed/Time First Assessed: 01/06/24 1640   Altered Skin Integrity Present on Admission - Did Patient arrive to the hospital with altered skin?: yes  Location: Sacral spine  Wound Number: #1  Is this injury device related?: No  Primary Wo...   Wound Image    Description of Altered Skin Integrity Full thickness tissue loss. Subcutaneous fat may be visible but bone, tendon or muscle are not exposed   Dressing Appearance Open to air   Drainage Amount None   Drainage Characteristics/Odor No odor   Appearance Pink;White;Other (see comments)  (scar tissue)   Tissue loss description Full thickness   Periwound Area Scar tissue   Care Cleansed with:;Wound cleanser   Dressing Applied;Silver;Foam        Altered Skin Integrity 03/31/24 0400 Left posterior Greater trochanter #3 Non pressure chronic ulcer Full thickness tissue loss. Subcutaneous fat may be visible but bone, tendon or muscle are not exposed   Date First Assessed/Time First Assessed: 03/31/24 0400   Altered Skin Integrity Present on Admission - Did Patient arrive to the hospital with altered skin?: yes  Side: Left  Orientation: posterior  Location: Greater trochanter  Wound Number: #3  Is t...   Wound Image    Description of Altered Skin Integrity Full thickness tissue loss with exposed bone, tendon, or muscle. Often includes undermining and tunneling. May extend into muscle and/or supporting structures.   Dressing Appearance Intact;Moist drainage   Drainage Amount Moderate   Drainage Characteristics/Odor Serosanguineous   Appearance Pink;White;Yellow;Moist;Adipose;Bone;Muscle   Tissue loss description Full thickness   Black (%), Wound Tissue Color 0 %   Red (%), Wound Tissue Color 90 %   Yellow (%), Wound Tissue Color 10 %   Periwound Area Macerated;Pale white   Wound Edges Defined   Wound Length (cm) 3.3 cm   Wound Width (cm) 5.1 cm   Wound Depth (cm) 1.6 cm   Wound Volume  (cm^3) 26.928 cm^3   Wound Surface Area (cm^2) 16.83 cm^2   Undermining (depth (cm)/location) UM 3-4 oclock deepest 4oclock @ 1.6cm   Care Cleansed with:;Wound cleanser   Dressing Changed;Gauze, wet to dry;Absorptive Pad   Packing packing removed;packed with;other (see comment)  (vashe moistened gauze)   Packing Inserted  1   Packing Removed 1     WOCN consulted for left greater trochanter and sacrum. Discussed POC w/ nurse Massiel. No family at bedside. Explained reason for visit. Treatment recommendations: left greater trochanter: clean w/ vashe, dry well, apply vashe moistened gauze to wound bed, cover with dry gauze, abd pad, secure with minimal tape. BID/prn if soilage. Sacrum: clean w/ vashe, dry well, apply large pink square Aquacel Ag foam. Daily/prn if soilage. If pt. Starts having bowel movements use zinc oxide as a barrier to protect sacrum instead of the foam. Nursing to cont. Tx recs and preventative measures. Ordered pt. An immerse bed. PUP pack ordered. Ordered patient more podus boots to help offload heels. Will follow up.     02/09/2025
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
## 2025-02-09 NOTE — PLAN OF CARE
Problem: Adult Inpatient Plan of Care  Goal: Plan of Care Review  Outcome: Progressing  Flowsheets (Taken 2/9/2025 0800)  Plan of Care Reviewed With: patient  Goal: Patient-Specific Goal (Individualized)  Outcome: Progressing  Goal: Absence of Hospital-Acquired Illness or Injury  Outcome: Progressing  Intervention: Identify and Manage Fall Risk  Flowsheets (Taken 2/9/2025 0800)  Safety Promotion/Fall Prevention:   assistive device/personal item within reach   side rails raised x 2  Intervention: Prevent Skin Injury  Flowsheets (Taken 2/9/2025 0800)  Body Position:   weight shifting   turned  Intervention: Prevent Infection  Flowsheets (Taken 2/9/2025 0800)  Infection Prevention:   hand hygiene promoted   environmental surveillance performed  Goal: Optimal Comfort and Wellbeing  Outcome: Progressing  Intervention: Monitor Pain and Promote Comfort  Flowsheets (Taken 2/9/2025 0800)  Pain Management Interventions:   pillow support provided   position adjusted  Intervention: Provide Person-Centered Care  Flowsheets (Taken 2/9/2025 0800)  Trust Relationship/Rapport:   care explained   choices provided   emotional support provided   empathic listening provided   questions answered   questions encouraged   reassurance provided   thoughts/feelings acknowledged  Goal: Readiness for Transition of Care  Outcome: Progressing     Problem: Skin Injury Risk Increased  Goal: Skin Health and Integrity  Outcome: Progressing  Intervention: Optimize Skin Protection  Flowsheets (Taken 2/9/2025 0800)  Pressure Reduction Techniques: frequent weight shift encouraged  Activity Management:   Arm raise - L1   Rolling - L1     Problem: Wound  Goal: Optimal Coping  Outcome: Progressing  Goal: Optimal Functional Ability  Outcome: Progressing  Intervention: Optimize Functional Ability  Flowsheets (Taken 2/9/2025 0800)  Activity Management:   Arm raise - L1   Rolling - L1  Goal: Absence of Infection Signs and Symptoms  Outcome:  Progressing  Intervention: Prevent or Manage Infection  Flowsheets (Taken 2/9/2025 0800)  Fever Reduction/Comfort Measures:   lightweight bedding   lightweight clothing  Goal: Improved Oral Intake  Outcome: Progressing  Goal: Optimal Pain Control and Function  Outcome: Progressing  Intervention: Prevent or Manage Pain  Flowsheets (Taken 2/9/2025 0800)  Pain Management Interventions:   pillow support provided   position adjusted  Goal: Skin Health and Integrity  Outcome: Progressing  Intervention: Optimize Skin Protection  Flowsheets (Taken 2/9/2025 0800)  Pressure Reduction Techniques: frequent weight shift encouraged  Activity Management:   Arm raise - L1   Rolling - L1  Goal: Optimal Wound Healing  Outcome: Progressing

## 2025-02-10 LAB
ANION GAP SERPL CALC-SCNC: 8 MEQ/L
BASOPHILS # BLD AUTO: 0.08 X10(3)/MCL
BASOPHILS NFR BLD AUTO: 1 %
BUN SERPL-MCNC: 8 MG/DL (ref 8.4–25.7)
CALCIUM SERPL-MCNC: 8.7 MG/DL (ref 8.4–10.2)
CHLORIDE SERPL-SCNC: 109 MMOL/L (ref 98–107)
CO2 SERPL-SCNC: 21 MMOL/L (ref 22–29)
CREAT SERPL-MCNC: 0.68 MG/DL (ref 0.72–1.25)
CREAT/UREA NIT SERPL: 12
EOSINOPHIL # BLD AUTO: 0.22 X10(3)/MCL (ref 0–0.9)
EOSINOPHIL NFR BLD AUTO: 2.7 %
ERYTHROCYTE [DISTWIDTH] IN BLOOD BY AUTOMATED COUNT: 16 % (ref 11.5–17)
GFR SERPLBLD CREATININE-BSD FMLA CKD-EPI: >60 ML/MIN/1.73/M2
GLUCOSE SERPL-MCNC: 96 MG/DL (ref 74–100)
HCT VFR BLD AUTO: 29.9 % (ref 42–52)
HGB BLD-MCNC: 9.3 G/DL (ref 14–18)
IMM GRANULOCYTES # BLD AUTO: 0.02 X10(3)/MCL (ref 0–0.04)
IMM GRANULOCYTES NFR BLD AUTO: 0.2 %
LYMPHOCYTES # BLD AUTO: 1.55 X10(3)/MCL (ref 0.6–4.6)
LYMPHOCYTES NFR BLD AUTO: 19.1 %
MCH RBC QN AUTO: 25.5 PG (ref 27–31)
MCHC RBC AUTO-ENTMCNC: 31.1 G/DL (ref 33–36)
MCV RBC AUTO: 82.1 FL (ref 80–94)
MONOCYTES # BLD AUTO: 0.37 X10(3)/MCL (ref 0.1–1.3)
MONOCYTES NFR BLD AUTO: 4.6 %
NEUTROPHILS # BLD AUTO: 5.87 X10(3)/MCL (ref 2.1–9.2)
NEUTROPHILS NFR BLD AUTO: 72.4 %
NRBC BLD AUTO-RTO: 0 %
PLATELET # BLD AUTO: 273 X10(3)/MCL (ref 130–400)
PMV BLD AUTO: 10.7 FL (ref 7.4–10.4)
POTASSIUM SERPL-SCNC: 4.2 MMOL/L (ref 3.5–5.1)
RBC # BLD AUTO: 3.64 X10(6)/MCL (ref 4.7–6.1)
SODIUM SERPL-SCNC: 138 MMOL/L (ref 136–145)
WBC # BLD AUTO: 8.11 X10(3)/MCL (ref 4.5–11.5)

## 2025-02-10 PROCEDURE — 80048 BASIC METABOLIC PNL TOTAL CA: CPT | Performed by: INTERNAL MEDICINE

## 2025-02-10 PROCEDURE — 36415 COLL VENOUS BLD VENIPUNCTURE: CPT | Performed by: INTERNAL MEDICINE

## 2025-02-10 PROCEDURE — 99223 1ST HOSP IP/OBS HIGH 75: CPT | Mod: GC,,, | Performed by: STUDENT IN AN ORGANIZED HEALTH CARE EDUCATION/TRAINING PROGRAM

## 2025-02-10 PROCEDURE — 25000003 PHARM REV CODE 250: Performed by: PHYSICIAN ASSISTANT

## 2025-02-10 PROCEDURE — 25000003 PHARM REV CODE 250: Performed by: STUDENT IN AN ORGANIZED HEALTH CARE EDUCATION/TRAINING PROGRAM

## 2025-02-10 PROCEDURE — 25000003 PHARM REV CODE 250: Performed by: INTERNAL MEDICINE

## 2025-02-10 PROCEDURE — 85025 COMPLETE CBC W/AUTO DIFF WBC: CPT | Performed by: INTERNAL MEDICINE

## 2025-02-10 PROCEDURE — 11000001 HC ACUTE MED/SURG PRIVATE ROOM

## 2025-02-10 RX ORDER — LUBIPROSTONE 24 UG/1
24 CAPSULE ORAL 2 TIMES DAILY WITH MEALS
Status: DISCONTINUED | OUTPATIENT
Start: 2025-02-10 | End: 2025-02-11 | Stop reason: HOSPADM

## 2025-02-10 RX ADMIN — HYDROXYZINE PAMOATE 25 MG: 25 CAPSULE ORAL at 08:02

## 2025-02-10 RX ADMIN — BACLOFEN 20 MG: 10 TABLET ORAL at 05:02

## 2025-02-10 RX ADMIN — APIXABAN 5 MG: 5 TABLET, FILM COATED ORAL at 09:02

## 2025-02-10 RX ADMIN — GABAPENTIN 800 MG: 400 CAPSULE ORAL at 08:02

## 2025-02-10 RX ADMIN — LUBIPROSTONE 24 MCG: 24 CAPSULE, GELATIN COATED ORAL at 05:02

## 2025-02-10 RX ADMIN — BACLOFEN 20 MG: 10 TABLET ORAL at 01:02

## 2025-02-10 RX ADMIN — LINACLOTIDE 290 MCG: 145 CAPSULE, GELATIN COATED ORAL at 05:02

## 2025-02-10 RX ADMIN — BACLOFEN 20 MG: 10 TABLET ORAL at 08:02

## 2025-02-10 RX ADMIN — CIPROFOLXACIN 500 MG: 500 TABLET ORAL at 08:02

## 2025-02-10 RX ADMIN — GABAPENTIN 800 MG: 400 CAPSULE ORAL at 02:02

## 2025-02-10 RX ADMIN — HYDROXYZINE PAMOATE 25 MG: 25 CAPSULE ORAL at 09:02

## 2025-02-10 RX ADMIN — GABAPENTIN 800 MG: 400 CAPSULE ORAL at 09:02

## 2025-02-10 RX ADMIN — SULFAMETHOXAZOLE AND TRIMETHOPRIM 1 TABLET: 800; 160 TABLET ORAL at 09:02

## 2025-02-10 RX ADMIN — POLYETHYLENE GLYCOL 3350 17 G: 17 POWDER, FOR SOLUTION ORAL at 08:02

## 2025-02-10 RX ADMIN — OXYCODONE AND ACETAMINOPHEN 1 TABLET: 10; 325 TABLET ORAL at 09:02

## 2025-02-10 RX ADMIN — OXYCODONE AND ACETAMINOPHEN 1 TABLET: 10; 325 TABLET ORAL at 05:02

## 2025-02-10 RX ADMIN — CIPROFOLXACIN 500 MG: 500 TABLET ORAL at 09:02

## 2025-02-10 RX ADMIN — POLYETHYLENE GLYCOL 3350 17 G: 17 POWDER, FOR SOLUTION ORAL at 09:02

## 2025-02-10 RX ADMIN — OXYCODONE AND ACETAMINOPHEN 1 TABLET: 10; 325 TABLET ORAL at 01:02

## 2025-02-10 RX ADMIN — BACLOFEN 20 MG: 10 TABLET ORAL at 09:02

## 2025-02-10 RX ADMIN — APIXABAN 5 MG: 5 TABLET, FILM COATED ORAL at 08:02

## 2025-02-10 RX ADMIN — SULFAMETHOXAZOLE AND TRIMETHOPRIM 1 TABLET: 800; 160 TABLET ORAL at 08:02

## 2025-02-10 NOTE — CONSULTS
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 CT abd showed considerable stool burden. He was admitted and given enema, lactualose, relistor with a GI consult. Currently having Bms. Hospital medicine considering discharge for tomorrow.     General surgery consulted to evaluate IR drain to Right thigh from prior admission that was placed for R thigh fluid collection. Drain output has been minimal and serosanguineous. Patient reports it has not been putting out much for a few days. He has a follow-up appointment with [facility] tomorrow for drain removal.     Review of Systems:  12 point ROS negative except as stated in HPI    PAST HISTORY:   Past medical history:  Past Medical History:   Diagnosis Date    Chronic UTI     Paraplegia        Past surgical history:  Past Surgical History:   Procedure Laterality Date    INSERTION OF SUPRAPUBIC CATHETER      LAPAROTOMY         Family history:  Family History   Problem Relation Name Age of Onset    Lymphoma Mother      Rheum arthritis Mother         Social history:  Social History     Socioeconomic History    Marital status:    Tobacco Use    Smoking status: Never    Smokeless tobacco: Never   Substance and Sexual Activity    Alcohol use: Not Currently    Drug use: Yes     Types: Marijuana     Social Drivers of Health     Financial Resource Strain: Medium Risk (2/7/2025)    Overall Financial Resource Strain (CARDIA)     Difficulty of Paying Living  Expenses: Somewhat hard   Food Insecurity: Food Insecurity Present (2/7/2025)    Hunger Vital Sign     Worried About Running Out of Food in the Last Year: Sometimes true     Ran Out of Food in the Last Year: Never true   Transportation Needs: Unmet Transportation Needs (2/7/2025)    TRANSPORTATION NEEDS     Transportation : Yes, it has kept me from non-medical meetings, appointments, work or from getting things that I need.   Physical Activity: Inactive (2/7/2025)    Exercise Vital Sign     Days of Exercise per Week: 0 days     Minutes of Exercise per Session: 0 min   Stress: Stress Concern Present (2/7/2025)    [employer] [place] [employer] - Occupational Stress Questionnaire     Feeling of Stress : Rather much   Housing Stability: Low Risk  (2/7/2025)    Housing Stability Vital Sign     Unable to Pay for Housing in the Last Year: No     Homeless in the Last Year: No     Social History     Tobacco Use   Smoking Status Never   Smokeless Tobacco Never      Social History     Substance and Sexual Activity   Alcohol Use Not Currently        MEDICATIONS & ALLERGIES:     No current facility-administered medications on file prior to encounter.     Current Outpatient Medications on File Prior to Encounter   Medication Sig    amLODIPine (NORVASC) 5 MG tablet Take 1 tablet (5 mg total) by mouth once daily.    baclofen (LIORESAL) 20 MG tablet Take 20 mg by mouth 3 (three) times daily.    ciprofloxacin HCl (CIPRO) 500 MG tablet Take 1 tablet (500 mg total) by mouth every 12 (twelve) hours.    ELIQUIS 5 mg Tab Take 5 mg by mouth 2 (two) times daily.    FEROSUL 325 mg (65 mg iron) Tab tablet Take 1 tablet by mouth every morning. (Patient not taking: Reported on 2/5/2025)    fluticasone propionate (FLONASE) 50 mcg/actuation nasal spray 1 spray by Each Nostril route 2 (two) times daily.    gabapentin (NEURONTIN) 800 MG tablet Take 800 mg by mouth 3 (three) times daily.    HYDROcodone-acetaminophen (NORCO) 5-325 mg per  tablet Take 1 tablet by mouth every 6 (six) hours as needed for Pain. (Patient not taking: Reported on 2/5/2025)    hydrOXYzine pamoate (VISTARIL) 25 MG Cap Take 25 mg by mouth 3 (three) times daily.    linaCLOtide (LINZESS) 290 mcg Cap capsule Take 1 capsule (290 mcg total) by mouth before breakfast.    mirtazapine (REMERON) 15 MG tablet Take 1 tablet (15 mg total) by mouth nightly.    oxybutynin (DITROPAN-XL) 10 MG 24 hr tablet Take 1 tablet by mouth every morning. (Patient not taking: Reported on 2/5/2025)    oxyCODONE-acetaminophen (PERCOCET)  mg per tablet Take 1 tablet by mouth 2 (two) times daily as needed for Pain.    sertraline (ZOLOFT) 50 MG tablet Take 1 tablet (50 mg total) by mouth once daily.    sulfamethoxazole-trimethoprim 800-160mg (BACTRIM DS) 800-160 mg Tab Take 1 tablet by mouth 2 (two) times daily.       Allergies:   Review of patient's allergies indicates:   Allergen Reactions    Adhesive     Amitriptyline        Scheduled Meds:   apixaban  5 mg Oral BID    baclofen  20 mg Oral QID    ciprofloxacin HCl  500 mg Oral Q12H    gabapentin  800 mg Oral TID    lubiprostone  24 mcg Oral BID WM    methylnaltrexone  12 mg Subcutaneous Every other day    polyethylene glycol  17 g Oral BID    sulfamethoxazole-trimethoprim 800-160mg  1 tablet Oral BID       Continuous Infusions:    PRN Meds:  Current Facility-Administered Medications:     acetaminophen, 650 mg, Oral, Q6H PRN    hydrOXYzine pamoate, 25 mg, Oral, Q8H PRN    ketorolac, 15 mg, Intravenous, BID PRN    ondansetron, 4 mg, Intravenous, TID PRN    oxyCODONE-acetaminophen, 1 tablet, Oral, Q8H PRN    OBJECTIVE:   Vital Signs:  VITAL SIGNS: 24 HR MIN & MAX LAST   Temp  Min: 98.1 °F (36.7 °C)  Max: 99.3 °F (37.4 °C)  99.3 °F (37.4 °C)   BP  Min: 121/77  Max: 133/64  (!) 131/90    Pulse  Min: 78  Max: 86  80    Resp  Min: 18  Max: 18  18    SpO2  Min: 98 %  Max: 100 %  100 %      HT: 6' (182.9 cm)  WT: 71.5 kg (157 lb 10.1 oz)  BMI: 21.4  "    Intake/output:  Intake/Output - Last 3 Shifts         02/08 0700  02/09 0659 02/09 0700  02/10 0659 02/10 0700  02/11 0659    P.O.  600 60    Total Intake(mL/kg)  600 (8.4) 60 (0.8)    Urine (mL/kg/hr) 1550 (0.9) 900 (0.5)     Drains   0    Stool 0 0     Total Output 1550 900 0    Net -1550 -300 +60           Stool Occurrence 3 x 5 x             Intake/Output Summary (Last 24 hours) at 2/10/2025 1335  Last data filed at 2/10/2025 0800  Gross per 24 hour   Intake 660 ml   Output 900 ml   Net -240 ml         Physical Exam:  General: Well developed, well nourished, no acute distress  HEENT: Normocephalic, atraumatic, PERRL  CV: RR  Resp: NWOB  GI:  Abdomen soft, non-tender, non-distended, no guarding, no rebound, normoactive bowel sounds, no masses  :  Deferred  MSK: No muscle atrophy, cyanosis, peripheral edema, moving all extremities spontaneously  Skin/wounds:  No rashes, ulcers, erythema  Neuro:  CNII-XII grossly intact, alert and oriented to person, place, and time    Labs:  Troponin:  No results for input(s): "TROPONINI" in the last 72 hours.  CBC:  Recent Labs     02/09/25  0524 02/10/25  0610   WBC 8.82 8.11   RBC 3.58* 3.64*   HGB 9.1* 9.3*   HCT 28.9* 29.9*    273   MCV 80.7 82.1   MCH 25.4* 25.5*   MCHC 31.5* 31.1*     CMP:  Recent Labs     02/10/25  0610   CALCIUM 8.7      K 4.2   CO2 21*   *   BUN 8.0*   CREATININE 0.68*     Lactic Acid:  No results for input(s): "LACTATE" in the last 72 hours.  ETOH:  No results for input(s): "ETHANOL" in the last 72 hours.   Urine Drug Screen:  No results for input(s): "COCAINE", "OPIATE", "BARBITURATE", "AMPHETAMINE", "FENTANYL", "CANNABINOIDS", "MDMA" in the last 72 hours.    Invalid input(s): "BENZODIAZEPINE", "PHENCYCLIDINE"   ABG:  No results for input(s): "PH", "PO2", "PCO2", "HCO3", "BE" in the last 168 hours.     Diagnostic Results:  X-Ray Abdomen AP 1 View   Final Result      Stool volume decreased since 02/07/2025, now small to " moderate.      No significant discrepancy between my interpretation and the preliminary radiology report.         Electronically signed by: Juan Sargent   Date:    02/09/2025   Time:    11:52      CT Abdomen Pelvis With IV Contrast NO Oral Contrast   Final Result      1. Massive stool burden.   2. Otherwise little interval change compared to the prior CT.   3. No significant discrepancy with the preliminary report.         Electronically signed by: Man Naylor   Date:    02/07/2025   Time:    08:26          ASSESSMENT & PLAN:    48 yo M, asked to evaluate drain for possible removal.     R. Thigh fluid collection s/p IR drain placement  -IR drain with minimal output  -will pull IR drain today  -change dressing daily   -no need for clinic follow-up    Please reach out should any questions or concerns arise.      Loki Oconnor M.D.   [facility] General Surgery - PGY1

## 2025-02-10 NOTE — PLAN OF CARE
General Surgery  Plan of Care      IR drain removed from L thigh without complication. Dressed with gauzed and medipore tape. May have some drainage for a couple days, ok to replace this. Can keep open to air in 2 days. Keep clean and dry. No need for clinic follow up.     Lavon Tai MD  [employer] General Surgery [facility]  4:44 PM  02/10/2025

## 2025-02-10 NOTE — PROGRESS NOTES
[facility] Hospital Medicine Progress Note        Chief Complaint: Inpatient Follow-up for     HPI: Hemal Guerrero is a 49 y.o. male  with Hx of paraplegia, neurogenic bladder with suprapubic catheter and non healing wounds , chronic sacral decubitus ulcer and left ischial ulcer,chronic osteomyelitis of the inferior pubic ramus bilaterally  who presented to ER on 2/6 c/o constipation and abd pain ,. No BM for weeks . CT abd- massive stool burden. Was given enema , lactulose, relistor and admitted to medicine services w GI consult .     Interval Hx:   Seen and examined  Regular BM  Pain controlled   Has R thigh IR drain from prior admission for r thigh fluid , pt reports he has apt w surgery clinic tomorrow for removal. Planning on discharging home tomorrow, reports he has no transportation to bring him back for apt tomorrow  Will consult surgery for drain removal as it has only very minimal output       Objective/physical exam:  General: In no acute distress, afebrile  Chest: Clear to auscultation bilaterally  Heart: RRR, +S1, S2, no appreciable murmur  Abdomen: Soft, nontender, BS +  MSK: paraplegia  Skin- left buttock wound, R thigh IR drain   Neurologic: paraplegia    VITAL SIGNS: 24 HRS MIN & MAX LAST   Temp  Min: 98 °F (36.7 °C)  Max: 99.3 °F (37.4 °C) 98 °F (36.7 °C)   BP  Min: 121/77  Max: 133/64 122/75   Pulse  Min: 76  Max: 86  76   Resp  Min: 18  Max: 18 18   SpO2  Min: 98 %  Max: 100 % 100 %     I have reviewed the following labs:  Recent Labs   Lab 02/07/25  0006 02/09/25  0524 02/10/25  0610   WBC 9.13 8.82 8.11   RBC 3.83* 3.58* 3.64*   HGB 9.6* 9.1* 9.3*   HCT 31.4* 28.9* 29.9*   MCV 82.0 80.7 82.1   MCH 25.1* 25.4* 25.5*   MCHC 30.6* 31.5* 31.1*   RDW 15.7 15.8 16.0    256 273   MPV 10.0 10.2 10.7*     Recent Labs   Lab 02/07/25  0006 02/09/25  0524 02/10/25  0610   * 136 138   K 3.9 3.9 4.2    107 109*   CO2 22 23 21*   BUN 15.6 12.0 8.0*    CREATININE 0.73 0.69* 0.68*   CALCIUM 9.0 8.5 8.7   MG 2.10  --   --    ALBUMIN 3.4*  --   --    ALKPHOS 99  --   --    ALT 7  --   --    AST 9  --   --    BILITOT 0.4  --   --      Microbiology Results (last 7 days)       ** No results found for the last 168 hours. **             See below for Radiology    Assessment/Plan:  Constipation w massive stool burden- improving   Chronic osteomyelitis of B/L inferior pubic rami  Paraplegia  Neurogenic bladder / Chronic vogt catheter  Depression / Anxiety  Anemia  Reactive thrombocytosis  Stage IV left gluteal decubitus ulcer  PAF  Hx of R foot osteomyelitis  11. Recent R thigh fluid collection s/p IR drain placement      Plan  Cont relistor , bowel regimen  Having bowel movements   GI on board  Pain control   Wound care following  cont gabapentin and baclofen   Cont  eliquis   Consulted surgery team for IR drain removal   DC home tomorrow if stable      Dvt prophylaxis- on eliquis         All diagnosis and differential diagnosis have been reviewed; assessment and plan has been documented; I have personally reviewed the labs and test results that are presently available; I have reviewed the patients medication list; I have reviewed the consulting providers response and recommendations. I have reviewed or attempted to review medical records based upon their availability    All of the patient's questions have been  addressed and answered. Patient's is agreeable to the above stated plan. I will continue to monitor closely and make adjustments to medical management as needed.    Portions of this note dictated using EMR integrated voice recognition software, and may be subject to voice recognition errors not corrected at proofreading. Please contact writer for clarification if needed.   _____________________________________________________________________    Malnutrition Status:  Nutrition consulted. Most recent weight and BMI monitored-     Measurements:  Wt Readings from  Last 1 Encounters:   02/08/25 71.5 kg (157 lb 10.1 oz)   Body mass index is 21.38 kg/m².    Patient has been screened and assessed by RD.    Malnutrition Type:  Context:    Level:      Malnutrition Characteristic Summary:       Interventions/Recommendations (treatment strategy):        Scheduled Med:   apixaban  5 mg Oral BID    baclofen  20 mg Oral QID    ciprofloxacin HCl  500 mg Oral Q12H    gabapentin  800 mg Oral TID    lubiprostone  24 mcg Oral BID WM    methylnaltrexone  12 mg Subcutaneous Every other day    polyethylene glycol  17 g Oral BID    sulfamethoxazole-trimethoprim 800-160mg  1 tablet Oral BID      Continuous Infusions:     PRN Meds:    Current Facility-Administered Medications:     acetaminophen, 650 mg, Oral, Q6H PRN    hydrOXYzine pamoate, 25 mg, Oral, Q8H PRN    ketorolac, 15 mg, Intravenous, BID PRN    ondansetron, 4 mg, Intravenous, TID PRN    oxyCODONE-acetaminophen, 1 tablet, Oral, Q8H PRN     Radiology:  I have personally reviewed the following imaging and agree with the radiologist.     X-Ray Abdomen AP 1 View  Narrative: EXAMINATION:  XR ABDOMEN AP 1 VIEW    CLINICAL HISTORY:  stool burden verification;    TECHNIQUE:  AP View(s) of the abdomen.    COMPARISON:  CT 02/07/2025    FINDINGS:  Small to moderate volume stool, decreased since the CT.  Nonspecific bowel gas pattern.  Impression: Stool volume decreased since 02/07/2025, now small to moderate.    No significant discrepancy between my interpretation and the preliminary radiology report.    Electronically signed by: Juan Sargent  Date:    02/09/2025  Time:    11:52      Margaret Leblanc MD  Department of Hospital Medicine   [facility]   02/10/2025
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
## 2025-02-11 VITALS
OXYGEN SATURATION: 100 % | BODY MASS INDEX: 21.35 KG/M2 | HEIGHT: 72 IN | SYSTOLIC BLOOD PRESSURE: 120 MMHG | HEART RATE: 88 BPM | DIASTOLIC BLOOD PRESSURE: 83 MMHG | WEIGHT: 157.63 LBS | TEMPERATURE: 98 F | RESPIRATION RATE: 18 BRPM

## 2025-02-11 PROBLEM — K59.00 CONSTIPATION: Status: ACTIVE | Noted: 2025-02-11

## 2025-02-11 PROCEDURE — 25000003 PHARM REV CODE 250: Performed by: STUDENT IN AN ORGANIZED HEALTH CARE EDUCATION/TRAINING PROGRAM

## 2025-02-11 PROCEDURE — 25000003 PHARM REV CODE 250: Performed by: INTERNAL MEDICINE

## 2025-02-11 PROCEDURE — 25000003 PHARM REV CODE 250: Performed by: PHYSICIAN ASSISTANT

## 2025-02-11 PROCEDURE — 63600175 PHARM REV CODE 636 W HCPCS: Performed by: INTERNAL MEDICINE

## 2025-02-11 RX ORDER — POLYETHYLENE GLYCOL 3350 17 G/17G
17 POWDER, FOR SOLUTION ORAL DAILY
Qty: 30 EACH | Refills: 0 | Status: SHIPPED | OUTPATIENT
Start: 2025-02-11 | End: 2025-03-13

## 2025-02-11 RX ORDER — NALOXEGOL OXALATE 25 MG/1
25 TABLET, FILM COATED ORAL DAILY
Qty: 90 TABLET | Refills: 3 | Status: SHIPPED | OUTPATIENT
Start: 2025-02-11 | End: 2026-02-11

## 2025-02-11 RX ADMIN — BACLOFEN 20 MG: 10 TABLET ORAL at 01:02

## 2025-02-11 RX ADMIN — LUBIPROSTONE 24 MCG: 24 CAPSULE, GELATIN COATED ORAL at 05:02

## 2025-02-11 RX ADMIN — OXYCODONE AND ACETAMINOPHEN 1 TABLET: 10; 325 TABLET ORAL at 01:02

## 2025-02-11 RX ADMIN — GABAPENTIN 800 MG: 400 CAPSULE ORAL at 02:02

## 2025-02-11 RX ADMIN — METHYLNALTREXONE BROMIDE 12 MG: 12 INJECTION, SOLUTION SUBCUTANEOUS at 08:02

## 2025-02-11 RX ADMIN — BACLOFEN 20 MG: 10 TABLET ORAL at 05:02

## 2025-02-11 RX ADMIN — SULFAMETHOXAZOLE AND TRIMETHOPRIM 1 TABLET: 800; 160 TABLET ORAL at 08:02

## 2025-02-11 RX ADMIN — CIPROFOLXACIN 500 MG: 500 TABLET ORAL at 08:02

## 2025-02-11 RX ADMIN — POLYETHYLENE GLYCOL 3350 17 G: 17 POWDER, FOR SOLUTION ORAL at 08:02

## 2025-02-11 RX ADMIN — OXYCODONE AND ACETAMINOPHEN 1 TABLET: 10; 325 TABLET ORAL at 05:02

## 2025-02-11 RX ADMIN — APIXABAN 5 MG: 5 TABLET, FILM COATED ORAL at 08:02

## 2025-02-11 RX ADMIN — GABAPENTIN 800 MG: 400 CAPSULE ORAL at 08:02

## 2025-02-11 RX ADMIN — BACLOFEN 20 MG: 10 TABLET ORAL at 08:02

## 2025-02-11 NOTE — PLAN OF CARE
"   02/11/25 0827   Final Note   Assessment Type Final Discharge Note   Anticipated Discharge Disposition Home-Health   Hospital Resources/Appts/Education Provided Post-Acute resouces added to AVS   Post-Acute Status   Post-Acute Authorization Home Health   Home Health Status Set-up Complete/Auth obtained  (Resume wtih [facility] )     Notified Celia with [facility] regarding DC plan for today. Patient placed in "will call" with Brinda.   "

## 2025-02-11 NOTE — NURSING
Patient observed to have poor trunk control. Patient cannot sit in wheelchair for transport. CM aware.

## 2025-02-11 NOTE — CONSULTS
Infectious Diseases Consultation    Inpatient consult to Infectious diseases  Consult performed by: Katy Weiss NP  Consult ordered by: Yosvany Simms MD   Reason for consult: ostemyelitis and complicated uti and thigh abscess     HPI:   48 y/o male with Hx of paraplegia, neurogenic bladder with suprapubic catheter and non healing wound to L hip, sacral wound with osteomyelitis of B/L inferior pubic rami s/p treatment who presented to ER on 1/25 with c/o abdominal pain, N/V and LLE pain. On admit he was afebrile without leukocytosis. Lactic acid 0.9 and ESR >130 with CRP 83.70. Influenza A/B Ag (-), RSV PCR (-), SARS-CoV-2 PCR (-). Blood cultures remain negative thus far. U/A with positive nitrites, 0-2 RBC, 51-99 WBC, moderate bacteria, moderate LE. Urine culture >100k ESBL Klebsiella. CT abdomen/pelvis with contrast revealed moderate to large volume colonic stool compatible with constipation, interval development of peripherally enhancing fluid collection anterior right thigh extending to the anterior aspect of the proximal right femur suspicious for abscess, mildly enlarged bilateral inguinal lymph nodes likely reactive. Surgery has been consulted.   He is currently on Vancomycin and Cefepime      Past Medical and Surgical History  Allergies :   Adhesive and Amitriptyline    Medical :   He has a past medical history of Chronic UTI and Paraplegia.    Surgical :   He has a past surgical history that includes Insertion of suprapubic catheter and Laparotomy.     Family History  His family history includes Lymphoma in his mother; Rheum arthritis in his mother.    Social History  He reports that he has never smoked. He has never used smokeless tobacco. He reports that he does not currently use alcohol. He reports current drug use. Drug: Marijuana.     ROS  Constitutional:  Positive for malaise/fatigue.   HENT: Negative.     Eyes: Negative.    Respiratory: Negative.     Cardiovascular: Negative.     Gastrointestinal:  Positive for abdominal pain, nausea and vomiting.   Musculoskeletal: Negative.    Skin:         L hip wound   Neurological:  Positive for focal weakness and weakness.   All other systems reviewed and are negative.    Objective   Physical Exam  Vitals reviewed.   HENT:      Head: Normocephalic.   Cardiovascular:      Rate and Rhythm: Normal rate and regular rhythm.   Pulmonary:      Effort: Pulmonary effort is normal. No respiratory distress.      Breath sounds: Normal breath sounds. No wheezing.   Abdominal:      General: Bowel sounds are normal. There is no distension.      Palpations: Abdomen is soft.      Tenderness: There is no abdominal tenderness.   Genitourinary:     Comments: Suprapubic catheter  Musculoskeletal:      Cervical back: Normal range of motion.      Comments: Paraplegia   Skin:     Findings: No rash.      Comments: Wounds dressed   Neurological:      Mental Status: He is alert and oriented to person, place, and time.   Psychiatric:         Mood and Affect: Mood normal.         Behavior: Behavior normal.     VITAL SIGNS: 24 HR MIN & MAX LAST    No data recorded  98.3 °F (36.8 °C)        No data recorded  (!) 114/53     No data recorded  102     No data recorded  18    No data recorded  100 %      HT: 6' (182.9 cm)  WT: 72.6 kg (160 lb)  BMI: 21.7     Recent Results (from the past 24 hours)   Basic Metabolic Panel    Collection Time: 02/10/25  6:10 AM   Result Value Ref Range    Sodium 138 136 - 145 mmol/L    Potassium 4.2 3.5 - 5.1 mmol/L    Chloride 109 (H) 98 - 107 mmol/L    CO2 21 (L) 22 - 29 mmol/L    Glucose 96 74 - 100 mg/dL    Blood Urea Nitrogen 8.0 (L) 8.4 - 25.7 mg/dL    Creatinine 0.68 (L) 0.72 - 1.25 mg/dL    BUN/Creatinine Ratio 12     Calcium 8.7 8.4 - 10.2 mg/dL    Anion Gap 8.0 mEq/L    eGFR >60 mL/min/1.73/m2   CBC with Differential    Collection Time: 02/10/25  6:10 AM   Result Value Ref Range    WBC 8.11 4.50 - 11.50 x10(3)/mcL    RBC 3.64 (L) 4.70 - 6.10  x10(6)/mcL    Hgb 9.3 (L) 14.0 - 18.0 g/dL    Hct 29.9 (L) 42.0 - 52.0 %    MCV 82.1 80.0 - 94.0 fL    MCH 25.5 (L) 27.0 - 31.0 pg    MCHC 31.1 (L) 33.0 - 36.0 g/dL    RDW 16.0 11.5 - 17.0 %    Platelet 273 130 - 400 x10(3)/mcL    MPV 10.7 (H) 7.4 - 10.4 fL    Neut % 72.4 %    Lymph % 19.1 %    Mono % 4.6 %    Eos % 2.7 %    Basophil % 1.0 %    Imm Grans % 0.2 %    Neut # 5.87 2.1 - 9.2 x10(3)/mcL    Lymph # 1.55 0.6 - 4.6 x10(3)/mcL    Mono # 0.37 0.1 - 1.3 x10(3)/mcL    Eos # 0.22 0 - 0.9 x10(3)/mcL    Baso # 0.08 <=0.2 x10(3)/mcL    Imm Gran # 0.02 0.00 - 0.04 x10(3)/mcL    NRBC% 0.0 %       Imaging  1/26/25 CT abdomen/pelvis with  Impression:   1. Moderate to large volume colonic stool compatible with constipation.  2. Interval development of peripherally enhancing fluid collection anterior right thigh extending to the anterior aspect of the proximal right femur suspicious for abscess.  3. Mildly enlarged bilateral inguinal lymph nodes likely reactive.  4. Additional findings as above.  5. Nighthawk concordance.    Impression  SIRS  R thigh fluid collection / abscess  Chronic osteomyelitis of B/L inferior pubic rami  ESBL Klebsiella complicated UTI  L hip non healing wound  Paraplegia  Neurogenic bladder / Chronic vogt catheter  Depression / Anxiety  Anemia  Reactive thrombocytosis    Recommendations  I agree with the management of Mr Guerrero. This is a 48 y/o male who presented to ER with abdominal pain, N/V and LLE pain. On admit he was afebrile without leukocytosis. U/A abnormal with urine culture >100k ESBL Klebsiella. CT abdomen/pelvis with contrast showed findings of constipation, interval development of peripherally enhancing fluid collection R thigh suspicious for abscess, chronic osteomyelitis of B/L inferior pubic rami. We will continue Vancomycin and Cefepime for now. Plans for CT guided drainage or R thigh fluid collection per IR, follow cultures. Case discussed with Dr Williamson as well as with the  patient and nursing staff. Thank you for this consultation, we will continue to follow Mr Guerrero with you.

## 2025-02-11 NOTE — DISCHARGE SUMMARY
[facility] Medicine Discharge Summary    Admit Date: 2/6/2025  Discharge Date and Time: 2/11/20257:32 AM  Admitting Physician:  Team  Discharging Physician: Margaret Leblanc MD.  Primary Care Physician: Margaret Leblanc MD  Consults: {consultation: [id number]}    Discharge Diagnoses:  ***    Hospital Course:   ***  Pt was seen and examined on the day of discharge  Vitals:  VITAL SIGNS: 24 HRS MIN & MAX LAST   Temp  Min: 98 °F (36.7 °C)  Max: 99.3 °F (37.4 °C) 98.1 °F (36.7 °C)   BP  Min: 109/72  Max: 131/90 109/72   Pulse  Min: 76  Max: 90  76   Resp  Min: 16  Max: 18 18   SpO2  Min: 99 %  Max: 100 % 100 %       Physical Exam:  ***    Procedures Performed: No admission procedures for hospital encounter.     Significant Diagnostic Studies: See Full reports for all details    Recent Labs   Lab 02/07/25  0006 02/09/25  0524 02/10/25  0610   WBC 9.13 8.82 8.11   RBC 3.83* 3.58* 3.64*   HGB 9.6* 9.1* 9.3*   HCT 31.4* 28.9* 29.9*   MCV 82.0 80.7 82.1   MCH 25.1* 25.4* 25.5*   MCHC 30.6* 31.5* 31.1*   RDW 15.7 15.8 16.0    256 273   MPV 10.0 10.2 10.7*       Recent Labs   Lab 02/07/25  0006 02/09/25  0524 02/10/25  0610   * 136 138   K 3.9 3.9 4.2    107 109*   CO2 22 23 21*   BUN 15.6 12.0 8.0*   CREATININE 0.73 0.69* 0.68*   CALCIUM 9.0 8.5 8.7   MG 2.10  --   --    ALBUMIN 3.4*  --   --    ALKPHOS 99  --   --    ALT 7  --   --    AST 9  --   --    BILITOT 0.4  --   --         Microbiology Results (last 7 days)       ** No results found for the last 168 hours. **             X-Ray Abdomen AP 1 View  Narrative: EXAMINATION:  XR ABDOMEN AP 1 VIEW    CLINICAL HISTORY:  stool burden verification;    TECHNIQUE:  AP View(s) of the abdomen.    COMPARISON:  CT 02/07/2025    FINDINGS:  Small to moderate volume stool, decreased since the CT.  Nonspecific bowel gas pattern.  Impression: Stool volume decreased since 02/07/2025, now small to moderate.    No significant  discrepancy between my interpretation and the preliminary radiology report.    Electronically signed by: Juan Sargent  Date:    02/09/2025  Time:    11:52         Medication List        START taking these medications      polyethylene glycol 17 gram Pwpk  Commonly known as: GLYCOLAX  Take 17 g by mouth once daily.            CONTINUE taking these medications      baclofen 20 MG tablet  Commonly known as: LIORESAL     ciprofloxacin HCl 500 MG tablet  Commonly known as: CIPRO  Take 1 tablet (500 mg total) by mouth every 12 (twelve) hours.     ELIQUIS 5 mg Tab  Generic drug: apixaban     fluticasone propionate 50 mcg/actuation nasal spray  Commonly known as: FLONASE     gabapentin 800 MG tablet  Commonly known as: NEURONTIN     hydrOXYzine pamoate 25 MG Cap  Commonly known as: VISTARIL     linaCLOtide 290 mcg Cap capsule  Commonly known as: LINZESS  Take 1 capsule (290 mcg total) by mouth before breakfast.     MOVANTIK 25 mg tablet  Generic drug: naloxegoL  Take 1 tablet (25 mg total) by mouth once daily.     oxyCODONE-acetaminophen  mg per tablet  Commonly known as: PERCOCET  Take 1 tablet by mouth 2 (two) times daily as needed for Pain.     sulfamethoxazole-trimethoprim 800-160mg 800-160 mg Tab  Commonly known as: BACTRIM DS  Take 1 tablet by mouth 2 (two) times daily.            STOP taking these medications      amLODIPine 5 MG tablet  Commonly known as: NORVASC     FeroSuL 325 mg (65 mg iron) Tab tablet  Generic drug: ferrous sulfate     HYDROcodone-acetaminophen 5-325 mg per tablet  Commonly known as: NORCO     mirtazapine 15 MG tablet  Commonly known as: REMERON     oxybutynin 10 MG 24 hr tablet  Commonly known as: DITROPAN-XL     sertraline 50 MG tablet  Commonly known as: ZOLOFT               Where to Get Your Medications        These medications were sent to [facility] - [place], [place] - [address]  [address], [facility] [address]       Phone: [phone]   MOVANTIK 25 mg tablet  polyethylene glycol 17 gram Pwpk          Explained in detail to the patient about the discharge plan, medications, and follow-up visits. Pt understands and agrees with the treatment plan  Discharge Disposition: [place]-Health Care [facility] [place]   Discharged Condition: stable  Diet-   Dietary Orders (From admission, onward)       Start     Ordered    02/08/25 1312  Dietary nutrition supplements BID; Brandon - Any flavor, Boost Plus Nutritional Drink - Very Vanilla  Continuous        Question Answer Comment   Frequency: BID    Select PO Supplement: Brandon - Any flavor    Select PO Supplement: Boost Plus Nutritional Drink - Very Vanilla        02/08/25 1311    02/07/25 1412  Diet Adult Regular  Diet effective now         02/07/25 1411                   Medications Per DC med rec  Activities as tolerated   Follow-up Information       Margaret Leblanc MD .    Specialty: Internal Medicine  Contact information:  [address] [place], [place] [address] [id number] [phone]               Primary Care. Call in 1 day.    Contact information:  Please call [phone] for a primary care provider.             Margaret Leblanc MD Follow up in 6 day(s).    Specialty: Internal Medicine  Contact information:  [address] [place], [place] [address]  [place] [id number] [phone]                               Discharge time 33 minutes    For worsening symptoms, chest pain, shortness of breath, increased abdominal pain, high grade fever, stroke or stroke like symptoms, immediately go to the nearest Emergency Room or call 911 as soon as possible.      Margaret Pinto M.D, on 2/11/2025. at 7:32 AM.          Nutritional Drink - Very Vanilla  Continuous        Question Answer Comment   Frequency: BID    Select PO Supplement: Brandon - Any flavor    Select PO Supplement: Boost Plus Nutritional Drink - Very Vanilla        02/08/25 1311    02/07/25 1412  Diet Adult Regular  Diet effective now         02/07/25 1411                   Medications Per DC med rec  Activities as tolerated   Follow-up Information       Margaret Leblanc MD .    Specialty: Internal Medicine  Contact information:  [address] [place] [address]  [phone]               Primary Care. Call in 1 day.    Contact information:  Please call [phone] for a primary care provider.             Margaret Leblanc MD Follow up in 6 day(s).    Specialty: Internal Medicine  Contact information:  [address] [place] [address]  [phone]                               Discharge time 33 minutes    For worsening symptoms, chest pain, shortness of breath, increased abdominal pain, high grade fever, stroke or stroke like symptoms, immediately go to the nearest Emergency Room or call 911 as soon as possible.      Margaret Pinot M.D, on 2/11/2025. at 7:32 AM.

## 2025-02-17 NOTE — PHYSICIAN QUERY
Please clarify the - wound type & location -   related to the documentation outlined in the query message.    Pressure Ulcer - specify location: __left trochanter_________   - specify stage: ___3_____

## 2025-02-17 NOTE — PHYSICIAN QUERY
Please provide the integumentary diagnosis related to the documentation of - Sacral Spine.     Pressure injury - specify other stage: ___1_____;  specify present on admit status: ____yes___.

## 2025-03-03 LAB — FUNGUS SPEC CULT: NORMAL

## 2025-04-02 ENCOUNTER — HOSPITAL ENCOUNTER (EMERGENCY)
Facility: HOSPITAL | Age: 50
Discharge: HOME | End: 2025-04-02
Attending: EMERGENCY MEDICINE
Payer: MEDICARE

## 2025-04-02 VITALS
OXYGEN SATURATION: 100 % | RESPIRATION RATE: 18 BRPM | TEMPERATURE: 99 F | WEIGHT: 160 LBS | DIASTOLIC BLOOD PRESSURE: 79 MMHG | HEIGHT: 72 IN | SYSTOLIC BLOOD PRESSURE: 124 MMHG | HEART RATE: 68 BPM | BODY MASS INDEX: 21.67 KG/M2

## 2025-04-02 DIAGNOSIS — R10.84 GENERALIZED ABDOMINAL PAIN: Primary | ICD-10-CM

## 2025-04-02 LAB
ALBUMIN SERPL-MCNC: 3.1 G/DL (ref 3.5–5)
ALBUMIN/GLOB SERPL: 0.8 RATIO (ref 1.1–2)
ALP SERPL-CCNC: 84 UNIT/L (ref 40–150)
ALT SERPL-CCNC: 7 UNIT/L (ref 0–55)
ANION GAP SERPL CALC-SCNC: 6 MEQ/L
AST SERPL-CCNC: 7 UNIT/L (ref 11–45)
BACTERIA #/AREA URNS AUTO: ABNORMAL /HPF
BASOPHILS # BLD AUTO: 0.09 X10(3)/MCL
BASOPHILS NFR BLD AUTO: 1.3 %
BILIRUB SERPL-MCNC: 0.2 MG/DL
BILIRUB UR QL STRIP.AUTO: NEGATIVE
BUN SERPL-MCNC: 21.7 MG/DL (ref 8.4–25.7)
CALCIUM SERPL-MCNC: 8.2 MG/DL (ref 8.4–10.2)
CHLORIDE SERPL-SCNC: 108 MMOL/L (ref 98–107)
CLARITY UR: ABNORMAL
CO2 SERPL-SCNC: 27 MMOL/L (ref 22–29)
COLOR UR AUTO: YELLOW
CREAT SERPL-MCNC: 0.66 MG/DL (ref 0.72–1.25)
CREAT/UREA NIT SERPL: 33
EOSINOPHIL # BLD AUTO: 0.22 X10(3)/MCL (ref 0–0.9)
EOSINOPHIL NFR BLD AUTO: 3.2 %
ERYTHROCYTE [DISTWIDTH] IN BLOOD BY AUTOMATED COUNT: 15.4 % (ref 11.5–17)
GFR SERPLBLD CREATININE-BSD FMLA CKD-EPI: >60 ML/MIN/1.73/M2
GLOBULIN SER-MCNC: 3.9 GM/DL (ref 2.4–3.5)
GLUCOSE SERPL-MCNC: 103 MG/DL (ref 74–100)
GLUCOSE UR QL STRIP: NORMAL
HCT VFR BLD AUTO: 33.1 % (ref 42–52)
HGB BLD-MCNC: 10 G/DL (ref 14–18)
HGB UR QL STRIP: ABNORMAL
IMM GRANULOCYTES # BLD AUTO: 0.01 X10(3)/MCL (ref 0–0.04)
IMM GRANULOCYTES NFR BLD AUTO: 0.1 %
KETONES UR QL STRIP: NEGATIVE
LEUKOCYTE ESTERASE UR QL STRIP: 500
LYMPHOCYTES # BLD AUTO: 1.91 X10(3)/MCL (ref 0.6–4.6)
LYMPHOCYTES NFR BLD AUTO: 28.1 %
MCH RBC QN AUTO: 24.5 PG (ref 27–31)
MCHC RBC AUTO-ENTMCNC: 30.2 G/DL (ref 33–36)
MCV RBC AUTO: 81.1 FL (ref 80–94)
MONOCYTES # BLD AUTO: 0.48 X10(3)/MCL (ref 0.1–1.3)
MONOCYTES NFR BLD AUTO: 7.1 %
MUCOUS THREADS URNS QL MICRO: ABNORMAL /LPF
NEUTROPHILS # BLD AUTO: 4.09 X10(3)/MCL (ref 2.1–9.2)
NEUTROPHILS NFR BLD AUTO: 60.2 %
NITRITE UR QL STRIP: ABNORMAL
NRBC BLD AUTO-RTO: 0 %
PH UR STRIP: 8.5 [PH]
PLATELET # BLD AUTO: 283 X10(3)/MCL (ref 130–400)
PMV BLD AUTO: 10.8 FL (ref 7.4–10.4)
POTASSIUM SERPL-SCNC: 3.6 MMOL/L (ref 3.5–5.1)
PROT SERPL-MCNC: 7 GM/DL (ref 6.4–8.3)
PROT UR QL STRIP: ABNORMAL
RBC # BLD AUTO: 4.08 X10(6)/MCL (ref 4.7–6.1)
RBC #/AREA URNS AUTO: ABNORMAL /HPF
SODIUM SERPL-SCNC: 141 MMOL/L (ref 136–145)
SP GR UR STRIP.AUTO: 1.04 (ref 1–1.03)
SQUAMOUS #/AREA URNS LPF: ABNORMAL /HPF
TRI-PHOS CRY UR QL COMP ASSIST: ABNORMAL
UROBILINOGEN UR STRIP-ACNC: 4
WBC # BLD AUTO: 6.8 X10(3)/MCL (ref 4.5–11.5)
WBC #/AREA URNS AUTO: >100 /HPF

## 2025-04-02 PROCEDURE — 96374 THER/PROPH/DIAG INJ IV PUSH: CPT

## 2025-04-02 PROCEDURE — 96375 TX/PRO/DX INJ NEW DRUG ADDON: CPT

## 2025-04-02 PROCEDURE — 99284 EMERGENCY DEPT VISIT MOD MDM: CPT | Mod: 25

## 2025-04-02 PROCEDURE — 81001 URINALYSIS AUTO W/SCOPE: CPT | Performed by: STUDENT IN AN ORGANIZED HEALTH CARE EDUCATION/TRAINING PROGRAM

## 2025-04-02 PROCEDURE — 63600175 PHARM REV CODE 636 W HCPCS: Performed by: EMERGENCY MEDICINE

## 2025-04-02 PROCEDURE — 80053 COMPREHEN METABOLIC PANEL: CPT | Performed by: STUDENT IN AN ORGANIZED HEALTH CARE EDUCATION/TRAINING PROGRAM

## 2025-04-02 PROCEDURE — 85025 COMPLETE CBC W/AUTO DIFF WBC: CPT | Performed by: STUDENT IN AN ORGANIZED HEALTH CARE EDUCATION/TRAINING PROGRAM

## 2025-04-02 PROCEDURE — 25000003 PHARM REV CODE 250: Performed by: EMERGENCY MEDICINE

## 2025-04-02 PROCEDURE — 87186 SC STD MICRODIL/AGAR DIL: CPT | Performed by: STUDENT IN AN ORGANIZED HEALTH CARE EDUCATION/TRAINING PROGRAM

## 2025-04-02 RX ORDER — ONDANSETRON HYDROCHLORIDE 2 MG/ML
4 INJECTION, SOLUTION INTRAVENOUS
Status: COMPLETED | OUTPATIENT
Start: 2025-04-02 | End: 2025-04-02

## 2025-04-02 RX ORDER — MORPHINE SULFATE 4 MG/ML
4 INJECTION, SOLUTION INTRAMUSCULAR; INTRAVENOUS
Refills: 0 | Status: COMPLETED | OUTPATIENT
Start: 2025-04-02 | End: 2025-04-02

## 2025-04-02 RX ORDER — OXYCODONE AND ACETAMINOPHEN 10; 325 MG/1; MG/1
1 TABLET ORAL ONCE
Refills: 0 | Status: COMPLETED | OUTPATIENT
Start: 2025-04-02 | End: 2025-04-02

## 2025-04-02 RX ORDER — DICYCLOMINE HYDROCHLORIDE 20 MG/1
20 TABLET ORAL 2 TIMES DAILY
Qty: 20 TABLET | Refills: 0 | Status: SHIPPED | OUTPATIENT
Start: 2025-04-02 | End: 2025-05-02

## 2025-04-02 RX ADMIN — OXYCODONE HYDROCHLORIDE AND ACETAMINOPHEN 1 TABLET: 10; 325 TABLET ORAL at 07:04

## 2025-04-02 RX ADMIN — MORPHINE SULFATE 4 MG: 4 INJECTION INTRAVENOUS at 06:04

## 2025-04-02 RX ADMIN — ONDANSETRON 4 MG: 2 INJECTION INTRAMUSCULAR; INTRAVENOUS at 06:04

## 2025-04-02 NOTE — ED PROVIDER NOTES
"Encounter Date: 4/2/2025    SCRIBE #1 NOTE: I, Jing Fields, am scribing for, and in the presence of,  Mehdi Ferrara MD. I have scribed the following portions of the note - Other sections scribed: HPI, ROS, PE.       History     Chief Complaint   Patient presents with    Abdominal Pain     Pt arrives via [facility] for lower abdominal pain since yesterday, pt is paraplegic with pressure injuries to sacral area, vogt cath history of uti's. Pt also ran out of percocet recently and cannot get filled for the next 2 days.      Patient is a 49-year-old male with a PMHx of chronic UTIs and paraplegia presents to the ED for lower abdominal pain. Patient reports "burning, spasming, bloating" abdominal pain. He reports having a history of UTIs. He reports his pain feels similar to his UTIs. He reports running out of percocet recently. He reports he last had his suprapubic catheter changed 2 weeks ago by his home health nurse. He reports having issues with leaking from around his catheter recently. He denies constipation or diarrhea.    PCP: Dr. Margaret Leblanc.   Urologist: Dr. Kevin Veras.      The history is provided by the patient and medical records. No  was used.     Review of patient's allergies indicates:   Allergen Reactions    Adhesive     Amitriptyline      Past Medical History:   Diagnosis Date    Chronic UTI     Paraplegia      Past Surgical History:   Procedure Laterality Date    INSERTION OF SUPRAPUBIC CATHETER      LAPAROTOMY       Family History   Problem Relation Name Age of Onset    Lymphoma Mother      Rheum arthritis Mother       Social History[1]  Review of Systems   Gastrointestinal:  Positive for abdominal pain. Negative for constipation and diarrhea.   Genitourinary:  Positive for difficulty urinating and dysuria.       Physical Exam     Initial Vitals [04/02/25 0418]   BP Pulse Resp Temp SpO2   116/72 80 18 98.7 °F (37.1 °C) 98 %      MAP       --         Physical " Exam    Nursing note and vitals reviewed.  Constitutional: He appears well-developed and well-nourished.  Non-toxic appearance. No distress.   HENT:   Head: Normocephalic and atraumatic.   Eyes: Conjunctivae and EOM are normal. Pupils are equal, round, and reactive to light. Right eye exhibits no discharge. Left eye exhibits no discharge. No scleral icterus.   Neck: No tracheal deviation present.   Cardiovascular:  Normal rate, regular rhythm, normal heart sounds and intact distal pulses.           No murmur heard.  Pulmonary/Chest: Breath sounds normal. No stridor. No respiratory distress. He has no wheezes. He has no rales.   Abdominal: Abdomen is soft. He exhibits no distension. There is no abdominal tenderness.   Large ventral scar.  Suprapubic catheter in place. Urine in bag appears cloudy and blood tinged. Sediment visible in tube. There is no rebound and no guarding.   Musculoskeletal:         General: No tenderness or edema.     Neurological: He is alert and oriented to person, place, and time. He has normal reflexes. No cranial nerve deficit.   Skin: Skin is warm and dry. No rash noted. No erythema. There is pallor (slightly).   Psychiatric: He has a normal mood and affect. His behavior is normal. Judgment and thought content normal.         ED Course   Procedures  Labs Reviewed   COMPREHENSIVE METABOLIC PANEL - Abnormal       Result Value    Sodium 141      Potassium 3.6      Chloride 108 (*)     CO2 27      Glucose 103 (*)     Blood Urea Nitrogen 21.7      Creatinine 0.66 (*)     Calcium 8.2 (*)     Protein Total 7.0      Albumin 3.1 (*)     Globulin 3.9 (*)     Albumin/Globulin Ratio 0.8 (*)     Bilirubin Total 0.2      ALP 84      ALT 7      AST 7 (*)     eGFR >60      Anion Gap 6.0      BUN/Creatinine Ratio 33     URINALYSIS, REFLEX TO URINE CULTURE - Abnormal    Color, UA Yellow      Appearance, UA Turbid (*)     Specific Gravity, UA 1.039 (*)     pH, UA 8.5      Protein, UA 2+ (*)     Glucose, UA  Normal      Ketones, UA Negative      Blood, UA 2+ (*)     Bilirubin, UA Negative      Urobilinogen, UA 4.0 (*)     Nitrites, UA 2+ (*)     Leukocyte Esterase,  (*)     RBC, UA 50-99 (*)     WBC, UA >100 (*)     Bacteria, UA None Seen      Squamous Epithelial Cells, UA Trace      Mucous, UA Trace (*)     Triple Phosphate Crystals, UA Many (*)    CBC WITH DIFFERENTIAL - Abnormal    WBC 6.80      RBC 4.08 (*)     Hgb 10.0 (*)     Hct 33.1 (*)     MCV 81.1      MCH 24.5 (*)     MCHC 30.2 (*)     RDW 15.4      Platelet 283      MPV 10.8 (*)     Neut % 60.2      Lymph % 28.1      Mono % 7.1      Eos % 3.2      Basophil % 1.3      Imm Grans % 0.1      Neut # 4.09      Lymph # 1.91      Mono # 0.48      Eos # 0.22      Baso # 0.09      Imm Gran # 0.01      NRBC% 0.0     CULTURE, URINE   CBC W/ AUTO DIFFERENTIAL    Narrative:     The following orders were created for panel order CBC auto differential.  Procedure                               Abnormality         Status                     ---------                               -----------         ------                     CBC with Differential[[id number]]       Abnormal            Final result                 Please view results for these tests on the individual orders.          Imaging Results    None          Medications   morphine injection 4 mg (4 mg Intravenous Given 4/2/25 0624)   ondansetron injection 4 mg (4 mg Intravenous Given 4/2/25 0624)     Medical Decision Making  The differential diagnosis includes, but is not limited to, UTI, chronic pain, neuropathic pain, bowel obstruction, osteomyelitis, and intraabdominal infection.    Amount and/or Complexity of Data Reviewed  Labs: ordered.    Risk  Prescription drug management.            Scribe Attestation:   Scribe #1: I performed the above scribed service and the documentation accurately describes the services I performed. I attest to the accuracy of the note.    Attending Attestation:           Physician  Attestation for Scribe:  Physician Attestation Statement for Scribe #1: I, Mehdi Ferrara MD, reviewed documentation, as scribed by Jing Fields in my presence, and it is both accurate and complete.                                    Clinical Impression:  Final diagnoses:  [R10.84] Generalized abdominal pain (Primary)          ED Disposition Condition    Discharge Stable          ED Prescriptions       Medication Sig Dispense Start Date End Date Auth. Provider    dicyclomine (BENTYL) 20 mg tablet Take 1 tablet (20 mg total) by mouth 2 (two) times daily. 20 tablet 4/2/2025 5/2/2025 Mehdi Ferrara MD          Follow-up Information       Follow up With Specialties Details Why Contact Info    Margaret Leblanc MD Internal Medicine In 1 day  [address] [place] [address] [phone]                   [1]   Social History  Tobacco Use    Smoking status: Never    Smokeless tobacco: Never   Substance Use Topics    Alcohol use: Not Currently    Drug use: Yes     Types: Marijuana        Mehdi Ferrara MD  04/02/25 0789

## 2025-04-05 LAB
BACTERIA UR CULT: ABNORMAL
BACTERIA UR CULT: ABNORMAL

## 2025-04-07 ENCOUNTER — RESULTS FOLLOW-UP (OUTPATIENT)
Dept: EMERGENCY MEDICINE | Facility: HOSPITAL | Age: 50
End: 2025-04-07

## 2025-04-15 ENCOUNTER — HOSPITAL ENCOUNTER (INPATIENT)
Facility: HOSPITAL | Age: 50
LOS: 7 days | Discharge: HOME HEALTH | DRG: 698 | End: 2025-04-23
Attending: STUDENT IN AN ORGANIZED HEALTH CARE EDUCATION/TRAINING PROGRAM | Admitting: INTERNAL MEDICINE
Payer: MEDICARE

## 2025-04-15 DIAGNOSIS — N39.0 URINARY TRACT INFECTION ASSOCIATED WITH CATHETERIZATION OF URINARY TRACT, UNSPECIFIED INDWELLING URINARY CATHETER TYPE, INITIAL ENCOUNTER: Primary | ICD-10-CM

## 2025-04-15 DIAGNOSIS — T83.511A URINARY TRACT INFECTION ASSOCIATED WITH CATHETERIZATION OF URINARY TRACT, UNSPECIFIED INDWELLING URINARY CATHETER TYPE, INITIAL ENCOUNTER: Primary | ICD-10-CM

## 2025-04-15 LAB
ALBUMIN SERPL-MCNC: 3.1 G/DL (ref 3.5–5)
ALBUMIN/GLOB SERPL: 0.6 RATIO (ref 1.1–2)
ALP SERPL-CCNC: 88 UNIT/L (ref 40–150)
ALT SERPL-CCNC: <5 UNIT/L (ref 0–55)
ANION GAP SERPL CALC-SCNC: 11 MEQ/L
AST SERPL-CCNC: 7 UNIT/L (ref 11–45)
BACTERIA #/AREA URNS AUTO: ABNORMAL /HPF
BASOPHILS # BLD AUTO: 0.14 X10(3)/MCL
BASOPHILS NFR BLD AUTO: 1.2 %
BILIRUB SERPL-MCNC: 0.3 MG/DL
BILIRUB UR QL STRIP.AUTO: NEGATIVE
BUN SERPL-MCNC: 10.7 MG/DL (ref 8.4–25.7)
CALCIUM SERPL-MCNC: 8.7 MG/DL (ref 8.4–10.2)
CHLORIDE SERPL-SCNC: 102 MMOL/L (ref 98–107)
CLARITY UR: CLEAR
CO2 SERPL-SCNC: 23 MMOL/L (ref 22–29)
COLOR UR AUTO: ABNORMAL
CREAT SERPL-MCNC: 0.7 MG/DL (ref 0.72–1.25)
CREAT/UREA NIT SERPL: 15
EOSINOPHIL # BLD AUTO: 0.24 X10(3)/MCL (ref 0–0.9)
EOSINOPHIL NFR BLD AUTO: 2.1 %
ERYTHROCYTE [DISTWIDTH] IN BLOOD BY AUTOMATED COUNT: 15.6 % (ref 11.5–17)
GFR SERPLBLD CREATININE-BSD FMLA CKD-EPI: >60 ML/MIN/1.73/M2
GLOBULIN SER-MCNC: 4.9 GM/DL (ref 2.4–3.5)
GLUCOSE SERPL-MCNC: 78 MG/DL (ref 74–100)
GLUCOSE UR QL STRIP: NORMAL
HCT VFR BLD AUTO: 34.7 % (ref 42–52)
HGB BLD-MCNC: 10.4 G/DL (ref 14–18)
HGB UR QL STRIP: ABNORMAL
IMM GRANULOCYTES # BLD AUTO: 0.03 X10(3)/MCL (ref 0–0.04)
IMM GRANULOCYTES NFR BLD AUTO: 0.3 %
KETONES UR QL STRIP: NEGATIVE
LEUKOCYTE ESTERASE UR QL STRIP: 500
LYMPHOCYTES # BLD AUTO: 2.63 X10(3)/MCL (ref 0.6–4.6)
LYMPHOCYTES NFR BLD AUTO: 23.2 %
MCH RBC QN AUTO: 24.5 PG (ref 27–31)
MCHC RBC AUTO-ENTMCNC: 30 G/DL (ref 33–36)
MCV RBC AUTO: 81.8 FL (ref 80–94)
MONOCYTES # BLD AUTO: 0.64 X10(3)/MCL (ref 0.1–1.3)
MONOCYTES NFR BLD AUTO: 5.6 %
MUCOUS THREADS URNS QL MICRO: ABNORMAL /LPF
NEUTROPHILS # BLD AUTO: 7.68 X10(3)/MCL (ref 2.1–9.2)
NEUTROPHILS NFR BLD AUTO: 67.6 %
NITRITE UR QL STRIP: NEGATIVE
NRBC BLD AUTO-RTO: 0 %
PH UR STRIP: 6.5 [PH]
PLATELET # BLD AUTO: 398 X10(3)/MCL (ref 130–400)
PMV BLD AUTO: 9.9 FL (ref 7.4–10.4)
POTASSIUM SERPL-SCNC: 3.8 MMOL/L (ref 3.5–5.1)
PROT SERPL-MCNC: 8 GM/DL (ref 6.4–8.3)
PROT UR QL STRIP: NEGATIVE
RBC # BLD AUTO: 4.24 X10(6)/MCL (ref 4.7–6.1)
RBC #/AREA URNS AUTO: ABNORMAL /HPF
SODIUM SERPL-SCNC: 136 MMOL/L (ref 136–145)
SP GR UR STRIP.AUTO: 1.01 (ref 1–1.03)
SQUAMOUS #/AREA URNS LPF: ABNORMAL /HPF
UROBILINOGEN UR STRIP-ACNC: NORMAL
WBC # BLD AUTO: 11.36 X10(3)/MCL (ref 4.5–11.5)
WBC #/AREA URNS AUTO: ABNORMAL /HPF

## 2025-04-15 PROCEDURE — 81001 URINALYSIS AUTO W/SCOPE: CPT

## 2025-04-15 PROCEDURE — 0T2BX0Z CHANGE DRAINAGE DEVICE IN BLADDER, EXTERNAL APPROACH: ICD-10-PCS | Performed by: STUDENT IN AN ORGANIZED HEALTH CARE EDUCATION/TRAINING PROGRAM

## 2025-04-15 PROCEDURE — 63600175 PHARM REV CODE 636 W HCPCS: Performed by: STUDENT IN AN ORGANIZED HEALTH CARE EDUCATION/TRAINING PROGRAM

## 2025-04-15 PROCEDURE — 87040 BLOOD CULTURE FOR BACTERIA: CPT | Performed by: STUDENT IN AN ORGANIZED HEALTH CARE EDUCATION/TRAINING PROGRAM

## 2025-04-15 PROCEDURE — 25000242 PHARM REV CODE 250 ALT 637 W/ HCPCS: Performed by: INTERNAL MEDICINE

## 2025-04-15 PROCEDURE — 96374 THER/PROPH/DIAG INJ IV PUSH: CPT

## 2025-04-15 PROCEDURE — 99285 EMERGENCY DEPT VISIT HI MDM: CPT

## 2025-04-15 PROCEDURE — 25000003 PHARM REV CODE 250: Performed by: INTERNAL MEDICINE

## 2025-04-15 PROCEDURE — G0378 HOSPITAL OBSERVATION PER HR: HCPCS

## 2025-04-15 PROCEDURE — 96375 TX/PRO/DX INJ NEW DRUG ADDON: CPT

## 2025-04-15 PROCEDURE — 80053 COMPREHEN METABOLIC PANEL: CPT

## 2025-04-15 PROCEDURE — 96376 TX/PRO/DX INJ SAME DRUG ADON: CPT

## 2025-04-15 PROCEDURE — 87086 URINE CULTURE/COLONY COUNT: CPT

## 2025-04-15 PROCEDURE — 85025 COMPLETE CBC W/AUTO DIFF WBC: CPT

## 2025-04-15 RX ORDER — TALC
6 POWDER (GRAM) TOPICAL NIGHTLY PRN
Status: DISCONTINUED | OUTPATIENT
Start: 2025-04-15 | End: 2025-04-23 | Stop reason: HOSPADM

## 2025-04-15 RX ORDER — DICYCLOMINE HYDROCHLORIDE 20 MG/1
20 TABLET ORAL 2 TIMES DAILY
Status: DISCONTINUED | OUTPATIENT
Start: 2025-04-15 | End: 2025-04-23 | Stop reason: HOSPADM

## 2025-04-15 RX ORDER — FLUTICASONE PROPIONATE 50 MCG
1 SPRAY, SUSPENSION (ML) NASAL 2 TIMES DAILY
Status: DISCONTINUED | OUTPATIENT
Start: 2025-04-15 | End: 2025-04-23 | Stop reason: HOSPADM

## 2025-04-15 RX ORDER — OXYCODONE AND ACETAMINOPHEN 10; 325 MG/1; MG/1
1 TABLET ORAL 2 TIMES DAILY PRN
Refills: 0 | Status: DISCONTINUED | OUTPATIENT
Start: 2025-04-15 | End: 2025-04-16

## 2025-04-15 RX ORDER — MORPHINE SULFATE 4 MG/ML
4 INJECTION, SOLUTION INTRAMUSCULAR; INTRAVENOUS
Refills: 0 | Status: COMPLETED | OUTPATIENT
Start: 2025-04-15 | End: 2025-04-15

## 2025-04-15 RX ORDER — GABAPENTIN 400 MG/1
800 CAPSULE ORAL 3 TIMES DAILY
Status: DISCONTINUED | OUTPATIENT
Start: 2025-04-15 | End: 2025-04-23 | Stop reason: HOSPADM

## 2025-04-15 RX ORDER — LIDOCAINE HYDROCHLORIDE 20 MG/ML
JELLY TOPICAL
Status: DISPENSED | OUTPATIENT
Start: 2025-04-15 | End: 2025-04-16

## 2025-04-15 RX ORDER — BACLOFEN 10 MG/1
20 TABLET ORAL 3 TIMES DAILY
Status: DISCONTINUED | OUTPATIENT
Start: 2025-04-15 | End: 2025-04-23 | Stop reason: HOSPADM

## 2025-04-15 RX ORDER — HYDROXYZINE PAMOATE 25 MG/1
25 CAPSULE ORAL 3 TIMES DAILY
Status: DISCONTINUED | OUTPATIENT
Start: 2025-04-15 | End: 2025-04-23 | Stop reason: HOSPADM

## 2025-04-15 RX ORDER — MEROPENEM 1 G/1
1 INJECTION, POWDER, FOR SOLUTION INTRAVENOUS
Status: DISCONTINUED | OUTPATIENT
Start: 2025-04-15 | End: 2025-04-23 | Stop reason: HOSPADM

## 2025-04-15 RX ORDER — ONDANSETRON HYDROCHLORIDE 2 MG/ML
4 INJECTION, SOLUTION INTRAVENOUS
Status: COMPLETED | OUTPATIENT
Start: 2025-04-15 | End: 2025-04-15

## 2025-04-15 RX ORDER — SODIUM CHLORIDE 0.9 % (FLUSH) 0.9 %
10 SYRINGE (ML) INJECTION
Status: DISCONTINUED | OUTPATIENT
Start: 2025-04-15 | End: 2025-04-23 | Stop reason: HOSPADM

## 2025-04-15 RX ADMIN — MEROPENEM 1 G: 1 INJECTION, POWDER, FOR SOLUTION INTRAVENOUS at 08:04

## 2025-04-15 RX ADMIN — APIXABAN 5 MG: 5 TABLET, FILM COATED ORAL at 08:04

## 2025-04-15 RX ADMIN — GABAPENTIN 800 MG: 400 CAPSULE ORAL at 08:04

## 2025-04-15 RX ADMIN — HYDROXYZINE PAMOATE 25 MG: 25 CAPSULE ORAL at 08:04

## 2025-04-15 RX ADMIN — MORPHINE SULFATE 4 MG: 4 INJECTION INTRAVENOUS at 08:04

## 2025-04-15 RX ADMIN — ONDANSETRON 4 MG: 2 INJECTION INTRAMUSCULAR; INTRAVENOUS at 04:04

## 2025-04-15 RX ADMIN — FLUTICASONE PROPIONATE 50 MCG: 50 SPRAY, METERED NASAL at 08:04

## 2025-04-15 RX ADMIN — DICYCLOMINE HYDROCHLORIDE 20 MG: 20 TABLET ORAL at 08:04

## 2025-04-15 RX ADMIN — MORPHINE SULFATE 4 MG: 4 INJECTION INTRAVENOUS at 04:04

## 2025-04-15 RX ADMIN — BACLOFEN 20 MG: 10 TABLET ORAL at 08:04

## 2025-04-15 RX ADMIN — OXYCODONE AND ACETAMINOPHEN 1 TABLET: 325; 10 TABLET ORAL at 11:04

## 2025-04-15 NOTE — ED PROVIDER NOTES
Encounter Date: 4/15/2025    SCRIBE #1 NOTE: I, Cassandra Terrell, am scribing for, and in the presence of,  Nikolay Goel MD. I have scribed the following portions of the note - Other sections scribed: HPI, ROS, PE.       History     Chief Complaint   Patient presents with    Medical Problem     Presents via [facility] for positive urine culture. Advised to be seen for IV Abx. Hx of paraplegia and chronic UTI.     Patient is a 49 year old male with a history of paraplegia who comes into the ED via [facility] for a positive urine culture obtained on 4/2. Patient complains of a burning sensation to his lower abdomen. Patient also complains of distended abdomen and woke up sweating this morning. Patient has a suprapubic catheter in place and reports it was last changed 1 week ago. Patient denies fever. Patient chronically has UTIs.    The history is provided by the patient and medical records.     Review of patient's allergies indicates:   Allergen Reactions    Adhesive     Amitriptyline      Past Medical History:   Diagnosis Date    Chronic UTI     Paraplegia      Past Surgical History:   Procedure Laterality Date    INSERTION OF SUPRAPUBIC CATHETER      LAPAROTOMY       Family History   Problem Relation Name Age of Onset    Lymphoma Mother      Rheum arthritis Mother       Social History[1]  Review of Systems   Constitutional:  Negative for chills and fever.        Woke up sweating today   HENT:  Negative for congestion, rhinorrhea and sore throat.    Eyes:  Negative for visual disturbance.   Respiratory:  Negative for cough and shortness of breath.    Cardiovascular:  Negative for chest pain.   Gastrointestinal:  Positive for abdominal distention and abdominal pain. Negative for nausea and vomiting.   Genitourinary:  Negative for hematuria.   Musculoskeletal:  Negative for joint swelling.   Skin:  Negative for rash.   Neurological:  Negative for weakness.   Psychiatric/Behavioral:  Negative for confusion.    All other systems  "reviewed and are negative.      Physical Exam     Initial Vitals [04/15/25 1342]   BP Pulse Resp Temp SpO2   124/83 (!) 59 14 97.7 °F (36.5 °C) 98 %      MAP       --         Physical Exam    Nursing note and vitals reviewed.  Constitutional: He is not diaphoretic. No distress.   HENT:   Head: Normocephalic and atraumatic.   Neck: Neck supple.   Normal range of motion.  Cardiovascular:  Normal rate and regular rhythm.           Pulmonary/Chest: Breath sounds normal. No respiratory distress.   Abdominal: Abdomen is soft.   Genitourinary:    Genitourinary Comments: Suprapubic catheter in place  Sediment in catheter tubing     Musculoskeletal:         General: No edema.      Cervical back: Normal range of motion and neck supple.     Neurological: He is alert and oriented to person, place, and time. No cranial nerve deficit.   Paraplegic at baseline   Skin: Skin is warm. Capillary refill takes less than 2 seconds.   Psychiatric: He has a normal mood and affect.         ED Course   Suprapubic Tube    Date/Time: 4/15/2025 6:14 PM    Performed by: Yesica Hines PA  Authorized by: Nikolay Goel IV, MD  Consent: The procedure was performed in an emergent situation. Verbal consent obtained  Risks and benefits: risks, benefits and alternatives were discussed  Consent given by: patient  Patient understanding: patient states understanding of the procedure being performed  Required items: required blood products, implants, devices, and special equipment available  Patient identity confirmed: verbally with patient  Time out: Immediately prior to procedure a "time out" was called to verify the correct patient, procedure, equipment, support staff and site/side marked as required.  Indications: catheter change and neurogenic bladder  Local anesthesia used: no    Anesthesia:  Local anesthesia used: no    Sedation:  Patient sedated: no    Suprapubic aspiration by: catheter  Catheter type: Bhatti  Catheter size: 16 Fr  Number of " attempts: 1  Urine volume: 10 ml  Urine characteristics: yellow  Patient tolerance: patient tolerated the procedure well with no immediate complications        Labs Reviewed   COMPREHENSIVE METABOLIC PANEL - Abnormal       Result Value    Sodium 136      Potassium 3.8      Chloride 102      CO2 23      Glucose 78      Blood Urea Nitrogen 10.7      Creatinine 0.70 (*)     Calcium 8.7      Protein Total 8.0      Albumin 3.1 (*)     Globulin 4.9 (*)     Albumin/Globulin Ratio 0.6 (*)     Bilirubin Total 0.3      ALP 88      ALT <5      AST 7 (*)     eGFR >60      Anion Gap 11.0      BUN/Creatinine Ratio 15     URINALYSIS, REFLEX TO URINE CULTURE - Abnormal    Color, UA Light-Yellow      Appearance, UA Clear      Specific Gravity, UA 1.008      pH, UA 6.5      Protein, UA Negative      Glucose, UA Normal      Ketones, UA Negative      Blood, UA 3+ (*)     Bilirubin, UA Negative      Urobilinogen, UA Normal      Nitrites, UA Negative      Leukocyte Esterase,  (*)     RBC, UA 6-10 (*)     WBC, UA 51-99 (*)     Bacteria, UA Trace      Squamous Epithelial Cells, UA Trace      Mucous, UA Trace (*)    CBC WITH DIFFERENTIAL - Abnormal    WBC 11.36      RBC 4.24 (*)     Hgb 10.4 (*)     Hct 34.7 (*)     MCV 81.8      MCH 24.5 (*)     MCHC 30.0 (*)     RDW 15.6      Platelet 398      MPV 9.9      Neut % 67.6      Lymph % 23.2      Mono % 5.6      Eos % 2.1      Basophil % 1.2      Imm Grans % 0.3      Neut # 7.68      Lymph # 2.63      Mono # 0.64      Eos # 0.24      Baso # 0.14      Imm Gran # 0.03      NRBC% 0.0     BLOOD CULTURE [facility]   BLOOD CULTURE [facility]   CULTURE, URINE   CBC W/ AUTO DIFFERENTIAL    Narrative:     The following orders were created for panel order CBC auto differential.  Procedure                               Abnormality         Status                     ---------                               -----------         ------                     CBC with Differential[[id number]]       Abnormal             Final result                 Please view results for these tests on the individual orders.          Imaging Results    None          Medications   LIDOcaine HCl 2% urojet ( Mucous Membrane Not Given 4/15/25 1630)   baclofen tablet 20 mg (20 mg Oral Given 4/15/25 2027)   dicyclomine tablet 20 mg (20 mg Oral Given 4/15/25 2027)   apixaban tablet 5 mg (5 mg Oral Given 4/15/25 2027)   fluticasone propionate 50 mcg/actuation nasal spray 50 mcg (50 mcg Each Nostril Given 4/15/25 2027)   gabapentin capsule 800 mg (800 mg Oral Given 4/15/25 2027)   linaCLOtide capsule 290 mcg (has no administration in time range)   hydrOXYzine pamoate capsule 25 mg (25 mg Oral Given 4/15/25 2027)   naloxegoL (MOVANTIK) tablet 25 mg (has no administration in time range)   oxyCODONE-acetaminophen  mg per tablet 1 tablet (has no administration in time range)   meropenem injection 1 g (1 g Intravenous Given 4/15/25 2028)   sodium chloride 0.9% flush 10 mL (has no administration in time range)   melatonin tablet 6 mg (has no administration in time range)   morphine injection 4 mg (4 mg Intravenous Given 4/15/25 1631)   ondansetron injection 4 mg (4 mg Intravenous Given 4/15/25 1631)   morphine injection 4 mg (4 mg Intravenous Given 4/15/25 2031)     Medical Decision Making  49-year-old paraplegic chronic indwelling suprapubic catheter  Presenting with abdominal pain outpatient urine culture showed multidrug resistant organisms only susceptible to meropenem  Suprapubic catheter exchanged infection still present will treat with IV antibiotics and admit at this time      The differential diagnosis includes, but is not limited to:  Uti, pyelo, MDR infection     Problems Addressed:  Urinary tract infection associated with catheterization of urinary tract, unspecified indwelling urinary catheter type, initial encounter: acute illness or injury that poses a threat to life or bodily functions    Amount and/or Complexity of Data Reviewed  External  Data Reviewed: notes.     Details: Last urine culture reviewed - showed MDR orgaism, susceptible to Meropenem   Labs: ordered.  Discussion of management or test interpretation with external provider(s): Discussed with Dr. Yosvany Simms - will admit    Risk  OTC drugs.  Prescription drug management.  Drug therapy requiring intensive monitoring for toxicity.  Decision regarding hospitalization.            Scribe Attestation:   Scribe #1: I performed the above scribed service and the documentation accurately describes the services I performed. I attest to the accuracy of the note.    Attending Attestation:           Physician Attestation for Scribe:  Physician Attestation Statement for Scribe #1: I, Nikolay Goel MD, reviewed documentation, as scribed by Cassandra Terrell in my presence, and it is both accurate and complete.             ED Course as of 04/15/25 2058   Tue Apr 15, 2025   1913 Dr. Yosvany Simms - accepts admission   [AC]      ED Course User Index  [AC] Nikolay Goel IV, MD                           Clinical Impression:  Final diagnoses:  [T83.511A, N39.0] Urinary tract infection associated with catheterization of urinary tract, unspecified indwelling urinary catheter type, initial encounter (Primary)          ED Disposition Condition    Observation                     [1]   Social History  Tobacco Use    Smoking status: Never    Smokeless tobacco: Never   Substance Use Topics    Alcohol use: Not Currently    Drug use: Yes     Types: Marijuana        Nikolay Goel IV, MD  04/15/25 2058

## 2025-04-15 NOTE — H&P
[facility] - Emergency Dept    History & Physical      Patient Name: Hemal Guerrero  MRN: [id number]  Admission Date: 4/15/2025  Attending Physician: Nikolay Goel IV, MD   Primary Care Provider: Margaret Leblanc MD         Patient information was obtained from ER records.     Subjective:     Principal Problem:<principal problem not specified>    Chief Complaint:   Chief Complaint   Patient presents with    Medical Problem     Presents via [facility] for positive urine culture. Advised to be seen for IV Abx. Hx of paraplegia and chronic UTI.        HPI: 49 y.o. male who  has a past medical history of Chronic UTI and Paraplegia. And chronic sacral decubitus ulcer and left ischial ulcer and repeated prolonged hospital and ltac stays for ostemyelitis and complicated uti presented to the ed with complaints of worsening weakness and subsequent work up and imaging suggestive of complicated uti and furthera dmitted iv abx and further care    Past Medical History:   Diagnosis Date    Chronic UTI     Paraplegia        Past Surgical History:   Procedure Laterality Date    INSERTION OF SUPRAPUBIC CATHETER      LAPAROTOMY         Review of patient's allergies indicates:   Allergen Reactions    Adhesive     Amitriptyline        No current facility-administered medications on file prior to encounter.     Current Outpatient Medications on File Prior to Encounter   Medication Sig    baclofen (LIORESAL) 20 MG tablet Take 20 mg by mouth 3 (three) times daily.    ciprofloxacin HCl (CIPRO) 500 MG tablet Take 1 tablet (500 mg total) by mouth every 12 (twelve) hours.    dicyclomine (BENTYL) 20 mg tablet Take 1 tablet (20 mg total) by mouth 2 (two) times daily.    ELIQUIS 5 mg Tab Take 5 mg by mouth 2 (two) times daily.    fluticasone propionate (FLONASE) 50 mcg/actuation nasal spray 1 spray by Each Nostril route 2 (two) times daily.    gabapentin (NEURONTIN) 800 MG tablet Take 800 mg by mouth 3 (three) times daily.     hydrOXYzine pamoate (VISTARIL) 25 MG Cap Take 25 mg by mouth 3 (three) times daily.    linaCLOtide (LINZESS) 290 mcg Cap capsule Take 1 capsule (290 mcg total) by mouth before breakfast.    naloxegoL (MOVANTIK) 25 mg tablet Take 1 tablet (25 mg total) by mouth once daily.    oxyCODONE-acetaminophen (PERCOCET)  mg per tablet Take 1 tablet by mouth 2 (two) times daily as needed for Pain.    sulfamethoxazole-trimethoprim 800-160mg (BACTRIM DS) 800-160 mg Tab Take 1 tablet by mouth 2 (two) times daily.     Family History       Problem Relation (Age of Onset)    Lymphoma Mother    Rheum arthritis Mother          Tobacco Use    Smoking status: Never    Smokeless tobacco: Never   Substance and Sexual Activity    Alcohol use: Not Currently    Drug use: Yes     Types: Marijuana    Sexual activity: Not on file     Review of Systems   Constitutional: Negative.    HENT: Negative.     Eyes: Negative.    Respiratory: Negative.     Cardiovascular: Negative.    Gastrointestinal: Negative.    Endocrine: Negative.    Genitourinary: Negative.    Musculoskeletal: Negative.    Skin: Negative.    Allergic/Immunologic: Negative.  Food allergies: manthena.   Neurological:  Positive for weakness.   Hematological: Negative.    Psychiatric/Behavioral: Negative.       Objective:     Vital Signs (Most Recent):  Temp: 97.7 °F (36.5 °C) (04/15/25 1342)  Pulse: (!) 54 (04/15/25 1737)  Resp: 18 (04/15/25 1737)  BP: 116/65 (04/15/25 1737)  SpO2: 100 % (04/15/25 1737) Vital Signs (24h Range):  Temp:  [97.7 °F (36.5 °C)] 97.7 °F (36.5 °C)  Pulse:  [54-59] 54  Resp:  [14-18] 18  SpO2:  [98 %-100 %] 100 %  BP: (116-124)/(65-83) 116/65     Weight: 72.6 kg (160 lb)  Body mass index is 21.7 kg/m².    Physical Exam  Vitals reviewed.   Constitutional:       Appearance: Normal appearance.   HENT:      Head: Normocephalic and atraumatic.      Right Ear: Tympanic membrane and external ear normal.      Nose: Nose normal.      Mouth/Throat:      Mouth:  Mucous membranes are moist.   Eyes:      Extraocular Movements: Extraocular movements intact.      Pupils: Pupils are equal, round, and reactive to light.   Cardiovascular:      Rate and Rhythm: Normal rate and regular rhythm.      Pulses: Normal pulses.      Heart sounds: Normal heart sounds.   Pulmonary:      Effort: Pulmonary effort is normal.      Breath sounds: Normal breath sounds.   Abdominal:      General: Abdomen is flat. Bowel sounds are normal.      Palpations: Abdomen is soft.   Musculoskeletal:         General: Swelling present. Normal range of motion.      Cervical back: Normal range of motion and neck supple.   Skin:     General: Skin is warm and dry.   Neurological:      General: No focal deficit present.      Mental Status: He is alert and oriented to person, place, and time.   Psychiatric:         Mood and Affect: Mood normal.         Behavior: Behavior normal.           CRANIAL NERVES     CN III, IV, VI   Pupils are equal, round, and reactive to light.      Significant Labs: All pertinent labs within the past 24 hours have been reviewed.    Lab Results   Component Value Date    WBC 11.36 04/15/2025    HGB 10.4 (L) 04/15/2025    HCT 34.7 (L) 04/15/2025    MCV 81.8 04/15/2025     04/15/2025         Recent Labs   Lab 04/15/25  1437      K 3.8      CO2 23   BUN 10.7   CREATININE 0.70*   GLUCOSE 78   CALCIUM 8.7       Significant Imaging: I have reviewed all pertinent imaging results/findings within the past 24 hours.    Assessment/Plan:     There are no hospital problems to display for this patient.    VTE Risk Mitigation (From admission, onward)      None            Sepsis  Complicated uti  Chronic osteomyelitis of inferior pubic rami  Sacral decubitis ulcer  Paraplegic  Neurogenic bladder w chronic indwelling vogt   Uti-poa  Deconditioning   PAF  Hx of R foot osteomyelitis  Htn  Iron def  Anxiety/depression     Plan  Cont iv abx  Symptomatic management  Follow cx  Consult wound  care   F/u on cx results  Resume home meds   Labs in the am  Gi and dvt ppx  Full code    Yosvany Simms MD  Department of Hospital Medicine   [facility] - Emergency Dept

## 2025-04-16 LAB
ALBUMIN SERPL-MCNC: 3.1 G/DL (ref 3.5–5)
ALBUMIN/GLOB SERPL: 0.7 RATIO (ref 1.1–2)
ALP SERPL-CCNC: 92 UNIT/L (ref 40–150)
ALT SERPL-CCNC: <5 UNIT/L (ref 0–55)
ANION GAP SERPL CALC-SCNC: 5 MEQ/L
AST SERPL-CCNC: 7 UNIT/L (ref 11–45)
BASOPHILS # BLD AUTO: 0.15 X10(3)/MCL
BASOPHILS NFR BLD AUTO: 1.9 %
BILIRUB SERPL-MCNC: 0.3 MG/DL
BUN SERPL-MCNC: 9.6 MG/DL (ref 8.4–25.7)
CALCIUM SERPL-MCNC: 8.8 MG/DL (ref 8.4–10.2)
CHLORIDE SERPL-SCNC: 107 MMOL/L (ref 98–107)
CO2 SERPL-SCNC: 28 MMOL/L (ref 22–29)
CREAT SERPL-MCNC: 0.69 MG/DL (ref 0.72–1.25)
CREAT/UREA NIT SERPL: 14
EOSINOPHIL # BLD AUTO: 0.26 X10(3)/MCL (ref 0–0.9)
EOSINOPHIL NFR BLD AUTO: 3.2 %
ERYTHROCYTE [DISTWIDTH] IN BLOOD BY AUTOMATED COUNT: 15.6 % (ref 11.5–17)
GFR SERPLBLD CREATININE-BSD FMLA CKD-EPI: >60 ML/MIN/1.73/M2
GLOBULIN SER-MCNC: 4.4 GM/DL (ref 2.4–3.5)
GLUCOSE SERPL-MCNC: 130 MG/DL (ref 74–100)
HCT VFR BLD AUTO: 42 % (ref 42–52)
HGB BLD-MCNC: 12.3 G/DL (ref 14–18)
IMM GRANULOCYTES # BLD AUTO: 0.01 X10(3)/MCL (ref 0–0.04)
IMM GRANULOCYTES NFR BLD AUTO: 0.1 %
LYMPHOCYTES # BLD AUTO: 3.02 X10(3)/MCL (ref 0.6–4.6)
LYMPHOCYTES NFR BLD AUTO: 37.5 %
MCH RBC QN AUTO: 24 PG (ref 27–31)
MCHC RBC AUTO-ENTMCNC: 29.3 G/DL (ref 33–36)
MCV RBC AUTO: 81.9 FL (ref 80–94)
MONOCYTES # BLD AUTO: 0.41 X10(3)/MCL (ref 0.1–1.3)
MONOCYTES NFR BLD AUTO: 5.1 %
NEUTROPHILS # BLD AUTO: 4.21 X10(3)/MCL (ref 2.1–9.2)
NEUTROPHILS NFR BLD AUTO: 52.2 %
NRBC BLD AUTO-RTO: 0 %
PLATELET # BLD AUTO: 201 X10(3)/MCL (ref 130–400)
PLATELETS.RETICULATED NFR BLD AUTO: 14.3 % (ref 0.9–11.2)
PMV BLD AUTO: 11.4 FL (ref 7.4–10.4)
POTASSIUM SERPL-SCNC: 4.3 MMOL/L (ref 3.5–5.1)
PROT SERPL-MCNC: 7.5 GM/DL (ref 6.4–8.3)
RBC # BLD AUTO: 5.13 X10(6)/MCL (ref 4.7–6.1)
SODIUM SERPL-SCNC: 140 MMOL/L (ref 136–145)
WBC # BLD AUTO: 8.06 X10(3)/MCL (ref 4.5–11.5)

## 2025-04-16 PROCEDURE — 63600175 PHARM REV CODE 636 W HCPCS: Performed by: STUDENT IN AN ORGANIZED HEALTH CARE EDUCATION/TRAINING PROGRAM

## 2025-04-16 PROCEDURE — 11000001 HC ACUTE MED/SURG PRIVATE ROOM

## 2025-04-16 PROCEDURE — 25000003 PHARM REV CODE 250: Performed by: INTERNAL MEDICINE

## 2025-04-16 PROCEDURE — 85025 COMPLETE CBC W/AUTO DIFF WBC: CPT | Performed by: INTERNAL MEDICINE

## 2025-04-16 PROCEDURE — 36415 COLL VENOUS BLD VENIPUNCTURE: CPT | Performed by: INTERNAL MEDICINE

## 2025-04-16 PROCEDURE — 80053 COMPREHEN METABOLIC PANEL: CPT | Performed by: INTERNAL MEDICINE

## 2025-04-16 PROCEDURE — 25000242 PHARM REV CODE 250 ALT 637 W/ HCPCS: Performed by: INTERNAL MEDICINE

## 2025-04-16 RX ORDER — POLYETHYLENE GLYCOL 3350 17 G/17G
POWDER, FOR SOLUTION ORAL
COMMUNITY

## 2025-04-16 RX ORDER — OXYCODONE AND ACETAMINOPHEN 10; 325 MG/1; MG/1
1 TABLET ORAL EVERY 4 HOURS PRN
Refills: 0 | Status: DISCONTINUED | OUTPATIENT
Start: 2025-04-16 | End: 2025-04-23 | Stop reason: HOSPADM

## 2025-04-16 RX ORDER — POLYETHYLENE GLYCOL 3350 17 G/17G
17 POWDER, FOR SOLUTION ORAL 2 TIMES DAILY PRN
Status: DISCONTINUED | OUTPATIENT
Start: 2025-04-16 | End: 2025-04-23 | Stop reason: HOSPADM

## 2025-04-16 RX ADMIN — POLYETHYLENE GLYCOL 3350 17 G: 17 POWDER, FOR SOLUTION ORAL at 06:04

## 2025-04-16 RX ADMIN — FLUTICASONE PROPIONATE 50 MCG: 50 SPRAY, METERED NASAL at 08:04

## 2025-04-16 RX ADMIN — HYDROXYZINE PAMOATE 25 MG: 25 CAPSULE ORAL at 02:04

## 2025-04-16 RX ADMIN — OXYCODONE AND ACETAMINOPHEN 1 TABLET: 325; 10 TABLET ORAL at 10:04

## 2025-04-16 RX ADMIN — GABAPENTIN 800 MG: 400 CAPSULE ORAL at 10:04

## 2025-04-16 RX ADMIN — DICYCLOMINE HYDROCHLORIDE 20 MG: 20 TABLET ORAL at 08:04

## 2025-04-16 RX ADMIN — MEROPENEM 1 G: 1 INJECTION, POWDER, FOR SOLUTION INTRAVENOUS at 09:04

## 2025-04-16 RX ADMIN — OXYCODONE AND ACETAMINOPHEN 1 TABLET: 325; 10 TABLET ORAL at 02:04

## 2025-04-16 RX ADMIN — HYDROXYZINE PAMOATE 25 MG: 25 CAPSULE ORAL at 08:04

## 2025-04-16 RX ADMIN — APIXABAN 5 MG: 5 TABLET, FILM COATED ORAL at 08:04

## 2025-04-16 RX ADMIN — MEROPENEM 1 G: 1 INJECTION, POWDER, FOR SOLUTION INTRAVENOUS at 12:04

## 2025-04-16 RX ADMIN — LINACLOTIDE 290 MCG: 145 CAPSULE, GELATIN COATED ORAL at 05:04

## 2025-04-16 RX ADMIN — GABAPENTIN 800 MG: 400 CAPSULE ORAL at 02:04

## 2025-04-16 RX ADMIN — DICYCLOMINE HYDROCHLORIDE 20 MG: 20 TABLET ORAL at 10:04

## 2025-04-16 RX ADMIN — GABAPENTIN 800 MG: 400 CAPSULE ORAL at 08:04

## 2025-04-16 RX ADMIN — HYDROXYZINE PAMOATE 25 MG: 25 CAPSULE ORAL at 10:04

## 2025-04-16 RX ADMIN — BACLOFEN 20 MG: 10 TABLET ORAL at 10:04

## 2025-04-16 RX ADMIN — NALOXEGOL OXALATE 25 MG: 25 TABLET, FILM COATED ORAL at 08:04

## 2025-04-16 RX ADMIN — BACLOFEN 20 MG: 10 TABLET ORAL at 08:04

## 2025-04-16 RX ADMIN — BACLOFEN 20 MG: 10 TABLET ORAL at 02:04

## 2025-04-16 RX ADMIN — OXYCODONE AND ACETAMINOPHEN 1 TABLET: 325; 10 TABLET ORAL at 08:04

## 2025-04-16 RX ADMIN — MEROPENEM 1 G: 1 INJECTION, POWDER, FOR SOLUTION INTRAVENOUS at 03:04

## 2025-04-16 RX ADMIN — APIXABAN 5 MG: 5 TABLET, FILM COATED ORAL at 10:04

## 2025-04-16 RX ADMIN — OXYCODONE AND ACETAMINOPHEN 1 TABLET: 325; 10 TABLET ORAL at 06:04

## 2025-04-16 NOTE — CONSULTS
[facility] - 5th Floor Med Surg  Wound Care    Patient Name:  eHmal Guerrero   MRN:  [id number]  Date: 4/16/2025  Diagnosis: Complicated UTI (urinary tract infection)    History:     Past Medical History:   Diagnosis Date    Chronic UTI     Paraplegia        Social History[1]    Precautions:     Allergies as of 04/15/2025 - Reviewed 04/15/2025   Allergen Reaction Noted    Adhesive  02/05/2023    Amitriptyline  11/16/2022       WOC Assessment Details/Treatment      04/16/25 1125   WOCN Assessment   Visit Date 04/16/25   Visit Time 1236   Consult Type New   [facility] Wound   Wound pressure   Intervention chart review;assessed;applied;orders   Teaching on-going        Altered Skin Integrity 03/31/24 0400 Left posterior Greater trochanter #3 Non pressure chronic ulcer Full thickness tissue loss. Subcutaneous fat may be visible but bone, tendon or muscle are not exposed   Date First Assessed/Time First Assessed: 03/31/24 0400   Altered Skin Integrity Present on Admission - Did Patient arrive to the hospital with altered skin?: yes  Side: Left  Orientation: posterior  Location: Greater trochanter  Wound Number: #3  Is t...   Wound Image    Description of Altered Skin Integrity Full thickness tissue loss with exposed bone, tendon, or muscle. Often includes undermining and tunneling. May extend into muscle and/or supporting structures.   Dressing Appearance Intact;Moist drainage   Drainage Amount Moderate   Drainage Characteristics/Odor Creamy;Serosanguineous   Appearance Pink;Red;Moist;Bone   Tissue loss description Full thickness   Black (%), Wound Tissue Color 0 %   Red (%), Wound Tissue Color 100 %   Yellow (%), Wound Tissue Color 0 %   Periwound Area Dry;Scar tissue   Wound Edges Defined   Wound Length (cm) 3.5 cm   Wound Width (cm) 5.2 cm   Wound Depth (cm) 0.8 cm   Wound Volume (cm^3) 7.624 cm^3   Wound Surface Area (cm^2) 14.29 cm^2   Care Cleansed with:;Wound cleanser   Dressing Applied;Gauze,  wet to dry;Absorptive Pad        Altered Skin Integrity 01/06/24 1640 Sacral spine #1 Pressure Injury   Date First Assessed/Time First Assessed: 01/06/24 1640   Altered Skin Integrity Present on Admission - Did Patient arrive to the hospital with altered skin?: yes  Location: Sacral spine  Wound Number: #1  Is this injury device related?: No  Primary Wo...   Wound Image    Description of Altered Skin Integrity Full thickness tissue loss. Subcutaneous fat may be visible but bone, tendon or muscle are not exposed   Dressing Appearance Intact;Moist drainage   Drainage Amount Small   Drainage Characteristics/Odor Serosanguineous   Appearance Pink;Red;Moist   Tissue loss description Partial thickness   Black (%), Wound Tissue Color 0 %   Red (%), Wound Tissue Color 100 %   Yellow (%), Wound Tissue Color 0 %   Periwound Area Scar tissue;Dry   Wound Length (cm) 0.9 cm   Wound Width (cm) 0.3 cm   Wound Depth (cm) 0.2 cm   Wound Volume (cm^3) 0.028 cm^3   Wound Surface Area (cm^2) 0.21 cm^2   Care Cleansed with:;Wound cleanser   Dressing Silver;Foam        Wound 04/15/25 2145 Other (comment) Right lower Foot   Date First Assessed/Time First Assessed: 04/15/25 2145   Present on Original Admission: Yes  Primary Wound Type: Other (comment)  Side: Right  Orientation: lower  Location: Foot   Wound Image    Dressing Appearance Intact;Dry;Clean   Drainage Amount None   Drainage Characteristics/Odor No odor   Appearance Dry;Maroon   Tissue loss description Not applicable   Periwound Area Dry   Wound Edges Callused   Wound Length (cm) 1 cm   Wound Width (cm) 0.5 cm   Wound Depth (cm) 0 cm   Wound Volume (cm^3) 0 cm^3   Wound Surface Area (cm^2) 0.39 cm^2   Care Cleansed with:;Wound cleanser;Applied:;Povidone iodine   Dressing Reinforced;Foam     WOCN consulted for left greater trochanter, sacrum and right plantar foot. Discussed POC w/ nurse Dana PtNeo Refusing for wound vac to be reapplied to left greater trochanter per nurse. No  family at bedside. Explained reason for visit. Treatment recommendations: left greater trochanter: clean w/ vashe, dry well, apply vashe moistened gauze to wound bed, cover with dry gauze, abd pad, secure with minimal tape. BID/prn if soilage. Sacrum: clean w/ vashe, dry well, apply large pink square Aquacel Ag foam. Daily/prn if soilage. If pt. Starts having bowel movements use zinc oxide as a barrier to protect sacrum instead of the foam. Right foot: clean w/ vashe, dry well, apply betadine and foam. Daily. Pt. Did state that he has seen podiatry before for foot and wouldn't mind seeing them again. Nursing to cont. Tx recs and preventative measures. Ordered pt. An immerse bed. PUP pack was ordered and in pts room. Will order specialty bed. Ordered patient more podus boots to help offload heels. Will follow up.     04/16/2025         [1]   Social History  Socioeconomic History    Marital status:    Tobacco Use    Smoking status: Never    Smokeless tobacco: Never   Substance and Sexual Activity    Alcohol use: Not Currently    Drug use: Yes     Types: Marijuana     Social Drivers of Health     Financial Resource Strain: Medium Risk (2/7/2025)    Overall Financial Resource Strain (CARDIA)     Difficulty of Paying Living Expenses: Somewhat hard   Food Insecurity: Food Insecurity Present (2/7/2025)    Hunger Vital Sign     Worried About Running Out of Food in the Last Year: Sometimes true     Ran Out of Food in the Last Year: Never true   Transportation Needs: Unmet Transportation Needs (2/7/2025)    TRANSPORTATION NEEDS     Transportation : Yes, it has kept me from non-medical meetings, appointments, work or from getting things that I need.   Physical Activity: Inactive (2/7/2025)    Exercise Vital Sign     Days of Exercise per Week: 0 days     Minutes of Exercise per Session: 0 min   Stress: Stress Concern Present (2/7/2025)    [employer] - Occupational Stress Questionnaire      Feeling of Stress : Rather much   Housing Stability: Unknown (2/7/2025)    Housing Stability Vital Sign     Unable to Pay for Housing in the Last Year: No     Homeless in the Last Year: No

## 2025-04-16 NOTE — PROGRESS NOTES
[facility] - 5th Floor [facility]  Progress Note    Patient Name: Hemal Guerrero  MRN: [id number]  Patient Class: OP- Observation   Admission Date: 4/15/2025  Length of Stay: 0 days  Attending Physician: Yosvany Simms MD  Primary Care Provider: Margaret Leblanc MD        Subjective:     Principal Problem:Complicated UTI (urinary tract infection)    Interval History:   Today's info : seen and examined, no acute events overnight. Continues to improve   Afebrile  Remains on iv abx  Cx pending    Review of Systems   Constitutional:  Positive for fatigue.   HENT: Negative.     Eyes: Negative.    Respiratory: Negative.     Cardiovascular: Negative.    Gastrointestinal: Negative.    Endocrine: Negative.    Genitourinary: Negative.    Musculoskeletal: Negative.    Skin:  Positive for wound.   Allergic/Immunologic: Negative.    Neurological:  Positive for weakness.     Objective:     Vital Signs (Most Recent):  Temp: 99 °F (37.2 °C) (04/16/25 1100)  Pulse: 66 (04/16/25 1100)  Resp: 18 (04/16/25 1439)  BP: 124/78 (04/16/25 1100)  SpO2: 100 % (04/16/25 1100) Vital Signs (24h Range):  Temp:  [97.6 °F (36.4 °C)-99 °F (37.2 °C)] 99 °F (37.2 °C)  Pulse:  [53-72] 66  Resp:  [10-18] 18  SpO2:  [95 %-100 %] 100 %  BP: (106-124)/(62-80) 124/78     Weight: 64.6 kg (142 lb 6.4 oz)  Body mass index is 19.31 kg/m².    Intake/Output Summary (Last 24 hours) at 4/16/2025 1448  Last data filed at 4/16/2025 1403  Gross per 24 hour   Intake 660 ml   Output 1100 ml   Net -440 ml      Physical Exam  Vitals reviewed.   Constitutional:       Appearance: Normal appearance.   HENT:      Head: Normocephalic and atraumatic.      Right Ear: Tympanic membrane and external ear normal.      Nose: Nose normal.      Mouth/Throat:      Mouth: Mucous membranes are moist.   Eyes:      Extraocular Movements: Extraocular movements intact.      Pupils: Pupils are equal, round, and reactive to light.    Cardiovascular:      Rate and Rhythm: Normal rate and regular rhythm.      Pulses: Normal pulses.      Heart sounds: Normal heart sounds.   Pulmonary:      Effort: Pulmonary effort is normal.      Breath sounds: Normal breath sounds.   Abdominal:      General: Abdomen is flat. Bowel sounds are normal.      Palpations: Abdomen is soft.      Comments: cath   Musculoskeletal:         General: Normal range of motion.      Cervical back: Normal range of motion and neck supple.   Skin:     General: Skin is warm and dry.   Neurological:      General: No focal deficit present.      Mental Status: He is alert and oriented to person, place, and time.      Comments: paraplegic   Psychiatric:         Mood and Affect: Mood normal.         Behavior: Behavior normal.           Overview/Hospital Course:   stable    Significant Labs: All pertinent labs within the past 24 hours have been reviewed.  Lab Results   Component Value Date    WBC 8.06 04/16/2025    HGB 12.3 (L) 04/16/2025    HCT 42.0 04/16/2025    MCV 81.9 04/16/2025     04/16/2025         Recent Labs   Lab 04/16/25  0817      K 4.3      CO2 28   BUN 9.6   CREATININE 0.69*   GLUCOSE 130*   CALCIUM 8.8       Significant Imaging: I have reviewed all pertinent imaging results/findings within the past 24 hours.    Assessment/Plan:      Active Diagnoses:    Diagnosis Date Noted POA    PRINCIPAL PROBLEM:  Complicated UTI (urinary tract infection) [N39.0] 06/21/2023 Yes      Problems Resolved During this Admission:     VTE Risk Mitigation (From admission, onward)           Ordered     apixaban tablet 5 mg  2 times daily         04/15/25 1858                       Sepsis  Complicated uti  Chronic osteomyelitis of inferior pubic rami  Sacral decubitis ulcer  Paraplegic  Neurogenic bladder w chronic indwelling vogt   Uti-poa  Deconditioning   PAF  Hx of R foot osteomyelitis  Htn  Iron def  Anxiety/depression     Plan  Cont iv abx  Symptomatic management  Follow  cx  Consult wound care   F/u on cx results  Resume home meds   Labs in the am  Gi and dvt ppx  Full code  Yosvany Simms MD  Department of Hospital Medicine   [facility] - 5th Floor Med Surg

## 2025-04-16 NOTE — PLAN OF CARE
Updates sent vis [facility] to [facility]  for continuity of care. No anticipated discharge date

## 2025-04-16 NOTE — PLAN OF CARE
04/16/25 1045   Discharge Assessment   Assessment Type Discharge Planning Assessment   Confirmed/corrected address, phone number and insurance Yes   Confirmed Demographics Correct on Facesheet   Source of Information patient   Communicated IVORY with patient/caregiver Date not available/Unable to determine   Reason For Admission UTI   People in Home child(arnol), adult   Do you expect to return to your current living situation? Yes   Do you have help at home or someone to help you manage your care at home? Yes   Who are your caregiver(s) and their phone number(s)? son Nick Guerrero   Prior to hospitilization cognitive status: Alert/Oriented   Current cognitive status: Alert/Oriented   Walking or Climbing Stairs Difficulty yes   Walking or Climbing Stairs ambulation difficulty, requires equipment;stair climbing difficulty, requires equipment;transferring difficulty, requires equipment   Mobility Management powerchair mostly bedbound   Dressing/Bathing Difficulty yes   Dressing/Bathing bathing difficulty, assistance 1 person;dressing difficulty, assistance 1 person   Dressing/Bathing Management son assists   Do you have any problems with: Errands/Grocery   Home Accessibility wheelchair accessible   Home Layout Able to live on 1st floor   Equipment Currently Used at Home wheelchair   Readmission within 30 days? No   Patient currently being followed by outpatient case management? No   Do you currently have service(s) that help you manage your care at home? Yes   Name and Contact number of agency [facility]   Is the pt/caregiver preference to resume services with current agency Yes   Do you take prescription medications? Yes   Do you have prescription coverage? Yes   Coverage Medicare fills meds at [facility]   Do you have any problems affording any of your prescribed medications? No   Is the patient taking medications as prescribed? yes   Who is going to help you get home at discharge? will need ambulance   How do you  get to doctors appointments? other (see comments)  (does virtual visits)   Are you on dialysis? No   Do you take coumadin? No   Discharge Plan A Home Health   Discharge Plan B Home Health   DME Needed Upon Discharge  none   Discharge Plan discussed with: Patient   Transition of Care Barriers None   OTHER   Name(s) of People in Home alla Romero

## 2025-04-16 NOTE — PLAN OF CARE
Problem: Skin Injury Risk Increased  Goal: Skin Health and Integrity  Outcome: Progressing     Problem: Adult Inpatient Plan of Care  Goal: Plan of Care Review  Outcome: Progressing  Goal: Patient-Specific Goal (Individualized)  Outcome: Progressing  Goal: Absence of Hospital-Acquired Illness or Injury  Outcome: Progressing  Goal: Optimal Comfort and Wellbeing  Outcome: Progressing  Goal: Readiness for Transition of Care  Outcome: Progressing     Problem: Wound  Goal: Optimal Coping  Outcome: Progressing  Goal: Optimal Functional Ability  Outcome: Progressing  Goal: Absence of Infection Signs and Symptoms  Outcome: Progressing  Goal: Improved Oral Intake  Outcome: Progressing  Goal: Optimal Pain Control and Function  Outcome: Progressing  Goal: Skin Health and Integrity  Outcome: Progressing  Goal: Optimal Wound Healing  Outcome: Progressing

## 2025-04-17 PROCEDURE — 63600175 PHARM REV CODE 636 W HCPCS: Performed by: STUDENT IN AN ORGANIZED HEALTH CARE EDUCATION/TRAINING PROGRAM

## 2025-04-17 PROCEDURE — 25000003 PHARM REV CODE 250: Performed by: INTERNAL MEDICINE

## 2025-04-17 PROCEDURE — 11000001 HC ACUTE MED/SURG PRIVATE ROOM

## 2025-04-17 RX ADMIN — OXYCODONE AND ACETAMINOPHEN 1 TABLET: 325; 10 TABLET ORAL at 02:04

## 2025-04-17 RX ADMIN — MEROPENEM 1 G: 1 INJECTION, POWDER, FOR SOLUTION INTRAVENOUS at 07:04

## 2025-04-17 RX ADMIN — GABAPENTIN 800 MG: 400 CAPSULE ORAL at 08:04

## 2025-04-17 RX ADMIN — DICYCLOMINE HYDROCHLORIDE 20 MG: 20 TABLET ORAL at 08:04

## 2025-04-17 RX ADMIN — HYDROXYZINE PAMOATE 25 MG: 25 CAPSULE ORAL at 02:04

## 2025-04-17 RX ADMIN — POLYETHYLENE GLYCOL 3350 17 G: 17 POWDER, FOR SOLUTION ORAL at 08:04

## 2025-04-17 RX ADMIN — APIXABAN 5 MG: 5 TABLET, FILM COATED ORAL at 08:04

## 2025-04-17 RX ADMIN — BACLOFEN 20 MG: 10 TABLET ORAL at 08:04

## 2025-04-17 RX ADMIN — FLUTICASONE PROPIONATE 50 MCG: 50 SPRAY, METERED NASAL at 08:04

## 2025-04-17 RX ADMIN — GABAPENTIN 800 MG: 400 CAPSULE ORAL at 02:04

## 2025-04-17 RX ADMIN — OXYCODONE AND ACETAMINOPHEN 1 TABLET: 325; 10 TABLET ORAL at 07:04

## 2025-04-17 RX ADMIN — MEROPENEM 1 G: 1 INJECTION, POWDER, FOR SOLUTION INTRAVENOUS at 12:04

## 2025-04-17 RX ADMIN — BACLOFEN 20 MG: 10 TABLET ORAL at 02:04

## 2025-04-17 RX ADMIN — OXYCODONE AND ACETAMINOPHEN 1 TABLET: 325; 10 TABLET ORAL at 06:04

## 2025-04-17 RX ADMIN — HYDROXYZINE PAMOATE 25 MG: 25 CAPSULE ORAL at 08:04

## 2025-04-17 RX ADMIN — NALOXEGOL OXALATE 25 MG: 25 TABLET, FILM COATED ORAL at 08:04

## 2025-04-17 RX ADMIN — MEROPENEM 1 G: 1 INJECTION, POWDER, FOR SOLUTION INTRAVENOUS at 03:04

## 2025-04-17 RX ADMIN — LINACLOTIDE 290 MCG: 145 CAPSULE, GELATIN COATED ORAL at 06:04

## 2025-04-17 RX ADMIN — OXYCODONE AND ACETAMINOPHEN 1 TABLET: 325; 10 TABLET ORAL at 11:04

## 2025-04-17 RX ADMIN — OXYCODONE AND ACETAMINOPHEN 1 TABLET: 325; 10 TABLET ORAL at 10:04

## 2025-04-17 NOTE — ASSESSMENT & PLAN NOTE
Patient has been screened and assessed by RD.    Malnutrition Type:  Context:  acute illness or injury  Level: moderate    Malnutrition Characteristic Summary:  Weight Loss (Malnutrition): 5% in 1 month  Energy Intake (Malnutrition): less than or equal to 75% for greater than or equal to 1 month  Muscle Mass (Malnutrition): moderate depletion  Fluid Accumulation (Malnutrition): other (see comments) (Not present)    Interventions/Recommendations (treatment strategy):  Oral nutritional supplement

## 2025-04-17 NOTE — PROGRESS NOTES
[facility] - 5th Floor [facility] Medicine  Progress Note    Patient Name: Hemal Guerrero  MRN: [id number]  Patient Class: IP- Inpatient   Admission Date: 4/15/2025  Length of Stay: 1 days  Attending Physician: Yosvany Simms MD  Primary Care Provider: Margaret Leblanc MD        Subjective:     Principal Problem:Complicated UTI (urinary tract infection)    Interval History:   Today's info : seen and examined, no acute events overnight. Continues to improve   Afebrile  Remains on iv abx  Cx pending  Polymicrobial in nature    Review of Systems   Constitutional:  Positive for fatigue.   HENT: Negative.     Eyes: Negative.    Respiratory: Negative.     Cardiovascular: Negative.    Gastrointestinal: Negative.    Endocrine: Negative.    Genitourinary: Negative.    Musculoskeletal: Negative.    Skin:  Positive for wound.   Allergic/Immunologic: Negative.    Neurological:  Positive for weakness.     Objective:     Vital Signs (Most Recent):  Temp: 98.1 °F (36.7 °C) (04/17/25 1100)  Pulse: 62 (04/17/25 1100)  Resp: 18 (04/17/25 1100)  BP: (!) 147/84 (04/17/25 1100)  SpO2: 98 % (04/17/25 1100) Vital Signs (24h Range):  Temp:  [97.5 °F (36.4 °C)-99.1 °F (37.3 °C)] 98.1 °F (36.7 °C)  Pulse:  [57-66] 62  Resp:  [18-20] 18  SpO2:  [97 %-100 %] 98 %  BP: (109-147)/(64-84) 147/84     Weight: 64.6 kg (142 lb 6.4 oz)  Body mass index is 19.31 kg/m².    Intake/Output Summary (Last 24 hours) at 4/17/2025 1401  Last data filed at 4/17/2025 0548  Gross per 24 hour   Intake 900 ml   Output 2550 ml   Net -1650 ml      Physical Exam  Vitals reviewed.   Constitutional:       Appearance: Normal appearance.   HENT:      Head: Normocephalic and atraumatic.      Right Ear: Tympanic membrane and external ear normal.      Nose: Nose normal.      Mouth/Throat:      Mouth: Mucous membranes are moist.   Eyes:      Extraocular Movements: Extraocular movements intact.      Pupils: Pupils are equal, round, and  reactive to light.   Cardiovascular:      Rate and Rhythm: Normal rate and regular rhythm.      Pulses: Normal pulses.      Heart sounds: Normal heart sounds.   Pulmonary:      Effort: Pulmonary effort is normal.      Breath sounds: Normal breath sounds.   Abdominal:      General: Abdomen is flat. Bowel sounds are normal.      Palpations: Abdomen is soft.      Comments: cath   Musculoskeletal:         General: Normal range of motion.      Cervical back: Normal range of motion and neck supple.   Skin:     General: Skin is warm and dry.   Neurological:      General: No focal deficit present.      Mental Status: He is alert and oriented to person, place, and time.      Comments: paraplegic   Psychiatric:         Mood and Affect: Mood normal.         Behavior: Behavior normal.           Overview/Hospital Course:   stable    Significant Labs: All pertinent labs within the past 24 hours have been reviewed.  Lab Results   Component Value Date    WBC 8.06 04/16/2025    HGB 12.3 (L) 04/16/2025    HCT 42.0 04/16/2025    MCV 81.9 04/16/2025     04/16/2025         Recent Labs   Lab 04/16/25  0817      K 4.3      CO2 28   BUN 9.6   CREATININE 0.69*   GLUCOSE 130*   CALCIUM 8.8       Significant Imaging: I have reviewed all pertinent imaging results/findings within the past 24 hours.    Assessment/Plan:      Active Diagnoses:    Diagnosis Date Noted POA    PRINCIPAL PROBLEM:  Complicated UTI (urinary tract infection) [N39.0] 06/21/2023 Yes    Moderate malnutrition [E44.0] 09/27/2024 Yes      Problems Resolved During this Admission:     VTE Risk Mitigation (From admission, onward)           Ordered     apixaban tablet 5 mg  2 times daily         04/15/25 1858                       Sepsis  Complicated uti  Chronic osteomyelitis of inferior pubic rami  Sacral decubitis ulcer  Paraplegic  Neurogenic bladder w chronic indwelling vogt   Uti-poa  Deconditioning   PAF  Hx of R foot osteomyelitis  Htn  Iron  def  Anxiety/depression     Plan  Cont iv abx  Symptomatic management  Follow cx  Consult wound care   F/u on cx results  Resume home meds   Labs in the am  Gi and dvt ppx  Full code  Yosvany Simms MD  Department of Hospital Medicine   [facility] - 5th Floor Med Surg

## 2025-04-17 NOTE — CONSULTS
Inpatient Nutrition Assessment    Admit Date: 4/15/2025   Total duration of encounter: 2 days   Patient Age: 49 y.o.    Nutrition Recommendation/Prescription     Continue oral diet as tolerated; Diet Adult Regular   Boost VHC provides 530 kcal, 22 gm protein per container  Brandon bid for wound healing  Encouraged intake of smaller more frequent meals.   Consider adding a multivitamin with wound healing vitamins: Vit A, Zinc, Vit C    Communication of Recommendations: reviewed with patient    Nutrition Assessment     Malnutrition Assessment/Nutrition-Focused Physical Exam    Malnutrition Context: acute illness or injury (04/17/25 1205)  Malnutrition Level: moderate (04/17/25 1205)  Energy Intake (Malnutrition): less than or equal to 75% for greater than or equal to 1 month (04/17/25 1205)  Weight Loss (Malnutrition): 5% in 1 month (04/17/25 1205)              Muscle Mass (Malnutrition): moderate depletion (04/17/25 1205)  [place] Region (Muscle Loss): mild depletion  Clavicle Bone Region (Muscle Loss): moderate depletion  Clavicle and Acromion Bone Region (Muscle Loss): mild depletion                 Fluid Accumulation (Malnutrition): other (see comments) (Not present) (04/17/25 1205)        A minimum of two characteristics is recommended for diagnosis of either severe or non-severe malnutrition.    Chart Review    Reason Seen: physician consult for  stage 4 decubitus    Malnutrition Screening Tool Results              Diagnosis:  Sepsis  Complicated uti  Chronic osteomyelitis of inferior pubic rami  Sacral decubitis ulcer  Paraplegic  Neurogenic bladder w chronic indwelling vogt   Uti-poa  Deconditioning    Relevant Medical History: R foot osteomyelitis  Htn  Iron def  Anxiety/depression    Scheduled Medications:  apixaban, 5 mg, BID  baclofen, 20 mg, TID  dicyclomine, 20 mg, BID  fluticasone propionate, 1 spray, BID  gabapentin, 800 mg, TID  hydrOXYzine pamoate, 25 mg, TID  linaCLOtide, 290 mcg, Before  breakfast  meropenem, 1 g, Q8H  naloxegoL, 25 mg, Daily    Continuous Infusions:   PRN Medications:  melatonin, 6 mg, Nightly PRN  oxyCODONE-acetaminophen, 1 tablet, Q4H PRN  polyethylene glycol, 17 g, BID PRN  sodium chloride 0.9%, 10 mL, PRN    Calorie Containing IV Medications: no significant kcals from medications at this time    Recent Labs   Lab 04/15/25  1437 04/16/25  0513 04/16/25  0817     --  140   K 3.8  --  4.3   CALCIUM 8.7  --  8.8     --  107   CO2 23  --  28   BUN 10.7  --  9.6   CREATININE 0.70*  --  0.69*   EGFRNORACEVR >60  --  >60   GLUCOSE 78  --  130*   BILITOT 0.3  --  0.3   ALKPHOS 88  --  92   ALT <5  --  <5   AST 7*  --  7*   ALBUMIN 3.1*  --  3.1*   WBC 11.36 8.06  --    HGB 10.4* 12.3*  --    HCT 34.7* 42.0  --      Nutrition Orders:  Diet Adult Regular  Dietary nutrition supplements BID; Boost Very High Calorie Nutritional Drink - Strawberry, Brandon - Any flavor    Appetite/Oral Intake: fair/50-75% of meals  Factors Affecting Nutritional Intake: decreased appetite and early satiety  Social Needs Impacting Access to Food: none identified  Food/Advent/Cultural Preferences: none reported  Food Allergies: no known food allergies  Last Bowel Movement: 04/16/25  Wound(s):     Altered Skin Integrity 03/31/24 0400 Left posterior Greater trochanter #3 Non pressure chronic ulcer Full thickness tissue loss. Subcutaneous fat may be visible but bone, tendon or muscle are not exposed-Tissue loss description: Full thickness       Wound 04/15/25 2145 Other (comment) Right lower Foot-Tissue loss description: Not applicable       Altered Skin Integrity 01/06/24 1640 Sacral spine #1 Pressure Injury-Tissue loss description: Partial thickness     Comments    4/17/25 Patient eating lunch, reports fair appetite, sometimes does not eat as much due to feeling full quickly. At home eats 1 meal a day on average. Is drinking some of the Boost VHC and Brandon. Weight loss noted in EMR over the past  "couple of months, noted fluctuating weights prior possibly due to discrepancy in scales, will monitor. Patient was unsure if he lost weight, but said it was possible since he is not eating as much as usual.     Anthropometrics    Height: 6' 0.01" (182.9 cm), Height Method: Stated  Last Weight: 64.6 kg (142 lb 6.4 oz) (25 1156), Weight Method: Bed Scale  BMI (Calculated): 19.3  BMI Classification: normal (BMI 18.5-24.9)        Ideal Body Weight (IBW), Male: 178.06 lb     % Ideal Body Weight, Male (lb): 79.97 %                 Usual Body Weight (UBW), k.5 kg  % Usual Body Weight: 90.53  % Weight Change From Usual Weight: -9.66 %  Usual Weight Provided By: patient and EMR weight history    Wt Readings from Last 5 Encounters:   25 64.6 kg (142 lb 6.4 oz)   25 72.6 kg (160 lb)   25 71.5 kg (157 lb 10.1 oz)   25 72.6 kg (160 lb)   24 74.8 kg (165 lb)     Weight Change(s) Since Admission:   Wt Readings from Last 1 Encounters:   25 1156 64.6 kg (142 lb 6.4 oz)   04/15/25 2325 64.6 kg (142 lb 6.4 oz)   04/15/25 1342 72.6 kg (160 lb)   Admit Weight: 72.6 kg (160 lb) (04/15/25 1342), Weight Method: Stated    Estimated Needs    Weight Used For Calorie Calculations: 64.6 kg (142 lb 6.7 oz)  Energy Calorie Requirements (kcal): 6520-4855 kcal (1.2-1.3 stress factor)  Energy Need Method: [facility]  Weight Used For Protein Calculations: 64.6 kg (142 lb 6.7 oz)  Protein Requirements: 97-110gm (1.5-1.7 gm/kg)  Fluid Requirements (mL): 1938 ml (30 ml/kg)  CHO Requirement: 245gm/day (45% est calorie needs)     Enteral Nutrition     Patient not receiving enteral nutrition at this time.    Parenteral Nutrition     Patient not receiving parenteral nutrition support at this time.    Evaluation of Received Nutrient Intake    Calories: not meeting estimated needs  Protein: not meeting estimated needs    Patient Education     Not applicable.    Nutrition Diagnosis     PES: Inadequate energy " intake related to change in GI tract motility as evidenced by <50% intake of meals since admit. (new)     PES: Moderate acute disease or injury related malnutrition Related to suboptimal protein energy intake As Evidenced by:  - weight loss: 5% in 1 month - energy intake: <= 75% for 2 months (meets criteria for <= 75% >= 1 month (severe - chronic)) - muscle mass depletion: 1 area of moderate muscle loss (Clavicle), 5 areas of mild muscle loss (Deltoid, Pectoralis, Temporalis, Acromion, Trapezius) new    Nutrition Interventions     Intervention(s): general/healthful diet, commercial beverage, multivitamin/mineral supplement therapy, and collaboration with other providers  Intervention(s): Oral nutritional supplement    Goal: Consume % of meals/snacks by follow-up. (new)  Goal: Consume % of oral supplements by follow-up. (new)    Nutrition Goals & Monitoring     Dietitian will monitor: food and beverage intake, weight change, and gastrointestinal profile  Discharge planning: continue regular diet with high protein oral supplements  Nutrition Risk/Follow-Up: high (follow-up in 1-4 days)   Please consult if re-assessment needed sooner.

## 2025-04-17 NOTE — PLAN OF CARE
Problem: Skin Injury Risk Increased  Goal: Skin Health and Integrity  Outcome: Progressing     Problem: Adult Inpatient Plan of Care  Goal: Plan of Care Review  Outcome: Progressing  Goal: Patient-Specific Goal (Individualized)  Outcome: Progressing  Goal: Absence of Hospital-Acquired Illness or Injury  Outcome: Progressing  Goal: Optimal Comfort and Wellbeing  Outcome: Progressing  Goal: Readiness for Transition of Care  Outcome: Progressing     Problem: Wound  Goal: Optimal Coping  Outcome: Progressing  Goal: Optimal Functional Ability  Outcome: Progressing  Goal: Absence of Infection Signs and Symptoms  Outcome: Progressing  Goal: Improved Oral Intake  Outcome: Progressing  Goal: Optimal Pain Control and Function  Outcome: Progressing  Goal: Skin Health and Integrity  Outcome: Progressing  Goal: Optimal Wound Healing  Outcome: Progressing      Patient: Marlo Carl    Procedure Summary       Date: 05/01/24 Room / Location: [facility] A OR 01 / Virtual U A OR    Anesthesia Start: 0853 Anesthesia Stop: 0937    Procedure: Stage 2 Continence Control Stimulator Generator Insertion Diagnosis:       OAB (overactive bladder)      (OAB (overactive bladder) [N32.81])    Surgeons: Vishal Delgado MD Responsible Provider: Shane Adler MD    Anesthesia Type: MAC ASA Status: 3            Anesthesia Type: MAC    Vitals Value Taken Time   /74 05/01/24 0937   Temp  05/01/24 0939   Pulse 61 05/01/24 0939   Resp  05/01/24 0939   SpO2 99 % 05/01/24 0939   Vitals shown include unfiled device data.    Anesthesia Post Evaluation    Patient location during evaluation: PACU  Patient participation: complete - patient participated  Level of consciousness: awake  Pain management: adequate  Multimodal analgesia pain management approach  Airway patency: patent  Cardiovascular status: acceptable  Respiratory status: acceptable  Hydration status: acceptable  Postoperative Nausea and Vomiting: none  Comments: Will continue to assess PONV status.        No notable events documented.

## 2025-04-18 LAB
BACTERIA UR CULT: ABNORMAL
BACTERIA UR CULT: ABNORMAL

## 2025-04-18 PROCEDURE — 63600175 PHARM REV CODE 636 W HCPCS: Performed by: STUDENT IN AN ORGANIZED HEALTH CARE EDUCATION/TRAINING PROGRAM

## 2025-04-18 PROCEDURE — 63600175 PHARM REV CODE 636 W HCPCS: Performed by: NURSE PRACTITIONER

## 2025-04-18 PROCEDURE — 11000001 HC ACUTE MED/SURG PRIVATE ROOM

## 2025-04-18 PROCEDURE — 25000003 PHARM REV CODE 250: Performed by: INTERNAL MEDICINE

## 2025-04-18 RX ORDER — SODIUM CHLORIDE 0.9 % (FLUSH) 0.9 %
10 SYRINGE (ML) INJECTION EVERY 12 HOURS PRN
Status: DISCONTINUED | OUTPATIENT
Start: 2025-04-18 | End: 2025-04-23 | Stop reason: HOSPADM

## 2025-04-18 RX ORDER — HYDRALAZINE HYDROCHLORIDE 20 MG/ML
10 INJECTION INTRAMUSCULAR; INTRAVENOUS EVERY 4 HOURS PRN
Status: DISCONTINUED | OUTPATIENT
Start: 2025-04-18 | End: 2025-04-23 | Stop reason: HOSPADM

## 2025-04-18 RX ORDER — HYDROMORPHONE HYDROCHLORIDE 2 MG/ML
1 INJECTION, SOLUTION INTRAMUSCULAR; INTRAVENOUS; SUBCUTANEOUS EVERY 6 HOURS PRN
Status: DISCONTINUED | OUTPATIENT
Start: 2025-04-18 | End: 2025-04-23 | Stop reason: HOSPADM

## 2025-04-18 RX ADMIN — HYDROXYZINE PAMOATE 25 MG: 25 CAPSULE ORAL at 08:04

## 2025-04-18 RX ADMIN — GABAPENTIN 800 MG: 400 CAPSULE ORAL at 08:04

## 2025-04-18 RX ADMIN — HYDROXYZINE PAMOATE 25 MG: 25 CAPSULE ORAL at 02:04

## 2025-04-18 RX ADMIN — HYDROMORPHONE HYDROCHLORIDE 1 MG: 2 INJECTION, SOLUTION INTRAMUSCULAR; INTRAVENOUS; SUBCUTANEOUS at 07:04

## 2025-04-18 RX ADMIN — MEROPENEM 1 G: 1 INJECTION, POWDER, FOR SOLUTION INTRAVENOUS at 08:04

## 2025-04-18 RX ADMIN — MEROPENEM 1 G: 1 INJECTION, POWDER, FOR SOLUTION INTRAVENOUS at 11:04

## 2025-04-18 RX ADMIN — APIXABAN 5 MG: 5 TABLET, FILM COATED ORAL at 08:04

## 2025-04-18 RX ADMIN — POLYETHYLENE GLYCOL 3350 17 G: 17 POWDER, FOR SOLUTION ORAL at 01:04

## 2025-04-18 RX ADMIN — BACLOFEN 20 MG: 10 TABLET ORAL at 08:04

## 2025-04-18 RX ADMIN — OXYCODONE AND ACETAMINOPHEN 1 TABLET: 325; 10 TABLET ORAL at 10:04

## 2025-04-18 RX ADMIN — OXYCODONE AND ACETAMINOPHEN 1 TABLET: 325; 10 TABLET ORAL at 09:04

## 2025-04-18 RX ADMIN — OXYCODONE AND ACETAMINOPHEN 1 TABLET: 325; 10 TABLET ORAL at 06:04

## 2025-04-18 RX ADMIN — OXYCODONE AND ACETAMINOPHEN 1 TABLET: 325; 10 TABLET ORAL at 01:04

## 2025-04-18 RX ADMIN — OXYCODONE AND ACETAMINOPHEN 1 TABLET: 325; 10 TABLET ORAL at 03:04

## 2025-04-18 RX ADMIN — GABAPENTIN 800 MG: 400 CAPSULE ORAL at 02:04

## 2025-04-18 RX ADMIN — HYDROMORPHONE HYDROCHLORIDE 1 MG: 2 INJECTION, SOLUTION INTRAMUSCULAR; INTRAVENOUS; SUBCUTANEOUS at 04:04

## 2025-04-18 RX ADMIN — NALOXEGOL OXALATE 25 MG: 25 TABLET, FILM COATED ORAL at 08:04

## 2025-04-18 RX ADMIN — BACLOFEN 20 MG: 10 TABLET ORAL at 02:04

## 2025-04-18 RX ADMIN — LINACLOTIDE 290 MCG: 145 CAPSULE, GELATIN COATED ORAL at 06:04

## 2025-04-18 NOTE — PROGRESS NOTES
[facility] - 5th Floor [facility] Medicine  Progress Note    Patient Name: Hemal Guerrero  MRN: [id number]  Patient Class: IP- Inpatient   Admission Date: 4/15/2025  Length of Stay: 2 days  Attending Physician: Yosvany Simms MD  Primary Care Provider: Margaret Leblanc MD        Subjective:     Principal Problem:Complicated UTI (urinary tract infection)    Interval History:   Today's info : seen and examined, no acute events overnight. Continues to improve   Afebrile  Remains on iv abx  Cx pending  Polymicrobial in nature    Esbl - change abx to merrem  Cm for ltac    Review of Systems   Constitutional:  Positive for fatigue.   HENT: Negative.     Eyes: Negative.    Respiratory: Negative.     Cardiovascular: Negative.    Gastrointestinal: Negative.    Endocrine: Negative.    Genitourinary: Negative.    Musculoskeletal: Negative.    Skin:  Positive for wound.   Allergic/Immunologic: Negative.    Neurological:  Positive for weakness.     Objective:     Vital Signs (Most Recent):  Temp: 98.2 °F (36.8 °C) (04/18/25 1107)  Pulse: 83 (04/18/25 1107)  Resp: 18 (04/18/25 1347)  BP: (!) 142/98 (04/18/25 1107)  SpO2: 98 % (04/18/25 1107) Vital Signs (24h Range):  Temp:  [98.1 °F (36.7 °C)-99 °F (37.2 °C)] 98.2 °F (36.8 °C)  Pulse:  [62-88] 83  Resp:  [18-20] 18  SpO2:  [98 %-100 %] 98 %  BP: (104-175)/(70-98) 142/98     Weight: 64.6 kg (142 lb 6.4 oz)  Body mass index is 19.31 kg/m².    Intake/Output Summary (Last 24 hours) at 4/18/2025 1511  Last data filed at 4/18/2025 1425  Gross per 24 hour   Intake 460 ml   Output 2300 ml   Net -1840 ml      Physical Exam  Vitals reviewed.   Constitutional:       Appearance: Normal appearance.   HENT:      Head: Normocephalic and atraumatic.      Right Ear: Tympanic membrane and external ear normal.      Nose: Nose normal.      Mouth/Throat:      Mouth: Mucous membranes are moist.   Eyes:      Extraocular Movements: Extraocular movements intact.       Pupils: Pupils are equal, round, and reactive to light.   Cardiovascular:      Rate and Rhythm: Normal rate and regular rhythm.      Pulses: Normal pulses.      Heart sounds: Normal heart sounds.   Pulmonary:      Effort: Pulmonary effort is normal.      Breath sounds: Normal breath sounds.   Abdominal:      General: Abdomen is flat. Bowel sounds are normal.      Palpations: Abdomen is soft.      Comments: cath   Musculoskeletal:         General: Normal range of motion.      Cervical back: Normal range of motion and neck supple.   Skin:     General: Skin is warm and dry.   Neurological:      General: No focal deficit present.      Mental Status: He is alert and oriented to person, place, and time.      Comments: paraplegic   Psychiatric:         Mood and Affect: Mood normal.         Behavior: Behavior normal.           Overview/Hospital Course:   stable    Significant Labs: All pertinent labs within the past 24 hours have been reviewed.  Lab Results   Component Value Date    WBC 8.06 04/16/2025    HGB 12.3 (L) 04/16/2025    HCT 42.0 04/16/2025    MCV 81.9 04/16/2025     04/16/2025         Recent Labs   Lab 04/16/25  0817      K 4.3      CO2 28   BUN 9.6   CREATININE 0.69*   GLUCOSE 130*   CALCIUM 8.8       Significant Imaging: I have reviewed all pertinent imaging results/findings within the past 24 hours.    Assessment/Plan:      Active Diagnoses:    Diagnosis Date Noted POA    PRINCIPAL PROBLEM:  Complicated UTI (urinary tract infection) [N39.0] 06/21/2023 Yes    Moderate malnutrition [E44.0] 09/27/2024 Yes      Problems Resolved During this Admission:     VTE Risk Mitigation (From admission, onward)           Ordered     apixaban tablet 5 mg  2 times daily         04/15/25 1858                       Sepsis  Complicated uti  Chronic osteomyelitis of inferior pubic rami  Sacral decubitis ulcer  Paraplegic  Neurogenic bladder w chronic indwelling vogt   Uti-poa  Deconditioning   PAF  Hx of R  foot osteomyelitis  Htn  Iron def  Anxiety/depression     Plan  Cont iv abx  Symptomatic management  Follow cx  Consult wound care   F/u on cx results  Resume home meds   Labs in the am  Gi and dvt ppx  Full code  Yosvany Simms MD  Department of Hospital Medicine   [facility] - 5th Floor Med Surg

## 2025-04-18 NOTE — NURSING
"Lost IV access on pt after 5 previous attempts. Poor venous access. Pt states he has "bad veins" and that he always have to get a midline or PICC when hospitalized. Pt also requesting stronger pain medicine. Call placed to on call provider Sherry Rios NP and informed her of what's going on and that pt's BP is high. Order received for Midline, hydralazine 10 mg IVP q4h prn, and dilaudid 1 mg q6h prn. Orders readback and verified.   "

## 2025-04-19 LAB
ALBUMIN SERPL-MCNC: 3.3 G/DL (ref 3.5–5)
ALBUMIN/GLOB SERPL: 0.7 RATIO (ref 1.1–2)
ALP SERPL-CCNC: 92 UNIT/L (ref 40–150)
ALT SERPL-CCNC: 9 UNIT/L (ref 0–55)
ANION GAP SERPL CALC-SCNC: 8 MEQ/L
AST SERPL-CCNC: 14 UNIT/L (ref 11–45)
BASOPHILS # BLD AUTO: 0.15 X10(3)/MCL
BASOPHILS NFR BLD AUTO: 1.7 %
BILIRUB SERPL-MCNC: 0.3 MG/DL
BUN SERPL-MCNC: 17.3 MG/DL (ref 8.4–25.7)
CALCIUM SERPL-MCNC: 8.9 MG/DL (ref 8.4–10.2)
CHLORIDE SERPL-SCNC: 104 MMOL/L (ref 98–107)
CO2 SERPL-SCNC: 26 MMOL/L (ref 22–29)
CREAT SERPL-MCNC: 0.62 MG/DL (ref 0.72–1.25)
CREAT/UREA NIT SERPL: 28
EOSINOPHIL # BLD AUTO: 0.23 X10(3)/MCL (ref 0–0.9)
EOSINOPHIL NFR BLD AUTO: 2.5 %
ERYTHROCYTE [DISTWIDTH] IN BLOOD BY AUTOMATED COUNT: 15.7 % (ref 11.5–17)
GFR SERPLBLD CREATININE-BSD FMLA CKD-EPI: >60 ML/MIN/1.73/M2
GLOBULIN SER-MCNC: 4.5 GM/DL (ref 2.4–3.5)
GLUCOSE SERPL-MCNC: 93 MG/DL (ref 74–100)
HCT VFR BLD AUTO: 37 % (ref 42–52)
HGB BLD-MCNC: 11 G/DL (ref 14–18)
IMM GRANULOCYTES # BLD AUTO: 0.02 X10(3)/MCL (ref 0–0.04)
IMM GRANULOCYTES NFR BLD AUTO: 0.2 %
LYMPHOCYTES # BLD AUTO: 2.94 X10(3)/MCL (ref 0.6–4.6)
LYMPHOCYTES NFR BLD AUTO: 32.4 %
MCH RBC QN AUTO: 24.2 PG (ref 27–31)
MCHC RBC AUTO-ENTMCNC: 29.7 G/DL (ref 33–36)
MCV RBC AUTO: 81.5 FL (ref 80–94)
MONOCYTES # BLD AUTO: 0.56 X10(3)/MCL (ref 0.1–1.3)
MONOCYTES NFR BLD AUTO: 6.2 %
NEUTROPHILS # BLD AUTO: 5.17 X10(3)/MCL (ref 2.1–9.2)
NEUTROPHILS NFR BLD AUTO: 57 %
NRBC BLD AUTO-RTO: 0 %
PLATELET # BLD AUTO: 396 X10(3)/MCL (ref 130–400)
PMV BLD AUTO: 9.8 FL (ref 7.4–10.4)
POTASSIUM SERPL-SCNC: 4.7 MMOL/L (ref 3.5–5.1)
PROT SERPL-MCNC: 7.8 GM/DL (ref 6.4–8.3)
RBC # BLD AUTO: 4.54 X10(6)/MCL (ref 4.7–6.1)
SODIUM SERPL-SCNC: 138 MMOL/L (ref 136–145)
WBC # BLD AUTO: 9.07 X10(3)/MCL (ref 4.5–11.5)

## 2025-04-19 PROCEDURE — 11000001 HC ACUTE MED/SURG PRIVATE ROOM

## 2025-04-19 PROCEDURE — 63600175 PHARM REV CODE 636 W HCPCS: Performed by: NURSE PRACTITIONER

## 2025-04-19 PROCEDURE — 36415 COLL VENOUS BLD VENIPUNCTURE: CPT | Performed by: INTERNAL MEDICINE

## 2025-04-19 PROCEDURE — 63600175 PHARM REV CODE 636 W HCPCS: Performed by: STUDENT IN AN ORGANIZED HEALTH CARE EDUCATION/TRAINING PROGRAM

## 2025-04-19 PROCEDURE — 80053 COMPREHEN METABOLIC PANEL: CPT | Performed by: INTERNAL MEDICINE

## 2025-04-19 PROCEDURE — 27000207 HC ISOLATION

## 2025-04-19 PROCEDURE — 25000242 PHARM REV CODE 250 ALT 637 W/ HCPCS: Performed by: INTERNAL MEDICINE

## 2025-04-19 PROCEDURE — 85025 COMPLETE CBC W/AUTO DIFF WBC: CPT | Performed by: INTERNAL MEDICINE

## 2025-04-19 PROCEDURE — 25000003 PHARM REV CODE 250: Performed by: INTERNAL MEDICINE

## 2025-04-19 RX ADMIN — GABAPENTIN 800 MG: 400 CAPSULE ORAL at 03:04

## 2025-04-19 RX ADMIN — BACLOFEN 20 MG: 10 TABLET ORAL at 03:04

## 2025-04-19 RX ADMIN — LINACLOTIDE 290 MCG: 145 CAPSULE, GELATIN COATED ORAL at 05:04

## 2025-04-19 RX ADMIN — OXYCODONE AND ACETAMINOPHEN 1 TABLET: 325; 10 TABLET ORAL at 06:04

## 2025-04-19 RX ADMIN — NALOXEGOL OXALATE 25 MG: 25 TABLET, FILM COATED ORAL at 09:04

## 2025-04-19 RX ADMIN — HYDROMORPHONE HYDROCHLORIDE 1 MG: 2 INJECTION, SOLUTION INTRAMUSCULAR; INTRAVENOUS; SUBCUTANEOUS at 11:04

## 2025-04-19 RX ADMIN — HYDROMORPHONE HYDROCHLORIDE 1 MG: 2 INJECTION, SOLUTION INTRAMUSCULAR; INTRAVENOUS; SUBCUTANEOUS at 12:04

## 2025-04-19 RX ADMIN — OXYCODONE AND ACETAMINOPHEN 1 TABLET: 325; 10 TABLET ORAL at 02:04

## 2025-04-19 RX ADMIN — MEROPENEM 1 G: 1 INJECTION, POWDER, FOR SOLUTION INTRAVENOUS at 08:04

## 2025-04-19 RX ADMIN — MEROPENEM 1 G: 1 INJECTION, POWDER, FOR SOLUTION INTRAVENOUS at 12:04

## 2025-04-19 RX ADMIN — MEROPENEM 1 G: 1 INJECTION, POWDER, FOR SOLUTION INTRAVENOUS at 04:04

## 2025-04-19 RX ADMIN — OXYCODONE AND ACETAMINOPHEN 1 TABLET: 325; 10 TABLET ORAL at 03:04

## 2025-04-19 RX ADMIN — HYDROMORPHONE HYDROCHLORIDE 1 MG: 2 INJECTION, SOLUTION INTRAMUSCULAR; INTRAVENOUS; SUBCUTANEOUS at 05:04

## 2025-04-19 RX ADMIN — BACLOFEN 20 MG: 10 TABLET ORAL at 08:04

## 2025-04-19 RX ADMIN — FLUTICASONE PROPIONATE 50 MCG: 50 SPRAY, METERED NASAL at 04:04

## 2025-04-19 RX ADMIN — POLYETHYLENE GLYCOL 3350 17 G: 17 POWDER, FOR SOLUTION ORAL at 10:04

## 2025-04-19 RX ADMIN — HYDROMORPHONE HYDROCHLORIDE 1 MG: 2 INJECTION, SOLUTION INTRAMUSCULAR; INTRAVENOUS; SUBCUTANEOUS at 08:04

## 2025-04-19 RX ADMIN — HYDROXYZINE PAMOATE 25 MG: 25 CAPSULE ORAL at 08:04

## 2025-04-19 RX ADMIN — HYDROXYZINE PAMOATE 25 MG: 25 CAPSULE ORAL at 09:04

## 2025-04-19 RX ADMIN — BACLOFEN 20 MG: 10 TABLET ORAL at 09:04

## 2025-04-19 RX ADMIN — APIXABAN 5 MG: 5 TABLET, FILM COATED ORAL at 08:04

## 2025-04-19 RX ADMIN — OXYCODONE AND ACETAMINOPHEN 1 TABLET: 325; 10 TABLET ORAL at 10:04

## 2025-04-19 RX ADMIN — GABAPENTIN 800 MG: 400 CAPSULE ORAL at 09:04

## 2025-04-19 RX ADMIN — HYDROXYZINE PAMOATE 25 MG: 25 CAPSULE ORAL at 03:04

## 2025-04-19 RX ADMIN — GABAPENTIN 800 MG: 400 CAPSULE ORAL at 08:04

## 2025-04-19 RX ADMIN — APIXABAN 5 MG: 5 TABLET, FILM COATED ORAL at 09:04

## 2025-04-19 RX ADMIN — FLUTICASONE PROPIONATE 50 MCG: 50 SPRAY, METERED NASAL at 08:04

## 2025-04-19 NOTE — PROGRESS NOTES
[facility] - 5th Floor [facility] Medicine  Progress Note    Patient Name: Hemal Guerrero  MRN: [id number]  Patient Class: IP- Inpatient   Admission Date: 4/15/2025  Length of Stay: 3 days  Attending Physician: Yosvany Simms MD  Primary Care Provider: Margaret Leblanc MD        Subjective:     Principal Problem:Complicated UTI (urinary tract infection)    Interval History:   Today's info : seen and examined, no acute events overnight. Continues to improve   Afebrile  Remains on iv abx  Cx pending  Polymicrobial in nature    Esbl - change abx to merrem  Cm for ltac    Review of Systems   Constitutional:  Positive for fatigue.   HENT: Negative.     Eyes: Negative.    Respiratory: Negative.     Cardiovascular: Negative.    Gastrointestinal: Negative.    Endocrine: Negative.    Genitourinary: Negative.    Musculoskeletal: Negative.    Skin:  Positive for wound.   Allergic/Immunologic: Negative.    Neurological:  Positive for weakness.     Objective:     Vital Signs (Most Recent):  Temp: 98.1 °F (36.7 °C) (04/19/25 1508)  Pulse: 74 (04/19/25 1508)  Resp: 18 (04/19/25 1503)  BP: 132/89 (04/19/25 1508)  SpO2: 99 % (04/19/25 1508) Vital Signs (24h Range):  Temp:  [97.9 °F (36.6 °C)-98.1 °F (36.7 °C)] 98.1 °F (36.7 °C)  Pulse:  [66-98] 74  Resp:  [18-20] 18  SpO2:  [98 %-100 %] 99 %  BP: (115-175)/() 132/89     Weight: 64.6 kg (142 lb 6.4 oz)  Body mass index is 19.31 kg/m².    Intake/Output Summary (Last 24 hours) at 4/19/2025 1537  Last data filed at 4/19/2025 1336  Gross per 24 hour   Intake 1020 ml   Output 2600 ml   Net -1580 ml      Physical Exam  Vitals reviewed.   Constitutional:       Appearance: Normal appearance.   HENT:      Head: Normocephalic and atraumatic.      Right Ear: Tympanic membrane and external ear normal.      Nose: Nose normal.      Mouth/Throat:      Mouth: Mucous membranes are moist.   Eyes:      Extraocular Movements: Extraocular movements intact.       Pupils: Pupils are equal, round, and reactive to light.   Cardiovascular:      Rate and Rhythm: Normal rate and regular rhythm.      Pulses: Normal pulses.      Heart sounds: Normal heart sounds.   Pulmonary:      Effort: Pulmonary effort is normal.      Breath sounds: Normal breath sounds.   Abdominal:      General: Abdomen is flat. Bowel sounds are normal.      Palpations: Abdomen is soft.      Comments: cath   Musculoskeletal:         General: Normal range of motion.      Cervical back: Normal range of motion and neck supple.   Skin:     General: Skin is warm and dry.   Neurological:      General: No focal deficit present.      Mental Status: He is alert and oriented to person, place, and time.      Comments: paraplegic   Psychiatric:         Mood and Affect: Mood normal.         Behavior: Behavior normal.           Overview/Hospital Course:   stable    Significant Labs: All pertinent labs within the past 24 hours have been reviewed.  Lab Results   Component Value Date    WBC 9.07 04/19/2025    HGB 11.0 (L) 04/19/2025    HCT 37.0 (L) 04/19/2025    MCV 81.5 04/19/2025     04/19/2025         Recent Labs   Lab 04/19/25  0303      K 4.7      CO2 26   BUN 17.3   CREATININE 0.62*   GLUCOSE 93   CALCIUM 8.9       Significant Imaging: I have reviewed all pertinent imaging results/findings within the past 24 hours.    Assessment/Plan:      Active Diagnoses:    Diagnosis Date Noted POA    PRINCIPAL PROBLEM:  Complicated UTI (urinary tract infection) [N39.0] 06/21/2023 Yes    Moderate malnutrition [E44.0] 09/27/2024 Yes      Problems Resolved During this Admission:     VTE Risk Mitigation (From admission, onward)           Ordered     apixaban tablet 5 mg  2 times daily         04/15/25 1858                       Sepsis  Complicated uti  Chronic osteomyelitis of inferior pubic rami  Sacral decubitis ulcer  Paraplegic  Neurogenic bladder w chronic indwelling vogt   Uti-poa  Deconditioning   PAF  Hx of R  foot osteomyelitis  Htn  Iron def  Anxiety/depression     Plan  Cont iv abx  Symptomatic management  Follow cx  Consult wound care   F/u on cx results  Resume home meds   Labs in the am  Gi and dvt ppx  Full code  Yosvany Simms MD  Department of Hospital Medicine   [facility] - 5th Floor Med Surg

## 2025-04-19 NOTE — PLAN OF CARE
Problem: Skin Injury Risk Increased  Goal: Skin Health and Integrity  Outcome: Progressing     Problem: Adult Inpatient Plan of Care  Goal: Plan of Care Review  Outcome: Progressing  Goal: Patient-Specific Goal (Individualized)  Outcome: Progressing  Goal: Absence of Hospital-Acquired Illness or Injury  Outcome: Progressing  Goal: Optimal Comfort and Wellbeing  Outcome: Progressing  Goal: Readiness for Transition of Care  Outcome: Progressing     Problem: Wound  Goal: Optimal Coping  Outcome: Progressing  Goal: Optimal Functional Ability  Outcome: Progressing  Goal: Absence of Infection Signs and Symptoms  Outcome: Progressing  Goal: Improved Oral Intake  Outcome: Progressing  Goal: Optimal Pain Control and Function  Outcome: Progressing  Goal: Skin Health and Integrity  Outcome: Progressing  Goal: Optimal Wound Healing  Outcome: Progressing     Problem: Infection  Goal: Absence of Infection Signs and Symptoms  Outcome: Progressing     Problem: Fall Injury Risk  Goal: Absence of Fall and Fall-Related Injury  Outcome: Progressing

## 2025-04-20 LAB
BACTERIA BLD CULT: NORMAL
BACTERIA BLD CULT: NORMAL

## 2025-04-20 PROCEDURE — 11000001 HC ACUTE MED/SURG PRIVATE ROOM

## 2025-04-20 PROCEDURE — 63600175 PHARM REV CODE 636 W HCPCS: Performed by: STUDENT IN AN ORGANIZED HEALTH CARE EDUCATION/TRAINING PROGRAM

## 2025-04-20 PROCEDURE — 63600175 PHARM REV CODE 636 W HCPCS: Performed by: NURSE PRACTITIONER

## 2025-04-20 PROCEDURE — 27000207 HC ISOLATION

## 2025-04-20 PROCEDURE — 25000003 PHARM REV CODE 250: Performed by: INTERNAL MEDICINE

## 2025-04-20 RX ADMIN — HYDROXYZINE PAMOATE 25 MG: 25 CAPSULE ORAL at 02:04

## 2025-04-20 RX ADMIN — OXYCODONE AND ACETAMINOPHEN 1 TABLET: 325; 10 TABLET ORAL at 09:04

## 2025-04-20 RX ADMIN — MEROPENEM 1 G: 1 INJECTION, POWDER, FOR SOLUTION INTRAVENOUS at 08:04

## 2025-04-20 RX ADMIN — HYDROMORPHONE HYDROCHLORIDE 1 MG: 2 INJECTION, SOLUTION INTRAMUSCULAR; INTRAVENOUS; SUBCUTANEOUS at 06:04

## 2025-04-20 RX ADMIN — BACLOFEN 20 MG: 10 TABLET ORAL at 08:04

## 2025-04-20 RX ADMIN — BACLOFEN 20 MG: 10 TABLET ORAL at 09:04

## 2025-04-20 RX ADMIN — BACLOFEN 20 MG: 10 TABLET ORAL at 02:04

## 2025-04-20 RX ADMIN — GABAPENTIN 800 MG: 400 CAPSULE ORAL at 02:04

## 2025-04-20 RX ADMIN — MEROPENEM 1 G: 1 INJECTION, POWDER, FOR SOLUTION INTRAVENOUS at 04:04

## 2025-04-20 RX ADMIN — FLUTICASONE PROPIONATE 50 MCG: 50 SPRAY, METERED NASAL at 09:04

## 2025-04-20 RX ADMIN — OXYCODONE AND ACETAMINOPHEN 1 TABLET: 325; 10 TABLET ORAL at 02:04

## 2025-04-20 RX ADMIN — NALOXEGOL OXALATE 25 MG: 25 TABLET, FILM COATED ORAL at 09:04

## 2025-04-20 RX ADMIN — OXYCODONE AND ACETAMINOPHEN 1 TABLET: 325; 10 TABLET ORAL at 12:04

## 2025-04-20 RX ADMIN — HYDROMORPHONE HYDROCHLORIDE 1 MG: 2 INJECTION, SOLUTION INTRAMUSCULAR; INTRAVENOUS; SUBCUTANEOUS at 07:04

## 2025-04-20 RX ADMIN — HYDROXYZINE PAMOATE 25 MG: 25 CAPSULE ORAL at 08:04

## 2025-04-20 RX ADMIN — APIXABAN 5 MG: 5 TABLET, FILM COATED ORAL at 09:04

## 2025-04-20 RX ADMIN — FLUTICASONE PROPIONATE 50 MCG: 50 SPRAY, METERED NASAL at 08:04

## 2025-04-20 RX ADMIN — MEROPENEM 1 G: 1 INJECTION, POWDER, FOR SOLUTION INTRAVENOUS at 12:04

## 2025-04-20 RX ADMIN — GABAPENTIN 800 MG: 400 CAPSULE ORAL at 08:04

## 2025-04-20 RX ADMIN — APIXABAN 5 MG: 5 TABLET, FILM COATED ORAL at 08:04

## 2025-04-20 RX ADMIN — HYDROMORPHONE HYDROCHLORIDE 1 MG: 2 INJECTION, SOLUTION INTRAMUSCULAR; INTRAVENOUS; SUBCUTANEOUS at 12:04

## 2025-04-20 RX ADMIN — GABAPENTIN 800 MG: 400 CAPSULE ORAL at 09:04

## 2025-04-20 RX ADMIN — OXYCODONE AND ACETAMINOPHEN 1 TABLET: 325; 10 TABLET ORAL at 04:04

## 2025-04-20 RX ADMIN — HYDROXYZINE PAMOATE 25 MG: 25 CAPSULE ORAL at 09:04

## 2025-04-20 RX ADMIN — LINACLOTIDE 290 MCG: 145 CAPSULE, GELATIN COATED ORAL at 04:04

## 2025-04-20 NOTE — PROGRESS NOTES
[facility] - 5th Floor [facility] Medicine  Progress Note    Patient Name: Hemal Guerrero  MRN: [id number]  Patient Class: IP- Inpatient   Admission Date: 4/15/2025  Length of Stay: 4 days  Attending Physician: Yosvany Simms MD  Primary Care Provider: Margaret Leblanc MD        Subjective:     Principal Problem:Complicated UTI (urinary tract infection)    Interval History:   Today's info : seen and examined, no acute events overnight. Continues to improve   Afebrile  Remains on iv abx  Cx pending  Polymicrobial in nature    Esbl - change abx to merrem  Cm for ltac    Urinary cath leak reported - urology consulted    Review of Systems   Constitutional:  Positive for fatigue.   HENT: Negative.     Eyes: Negative.    Respiratory: Negative.     Cardiovascular: Negative.    Gastrointestinal: Negative.    Endocrine: Negative.    Genitourinary: Negative.    Musculoskeletal: Negative.    Skin:  Positive for wound.   Allergic/Immunologic: Negative.    Neurological:  Positive for weakness.     Objective:     Vital Signs (Most Recent):  Temp: 98.5 °F (36.9 °C) (04/20/25 0748)  Pulse: 82 (04/20/25 0748)  Resp: 17 (04/20/25 1451)  BP: (!) 164/99 (04/20/25 0748)  SpO2: 100 % (04/20/25 0748) Vital Signs (24h Range):  Temp:  [97.9 °F (36.6 °C)-98.5 °F (36.9 °C)] 98.5 °F (36.9 °C)  Pulse:  [] 82  Resp:  [17-20] 17  SpO2:  [98 %-100 %] 100 %  BP: (124-164)/(68-99) 164/99     Weight: 64.6 kg (142 lb 6.4 oz)  Body mass index is 19.31 kg/m².  No intake or output data in the 24 hours ending 04/20/25 1455     Physical Exam  Vitals reviewed.   Constitutional:       Appearance: Normal appearance.   HENT:      Head: Normocephalic and atraumatic.      Right Ear: Tympanic membrane and external ear normal.      Nose: Nose normal.      Mouth/Throat:      Mouth: Mucous membranes are moist.   Eyes:      Extraocular Movements: Extraocular movements intact.      Pupils: Pupils are equal, round, and  reactive to light.   Cardiovascular:      Rate and Rhythm: Normal rate and regular rhythm.      Pulses: Normal pulses.      Heart sounds: Normal heart sounds.   Pulmonary:      Effort: Pulmonary effort is normal.      Breath sounds: Normal breath sounds.   Abdominal:      General: Abdomen is flat. Bowel sounds are normal.      Palpations: Abdomen is soft.      Comments: cath   Musculoskeletal:         General: Normal range of motion.      Cervical back: Normal range of motion and neck supple.   Skin:     General: Skin is warm and dry.   Neurological:      General: No focal deficit present.      Mental Status: He is alert and oriented to person, place, and time.      Comments: paraplegic   Psychiatric:         Mood and Affect: Mood normal.         Behavior: Behavior normal.           Overview/Hospital Course:   stable    Significant Labs: All pertinent labs within the past 24 hours have been reviewed.  Lab Results   Component Value Date    WBC 9.07 04/19/2025    HGB 11.0 (L) 04/19/2025    HCT 37.0 (L) 04/19/2025    MCV 81.5 04/19/2025     04/19/2025         Recent Labs   Lab 04/19/25  0303      K 4.7      CO2 26   BUN 17.3   CREATININE 0.62*   GLUCOSE 93   CALCIUM 8.9       Significant Imaging: I have reviewed all pertinent imaging results/findings within the past 24 hours.    Assessment/Plan:      Active Diagnoses:    Diagnosis Date Noted POA    PRINCIPAL PROBLEM:  Complicated UTI (urinary tract infection) [N39.0] 06/21/2023 Yes    Moderate malnutrition [E44.0] 09/27/2024 Yes      Problems Resolved During this Admission:     VTE Risk Mitigation (From admission, onward)           Ordered     apixaban tablet 5 mg  2 times daily         04/15/25 1858                       Sepsis  Complicated uti  Chronic osteomyelitis of inferior pubic rami  Sacral decubitis ulcer  Paraplegic  Neurogenic bladder w chronic indwelling vogt   Uti-poa  Deconditioning   PAF  Hx of R foot osteomyelitis  Htn  Iron  def  Anxiety/depression     Plan  Cont iv abx  Symptomatic management  Follow cx  Consult wound care   F/u on cx results  Resume home meds   Labs in the am  Gi and dvt ppx  Full code  Yosvany Simms MD  Department of Hospital Medicine   [facility] - 5th Floor Med Surg

## 2025-04-21 PROCEDURE — 11000001 HC ACUTE MED/SURG PRIVATE ROOM

## 2025-04-21 PROCEDURE — 27000207 HC ISOLATION

## 2025-04-21 PROCEDURE — 25000003 PHARM REV CODE 250: Performed by: INTERNAL MEDICINE

## 2025-04-21 PROCEDURE — 63600175 PHARM REV CODE 636 W HCPCS: Performed by: NURSE PRACTITIONER

## 2025-04-21 PROCEDURE — 63600175 PHARM REV CODE 636 W HCPCS: Performed by: INTERNAL MEDICINE

## 2025-04-21 PROCEDURE — 63600175 PHARM REV CODE 636 W HCPCS: Performed by: STUDENT IN AN ORGANIZED HEALTH CARE EDUCATION/TRAINING PROGRAM

## 2025-04-21 RX ORDER — HYDROMORPHONE HYDROCHLORIDE 2 MG/ML
1 INJECTION, SOLUTION INTRAMUSCULAR; INTRAVENOUS; SUBCUTANEOUS EVERY 6 HOURS PRN
Status: DISCONTINUED | OUTPATIENT
Start: 2025-04-21 | End: 2025-04-23 | Stop reason: HOSPADM

## 2025-04-21 RX ADMIN — HYDROMORPHONE HYDROCHLORIDE 1 MG: 2 INJECTION, SOLUTION INTRAMUSCULAR; INTRAVENOUS; SUBCUTANEOUS at 10:04

## 2025-04-21 RX ADMIN — OXYCODONE AND ACETAMINOPHEN 1 TABLET: 325; 10 TABLET ORAL at 07:04

## 2025-04-21 RX ADMIN — MEROPENEM 1 G: 1 INJECTION, POWDER, FOR SOLUTION INTRAVENOUS at 10:04

## 2025-04-21 RX ADMIN — FLUTICASONE PROPIONATE 50 MCG: 50 SPRAY, METERED NASAL at 10:04

## 2025-04-21 RX ADMIN — APIXABAN 5 MG: 5 TABLET, FILM COATED ORAL at 10:04

## 2025-04-21 RX ADMIN — OXYCODONE AND ACETAMINOPHEN 1 TABLET: 325; 10 TABLET ORAL at 06:04

## 2025-04-21 RX ADMIN — HYDROXYZINE PAMOATE 25 MG: 25 CAPSULE ORAL at 10:04

## 2025-04-21 RX ADMIN — OXYCODONE AND ACETAMINOPHEN 1 TABLET: 325; 10 TABLET ORAL at 11:04

## 2025-04-21 RX ADMIN — HYDROXYZINE PAMOATE 25 MG: 25 CAPSULE ORAL at 09:04

## 2025-04-21 RX ADMIN — HYDROMORPHONE HYDROCHLORIDE 1 MG: 2 INJECTION INTRAMUSCULAR; INTRAVENOUS; SUBCUTANEOUS at 03:04

## 2025-04-21 RX ADMIN — FLUTICASONE PROPIONATE 50 MCG: 50 SPRAY, METERED NASAL at 11:04

## 2025-04-21 RX ADMIN — NALOXEGOL OXALATE 25 MG: 25 TABLET, FILM COATED ORAL at 09:04

## 2025-04-21 RX ADMIN — MEROPENEM 1 G: 1 INJECTION, POWDER, FOR SOLUTION INTRAVENOUS at 11:04

## 2025-04-21 RX ADMIN — HYDROMORPHONE HYDROCHLORIDE 1 MG: 2 INJECTION, SOLUTION INTRAMUSCULAR; INTRAVENOUS; SUBCUTANEOUS at 09:04

## 2025-04-21 RX ADMIN — GABAPENTIN 800 MG: 400 CAPSULE ORAL at 09:04

## 2025-04-21 RX ADMIN — BACLOFEN 20 MG: 10 TABLET ORAL at 03:04

## 2025-04-21 RX ADMIN — BACLOFEN 20 MG: 10 TABLET ORAL at 10:04

## 2025-04-21 RX ADMIN — MEROPENEM 1 G: 1 INJECTION, POWDER, FOR SOLUTION INTRAVENOUS at 03:04

## 2025-04-21 RX ADMIN — GABAPENTIN 800 MG: 400 CAPSULE ORAL at 03:04

## 2025-04-21 RX ADMIN — LINACLOTIDE 290 MCG: 145 CAPSULE, GELATIN COATED ORAL at 06:04

## 2025-04-21 RX ADMIN — DICYCLOMINE HYDROCHLORIDE 20 MG: 20 TABLET ORAL at 09:04

## 2025-04-21 RX ADMIN — HYDROXYZINE PAMOATE 25 MG: 25 CAPSULE ORAL at 03:04

## 2025-04-21 RX ADMIN — BACLOFEN 20 MG: 10 TABLET ORAL at 09:04

## 2025-04-21 RX ADMIN — APIXABAN 5 MG: 5 TABLET, FILM COATED ORAL at 09:04

## 2025-04-21 RX ADMIN — HYDROMORPHONE HYDROCHLORIDE 1 MG: 2 INJECTION, SOLUTION INTRAMUSCULAR; INTRAVENOUS; SUBCUTANEOUS at 03:04

## 2025-04-21 RX ADMIN — GABAPENTIN 800 MG: 400 CAPSULE ORAL at 10:04

## 2025-04-21 NOTE — PROGRESS NOTES
Patient seen and examined.      He gets his suprapubic tube changed weekly secondary to leakage in previous obstruction.      I was able to change his 16 Beninese catheter at the bedside without difficulty.  Clear urine returned.      We will continue to follow up with his regular urologist as an outpatient, Dr. Kevin Veras

## 2025-04-21 NOTE — PROGRESS NOTES
[facility] - 5th Floor [facility] Medicine  Progress Note    Patient Name: Hemal Guerrero  MRN: [id number]  Patient Class: IP- Inpatient   Admission Date: 4/15/2025  Length of Stay: 5 days  Attending Physician: Yosvany Simms MD  Primary Care Provider: Margaret Leblanc MD        Subjective:     Principal Problem:Complicated UTI (urinary tract infection)    Interval History:   Today's info : seen and examined, no acute events overnight. Continues to improve   Afebrile  Remains on iv abx  Cx pending  Polymicrobial in nature    Esbl - change abx to merrem  Cm for ltac    S/p suprapubic cath change by urology    Review of Systems   Constitutional:  Positive for fatigue.   HENT: Negative.     Eyes: Negative.    Respiratory: Negative.     Cardiovascular: Negative.    Gastrointestinal: Negative.    Endocrine: Negative.    Genitourinary: Negative.    Musculoskeletal: Negative.    Skin:  Positive for wound.   Allergic/Immunologic: Negative.    Neurological:  Positive for weakness.     Objective:     Vital Signs (Most Recent):  Temp: 97.9 °F (36.6 °C) (04/21/25 1101)  Pulse: 92 (04/21/25 1101)  Resp: 18 (04/21/25 1147)  BP: (!) 150/100 (04/21/25 1101)  SpO2: 99 % (04/21/25 1101) Vital Signs (24h Range):  Temp:  [97.8 °F (36.6 °C)-98.8 °F (37.1 °C)] 97.9 °F (36.6 °C)  Pulse:  [] 92  Resp:  [17-18] 18  SpO2:  [97 %-100 %] 99 %  BP: (103-150)/() 150/100     Weight: 64.6 kg (142 lb 6.4 oz)  Body mass index is 19.31 kg/m².    Intake/Output Summary (Last 24 hours) at 4/21/2025 1157  Last data filed at 4/21/2025 0618  Gross per 24 hour   Intake 600 ml   Output 1900 ml   Net -1300 ml        Physical Exam  Vitals reviewed.   Constitutional:       Appearance: Normal appearance.   HENT:      Head: Normocephalic and atraumatic.      Right Ear: Tympanic membrane and external ear normal.      Nose: Nose normal.      Mouth/Throat:      Mouth: Mucous membranes are moist.   Eyes:       Extraocular Movements: Extraocular movements intact.      Pupils: Pupils are equal, round, and reactive to light.   Cardiovascular:      Rate and Rhythm: Normal rate and regular rhythm.      Pulses: Normal pulses.      Heart sounds: Normal heart sounds.   Pulmonary:      Effort: Pulmonary effort is normal.      Breath sounds: Normal breath sounds.   Abdominal:      General: Abdomen is flat. Bowel sounds are normal.      Palpations: Abdomen is soft.      Comments: cath   Musculoskeletal:         General: Normal range of motion.      Cervical back: Normal range of motion and neck supple.   Skin:     General: Skin is warm and dry.   Neurological:      General: No focal deficit present.      Mental Status: He is alert and oriented to person, place, and time.      Comments: paraplegic   Psychiatric:         Mood and Affect: Mood normal.         Behavior: Behavior normal.           Overview/Hospital Course:   stable    Significant Labs: All pertinent labs within the past 24 hours have been reviewed.  Lab Results   Component Value Date    WBC 9.07 04/19/2025    HGB 11.0 (L) 04/19/2025    HCT 37.0 (L) 04/19/2025    MCV 81.5 04/19/2025     04/19/2025         Recent Labs   Lab 04/19/25  0303      K 4.7      CO2 26   BUN 17.3   CREATININE 0.62*   GLUCOSE 93   CALCIUM 8.9       Significant Imaging: I have reviewed all pertinent imaging results/findings within the past 24 hours.    Assessment/Plan:      Active Diagnoses:    Diagnosis Date Noted POA    PRINCIPAL PROBLEM:  Complicated UTI (urinary tract infection) [N39.0] 06/21/2023 Yes    Moderate malnutrition [E44.0] 09/27/2024 Yes      Problems Resolved During this Admission:     VTE Risk Mitigation (From admission, onward)           Ordered     apixaban tablet 5 mg  2 times daily         04/15/25 1858                       Sepsis  Complicated uti  Chronic osteomyelitis of inferior pubic rami  Sacral decubitis ulcer  Paraplegic  Neurogenic bladder w chronic  indwelling vogt   Uti-poa  Deconditioning   PAF  Hx of R foot osteomyelitis  Htn  Iron def  Anxiety/depression     Plan  Cont iv abx  Symptomatic management  Follow cx  Consult wound care   F/u on cx results  Resume home meds   Labs in the am  Gi and dvt ppx  Full code  Yosvany Simms MD  Department of Hospital Medicine   [facility] - 5th Floor Med Surg

## 2025-04-21 NOTE — PROGRESS NOTES
Inpatient Nutrition Assessment    Admit Date: 4/15/2025   Total duration of encounter: 6 days   Patient Age: 49 y.o.    Nutrition Recommendation/Prescription     Continue oral diet as tolerated; Diet Adult Regular   Boost VHC provides 530 kcal, 22 gm protein per container  Brandon bid for wound healing  Encouraged intake of smaller more frequent meals.   Consider adding a multivitamin with wound healing vitamins: Vit A, Zinc, Vit C    Communication of Recommendations: reviewed with patient    Nutrition Assessment     Malnutrition Assessment/Nutrition-Focused Physical Exam    Malnutrition Context: acute illness or injury (04/21/25 1051)  Malnutrition Level: moderate (04/17/25 1205)  Energy Intake (Malnutrition): less than or equal to 75% for greater than or equal to 1 month (04/21/25 1051)  Weight Loss (Malnutrition): 5% in 1 month (04/21/25 1051)              Muscle Mass (Malnutrition): moderate depletion (04/21/25 1051)  Scientology Region (Muscle Loss): mild depletion  Clavicle Bone Region (Muscle Loss): moderate depletion  Clavicle and Acromion Bone Region (Muscle Loss): mild depletion                 Fluid Accumulation (Malnutrition): other (see comments) (Not present) (04/21/25 1051)        A minimum of two characteristics is recommended for diagnosis of either severe or non-severe malnutrition.    Chart Review    Reason Seen: physician consult for  stage 4 decubitus    Malnutrition Screening Tool Results              Diagnosis:  Sepsis  Complicated uti  Chronic osteomyelitis of inferior pubic rami  Sacral decubitis ulcer  Paraplegic  Neurogenic bladder w chronic indwelling vogt   Uti-poa  Deconditioning    Relevant Medical History: R foot osteomyelitis  Htn  Iron def  Anxiety/depression    Scheduled Medications:  apixaban, 5 mg, BID  baclofen, 20 mg, TID  dicyclomine, 20 mg, BID  fluticasone propionate, 1 spray, BID  gabapentin, 800 mg, TID  hydrOXYzine pamoate, 25 mg, TID  linaCLOtide, 290 mcg, Before  breakfast  meropenem, 1 g, Q8H  naloxegoL, 25 mg, Daily    Continuous Infusions:   PRN Medications:  hydrALAZINE, 10 mg, Q4H PRN  HYDROmorphone, 1 mg, Q6H PRN  melatonin, 6 mg, Nightly PRN  oxyCODONE-acetaminophen, 1 tablet, Q4H PRN  polyethylene glycol, 17 g, BID PRN  sodium chloride 0.9%, 10 mL, PRN  sodium chloride 0.9%, 10 mL, Q12H PRN    Calorie Containing IV Medications: no significant kcals from medications at this time    Recent Labs   Lab 04/15/25  1437 04/16/25  0513 04/16/25  0817 04/19/25  0303     --  140 138   K 3.8  --  4.3 4.7   CALCIUM 8.7  --  8.8 8.9     --  107 104   CO2 23  --  28 26   BUN 10.7  --  9.6 17.3   CREATININE 0.70*  --  0.69* 0.62*   EGFRNORACEVR >60  --  >60 >60   GLUCOSE 78  --  130* 93   BILITOT 0.3  --  0.3 0.3   ALKPHOS 88  --  92 92   ALT <5  --  <5 9   AST 7*  --  7* 14   ALBUMIN 3.1*  --  3.1* 3.3*   WBC 11.36 8.06  --  9.07   HGB 10.4* 12.3*  --  11.0*   HCT 34.7* 42.0  --  37.0*     Nutrition Orders:  Diet Adult Regular  Dietary nutrition supplements BID; Boost Very High Calorie Nutritional Drink - Strawberry, Brandon - Any flavor    Appetite/Oral Intake: fair/50-75% of meals  Factors Affecting Nutritional Intake: decreased appetite and early satiety  Social Needs Impacting Access to Food: none identified  Food/Uatsdin/Cultural Preferences: none reported  Food Allergies: no known food allergies  Last Bowel Movement: 04/19/25  Wound(s):     Altered Skin Integrity 03/31/24 0400 Left posterior Greater trochanter #3 Non pressure chronic ulcer Full thickness tissue loss. Subcutaneous fat may be visible but bone, tendon or muscle are not exposed-Tissue loss description: Full thickness       Wound 04/15/25 2145 Other (comment) Right lower Foot-Tissue loss description: Not applicable       Altered Skin Integrity 01/06/24 1640 Sacral spine #1 Pressure Injury-Tissue loss description: Partial thickness     Comments    4/17/25 Patient eating lunch, reports fair appetite,  "sometimes does not eat as much due to feeling full quickly. At home eats 1 meal a day on average. Is drinking some of the Boost VHC and Brandon. Weight loss noted in EMR over the past couple of months, noted fluctuating weights prior possibly due to discrepancy in scales, will monitor. Patient was unsure if he lost weight, but said it was possible since he is not eating as much as usual.     25 Good appetite and intake reported, eating about % of most meals. Drinking some of the oral supplements.    Anthropometrics    Height: 6' 0.01" (182.9 cm), Height Method: Stated  Last Weight: 64.6 kg (142 lb 6.4 oz) (25 1156), Weight Method: Bed Scale  BMI (Calculated): 19.3  BMI Classification: normal (BMI 18.5-24.9)        Ideal Body Weight (IBW), Male: 178.06 lb     % Ideal Body Weight, Male (lb): 79.97 %                 Usual Body Weight (UBW), k.5 kg  % Usual Body Weight: 90.53  % Weight Change From Usual Weight: -9.66 %  Usual Weight Provided By: patient and EMR weight history    Wt Readings from Last 5 Encounters:   25 64.6 kg (142 lb 6.4 oz)   25 72.6 kg (160 lb)   25 71.5 kg (157 lb 10.1 oz)   25 72.6 kg (160 lb)   24 74.8 kg (165 lb)     Weight Change(s) Since Admission:   Wt Readings from Last 1 Encounters:   25 1156 64.6 kg (142 lb 6.4 oz)   04/15/25 2325 64.6 kg (142 lb 6.4 oz)   04/15/25 1342 72.6 kg (160 lb)   Admit Weight: 72.6 kg (160 lb) (04/15/25 1342), Weight Method: Stated    Estimated Needs    Weight Used For Calorie Calculations: 64.6 kg (142 lb 6.7 oz)  Energy Calorie Requirements (kcal): 5183-6449 kcal (1.2-1.3 stress factor)  Energy Need Method: Barbour- Jeor  Weight Used For Protein Calculations: 64.6 kg (142 lb 6.7 oz)  Protein Requirements: 97-110gm (1.5-1.7 gm/kg)  Fluid Requirements (mL): 1938 ml (30 ml/kg)  CHO Requirement: 245gm/day (45% est calorie needs)     Enteral Nutrition     Patient not receiving enteral nutrition at this " time.    Parenteral Nutrition     Patient not receiving parenteral nutrition support at this time.    Evaluation of Received Nutrient Intake    Calories: meeting estimated needs  Protein: meeting estimated needs    Patient Education     Not applicable.    Nutrition Diagnosis     PES: Inadequate energy intake related to change in GI tract motility as evidenced by <50% intake of meals since admit. (resolved)     PES: Moderate acute disease or injury related malnutrition Related to suboptimal protein energy intake As Evidenced by:  - weight loss: 5% in 1 month - energy intake: <= 75% for 2 months (meets criteria for <= 75% >= 1 month (severe - chronic)) - muscle mass depletion: 1 area of moderate muscle loss (Clavicle), 5 areas of mild muscle loss (Deltoid, Pectoralis, Temporalis, Acromion, Trapezius) active    Nutrition Interventions     Intervention(s): general/healthful diet, commercial beverage, multivitamin/mineral supplement therapy, and collaboration with other providers  Intervention(s): Oral nutritional supplement    Goal: Consume % of meals/snacks by follow-up. (goal met)  Goal: Consume % of oral supplements by follow-up. (goal progressing)    Nutrition Goals & Monitoring     Dietitian will monitor: food and beverage intake, weight change, and gastrointestinal profile  Discharge planning: continue regular diet with high protein oral supplements  Nutrition Risk/Follow-Up: high (follow-up in 1-4 days)   Please consult if re-assessment needed sooner.

## 2025-04-21 NOTE — CONSULTS
Hemal Guerrero 1975  [id number]  4/20/2025    CONSULTING PHYSICIAN: Mendez    Primary : Rito    ON Call MD: Leonela    CC:  Urethral incontinence      HPI:  49-year-old man history of spinal cord injury long standing bladder management with a suprapubic tube.  He is readmitted to the hospital.  He complained to the primary team of occasional leakage from the urethra.  We were consulted for evaluation.  The patient does have copious clear yellow urine in the bag from his suprapubic tube.  He reports that intermittently he will have spasms and leak per urethra.  Previously they have had plans to place Botox in his bladder with his primary urologist but that was denied by his insurance.  No fever chills.  No gross hematuria.      Past Medical History:   Diagnosis Date    Chronic UTI     Paraplegia        Past Surgical History:   Procedure Laterality Date    INSERTION OF SUPRAPUBIC CATHETER      LAPAROTOMY         Family History   Problem Relation Name Age of Onset    Lymphoma Mother      Rheum arthritis Mother         Social History[1]    Current Medications[2]    Review of patient's allergies indicates:   Allergen Reactions    Adhesive     Amitriptyline        ROS: 12 point review of systems negative other than the HPI      PHYSICAL EXAM:  Vitals:    04/20/25 1247 04/20/25 1451 04/20/25 1523 04/20/25 1919   BP:   132/78    Pulse:   86    Resp: 17 17  18   Temp:   98.1 °F (36.7 °C)    TempSrc:   Oral    SpO2:   98%    Weight:       Height:             Intake/Output Summary (Last 24 hours) at 4/20/2025 2036  Last data filed at 4/20/2025 1524  Gross per 24 hour   Intake 360 ml   Output 1200 ml   Net -840 ml       GEN: WN/WD NAD  HEENT: NCAT, PERRLA, EOMI, OP clear, nares patent  CV: RRR  RESP: Even and unlabored  ABD:  Soft nontender nondistended.  SP tube in good position with clear yellow urine  :  Deferred  EXT: no C/C/E  NEURO: no focal deficits, Tres TREJO      LABS:  [unfilled]      No results  found for this or any previous visit (from the past 24 hours).      IMAGING:  Imaging Results    None           ASSESSMENT:  Neurogenic bladder  Urgency incontinence    PLAN:  SP tube is functioning well  Levsin p.r.n. for bladder spasms  I will ask the nurse practitioner to changes SP tube tomorrow  He will follow up with his primary urologist to discuss additional treatment options for urgency incontinence    Robert Gipson MD           [1]   Social History  Tobacco Use    Smoking status: Never    Smokeless tobacco: Never   Substance Use Topics    Alcohol use: Not Currently    Drug use: Yes     Types: Marijuana   [2]   Current Facility-Administered Medications   Medication Dose Route Frequency Provider Last Rate Last Admin    apixaban tablet 5 mg  5 mg Oral BID Yosvany Simms MD   5 mg at 04/20/25 2030    baclofen tablet 20 mg  20 mg Oral TID Yosvany Simms MD   20 mg at 04/20/25 2030    dicyclomine tablet 20 mg  20 mg Oral BID Yosvany Simsm MD   20 mg at 04/17/25 0852    fluticasone propionate 50 mcg/actuation nasal spray 50 mcg  1 spray Each Nostril BID Yosvany Simms MD   50 mcg at 04/20/25 2031    gabapentin capsule 800 mg  800 mg Oral TID Yosvany Simms MD   800 mg at 04/20/25 2030    hydrALAZINE injection 10 mg  10 mg Intravenous Q4H PRN Sherry Rios ANP        HYDROmorphone (PF) injection 1 mg  1 mg Intravenous Q6H PRN Sherry Rios ANP   1 mg at 04/20/25 1919    hydrOXYzine pamoate capsule 25 mg  25 mg Oral TID Yosvany Simms MD   25 mg at 04/20/25 2030    linaCLOtide capsule 290 mcg  290 mcg Oral Before breakfast Yosvany Simms MD   290 mcg at 04/20/25 0421    melatonin tablet 6 mg  6 mg Oral Nightly PRN Nikolay Goel IV, MD        meropenem injection 1 g  1 g Intravenous Q8H Nikolay Goel IV, MD   1 g at 04/20/25 2030    naloxegoL (MOVANTIK) tablet 25 mg  25 mg Oral Daily Yosvany Simms MD   25 mg at 04/20/25 0910    oxyCODONE-acetaminophen   mg per tablet 1 tablet  1 tablet Oral Q4H PRN Yosvany Simms MD   1 tablet at 04/20/25 1451    polyethylene glycol packet 17 g  17 g Oral BID PRN Yosvany Simms MD   17 g at 04/19/25 1036    sodium chloride 0.9% flush 10 mL  10 mL Intravenous PRN Nikolay Goel IV, MD        sodium chloride 0.9% flush 10 mL  10 mL Intravenous Q12H PRN Sherry Rios, ANP

## 2025-04-22 PROCEDURE — 11000001 HC ACUTE MED/SURG PRIVATE ROOM

## 2025-04-22 PROCEDURE — 63600175 PHARM REV CODE 636 W HCPCS: Performed by: INTERNAL MEDICINE

## 2025-04-22 PROCEDURE — 63600175 PHARM REV CODE 636 W HCPCS: Performed by: NURSE PRACTITIONER

## 2025-04-22 PROCEDURE — 27000207 HC ISOLATION

## 2025-04-22 PROCEDURE — 63600175 PHARM REV CODE 636 W HCPCS: Performed by: STUDENT IN AN ORGANIZED HEALTH CARE EDUCATION/TRAINING PROGRAM

## 2025-04-22 PROCEDURE — 25000003 PHARM REV CODE 250: Performed by: INTERNAL MEDICINE

## 2025-04-22 RX ORDER — ONDANSETRON HYDROCHLORIDE 2 MG/ML
4 INJECTION, SOLUTION INTRAVENOUS EVERY 6 HOURS PRN
Status: DISCONTINUED | OUTPATIENT
Start: 2025-04-22 | End: 2025-04-23 | Stop reason: HOSPADM

## 2025-04-22 RX ADMIN — APIXABAN 5 MG: 5 TABLET, FILM COATED ORAL at 11:04

## 2025-04-22 RX ADMIN — NALOXEGOL OXALATE 25 MG: 25 TABLET, FILM COATED ORAL at 10:04

## 2025-04-22 RX ADMIN — OXYCODONE AND ACETAMINOPHEN 1 TABLET: 325; 10 TABLET ORAL at 12:04

## 2025-04-22 RX ADMIN — HYDROXYZINE PAMOATE 25 MG: 25 CAPSULE ORAL at 10:04

## 2025-04-22 RX ADMIN — BACLOFEN 20 MG: 10 TABLET ORAL at 11:04

## 2025-04-22 RX ADMIN — BACLOFEN 20 MG: 10 TABLET ORAL at 10:04

## 2025-04-22 RX ADMIN — ONDANSETRON 4 MG: 2 INJECTION INTRAMUSCULAR; INTRAVENOUS at 01:04

## 2025-04-22 RX ADMIN — HYDROXYZINE PAMOATE 25 MG: 25 CAPSULE ORAL at 03:04

## 2025-04-22 RX ADMIN — POLYETHYLENE GLYCOL 3350 17 G: 17 POWDER, FOR SOLUTION ORAL at 12:04

## 2025-04-22 RX ADMIN — GABAPENTIN 800 MG: 400 CAPSULE ORAL at 11:04

## 2025-04-22 RX ADMIN — HYDROXYZINE PAMOATE 25 MG: 25 CAPSULE ORAL at 11:04

## 2025-04-22 RX ADMIN — MEROPENEM 1 G: 1 INJECTION, POWDER, FOR SOLUTION INTRAVENOUS at 12:04

## 2025-04-22 RX ADMIN — FLUTICASONE PROPIONATE 50 MCG: 50 SPRAY, METERED NASAL at 11:04

## 2025-04-22 RX ADMIN — OXYCODONE AND ACETAMINOPHEN 1 TABLET: 325; 10 TABLET ORAL at 06:04

## 2025-04-22 RX ADMIN — OXYCODONE AND ACETAMINOPHEN 1 TABLET: 325; 10 TABLET ORAL at 07:04

## 2025-04-22 RX ADMIN — HYDROMORPHONE HYDROCHLORIDE 1 MG: 2 INJECTION, SOLUTION INTRAMUSCULAR; INTRAVENOUS; SUBCUTANEOUS at 04:04

## 2025-04-22 RX ADMIN — LINACLOTIDE 290 MCG: 145 CAPSULE, GELATIN COATED ORAL at 06:04

## 2025-04-22 RX ADMIN — HYDROMORPHONE HYDROCHLORIDE 1 MG: 2 INJECTION INTRAMUSCULAR; INTRAVENOUS; SUBCUTANEOUS at 10:04

## 2025-04-22 RX ADMIN — BACLOFEN 20 MG: 10 TABLET ORAL at 03:04

## 2025-04-22 RX ADMIN — GABAPENTIN 800 MG: 400 CAPSULE ORAL at 03:04

## 2025-04-22 RX ADMIN — GABAPENTIN 800 MG: 400 CAPSULE ORAL at 10:04

## 2025-04-22 RX ADMIN — MEROPENEM 1 G: 1 INJECTION, POWDER, FOR SOLUTION INTRAVENOUS at 04:04

## 2025-04-22 RX ADMIN — MEROPENEM 1 G: 1 INJECTION, POWDER, FOR SOLUTION INTRAVENOUS at 08:04

## 2025-04-22 RX ADMIN — HYDROMORPHONE HYDROCHLORIDE 1 MG: 2 INJECTION INTRAMUSCULAR; INTRAVENOUS; SUBCUTANEOUS at 11:04

## 2025-04-22 RX ADMIN — APIXABAN 5 MG: 5 TABLET, FILM COATED ORAL at 10:04

## 2025-04-22 RX ADMIN — HYDROMORPHONE HYDROCHLORIDE 1 MG: 2 INJECTION, SOLUTION INTRAMUSCULAR; INTRAVENOUS; SUBCUTANEOUS at 11:04

## 2025-04-22 RX ADMIN — HYDROMORPHONE HYDROCHLORIDE 1 MG: 2 INJECTION, SOLUTION INTRAMUSCULAR; INTRAVENOUS; SUBCUTANEOUS at 03:04

## 2025-04-22 RX ADMIN — FLUTICASONE PROPIONATE 50 MCG: 50 SPRAY, METERED NASAL at 10:04

## 2025-04-22 NOTE — CARE UPDATE
[id number] spoke with pt re ltac. Pt is in agreement to go to [facility]. FOC obtained. Referral sent to [facility] thru epic

## 2025-04-22 NOTE — PROGRESS NOTES
[facility] - 5th Floor [facility] Medicine  Progress Note    Patient Name: Hemal Guerrero  MRN: [id number]  Patient Class: IP- Inpatient   Admission Date: 4/15/2025  Length of Stay: 6 days  Attending Physician: Yosvany Simms MD  Primary Care Provider: Margaret Leblanc MD        Subjective:     Principal Problem:Complicated UTI (urinary tract infection)    Interval History:   Today's info : seen and examined, no acute events overnight. Continues to improve   Afebrile  Remains on iv abx  Proteus ESBL and klebsiella   Suprapubic catheter exchanged by urology yesterday       Review of Systems   Constitutional:  Positive for fatigue.   HENT: Negative.     Eyes: Negative.    Respiratory: Negative.     Cardiovascular: Negative.    Gastrointestinal: Negative.    Endocrine: Negative.    Genitourinary: Negative.    Musculoskeletal:  Positive for back pain.   Skin:  Positive for wound.   Allergic/Immunologic: Negative.    Neurological:  Positive for weakness.     Objective:     Vital Signs (Most Recent):  Temp: 98.6 °F (37 °C) (04/22/25 0740)  Pulse: 85 (04/22/25 0740)  Resp: 18 (04/22/25 0740)  BP: 122/73 (04/22/25 0740)  SpO2: 97 % (04/22/25 0740) Vital Signs (24h Range):  Temp:  [97.9 °F (36.6 °C)-98.6 °F (37 °C)] 98.6 °F (37 °C)  Pulse:  [] 85  Resp:  [18] 18  SpO2:  [96 %-99 %] 97 %  BP: (107-150)/() 122/73     Weight: 64.6 kg (142 lb 6.4 oz)  Body mass index is 19.31 kg/m².    Intake/Output Summary (Last 24 hours) at 4/22/2025 0752  Last data filed at 4/21/2025 1346  Gross per 24 hour   Intake 360 ml   Output 300 ml   Net 60 ml        Physical Exam  Vitals reviewed.   Constitutional:       Appearance: Normal appearance.   HENT:      Head: Normocephalic and atraumatic.      Right Ear: Tympanic membrane and external ear normal.      Nose: Nose normal.      Mouth/Throat:      Mouth: Mucous membranes are moist.   Eyes:      Extraocular Movements: Extraocular movements  intact.      Pupils: Pupils are equal, round, and reactive to light.   Cardiovascular:      Rate and Rhythm: Normal rate and regular rhythm.      Pulses: Normal pulses.      Heart sounds: Normal heart sounds.   Pulmonary:      Effort: Pulmonary effort is normal.      Breath sounds: Normal breath sounds.   Abdominal:      General: Abdomen is flat. Bowel sounds are normal.      Palpations: Abdomen is soft.      Comments: cath   Musculoskeletal:         General: Normal range of motion.      Cervical back: Normal range of motion and neck supple.   Neurological:      General: No focal deficit present.      Mental Status: He is alert and oriented to person, place, and time.      Comments: paraplegic   Psychiatric:         Mood and Affect: Mood normal.         Behavior: Behavior normal.           Overview/Hospital Course:   stable    Significant Labs: All pertinent labs within the past 24 hours have been reviewed.  Lab Results   Component Value Date    WBC 9.07 04/19/2025    HGB 11.0 (L) 04/19/2025    HCT 37.0 (L) 04/19/2025    MCV 81.5 04/19/2025     04/19/2025         Recent Labs   Lab 04/19/25  0303      K 4.7      CO2 26   BUN 17.3   CREATININE 0.62*   GLUCOSE 93   CALCIUM 8.9       Significant Imaging: I have reviewed all pertinent imaging results/findings within the past 24 hours.    Assessment/Plan:      Active Diagnoses:    Diagnosis Date Noted POA    PRINCIPAL PROBLEM:  Complicated UTI (urinary tract infection) [N39.0] 06/21/2023 Yes    Moderate malnutrition [E44.0] 09/27/2024 Yes      Problems Resolved During this Admission:     VTE Risk Mitigation (From admission, onward)           Ordered     apixaban tablet 5 mg  2 times daily         04/15/25 1858                       Sepsis-resolved  Complicated uti with esbl proteus and klebsiella   Chronic osteomyelitis of inferior pubic rami  Sacral decubitis ulcer  Paraplegic  Neurogenic bladder w chronic indwelling vogt   Uti-poa  Deconditioning    PAF  Hx of R foot osteomyelitis  Htn  Iron def  Anxiety/depression     Plan  Cont iv abx merrem   Pain control   Labs in the am   Pending ltac placement for iv abx   S/p suprapubic catheter exchange by urology   They are doing it weekly due to llouie Leblanc MD  Department of Hospital Medicine   [facility] - 5th Floor Med Surg

## 2025-04-22 NOTE — PROGRESS NOTES
[facility] - 5th Floor Med Surg  Wound Care    Patient Name:  Hemal Guerrero   MRN:  [id number]  Date: 4/22/2025  Diagnosis: Complicated UTI (urinary tract infection)    History:     Past Medical History:   Diagnosis Date    Chronic UTI     Paraplegia        Social History[1]    Precautions:     Allergies as of 04/15/2025 - Reviewed 04/15/2025   Allergen Reaction Noted    Adhesive  02/05/2023    Amitriptyline  11/16/2022       [facility] Assessment Details/Treatment        04/22/25 1056   [facility] Assessment   Visit Date 04/22/25   Visit Time 1056   Consult Type Follow Up   [facility] Speciality Wound   Wound pressure   Intervention chart review;assessed  (pt. refused wound care)     [facility] follow up for left greater trochanter, sacrum and right plantar foot. No family at bedside. Explained reason for visit. Pt. Refused wound care at this time due to being in too much pain. Discussed POC w/ nurse Bhardwaj who stated pt.had just received pain medication. Cont. Treatment recommendations: left greater trochanter: clean w/ vashe, dry well, apply vashe moistened gauze to wound bed, cover with dry gauze, abd pad, secure with minimal tape. BID/prn if soilage. Sacrum: clean w/ vashe, dry well, apply large pink square Aquacel Ag foam. Daily/prn if soilage. If pt. Starts having bowel movements use zinc oxide as a barrier to protect sacrum instead of the foam. Right foot: clean w/ vashe, dry well, apply betadine and foam. Daily. Nursing to cont. Tx recs and preventative measures. Pt. On An immerse bed.  Ordered patient more podus boots to help offload heels. Will follow up tomorrow.        04/22/2025         [1]   Social History  Socioeconomic History    Marital status:    Tobacco Use    Smoking status: Never    Smokeless tobacco: Never   Substance and Sexual Activity    Alcohol use: Not Currently    Drug use: Yes     Types: Marijuana     Social Drivers of Health     Financial Resource Strain: Medium Risk (2/7/2025)    Overall  Financial Resource Strain (CARDIA)     Difficulty of Paying Living Expenses: Somewhat hard   Food Insecurity: Food Insecurity Present (2/7/2025)    Hunger Vital Sign     Worried About Running Out of Food in the Last Year: Sometimes true     Ran Out of Food in the Last Year: Never true   Transportation Needs: Unmet Transportation Needs (2/7/2025)    TRANSPORTATION NEEDS     Transportation : Yes, it has kept me from non-medical meetings, appointments, work or from getting things that I need.   Physical Activity: Inactive (2/7/2025)    Exercise Vital Sign     Days of Exercise per Week: 0 days     Minutes of Exercise per Session: 0 min   Stress: Stress Concern Present (2/7/2025)    [employer] [place] [employer] - Occupational Stress Questionnaire     Feeling of Stress : Rather much   Housing Stability: Unknown (2/7/2025)    Housing Stability Vital Sign     Unable to Pay for Housing in the Last Year: No     Homeless in the Last Year: No

## 2025-04-23 VITALS
SYSTOLIC BLOOD PRESSURE: 118 MMHG | WEIGHT: 142.38 LBS | HEIGHT: 72 IN | DIASTOLIC BLOOD PRESSURE: 75 MMHG | RESPIRATION RATE: 18 BRPM | TEMPERATURE: 98 F | OXYGEN SATURATION: 99 % | HEART RATE: 88 BPM | BODY MASS INDEX: 19.28 KG/M2

## 2025-04-23 PROCEDURE — 25000242 PHARM REV CODE 250 ALT 637 W/ HCPCS: Performed by: INTERNAL MEDICINE

## 2025-04-23 PROCEDURE — 63600175 PHARM REV CODE 636 W HCPCS: Performed by: INTERNAL MEDICINE

## 2025-04-23 PROCEDURE — 25000003 PHARM REV CODE 250: Performed by: STUDENT IN AN ORGANIZED HEALTH CARE EDUCATION/TRAINING PROGRAM

## 2025-04-23 PROCEDURE — 63600175 PHARM REV CODE 636 W HCPCS: Performed by: NURSE PRACTITIONER

## 2025-04-23 PROCEDURE — 25000003 PHARM REV CODE 250: Performed by: INTERNAL MEDICINE

## 2025-04-23 RX ADMIN — MEROPENEM 1 G: 1 INJECTION, POWDER, FOR SOLUTION INTRAVENOUS at 04:04

## 2025-04-23 RX ADMIN — NALOXEGOL OXALATE 25 MG: 25 TABLET, FILM COATED ORAL at 09:04

## 2025-04-23 RX ADMIN — MEROPENEM 1 G: 1 INJECTION, POWDER, FOR SOLUTION INTRAVENOUS at 11:04

## 2025-04-23 RX ADMIN — FLUTICASONE PROPIONATE 50 MCG: 50 SPRAY, METERED NASAL at 11:04

## 2025-04-23 RX ADMIN — APIXABAN 5 MG: 5 TABLET, FILM COATED ORAL at 09:04

## 2025-04-23 RX ADMIN — Medication 6 MG: at 01:04

## 2025-04-23 RX ADMIN — BACLOFEN 20 MG: 10 TABLET ORAL at 09:04

## 2025-04-23 RX ADMIN — LINACLOTIDE 290 MCG: 145 CAPSULE, GELATIN COATED ORAL at 05:04

## 2025-04-23 RX ADMIN — OXYCODONE AND ACETAMINOPHEN 1 TABLET: 325; 10 TABLET ORAL at 09:04

## 2025-04-23 RX ADMIN — OXYCODONE AND ACETAMINOPHEN 1 TABLET: 325; 10 TABLET ORAL at 01:04

## 2025-04-23 RX ADMIN — HYDROXYZINE PAMOATE 25 MG: 25 CAPSULE ORAL at 09:04

## 2025-04-23 RX ADMIN — HYDROMORPHONE HYDROCHLORIDE 1 MG: 2 INJECTION, SOLUTION INTRAMUSCULAR; INTRAVENOUS; SUBCUTANEOUS at 05:04

## 2025-04-23 RX ADMIN — HYDROMORPHONE HYDROCHLORIDE 1 MG: 2 INJECTION, SOLUTION INTRAMUSCULAR; INTRAVENOUS; SUBCUTANEOUS at 12:04

## 2025-04-23 RX ADMIN — GABAPENTIN 800 MG: 400 CAPSULE ORAL at 09:04

## 2025-04-23 NOTE — DISCHARGE SUMMARY
[facility] Hospital Medicine Discharge Summary    Admit Date: 4/15/2025  Discharge Date and Time: 4/23/20257:40 AM  Admitting Physician:  Team  Discharging Physician: Margaret Leblanc MD.  Primary Care Physician: Margaret Leblanc MD  Consults: None    Discharge Diagnoses:  Complicated uti with esbl proteus and klebsiella   Chronic osteomyelitis of inferior pubic rami  Sacral decubitis ulcer  Paraplegic  Neurogenic bladder w chronic indwelling vogt   Uti-poa  Deconditioning   PAF  Hx of R foot osteomyelitis  Htn  Iron def  Anxiety/depression    Hospital Course:   49 y.o. male who  has a past medical history of Chronic UTI and Paraplegia. And chronic sacral decubitus ulcer and left ischial ulcer and repeated prolonged hospital and ltac stays for ostemyelitis and complicated uti presented to the ed with complaints of worsening weakness and subsequent work up and imaging suggestive of complicated uti and furthera dmitted iv abx and further care   Cx reviewed   Shows proteus and klebsiella   Started on iv merrem   Completed course of iv abx   Improved   Pt wants to go home instead of ltac   He finished abx   Will resume hh w wound care   Pt was seen and examined on the day of discharge  Discharged home in a stable condition   With close pcp f/u   Vitals:  VITAL SIGNS: 24 HRS MIN & MAX LAST   Temp  Min: 97.9 °F (36.6 °C)  Max: 98.4 °F (36.9 °C) 97.9 °F (36.6 °C)   BP  Min: 98/66  Max: 151/89 103/69   Pulse  Min: 91  Max: 96  91   Resp  Min: 16  Max: 18 16   SpO2  Min: 97 %  Max: 99 % 99 %       Physical Exam:  Constitutional:       Appearance: Normal appearance.   HENT:      Head: Normocephalic and atraumatic.      Right Ear: Tympanic membrane and external ear normal.      Nose: Nose normal.      Mouth/Throat:      Mouth: Mucous membranes are moist.   Eyes:      Extraocular Movements: Extraocular movements intact.      Pupils: Pupils are equal, round, and reactive to light.    Cardiovascular:      Rate and Rhythm: Normal rate and regular rhythm.      Pulses: Normal pulses.      Heart sounds: Normal heart sounds.   Pulmonary:      Effort: Pulmonary effort is normal.      Breath sounds: Normal breath sounds.   Abdominal:      General: Abdomen is flat. Bowel sounds are normal.      Palpations: Abdomen is soft.      Comments: cath   Musculoskeletal:         General: Normal range of motion.      Cervical back: Normal range of motion and neck supple.   Neurological:      General: No focal deficit present.      Mental Status: He is alert and oriented to person, place, and time.      Comments: paraplegic   Psychiatric:         Mood and Affect: Mood normal.         Behavior: Behavior normal.     Procedures Performed: No admission procedures for hospital encounter.     Significant Diagnostic Studies: See Full reports for all details    Recent Labs   Lab 04/19/25  0303   WBC 9.07   RBC 4.54*   HGB 11.0*   HCT 37.0*   MCV 81.5   MCH 24.2*   MCHC 29.7*   RDW 15.7      MPV 9.8       Recent Labs   Lab 04/16/25  0817 04/19/25  0303    138   K 4.3 4.7    104   CO2 28 26   BUN 9.6 17.3   CREATININE 0.69* 0.62*   CALCIUM 8.8 8.9   ALBUMIN 3.1* 3.3*   ALKPHOS 92 92   ALT <5 9   AST 7* 14   BILITOT 0.3 0.3        Microbiology Results (last 7 days)       Procedure Component Value Units Date/Time    Blood culture #2 **CANNOT BE ORDERED STAT** [[id number]]  (Normal) Collected: 04/15/25 1617    Order Status: Completed Specimen: Blood Updated: 04/20/25 1802     Blood Culture No Growth at 5 days    Blood culture #1 **CANNOT BE ORDERED STAT** [[id number]]  (Normal) Collected: 04/15/25 1617    Order Status: Completed Specimen: Blood Updated: 04/20/25 1802     Blood Culture No Growth at 5 days    Urine culture [[id number]]  (Abnormal)  (Susceptibility) Collected: 04/15/25 1720    Order Status: Completed Specimen: Urine, Supra Pubic Updated: 04/18/25 0644     Urine Culture >/= 100,000 colonies/ml  Proteus mirabilis ESBL      >/= 100,000 colonies/ml Klebsiella pneumoniae ssp pneumoniae     Comment: ESBL (Extended spectrum beta-lactamase)                X-Ray Abdomen AP 1 View  Narrative: EXAMINATION:  XR ABDOMEN AP 1 VIEW    CLINICAL HISTORY:  stool burden verification;    TECHNIQUE:  AP View(s) of the abdomen.    COMPARISON:  CT 02/07/2025    FINDINGS:  Small to moderate volume stool, decreased since the CT.  Nonspecific bowel gas pattern.  Impression: Stool volume decreased since 02/07/2025, now small to moderate.    No significant discrepancy between my interpretation and the preliminary radiology report.    Electronically signed by: Juan Sargent  Date:    02/09/2025  Time:    11:52         Medication List        CONTINUE taking these medications      baclofen 20 MG tablet  Commonly known as: LIORESAL     dicyclomine 20 mg tablet  Commonly known as: BENTYL  Take 1 tablet (20 mg total) by mouth 2 (two) times daily.     ELIQUIS 5 mg Tab  Generic drug: apixaban     fluticasone propionate 50 mcg/actuation nasal spray  Commonly known as: FLONASE     gabapentin 800 MG tablet  Commonly known as: NEURONTIN     hydrOXYzine pamoate 25 MG Cap  Commonly known as: VISTARIL     linaCLOtide 290 mcg Cap capsule  Commonly known as: LINZESS  Take 1 capsule (290 mcg total) by mouth before breakfast.     MOVANTIK 25 mg tablet  Generic drug: naloxegoL  Take 1 tablet (25 mg total) by mouth once daily.     oxyCODONE-acetaminophen  mg per tablet  Commonly known as: PERCOCET  Take 1 tablet by mouth 2 (two) times daily as needed for Pain.     polyethylene glycol 17 gram Pwpk  Commonly known as: GLYCOLAX            STOP taking these medications      ciprofloxacin HCl 500 MG tablet  Commonly known as: CIPRO     sulfamethoxazole-trimethoprim 800-160mg 800-160 mg Tab  Commonly known as: BACTRIM DS               Explained in detail to the patient about the discharge plan, medications, and follow-up visits. Pt understands and  agrees with the treatment plan  Discharge Disposition: Home or Self Care   Discharged Condition: stable  Diet-   Dietary Orders (From admission, onward)       Start     Ordered    04/16/25 0926  Dietary nutrition supplements BID; Boost Very High Calorie Nutritional Drink - Strawberry, Brandon - Any flavor  Continuous        Question Answer Comment   Frequency: BID    Select PO Supplement: Boost Very High Calorie Nutritional Drink - Strawberry    Select PO Supplement: Brandon - Any flavor        04/16/25 0926    04/15/25 1926  Diet Adult Regular  (Diet/Nutrition - [facility])  Diet effective now         04/15/25 1925                   Medications Per DC med rec  Activities as tolerated   Follow-up Information       Margaret Leblanc MD Follow up in 6 day(s).    Specialty: Internal Medicine  Contact information:  [address] [place] [address] [phone]                           For further questions contact hospitalist office    Discharge time 33 minutes    For worsening symptoms, chest pain, shortness of breath, increased abdominal pain, high grade fever, stroke or stroke like symptoms, immediately go to the nearest Emergency Room or call 911 as soon as possible.      Margaret Pinto M.D, on 4/23/2025. at 7:40 AM.

## 2025-04-23 NOTE — PLAN OF CARE
04/23/25 1108   Final Note   Assessment Type Discharge Planning Brief Assessment   Anticipated Discharge Disposition Home-Health   What phone number can be called within the next 1-3 days to see how you are doing after discharge? [phone]   Hospital Resources/Appts/Education Provided Provided patient/caregiver with written discharge plan information   Post-Acute Status   Post-Acute Authorization Home Health   Home Health Status Referrals Sent   Discharge Delays None known at this time     D/C plan changed to d/c home with  services- pt current with [facility].  D/c info sent via [employer] to [facility].  Nurse states pt is paraplegic and due to wounds transportation needed for d/c is [facility].    1340   [facility] here to transport pt home.

## 2025-04-23 NOTE — PROGRESS NOTES
[facility] - 5th Floor Med Surg  Wound Care    Patient Name:  Hemal Guerrero   MRN:  [id number]  Date: 4/23/2025  Diagnosis: Complicated UTI (urinary tract infection)    History:     Past Medical History:   Diagnosis Date    Chronic UTI     Paraplegia        Social History[1]    Precautions:     Allergies as of 04/15/2025 - Reviewed 04/15/2025   Allergen Reaction Noted    Adhesive  02/05/2023    Amitriptyline  11/16/2022       WOC Assessment Details/Treatment        04/23/25 1043   WOCN Assessment   Visit Date 04/23/25   Visit Time 1043   Consult Type Follow Up   [facility] Speciality Wound   Wound pressure   Intervention chart review;assessed;changed   Teaching discharge  (pt. going home today)        Altered Skin Integrity 03/31/24 0400 Left posterior Greater trochanter #3 Non pressure chronic ulcer Full thickness tissue loss. Subcutaneous fat may be visible but bone, tendon or muscle are not exposed   Date First Assessed/Time First Assessed: 03/31/24 0400   Altered Skin Integrity Present on Admission - Did Patient arrive to the hospital with altered skin?: yes  Side: Left  Orientation: posterior  Location: Greater trochanter  Wound Number: #3  Is t...   Wound Image    Description of Altered Skin Integrity Full thickness tissue loss with exposed bone, tendon, or muscle. Often includes undermining and tunneling. May extend into muscle and/or supporting structures.   Dressing Appearance Open to air   Drainage Amount None   Drainage Characteristics/Odor No odor   Appearance Pink;Red;Bone   Tissue loss description Full thickness   Black (%), Wound Tissue Color 0 %   Red (%), Wound Tissue Color 100 %   Yellow (%), Wound Tissue Color 0 %   Periwound Area Dry;Scar tissue   Wound Edges Defined   Wound Length (cm) 3 cm   Wound Width (cm) 6 cm   Wound Depth (cm) 1.4 cm   Wound Volume (cm^3) 13.195 cm^3   Wound Surface Area (cm^2) 14.14 cm^2   Undermining (depth (cm)/location) UM 8-12 oclock, deepest at 11 oclock @  1.8cm   Care Cleansed with:;Wound cleanser   Dressing Applied;Gauze, wet to dry;Absorptive Pad        Altered Skin Integrity 01/06/24 1640 Sacral spine #1 Pressure Injury   Date First Assessed/Time First Assessed: 01/06/24 1640   Altered Skin Integrity Present on Admission - Did Patient arrive to the hospital with altered skin?: yes  Location: Sacral spine  Wound Number: #1  Is this injury device related?: No  Primary Wo...   Wound Image    Description of Altered Skin Integrity Full thickness tissue loss. Subcutaneous fat may be visible but bone, tendon or muscle are not exposed   Dressing Appearance Dry;Intact;Clean   Drainage Amount None   Drainage Characteristics/Odor Serosanguineous   Appearance Pink;Dry   Tissue loss description Full thickness  (was possibly a full thickness wound at one time)   Black (%), Wound Tissue Color 0 %   Red (%), Wound Tissue Color 100 %   Yellow (%), Wound Tissue Color 0 %   Periwound Area Scar tissue;Dry   Wound Length (cm) 0.9 cm   Wound Width (cm) 0.3 cm   Wound Depth (cm) 0 cm   Wound Volume (cm^3) 0 cm^3   Wound Surface Area (cm^2) 0.21 cm^2   Care Cleansed with:;Wound cleanser   Dressing Changed;Silver;Foam     WOCN follow up for left greater trochanter, sacrum. No family at bedside. Explained reason for visit. Cont. treatment recommendations: left greater trochanter: clean w/ vashe, dry well, apply vashe moistened gauze to wound bed, cover with dry gauze, abd pad, secure with minimal tape. BID/prn if soilage. Sacrum: clean w/ vashe, dry well, apply large pink square Aquacel Ag foam. Daily/prn if soilage. If pt. Starts having bowel movements use zinc oxide as a barrier to protect sacrum instead of the foam. Nursing to cont. Tx recs and preventative measures. Pt. on An immerse bed. PUP pack was ordered and in pts room. Will order specialty bed. Ordered patient more podus boots from the right place. Will follow up if applicable. Pt. Stated he was getting discharged home  today.    04/23/2025         [1]   Social History  Socioeconomic History    Marital status:    Tobacco Use    Smoking status: Never    Smokeless tobacco: Never   Substance and Sexual Activity    Alcohol use: Not Currently    Drug use: Yes     Types: Marijuana     Social Drivers of Health     Financial Resource Strain: Medium Risk (2/7/2025)    Overall Financial Resource Strain (CARDIA)     Difficulty of Paying Living Expenses: Somewhat hard   Food Insecurity: Food Insecurity Present (2/7/2025)    Hunger Vital Sign     Worried About Running Out of Food in the Last Year: Sometimes true     Ran Out of Food in the Last Year: Never true   Transportation Needs: Unmet Transportation Needs (2/7/2025)    TRANSPORTATION NEEDS     Transportation : Yes, it has kept me from non-medical meetings, appointments, work or from getting things that I need.   Physical Activity: Inactive (2/7/2025)    Exercise Vital Sign     Days of Exercise per Week: 0 days     Minutes of Exercise per Session: 0 min   Stress: Stress Concern Present (2/7/2025)    [employer] - Occupational Stress Questionnaire     Feeling of Stress : Rather much   Housing Stability: Unknown (2/7/2025)    Housing Stability Vital Sign     Unable to Pay for Housing in the Last Year: No     Homeless in the Last Year: No

## 2025-04-23 NOTE — PLAN OF CARE
D/C order noted and nurse Spencer states pt will d/c to home with  services.  Went to room to discuss with pt as [facility] noted yesterday indicated that pt would d/c to [facility] for wound Care, etc.  Went to room to discuss.  Pt states as IVAB would be completed today- Dr. Dumont indicated yesterday afternoon that he could d/c home- he did attend an outpt.  clinic and had [facility]  currently.  He states he wants d/c home and not go to [facility].  D/C info sent to [facility]  so services can be resumed via Epic.

## 2025-04-24 ENCOUNTER — PATIENT OUTREACH (OUTPATIENT)
Dept: ADMINISTRATIVE | Facility: CLINIC | Age: 50
End: 2025-04-24
Payer: MEDICARE

## 2025-04-24 NOTE — PROGRESS NOTES
C3 nurse spoke with Hemal Guerrero for a TCC post hospital discharge follow up call. The patient has a scheduled HOSFU appointment with Margaret Leblanc MD on 4/29/25 @ 9:30.

## 2025-04-25 NOTE — PHYSICIAN QUERY
Please clarify the integumentary diagnosis related to documentation of the the left greater trochanter site:  Non-pressure ulcer, bone involvement without evidence of necrosis

## 2025-04-25 NOTE — PHYSICIAN QUERY
Please clarify the integumentary diagnosis related to documentation of the sacrum.  Pressure Injury/Decubitus Ulcer, Stage 3

## 2025-04-25 NOTE — PHYSICIAN QUERY
Due to the conflicting clinical picture, please clinically validate the sepsis. If validated, please provide additional clinical support for the diagnosis.   Sepsis is not confirmed and/or it has been ruled out. Localized infection only.

## 2025-06-21 ENCOUNTER — LAB REQUISITION (OUTPATIENT)
Dept: LAB | Facility: HOSPITAL | Age: 50
End: 2025-06-21
Payer: MEDICARE

## 2025-06-21 DIAGNOSIS — M86.69 OTHER CHRONIC OSTEOMYELITIS, MULTIPLE SITES: ICD-10-CM

## 2025-06-21 DIAGNOSIS — D64.9 ANEMIA, UNSPECIFIED: ICD-10-CM

## 2025-06-21 LAB
ALBUMIN SERPL-MCNC: 2.8 G/DL (ref 3.5–5)
ALBUMIN/GLOB SERPL: 0.7 RATIO (ref 1.1–2)
ALP SERPL-CCNC: 82 UNIT/L (ref 40–150)
ALT SERPL-CCNC: 5 UNIT/L (ref 0–55)
ANION GAP SERPL CALC-SCNC: 6 MEQ/L
AST SERPL-CCNC: 6 UNIT/L (ref 11–45)
BASOPHILS # BLD AUTO: 0.13 X10(3)/MCL
BASOPHILS NFR BLD AUTO: 1.4 %
BILIRUB SERPL-MCNC: 0.2 MG/DL
BUN SERPL-MCNC: 13.1 MG/DL (ref 8.4–25.7)
CALCIUM SERPL-MCNC: 8.3 MG/DL (ref 8.4–10.2)
CHLORIDE SERPL-SCNC: 109 MMOL/L (ref 98–107)
CO2 SERPL-SCNC: 28 MMOL/L (ref 22–29)
CREAT SERPL-MCNC: 0.62 MG/DL (ref 0.72–1.25)
CREAT/UREA NIT SERPL: 21
CRP SERPL-MCNC: 42.3 MG/L
EOSINOPHIL # BLD AUTO: 0.23 X10(3)/MCL (ref 0–0.9)
EOSINOPHIL NFR BLD AUTO: 2.5 %
ERYTHROCYTE [DISTWIDTH] IN BLOOD BY AUTOMATED COUNT: 17.7 % (ref 11.5–17)
ERYTHROCYTE [SEDIMENTATION RATE] IN BLOOD: 84 MM/HR (ref 0–20)
GFR SERPLBLD CREATININE-BSD FMLA CKD-EPI: >60 ML/MIN/1.73/M2
GLOBULIN SER-MCNC: 4.2 GM/DL (ref 2.4–3.5)
GLUCOSE SERPL-MCNC: 93 MG/DL (ref 74–100)
HCT VFR BLD AUTO: 31 % (ref 42–52)
HGB BLD-MCNC: 9.3 G/DL (ref 14–18)
IMM GRANULOCYTES # BLD AUTO: 0.03 X10(3)/MCL (ref 0–0.04)
IMM GRANULOCYTES NFR BLD AUTO: 0.3 %
LYMPHOCYTES # BLD AUTO: 3.38 X10(3)/MCL (ref 0.6–4.6)
LYMPHOCYTES NFR BLD AUTO: 37.1 %
MCH RBC QN AUTO: 24.6 PG (ref 27–31)
MCHC RBC AUTO-ENTMCNC: 30 G/DL (ref 33–36)
MCV RBC AUTO: 82 FL (ref 80–94)
MONOCYTES # BLD AUTO: 0.53 X10(3)/MCL (ref 0.1–1.3)
MONOCYTES NFR BLD AUTO: 5.8 %
NEUTROPHILS # BLD AUTO: 4.8 X10(3)/MCL (ref 2.1–9.2)
NEUTROPHILS NFR BLD AUTO: 52.9 %
NRBC BLD AUTO-RTO: 0 %
PLATELET # BLD AUTO: 353 X10(3)/MCL (ref 130–400)
PMV BLD AUTO: 10.7 FL (ref 7.4–10.4)
POTASSIUM SERPL-SCNC: 4 MMOL/L (ref 3.5–5.1)
PROT SERPL-MCNC: 7 GM/DL (ref 6.4–8.3)
RBC # BLD AUTO: 3.78 X10(6)/MCL (ref 4.7–6.1)
SODIUM SERPL-SCNC: 143 MMOL/L (ref 136–145)
WBC # BLD AUTO: 9.1 X10(3)/MCL (ref 4.5–11.5)

## 2025-06-21 PROCEDURE — 85652 RBC SED RATE AUTOMATED: CPT | Performed by: NURSE PRACTITIONER

## 2025-06-21 PROCEDURE — 80053 COMPREHEN METABOLIC PANEL: CPT | Performed by: NURSE PRACTITIONER

## 2025-06-21 PROCEDURE — 85025 COMPLETE CBC W/AUTO DIFF WBC: CPT | Performed by: NURSE PRACTITIONER

## 2025-06-21 PROCEDURE — 86140 C-REACTIVE PROTEIN: CPT | Performed by: NURSE PRACTITIONER

## 2025-06-21 PROCEDURE — 84134 ASSAY OF PREALBUMIN: CPT | Performed by: NURSE PRACTITIONER

## 2025-06-23 LAB — PREALB SERPL-MCNC: 16.3 MG/DL (ref 18–45)

## 2025-06-24 DIAGNOSIS — L89.324 STAGE IV PRESSURE ULCER OF LEFT BUTTOCK: Primary | ICD-10-CM

## 2025-06-27 ENCOUNTER — HOSPITAL ENCOUNTER (INPATIENT)
Facility: HOSPITAL | Age: 50
LOS: 5 days | Discharge: NURSING, REHAB OR LONG-TERM CARE | DRG: 698 | End: 2025-07-02
Attending: EMERGENCY MEDICINE | Admitting: INTERNAL MEDICINE
Payer: MEDICARE

## 2025-06-27 DIAGNOSIS — R10.2 PELVIC PAIN: ICD-10-CM

## 2025-06-27 DIAGNOSIS — L89.159 PRESSURE INJURY OF SKIN OF SACRAL REGION, UNSPECIFIED INJURY STAGE: Primary | ICD-10-CM

## 2025-06-27 DIAGNOSIS — R70.0 ELEVATED ERYTHROCYTE SEDIMENTATION RATE: ICD-10-CM

## 2025-06-27 DIAGNOSIS — N39.0 COMPLICATED UTI (URINARY TRACT INFECTION): ICD-10-CM

## 2025-06-27 LAB
ALBUMIN SERPL-MCNC: 3.6 G/DL (ref 3.5–5)
ALBUMIN/GLOB SERPL: 0.5 RATIO (ref 1.1–2)
ALP SERPL-CCNC: 95 UNIT/L (ref 40–150)
ALT SERPL-CCNC: 7 UNIT/L (ref 0–55)
ANION GAP SERPL CALC-SCNC: 10 MEQ/L
AST SERPL-CCNC: 10 UNIT/L (ref 11–45)
BACTERIA #/AREA URNS AUTO: ABNORMAL /HPF
BASOPHILS # BLD AUTO: 0.13 X10(3)/MCL
BASOPHILS NFR BLD AUTO: 1.7 %
BILIRUB SERPL-MCNC: 0.5 MG/DL
BILIRUB UR QL STRIP.AUTO: NEGATIVE
BUN SERPL-MCNC: 14.1 MG/DL (ref 8.4–25.7)
CALCIUM SERPL-MCNC: 9.9 MG/DL (ref 8.4–10.2)
CHLORIDE SERPL-SCNC: 103 MMOL/L (ref 98–107)
CLARITY UR: ABNORMAL
CO2 SERPL-SCNC: 28 MMOL/L (ref 22–29)
COLOR UR AUTO: YELLOW
CREAT SERPL-MCNC: 0.67 MG/DL (ref 0.72–1.25)
CREAT/UREA NIT SERPL: 21
EOSINOPHIL # BLD AUTO: 0.29 X10(3)/MCL (ref 0–0.9)
EOSINOPHIL NFR BLD AUTO: 3.9 %
ERYTHROCYTE [DISTWIDTH] IN BLOOD BY AUTOMATED COUNT: 17.2 % (ref 11.5–17)
ERYTHROCYTE [SEDIMENTATION RATE] IN BLOOD: >130 MM/HR (ref 0–15)
GFR SERPLBLD CREATININE-BSD FMLA CKD-EPI: >60 ML/MIN/1.73/M2
GLOBULIN SER-MCNC: 6.7 GM/DL (ref 2.4–3.5)
GLUCOSE SERPL-MCNC: 90 MG/DL (ref 74–100)
GLUCOSE UR QL STRIP: NEGATIVE
HCT VFR BLD AUTO: 37.3 % (ref 42–52)
HGB BLD-MCNC: 11.3 G/DL (ref 14–18)
HGB UR QL STRIP: NEGATIVE
IMM GRANULOCYTES # BLD AUTO: 0.01 X10(3)/MCL (ref 0–0.04)
IMM GRANULOCYTES NFR BLD AUTO: 0.1 %
KETONES UR QL STRIP: 15
LACTATE SERPL-SCNC: 1.4 MMOL/L (ref 0.5–2.2)
LEUKOCYTE ESTERASE UR QL STRIP: ABNORMAL
LYMPHOCYTES # BLD AUTO: 2.71 X10(3)/MCL (ref 0.6–4.6)
LYMPHOCYTES NFR BLD AUTO: 36.4 %
MCH RBC QN AUTO: 24.4 PG (ref 27–31)
MCHC RBC AUTO-ENTMCNC: 30.3 G/DL (ref 33–36)
MCV RBC AUTO: 80.6 FL (ref 80–94)
MONOCYTES # BLD AUTO: 0.42 X10(3)/MCL (ref 0.1–1.3)
MONOCYTES NFR BLD AUTO: 5.6 %
NEUTROPHILS # BLD AUTO: 3.88 X10(3)/MCL (ref 2.1–9.2)
NEUTROPHILS NFR BLD AUTO: 52.3 %
NITRITE UR QL STRIP: NEGATIVE
NRBC BLD AUTO-RTO: 0 %
PH UR STRIP: 7.5 [PH]
PLATELET # BLD AUTO: 351 X10(3)/MCL (ref 130–400)
PMV BLD AUTO: 10.7 FL (ref 7.4–10.4)
POTASSIUM SERPL-SCNC: 3.4 MMOL/L (ref 3.5–5.1)
PROT SERPL-MCNC: 10.3 GM/DL (ref 6.4–8.3)
PROT UR QL STRIP: NEGATIVE
RBC # BLD AUTO: 4.63 X10(6)/MCL (ref 4.7–6.1)
RBC #/AREA URNS AUTO: ABNORMAL /HPF
SODIUM SERPL-SCNC: 141 MMOL/L (ref 136–145)
SP GR UR STRIP.AUTO: 1.01 (ref 1–1.03)
SQUAMOUS #/AREA URNS AUTO: ABNORMAL /HPF
TRI-PHOS CRY UR QL COMP ASSIST: ABNORMAL
UROBILINOGEN UR STRIP-ACNC: 2
WBC # BLD AUTO: 7.44 X10(3)/MCL (ref 4.5–11.5)
WBC #/AREA URNS AUTO: ABNORMAL /HPF

## 2025-06-27 PROCEDURE — 83605 ASSAY OF LACTIC ACID: CPT | Performed by: EMERGENCY MEDICINE

## 2025-06-27 PROCEDURE — 63600175 PHARM REV CODE 636 W HCPCS: Performed by: EMERGENCY MEDICINE

## 2025-06-27 PROCEDURE — 87077 CULTURE AEROBIC IDENTIFY: CPT | Performed by: EMERGENCY MEDICINE

## 2025-06-27 PROCEDURE — 85652 RBC SED RATE AUTOMATED: CPT | Performed by: EMERGENCY MEDICINE

## 2025-06-27 PROCEDURE — 87040 BLOOD CULTURE FOR BACTERIA: CPT | Performed by: EMERGENCY MEDICINE

## 2025-06-27 PROCEDURE — 21400001 HC TELEMETRY ROOM

## 2025-06-27 PROCEDURE — 25000003 PHARM REV CODE 250: Performed by: EMERGENCY MEDICINE

## 2025-06-27 PROCEDURE — 99285 EMERGENCY DEPT VISIT HI MDM: CPT | Mod: 25

## 2025-06-27 PROCEDURE — 81003 URINALYSIS AUTO W/O SCOPE: CPT | Performed by: EMERGENCY MEDICINE

## 2025-06-27 PROCEDURE — 85025 COMPLETE CBC W/AUTO DIFF WBC: CPT | Performed by: EMERGENCY MEDICINE

## 2025-06-27 PROCEDURE — 11000001 HC ACUTE MED/SURG PRIVATE ROOM

## 2025-06-27 PROCEDURE — 80053 COMPREHEN METABOLIC PANEL: CPT | Performed by: EMERGENCY MEDICINE

## 2025-06-27 PROCEDURE — 96374 THER/PROPH/DIAG INJ IV PUSH: CPT

## 2025-06-27 RX ORDER — TALC
6 POWDER (GRAM) TOPICAL NIGHTLY PRN
Status: DISCONTINUED | OUTPATIENT
Start: 2025-06-27 | End: 2025-07-02 | Stop reason: HOSPADM

## 2025-06-27 RX ORDER — SODIUM CHLORIDE 0.9 % (FLUSH) 0.9 %
10 SYRINGE (ML) INJECTION
Status: DISCONTINUED | OUTPATIENT
Start: 2025-06-27 | End: 2025-07-02 | Stop reason: HOSPADM

## 2025-06-27 RX ORDER — FENTANYL CITRATE 50 UG/ML
100 INJECTION, SOLUTION INTRAMUSCULAR; INTRAVENOUS
Refills: 0 | Status: COMPLETED | OUTPATIENT
Start: 2025-06-27 | End: 2025-06-27

## 2025-06-27 RX ORDER — HYDROMORPHONE HYDROCHLORIDE 2 MG/ML
2 INJECTION, SOLUTION INTRAMUSCULAR; INTRAVENOUS; SUBCUTANEOUS EVERY 4 HOURS PRN
Status: DISCONTINUED | OUTPATIENT
Start: 2025-06-27 | End: 2025-07-02 | Stop reason: HOSPADM

## 2025-06-27 RX ORDER — ACETAMINOPHEN 500 MG
1000 TABLET ORAL
Status: COMPLETED | OUTPATIENT
Start: 2025-06-27 | End: 2025-06-27

## 2025-06-27 RX ORDER — HYDROMORPHONE HYDROCHLORIDE 2 MG/ML
1 INJECTION, SOLUTION INTRAMUSCULAR; INTRAVENOUS; SUBCUTANEOUS EVERY 4 HOURS PRN
Status: DISCONTINUED | OUTPATIENT
Start: 2025-06-27 | End: 2025-07-02 | Stop reason: HOSPADM

## 2025-06-27 RX ORDER — ONDANSETRON HYDROCHLORIDE 2 MG/ML
4 INJECTION, SOLUTION INTRAVENOUS EVERY 8 HOURS PRN
Status: DISCONTINUED | OUTPATIENT
Start: 2025-06-27 | End: 2025-07-02 | Stop reason: HOSPADM

## 2025-06-27 RX ORDER — ENOXAPARIN SODIUM 100 MG/ML
40 INJECTION SUBCUTANEOUS EVERY 24 HOURS
Status: DISCONTINUED | OUTPATIENT
Start: 2025-06-27 | End: 2025-06-28

## 2025-06-27 RX ADMIN — HYDROMORPHONE HYDROCHLORIDE 1 MG: 2 INJECTION INTRAMUSCULAR; INTRAVENOUS; SUBCUTANEOUS at 11:06

## 2025-06-27 RX ADMIN — ENOXAPARIN SODIUM 40 MG: 40 INJECTION SUBCUTANEOUS at 08:06

## 2025-06-27 RX ADMIN — ACETAMINOPHEN 1000 MG: 500 TABLET ORAL at 08:06

## 2025-06-27 RX ADMIN — PIPERACILLIN SODIUM AND TAZOBACTAM SODIUM 4.5 G: 4; .5 INJECTION, POWDER, LYOPHILIZED, FOR SOLUTION INTRAVENOUS at 11:06

## 2025-06-27 RX ADMIN — VANCOMYCIN HYDROCHLORIDE 1500 MG: 1.5 INJECTION, POWDER, LYOPHILIZED, FOR SOLUTION INTRAVENOUS at 11:06

## 2025-06-27 RX ADMIN — FENTANYL CITRATE 100 MCG: 50 INJECTION, SOLUTION INTRAMUSCULAR; INTRAVENOUS at 05:06
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
## 2025-06-27 NOTE — Clinical Note
Diagnosis: Pressure injury of skin of sacral region, unspecified injury stage [9529119]   Admit to which facility:: [facility] [[id number]]   Reason for IP Medical Treatment  (Clinical interventions that can only be accomplished in the IP setting? ) :: suspected osteomyelitis, needs MRI, IV antibiotics   Plans for Post-Acute care--if anticipated (pick the single best option):: C. Discharge home with home health services   Special Needs:: No Special Needs [1]

## 2025-06-28 LAB
ANION GAP SERPL CALC-SCNC: 10 MEQ/L
BASOPHILS # BLD AUTO: 0.1 X10(3)/MCL
BASOPHILS NFR BLD AUTO: 1.3 %
BUN SERPL-MCNC: 12.8 MG/DL (ref 8.4–25.7)
CALCIUM SERPL-MCNC: 8.8 MG/DL (ref 8.4–10.2)
CHLORIDE SERPL-SCNC: 104 MMOL/L (ref 98–107)
CO2 SERPL-SCNC: 26 MMOL/L (ref 22–29)
CREAT SERPL-MCNC: 0.61 MG/DL (ref 0.72–1.25)
CREAT/UREA NIT SERPL: 21
EOSINOPHIL # BLD AUTO: 0.26 X10(3)/MCL (ref 0–0.9)
EOSINOPHIL NFR BLD AUTO: 3.5 %
ERYTHROCYTE [DISTWIDTH] IN BLOOD BY AUTOMATED COUNT: 16.9 % (ref 11.5–17)
GFR SERPLBLD CREATININE-BSD FMLA CKD-EPI: >60 ML/MIN/1.73/M2
GLUCOSE SERPL-MCNC: 126 MG/DL (ref 74–100)
HCT VFR BLD AUTO: 35.2 % (ref 42–52)
HGB BLD-MCNC: 10.5 G/DL (ref 14–18)
IMM GRANULOCYTES # BLD AUTO: 0.01 X10(3)/MCL (ref 0–0.04)
IMM GRANULOCYTES NFR BLD AUTO: 0.1 %
LYMPHOCYTES # BLD AUTO: 2.32 X10(3)/MCL (ref 0.6–4.6)
LYMPHOCYTES NFR BLD AUTO: 31.2 %
MCH RBC QN AUTO: 24.4 PG (ref 27–31)
MCHC RBC AUTO-ENTMCNC: 29.8 G/DL (ref 33–36)
MCV RBC AUTO: 81.7 FL (ref 80–94)
MONOCYTES # BLD AUTO: 0.51 X10(3)/MCL (ref 0.1–1.3)
MONOCYTES NFR BLD AUTO: 6.9 %
NEUTROPHILS # BLD AUTO: 4.23 X10(3)/MCL (ref 2.1–9.2)
NEUTROPHILS NFR BLD AUTO: 57 %
NRBC BLD AUTO-RTO: 0 %
PLATELET # BLD AUTO: 337 X10(3)/MCL (ref 130–400)
PMV BLD AUTO: 10.2 FL (ref 7.4–10.4)
POTASSIUM SERPL-SCNC: 4.1 MMOL/L (ref 3.5–5.1)
RBC # BLD AUTO: 4.31 X10(6)/MCL (ref 4.7–6.1)
SODIUM SERPL-SCNC: 140 MMOL/L (ref 136–145)
WBC # BLD AUTO: 7.43 X10(3)/MCL (ref 4.5–11.5)

## 2025-06-28 PROCEDURE — 25000003 PHARM REV CODE 250: Performed by: INTERNAL MEDICINE

## 2025-06-28 PROCEDURE — 36569 INSJ PICC 5 YR+ W/O IMAGING: CPT

## 2025-06-28 PROCEDURE — 25000003 PHARM REV CODE 250: Performed by: NURSE PRACTITIONER

## 2025-06-28 PROCEDURE — 63600175 PHARM REV CODE 636 W HCPCS: Performed by: EMERGENCY MEDICINE

## 2025-06-28 PROCEDURE — C1751 CATH, INF, PER/CENT/MIDLINE: HCPCS

## 2025-06-28 PROCEDURE — 63600175 PHARM REV CODE 636 W HCPCS: Performed by: INTERNAL MEDICINE

## 2025-06-28 PROCEDURE — 36415 COLL VENOUS BLD VENIPUNCTURE: CPT | Performed by: EMERGENCY MEDICINE

## 2025-06-28 PROCEDURE — 25000003 PHARM REV CODE 250: Performed by: EMERGENCY MEDICINE

## 2025-06-28 PROCEDURE — 85025 COMPLETE CBC W/AUTO DIFF WBC: CPT | Performed by: EMERGENCY MEDICINE

## 2025-06-28 PROCEDURE — 02HV33Z INSERTION OF INFUSION DEVICE INTO SUPERIOR VENA CAVA, PERCUTANEOUS APPROACH: ICD-10-PCS | Performed by: INTERNAL MEDICINE

## 2025-06-28 PROCEDURE — 11000001 HC ACUTE MED/SURG PRIVATE ROOM

## 2025-06-28 PROCEDURE — 80048 BASIC METABOLIC PNL TOTAL CA: CPT | Performed by: EMERGENCY MEDICINE

## 2025-06-28 PROCEDURE — 21400001 HC TELEMETRY ROOM

## 2025-06-28 RX ORDER — CLONIDINE HYDROCHLORIDE 0.1 MG/1
0.1 TABLET ORAL EVERY 8 HOURS PRN
Status: DISCONTINUED | OUTPATIENT
Start: 2025-06-28 | End: 2025-07-02 | Stop reason: HOSPADM

## 2025-06-28 RX ORDER — SODIUM CHLORIDE 0.9 % (FLUSH) 0.9 %
10 SYRINGE (ML) INJECTION EVERY 12 HOURS PRN
Status: DISCONTINUED | OUTPATIENT
Start: 2025-06-28 | End: 2025-07-02 | Stop reason: HOSPADM

## 2025-06-28 RX ORDER — BACLOFEN 10 MG/1
20 TABLET ORAL 3 TIMES DAILY
Status: DISCONTINUED | OUTPATIENT
Start: 2025-06-28 | End: 2025-07-02 | Stop reason: HOSPADM

## 2025-06-28 RX ORDER — OXYCODONE AND ACETAMINOPHEN 10; 325 MG/1; MG/1
1 TABLET ORAL EVERY 12 HOURS
Refills: 0 | Status: DISCONTINUED | OUTPATIENT
Start: 2025-06-28 | End: 2025-07-02 | Stop reason: HOSPADM

## 2025-06-28 RX ORDER — LORAZEPAM 2 MG/ML
1 INJECTION INTRAMUSCULAR ONCE AS NEEDED
Status: DISCONTINUED | OUTPATIENT
Start: 2025-06-28 | End: 2025-06-29

## 2025-06-28 RX ORDER — GABAPENTIN 400 MG/1
800 CAPSULE ORAL 3 TIMES DAILY
Status: DISCONTINUED | OUTPATIENT
Start: 2025-06-28 | End: 2025-07-02 | Stop reason: HOSPADM

## 2025-06-28 RX ORDER — POLYETHYLENE GLYCOL 3350 17 G/17G
17 POWDER, FOR SOLUTION ORAL DAILY
Status: DISCONTINUED | OUTPATIENT
Start: 2025-06-29 | End: 2025-07-02 | Stop reason: HOSPADM

## 2025-06-28 RX ORDER — MUPIROCIN 20 MG/G
OINTMENT TOPICAL 2 TIMES DAILY
Status: DISCONTINUED | OUTPATIENT
Start: 2025-06-28 | End: 2025-07-02 | Stop reason: HOSPADM

## 2025-06-28 RX ORDER — FLUTICASONE PROPIONATE 50 MCG
1 SPRAY, SUSPENSION (ML) NASAL 2 TIMES DAILY
Status: DISCONTINUED | OUTPATIENT
Start: 2025-06-28 | End: 2025-07-02 | Stop reason: HOSPADM

## 2025-06-28 RX ORDER — HYDROXYZINE PAMOATE 25 MG/1
25 CAPSULE ORAL 3 TIMES DAILY
Status: DISCONTINUED | OUTPATIENT
Start: 2025-06-28 | End: 2025-07-02 | Stop reason: HOSPADM

## 2025-06-28 RX ADMIN — GABAPENTIN 800 MG: 400 CAPSULE ORAL at 10:06

## 2025-06-28 RX ADMIN — APIXABAN 5 MG: 5 TABLET, FILM COATED ORAL at 10:06

## 2025-06-28 RX ADMIN — CLONIDINE HYDROCHLORIDE 0.1 MG: 0.1 TABLET ORAL at 04:06

## 2025-06-28 RX ADMIN — HYDROMORPHONE HYDROCHLORIDE 2 MG: 2 INJECTION, SOLUTION INTRAMUSCULAR; INTRAVENOUS; SUBCUTANEOUS at 03:06

## 2025-06-28 RX ADMIN — BACLOFEN 20 MG: 10 TABLET ORAL at 08:06

## 2025-06-28 RX ADMIN — PIPERACILLIN SODIUM AND TAZOBACTAM SODIUM 4.5 G: 4; .5 INJECTION, POWDER, LYOPHILIZED, FOR SOLUTION INTRAVENOUS at 07:06

## 2025-06-28 RX ADMIN — VANCOMYCIN HYDROCHLORIDE 1000 MG: 1 INJECTION, POWDER, LYOPHILIZED, FOR SOLUTION INTRAVENOUS at 07:06

## 2025-06-28 RX ADMIN — HYDROMORPHONE HYDROCHLORIDE 2 MG: 2 INJECTION, SOLUTION INTRAMUSCULAR; INTRAVENOUS; SUBCUTANEOUS at 07:06

## 2025-06-28 RX ADMIN — HYDROMORPHONE HYDROCHLORIDE 2 MG: 2 INJECTION, SOLUTION INTRAMUSCULAR; INTRAVENOUS; SUBCUTANEOUS at 10:06

## 2025-06-28 RX ADMIN — OXYCODONE AND ACETAMINOPHEN 1 TABLET: 325; 10 TABLET ORAL at 10:06

## 2025-06-28 RX ADMIN — HYDROMORPHONE HYDROCHLORIDE 2 MG: 2 INJECTION, SOLUTION INTRAMUSCULAR; INTRAVENOUS; SUBCUTANEOUS at 02:06

## 2025-06-28 RX ADMIN — BACLOFEN 20 MG: 10 TABLET ORAL at 03:06

## 2025-06-28 RX ADMIN — OXYCODONE AND ACETAMINOPHEN 1 TABLET: 325; 10 TABLET ORAL at 03:06

## 2025-06-28 RX ADMIN — HYDROXYZINE PAMOATE 25 MG: 25 CAPSULE ORAL at 10:06

## 2025-06-28 RX ADMIN — BACLOFEN 20 MG: 10 TABLET ORAL at 10:06

## 2025-06-28 RX ADMIN — HYDROMORPHONE HYDROCHLORIDE 1 MG: 2 INJECTION INTRAMUSCULAR; INTRAVENOUS; SUBCUTANEOUS at 07:06

## 2025-06-28 RX ADMIN — BACLOFEN 20 MG: 10 TABLET ORAL at 04:06

## 2025-06-28 NOTE — H&P
[facility] - [address] S[address]    History & Physical      Patient Name: Hemal Guerrero  MRN: [id number]  Admission Date: 6/27/2025  Attending Physician: Yosvany Simms MD   Primary Care Provider: Margaret Leblanc MD         Patient information was obtained from ER records.     Subjective:     Principal Problem:<principal problem not specified>    Chief Complaint:   Chief Complaint   Patient presents with    General Illness     Pt to ED from home, paraplegic states home health informed him his RBC were high and he would need a bone scan to rule out infection...pt also states out of pain medication; 8/10 pain to abd and lower back. Indwelling suprapubic catheter in place on arrival, heel protectors bilateral. Current sacral wounds w/ wound care.        HPI: 49 y.o. male who  has a past medical history of Chronic UTI and Paraplegia. And chronic sacral decubitus ulcer and left ischial ulcer and repeated prolonged hospital and ltac stays for ostemyelitis and complicated uti presented to the ed with complaints of worsening weakness and subsequent work up and imaging suggestive of complicated uti and furthera dmitted iv abx and further care     Past Medical History:   Diagnosis Date    Chronic UTI     Paraplegia        Past Surgical History:   Procedure Laterality Date    INSERTION OF SUPRAPUBIC CATHETER      LAPAROTOMY         Review of patient's allergies indicates:   Allergen Reactions    Adhesive     Amitriptyline        No current facility-administered medications on file prior to encounter.     Current Outpatient Medications on File Prior to Encounter   Medication Sig    baclofen (LIORESAL) 20 MG tablet Take 20 mg by mouth 3 (three) times daily.    ELIQUIS 5 mg Tab Take 5 mg by mouth 2 (two) times daily.    gabapentin (NEURONTIN) 800 MG tablet Take 800 mg by mouth 3 (three) times daily.    hydrOXYzine pamoate (VISTARIL) 25 MG Cap Take 25 mg by mouth 3 (three) times daily.     oxyCODONE-acetaminophen (PERCOCET)  mg per tablet Take 1 tablet by mouth 2 (two) times daily as needed for Pain.    polyethylene glycol (GLYCOLAX) 17 gram PwPk Take by mouth.    fluticasone propionate (FLONASE) 50 mcg/actuation nasal spray 1 spray by Each Nostril route 2 (two) times daily.    linaCLOtide (LINZESS) 290 mcg Cap capsule Take 1 capsule (290 mcg total) by mouth before breakfast. (Patient not taking: Reported on 4/24/2025)     Family History       Problem Relation (Age of Onset)    Lymphoma Mother    Rheum arthritis Mother          Tobacco Use    Smoking status: Never    Smokeless tobacco: Never   Substance and Sexual Activity    Alcohol use: Not Currently    Drug use: Yes     Types: Marijuana    Sexual activity: Not on file     Review of Systems   Constitutional: Negative.    HENT: Negative.     Eyes: Negative.    Respiratory: Negative.     Cardiovascular: Negative.    Gastrointestinal: Negative.    Endocrine: Negative.    Genitourinary: Negative.    Musculoskeletal:  Positive for back pain.   Skin:  Positive for wound.   Allergic/Immunologic: Negative.  Food allergies: manthena.   Neurological:  Positive for weakness.   Hematological: Negative.    Psychiatric/Behavioral: Negative.       Objective:     Vital Signs (Most Recent):  Temp: 98.5 °F (36.9 °C) (06/28/25 1551)  Pulse: 84 (06/28/25 1551)  Resp: 18 (06/28/25 1551)  BP: (!) 231/106 (06/28/25 1551)  SpO2: 100 % (06/28/25 1551) Vital Signs (24h Range):  Temp:  [98.2 °F (36.8 °C)-99 °F (37.2 °C)] 98.5 °F (36.9 °C)  Pulse:  [62-84] 84  Resp:  [16-20] 18  SpO2:  [98 %-100 %] 100 %  BP: ()/() 231/106     Weight: 68 kg (150 lb)  Body mass index is 20.34 kg/m².    Physical Exam  Vitals reviewed.   Constitutional:       Appearance: Normal appearance.   HENT:      Head: Normocephalic and atraumatic.      Right Ear: Tympanic membrane and external ear normal.      Nose: Nose normal.      Mouth/Throat:      Mouth: Mucous membranes are moist.    Eyes:      Extraocular Movements: Extraocular movements intact.      Pupils: Pupils are equal, round, and reactive to light.   Cardiovascular:      Rate and Rhythm: Normal rate and regular rhythm.      Pulses: Normal pulses.      Heart sounds: Normal heart sounds.   Pulmonary:      Effort: Pulmonary effort is normal.      Breath sounds: Normal breath sounds.   Abdominal:      General: Abdomen is flat. Bowel sounds are normal.      Palpations: Abdomen is soft.   Musculoskeletal:         General: Deformity present. Normal range of motion.      Cervical back: Normal range of motion and neck supple.   Skin:     General: Skin is warm and dry.   Neurological:      General: No focal deficit present.      Mental Status: He is alert and oriented to person, place, and time.      Motor: Weakness present.   Psychiatric:         Mood and Affect: Mood normal.         Behavior: Behavior normal.           CRANIAL NERVES     CN III, IV, VI   Pupils are equal, round, and reactive to light.      Significant Labs: All pertinent labs within the past 24 hours have been reviewed.  Lab Results   Component Value Date    WBC 7.43 06/28/2025    HGB 10.5 (L) 06/28/2025    HCT 35.2 (L) 06/28/2025    MCV 81.7 06/28/2025     06/28/2025         Recent Labs   Lab 06/28/25  0356      K 4.1      CO2 26   BUN 12.8   CREATININE 0.61*   CALCIUM 8.8       Significant Imaging: I have reviewed all pertinent imaging results/findings within the past 24 hours.    Assessment/Plan:     There are no hospital problems to display for this patient.    VTE Risk Mitigation (From admission, onward)           Ordered     enoxaparin injection 40 mg  Daily         06/27/25 2000     IP VTE HIGH RISK PATIENT  Once         06/27/25 2000     Place sequential compression device  Until discontinued         06/27/25 2000                    Sepsis  Complicated uti  Chronic osteomyelitis of inferior pubic rami  Sacral decubitis ulcer  Paraplegic  Neurogenic  bladder w chronic indwelling vogt   Uti-poa  Deconditioning   PAF  Hx of R foot osteomyelitis  Htn  Iron def  Anxiety/depression     Plan  Mri to r/o osteoiv abx  Symptomatic management  Follow cx  Consult wound care   F/u on cx results  Resume home meds   Labs in the am  Gi and dvt ppx  Full code    Yosvany Simms MD  Department of Hospital Medicine   [facility] - [address] [place] [address] Surg

## 2025-06-28 NOTE — PROGRESS NOTES
"Pharmacokinetic Initial Assessment: IV Vancomycin    Assessment/Plan:    Initiate intravenous vancomycin with loading dose of 1500 mg once followed by a maintenance dose of vancomycin 1000mg IV every 8 hours  Desired empiric serum trough concentration is 10 to 20 mcg/mL  Draw vancomycin trough on 6/29 at 2100  Pharmacy will continue to follow and monitor vancomycin.       Patient brief summary:  Hemal Guerrero is a 49 y.o. male initiated on antimicrobial therapy with IV Vancomycin for treatment of suspected skin & soft tissue infection    Drug Allergies:   Review of patient's allergies indicates:   Allergen Reactions    Adhesive     Amitriptyline        Actual Body Weight:   68 kg    Renal Function:   Estimated Creatinine Clearance: 126.9 mL/min (A) (based on SCr of 0.67 mg/dL (L)).,     Dialysis Method (if applicable):  N/A    CBC (last 72 hours):  Recent Labs   Lab Result Units 06/27/25  1739   WBC x10(3)/mcL 7.44   Hgb g/dL 11.3*   Hct % 37.3*   Platelet x10(3)/mcL 351   Mono % % 5.6   Eos % % 3.9   Basophil % % 1.7       Metabolic Panel (last 72 hours):  Recent Labs   Lab Result Units 06/27/25  1739 06/27/25  1804   Sodium mmol/L 141  --    Potassium mmol/L 3.4*  --    Chloride mmol/L 103  --    CO2 mmol/L 28  --    Glucose mg/dL 90  --    Glucose, UA   --  Negative   Blood Urea Nitrogen mg/dL 14.1  --    Creatinine mg/dL 0.67*  --    Albumin g/dL 3.6  --    Bilirubin Total mg/dL 0.5  --    ALP unit/L 95  --    AST unit/L 10*  --    ALT unit/L 7  --        Drug levels (last 3 results):  No results for input(s): "VANCOMYCINRA", "VANCORANDOM", "VANCOMYCINPE", "VANCOPEAK", "VANCOMYCINTR", "VANCOTROUGH" in the last 72 hours.    Microbiologic Results:  Microbiology Results (last 7 days)       Procedure Component Value Units Date/Time    Urine culture [[id number]] Collected: 06/27/25 1804    Order Status: Sent Specimen: Urine, Catheterized Updated: 06/27/25 1825    Blood culture #2 **CANNOT BE ORDERED STAT** " [[id number]] Collected: 06/27/25 1741    Order Status: Sent Specimen: Blood from Arm, Left Updated: 06/27/25 1744    Blood culture #1 **CANNOT BE ORDERED STAT** [[id number]] Collected: 06/27/25 1730    Order Status: Sent Specimen: Blood from Wrist, Right Updated: 06/27/25 1744

## 2025-06-28 NOTE — CONSULTS
[facility] [facility]  Wound Care    Patient Name:  Hemal Guerrero   MRN:  [id number]  Date: 6/28/2025  Diagnosis: <principal problem not specified>    History:     Past Medical History:   Diagnosis Date    Chronic UTI     Paraplegia        Social History[1]    Precautions:     Allergies as of 06/27/2025 - Reviewed 06/27/2025   Allergen Reaction Noted    Adhesive  02/05/2023    Amitriptyline  11/16/2022       WOC Assessment Details/Treatment        06/28/25 1025   WOCN Assessment   Visit Date 06/28/25   Visit Time 1025   Consult Type New   WOCN Speciality Wound   Wound pressure   Number of Wounds 2   Intervention assessed;changed;applied;chart review;coordination of care;orders   Teaching on-going        Altered Skin Integrity 01/06/24 1640 Sacral spine #1 Pressure Injury   Date First Assessed/Time First Assessed: 01/06/24 1640   Altered Skin Integrity Present on Admission - Did Patient arrive to the hospital with altered skin?: yes  Location: Sacral spine  Wound Number: #1  Is this injury device related?: No  Primary Wo...   Wound Image    Description of Altered Skin Integrity Full thickness tissue loss. Subcutaneous fat may be visible but bone, tendon or muscle are not exposed   Dressing Appearance Moist drainage   Drainage Amount Small   Drainage Characteristics/Odor Purulent   Appearance Pink;Red;Moist   Tissue loss description Full thickness   Black (%), Wound Tissue Color 0 %   Red (%), Wound Tissue Color 100 %   Yellow (%), Wound Tissue Color 0 %   Periwound Area Pink;Excoriated   Wound Edges Defined;Irregular   Wound Length (cm) 2 cm   Wound Width (cm) 0.7 cm   Wound Depth (cm) 0.7 cm   Wound Volume (cm^3) 0.513 cm^3   Wound Surface Area (cm^2) 1.1 cm^2   Care Cleansed with:;Antimicrobial agent;Wound cleanser;Other (see comments)  (vashe)   Dressing Changed;Silver;Absorptive Pad        Altered Skin Integrity 03/31/24 0400 Left posterior Greater trochanter #3 Non pressure chronic ulcer  Full thickness tissue loss. Subcutaneous fat may be visible but bone, tendon or muscle are not exposed   Date First Assessed/Time First Assessed: 03/31/24 0400   Altered Skin Integrity Present on Admission - Did Patient arrive to the hospital with altered skin?: yes  Side: Left  Orientation: posterior  Location: Greater trochanter  Wound Number: #3  Is t...   Wound Image    Description of Altered Skin Integrity Full thickness tissue loss. Subcutaneous fat may be visible but bone, tendon or muscle are not exposed   Dressing Appearance Moist drainage   Drainage Amount Moderate   Drainage Characteristics/Odor Purulent   Appearance Red;Pink;White;Moist   Tissue loss description Full thickness   Black (%), Wound Tissue Color 0 %   Red (%), Wound Tissue Color 100 %   Yellow (%), Wound Tissue Color 0 %   Periwound Area Intact;Dry   Wound Edges Defined;Irregular   Wound Length (cm) 4.2 cm   Wound Width (cm) 6 cm   Wound Depth (cm) 2 cm   Wound Volume (cm^3) 26.389 cm^3   Wound Surface Area (cm^2) 19.79 cm^2   Care Cleansed with:;Antimicrobial agent;Wound cleanser;Other (see comments)  (vashe)   Dressing Changed;Gauze, wet to dry;Absorptive Pad;Other (comment)  (vashe moistened gauze)     WOCN consulted for sacrum and L buttock. Patient awake, on MONTY bed, preventative measures in place. Educated patient on type of wound care done and how it promotes healing, patient verbalized understanding. Treatment recommendations: sacrum: cleanse with vashe, pat dry, apply aquacel silver into wound leaving at least 1/2 inch tail out, cover with abd pad, change daily and PRN soilage. L buttock: cleanse with vashe, apply vashe moistened wet to dry gauze dressing, cover with abd pad, change BID and PRN soilage. Nursing to continue with current treatment recommendations. Wound care will follow.     06/28/2025         [1]   Social History  Socioeconomic History    Marital status:    Tobacco Use    Smoking status: Never    Smokeless  tobacco: Never   Substance and Sexual Activity    Alcohol use: Not Currently    Drug use: Yes     Types: Marijuana     Social Drivers of Health     Financial Resource Strain: Medium Risk (2/7/2025)    Overall Financial Resource Strain (CARDIA)     Difficulty of Paying Living Expenses: Somewhat hard   Food Insecurity: Food Insecurity Present (2/7/2025)    Hunger Vital Sign     Worried About Running Out of Food in the Last Year: Sometimes true     Ran Out of Food in the Last Year: Never true   Transportation Needs: Unmet Transportation Needs (2/7/2025)    TRANSPORTATION NEEDS     Transportation : Yes, it has kept me from non-medical meetings, appointments, work or from getting things that I need.   Physical Activity: Inactive (2/7/2025)    Exercise Vital Sign     Days of Exercise per Week: 0 days     Minutes of Exercise per Session: 0 min   Stress: Stress Concern Present (2/7/2025)    [employer] - Occupational Stress Questionnaire     Feeling of Stress : Rather much   Housing Stability: Unknown (2/7/2025)    Housing Stability Vital Sign     Unable to Pay for Housing in the Last Year: No     Homeless in the Last Year: No

## 2025-06-28 NOTE — PROCEDURES
Hemal Guerrero is a 49 y.o. male patient.    Temp: 98.5 °F (36.9 °C) (06/28/25 1551)  Pulse: 64 (06/28/25 1648)  Resp: 18 (06/28/25 1551)  BP: (!) 195/98 (06/28/25 1650)  SpO2: 100 % (06/28/25 1551)  Weight: 68 kg (149 lb 14.6 oz) (06/28/25 1626)  Height: 6' (182.9 cm) (06/28/25 1626)    PICC  Date/Time: 6/28/2025 5:41 PM  Performed by: Kiya Broussard, KAMLA  Consent Done: Yes  Time out: Immediately prior to procedure a time out was called to verify the correct patient, procedure, equipment, support staff and site/side marked as required  Indications: med administration and vascular access  Anesthesia: local infiltration  Local anesthetic: lidocaine 1% without epinephrine  Anesthetic Total (mL): 4  Preparation: skin prepped with ChloraPrep  Skin prep agent dried: skin prep agent completely dried prior to procedure  Sterile barriers: all five maximum sterile barriers used - cap, mask, sterile gown, sterile gloves, and large sterile sheet  Hand hygiene: hand hygiene performed prior to central venous catheter insertion  Location details: right basilic  Catheter type: double lumen  Catheter size: 5 Fr  Catheter Length: 38cm    Ultrasound guidance: yes  Vessel Caliber: medium and patent, compressibility normal  Needle advanced into vessel with real time Ultrasound guidance.  Guidewire confirmed in vessel.  Sterile sheath used.  Number of attempts: 1  Post-procedure: blood return through all ports, sterile dressing applied and chlorhexidine patch    Assessment: placement verified by x-ray  Complications: none          Kiya broussard RN  6/28/2025

## 2025-06-29 PROBLEM — E43 SEVERE MALNUTRITION: Status: ACTIVE | Noted: 2025-06-29

## 2025-06-29 LAB
ALBUMIN SERPL-MCNC: 3.2 G/DL (ref 3.5–5)
ALBUMIN/GLOB SERPL: 0.5 RATIO (ref 1.1–2)
ALP SERPL-CCNC: 87 UNIT/L (ref 40–150)
ALT SERPL-CCNC: 7 UNIT/L (ref 0–55)
ANION GAP SERPL CALC-SCNC: 9 MEQ/L
AST SERPL-CCNC: 9 UNIT/L (ref 11–45)
BASOPHILS # BLD AUTO: 0.1 X10(3)/MCL
BASOPHILS NFR BLD AUTO: 1.2 %
BILIRUB SERPL-MCNC: 0.3 MG/DL
BUN SERPL-MCNC: 12.3 MG/DL (ref 8.4–25.7)
CALCIUM SERPL-MCNC: 9.3 MG/DL (ref 8.4–10.2)
CHLORIDE SERPL-SCNC: 106 MMOL/L (ref 98–107)
CO2 SERPL-SCNC: 26 MMOL/L (ref 22–29)
CREAT SERPL-MCNC: 0.64 MG/DL (ref 0.72–1.25)
CREAT/UREA NIT SERPL: 19
EOSINOPHIL # BLD AUTO: 0.33 X10(3)/MCL (ref 0–0.9)
EOSINOPHIL NFR BLD AUTO: 3.9 %
ERYTHROCYTE [DISTWIDTH] IN BLOOD BY AUTOMATED COUNT: 16.8 % (ref 11.5–17)
GFR SERPLBLD CREATININE-BSD FMLA CKD-EPI: >60 ML/MIN/1.73/M2
GLOBULIN SER-MCNC: 5.9 GM/DL (ref 2.4–3.5)
GLUCOSE SERPL-MCNC: 81 MG/DL (ref 74–100)
HCT VFR BLD AUTO: 35.2 % (ref 42–52)
HGB BLD-MCNC: 10.5 G/DL (ref 14–18)
IMM GRANULOCYTES # BLD AUTO: 0.01 X10(3)/MCL (ref 0–0.04)
IMM GRANULOCYTES NFR BLD AUTO: 0.1 %
LYMPHOCYTES # BLD AUTO: 2.55 X10(3)/MCL (ref 0.6–4.6)
LYMPHOCYTES NFR BLD AUTO: 30.2 %
MCH RBC QN AUTO: 24.1 PG (ref 27–31)
MCHC RBC AUTO-ENTMCNC: 29.8 G/DL (ref 33–36)
MCV RBC AUTO: 80.7 FL (ref 80–94)
MONOCYTES # BLD AUTO: 0.53 X10(3)/MCL (ref 0.1–1.3)
MONOCYTES NFR BLD AUTO: 6.3 %
NEUTROPHILS # BLD AUTO: 4.92 X10(3)/MCL (ref 2.1–9.2)
NEUTROPHILS NFR BLD AUTO: 58.3 %
NRBC BLD AUTO-RTO: 0 %
PLATELET # BLD AUTO: 374 X10(3)/MCL (ref 130–400)
PMV BLD AUTO: 10.3 FL (ref 7.4–10.4)
POTASSIUM SERPL-SCNC: 3.9 MMOL/L (ref 3.5–5.1)
PROT SERPL-MCNC: 9.1 GM/DL (ref 6.4–8.3)
RBC # BLD AUTO: 4.36 X10(6)/MCL (ref 4.7–6.1)
SODIUM SERPL-SCNC: 141 MMOL/L (ref 136–145)
VANCOMYCIN SERPL-MCNC: 15.5 UG/ML (ref 15–20)
VANCOMYCIN TROUGH SERPL-MCNC: 24 UG/ML (ref 15–20)
WBC # BLD AUTO: 8.44 X10(3)/MCL (ref 4.5–11.5)

## 2025-06-29 PROCEDURE — 63600175 PHARM REV CODE 636 W HCPCS

## 2025-06-29 PROCEDURE — 80202 ASSAY OF VANCOMYCIN: CPT | Performed by: INTERNAL MEDICINE

## 2025-06-29 PROCEDURE — 80053 COMPREHEN METABOLIC PANEL: CPT | Performed by: INTERNAL MEDICINE

## 2025-06-29 PROCEDURE — 25000003 PHARM REV CODE 250: Performed by: NURSE PRACTITIONER

## 2025-06-29 PROCEDURE — 11000001 HC ACUTE MED/SURG PRIVATE ROOM

## 2025-06-29 PROCEDURE — 21400001 HC TELEMETRY ROOM

## 2025-06-29 PROCEDURE — 25000003 PHARM REV CODE 250: Performed by: INTERNAL MEDICINE

## 2025-06-29 PROCEDURE — 25000242 PHARM REV CODE 250 ALT 637 W/ HCPCS: Performed by: INTERNAL MEDICINE

## 2025-06-29 PROCEDURE — 36415 COLL VENOUS BLD VENIPUNCTURE: CPT | Performed by: INTERNAL MEDICINE

## 2025-06-29 PROCEDURE — 25000003 PHARM REV CODE 250: Performed by: EMERGENCY MEDICINE

## 2025-06-29 PROCEDURE — 63600175 PHARM REV CODE 636 W HCPCS: Performed by: INTERNAL MEDICINE

## 2025-06-29 PROCEDURE — 85025 COMPLETE CBC W/AUTO DIFF WBC: CPT | Performed by: INTERNAL MEDICINE

## 2025-06-29 PROCEDURE — 63600175 PHARM REV CODE 636 W HCPCS: Performed by: EMERGENCY MEDICINE

## 2025-06-29 RX ORDER — LORAZEPAM 2 MG/ML
INJECTION INTRAMUSCULAR
Status: COMPLETED
Start: 2025-06-29 | End: 2025-06-29

## 2025-06-29 RX ADMIN — VANCOMYCIN HYDROCHLORIDE 1000 MG: 1 INJECTION, POWDER, LYOPHILIZED, FOR SOLUTION INTRAVENOUS at 05:06

## 2025-06-29 RX ADMIN — LINACLOTIDE 290 MCG: 145 CAPSULE, GELATIN COATED ORAL at 05:06

## 2025-06-29 RX ADMIN — OXYCODONE AND ACETAMINOPHEN 1 TABLET: 325; 10 TABLET ORAL at 08:06

## 2025-06-29 RX ADMIN — APIXABAN 5 MG: 5 TABLET, FILM COATED ORAL at 08:06

## 2025-06-29 RX ADMIN — BACLOFEN 20 MG: 10 TABLET ORAL at 03:06

## 2025-06-29 RX ADMIN — GABAPENTIN 800 MG: 400 CAPSULE ORAL at 08:06

## 2025-06-29 RX ADMIN — HYDROXYZINE PAMOATE 25 MG: 25 CAPSULE ORAL at 03:06

## 2025-06-29 RX ADMIN — LORAZEPAM 2 MG: 2 INJECTION INTRAMUSCULAR; INTRAVENOUS at 12:06

## 2025-06-29 RX ADMIN — HYDROMORPHONE HYDROCHLORIDE 1 MG: 2 INJECTION INTRAMUSCULAR; INTRAVENOUS; SUBCUTANEOUS at 11:06

## 2025-06-29 RX ADMIN — PIPERACILLIN SODIUM AND TAZOBACTAM SODIUM 4.5 G: 4; .5 INJECTION, POWDER, LYOPHILIZED, FOR SOLUTION INTRAVENOUS at 09:06

## 2025-06-29 RX ADMIN — POLYETHYLENE GLYCOL 3350 17 G: 17 POWDER, FOR SOLUTION ORAL at 08:06

## 2025-06-29 RX ADMIN — GABAPENTIN 800 MG: 400 CAPSULE ORAL at 03:06

## 2025-06-29 RX ADMIN — HYDROXYZINE PAMOATE 25 MG: 25 CAPSULE ORAL at 08:06

## 2025-06-29 RX ADMIN — PIPERACILLIN SODIUM AND TAZOBACTAM SODIUM 4.5 G: 4; .5 INJECTION, POWDER, LYOPHILIZED, FOR SOLUTION INTRAVENOUS at 05:06

## 2025-06-29 RX ADMIN — FLUTICASONE PROPIONATE 50 MCG: 50 SPRAY, METERED NASAL at 08:06

## 2025-06-29 RX ADMIN — MUPIROCIN: 20 OINTMENT TOPICAL at 08:06

## 2025-06-29 RX ADMIN — BACLOFEN 20 MG: 10 TABLET ORAL at 08:06

## 2025-06-29 RX ADMIN — PIPERACILLIN SODIUM AND TAZOBACTAM SODIUM 4.5 G: 4; .5 INJECTION, POWDER, LYOPHILIZED, FOR SOLUTION INTRAVENOUS at 11:06

## 2025-06-29 RX ADMIN — VANCOMYCIN HYDROCHLORIDE 1000 MG: 1 INJECTION, POWDER, LYOPHILIZED, FOR SOLUTION INTRAVENOUS at 09:06

## 2025-06-29 NOTE — PROGRESS NOTES
Pharmacokinetic Assessment Follow Up: IV Vancomycin    Vancomycin serum concentration assessment(s):    The trough level was drawn correctly and can be used to guide therapy at this time. The measurement is above the desired definitive target range of 10 to 20 mcg/mL.    Vancomycin Regimen Plan:    Discontinue the scheduled vancomycin regimen and re-dose when the random level is less than 20 mcg/mL, next level to be drawn at 1700 on 06/29..    Drug levels (last 3 results):  Recent Labs   Lab Result Units 06/29/25  1103   Vancomycin Trough ug/ml 24.0*       Pharmacy will continue to follow and monitor vancomycin.    Please contact pharmacy at extension [phone] for questions regarding this assessment.    Thank you for the consult,   Ria Rodríguez       Patient brief summary:  Hemal Guerrero is a 49 y.o. male initiated on antimicrobial therapy with IV Vancomycin for treatment of skin & soft tissue infection    The patient's current regimen is 1000mg q8h.    Drug Allergies:   Review of patient's allergies indicates:   Allergen Reactions    Adhesive     Amitriptyline        Actual Body Weight:   68kg    Renal Function:   Estimated Creatinine Clearance: 132.8 mL/min (A) (based on SCr of 0.64 mg/dL (L)).,     Dialysis Method (if applicable):  N/A    CBC (last 72 hours):  Recent Labs   Lab Result Units 06/27/25  1739 06/28/25  0356 06/29/25  0420   WBC x10(3)/mcL 7.44 7.43 8.44   Hgb g/dL 11.3* 10.5* 10.5*   Hct % 37.3* 35.2* 35.2*   Platelet x10(3)/mcL 351 337 374   Mono % % 5.6 6.9 6.3   Eos % % 3.9 3.5 3.9   Basophil % % 1.7 1.3 1.2       Metabolic Panel (last 72 hours):  Recent Labs   Lab Result Units 06/27/25  1739 06/27/25  1804 06/28/25  0356 06/29/25  0420   Sodium mmol/L 141  --  140 141   Potassium mmol/L 3.4*  --  4.1 3.9   Chloride mmol/L 103  --  104 106   CO2 mmol/L 28  --  26 26   Glucose mg/dL 90  --  126* 81   Glucose, UA   --  Negative  --   --    Blood Urea Nitrogen mg/dL 14.1  --  12.8 12.3   Creatinine  mg/dL 0.67*  --  0.61* 0.64*   Albumin g/dL 3.6  --   --  3.2*   Bilirubin Total mg/dL 0.5  --   --  0.3   ALP unit/L 95  --   --  87   AST unit/L 10*  --   --  9*   ALT unit/L 7  --   --  7       Vancomycin Administrations:  vancomycin given in the last 96 hours                     vancomycin (VANCOCIN) 1,000 mg in D5W 250 mL IVPB (admixture device) (mg) 1,000 mg New Bag 06/29/25 0512     1,000 mg New Bag 06/28/25 1932     1,000 mg New Bag  0704    vancomycin 1,500 mg in D5W 250 mL IVPB (admixture device) (mg) 1,500 mg New Bag 06/27/25 2308                    Microbiologic Results:  Microbiology Results (last 7 days)       Procedure Component Value Units Date/Time    Urine culture [[id number]]  (Abnormal) Collected: 06/27/25 1804    Order Status: Completed Specimen: Urine, Catheterized Updated: 06/29/25 0914     Urine Culture >/= 100,000 colonies/ml Gram-negative Rods      >/= 100,000 colonies/ml Gram-positive Cocci      25,000-50,000 colonies/ml Gram-positive Cocci    Blood culture #1 **CANNOT BE ORDERED STAT** [[id number]]  (Normal) Collected: 06/27/25 1730    Order Status: Completed Specimen: Blood from Wrist, Right Updated: 06/29/25 0002     Blood Culture No Growth At 24 Hours    Blood culture #2 **CANNOT BE ORDERED STAT** [[id number]]  (Normal) Collected: 06/27/25 1741    Order Status: Completed Specimen: Blood from Arm, Left Updated: 06/29/25 0002     Blood Culture No Growth At 24 Hours

## 2025-06-29 NOTE — CONSULTS
Inpatient Nutrition Assessment    Admit Date: 6/27/2025   Total duration of encounter: 2 days   Patient Age: 49 y.o.    Nutrition Recommendation/Prescription     Continue oral diet as tolerated; currently Diet Adult Regular Standard Tray   Brandon 1 pkt bid for wound healing  Prosource 1 pkt bid; provides 60 kcal, 15 gm protein per pkt  Consider adding VitC, Zinc, Vit A for wound healing if medically appropriate    Communication of Recommendations: reviewed with nurse and reviewed with patient    Nutrition Assessment     Malnutrition Assessment/Nutrition-Focused Physical Exam    Malnutrition Context: acute illness or injury (06/29/25 1807)  Malnutrition Level: severe (06/29/25 1807)  Energy Intake (Malnutrition): less than or equal to 75% for greater than or equal to 1 month (06/29/25 1807)  Weight Loss (Malnutrition): other (see comments) (Does not meet criteria) (06/29/25 1807)              Muscle Mass (Malnutrition): mild depletion (06/29/25 1807)  Lanse Region (Muscle Loss): mild depletion  Clavicle Bone Region (Muscle Loss): mild depletion  Clavicle and Acromion Bone Region (Muscle Loss): mild depletion                          A minimum of two characteristics is recommended for diagnosis of either severe or non-severe malnutrition.    Chart Review    Reason Seen: continuous nutrition monitoring and physician consult for wounds    Malnutrition Screening Tool Results   Have you recently lost weight without trying?: No  Have you been eating poorly because of a decreased appetite?: Yes   MST Score: 1   Diagnosis:  Sepsis  Complicated uti  Chronic osteomyelitis of inferior pubic rami  Sacral decubitis ulcer  Paraplegic  Neurogenic bladder w chronic indwelling vogt   Uti-poa  Deconditioning     Relevant Medical History: R foot osteomyelitis  Htn  Iron def  Anxiety/depression    Scheduled Medications:  apixaban, 5 mg, BID  baclofen, 20 mg, TID  fluticasone propionate, 1 spray, BID  gabapentin, 800 mg,  TID  hydrOXYzine pamoate, 25 mg, TID  linaCLOtide, 290 mcg, Before breakfast  mupirocin, , BID  oxyCODONE-acetaminophen, 1 tablet, Q12H  piperacillin-tazobactam (Zosyn) IV (PEDS and ADULTS) (extended infusion is not appropriate), 4.5 g, Q8H  polyethylene glycol, 17 g, Daily    Continuous Infusions:   PRN Medications:  cloNIDine, 0.1 mg, Q8H PRN  HYDROmorphone, 1 mg, Q4H PRN  HYDROmorphone, 2 mg, Q4H PRN  melatonin, 6 mg, Nightly PRN  ondansetron, 4 mg, Q8H PRN  sodium chloride 0.9%, 10 mL, PRN  sodium chloride 0.9%, 10 mL, Q12H PRN  vancomycin - pharmacy to dose, , pharmacy to manage frequency    Calorie Containing IV Medications: no significant kcals from medications at this time    Recent Labs   Lab 06/27/25  1739 06/28/25  0356 06/29/25  0420    140 141   K 3.4* 4.1 3.9   CALCIUM 9.9 8.8 9.3    104 106   CO2 28 26 26   BUN 14.1 12.8 12.3   CREATININE 0.67* 0.61* 0.64*   EGFRNORACEVR >60 >60 >60   GLU 90 126* 81   BILITOT 0.5  --  0.3   ALKPHOS 95  --  87   ALT 7  --  7   AST 10*  --  9*   ALBUMIN 3.6  --  3.2*   WBC 7.44 7.43 8.44   HGB 11.3* 10.5* 10.5*   HCT 37.3* 35.2* 35.2*     Nutrition Orders:  Diet Adult Regular Standard Tray  Dietary nutrition supplements All Meals; Boost Very High Calorie Nutritional Drink - Strawberry,Dietary nutrition supplements BID; ProSource NoCarb Liquid Protein - Neutral, Brandon - Any flavor    Appetite/Oral Intake: fair/50-75% of meals  Factors Affecting Nutritional Intake: decreased appetite  Social Needs Impacting Access to Food: none identified  Food/Mosque/Cultural Preferences: none reported  Food Allergies: no known food allergies  Last Bowel Movement: 06/28/25  Wound(s):     Altered Skin Integrity 01/06/24 1640 Sacral spine #1 Pressure Injury-Tissue loss description: Full thickness       Altered Skin Integrity 03/31/24 0400 Left posterior Greater trochanter #3 Non pressure chronic ulcer Full thickness tissue loss. Subcutaneous fat may be visible but bone,  "tendon or muscle are not exposed-Tissue loss description: Full thickness     Comments    25 Pt reports decreased appetite for about a month or so, feels he has lost some weight but unsure of exact amount; weight loss noted in EMR since April. Concerned about getting enough protein for wound healing, so will add Prosource and Brandon. Also agreeable to adding Boost supplements since appetite decreased, was drinking Protein Shakes at home. Prefers strawberry flavor.    Anthropometrics    Height: 6' 0.01" (182.9 cm), Height Method: Measured  Last Weight: 68 kg (149 lb 14.6 oz) (25 1803), Weight Method: Bed Scale  BMI (Calculated): 20.3  BMI Classification: normal (BMI 18.5-24.9)        Ideal Body Weight (IBW), Male: 178.06 lb     % Ideal Body Weight, Male (lb): 84.19 %                 Usual Body Weight (UBW), k.6 kg  % Usual Body Weight: 93.86  % Weight Change From Usual Weight: -6.34 %  Usual Weight Provided By: EMR weight history    Wt Readings from Last 5 Encounters:   25 68 kg (149 lb 14.6 oz)   25 64.6 kg (142 lb 6.4 oz)   25 72.6 kg (160 lb)   25 71.5 kg (157 lb 10.1 oz)   25 72.6 kg (160 lb)     Weight Change(s) Since Admission:   Wt Readings from Last 1 Encounters:   25 1803 68 kg (149 lb 14.6 oz)   25 1626 68 kg (149 lb 14.6 oz)   25 1715 68 kg (150 lb)   Admit Weight: 68 kg (150 lb) (25 1715), Weight Method: Stated    Estimated Needs    Weight Used For Calorie Calculations: 68 kg (149 lb 14.6 oz)  Energy Calorie Requirements (kcal): 2040 kcal (30 kcal/kg)  Energy Need Method: Kcal/kg  Weight Used For Protein Calculations: 68 kg (149 lb 14.6 oz)  Protein Requirements: 102gm (1.5 gm/kg)  Fluid Requirements (mL): 2040ml (1 ml/kcal)        Enteral Nutrition     Patient not receiving enteral nutrition at this time.    Parenteral Nutrition     Patient not receiving parenteral nutrition support at this time.    Evaluation of Received Nutrient " Intake    Calories: not meeting estimated needs  Protein: not meeting estimated needs    Patient Education     Not applicable.    Nutrition Diagnosis     PES: Inadequate oral intake related to acute illness as evidenced by <75% intake of meals x 1 month. (new)     PES: Severe acute disease or injury related malnutrition Related to acute condition As Evidenced by:  - energy intake: <= 75% for 2 months (meets criteria for <= 75% >= 1 month (severe - chronic)) - muscle mass depletion: 6 areas of mild muscle loss (Trapezius, Clavicle, Temporalis, Acromion, Pectoralis, Deltoid) new    Nutrition Interventions     Intervention(s): commercial beverage, multivitamin/mineral supplement therapy, and collaboration with other providers  Intervention(s): Oral nutritional supplement    Goal: Consume % of meals/snacks by follow-up. (new)  Goal: Consume % of oral supplements by follow-up. (new)    Nutrition Goals & Monitoring     Dietitian will monitor: food and beverage intake and weight change  Discharge planning: resume home regimen  Nutrition Risk/Follow-Up: patient at increased nutrition risk; dietitian will follow-up twice weekly   Please consult if re-assessment needed sooner.

## 2025-06-29 NOTE — PROGRESS NOTES
[facility] Medicine  Progress Note    Patient Name: Hemal Guerrero  MRN: [id number]  Patient Class: IP- Inpatient   Admission Date: 6/27/2025  Length of Stay: 2 days  Attending Physician: Yosvany Simms MD  Primary Care Provider: Margaret Leblanc MD        Subjective:     Principal Problem:Complicated UTI (urinary tract infection)    Interval History:   Today's info : seen and examined, no acute events overnight. Continues to improve   Afebrile  Remains on iv abx  Pain controlled  Mri neg for sotemyelitis    Review of Systems   Constitutional:  Positive for fever.   Respiratory:  Positive for shortness of breath.    Gastrointestinal:  Positive for abdominal distention and abdominal pain.   Musculoskeletal:  Positive for back pain.   Skin:  Positive for wound.   Neurological:  Positive for weakness.     Objective:     Vital Signs (Most Recent):  Temp: 98.1 °F (36.7 °C) (06/29/25 1143)  Pulse: 67 (06/29/25 1143)  Resp: 16 (06/29/25 1125)  BP: 102/66 (06/29/25 1143)  SpO2: 99 % (06/29/25 1143) Vital Signs (24h Range):  Temp:  [98 °F (36.7 °C)-98.5 °F (36.9 °C)] 98.1 °F (36.7 °C)  Pulse:  [62-86] 67  Resp:  [16-20] 16  SpO2:  [99 %-100 %] 99 %  BP: (102-231)/() 102/66     Weight: 68 kg (149 lb 14.6 oz)  Body mass index is 20.33 kg/m².    Intake/Output Summary (Last 24 hours) at 6/29/2025 1513  Last data filed at 6/29/2025 0604  Gross per 24 hour   Intake --   Output 1000 ml   Net -1000 ml      Physical Exam  Vitals reviewed.   Constitutional:       Appearance: Normal appearance.   HENT:      Head: Normocephalic and atraumatic.      Right Ear: Tympanic membrane and external ear normal.      Nose: Nose normal.      Mouth/Throat:      Mouth: Mucous membranes are moist.   Eyes:      Extraocular Movements: Extraocular movements intact.      Pupils: Pupils are equal, round, and reactive to light.   Cardiovascular:      Rate and Rhythm: Normal rate and regular  rhythm.      Pulses: Normal pulses.      Heart sounds: Normal heart sounds.   Pulmonary:      Effort: Pulmonary effort is normal.      Breath sounds: Normal breath sounds.   Abdominal:      General: Abdomen is flat. Bowel sounds are normal. There is distension.      Palpations: Abdomen is soft.   Musculoskeletal:         General: Normal range of motion.      Cervical back: Normal range of motion and neck supple.   Skin:     General: Skin is warm and dry.   Neurological:      General: No focal deficit present.      Mental Status: He is alert and oriented to person, place, and time.      Motor: Weakness present.   Psychiatric:         Mood and Affect: Mood normal.         Behavior: Behavior normal.           Overview/Hospital Course: stable    Significant Labs: All pertinent labs within the past 24 hours have been reviewed.  Lab Results   Component Value Date    WBC 8.44 06/29/2025    HGB 10.5 (L) 06/29/2025    HCT 35.2 (L) 06/29/2025    MCV 80.7 06/29/2025     06/29/2025         Recent Labs   Lab 06/29/25  0420      K 3.9      CO2 26   BUN 12.3   CREATININE 0.64*   CALCIUM 9.3       Significant Imaging: I have reviewed all pertinent imaging results/findings within the past 24 hours.    Assessment/Plan:      Active Diagnoses:    Diagnosis Date Noted POA    PRINCIPAL PROBLEM:  Complicated UTI (urinary tract infection) [N39.0] 06/21/2023 Yes      Problems Resolved During this Admission:     VTE Risk Mitigation (From admission, onward)           Ordered     apixaban tablet 5 mg  2 times daily         06/28/25 1558     IP VTE HIGH RISK PATIENT  Once         06/27/25 2000     Place sequential compression device  Until discontinued         06/27/25 2000                    Sepsis  Complicated uti  Chronic osteomyelitis of inferior pubic rami  Sacral decubitis ulcer  Paraplegic  Neurogenic bladder w chronic indwelling vogt   Uti-poa  Deconditioning   PAF  Hx of R foot osteomyelitis  Htn  Iron  def  Anxiety/depression     Plan  Mri to r/o osteoiv abx  Symptomatic management  Follow cx  Consult wound care   F/u on cx results  Labs in the am  Gi and dvt ppx    Yosvany Simms MD  Department of Hospital Medicine   [facility] - [facility]

## 2025-06-29 NOTE — PLAN OF CARE
Problem: Adult Inpatient Plan of Care  Goal: Plan of Care Review  Outcome: Progressing  Flowsheets (Taken 6/29/2025 0800)  Plan of Care Reviewed With: patient  Goal: Patient-Specific Goal (Individualized)  Outcome: Progressing  Goal: Absence of Hospital-Acquired Illness or Injury  Outcome: Progressing  Intervention: Identify and Manage Fall Risk  Flowsheets (Taken 6/29/2025 0800)  Safety Promotion/Fall Prevention:   assistive device/personal item within reach   side rails raised x 2  Intervention: Prevent Skin Injury  Flowsheets (Taken 6/29/2025 0800)  Body Position:   weight shifting   turned  Intervention: Prevent and Manage VTE (Venous Thromboembolism) Risk  Flowsheets (Taken 6/29/2025 0800)  VTE Prevention/Management:   ROM (passive) performed   fluids promoted  Intervention: Prevent Infection  Flowsheets (Taken 6/29/2025 0800)  Infection Prevention:   environmental surveillance performed   hand hygiene promoted   rest/sleep promoted  Goal: Optimal Comfort and Wellbeing  Outcome: Progressing  Intervention: Monitor Pain and Promote Comfort  Flowsheets (Taken 6/29/2025 0800)  Pain Management Interventions:   care clustered   pillow support provided   position adjusted   quiet environment facilitated  Intervention: Provide Person-Centered Care  Flowsheets (Taken 6/29/2025 0800)  Trust Relationship/Rapport:   care explained   choices provided   emotional support provided   empathic listening provided   questions answered   questions encouraged   thoughts/feelings acknowledged   reassurance provided  Goal: Readiness for Transition of Care  Outcome: Progressing     Problem: Wound  Goal: Optimal Coping  Outcome: Progressing  Goal: Optimal Functional Ability  Outcome: Progressing  Intervention: Optimize Functional Ability  Flowsheets (Taken 6/29/2025 0800)  Activity Management:   Arm raise - L1   Rolling - L1  Activity Assistance Provided: assistance, 2 people  Goal: Absence of Infection Signs and Symptoms  Outcome:  Progressing  Intervention: Prevent or Manage Infection  Flowsheets (Taken 6/29/2025 0800)  Fever Reduction/Comfort Measures:   lightweight bedding   lightweight clothing  Goal: Improved Oral Intake  Outcome: Progressing  Goal: Optimal Pain Control and Function  Outcome: Progressing  Intervention: Prevent or Manage Pain  Flowsheets (Taken 6/29/2025 0800)  Pain Management Interventions:   care clustered   pillow support provided   position adjusted   quiet environment facilitated  Goal: Skin Health and Integrity  Outcome: Progressing  Intervention: Optimize Skin Protection  Flowsheets (Taken 6/29/2025 0800)  Pressure Reduction Techniques: frequent weight shift encouraged  Activity Management:   Arm raise - L1   Rolling - L1  Head of Bed (HOB) Positioning: HOB at 30-45 degrees  Goal: Optimal Wound Healing  Outcome: Progressing     Problem: Fall Injury Risk  Goal: Absence of Fall and Fall-Related Injury  Outcome: Progressing  Intervention: Identify and Manage Contributors  Flowsheets (Taken 6/29/2025 0800)  Medication Review/Management: medications reviewed  Intervention: Promote Injury-Free Environment  Flowsheets (Taken 6/29/2025 0800)  Safety Promotion/Fall Prevention:   assistive device/personal item within reach   side rails raised x 2     Problem: Skin Injury Risk Increased  Goal: Skin Health and Integrity  Outcome: Progressing  Intervention: Optimize Skin Protection  Flowsheets (Taken 6/29/2025 0800)  Pressure Reduction Techniques: frequent weight shift encouraged  Activity Management:   Arm raise - L1   Rolling - L1  Head of Bed (HOB) Positioning: HOB at 30-45 degrees     Problem: Infection  Goal: Absence of Infection Signs and Symptoms  Outcome: Progressing  Intervention: Prevent or Manage Infection  Flowsheets (Taken 6/29/2025 0800)  Fever Reduction/Comfort Measures:   lightweight bedding   lightweight clothing

## 2025-06-30 PROCEDURE — 25000003 PHARM REV CODE 250: Performed by: INTERNAL MEDICINE

## 2025-06-30 PROCEDURE — 63600175 PHARM REV CODE 636 W HCPCS: Performed by: INTERNAL MEDICINE

## 2025-06-30 PROCEDURE — 21400001 HC TELEMETRY ROOM

## 2025-06-30 PROCEDURE — 25000003 PHARM REV CODE 250: Performed by: NURSE PRACTITIONER

## 2025-06-30 PROCEDURE — 11000001 HC ACUTE MED/SURG PRIVATE ROOM

## 2025-06-30 PROCEDURE — 25000003 PHARM REV CODE 250: Performed by: EMERGENCY MEDICINE

## 2025-06-30 PROCEDURE — 63600175 PHARM REV CODE 636 W HCPCS: Performed by: EMERGENCY MEDICINE

## 2025-06-30 RX ORDER — HYDROCODONE BITARTRATE AND ACETAMINOPHEN 10; 325 MG/1; MG/1
1 TABLET ORAL EVERY 4 HOURS PRN
Refills: 0 | Status: DISCONTINUED | OUTPATIENT
Start: 2025-06-30 | End: 2025-07-02 | Stop reason: HOSPADM

## 2025-06-30 RX ADMIN — LINACLOTIDE 290 MCG: 145 CAPSULE, GELATIN COATED ORAL at 06:06

## 2025-06-30 RX ADMIN — OXYCODONE AND ACETAMINOPHEN 1 TABLET: 325; 10 TABLET ORAL at 08:06

## 2025-06-30 RX ADMIN — VANCOMYCIN HYDROCHLORIDE 1000 MG: 1 INJECTION, POWDER, LYOPHILIZED, FOR SOLUTION INTRAVENOUS at 08:06

## 2025-06-30 RX ADMIN — GABAPENTIN 800 MG: 400 CAPSULE ORAL at 04:06

## 2025-06-30 RX ADMIN — GABAPENTIN 800 MG: 400 CAPSULE ORAL at 08:06

## 2025-06-30 RX ADMIN — HYDROMORPHONE HYDROCHLORIDE 2 MG: 2 INJECTION, SOLUTION INTRAMUSCULAR; INTRAVENOUS; SUBCUTANEOUS at 02:06

## 2025-06-30 RX ADMIN — HYDROMORPHONE HYDROCHLORIDE 2 MG: 2 INJECTION, SOLUTION INTRAMUSCULAR; INTRAVENOUS; SUBCUTANEOUS at 11:06

## 2025-06-30 RX ADMIN — MUPIROCIN: 20 OINTMENT TOPICAL at 08:06

## 2025-06-30 RX ADMIN — HYDROMORPHONE HYDROCHLORIDE 2 MG: 2 INJECTION, SOLUTION INTRAMUSCULAR; INTRAVENOUS; SUBCUTANEOUS at 10:06

## 2025-06-30 RX ADMIN — APIXABAN 5 MG: 5 TABLET, FILM COATED ORAL at 08:06

## 2025-06-30 RX ADMIN — HYDROXYZINE PAMOATE 25 MG: 25 CAPSULE ORAL at 08:06

## 2025-06-30 RX ADMIN — FLUTICASONE PROPIONATE 50 MCG: 50 SPRAY, METERED NASAL at 08:06

## 2025-06-30 RX ADMIN — HYDROCODONE BITARTRATE AND ACETAMINOPHEN 1 TABLET: 10; 325 TABLET ORAL at 04:06

## 2025-06-30 RX ADMIN — BACLOFEN 20 MG: 10 TABLET ORAL at 08:06

## 2025-06-30 RX ADMIN — HYDROMORPHONE HYDROCHLORIDE 2 MG: 2 INJECTION, SOLUTION INTRAMUSCULAR; INTRAVENOUS; SUBCUTANEOUS at 06:06

## 2025-06-30 RX ADMIN — HYDROXYZINE PAMOATE 25 MG: 25 CAPSULE ORAL at 04:06

## 2025-06-30 RX ADMIN — PIPERACILLIN SODIUM AND TAZOBACTAM SODIUM 4.5 G: 4; .5 INJECTION, POWDER, LYOPHILIZED, FOR SOLUTION INTRAVENOUS at 08:06

## 2025-06-30 RX ADMIN — POLYETHYLENE GLYCOL 3350 17 G: 17 POWDER, FOR SOLUTION ORAL at 08:06

## 2025-06-30 RX ADMIN — BACLOFEN 20 MG: 10 TABLET ORAL at 04:06

## 2025-06-30 RX ADMIN — PIPERACILLIN SODIUM AND TAZOBACTAM SODIUM 4.5 G: 4; .5 INJECTION, POWDER, LYOPHILIZED, FOR SOLUTION INTRAVENOUS at 03:06

## 2025-06-30 RX ADMIN — PIPERACILLIN SODIUM AND TAZOBACTAM SODIUM 4.5 G: 4; .5 INJECTION, POWDER, LYOPHILIZED, FOR SOLUTION INTRAVENOUS at 01:06

## 2025-06-30 RX ADMIN — HYDROMORPHONE HYDROCHLORIDE 1 MG: 2 INJECTION INTRAMUSCULAR; INTRAVENOUS; SUBCUTANEOUS at 03:06

## 2025-06-30 NOTE — PLAN OF CARE
Patient reports being approved for 53 hours of caregiver services through Waiver Program, just waiting to get a company.      06/30/25 1115   Discharge Assessment   Assessment Type Discharge Planning Assessment   Confirmed/corrected address, phone number and insurance Yes   Confirmed Demographics Correct on Facesheet   Source of Information patient   People in Home child(arnol), adult   Do you expect to return to your current living situation? Yes   Do you have help at home or someone to help you manage your care at home? Yes   Prior to hospitilization cognitive status: Alert/Oriented   Current cognitive status: Alert/Oriented   Home Accessibility wheelchair accessible   Home Layout Able to live on 1st floor   Equipment Currently Used at Home hospital bed;wheelchair;shower chair;slide board;bedside commode   Readmission within 30 days? No   Patient currently being followed by outpatient case management? No   Do you currently have service(s) that help you manage your care at home? Yes   Name and Contact number of agency [facility]   Is the pt/caregiver preference to resume services with current agency Yes   Do you take prescription medications? Yes   Discharge Plan A Home Health   Discharge Plan B Home with family   DME Needed Upon Discharge  none   Transition of Care Barriers None

## 2025-06-30 NOTE — PROGRESS NOTES
Pharmacokinetic Assessment Follow Up: IV Vancomycin    Vancomycin serum concentration assessment(s):    The random level was drawn correctly and can be used to guide therapy at this time. The measurement is within the desired definitive target range of 10 to 20 mcg/mL.    Vancomycin Regimen Plan:    Change regimen to Vancomycin 1000 mg IV every 12 hours with next serum trough concentration measured at 0730 prior to 4th dose on 07/01.    Drug levels (last 3 results):  Recent Labs   Lab Result Units 06/29/25  1103 06/29/25  1827   Vancomycin Random ug/ml  --  15.5   Vancomycin Trough ug/ml 24.0*  --        Pharmacy will continue to follow and monitor vancomycin.    Please contact pharmacy at extension [phone] for questions regarding this assessment.    Thank you for the consult,   Ria Rodríguez       Patient brief summary:  Hemal Guerrero is a 49 y.o. male initiated on antimicrobial therapy with IV Vancomycin for treatment of skin & soft tissue infection    The patient's current regimen is 1000mg q8h.    Drug Allergies:   Review of patient's allergies indicates:   Allergen Reactions    Adhesive     Amitriptyline        Actual Body Weight:   68kg    Renal Function:   Estimated Creatinine Clearance: 132.8 mL/min (A) (based on SCr of 0.64 mg/dL (L)).,     Dialysis Method (if applicable):  N/A    CBC (last 72 hours):  Recent Labs   Lab Result Units 06/27/25  1739 06/28/25  0356 06/29/25  0420   WBC x10(3)/mcL 7.44 7.43 8.44   Hgb g/dL 11.3* 10.5* 10.5*   Hct % 37.3* 35.2* 35.2*   Platelet x10(3)/mcL 351 337 374   Mono % % 5.6 6.9 6.3   Eos % % 3.9 3.5 3.9   Basophil % % 1.7 1.3 1.2       Metabolic Panel (last 72 hours):  Recent Labs   Lab Result Units 06/27/25  1739 06/27/25  1804 06/28/25  0356 06/29/25  0420   Sodium mmol/L 141  --  140 141   Potassium mmol/L 3.4*  --  4.1 3.9   Chloride mmol/L 103  --  104 106   CO2 mmol/L 28  --  26 26   Glucose mg/dL 90  --  126* 81   Glucose, UA   --  Negative  --   --    Blood Urea  Nitrogen mg/dL 14.1  --  12.8 12.3   Creatinine mg/dL 0.67*  --  0.61* 0.64*   Albumin g/dL 3.6  --   --  3.2*   Bilirubin Total mg/dL 0.5  --   --  0.3   ALP unit/L 95  --   --  87   AST unit/L 10*  --   --  9*   ALT unit/L 7  --   --  7       Vancomycin Administrations:  vancomycin given in the last 96 hours                     vancomycin (VANCOCIN) 1,000 mg in D5W 250 mL IVPB (admixture device) (mg) 1,000 mg New Bag 06/29/25 0512     1,000 mg New Bag 06/28/25 1932     1,000 mg New Bag  0704    vancomycin 1,500 mg in D5W 250 mL IVPB (admixture device) (mg) 1,500 mg New Bag 06/27/25 2308                    Microbiologic Results:  Microbiology Results (last 7 days)       Procedure Component Value Units Date/Time    Urine culture [[id number]]  (Abnormal) Collected: 06/27/25 1804    Order Status: Completed Specimen: Urine, Catheterized Updated: 06/29/25 0914     Urine Culture >/= 100,000 colonies/ml Gram-negative Rods      >/= 100,000 colonies/ml Gram-positive Cocci      25,000-50,000 colonies/ml Gram-positive Cocci    Blood culture #1 **CANNOT BE ORDERED STAT** [[id number]]  (Normal) Collected: 06/27/25 1730    Order Status: Completed Specimen: Blood from Wrist, Right Updated: 06/29/25 0002     Blood Culture No Growth At 24 Hours    Blood culture #2 **CANNOT BE ORDERED STAT** [[id number]]  (Normal) Collected: 06/27/25 1741    Order Status: Completed Specimen: Blood from Arm, Left Updated: 06/29/25 0002     Blood Culture No Growth At 24 Hours

## 2025-06-30 NOTE — PROGRESS NOTES
[facility] [address] [facility] Medicine  Progress Note    Patient Name: Hemal Guerrero  MRN: [id number]  Patient Class: IP- Inpatient   Admission Date: 6/27/2025  Length of Stay: 3 days  Attending Physician: Yosvany Simms MD  Primary Care Provider: Margaret Leblanc MD        Subjective:     Principal Problem:Complicated UTI (urinary tract infection)    Interval History:   Today's info : seen and examined, no acute events overnight. Continues to improve   Afebrile  Remains on iv abx  Pain controlled  Mri neg for sotemyelitis  Polymicrobes in urine pending final sens    Review of Systems   Constitutional:  Positive for fever.   Respiratory:  Positive for shortness of breath.    Gastrointestinal:  Positive for abdominal distention and abdominal pain.   Musculoskeletal:  Positive for back pain.   Skin:  Positive for wound.   Neurological:  Positive for weakness.     Objective:     Vital Signs (Most Recent):  Temp: 98.6 °F (37 °C) (06/30/25 1201)  Pulse: 101 (06/30/25 1201)  Resp: 18 (06/30/25 1201)  BP: (!) 151/92 (06/30/25 1201)  SpO2: 99 % (06/30/25 1201) Vital Signs (24h Range):  Temp:  [98 °F (36.7 °C)-98.6 °F (37 °C)] 98.6 °F (37 °C)  Pulse:  [] 101  Resp:  [16-20] 18  SpO2:  [99 %-100 %] 99 %  BP: (102-151)/(65-92) 151/92     Weight: 68 kg (149 lb 14.6 oz)  Body mass index is 20.33 kg/m².    Intake/Output Summary (Last 24 hours) at 6/30/2025 1311  Last data filed at 6/30/2025 1014  Gross per 24 hour   Intake --   Output 1951 ml   Net -1951 ml      Physical Exam  Vitals reviewed.   Constitutional:       Appearance: Normal appearance.   HENT:      Head: Normocephalic and atraumatic.      Right Ear: Tympanic membrane and external ear normal.      Nose: Nose normal.      Mouth/Throat:      Mouth: Mucous membranes are moist.   Eyes:      Extraocular Movements: Extraocular movements intact.      Pupils: Pupils are equal, round, and reactive to light.   Cardiovascular:       Rate and Rhythm: Normal rate and regular rhythm.      Pulses: Normal pulses.      Heart sounds: Normal heart sounds.   Pulmonary:      Effort: Pulmonary effort is normal.      Breath sounds: Normal breath sounds.   Abdominal:      General: Abdomen is flat. Bowel sounds are normal. There is distension.      Palpations: Abdomen is soft.   Musculoskeletal:         General: Normal range of motion.      Cervical back: Normal range of motion and neck supple.   Skin:     General: Skin is warm and dry.   Neurological:      General: No focal deficit present.      Mental Status: He is alert and oriented to person, place, and time.      Motor: Weakness present.   Psychiatric:         Mood and Affect: Mood normal.         Behavior: Behavior normal.           Overview/Hospital Course: stable    Significant Labs: All pertinent labs within the past 24 hours have been reviewed.  Lab Results   Component Value Date    WBC 8.44 06/29/2025    HGB 10.5 (L) 06/29/2025    HCT 35.2 (L) 06/29/2025    MCV 80.7 06/29/2025     06/29/2025         Recent Labs   Lab 06/29/25  0420      K 3.9      CO2 26   BUN 12.3   CREATININE 0.64*   CALCIUM 9.3       Significant Imaging: I have reviewed all pertinent imaging results/findings within the past 24 hours.    Assessment/Plan:      Active Diagnoses:    Diagnosis Date Noted POA    PRINCIPAL PROBLEM:  Complicated UTI (urinary tract infection) [N39.0] 06/21/2023 Yes    Severe malnutrition [E43] 06/29/2025 Yes      Problems Resolved During this Admission:     VTE Risk Mitigation (From admission, onward)           Ordered     apixaban tablet 5 mg  2 times daily         06/28/25 1558     IP VTE HIGH RISK PATIENT  Once         06/27/25 2000     Place sequential compression device  Until discontinued         06/27/25 2000                    Sepsis  Complicated uti  Chronic osteomyelitis of inferior pubic rami  Sacral decubitis ulcer  Paraplegic  Neurogenic bladder w chronic indwelling vogt    Uti-poa  Deconditioning   PAF  Hx of R foot osteomyelitis  Htn  Iron def  Anxiety/depression     Plan  Mri to r/o osteoiv abx  Symptomatic management  Follow cx  Consult wound care   F/u on cx results  Labs in the am  Gi and dvt ppx    Yosvany Simms MD  Department of Hospital Medicine   [facility] - [address] [facility]

## 2025-07-01 ENCOUNTER — HOSPITAL ENCOUNTER (OUTPATIENT)
Dept: RADIOLOGY | Facility: HOSPITAL | Age: 50
Discharge: HOME | End: 2025-07-01
Attending: NURSE PRACTITIONER
Payer: MEDICARE

## 2025-07-01 DIAGNOSIS — L89.324 STAGE IV PRESSURE ULCER OF LEFT BUTTOCK: ICD-10-CM

## 2025-07-01 LAB
ALBUMIN SERPL-MCNC: 3.2 G/DL (ref 3.5–5)
ALBUMIN/GLOB SERPL: 0.6 RATIO (ref 1.1–2)
ALP SERPL-CCNC: 74 UNIT/L (ref 40–150)
ALT SERPL-CCNC: 5 UNIT/L (ref 0–55)
ANION GAP SERPL CALC-SCNC: 6 MEQ/L
AST SERPL-CCNC: 7 UNIT/L (ref 11–45)
BACTERIA UR CULT: ABNORMAL
BASOPHILS # BLD AUTO: 0.12 X10(3)/MCL
BASOPHILS NFR BLD AUTO: 1.5 %
BILIRUB SERPL-MCNC: 0.3 MG/DL
BUN SERPL-MCNC: 19.2 MG/DL (ref 8.4–25.7)
CALCIUM SERPL-MCNC: 9.2 MG/DL (ref 8.4–10.2)
CHLORIDE SERPL-SCNC: 104 MMOL/L (ref 98–107)
CO2 SERPL-SCNC: 28 MMOL/L (ref 22–29)
CREAT SERPL-MCNC: 0.65 MG/DL (ref 0.72–1.25)
CREAT/UREA NIT SERPL: 30
EOSINOPHIL # BLD AUTO: 0.27 X10(3)/MCL (ref 0–0.9)
EOSINOPHIL NFR BLD AUTO: 3.4 %
ERYTHROCYTE [DISTWIDTH] IN BLOOD BY AUTOMATED COUNT: 17.1 % (ref 11.5–17)
GFR SERPLBLD CREATININE-BSD FMLA CKD-EPI: >60 ML/MIN/1.73/M2
GLOBULIN SER-MCNC: 5.2 GM/DL (ref 2.4–3.5)
GLUCOSE SERPL-MCNC: 95 MG/DL (ref 74–100)
HCT VFR BLD AUTO: 35.5 % (ref 42–52)
HGB BLD-MCNC: 10.5 G/DL (ref 14–18)
IMM GRANULOCYTES # BLD AUTO: 0.03 X10(3)/MCL (ref 0–0.04)
IMM GRANULOCYTES NFR BLD AUTO: 0.4 %
LYMPHOCYTES # BLD AUTO: 2.06 X10(3)/MCL (ref 0.6–4.6)
LYMPHOCYTES NFR BLD AUTO: 25.7 %
MCH RBC QN AUTO: 24.1 PG (ref 27–31)
MCHC RBC AUTO-ENTMCNC: 29.6 G/DL (ref 33–36)
MCV RBC AUTO: 81.6 FL (ref 80–94)
MONOCYTES # BLD AUTO: 0.52 X10(3)/MCL (ref 0.1–1.3)
MONOCYTES NFR BLD AUTO: 6.5 %
NEUTROPHILS # BLD AUTO: 5.02 X10(3)/MCL (ref 2.1–9.2)
NEUTROPHILS NFR BLD AUTO: 62.5 %
NRBC BLD AUTO-RTO: 0 %
PLATELET # BLD AUTO: 352 X10(3)/MCL (ref 130–400)
PMV BLD AUTO: 10 FL (ref 7.4–10.4)
POTASSIUM SERPL-SCNC: 4 MMOL/L (ref 3.5–5.1)
PROT SERPL-MCNC: 8.4 GM/DL (ref 6.4–8.3)
RBC # BLD AUTO: 4.35 X10(6)/MCL (ref 4.7–6.1)
SODIUM SERPL-SCNC: 138 MMOL/L (ref 136–145)
VANCOMYCIN TROUGH SERPL-MCNC: 18.7 UG/ML (ref 15–20)
WBC # BLD AUTO: 8.02 X10(3)/MCL (ref 4.5–11.5)

## 2025-07-01 PROCEDURE — 63600175 PHARM REV CODE 636 W HCPCS: Performed by: INTERNAL MEDICINE

## 2025-07-01 PROCEDURE — 85025 COMPLETE CBC W/AUTO DIFF WBC: CPT | Performed by: INTERNAL MEDICINE

## 2025-07-01 PROCEDURE — 11000001 HC ACUTE MED/SURG PRIVATE ROOM

## 2025-07-01 PROCEDURE — 25000003 PHARM REV CODE 250: Performed by: EMERGENCY MEDICINE

## 2025-07-01 PROCEDURE — A9503 TC99M MEDRONATE: HCPCS | Performed by: NURSE PRACTITIONER

## 2025-07-01 PROCEDURE — 80202 ASSAY OF VANCOMYCIN: CPT | Performed by: INTERNAL MEDICINE

## 2025-07-01 PROCEDURE — 25000003 PHARM REV CODE 250: Performed by: NURSE PRACTITIONER

## 2025-07-01 PROCEDURE — 78315 BONE IMAGING 3 PHASE: CPT | Mod: TC

## 2025-07-01 PROCEDURE — 21400001 HC TELEMETRY ROOM

## 2025-07-01 PROCEDURE — 36415 COLL VENOUS BLD VENIPUNCTURE: CPT | Performed by: INTERNAL MEDICINE

## 2025-07-01 PROCEDURE — 63600175 PHARM REV CODE 636 W HCPCS: Performed by: EMERGENCY MEDICINE

## 2025-07-01 PROCEDURE — 25000003 PHARM REV CODE 250: Performed by: INTERNAL MEDICINE

## 2025-07-01 PROCEDURE — 80053 COMPREHEN METABOLIC PANEL: CPT | Performed by: INTERNAL MEDICINE

## 2025-07-01 RX ADMIN — HYDROMORPHONE HYDROCHLORIDE 2 MG: 2 INJECTION, SOLUTION INTRAMUSCULAR; INTRAVENOUS; SUBCUTANEOUS at 10:07

## 2025-07-01 RX ADMIN — CLONIDINE HYDROCHLORIDE 0.1 MG: 0.1 TABLET ORAL at 09:07

## 2025-07-01 RX ADMIN — OXYCODONE AND ACETAMINOPHEN 1 TABLET: 325; 10 TABLET ORAL at 08:07

## 2025-07-01 RX ADMIN — HYDROCODONE BITARTRATE AND ACETAMINOPHEN 1 TABLET: 10; 325 TABLET ORAL at 02:07

## 2025-07-01 RX ADMIN — GABAPENTIN 800 MG: 400 CAPSULE ORAL at 04:07

## 2025-07-01 RX ADMIN — BACLOFEN 20 MG: 10 TABLET ORAL at 04:07

## 2025-07-01 RX ADMIN — HYDROCODONE BITARTRATE AND ACETAMINOPHEN 1 TABLET: 10; 325 TABLET ORAL at 11:07

## 2025-07-01 RX ADMIN — HYDROMORPHONE HYDROCHLORIDE 2 MG: 2 INJECTION, SOLUTION INTRAMUSCULAR; INTRAVENOUS; SUBCUTANEOUS at 09:07

## 2025-07-01 RX ADMIN — HYDROCODONE BITARTRATE AND ACETAMINOPHEN 1 TABLET: 10; 325 TABLET ORAL at 07:07

## 2025-07-01 RX ADMIN — APIXABAN 5 MG: 5 TABLET, FILM COATED ORAL at 08:07

## 2025-07-01 RX ADMIN — GABAPENTIN 800 MG: 400 CAPSULE ORAL at 09:07

## 2025-07-01 RX ADMIN — POLYETHYLENE GLYCOL 3350 17 G: 17 POWDER, FOR SOLUTION ORAL at 08:07

## 2025-07-01 RX ADMIN — APIXABAN 5 MG: 5 TABLET, FILM COATED ORAL at 09:07

## 2025-07-01 RX ADMIN — PIPERACILLIN SODIUM AND TAZOBACTAM SODIUM 4.5 G: 4; .5 INJECTION, POWDER, LYOPHILIZED, FOR SOLUTION INTRAVENOUS at 09:07

## 2025-07-01 RX ADMIN — BACLOFEN 20 MG: 10 TABLET ORAL at 08:07

## 2025-07-01 RX ADMIN — VANCOMYCIN HYDROCHLORIDE 1000 MG: 1 INJECTION, POWDER, LYOPHILIZED, FOR SOLUTION INTRAVENOUS at 10:07

## 2025-07-01 RX ADMIN — LINACLOTIDE 290 MCG: 145 CAPSULE, GELATIN COATED ORAL at 05:07

## 2025-07-01 RX ADMIN — GABAPENTIN 800 MG: 400 CAPSULE ORAL at 08:07

## 2025-07-01 RX ADMIN — HYDROCODONE BITARTRATE AND ACETAMINOPHEN 1 TABLET: 10; 325 TABLET ORAL at 04:07

## 2025-07-01 RX ADMIN — BACLOFEN 20 MG: 10 TABLET ORAL at 09:07

## 2025-07-01 RX ADMIN — HYDROMORPHONE HYDROCHLORIDE 2 MG: 2 INJECTION, SOLUTION INTRAMUSCULAR; INTRAVENOUS; SUBCUTANEOUS at 04:07

## 2025-07-01 RX ADMIN — PIPERACILLIN SODIUM AND TAZOBACTAM SODIUM 4.5 G: 4; .5 INJECTION, POWDER, LYOPHILIZED, FOR SOLUTION INTRAVENOUS at 02:07

## 2025-07-01 RX ADMIN — HYDROXYZINE PAMOATE 25 MG: 25 CAPSULE ORAL at 10:07

## 2025-07-01 RX ADMIN — MUPIROCIN: 20 OINTMENT TOPICAL at 08:07

## 2025-07-01 RX ADMIN — HYDROMORPHONE HYDROCHLORIDE 2 MG: 2 INJECTION, SOLUTION INTRAMUSCULAR; INTRAVENOUS; SUBCUTANEOUS at 01:07

## 2025-07-01 RX ADMIN — HYDROXYZINE PAMOATE 25 MG: 25 CAPSULE ORAL at 04:07

## 2025-07-01 RX ADMIN — HYDROXYZINE PAMOATE 25 MG: 25 CAPSULE ORAL at 08:07

## 2025-07-01 RX ADMIN — PIPERACILLIN SODIUM AND TAZOBACTAM SODIUM 4.5 G: 4; .5 INJECTION, POWDER, LYOPHILIZED, FOR SOLUTION INTRAVENOUS at 04:07

## 2025-07-01 RX ADMIN — TECHNETIUM TC 99M MEDRONATE 27.5 MILLICURIE: 20 INJECTION, POWDER, LYOPHILIZED, FOR SOLUTION INTRAVENOUS at 08:07

## 2025-07-01 RX ADMIN — HYDROMORPHONE HYDROCHLORIDE 2 MG: 2 INJECTION, SOLUTION INTRAMUSCULAR; INTRAVENOUS; SUBCUTANEOUS at 06:07

## 2025-07-01 NOTE — PHYSICIAN QUERY
Please specify the Stage of The Sacral Decubitus Ulcer.   Pressure Injury/Decubitus Ulcer, Stage 3

## 2025-07-01 NOTE — PHYSICIAN QUERY
Please clarify if there is any clinical correlation between UTI and Suprapubic Catheter:   Due to or associated with each other

## 2025-07-01 NOTE — PROGRESS NOTES
[facility] [address] [facility] Medicine  Progress Note    Patient Name: Hemal Guerrero  MRN: [id number]  Patient Class: IP- Inpatient   Admission Date: 6/27/2025  Length of Stay: 4 days  Attending Physician: Yosvany Simms MD  Primary Care Provider: Margaret Leblanc MD        Subjective:     Principal Problem:Complicated UTI (urinary tract infection)    Interval History:   Today's info : seen and examined, no acute events overnight. Continues to improve   Afebrile  Remains on iv abx  Pain controlled  Mri neg for sotemyelitis  Esbl sens zosyn  Dc vanc  Ltac in am  Pending bonescan    Review of Systems   Constitutional:  Positive for fever.   Respiratory:  Positive for shortness of breath.    Gastrointestinal:  Positive for abdominal distention and abdominal pain.   Musculoskeletal:  Positive for back pain.   Skin:  Positive for wound.   Neurological:  Positive for weakness.     Objective:     Vital Signs (Most Recent):  Temp: 98.9 °F (37.2 °C) (07/01/25 1128)  Pulse: 89 (07/01/25 1136)  Resp: 16 (07/01/25 1135)  BP: 124/83 (07/01/25 1136)  SpO2: 99 % (07/01/25 1128) Vital Signs (24h Range):  Temp:  [98 °F (36.7 °C)-98.9 °F (37.2 °C)] 98.9 °F (37.2 °C)  Pulse:  [65-96] 89  Resp:  [10-20] 16  SpO2:  [98 %-100 %] 99 %  BP: (114-190)/() 124/83     Weight: 68 kg (149 lb 14.6 oz)  Body mass index is 20.33 kg/m².    Intake/Output Summary (Last 24 hours) at 7/1/2025 1335  Last data filed at 7/1/2025 0835  Gross per 24 hour   Intake --   Output 3100 ml   Net -3100 ml      Physical Exam  Vitals reviewed.   Constitutional:       Appearance: Normal appearance.   HENT:      Head: Normocephalic and atraumatic.      Right Ear: Tympanic membrane and external ear normal.      Nose: Nose normal.      Mouth/Throat:      Mouth: Mucous membranes are moist.   Eyes:      Extraocular Movements: Extraocular movements intact.      Pupils: Pupils are equal, round, and reactive to light.    Cardiovascular:      Rate and Rhythm: Normal rate and regular rhythm.      Pulses: Normal pulses.      Heart sounds: Normal heart sounds.   Pulmonary:      Effort: Pulmonary effort is normal.      Breath sounds: Normal breath sounds.   Abdominal:      General: Abdomen is flat. Bowel sounds are normal. There is distension.      Palpations: Abdomen is soft.   Musculoskeletal:         General: Normal range of motion.      Cervical back: Normal range of motion and neck supple.   Skin:     General: Skin is warm and dry.   Neurological:      General: No focal deficit present.      Mental Status: He is alert and oriented to person, place, and time.      Motor: Weakness present.   Psychiatric:         Mood and Affect: Mood normal.         Behavior: Behavior normal.           Overview/Hospital Course: stable    Significant Labs: All pertinent labs within the past 24 hours have been reviewed.  Lab Results   Component Value Date    WBC 8.02 07/01/2025    HGB 10.5 (L) 07/01/2025    HCT 35.5 (L) 07/01/2025    MCV 81.6 07/01/2025     07/01/2025         Recent Labs   Lab 07/01/25  0614      K 4.0      CO2 28   BUN 19.2   CREATININE 0.65*   CALCIUM 9.2       Significant Imaging: I have reviewed all pertinent imaging results/findings within the past 24 hours.    Assessment/Plan:      Active Diagnoses:    Diagnosis Date Noted POA    PRINCIPAL PROBLEM:  Complicated UTI (urinary tract infection) [N39.0] 06/21/2023 Yes    Severe malnutrition [E43] 06/29/2025 Yes      Problems Resolved During this Admission:     VTE Risk Mitigation (From admission, onward)           Ordered     apixaban tablet 5 mg  2 times daily         06/28/25 1558     IP VTE HIGH RISK PATIENT  Once         06/27/25 2000     Place sequential compression device  Until discontinued         06/27/25 2000                    Sepsis  Complicated uti  Chronic osteomyelitis of inferior pubic rami  Sacral decubitis ulcer  Paraplegic  Neurogenic bladder w  chronic indwelling vgot   Uti-poa  Deconditioning   PAF  Hx of R foot osteomyelitis  Htn  Iron def  Anxiety/depression     Plan  Mri to r/o osteoiv abx  Symptomatic management  Follow cx  Consult wound care   F/u on cx results  Labs in the am  Gi and dvt ppx    Yosvany Simms MD  Department of Hospital Medicine   [facility] - [address] [facility]

## 2025-07-01 NOTE — PHYSICIAN QUERY
Provider, due to the conflicting clinical picture, please clinically validate the diagnosis of Sepsis. If validated, please provide additional clinical support for the diagnosis.   The condition is not confirmed and/or it has been ruled out

## 2025-07-01 NOTE — PROGRESS NOTES
Pharmacokinetic Assessment Follow Up: IV Vancomycin    Vancomycin serum concentration assessment(s):  The trough level was drawn incorrectly, approximately 1 hour prior to scheduled time; however, expect trough to be in therapeutic range.    Vancomycin Regimen Plan:  Continue regimen to Vancomycin 1000 mg IV every 12 hours with next serum trough concentration measured at 0730 prior to 0830 dose on 07/03    Scheduled Administration Times  0830  2030    Drug levels (last 3 results):  Recent Labs   Lab Result Units 06/29/25  1103 06/29/25  1827 07/01/25  0614   Vancomycin Random ug/ml  --  15.5  --    Vancomycin Trough ug/ml 24.0*  --  18.7       Vancomycin Administrations:  vancomycin given in the last 96 hours                     vancomycin (VANCOCIN) 1,000 mg in D5W 250 mL IVPB (admixture device) (mg) 1,000 mg New Bag 06/30/25 2043     1,000 mg New Bag  0855     1,000 mg New Bag 06/29/25 2126    vancomycin (VANCOCIN) 1,000 mg in D5W 250 mL IVPB (admixture device) (mg) 1,000 mg New Bag 06/29/25 0512     1,000 mg New Bag 06/28/25 1932     1,000 mg New Bag  0704    vancomycin 1,500 mg in D5W 250 mL IVPB (admixture device) (mg) 1,500 mg New Bag 06/27/25 2308                    Pharmacy will continue to follow and monitor vancomycin.    Please contact pharmacy at extension [phone] for questions regarding this assessment.    Thank you for the consult,   Porsha Downey       Patient brief summary:  Hemal Guerrero is a 49 y.o. male initiated on antimicrobial therapy with IV Vancomycin for treatment of bone/joint infection    The patient's current regimen is 1000 mg IVPB Q12H    Drug Allergies:   Review of patient's allergies indicates:   Allergen Reactions    Adhesive     Amitriptyline        Actual Body Weight:  Wt Readings from Last 1 Encounters:   06/29/25 68 kg (149 lb 14.6 oz)       Renal Function:   Estimated Creatinine Clearance: 130.8 mL/min (A) (based on SCr of 0.65 mg/dL (L)).,     Dialysis Method (if  applicable):  N/A    CBC (last 72 hours):  Recent Labs   Lab Result Units 06/29/25  0420 07/01/25  0614   WBC x10(3)/mcL 8.44 8.02   Hgb g/dL 10.5* 10.5*   Hct % 35.2* 35.5*   Platelet x10(3)/mcL 374 352   Mono % % 6.3 6.5   Eos % % 3.9 3.4   Basophil % % 1.2 1.5       Metabolic Panel (last 72 hours):  Recent Labs   Lab Result Units 06/29/25  0420 07/01/25  0614   Sodium mmol/L 141 138   Potassium mmol/L 3.9 4.0   Chloride mmol/L 106 104   CO2 mmol/L 26 28   Glucose mg/dL 81 95   Blood Urea Nitrogen mg/dL 12.3 19.2   Creatinine mg/dL 0.64* 0.65*   Albumin g/dL 3.2* 3.2*   Bilirubin Total mg/dL 0.3 0.3   ALP unit/L 87 74   AST unit/L 9* 7*   ALT unit/L 7 5       Microbiologic Results:  Microbiology Results (last 7 days)       Procedure Component Value Units Date/Time    Blood culture #1 **CANNOT BE ORDERED STAT** [[id number]]  (Normal) Collected: 06/27/25 1730    Order Status: Completed Specimen: Blood from Wrist, Right Updated: 07/01/25 0013     Blood Culture No Growth At 72 Hours    Blood culture #2 **CANNOT BE ORDERED STAT** [[id number]]  (Normal) Collected: 06/27/25 1741    Order Status: Completed Specimen: Blood from Arm, Left Updated: 07/01/25 0013     Blood Culture No Growth At 72 Hours    Urine culture [[id number]]  (Abnormal) Collected: 06/27/25 1804    Order Status: Completed Specimen: Urine, Catheterized Updated: 06/30/25 0939     Urine Culture >/= 100,000 colonies/ml Gram-negative Rods      >/= 100,000 colonies/ml Gram-positive Cocci      25,000-50,000 colonies/ml Staphylococcus aureus

## 2025-07-01 NOTE — PHYSICIAN QUERY
Please provide the integumentary diagnosis related to the documentation of Left Greater Trochanter:   Pressure Injury/Decubitus Ulcer, Stage 3

## 2025-07-01 NOTE — PROGRESS NOTES
[facility] [address] [facility]  Wound Care    Patient Name:  Hemal Guerrero   MRN:  [id number]  Date: 7/1/2025  Diagnosis: Complicated UTI (urinary tract infection)    History:     Past Medical History:   Diagnosis Date    Chronic UTI     Paraplegia        Social History[1]    Precautions:     Allergies as of 06/27/2025 - Reviewed 06/27/2025   Allergen Reaction Noted    Adhesive  02/05/2023    Amitriptyline  11/16/2022       WOC Assessment Details/Treatment      WOCN follow up for Left buttock and sacrum. Attempted to see pt x2. Pt currently off unit in Nuclear medicine. Continue current treatment recommendations in place. Wound care will follow up.     07/01/2025         [1]   Social History  Socioeconomic History    Marital status:    Tobacco Use    Smoking status: Never    Smokeless tobacco: Never   Substance and Sexual Activity    Alcohol use: Not Currently    Drug use: Yes     Types: Marijuana     Social Drivers of Health     Financial Resource Strain: High Risk (6/28/2025)    Overall Financial Resource Strain (CARDIA)     Difficulty of Paying Living Expenses: Hard   Food Insecurity: No Food Insecurity (6/28/2025)    Hunger Vital Sign     Worried About Running Out of Food in the Last Year: Never true     Ran Out of Food in the Last Year: Never true   Transportation Needs: Unmet Transportation Needs (6/28/2025)    PRAPARE - Transportation     Lack of Transportation (Medical): Yes     Lack of Transportation (Non-Medical): Yes   Physical Activity: Inactive (2/7/2025)    Exercise Vital Sign     Days of Exercise per Week: 0 days     Minutes of Exercise per Session: 0 min   Stress: Stress Concern Present (6/28/2025)    [employer] - Occupational Stress Questionnaire     Feeling of Stress : Very much   Housing Stability: Low Risk  (6/28/2025)    Housing Stability Vital Sign     Unable to Pay for Housing in the Last Year: No     Homeless in the Last Year: No

## 2025-07-01 NOTE — PROGRESS NOTES
Inpatient Nutrition Assessment    Admit Date: 6/27/2025   Total duration of encounter: 4 days   Patient Age: 49 y.o.    Nutrition Recommendation/Prescription     Continue regular diet as tolerated  Continue Boost VHC TID (530 kcal and 22 gm protein per serving)  Continue Brandon and Prosource BID to support wound healing (90 kcal, 2.5 gm protein and 60 kcal,15 gm protein per serving)  Consider adding Vit C, Zinc, Vit A for wound healing if medically appropriate     Communication of Recommendations: reviewed with patient    Nutrition Assessment     Malnutrition Assessment/Nutrition-Focused Physical Exam    Malnutrition Context: acute illness or injury (06/29/25 1807)  Malnutrition Level: severe (06/29/25 1807)  Energy Intake (Malnutrition): less than or equal to 75% for greater than or equal to 1 month (06/29/25 1807)  Weight Loss (Malnutrition): other (see comments) (Does not meet criteria) (06/29/25 1807)              Muscle Mass (Malnutrition): mild depletion (06/29/25 1807)  [place] (Muscle Loss): mild depletion  Clavicle Bone Region (Muscle Loss): mild depletion  Clavicle and Acromion Bone Region (Muscle Loss): mild depletion                          A minimum of two characteristics is recommended for diagnosis of either severe or non-severe malnutrition.    Chart Review    Reason Seen: follow-up    Malnutrition Screening Tool Results   Have you recently lost weight without trying?: No  Have you been eating poorly because of a decreased appetite?: Yes   MST Score: 1   Diagnosis:  Sepsis  Complicated uti  Chronic osteomyelitis of inferior pubic rami  Sacral decubitis ulcer  Paraplegic  Neurogenic bladder w chronic indwelling vogt   Uti-poa  Deconditioning     Relevant Medical History: R foot osteomyelitis, HTN, Iron def, Anxiety/depression    Scheduled Medications:  apixaban, 5 mg, BID  baclofen, 20 mg, TID  fluticasone propionate, 1 spray, BID  gabapentin, 800 mg, TID  hydrOXYzine pamoate, 25 mg,  TID  linaCLOtide, 290 mcg, Before breakfast  mupirocin, , BID  oxyCODONE-acetaminophen, 1 tablet, Q12H  piperacillin-tazobactam (Zosyn) IV (PEDS and ADULTS) (extended infusion is not appropriate), 4.5 g, Q8H  polyethylene glycol, 17 g, Daily  vancomycin (VANCOCIN) 1,000 mg in D5W 250 mL IVPB (admixture device), 1,000 mg, Q12H    Continuous Infusions:   PRN Medications:  cloNIDine, 0.1 mg, Q8H PRN  HYDROcodone-acetaminophen, 1 tablet, Q4H PRN  HYDROmorphone, 1 mg, Q4H PRN  HYDROmorphone, 2 mg, Q4H PRN  melatonin, 6 mg, Nightly PRN  ondansetron, 4 mg, Q8H PRN  sodium chloride 0.9%, 10 mL, PRN  sodium chloride 0.9%, 10 mL, Q12H PRN  vancomycin - pharmacy to dose, , pharmacy to manage frequency    Calorie Containing IV Medications: no significant kcals from medications at this time    Recent Labs   Lab 06/27/25  1739 06/28/25  0356 06/29/25  0420 07/01/25  0614    140 141 138   K 3.4* 4.1 3.9 4.0   CALCIUM 9.9 8.8 9.3 9.2    104 106 104   CO2 28 26 26 28   BUN 14.1 12.8 12.3 19.2   CREATININE 0.67* 0.61* 0.64* 0.65*   EGFRNORACEVR >60 >60 >60 >60   GLU 90 126* 81 95   BILITOT 0.5  --  0.3 0.3   ALKPHOS 95  --  87 74   ALT 7  --  7 5   AST 10*  --  9* 7*   ALBUMIN 3.6  --  3.2* 3.2*   WBC 7.44 7.43 8.44 8.02   HGB 11.3* 10.5* 10.5* 10.5*   HCT 37.3* 35.2* 35.2* 35.5*     Nutrition Orders:  Diet Adult Regular Standard Tray  Dietary nutrition supplements All Meals; Boost Very High Calorie Nutritional Drink - Strawberry,Dietary nutrition supplements BID; ProSource NoCarb Liquid Protein - Neutral, Brandon - Any flavor    Appetite/Oral Intake: fair/50-75% of meals  Factors Affecting Nutritional Intake: decreased appetite  Social Needs Impacting Access to Food: none identified  Food/Taoism/Cultural Preferences: none reported  Food Allergies: no known food allergies  Last Bowel Movement: 06/29/25  Wound(s):     Altered Skin Integrity 01/06/24 1640 Sacral spine #1 Pressure Injury-Tissue loss description: Full  "thickness       Altered Skin Integrity 24 0400 Left posterior Greater trochanter #3 Non pressure chronic ulcer Full thickness tissue loss. Subcutaneous fat may be visible but bone, tendon or muscle are not exposed-Tissue loss description: Full thickness     Comments    25: Pt reports decreased appetite for about a month or so, feels he has lost some weight but unsure of exact amount; weight loss noted in EMR since April. Concerned about getting enough protein for wound healing, so will add Prosource and Brandon. Also agreeable to adding Boost supplements since appetite decreased, was drinking Protein Shakes at home. Prefers strawberry flavor.     25: Pt reports continued fair appetite, eating 50% of meals. Drinking ONS, Brandon and Prosource. Agreeable to continue nutrition regimen at this time. No reports of n/v/d/c; LBM  noted. Will continue to monitor.    Anthropometrics    Height: 6' 0.01" (182.9 cm), Height Method: Measured  Last Weight: 68 kg (149 lb 14.6 oz) (25 1803), Weight Method: Bed Scale  BMI (Calculated): 20.3  BMI Classification: normal (BMI 18.5-24.9)        Ideal Body Weight (IBW), Male: 178.06 lb     % Ideal Body Weight, Male (lb): 84.19 %                 Usual Body Weight (UBW), k.6 kg  % Usual Body Weight: 93.86  % Weight Change From Usual Weight: -6.34 %  Usual Weight Provided By: EMR weight history    Wt Readings from Last 5 Encounters:   25 68 kg (149 lb 14.6 oz)   25 64.6 kg (142 lb 6.4 oz)   25 72.6 kg (160 lb)   25 71.5 kg (157 lb 10.1 oz)   25 72.6 kg (160 lb)     Weight Change(s) Since Admission:   Wt Readings from Last 1 Encounters:   25 1803 68 kg (149 lb 14.6 oz)   25 1626 68 kg (149 lb 14.6 oz)   25 1715 68 kg (150 lb)   Admit Weight: 68 kg (150 lb) (25 171), Weight Method: Stated    Estimated Needs    Weight Used For Calorie Calculations: 68 kg (149 lb 14.6 oz)  Energy Calorie Requirements (kcal):  " kcal (30 kcal/kg)  Energy Need Method: Kcal/kg  Weight Used For Protein Calculations: 68 kg (149 lb 14.6 oz)  Protein Requirements: 102gm (1.5 gm/kg)  Fluid Requirements (mL): 2040ml (1 ml/kcal)        Enteral Nutrition     Patient not receiving enteral nutrition at this time.    Parenteral Nutrition     Patient not receiving parenteral nutrition support at this time.    Evaluation of Received Nutrient Intake    Calories: meeting estimated needs likely meeting needs with PO intake of ONS and meals  Protein: meeting estimated needs likely meeting needs with PO intake of ONS and meals    Patient Education     Not applicable.    Nutrition Diagnosis     PES: Inadequate oral intake related to acute illness as evidenced by <75% intake of meals x 1 month. (active)     PES: Severe acute disease or injury related malnutrition Related to acute condition As Evidenced by:  - energy intake: <= 75% for 2 months (meets criteria for <= 75% >= 1 month (severe - chronic)) - muscle mass depletion: 6 areas of mild muscle loss (Trapezius, Clavicle, Temporalis, Acromion, Pectoralis, Deltoid) new    Nutrition Interventions     Intervention(s): general/healthful diet, commercial beverage, multivitamin/mineral supplement therapy, and collaboration with other providers  Intervention(s): Oral nutritional supplement    Goal: Consume % of meals/snacks by follow-up. (goal progressing)  Goal: Consume % of oral supplements by follow-up. (goal progressing)    Nutrition Goals & Monitoring     Dietitian will monitor: food and beverage intake, weight change, and gastrointestinal profile  Discharge planning: resume home regimen  Nutrition Risk/Follow-Up: patient at increased nutrition risk; dietitian will follow-up twice weekly   Please consult if re-assessment needed sooner.

## 2025-07-02 VITALS
HEART RATE: 74 BPM | DIASTOLIC BLOOD PRESSURE: 79 MMHG | OXYGEN SATURATION: 98 % | RESPIRATION RATE: 20 BRPM | BODY MASS INDEX: 20.31 KG/M2 | WEIGHT: 149.94 LBS | TEMPERATURE: 98 F | SYSTOLIC BLOOD PRESSURE: 132 MMHG | HEIGHT: 72 IN

## 2025-07-02 PROBLEM — N39.0 COMPLICATED UTI (URINARY TRACT INFECTION): Status: RESOLVED | Noted: 2023-06-21 | Resolved: 2025-07-02

## 2025-07-02 PROCEDURE — 63600175 PHARM REV CODE 636 W HCPCS: Performed by: EMERGENCY MEDICINE

## 2025-07-02 PROCEDURE — 25000003 PHARM REV CODE 250: Performed by: EMERGENCY MEDICINE

## 2025-07-02 PROCEDURE — 25000003 PHARM REV CODE 250: Performed by: NURSE PRACTITIONER

## 2025-07-02 PROCEDURE — 25000003 PHARM REV CODE 250: Performed by: INTERNAL MEDICINE

## 2025-07-02 RX ADMIN — HYDROCODONE BITARTRATE AND ACETAMINOPHEN 1 TABLET: 10; 325 TABLET ORAL at 12:07

## 2025-07-02 RX ADMIN — HYDROMORPHONE HYDROCHLORIDE 2 MG: 2 INJECTION, SOLUTION INTRAMUSCULAR; INTRAVENOUS; SUBCUTANEOUS at 11:07

## 2025-07-02 RX ADMIN — MUPIROCIN: 20 OINTMENT TOPICAL at 09:07

## 2025-07-02 RX ADMIN — HYDROXYZINE PAMOATE 25 MG: 25 CAPSULE ORAL at 09:07

## 2025-07-02 RX ADMIN — HYDROCODONE BITARTRATE AND ACETAMINOPHEN 1 TABLET: 10; 325 TABLET ORAL at 04:07

## 2025-07-02 RX ADMIN — HYDROCODONE BITARTRATE AND ACETAMINOPHEN 1 TABLET: 10; 325 TABLET ORAL at 10:07

## 2025-07-02 RX ADMIN — PIPERACILLIN SODIUM AND TAZOBACTAM SODIUM 4.5 G: 4; .5 INJECTION, POWDER, LYOPHILIZED, FOR SOLUTION INTRAVENOUS at 05:07

## 2025-07-02 RX ADMIN — LINACLOTIDE 290 MCG: 145 CAPSULE, GELATIN COATED ORAL at 04:07

## 2025-07-02 RX ADMIN — GABAPENTIN 800 MG: 400 CAPSULE ORAL at 09:07

## 2025-07-02 RX ADMIN — APIXABAN 5 MG: 5 TABLET, FILM COATED ORAL at 09:07

## 2025-07-02 RX ADMIN — HYDROMORPHONE HYDROCHLORIDE 2 MG: 2 INJECTION, SOLUTION INTRAMUSCULAR; INTRAVENOUS; SUBCUTANEOUS at 06:07

## 2025-07-02 RX ADMIN — HYDROMORPHONE HYDROCHLORIDE 2 MG: 2 INJECTION, SOLUTION INTRAMUSCULAR; INTRAVENOUS; SUBCUTANEOUS at 02:07

## 2025-07-02 RX ADMIN — BACLOFEN 20 MG: 10 TABLET ORAL at 09:07

## 2025-07-02 RX ADMIN — HYDROCODONE BITARTRATE AND ACETAMINOPHEN 1 TABLET: 10; 325 TABLET ORAL at 01:07

## 2025-07-02 RX ADMIN — POLYETHYLENE GLYCOL 3350 17 G: 17 POWDER, FOR SOLUTION ORAL at 09:07

## 2025-07-02 RX ADMIN — OXYCODONE AND ACETAMINOPHEN 1 TABLET: 325; 10 TABLET ORAL at 09:07

## 2025-07-02 NOTE — PROGRESS NOTES
[facility] [address] Surg  Wound Care    Patient Name:  Hemal Guererro   MRN:  [id number]  Date: 7/2/2025  Diagnosis: Complicated UTI (urinary tract infection)    History:     Past Medical History:   Diagnosis Date    Chronic UTI     Paraplegia        Social History[1]    Precautions:     Allergies as of 06/27/2025 - Reviewed 06/27/2025   Allergen Reaction Noted    Adhesive  02/05/2023    Amitriptyline  11/16/2022       WOC Assessment Details/Treatment     WOCN follow up for Sacrum and left buttock. Attempted to see pt. Pt refused stating that he is in a lot of pain in pelvic region. Nurse notified. Nursing to continue with current treatment recommendations in place. Wound care will follow up.     07/02/2025         [1]   Social History  Socioeconomic History    Marital status:    Tobacco Use    Smoking status: Never    Smokeless tobacco: Never   Substance and Sexual Activity    Alcohol use: Not Currently    Drug use: Yes     Types: Marijuana     Social Drivers of Health     Financial Resource Strain: High Risk (6/28/2025)    Overall Financial Resource Strain (CARDIA)     Difficulty of Paying Living Expenses: Hard   Food Insecurity: No Food Insecurity (6/28/2025)    Hunger Vital Sign     Worried About Running Out of Food in the Last Year: Never true     Ran Out of Food in the Last Year: Never true   Transportation Needs: Unmet Transportation Needs (6/28/2025)    PRAPARE - Transportation     Lack of Transportation (Medical): Yes     Lack of Transportation (Non-Medical): Yes   Physical Activity: Inactive (2/7/2025)    Exercise Vital Sign     Days of Exercise per Week: 0 days     Minutes of Exercise per Session: 0 min   Stress: Stress Concern Present (6/28/2025)    [employer] - Occupational Stress Questionnaire     Feeling of Stress : Very much   Housing Stability: Low Risk  (6/28/2025)    Housing Stability Vital Sign     Unable to Pay for Housing in the Last Year:  No     Homeless in the Last Year: No

## 2025-07-02 NOTE — NURSING
Report given to [facility], and pt has been taken out of will-call. Brinda said it will take about an hour.

## 2025-07-02 NOTE — PROGRESS NOTES
Pt going to [place], report given. Pt acknowledged understanding of discharge. Pt left with [facility]. Only belongings pt had was his clothes and he left with them.

## 2025-07-02 NOTE — DISCHARGE SUMMARY
[facility] - [address] [facility] Hospital Medicine  Discharge Summary      Patient Name: Hemal Guerrero  MRN: [id number]  Admission Date: 6/27/2025  Hospital Length of Stay: 5 days  Discharge Date and Time: 07/02/2025 12:22 PM  Attending Physician: Yosvany Mendoza MD   Discharging Provider: Yosvany Mendoza MD  Discharge Provider Team: Networked reference to record PCT   Primary Care Provider: Margaret Leblanc MD        Dc diagnoses :    Sepsis  Complicated uti  Chronic osteomyelitis of inferior pubic rami  Sacral decubitis ulcer  Paraplegic  Neurogenic bladder w chronic indwelling vogt   Uti-poa  Deconditioning   PAF  Hx of R foot osteomyelitis  Htn  Iron def  Anxiety/depression    * No surgery found *      Hospital Course:   49 y.o. male who  has a past medical history of Chronic UTI and Paraplegia. And chronic sacral decubitus ulcer and left ischial ulcer and repeated prolonged hospital and ltac stays for ostemyelitis and complicated uti presented to the ed with complaints of worsening weakness and subsequent work up and imaging suggestive of complicated uti and furthera dmitted iv abx and further care esbl inurine  Afebrile  Mri neg for osteo  Dc to ltac for iv abx and wound care and ongoing complex medical management    Consults:   Consults (From admission, onward)          Status Ordering Provider     Inpatient consult to Social Work/Case Management  Once        Provider:  (Not yet assigned)    Completed YOSVANY MENDOZA     Inpatient consult to PICC team (BRENDA)  Once        Provider:  (Not yet assigned)    Acknowledged YOSVANY MENDOZA     Inpatient consult to Registered Dietitian/Nutritionist  Once        Provider:  (Not yet assigned)    Completed YOSVANY MENDOZA            Final Active Diagnoses:    Diagnosis Date Noted POA    Severe malnutrition [E43] 06/29/2025 Yes      Problems Resolved During this Admission:    Diagnosis Date Noted Date Resolved POA    PRINCIPAL  PROBLEM:  Complicated UTI (urinary tract infection) [N39.0] 06/21/2023 07/02/2025 Yes      Discharged Condition: fair    Disposition: Long Term Care    Follow Up:    Patient Instructions:      Diet Adult Regular     Activity as tolerated     Medications:  Reconciled Home Medications:      Medication List        START taking these medications      D5W PgBk 100 mL with piperacillin-tazobactam 4.5 gram SolR 4.5 g  Inject 4.5 g into the vein every 8 (eight) hours.            CONTINUE taking these medications      baclofen 20 MG tablet  Commonly known as: LIORESAL  Take 20 mg by mouth 3 (three) times daily.     ELIQUIS 5 mg Tab  Generic drug: apixaban  Take 5 mg by mouth 2 (two) times daily.     fluticasone propionate 50 mcg/actuation nasal spray  Commonly known as: FLONASE  1 spray by Each Nostril route 2 (two) times daily.     gabapentin 800 MG tablet  Commonly known as: NEURONTIN  Take 800 mg by mouth 3 (three) times daily.     hydrOXYzine pamoate 25 MG Cap  Commonly known as: VISTARIL  Take 25 mg by mouth 3 (three) times daily.     linaCLOtide 290 mcg Cap capsule  Commonly known as: LINZESS  Take 1 capsule (290 mcg total) by mouth before breakfast.     oxyCODONE-acetaminophen  mg per tablet  Commonly known as: PERCOCET  Take 1 tablet by mouth 2 (two) times daily as needed for Pain.     polyethylene glycol 17 gram Pwpk  Commonly known as: GLYCOLAX  Take by mouth.              Significant Diagnostic Studies: Labs: BMP:   Recent Labs   Lab 07/01/25  0614   GLU 95      K 4.0      CO2 28   BUN 19.2   CREATININE 0.65*   CALCIUM 9.2       Pending Diagnostic Studies:       None          Indwelling Lines/Drains at time of discharge:   Lines/Drains/Airways       Peripherally Inserted Central Catheter Line  Duration             PICC Double Lumen 06/28/25 1742 right basilic 3 days              Drain  Duration                  Suprapubic Catheter 04/15/25 1723 77 days                    Time spent on the  discharge of patient: 32 minutes         Yosvany Simms MD  Department of Hospital Medicine  [facility] - [address] [facility]

## 2025-07-02 NOTE — PLAN OF CARE
Problem: Adult Inpatient Plan of Care  Goal: Plan of Care Review  Outcome: Met  Goal: Patient-Specific Goal (Individualized)  Outcome: Met  Goal: Absence of Hospital-Acquired Illness or Injury  Outcome: Met  Goal: Optimal Comfort and Wellbeing  Outcome: Met  Goal: Readiness for Transition of Care  Outcome: Met     Problem: Wound  Goal: Optimal Coping  Outcome: Met  Goal: Optimal Functional Ability  Outcome: Met  Goal: Absence of Infection Signs and Symptoms  Outcome: Met  Goal: Improved Oral Intake  Outcome: Met  Goal: Optimal Pain Control and Function  Outcome: Met  Goal: Skin Health and Integrity  Outcome: Met  Goal: Optimal Wound Healing  Outcome: Met     Problem: Fall Injury Risk  Goal: Absence of Fall and Fall-Related Injury  Outcome: Met     Problem: Skin Injury Risk Increased  Goal: Skin Health and Integrity  Outcome: Met     Problem: Infection  Goal: Absence of Infection Signs and Symptoms  Outcome: Met

## 2025-07-02 NOTE — PLAN OF CARE
07/02/25 1243   Final Note   Assessment Type Final Discharge Note   Anticipated Discharge Disposition LTAC   Post-Acute Status   Post-Acute Authorization Placement   Post-Acute Placement Status Set-up Complete/Auth obtained  ([facility])   Discharge Delays (!) Ambulance Transport/Facility Transport     Transport via [facility]

## 2025-07-03 LAB
BACTERIA BLD CULT: NORMAL
BACTERIA BLD CULT: NORMAL

## 2025-08-19 ENCOUNTER — PATIENT MESSAGE (OUTPATIENT)
Dept: RESEARCH | Facility: HOSPITAL | Age: 50
End: 2025-08-19
Payer: MEDICARE